# Patient Record
Sex: FEMALE | Race: WHITE | NOT HISPANIC OR LATINO | Employment: OTHER | URBAN - METROPOLITAN AREA
[De-identification: names, ages, dates, MRNs, and addresses within clinical notes are randomized per-mention and may not be internally consistent; named-entity substitution may affect disease eponyms.]

---

## 2017-01-03 ENCOUNTER — HOSPITAL ENCOUNTER (OUTPATIENT)
Dept: LAB | Facility: HOSPITAL | Age: 57
Discharge: HOME | End: 2017-01-03
Attending: INTERNAL MEDICINE | Admitting: INTERNAL MEDICINE

## 2017-07-24 ENCOUNTER — HOSPITAL ENCOUNTER (OUTPATIENT)
Dept: LAB | Facility: HOSPITAL | Age: 57
Discharge: HOME | End: 2017-07-24
Attending: INTERNAL MEDICINE | Admitting: INTERNAL MEDICINE

## 2017-09-25 ENCOUNTER — HOSPITAL ENCOUNTER (OUTPATIENT)
Dept: FAMILY MEDICINE CLINIC | Facility: CLINIC | Age: 57
Setting detail: SPECIMEN
Discharge: HOME | End: 2017-09-25
Attending: FAMILY MEDICINE | Admitting: FAMILY MEDICINE

## 2017-10-12 ENCOUNTER — HOSPITAL ENCOUNTER (OUTPATIENT)
Dept: ORTHOPEDIC SURGERY | Facility: CLINIC | Age: 57
Discharge: HOME | End: 2017-10-12
Attending: PODIATRIST | Admitting: PODIATRIST

## 2017-10-13 ENCOUNTER — HOSPITAL ENCOUNTER (OUTPATIENT)
Dept: CT IMAGING | Facility: HOSPITAL | Age: 57
Discharge: HOME | End: 2017-10-13
Attending: FAMILY MEDICINE | Admitting: FAMILY MEDICINE

## 2017-10-13 LAB — CREAT BLDA-MCNC: 0.7 MG/DL (ref 0.6–1.3)

## 2017-10-23 ENCOUNTER — HOSPITAL ENCOUNTER (OUTPATIENT)
Dept: MAMMOGRAPHY | Facility: HOSPITAL | Age: 57
Discharge: HOME | End: 2017-10-23
Attending: FAMILY MEDICINE | Admitting: FAMILY MEDICINE

## 2018-11-27 ENCOUNTER — HOSPITAL ENCOUNTER (OUTPATIENT)
Dept: MAMMOGRAPHY | Facility: HOSPITAL | Age: 58
Discharge: HOME | End: 2018-11-27
Attending: FAMILY MEDICINE | Admitting: FAMILY MEDICINE

## 2019-07-02 RX ORDER — LOSARTAN POTASSIUM AND HYDROCHLOROTHIAZIDE 25; 100 MG/1; MG/1
1 TABLET ORAL DAILY
COMMUNITY
Start: 2018-07-31 | End: 2019-07-02 | Stop reason: SDUPTHER

## 2019-07-02 RX ORDER — LOSARTAN POTASSIUM AND HYDROCHLOROTHIAZIDE 25; 100 MG/1; MG/1
1 TABLET ORAL DAILY
Qty: 90 TABLET | Refills: 0 | Status: SHIPPED | OUTPATIENT
Start: 2019-07-02 | End: 2019-10-16 | Stop reason: SDUPTHER

## 2019-07-05 RX ORDER — LOSARTAN POTASSIUM AND HYDROCHLOROTHIAZIDE 12.5; 5 MG/1; MG/1
1 TABLET ORAL DAILY
Qty: 180 TABLET | Refills: 0 | OUTPATIENT
Start: 2019-07-05

## 2019-08-07 RX ORDER — ALPRAZOLAM 0.5 MG/1
TABLET ORAL
Qty: 30 TABLET | Refills: 0 | Status: SHIPPED | OUTPATIENT
Start: 2019-08-07 | End: 2019-11-08 | Stop reason: SDUPTHER

## 2019-08-22 ENCOUNTER — PROCEDURE VISIT (OUTPATIENT)
Dept: NEUROLOGY | Facility: CLINIC | Age: 59
End: 2019-08-22

## 2019-08-22 DIAGNOSIS — G56.03 BILATERAL CARPAL TUNNEL SYNDROME: Primary | ICD-10-CM

## 2019-08-22 PROCEDURE — 95886 MUSC TEST DONE W/N TEST COMP: CPT | Performed by: PSYCHIATRY & NEUROLOGY

## 2019-08-22 PROCEDURE — 95910 NRV CNDJ TEST 7-8 STUDIES: CPT | Performed by: PSYCHIATRY & NEUROLOGY

## 2019-08-22 NOTE — PROGRESS NOTES
1EMG and Nerve Conduction Studies    The complete report includes the data sheets.     Referring Doctor:  Alan Villagran MD    Indication:  Bilateral CTS    History: Patient states been going on for 30 years last EMG was in 1989 in [place].     Are you a diabetic? yesHepatitis?noHIV?noPacemaker?noDefibrillator?noBlood thinner?noAre you allergic to latex or tape?no    Results:    1.  The median sensory distal latency is prolonged bilaterally, the median motor distal latency is prolonged bilaterally with normal amplitude responses and conduction velocity in the forearm.    2.  The ulnar motor and sensory conduction studies are normal.    3.  EMG of muscles examined in the C5-T1 myotomes were normal bilaterally except for minor neurogenic changes in the abductor pollicis brevis muscles.      Impression:    This is an abnormal study that supports a diagnosis of moderate bilateral median neuropathy at the wrist.    Joseph Seipel, M.D.

## 2019-09-17 RX ORDER — FENOFIBRATE 160 MG/1
TABLET ORAL
Qty: 90 TABLET | Refills: 0 | OUTPATIENT
Start: 2019-09-17

## 2019-09-25 ENCOUNTER — LAB (OUTPATIENT)
Dept: LAB | Facility: HOSPITAL | Age: 59
End: 2019-09-25

## 2019-09-25 ENCOUNTER — HOSPITAL ENCOUNTER (OUTPATIENT)
Dept: CARDIOLOGY | Facility: HOSPITAL | Age: 59
Discharge: HOME | End: 2019-09-25
Admitting: SURGERY

## 2019-09-25 ENCOUNTER — TRANSCRIBE ORDERS (OUTPATIENT)
Dept: ADMINISTRATIVE | Facility: HOSPITAL | Age: 59
End: 2019-09-25

## 2019-09-25 DIAGNOSIS — Z01.818 PRE-OP TESTING: ICD-10-CM

## 2019-09-25 DIAGNOSIS — G56.03 CARPAL TUNNEL SYNDROME, BILATERAL: ICD-10-CM

## 2019-09-25 DIAGNOSIS — Z01.818 PRE-OP TESTING: Primary | ICD-10-CM

## 2019-09-25 LAB
ANION GAP SERPL CALCULATED.3IONS-SCNC: 14.5 MMOL/L (ref 5–15)
BUN BLD-MCNC: 18 MG/DL (ref 8–23)
BUN/CREAT SERPL: 16.1 (ref 7–25)
CALCIUM SPEC-SCNC: 10.5 MG/DL (ref 8.6–10.5)
CHLORIDE SERPL-SCNC: 101 MMOL/L (ref 98–107)
CO2 SERPL-SCNC: 25.5 MMOL/L (ref 22–29)
CREAT BLD-MCNC: 1.12 MG/DL (ref 0.57–1)
GFR SERPL CREATININE-BSD FRML MDRD: 49 ML/MIN/1.73
GLUCOSE BLD-MCNC: 202 MG/DL (ref 65–99)
POTASSIUM BLD-SCNC: 3.9 MMOL/L (ref 3.5–5.2)
SODIUM BLD-SCNC: 141 MMOL/L (ref 136–145)

## 2019-09-25 PROCEDURE — 93010 ELECTROCARDIOGRAM REPORT: CPT | Performed by: INTERNAL MEDICINE

## 2019-09-25 PROCEDURE — 80048 BASIC METABOLIC PNL TOTAL CA: CPT

## 2019-09-25 PROCEDURE — 93005 ELECTROCARDIOGRAM TRACING: CPT | Performed by: SURGERY

## 2019-09-25 PROCEDURE — 36415 COLL VENOUS BLD VENIPUNCTURE: CPT

## 2019-09-26 ENCOUNTER — OFFICE VISIT (OUTPATIENT)
Dept: FAMILY MEDICINE CLINIC | Facility: CLINIC | Age: 59
End: 2019-09-26

## 2019-09-26 ENCOUNTER — LAB (OUTPATIENT)
Dept: FAMILY MEDICINE CLINIC | Facility: CLINIC | Age: 59
End: 2019-09-26

## 2019-09-26 VITALS
WEIGHT: 170.4 LBS | OXYGEN SATURATION: 97 % | HEART RATE: 79 BPM | DIASTOLIC BLOOD PRESSURE: 91 MMHG | HEIGHT: 64 IN | TEMPERATURE: 98.2 F | SYSTOLIC BLOOD PRESSURE: 163 MMHG | BODY MASS INDEX: 29.09 KG/M2

## 2019-09-26 DIAGNOSIS — E78.2 MIXED HYPERLIPIDEMIA: ICD-10-CM

## 2019-09-26 DIAGNOSIS — I10 ESSENTIAL HYPERTENSION: ICD-10-CM

## 2019-09-26 DIAGNOSIS — E11.49 TYPE 2 DIABETES MELLITUS WITH OTHER DIABETIC NEUROLOGICAL COMPLICATION (HCC): ICD-10-CM

## 2019-09-26 DIAGNOSIS — I10 ESSENTIAL HYPERTENSION: Primary | ICD-10-CM

## 2019-09-26 DIAGNOSIS — R30.0 DYSURIA: ICD-10-CM

## 2019-09-26 LAB
ALBUMIN SERPL-MCNC: 4.4 G/DL (ref 3.5–4.8)
ALBUMIN UR-MCNC: 591 MG/L
ALP SERPL-CCNC: 41 U/L (ref 32–91)
ALT SERPL W P-5'-P-CCNC: 48 U/L (ref 14–54)
ARTICHOKE IGE QN: 63 MG/DL (ref 0–100)
AST SERPL-CCNC: 31 U/L (ref 15–41)
BILIRUB BLD-MCNC: NEGATIVE MG/DL
BILIRUB CONJ SERPL-MCNC: 0 MG/DL (ref 0.1–0.5)
BILIRUB INDIRECT SERPL-MCNC: 0.6 MG/DL (ref 0–1.1)
BILIRUB SERPL-MCNC: 0.6 MG/DL (ref 0.3–1.2)
CHOLEST SERPL-MCNC: 125 MG/DL
CLARITY, POC: CLEAR
COLOR UR: YELLOW
GLUCOSE UR STRIP-MCNC: NEGATIVE MG/DL
HDLC SERPL QL: 3.29
HDLC SERPL-MCNC: 38 MG/DL
KETONES UR QL: NEGATIVE
LDLC/HDLC SERPL: 0.74 {RATIO}
LEUKOCYTE EST, POC: NEGATIVE
NITRITE UR-MCNC: NEGATIVE MG/ML
PH UR: 7 [PH] (ref 5–8)
PROT SERPL-MCNC: 7.1 G/DL (ref 6.1–7.9)
PROT UR STRIP-MCNC: ABNORMAL MG/DL
RBC # UR STRIP: NEGATIVE /UL
SP GR UR: 1.02 (ref 1–1.03)
TRIGL SERPL-MCNC: 295 MG/DL
TSH SERPL DL<=0.05 MIU/L-ACNC: 2.06 UIU/ML (ref 0.34–5.6)
UROBILINOGEN UR QL: NORMAL
VLDLC SERPL-MCNC: 59 MG/DL

## 2019-09-26 PROCEDURE — 80061 LIPID PANEL: CPT | Performed by: FAMILY MEDICINE

## 2019-09-26 PROCEDURE — 36415 COLL VENOUS BLD VENIPUNCTURE: CPT | Performed by: FAMILY MEDICINE

## 2019-09-26 PROCEDURE — 80076 HEPATIC FUNCTION PANEL: CPT | Performed by: FAMILY MEDICINE

## 2019-09-26 PROCEDURE — 83036 HEMOGLOBIN GLYCOSYLATED A1C: CPT | Performed by: FAMILY MEDICINE

## 2019-09-26 PROCEDURE — 84443 ASSAY THYROID STIM HORMONE: CPT | Performed by: FAMILY MEDICINE

## 2019-09-26 PROCEDURE — 99214 OFFICE O/P EST MOD 30 MIN: CPT | Performed by: FAMILY MEDICINE

## 2019-09-26 PROCEDURE — 81003 URINALYSIS AUTO W/O SCOPE: CPT | Performed by: FAMILY MEDICINE

## 2019-09-26 PROCEDURE — 82043 UR ALBUMIN QUANTITATIVE: CPT | Performed by: FAMILY MEDICINE

## 2019-09-26 RX ORDER — SYRINGE-NEEDLE,INSULIN,0.5 ML 27GX1/2"
SYRINGE, EMPTY DISPOSABLE MISCELLANEOUS
COMMUNITY
Start: 2016-03-24 | End: 2019-12-24

## 2019-09-26 RX ORDER — FENOFIBRATE 160 MG/1
TABLET ORAL EVERY 24 HOURS
COMMUNITY
Start: 2017-09-25 | End: 2019-09-30 | Stop reason: ALTCHOICE

## 2019-09-26 RX ORDER — ATORVASTATIN CALCIUM 20 MG/1
TABLET, FILM COATED ORAL EVERY 24 HOURS
COMMUNITY
Start: 2017-09-25 | End: 2019-09-27 | Stop reason: SDUPTHER

## 2019-09-26 RX ORDER — ESTRADIOL 0.1 MG/G
CREAM VAGINAL
COMMUNITY
Start: 2014-09-15 | End: 2019-12-24

## 2019-09-26 RX ORDER — METOPROLOL SUCCINATE 100 MG/1
TABLET, EXTENDED RELEASE ORAL
COMMUNITY
Start: 2019-01-03 | End: 2020-01-17 | Stop reason: SDUPTHER

## 2019-09-26 RX ORDER — ALPRAZOLAM 0.5 MG/1
TABLET ORAL EVERY 12 HOURS
COMMUNITY
Start: 2017-10-11 | End: 2019-12-24

## 2019-09-26 RX ORDER — BLOOD-GLUCOSE METER
KIT MISCELLANEOUS
COMMUNITY
Start: 2018-12-06 | End: 2020-01-17

## 2019-09-26 RX ORDER — MULTIVITAMIN
CAPSULE ORAL
COMMUNITY
Start: 2015-03-10 | End: 2019-12-24

## 2019-09-26 RX ORDER — MINOXIDIL 2 %
SOLUTION, NON-ORAL TOPICAL
COMMUNITY
Start: 2015-09-15 | End: 2019-12-24

## 2019-09-26 NOTE — PROGRESS NOTES
Subjective   Susy Hanson is a 59 y.o. female.     Here for follow up on bp,chol, and dm  BS running 150+  Last eye exam [facility] last year   2500mg metformin daily  24/28 units of humalog am/pm         The following portions of the patient's history were reviewed and updated as appropriate: allergies, current medications, past family history, past medical history, past social history, past surgical history and problem list.  Past Medical History:   Diagnosis Date   • Anxiety    • Depression    • Diabetes mellitus (CMS/HCC)    • DUB (dysfunctional uterine bleeding)    • Hyperlipidemia    • Hypertension    • IBS (irritable bowel syndrome)    • Obesity    • Peripheral neuropathy      Past Surgical History:   Procedure Laterality Date   • CHOLECYSTECTOMY     • HYSTERECTOMY       Family History   Problem Relation Age of Onset   • Alzheimer's disease Father      Social History     Socioeconomic History   • Marital status:      Spouse name: Not on file   • Number of children: Not on file   • Years of education: Not on file   • Highest education level: Not on file   Tobacco Use   • Smoking status: Former Smoker   Substance and Sexual Activity   • Alcohol use: No     Frequency: Never   • Drug use: No         Current Outpatient Medications:   •  ALPRAZolam (XANAX) 0.5 MG tablet, Every 12 (Twelve) Hours., Disp: , Rfl:   •  aspirin 81 MG tablet, ASPIRIN 81 MG ORAL TABLET, Disp: , Rfl:   •  atorvastatin (LIPITOR) 20 MG tablet, Daily., Disp: , Rfl:   •  Blood Glucose Monitoring Suppl (ONE TOUCH ULTRA MINI) w/Device kit, ONETOUCH ULTRA MINI w/Device KIT, Disp: , Rfl:   •  Cholecalciferol (MAXIMUM D3) 94016 units capsule, MAXIMUM D3 25989 UNIT CAPS, Disp: , Rfl:   •  estradiol (ESTRACE VAGINAL) 0.1 MG/GM vaginal cream, ESTRACE 0.1 MG/GM CREA, Disp: , Rfl:   •  fenofibrate 160 MG tablet, Daily., Disp: , Rfl:   •  glucose blood (ONE TOUCH ULTRA TEST) test strip, ONETOUCH ULTRA BLUE STRP, Disp: , Rfl:   •  insulin  "lispro protamine-insulin lispro (HUMALOG MIX 75/25) (75-25) 100 UNIT/ML suspension injection, HUMALOG MIX 75/25 (75-25) 100 UNIT/ML SUSP, Disp: , Rfl:   •  Insulin Pen Needle (BD PEN NEEDLE NAYLA U/F) 32G X 4 MM misc, BD PEN NEEDLE NAYLA U/F 32G X 4 MM, Disp: , Rfl:   •  Insulin Syringe-Needle U-100 (B-D INS SYR ULTRAFINE 1CC/31G) 31G X 5/16\" 1 ML misc, BD INSULIN SYR ULTRAFINE II 31G X 5/16\" 1 ML, Disp: , Rfl:   •  metFORMIN (GLUCOPHAGE) 1000 MG tablet, METFORMIN HCL 1000 MG TABS, Disp: , Rfl:   •  metoprolol succinate XL (TOPROL XL) 100 MG 24 hr tablet, TOPROL  MG XR24H-TAB, Disp: , Rfl:   •  Multiple Vitamin (MULTIVITAMIN) capsule, MULTIVITAMINS CAPS, Disp: , Rfl:   •  Omega-3 Fatty Acids ([employer] FISH OIL) 1200 MG capsule, [employer] FISH OIL 1200 MG CAPS, Disp: , Rfl:   •  ALPRAZolam (XANAX) 0.5 MG tablet, TAKE 1 TABLET BY MOUTH TWICE DAILY AS NEEDED FOR ANXIETY, Disp: 30 tablet, Rfl: 0  •  losartan-hydrochlorothiazide (HYZAAR) 100-25 MG per tablet, Take 1 tablet by mouth Daily., Disp: 90 tablet, Rfl: 0    Review of Systems   Constitutional: Negative for diaphoresis, fatigue, fever, unexpected weight gain and unexpected weight loss.   Respiratory: Negative for cough, chest tightness and shortness of breath.    Cardiovascular: Negative for chest pain, palpitations and leg swelling.   Gastrointestinal: Negative for nausea and vomiting.   Endocrine: Negative.    Genitourinary: Positive for dysuria. Negative for hematuria.   Neurological: Negative for dizziness, syncope, numbness and headache.     /91 (BP Location: Left arm, Patient Position: Sitting, Cuff Size: Adult)   Pulse 79   Temp 98.2 °F (36.8 °C) (Oral)   Ht 162.6 cm (64\")   Wt 77.3 kg (170 lb 6.4 oz)   SpO2 97%   BMI 29.25 kg/m²       Objective   Physical Exam   Constitutional: She appears well-developed and well-nourished. No distress. She appears overweight.   HENT:   Head: Normocephalic and atraumatic.   Neck: Neck supple. No JVD present. No " thyromegaly present.   Cardiovascular: Normal rate, regular rhythm, normal heart sounds and intact distal pulses. Exam reveals no gallop and no friction rub.   No murmur heard.  Pulmonary/Chest: Effort normal and breath sounds normal. No respiratory distress. She has no wheezes. She has no rales.   Abdominal: Soft. Bowel sounds are normal. There is no tenderness. There is no CVA tenderness.   Musculoskeletal: She exhibits no edema.    Susy had a diabetic foot exam performed today.   During the foot exam she had a monofilament test not performed.  Vascular Status -  Her right foot exhibits normal foot vasculature  and no edema. Her left foot exhibits normal foot vasculature  and no edema.  Skin Integrity  -  Her right foot skin is intact. She has callous right foot.  She has no right foot onychomycosis, no right foot ulcer, no ingrown toenail on right foot, right heel is not dry and cracked, no right foot warmth, no right foot blister and no right foot gangrenous changes.Her left foot skin is intact.She has callous left foot. She has no left foot onychomycosis, no left foot ulcer, no left ingrown toenail, no left heel dry and cracked, no left foot warmth, no left foot blister and no left foot gangrenous changes..   Foot Structure and Biomechanics -  Her right foot has no hallux valgus, no pes cavus, no claw toes and no hammer toes present. Her left foot has no hallux valgus, no pes cavus, no claw toes and no hammer toes.  Lymphadenopathy:     She has no cervical adenopathy.   Neurological: She is alert.   Skin: Skin is warm and dry. Turgor is normal.   Psychiatric: Her mood appears anxious.   Nursing note and vitals reviewed.    Brief Urine Lab Results  (Last result in the past 365 days)      Color   Clarity   Blood   Leuk Est   Nitrite   Protein   CREAT   Urine HCG        09/26/19 1618 Yellow Clear Negative Negative Negative 100 mg/dL               Assessment/Plan   Problems Addressed this Visit         Cardiovascular and Mediastinum    Hypertension - Primary    Relevant Medications    metoprolol succinate XL (TOPROL XL) 100 MG 24 hr tablet    Other Relevant Orders    Lipid Panel (Completed)    Hepatic Function Panel (Completed)    Mixed hyperlipidemia    Relevant Medications    atorvastatin (LIPITOR) 20 MG tablet    fenofibrate 160 MG tablet    Other Relevant Orders    Lipid Panel (Completed)    Hepatic Function Panel (Completed)       Endocrine    Type 2 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 (CMS/HCC)    Relevant Medications    insulin lispro protamine-insulin lispro (HUMALOG MIX 75/25) (75-25) 100 UNIT/ML suspension injection    metFORMIN (GLUCOPHAGE) 1000 MG tablet    Other Relevant Orders    Hemoglobin A1c    Lipid Panel (Completed)    MicroAlbumin, Urine, Random - Urine, Clean Catch (Completed)    TSH (Completed)    Hepatic Function Panel (Completed)      Other Visit Diagnoses     Dysuria        UA normal    Relevant Orders    POC Urinalysis Dipstick, Automated (Completed)          She will get flu shot at work.  Stress reduction encouraged.

## 2019-09-26 NOTE — PATIENT INSTRUCTIONS
Have bp checked daily for a week and send them to me  Keep working to lose weight through healthy eating and exercise.  No fried foods and limit pasta, bread, and sweets.

## 2019-09-27 LAB — HBA1C MFR BLD: 7.8 % (ref 3.5–5.6)

## 2019-09-29 RX ORDER — INSULIN LISPRO 100 [IU]/ML
INJECTION, SUSPENSION SUBCUTANEOUS
Qty: 45 ML | Refills: 4 | Status: SHIPPED | OUTPATIENT
Start: 2019-09-29 | End: 2020-01-17

## 2019-09-29 RX ORDER — ATORVASTATIN CALCIUM 20 MG/1
20 TABLET, FILM COATED ORAL EVERY 24 HOURS
Qty: 90 TABLET | Refills: 3 | Status: SHIPPED | OUTPATIENT
Start: 2019-09-29 | End: 2019-12-24

## 2019-09-29 RX ORDER — FENOFIBRATE 160 MG/1
160 TABLET ORAL EVERY 24 HOURS
Qty: 90 TABLET | Refills: 0 | OUTPATIENT
Start: 2019-09-29

## 2019-09-30 RX ORDER — ICOSAPENT ETHYL 1000 MG/1
2 CAPSULE ORAL 2 TIMES DAILY WITH MEALS
Qty: 360 CAPSULE | Refills: 3 | Status: SHIPPED | OUTPATIENT
Start: 2019-09-30 | End: 2019-12-24

## 2019-10-03 ENCOUNTER — TELEPHONE (OUTPATIENT)
Dept: FAMILY MEDICINE CLINIC | Facility: CLINIC | Age: 59
End: 2019-10-03

## 2019-10-03 NOTE — TELEPHONE ENCOUNTER
Pt needs  a rx for one touch ultra test strips and pen needles 4ml x32 guage and lancets  sent to jaylan at [facility]

## 2019-10-04 RX ORDER — LANCETS
EACH MISCELLANEOUS
Qty: 200 EACH | Refills: 3 | Status: SHIPPED | OUTPATIENT
Start: 2019-10-04 | End: 2020-01-17

## 2019-10-16 RX ORDER — LOSARTAN POTASSIUM AND HYDROCHLOROTHIAZIDE 25; 100 MG/1; MG/1
1 TABLET ORAL DAILY
Qty: 90 TABLET | Refills: 3 | Status: SHIPPED | OUTPATIENT
Start: 2019-10-16 | End: 2019-10-18 | Stop reason: ALTCHOICE

## 2019-10-18 ENCOUNTER — TELEPHONE (OUTPATIENT)
Dept: FAMILY MEDICINE CLINIC | Facility: CLINIC | Age: 59
End: 2019-10-18

## 2019-10-18 RX ORDER — LOSARTAN POTASSIUM 100 MG/1
100 TABLET ORAL DAILY
Qty: 90 TABLET | Refills: 3 | Status: SHIPPED | OUTPATIENT
Start: 2019-10-18 | End: 2020-09-29 | Stop reason: SDUPTHER

## 2019-10-18 RX ORDER — HYDROCHLOROTHIAZIDE 25 MG/1
25 TABLET ORAL DAILY
Qty: 90 TABLET | Refills: 3 | Status: SHIPPED | OUTPATIENT
Start: 2019-10-18 | End: 2020-09-29 | Stop reason: SDUPTHER

## 2019-10-28 RX ORDER — ATORVASTATIN CALCIUM 20 MG/1
20 TABLET, FILM COATED ORAL EVERY 24 HOURS
Qty: 90 TABLET | Refills: 0 | OUTPATIENT
Start: 2019-10-28

## 2019-11-01 DIAGNOSIS — Z12.31 BREAST CANCER SCREENING BY MAMMOGRAM: Primary | ICD-10-CM

## 2019-11-08 RX ORDER — ALPRAZOLAM 0.5 MG/1
0.5 TABLET ORAL 2 TIMES DAILY PRN
Qty: 30 TABLET | Refills: 0 | Status: SHIPPED | OUTPATIENT
Start: 2019-11-08 | End: 2020-03-04

## 2019-11-11 RX ORDER — NIACIN 500 MG/1
500 TABLET, EXTENDED RELEASE ORAL NIGHTLY
Qty: 90 TABLET | Refills: 1 | Status: SHIPPED | OUTPATIENT
Start: 2019-11-11 | End: 2020-04-08

## 2019-11-15 ENCOUNTER — HOSPITAL ENCOUNTER (OUTPATIENT)
Dept: MAMMOGRAPHY | Facility: HOSPITAL | Age: 59
Discharge: HOME | End: 2019-11-15
Admitting: FAMILY MEDICINE

## 2019-11-15 DIAGNOSIS — Z12.31 BREAST CANCER SCREENING BY MAMMOGRAM: ICD-10-CM

## 2019-11-15 PROCEDURE — 77067 SCR MAMMO BI INCL CAD: CPT

## 2019-11-15 PROCEDURE — 77063 BREAST TOMOSYNTHESIS BI: CPT

## 2019-11-27 ENCOUNTER — TELEPHONE (OUTPATIENT)
Dept: FAMILY MEDICINE CLINIC | Facility: CLINIC | Age: 59
End: 2019-11-27

## 2019-12-02 ENCOUNTER — TREATMENT (OUTPATIENT)
Dept: PHYSICAL THERAPY | Facility: CLINIC | Age: 59
End: 2019-12-02

## 2019-12-02 ENCOUNTER — TRANSCRIBE ORDERS (OUTPATIENT)
Dept: PHYSICAL THERAPY | Facility: CLINIC | Age: 59
End: 2019-12-02

## 2019-12-02 DIAGNOSIS — G56.01 CARPAL TUNNEL SYNDROME ON RIGHT: Primary | ICD-10-CM

## 2019-12-02 DIAGNOSIS — G56.01 CARPAL TUNNEL SYNDROME OF RIGHT WRIST: Primary | ICD-10-CM

## 2019-12-02 PROCEDURE — 97165 OT EVAL LOW COMPLEX 30 MIN: CPT | Performed by: OCCUPATIONAL THERAPIST

## 2019-12-02 PROCEDURE — 97140 MANUAL THERAPY 1/> REGIONS: CPT | Performed by: OCCUPATIONAL THERAPIST

## 2019-12-02 PROCEDURE — 97110 THERAPEUTIC EXERCISES: CPT | Performed by: OCCUPATIONAL THERAPIST

## 2019-12-02 NOTE — PROGRESS NOTES
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    The pt had left CTS done  In Oct which is healing well   She is here today due to CTS that was done on her right hand in    She reports that she feels it is tight and difficult to make a fist and use her hand   She reports no other major co morbidities       Objective     Pt is right hand dominate and right hand affected   ROM of the wrist in flex  55, ext 55  Radial dev is 25 and ulnar is 30   on the right is 35 and the left is 30  Dexterity on the right 9 hole peg is 20.93 sec and the left is 24 sec   She reports no sensory impairments and the numbness and tingling that she would experience at night is gone   She is able to make a complete fist and opposition is complete   She reports no deficits in ADL     IADL and performing some heavier manual tasks such as ; lifting, carrying, resistive rotation such as opening a jar present some minor issues    Assessment & Plan     Assessment  Impairments: impaired physical strength  Assessment details: Due to the tenderness and reported \"thickness in the palm\"  The pt presents with the above impairments   Prognosis: good  Functional Limitations: carrying objects, lifting, pushing and unable to perform repetitive tasks  Goals  Plan Goals: STG   Educate to HEP     LTG  Reduce the scar thickness at surgery site to equal same as left   LTG  Increase strength of lumbrical / strength     Pt is indicated for skilled OT      Plan  Planned modality interventions: " ultrasound  Planned therapy interventions: manual therapy, soft tissue mobilization, strengthening, stretching, therapeutic activities, joint mobilization, home exercise program, flexibility, functional ROM exercises and fine motor coordination training  Frequency: 2x week  Plan details: 10        History # of Personal Factors and/or Comorbidities: LOW (0)  Examination of Body System(s): # of elements: LOW (1-2)  Clinical Presentation: STABLE   Clinical Decision Making: LOW       Timed Codes        Manual Therapy:    15     mins  08649;    Therapeutic Exercise:    15     mins  71789;     Neuromuscular Ronna:        mins  01545;    Therapeutic Activity:          mins  58846;      Ultrasound:          mins  61981;    Community/ work         mins  86216  Self Care/ Danielle         mins  67904  Sensory Re-Int.                  mins   17618  Cognitve Re-train         mins  25734     Un-timed Codes  Electrical Stimulation:         mins 9 52496 ( );  OT low eval     30     mins  60192  OT mod eval.                   mins   05901  OT high eval          mins 44845        Timed Treatment:   30   mins   Total Treatment:     60   mins    OT SIGNATURE: Joy Wu OT   DATE TREATMENT INITIATED: 12/2/2019    Initial Certification  Certification Period: 3/1/2020  I certify that the therapy services are furnished while this patient is under my care.  The services outlined above are required by this patient, and will be reviewed every 90 days.     PHYSICIAN: Florin Montoya MD      DATE:     Please sign and return via fax to [phone].. Thank you, [facility] Occupational Therapy.

## 2019-12-06 ENCOUNTER — TREATMENT (OUTPATIENT)
Dept: PHYSICAL THERAPY | Facility: CLINIC | Age: 59
End: 2019-12-06

## 2019-12-06 DIAGNOSIS — G56.01 CARPAL TUNNEL SYNDROME ON RIGHT: Primary | ICD-10-CM

## 2019-12-06 PROCEDURE — 97110 THERAPEUTIC EXERCISES: CPT | Performed by: OCCUPATIONAL THERAPIST

## 2019-12-06 PROCEDURE — 97140 MANUAL THERAPY 1/> REGIONS: CPT | Performed by: OCCUPATIONAL THERAPIST

## 2019-12-06 NOTE — PROGRESS NOTES
OccupationalTherapy Daily Progress Note        Patient: Susy Hanson   : 1960  Diagnosis/ICD-10 Code:  Carpal tunnel syndrome on right [G56.01]  Referring practitioner: Florin Montoya MD  Date of Initial Visit: Type: THERAPY  Noted: 2019  Today's Date: 2019  Patient seen for 2 sessions             Subjective  The scar tissue on my right is more than left    I did fall on this hand after my surgery       Objective   See Exercise, Manual, and Modality Logs for complete treatment.   US  And scar massage and stretch to the incision healed area    ROM of the wrist is WFL:   MMT of wrist is good at 4/5   and sustained   Good no pain reports        Assessment/Plan    Progress per Plan of Care           Timed Codes        Manual Therapy:    15     mins  96889;    Therapeutic Exercise:    15     mins  25331;     Neuromuscular Ronna:        mins  51016;    Therapeutic Activity:          mins  73090;      Ultrasound:          mins  53713;    Community/ work         mins  64423  Self Care/ Danielle         mins  41617  Sensory Re-Int.                  mins   93444  Cognitve Re-train         mins  22936     Un-timed Codes  Electrical Stimulation:         mins 9 98692 ( );      Timed Treatment:   30   mins   Total Treatment:     30   mins    Joy Wu OT  Occupational Therapist

## 2019-12-24 ENCOUNTER — OFFICE VISIT (OUTPATIENT)
Dept: FAMILY MEDICINE CLINIC | Facility: CLINIC | Age: 59
End: 2019-12-24

## 2019-12-24 VITALS
HEART RATE: 85 BPM | SYSTOLIC BLOOD PRESSURE: 193 MMHG | WEIGHT: 165.8 LBS | OXYGEN SATURATION: 99 % | BODY MASS INDEX: 28.31 KG/M2 | DIASTOLIC BLOOD PRESSURE: 107 MMHG | HEIGHT: 64 IN

## 2019-12-24 DIAGNOSIS — I10 ESSENTIAL HYPERTENSION: Primary | ICD-10-CM

## 2019-12-24 LAB
ALBUMIN SERPL-MCNC: 4.4 G/DL (ref 3.5–5.2)
ALBUMIN/GLOB SERPL: 1.6 G/DL
ALP SERPL-CCNC: 69 U/L (ref 39–117)
ALT SERPL W P-5'-P-CCNC: 35 U/L (ref 1–33)
ANION GAP SERPL CALCULATED.3IONS-SCNC: 13.9 MMOL/L (ref 5–15)
AST SERPL-CCNC: 21 U/L (ref 1–32)
BASOPHILS # BLD AUTO: 0.08 10*3/MM3 (ref 0–0.2)
BASOPHILS NFR BLD AUTO: 1.4 % (ref 0–1.5)
BILIRUB SERPL-MCNC: 0.2 MG/DL (ref 0.2–1.2)
BUN BLD-MCNC: 12 MG/DL (ref 8–23)
BUN/CREAT SERPL: 13.8 (ref 7–25)
CALCIUM SPEC-SCNC: 9.7 MG/DL (ref 8.6–10.5)
CHLORIDE SERPL-SCNC: 98 MMOL/L (ref 98–107)
CO2 SERPL-SCNC: 27.1 MMOL/L (ref 22–29)
CREAT BLD-MCNC: 0.87 MG/DL (ref 0.57–1)
DEPRECATED RDW RBC AUTO: 40.7 FL (ref 37–54)
EOSINOPHIL # BLD AUTO: 0.39 10*3/MM3 (ref 0–0.4)
EOSINOPHIL NFR BLD AUTO: 6.9 % (ref 0.3–6.2)
ERYTHROCYTE [DISTWIDTH] IN BLOOD BY AUTOMATED COUNT: 13.2 % (ref 12.3–15.4)
GFR SERPL CREATININE-BSD FRML MDRD: 66 ML/MIN/1.73
GLOBULIN UR ELPH-MCNC: 2.8 GM/DL
GLUCOSE BLD-MCNC: 209 MG/DL (ref 65–99)
HCT VFR BLD AUTO: 39.4 % (ref 34–46.6)
HGB BLD-MCNC: 12.9 G/DL (ref 12–15.9)
IMM GRANULOCYTES # BLD AUTO: 0.02 10*3/MM3 (ref 0–0.05)
IMM GRANULOCYTES NFR BLD AUTO: 0.4 % (ref 0–0.5)
LYMPHOCYTES # BLD AUTO: 1.77 10*3/MM3 (ref 0.7–3.1)
LYMPHOCYTES NFR BLD AUTO: 31.2 % (ref 19.6–45.3)
MCH RBC QN AUTO: 28.2 PG (ref 26.6–33)
MCHC RBC AUTO-ENTMCNC: 32.7 G/DL (ref 31.5–35.7)
MCV RBC AUTO: 86 FL (ref 79–97)
MONOCYTES # BLD AUTO: 0.55 10*3/MM3 (ref 0.1–0.9)
MONOCYTES NFR BLD AUTO: 9.7 % (ref 5–12)
NEUTROPHILS # BLD AUTO: 2.87 10*3/MM3 (ref 1.7–7)
NEUTROPHILS NFR BLD AUTO: 50.4 % (ref 42.7–76)
NRBC BLD AUTO-RTO: 0 /100 WBC (ref 0–0.2)
PLATELET # BLD AUTO: 251 10*3/MM3 (ref 140–450)
PMV BLD AUTO: 10.8 FL (ref 6–12)
POTASSIUM BLD-SCNC: 4.6 MMOL/L (ref 3.5–5.2)
PROT SERPL-MCNC: 7.2 G/DL (ref 6–8.5)
RBC # BLD AUTO: 4.58 10*6/MM3 (ref 3.77–5.28)
SODIUM BLD-SCNC: 139 MMOL/L (ref 136–145)
WBC NRBC COR # BLD: 5.68 10*3/MM3 (ref 3.4–10.8)

## 2019-12-24 PROCEDURE — 85025 COMPLETE CBC W/AUTO DIFF WBC: CPT | Performed by: NURSE PRACTITIONER

## 2019-12-24 PROCEDURE — 80053 COMPREHEN METABOLIC PANEL: CPT | Performed by: NURSE PRACTITIONER

## 2019-12-24 PROCEDURE — 99213 OFFICE O/P EST LOW 20 MIN: CPT | Performed by: NURSE PRACTITIONER

## 2019-12-24 RX ORDER — HYDRALAZINE HYDROCHLORIDE 25 MG/1
25 TABLET, FILM COATED ORAL 2 TIMES DAILY
Qty: 60 TABLET | Refills: 1 | Status: SHIPPED | OUTPATIENT
Start: 2019-12-24 | End: 2019-12-26

## 2019-12-24 NOTE — PROGRESS NOTES
Subjective   Susy Hanson is a 59 y.o. female.       HPI   Pt. Is here today with c/o BP running high.  She takes medication for HTN daily; currently on metoprolol XL; Losartan; HCTZ.  For past couple of days took an extra metoprolol; BP still high.  Pt.  Has been having intermittent headaches that are stabbing at times.  Denies any CP; palpations; SOA; dizziness; trouble with vision, N/V.     BP readings at home for past two weeks: 182/105; 193/102; 199/114; 184/122; 164/96' 169/106; 168/94; 157/101; 174/99; 189/109.       The following portions of the patient's history were reviewed and updated as appropriate: allergies, current medications, past family history, past medical history, past social history, past surgical history and problem list.    Review of Systems   Constitutional: Negative for activity change, appetite change, chills, diaphoresis, fatigue, fever, unexpected weight gain and unexpected weight loss.   HENT: Negative for ear pain, hearing loss and nosebleeds.    Eyes: Negative for blurred vision, double vision, photophobia and visual disturbance.   Respiratory: Negative for cough, shortness of breath and wheezing.    Cardiovascular: Negative for chest pain, palpitations and leg swelling.   Gastrointestinal: Negative for abdominal pain, constipation, diarrhea, nausea, vomiting and indigestion.   Genitourinary: Negative for dysuria.   Musculoskeletal: Negative for arthralgias, myalgias, neck pain and neck stiffness.   Skin: Negative for rash.   Neurological: Positive for headache. Negative for dizziness, tremors, seizures, syncope, facial asymmetry, speech difficulty, weakness, light-headedness, numbness, memory problem and confusion.   Psychiatric/Behavioral: Negative for sleep disturbance, depressed mood and stress. The patient is not nervous/anxious.        Objective   Physical Exam   Constitutional: She is oriented to person, place, and time. She appears well-developed and well-nourished. No  distress.   HENT:   Head: Normocephalic and atraumatic.   Eyes: Pupils are equal, round, and reactive to light. Conjunctivae and EOM are normal.   Neck: Normal range of motion. Neck supple. No JVD present. No thyromegaly present.   Cardiovascular: Normal rate, regular rhythm, normal heart sounds and intact distal pulses. Exam reveals no gallop and no friction rub.   No murmur heard.  Pulmonary/Chest: Effort normal and breath sounds normal. No respiratory distress. She exhibits no tenderness.   Abdominal: Soft. Bowel sounds are normal. She exhibits no distension. There is no tenderness.   Lymphadenopathy:     She has no cervical adenopathy.   Neurological: She is alert and oriented to person, place, and time.   Skin: Skin is warm and dry. Capillary refill takes less than 2 seconds. No rash noted. She is not diaphoretic. No erythema.   Psychiatric: She has a normal mood and affect. Her behavior is normal.   Vitals reviewed.        Assessment/Plan   Susy was seen today for hypertension and headache.    Diagnoses and all orders for this visit:    Essential hypertension  Comments:  Cont. current medications.  Will add hydralazine 25mg bid.   Monitor BP at home over next couple of days; call in readings Thursday.    Reviewed warning s/s  Orders:  -     hydrALAZINE (APRESOLINE) 25 MG tablet; Take 1 tablet by mouth 2 (Two) Times a Day.  -     CBC Auto Differential  -     Comprehensive Metabolic Panel

## 2019-12-24 NOTE — PATIENT INSTRUCTIONS
"Monitor BP at home over the next week; call in readings for review [phone].   If BP not improving, call office for appointment.    Go to ER for ANY chest pain, shortness of breath, dizziness, headache, trouble with vision.      Hypertension  Hypertension, commonly called high blood pressure, is when the force of blood pumping through the arteries is too strong. The arteries are the blood vessels that carry blood from the heart throughout the body. Hypertension forces the heart to work harder to pump blood and may cause arteries to become narrow or stiff. Having untreated or uncontrolled hypertension can cause heart attacks, strokes, kidney disease, and other problems.  A blood pressure reading consists of a higher number over a lower number. Ideally, your blood pressure should be below 120/80. The first (\"top\") number is called the systolic pressure. It is a measure of the pressure in your arteries as your heart beats. The second (\"bottom\") number is called the diastolic pressure. It is a measure of the pressure in your arteries as the heart relaxes.  What are the causes?  The cause of this condition is not known.  What increases the risk?  Some risk factors for high blood pressure are under your control. Others are not.  Factors you can change  · Smoking.  · Having type 2 diabetes mellitus, high cholesterol, or both.  · Not getting enough exercise or physical activity.  · Being overweight.  · Having too much fat, sugar, calories, or salt (sodium) in your diet.  · Drinking too much alcohol.  Factors that are difficult or impossible to change  · Having chronic kidney disease.  · Having a family history of high blood pressure.  · Age. Risk increases with age.  · Race. You may be at higher risk if you are -American.  · Gender. Men are at higher risk than women before age 45. After age 65, women are at higher risk than men.  · Having obstructive sleep apnea.  · Stress.  What are the signs or " symptoms?  Extremely high blood pressure (hypertensive crisis) may cause:  · Headache.  · Anxiety.  · Shortness of breath.  · Nosebleed.  · Nausea and vomiting.  · Severe chest pain.  · Jerky movements you cannot control (seizures).  How is this diagnosed?  This condition is diagnosed by measuring your blood pressure while you are seated, with your arm resting on a surface. The cuff of the blood pressure monitor will be placed directly against the skin of your upper arm at the level of your heart. It should be measured at least twice using the same arm. Certain conditions can cause a difference in blood pressure between your right and left arms.  Certain factors can cause blood pressure readings to be lower or higher than normal (elevated) for a short period of time:  · When your blood pressure is higher when you are in a health care provider's office than when you are at home, this is called white coat hypertension. Most people with this condition do not need medicines.  · When your blood pressure is higher at home than when you are in a health care provider's office, this is called masked hypertension. Most people with this condition may need medicines to control blood pressure.  If you have a high blood pressure reading during one visit or you have normal blood pressure with other risk factors:  · You may be asked to return on a different day to have your blood pressure checked again.  · You may be asked to monitor your blood pressure at home for 1 week or longer.  If you are diagnosed with hypertension, you may have other blood or imaging tests to help your health care provider understand your overall risk for other conditions.  How is this treated?  This condition is treated by making healthy lifestyle changes, such as eating healthy foods, exercising more, and reducing your alcohol intake. Your health care provider may prescribe medicine if lifestyle changes are not enough to get your blood pressure under  control, and if:  · Your systolic blood pressure is above 130.  · Your diastolic blood pressure is above 80.  Your personal target blood pressure may vary depending on your medical conditions, your age, and other factors.  Follow these instructions at home:  Eating and drinking    · Eat a diet that is high in fiber and potassium, and low in sodium, added sugar, and fat. An example eating plan is called the DASH (Dietary Approaches to Stop Hypertension) diet. To eat this way:  ? Eat plenty of fresh fruits and vegetables. Try to fill half of your plate at each meal with fruits and vegetables.  ? Eat whole grains, such as whole wheat pasta, brown rice, or whole grain bread. Fill about one quarter of your plate with whole grains.  ? Eat or drink low-fat dairy products, such as skim milk or low-fat yogurt.  ? Avoid fatty cuts of meat, processed or cured meats, and poultry with skin. Fill about one quarter of your plate with lean proteins, such as fish, chicken without skin, beans, eggs, and tofu.  ? Avoid premade and processed foods. These tend to be higher in sodium, added sugar, and fat.  · Reduce your daily sodium intake. Most people with hypertension should eat less than 1,500 mg of sodium a day.  · Limit alcohol intake to no more than 1 drink a day for nonpregnant women and 2 drinks a day for men. One drink equals 12 oz of beer, 5 oz of wine, or 1½ oz of hard liquor.  Lifestyle    · Work with your health care provider to maintain a healthy body weight or to lose weight. Ask what an ideal weight is for you.  · Get at least 30 minutes of exercise that causes your heart to beat faster (aerobic exercise) most days of the week. Activities may include walking, swimming, or biking.  · Include exercise to strengthen your muscles (resistance exercise), such as pilates or lifting weights, as part of your weekly exercise routine. Try to do these types of exercises for 30 minutes at least 3 days a week.  · Do not use any  products that contain nicotine or tobacco, such as cigarettes and e-cigarettes. If you need help quitting, ask your health care provider.  · Monitor your blood pressure at home as told by your health care provider.  · Keep all follow-up visits as told by your health care provider. This is important.  Medicines  · Take over-the-counter and prescription medicines only as told by your health care provider. Follow directions carefully. Blood pressure medicines must be taken as prescribed.  · Do not skip doses of blood pressure medicine. Doing this puts you at risk for problems and can make the medicine less effective.  · Ask your health care provider about side effects or reactions to medicines that you should watch for.  Contact a health care provider if:  · You think you are having a reaction to a medicine you are taking.  · You have headaches that keep coming back (recurring).  · You feel dizzy.  · You have swelling in your ankles.  · You have trouble with your vision.  Get help right away if:  · You develop a severe headache or confusion.  · You have unusual weakness or numbness.  · You feel faint.  · You have severe pain in your chest or abdomen.  · You vomit repeatedly.  · You have trouble breathing.  Summary  · Hypertension is when the force of blood pumping through your arteries is too strong. If this condition is not controlled, it may put you at risk for serious complications.  · Your personal target blood pressure may vary depending on your medical conditions, your age, and other factors. For most people, a normal blood pressure is less than 120/80.  · Hypertension is treated with lifestyle changes, medicines, or a combination of both. Lifestyle changes include weight loss, eating a healthy, low-sodium diet, exercising more, and limiting alcohol.  This information is not intended to replace advice given to you by your health care provider. Make sure you discuss any questions you have with your health care  provider.  Document Released: 12/18/2006 Document Revised: 11/15/2017 Document Reviewed: 11/15/2017  [employer] Patient Education © 2019 [employer]

## 2019-12-26 DIAGNOSIS — I10 ESSENTIAL HYPERTENSION: ICD-10-CM

## 2019-12-26 RX ORDER — HYDRALAZINE HYDROCHLORIDE 25 MG/1
TABLET, FILM COATED ORAL
Qty: 180 TABLET | Refills: 1 | Status: SHIPPED | OUTPATIENT
Start: 2019-12-26 | End: 2020-01-07 | Stop reason: SDUPTHER

## 2020-01-02 ENCOUNTER — TELEPHONE (OUTPATIENT)
Dept: FAMILY MEDICINE CLINIC | Facility: CLINIC | Age: 60
End: 2020-01-02

## 2020-01-07 DIAGNOSIS — I10 ESSENTIAL HYPERTENSION: ICD-10-CM

## 2020-01-07 RX ORDER — HYDRALAZINE HYDROCHLORIDE 50 MG/1
50 TABLET, FILM COATED ORAL 2 TIMES DAILY
Qty: 180 TABLET | Refills: 0 | Status: ON HOLD | OUTPATIENT
Start: 2020-01-07 | End: 2020-01-18 | Stop reason: SDUPTHER

## 2020-01-17 ENCOUNTER — APPOINTMENT (OUTPATIENT)
Dept: CT IMAGING | Facility: HOSPITAL | Age: 60
End: 2020-01-17

## 2020-01-17 ENCOUNTER — HOSPITAL ENCOUNTER (OUTPATIENT)
Facility: HOSPITAL | Age: 60
Setting detail: OBSERVATION
Discharge: HOME | End: 2020-01-18
Attending: EMERGENCY MEDICINE | Admitting: INTERNAL MEDICINE

## 2020-01-17 ENCOUNTER — APPOINTMENT (OUTPATIENT)
Dept: MRI IMAGING | Facility: HOSPITAL | Age: 60
End: 2020-01-17

## 2020-01-17 ENCOUNTER — OFFICE VISIT (OUTPATIENT)
Dept: FAMILY MEDICINE CLINIC | Facility: CLINIC | Age: 60
End: 2020-01-17

## 2020-01-17 VITALS
WEIGHT: 168 LBS | BODY MASS INDEX: 28.84 KG/M2 | DIASTOLIC BLOOD PRESSURE: 113 MMHG | OXYGEN SATURATION: 98 % | HEART RATE: 88 BPM | TEMPERATURE: 97.1 F | SYSTOLIC BLOOD PRESSURE: 192 MMHG

## 2020-01-17 DIAGNOSIS — R42 DIZZY: ICD-10-CM

## 2020-01-17 DIAGNOSIS — I10 ESSENTIAL HYPERTENSION: ICD-10-CM

## 2020-01-17 DIAGNOSIS — R51.9 NONINTRACTABLE HEADACHE, UNSPECIFIED CHRONICITY PATTERN, UNSPECIFIED HEADACHE TYPE: ICD-10-CM

## 2020-01-17 DIAGNOSIS — I16.0 HYPERTENSIVE URGENCY: Primary | ICD-10-CM

## 2020-01-17 DIAGNOSIS — I10 UNCONTROLLED HYPERTENSION: Primary | ICD-10-CM

## 2020-01-17 DIAGNOSIS — I44.7 NEW ONSET LEFT BUNDLE BRANCH BLOCK (LBBB): ICD-10-CM

## 2020-01-17 PROBLEM — G62.9 PERIPHERAL NEUROPATHY: Status: ACTIVE | Noted: 2020-01-17

## 2020-01-17 LAB
ANION GAP SERPL CALCULATED.3IONS-SCNC: 13 MMOL/L (ref 5–15)
BASOPHILS # BLD AUTO: 0.1 10*3/MM3 (ref 0–0.2)
BASOPHILS NFR BLD AUTO: 1.7 % (ref 0–1.5)
BUN BLD-MCNC: 14 MG/DL (ref 8–23)
BUN/CREAT SERPL: 17.9 (ref 7–25)
CALCIUM SPEC-SCNC: 10.4 MG/DL (ref 8.6–10.5)
CHLORIDE SERPL-SCNC: 100 MMOL/L (ref 98–107)
CO2 SERPL-SCNC: 27 MMOL/L (ref 22–29)
CREAT BLD-MCNC: 0.78 MG/DL (ref 0.57–1)
DEPRECATED RDW RBC AUTO: 44.2 FL (ref 37–54)
EOSINOPHIL # BLD AUTO: 0.6 10*3/MM3 (ref 0–0.4)
EOSINOPHIL NFR BLD AUTO: 9.2 % (ref 0.3–6.2)
ERYTHROCYTE [DISTWIDTH] IN BLOOD BY AUTOMATED COUNT: 14.5 % (ref 12.3–15.4)
GFR SERPL CREATININE-BSD FRML MDRD: 75 ML/MIN/1.73
GLUCOSE BLD-MCNC: 85 MG/DL (ref 65–99)
GLUCOSE BLDC GLUCOMTR-MCNC: 162 MG/DL (ref 70–105)
GLUCOSE BLDC GLUCOMTR-MCNC: 72 MG/DL (ref 70–105)
HCT VFR BLD AUTO: 38.8 % (ref 34–46.6)
HGB BLD-MCNC: 13.7 G/DL (ref 12–15.9)
LYMPHOCYTES # BLD AUTO: 1.8 10*3/MM3 (ref 0.7–3.1)
LYMPHOCYTES NFR BLD AUTO: 29 % (ref 19.6–45.3)
MCH RBC QN AUTO: 30.8 PG (ref 26.6–33)
MCHC RBC AUTO-ENTMCNC: 35.3 G/DL (ref 31.5–35.7)
MCV RBC AUTO: 87.2 FL (ref 79–97)
MONOCYTES # BLD AUTO: 0.6 10*3/MM3 (ref 0.1–0.9)
MONOCYTES NFR BLD AUTO: 10.3 % (ref 5–12)
NEUTROPHILS # BLD AUTO: 3.1 10*3/MM3 (ref 1.7–7)
NEUTROPHILS NFR BLD AUTO: 49.8 % (ref 42.7–76)
NRBC BLD AUTO-RTO: 0.1 /100 WBC (ref 0–0.2)
PLATELET # BLD AUTO: 220 10*3/MM3 (ref 140–450)
PMV BLD AUTO: 8.7 FL (ref 6–12)
POTASSIUM BLD-SCNC: 3.9 MMOL/L (ref 3.5–5.2)
RBC # BLD AUTO: 4.45 10*6/MM3 (ref 3.77–5.28)
SODIUM BLD-SCNC: 140 MMOL/L (ref 136–145)
TROPONIN T SERPL-MCNC: <0.01 NG/ML (ref 0–0.03)
TSH SERPL DL<=0.05 MIU/L-ACNC: 1.97 UIU/ML (ref 0.27–4.2)
WBC NRBC COR # BLD: 6.2 10*3/MM3 (ref 3.4–10.8)
WHOLE BLOOD HOLD SPECIMEN: NORMAL
WHOLE BLOOD HOLD SPECIMEN: NORMAL

## 2020-01-17 PROCEDURE — 96376 TX/PRO/DX INJ SAME DRUG ADON: CPT

## 2020-01-17 PROCEDURE — G0378 HOSPITAL OBSERVATION PER HR: HCPCS

## 2020-01-17 PROCEDURE — 99220 PR INITIAL OBSERVATION CARE/DAY 70 MINUTES: CPT | Performed by: INTERNAL MEDICINE

## 2020-01-17 PROCEDURE — 82962 GLUCOSE BLOOD TEST: CPT

## 2020-01-17 PROCEDURE — 63710000001 INSULIN LISPRO PROTAMINE-INSULIN LISPRO (75-25) 100 UNIT/ML SUSPENSION: Performed by: PHYSICIAN ASSISTANT

## 2020-01-17 PROCEDURE — 74175 CTA ABDOMEN W/CONTRAST: CPT

## 2020-01-17 PROCEDURE — 93005 ELECTROCARDIOGRAM TRACING: CPT | Performed by: EMERGENCY MEDICINE

## 2020-01-17 PROCEDURE — 70551 MRI BRAIN STEM W/O DYE: CPT

## 2020-01-17 PROCEDURE — 84484 ASSAY OF TROPONIN QUANT: CPT | Performed by: EMERGENCY MEDICINE

## 2020-01-17 PROCEDURE — 99213 OFFICE O/P EST LOW 20 MIN: CPT | Performed by: NURSE PRACTITIONER

## 2020-01-17 PROCEDURE — 84443 ASSAY THYROID STIM HORMONE: CPT | Performed by: PHYSICIAN ASSISTANT

## 2020-01-17 PROCEDURE — 99284 EMERGENCY DEPT VISIT MOD MDM: CPT

## 2020-01-17 PROCEDURE — 0 IOPAMIDOL PER 1 ML: Performed by: INTERNAL MEDICINE

## 2020-01-17 PROCEDURE — 63710000001 INSULIN LISPRO (HUMAN) PER 5 UNITS: Performed by: PHYSICIAN ASSISTANT

## 2020-01-17 PROCEDURE — 85025 COMPLETE CBC W/AUTO DIFF WBC: CPT | Performed by: EMERGENCY MEDICINE

## 2020-01-17 PROCEDURE — 96374 THER/PROPH/DIAG INJ IV PUSH: CPT

## 2020-01-17 PROCEDURE — 36415 COLL VENOUS BLD VENIPUNCTURE: CPT

## 2020-01-17 PROCEDURE — 84484 ASSAY OF TROPONIN QUANT: CPT | Performed by: PHYSICIAN ASSISTANT

## 2020-01-17 PROCEDURE — 80048 BASIC METABOLIC PNL TOTAL CA: CPT | Performed by: EMERGENCY MEDICINE

## 2020-01-17 RX ORDER — BISACODYL 10 MG
10 SUPPOSITORY, RECTAL RECTAL DAILY PRN
Status: DISCONTINUED | OUTPATIENT
Start: 2020-01-17 | End: 2020-01-18 | Stop reason: HOSPADM

## 2020-01-17 RX ORDER — LABETALOL HYDROCHLORIDE 5 MG/ML
20 INJECTION, SOLUTION INTRAVENOUS ONCE
Status: COMPLETED | OUTPATIENT
Start: 2020-01-17 | End: 2020-01-17

## 2020-01-17 RX ORDER — NICOTINE POLACRILEX 4 MG
15 LOZENGE BUCCAL
Status: DISCONTINUED | OUTPATIENT
Start: 2020-01-17 | End: 2020-01-18 | Stop reason: HOSPADM

## 2020-01-17 RX ORDER — HYDROCHLOROTHIAZIDE 25 MG/1
25 TABLET ORAL DAILY
Status: DISCONTINUED | OUTPATIENT
Start: 2020-01-17 | End: 2020-01-18 | Stop reason: HOSPADM

## 2020-01-17 RX ORDER — ALPRAZOLAM 0.5 MG/1
0.5 TABLET ORAL 2 TIMES DAILY PRN
Status: DISCONTINUED | OUTPATIENT
Start: 2020-01-17 | End: 2020-01-18 | Stop reason: HOSPADM

## 2020-01-17 RX ORDER — HYDRALAZINE HYDROCHLORIDE 25 MG/1
50 TABLET, FILM COATED ORAL 2 TIMES DAILY
Status: DISCONTINUED | OUTPATIENT
Start: 2020-01-17 | End: 2020-01-18 | Stop reason: HOSPADM

## 2020-01-17 RX ORDER — CHOLECALCIFEROL (VITAMIN D3) 125 MCG
5 CAPSULE ORAL NIGHTLY PRN
Status: DISCONTINUED | OUTPATIENT
Start: 2020-01-17 | End: 2020-01-18 | Stop reason: HOSPADM

## 2020-01-17 RX ORDER — ACETAMINOPHEN 325 MG/1
650 TABLET ORAL EVERY 4 HOURS PRN
Status: DISCONTINUED | OUTPATIENT
Start: 2020-01-17 | End: 2020-01-18 | Stop reason: HOSPADM

## 2020-01-17 RX ORDER — SODIUM CHLORIDE 0.9 % (FLUSH) 0.9 %
10 SYRINGE (ML) INJECTION EVERY 12 HOURS SCHEDULED
Status: DISCONTINUED | OUTPATIENT
Start: 2020-01-17 | End: 2020-01-18 | Stop reason: HOSPADM

## 2020-01-17 RX ORDER — ALUMINA, MAGNESIA, AND SIMETHICONE 2400; 2400; 240 MG/30ML; MG/30ML; MG/30ML
15 SUSPENSION ORAL EVERY 6 HOURS PRN
Status: DISCONTINUED | OUTPATIENT
Start: 2020-01-17 | End: 2020-01-18 | Stop reason: HOSPADM

## 2020-01-17 RX ORDER — POTASSIUM CHLORIDE 1.5 G/1.77G
40 POWDER, FOR SOLUTION ORAL AS NEEDED
Status: DISCONTINUED | OUTPATIENT
Start: 2020-01-17 | End: 2020-01-18 | Stop reason: HOSPADM

## 2020-01-17 RX ORDER — SODIUM CHLORIDE 0.9 % (FLUSH) 0.9 %
10 SYRINGE (ML) INJECTION AS NEEDED
Status: DISCONTINUED | OUTPATIENT
Start: 2020-01-17 | End: 2020-01-18 | Stop reason: HOSPADM

## 2020-01-17 RX ORDER — DEXTROSE MONOHYDRATE 25 G/50ML
25 INJECTION, SOLUTION INTRAVENOUS
Status: DISCONTINUED | OUTPATIENT
Start: 2020-01-17 | End: 2020-01-18 | Stop reason: HOSPADM

## 2020-01-17 RX ORDER — MAGNESIUM SULFATE HEPTAHYDRATE 40 MG/ML
2 INJECTION, SOLUTION INTRAVENOUS AS NEEDED
Status: DISCONTINUED | OUTPATIENT
Start: 2020-01-17 | End: 2020-01-18 | Stop reason: HOSPADM

## 2020-01-17 RX ORDER — ACETAMINOPHEN 160 MG/5ML
650 SOLUTION ORAL EVERY 4 HOURS PRN
Status: DISCONTINUED | OUTPATIENT
Start: 2020-01-17 | End: 2020-01-18 | Stop reason: HOSPADM

## 2020-01-17 RX ORDER — DOCUSATE SODIUM 100 MG/1
100 CAPSULE, LIQUID FILLED ORAL 2 TIMES DAILY PRN
Status: DISCONTINUED | OUTPATIENT
Start: 2020-01-17 | End: 2020-01-18 | Stop reason: HOSPADM

## 2020-01-17 RX ORDER — LOSARTAN POTASSIUM 50 MG/1
100 TABLET ORAL DAILY
Status: DISCONTINUED | OUTPATIENT
Start: 2020-01-18 | End: 2020-01-18 | Stop reason: HOSPADM

## 2020-01-17 RX ORDER — ACETAMINOPHEN 650 MG/1
650 SUPPOSITORY RECTAL EVERY 4 HOURS PRN
Status: DISCONTINUED | OUTPATIENT
Start: 2020-01-17 | End: 2020-01-18 | Stop reason: HOSPADM

## 2020-01-17 RX ORDER — LABETALOL HYDROCHLORIDE 5 MG/ML
10 INJECTION, SOLUTION INTRAVENOUS EVERY 6 HOURS PRN
Status: DISCONTINUED | OUTPATIENT
Start: 2020-01-17 | End: 2020-01-18 | Stop reason: HOSPADM

## 2020-01-17 RX ORDER — METOPROLOL SUCCINATE 50 MG/1
100 TABLET, EXTENDED RELEASE ORAL 2 TIMES DAILY
Status: DISCONTINUED | OUTPATIENT
Start: 2020-01-17 | End: 2020-01-18 | Stop reason: HOSPADM

## 2020-01-17 RX ORDER — POTASSIUM CHLORIDE 20 MEQ/1
40 TABLET, EXTENDED RELEASE ORAL AS NEEDED
Status: DISCONTINUED | OUTPATIENT
Start: 2020-01-17 | End: 2020-01-18 | Stop reason: HOSPADM

## 2020-01-17 RX ORDER — HYDRALAZINE HYDROCHLORIDE 25 MG/1
25 TABLET, FILM COATED ORAL 2 TIMES DAILY
COMMUNITY
Start: 2020-01-13 | End: 2020-01-17 | Stop reason: SDUPTHER

## 2020-01-17 RX ORDER — ONDANSETRON 2 MG/ML
4 INJECTION INTRAMUSCULAR; INTRAVENOUS EVERY 6 HOURS PRN
Status: DISCONTINUED | OUTPATIENT
Start: 2020-01-17 | End: 2020-01-18 | Stop reason: HOSPADM

## 2020-01-17 RX ORDER — ONDANSETRON 4 MG/1
4 TABLET, FILM COATED ORAL EVERY 6 HOURS PRN
Status: DISCONTINUED | OUTPATIENT
Start: 2020-01-17 | End: 2020-01-18 | Stop reason: HOSPADM

## 2020-01-17 RX ORDER — MAGNESIUM SULFATE HEPTAHYDRATE 40 MG/ML
4 INJECTION, SOLUTION INTRAVENOUS AS NEEDED
Status: DISCONTINUED | OUTPATIENT
Start: 2020-01-17 | End: 2020-01-18 | Stop reason: HOSPADM

## 2020-01-17 RX ORDER — CALCIUM CARBONATE 200(500)MG
2 TABLET,CHEWABLE ORAL 2 TIMES DAILY PRN
Status: DISCONTINUED | OUTPATIENT
Start: 2020-01-17 | End: 2020-01-18 | Stop reason: HOSPADM

## 2020-01-17 RX ORDER — ATORVASTATIN CALCIUM 20 MG/1
20 TABLET, FILM COATED ORAL DAILY
Status: DISCONTINUED | OUTPATIENT
Start: 2020-01-18 | End: 2020-01-18 | Stop reason: HOSPADM

## 2020-01-17 RX ORDER — ACETAMINOPHEN 500 MG
1000 TABLET ORAL ONCE
Status: COMPLETED | OUTPATIENT
Start: 2020-01-17 | End: 2020-01-17

## 2020-01-17 RX ORDER — AMLODIPINE BESYLATE 5 MG/1
5 TABLET ORAL DAILY
Qty: 30 TABLET | Refills: 1 | Status: SHIPPED | OUTPATIENT
Start: 2020-01-17 | End: 2020-01-17

## 2020-01-17 RX ADMIN — METOPROLOL SUCCINATE 100 MG: 50 TABLET, EXTENDED RELEASE ORAL at 20:54

## 2020-01-17 RX ADMIN — HYDRALAZINE HYDROCHLORIDE 50 MG: 25 TABLET, FILM COATED ORAL at 20:54

## 2020-01-17 RX ADMIN — LABETALOL 20 MG/4 ML (5 MG/ML) INTRAVENOUS SYRINGE 10 MG: at 18:38

## 2020-01-17 RX ADMIN — IOPAMIDOL 100 ML: 755 INJECTION, SOLUTION INTRAVENOUS at 23:15

## 2020-01-17 RX ADMIN — ACETAMINOPHEN 1000 MG: 500 TABLET ORAL at 14:37

## 2020-01-17 RX ADMIN — ACETAMINOPHEN 650 MG: 325 TABLET, FILM COATED ORAL at 18:32

## 2020-01-17 RX ADMIN — HYDROCHLOROTHIAZIDE 25 MG: 25 TABLET ORAL at 18:27

## 2020-01-17 RX ADMIN — ALPRAZOLAM 0.5 MG: 0.5 TABLET ORAL at 21:13

## 2020-01-17 RX ADMIN — INSULIN LISPRO 28 UNITS: 100 INJECTION, SUSPENSION SUBCUTANEOUS at 21:01

## 2020-01-17 RX ADMIN — INSULIN LISPRO 3 UNITS: 100 INJECTION, SOLUTION INTRAVENOUS; SUBCUTANEOUS at 20:58

## 2020-01-17 RX ADMIN — LABETALOL 20 MG/4 ML (5 MG/ML) INTRAVENOUS SYRINGE 20 MG: at 12:49

## 2020-01-17 RX ADMIN — Medication 10 ML: at 20:55

## 2020-01-17 NOTE — PATIENT INSTRUCTIONS
It is advised that you go to the ER now for further evaluation.      You will also be given referral to see a cardiologist for further evaluation.

## 2020-01-17 NOTE — PROGRESS NOTES
Subjective   Susy Hanson is a 59 y.o. female.       HPI   Pt. Is here today to follow up on high blood pressure.  She was initiallyseen on Gabriele Vanda for elevated pressures; hydralazine was added.  She called back a week later with continued high readings at home; hydralazine was doubled.    She is back today with continued high readings; 192/113 today.  She c/o significant headache.  She denies chest pain but mentions feeling a pressure a few times.  Denies any SOA; dizziness; trouble with vision.  Has had some nausea on and off; no vomiting.  She mentions being stressed at work and with caring for her ill mother.  She has Xanax for PRN anxiety and mainly uses that at bedtime if needed.  She says she doesn't want anything else for anxiety.      The following portions of the patient's history were reviewed and updated as appropriate: allergies, current medications, past family history, past medical history, past social history, past surgical history and problem list.    Review of Systems   Constitutional: Negative for activity change, appetite change, chills, diaphoresis, fatigue, fever, unexpected weight gain and unexpected weight loss.   Respiratory: Negative for cough, chest tightness, shortness of breath and wheezing.    Cardiovascular: Negative for chest pain, palpitations and leg swelling.   Gastrointestinal: Positive for nausea. Negative for abdominal pain, diarrhea and vomiting.   Genitourinary: Negative for dysuria, flank pain, frequency and urgency.   Musculoskeletal: Negative for arthralgias and myalgias.   Skin: Negative for rash.   Neurological: Positive for headache. Negative for dizziness, tremors, seizures, syncope, facial asymmetry, speech difficulty, weakness, light-headedness, numbness, memory problem and confusion.   Psychiatric/Behavioral: Positive for stress. Negative for depressed mood. The patient is not nervous/anxious.        Objective   Physical Exam   Constitutional: She is  oriented to person, place, and time. She appears well-developed and well-nourished. No distress.   HENT:   Head: Normocephalic and atraumatic.   Eyes: Pupils are equal, round, and reactive to light. Conjunctivae and EOM are normal.   Neck: Normal range of motion. Neck supple. No JVD present. No thyromegaly present.   Cardiovascular: Normal rate, regular rhythm, normal heart sounds and intact distal pulses. Exam reveals no gallop and no friction rub.   No murmur heard.  Pulmonary/Chest: Effort normal and breath sounds normal. No respiratory distress. She has no wheezes. She exhibits no tenderness.   Abdominal: Soft. Bowel sounds are normal. She exhibits no distension. There is no tenderness.   Musculoskeletal: She exhibits no edema.   Lymphadenopathy:     She has no cervical adenopathy.   Neurological: She is alert and oriented to person, place, and time.   Skin: Skin is warm and dry. Capillary refill takes less than 2 seconds. No rash noted. She is not diaphoretic. No erythema.   Psychiatric: She has a normal mood and affect. Her behavior is normal.   Vitals reviewed.        Assessment/Plan   Susy was seen today for hypertension.    Diagnoses and all orders for this visit:    Uncontrolled hypertension  Comments:  Have reviewed warning S/S and am recommending she go to ER now.  Pt. is agreeable but mentions she might not stay if it's crowded.  Orders:  -     Ambulatory Referral to Cardiology  -     amLODIPine (NORVASC) 5 MG tablet; Take 1 tablet by mouth Daily.    Will also send amlodipine 5mg daily and a referral for evaluation with cardiology.  Warning S/S were reviewed with the patient today.

## 2020-01-17 NOTE — H&P
[facility] Medicine Services    Patient Name: Susy Hanson  : 1960  MRN: [id number]  Primary Care Physician: Erica Avila MD  Date of admission: 2020    Patient Care Team:  Erica Avila MD as PCP - General    Subjective   History Present Illness     Chief Complaint:   Chief Complaint   Patient presents with   • Hypertension     Pt sent from [facility] for HTN.      Ms. Hanson is a 59 y.o. with a past medical history of HTN, IBS, anxiety/depression, DM 2 with peripheral neuropathy who presents to [facility]  complaining of accelerated HTN with associated stabbing headaches for greater than one month.  She states her blood pressure meds were doubled three weeks ago, but her BP has been persistently elevated.  She reports associated blurred vision, nausea and dizziness.  She denies chest pain, diaphoresis, dyspnea and vomiting.  She states she has had increased stress and attributes that to part of her uncontrolled HTN.    In the ED, the patient's labs included the following: Na 140, K 3.9, BUN 14, Cr 0.78, glucose 85, WBC 6.2, hgb 13.7, plts 220.  Troponin negative.  EKG: new LBBB, SR, ST elevation secondary to IVCD, HR 85.  MRI brain negative.  BP on arrival 227/123.  The patient was given Labetalol 20 mg once in the ED and admitted for further evaluation and management.         Review of Systems   Constitution: Negative for chills, diaphoresis and fever.   Eyes: Positive for blurred vision.   Cardiovascular: Negative for chest pain.   Respiratory: Negative for shortness of breath.    Gastrointestinal: Positive for nausea. Negative for vomiting.   Neurological: Positive for dizziness and headaches.   All other systems reviewed and are negative.    Personal History     Past Medical History:   Past Medical History:   Diagnosis Date   • DUB (dysfunctional uterine bleeding)    • Hypertension    • Obesity    • Peripheral  neuropathy        Surgical History:      Past Surgical History:   Procedure Laterality Date   • CHOLECYSTECTOMY     • HYSTERECTOMY             Family History: family history includes Alzheimer's disease in her father. Otherwise pertinent FHx was reviewed and unremarkable.     Social History:  reports that she has quit smoking. She has never used smokeless tobacco. She reports that she does not drink alcohol or use drugs.      Medications:  Prior to Admission medications    Medication Sig Start Date End Date Taking? Authorizing Provider   ALPRAZolam (XANAX) 0.5 MG tablet Take 1 tablet by mouth 2 (Two) Times a Day As Needed for Anxiety. for anxiety 11/8/19  Yes Erica Avila MD   atorvastatin (LIPITOR) 20 MG tablet Take 1 tablet by mouth Daily. 9/29/19  Yes Erica Avila MD   hydrALAZINE (APRESOLINE) 50 MG tablet Take 1 tablet by mouth 2 (Two) Times a Day. 1/7/20  Yes Erica Avila MD   hydroCHLOROthiazide (HYDRODIURIL) 25 MG tablet Take 1 tablet by mouth Daily. 10/18/19  Yes Erica Avila MD   insulin lispro protamine-insulin lispro (humaLOG 75-25) (75-25) 100 UNIT/ML suspension injection Inject 24 Units under the skin into the appropriate area as directed Every Morning.   Yes Lexus Taylor MD   insulin lispro protamine-insulin lispro (humaLOG 75-25) (75-25) 100 UNIT/ML suspension injection Inject 28 Units under the skin into the appropriate area as directed every night at bedtime.   Yes Lexus Taylor MD   losartan (COZAAR) 100 MG tablet Take 1 tablet by mouth Daily. 10/18/19  Yes Erica Avila MD   metFORMIN (GLUCOPHAGE) 1000 MG tablet Take 1,000 mg by mouth Every Morning.   Yes Lexus Taylor MD   metFORMIN (GLUCOPHAGE) 1000 MG tablet Take 1,500 mg by mouth Every Evening.   Yes Lexus Taylor MD   metoprolol succinate XL (TOPROL-XL) 100 MG 24 hr tablet Take 2 tablets by mouth Daily for blood pressure.  Patient taking  differently: Take 100 mg by mouth 2 (Two) Times a Day. 4/30/19  Yes Erica Avila MD   niacin (NIASPAN) 500 MG CR tablet Take 1 tablet by mouth Every Night. 11/11/19  Yes Erica Avila MD   icosapent ethyl (VASCEPA) 1 g capsule capsule Take 2 capsules by mouth 2 (Two) Times a Day with meals. 9/30/19 1/17/20 Yes Erica Avila MD   amLODIPine (NORVASC) 5 MG tablet Take 1 tablet by mouth Daily. 1/17/20 1/17/20  Maria R Blue APRN   Blood Glucose Monitoring Suppl (ONE TOUCH ULTRA MINI) w/Device kit ONETOUCH ULTRA MINI w/Device KIT 12/6/18 1/17/20  ProviderLexus MD   glucose blood (ONE TOUCH ULTRA TEST) test strip 1 each by Other route 2 (Two) Times a Day. Use as instructed 10/4/19 1/17/20  Erica Avila MD   hydrALAZINE (APRESOLINE) 25 MG tablet Take 25 mg by mouth 2 (Two) Times a Day. 1/13/20 1/17/20  ProviderLexus MD   Insulin Lispro Prot & Lispro (humaLOG 75-25) (75-25) 100 UNIT/ML suspension pen-injector pen inject 24 units under the skin every morning and 28 units every evening 9/29/19 1/17/20  Erica Avila MD   Insulin Pen Needle (BD PEN NEEDLE NAYLA U/F) 32G X 4 MM misc Use with insulin twice a day 10/4/19 1/17/20  Erica Avila MD   Lancets (ONETOUCH ULTRASOFT) lancets Test blood sugar twice a day 10/4/19 1/17/20  Erica Avila MD   losartan-hydrochlorothiazide (HYZAAR) 100-25 MG per tablet TAKE 1 TABLET BY MOUTH DAILY 7/2/19 1/17/20  Erica Avila MD   metFORMIN (GLUCOPHAGE) 1000 MG tablet Take 1 tablet by mouth at breakfast and 2 tablets with supper. 9/29/19 1/17/20  Erica Avila MD   metoprolol succinate XL (TOPROL XL) 100 MG 24 hr tablet TOPROL  MG XR24H-TAB 1/3/19 1/17/20  Provider, MD Lexus   niacin (SLO-NIACIN) 500 MG CR tablet Take 1 tablet by mouth Every Night. 11/11/19 1/17/20  Erica Avila MD       Allergies:    Allergies   Allergen  Reactions   • Hydrocodone-Acetaminophen Hives       Objective   Objective     Vital Signs  Temp:  [97.1 °F (36.2 °C)-98 °F (36.7 °C)] 98 °F (36.7 °C)  Heart Rate:  [88-98] 90  Resp:  [12-18] 12  BP: (139-227)/() 169/82  SpO2:  [97 %-99 %] 97 %  on   ;   Device (Oxygen Therapy): room air  Body mass index is 28.53 kg/m².    Physical Exam   Constitutional: She is oriented to person, place, and time. She appears well-developed and well-nourished. No distress.   HENT:   Head: Normocephalic and atraumatic.   Eyes: Pupils are equal, round, and reactive to light. EOM are normal.   Neck: Normal range of motion. Neck supple.   Cardiovascular: Regular rhythm and normal heart sounds. Exam reveals no gallop and no friction rub.   No murmur heard.  Intermittent tachycardia, accelerated HTN   Pulmonary/Chest: Effort normal and breath sounds normal. No stridor. No respiratory distress. She has no wheezes.   Abdominal: Soft. Bowel sounds are normal. She exhibits no distension. There is no tenderness. There is no guarding.   Musculoskeletal: Normal range of motion. She exhibits no edema or deformity.   Neurological: She is alert and oriented to person, place, and time.   Skin: Skin is warm and dry. She is not diaphoretic.   Psychiatric: She has a normal mood and affect.   Vitals reviewed.    Results Review:    Results from last 7 days   Lab Units 01/17/20  1242   WBC 10*3/mm3 6.20   HEMOGLOBIN g/dL 13.7   HEMATOCRIT % 38.8   PLATELETS 10*3/mm3 220     Results from last 7 days   Lab Units 01/17/20  1553 01/17/20  1242   SODIUM mmol/L  --  140   POTASSIUM mmol/L  --  3.9   CHLORIDE mmol/L  --  100   CO2 mmol/L  --  27.0   BUN mg/dL  --  14   CREATININE mg/dL  --  0.78   GLUCOSE mg/dL  --  85   CALCIUM mg/dL  --  10.4   TROPONIN T ng/mL <0.010 <0.010     Estimated Creatinine Clearance: 75.3 mL/min (by C-G formula based on SCr of 0.78 mg/dL).  Brief Urine Lab Results  (Last result in the past 365 days)      Color   Clarity   Blood    Leuk Est   Nitrite   Protein   CREAT   Urine HCG        09/26/19 1618 Yellow Clear Negative Negative Negative 100 mg/dL               Microbiology Results (last 10 days)     ** No results found for the last 240 hours. **          ECG/EMG Results (most recent)     Procedure Component Value Units Date/Time    ECG 12 Lead [[id number]] Collected:  01/17/20 1241     Updated:  01/17/20 1244    Narrative:       HEART RATE= 85  bpm  RR Interval= 704  ms  NE Interval= 175  ms  P Horizontal Axis= -7  deg  P Front Axis= 58  deg  QRSD Interval= 167  ms  QT Interval= 434  ms  QRS Axis= -35  deg  T Wave Axis= 82  deg  - ABNORMAL ECG -  Sinus rhythm  Probable left atrial enlargement  ST elevation secondary to IVCD  When compared with ECG of 25-Sep-2019 14:52:40,  Nonspecific significant change  Electronically Signed By:   Date and Time of Study: 2020-01-17 12:41:08                    Mri Brain Without Contrast    Result Date: 1/17/2020  1. No acute intracranial abnormality. Statistically, no evidence of hemorrhage, mass effect or infarct. 2. Minimal periventricular white matter T2/FLAIR hyperintense signal changes most likely representing sequelae of chronic small vessel ischemic disease. Demyelinating process is also a consideration but felt to be less likely.    Electronically Signed By-Pillo Rosas On:1/17/2020 1:47 PM This report was finalized on [id number] by  Pillo Rosas, .        Estimated Creatinine Clearance: 75.3 mL/min (by C-G formula based on SCr of 0.78 mg/dL).    Assessment/Plan   Assessment/Plan       Active Hospital Problems    Diagnosis  POA   • **Hypertensive urgency [I16.0]  Yes   • Peripheral neuropathy [G62.9]  Unknown   • Type 2 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 (CMS/HCC) [E11.49]  Yes   • Vitamin D deficiency [E55.9]  Yes   • Obesity [E66.9]  Yes   • Mixed anxiety depressive disorder [F41.8]  Yes   • Irritable bowel syndrome [K58.9]  Yes   • Hypertension [I10]  Yes      • Mixed hyperlipidemia [E78.2]  Yes      Resolved Hospital Problems   No resolved problems to display.       Active Hospital Problems:  No notes have been filed under this hospital service.  Service: Hospitalist    Hypertensive urgency with uncontrolled HTN  - /123 on arrival, improved   - EKG: new LBBB, SR, ST elevation secondary to IVCD, HR 85  - troponin negative, check serial troponin  - stress test in AM   - cardiac monitoring  - cardiology consulted   - patient given Labetalol 20 mg once in ED, continue PRN  - continue home hydralazine, losartan, metoprolol, HCTZ  - diabetic / healthy heart diet / NPO at midnight     Acute headache  - likely secondary to uncontrolled HTN   - MRI brain negative   - Tylenol PRN     Hyperlipidemia  - continue home atorvastatin     Diabetes mellitus type 2 with peripheral neuropathy   - continue home insulin  - defer home Metformin  - SSI   - accuchecks AC HS    Anxiety / Depression  - on home Xanax (no current INSPECT)    IBS     Overweight (BMI 28.53)  - encourage lifestyle modifications     VTE Prophylaxis - Lovenox 40 mg SC daily.    CODE STATUS:    Code Status and Medical Interventions:   Ordered at: 01/17/20 1455     Code Status:    CPR     Medical Interventions (Level of Support Prior to Arrest):    Full       Admission Status:  I believe this patient meets observation criteria.    I discussed the patient's findings and my recommendations with patient.    Electronically signed by Gerda Warren PA-C, 01/17/20, 2:36 PM.  [facility] Hospitalist Team      I have reviewed the notes, assessments, and/or procedures performed byRICARDO Stevenson- . I concur with her/his documentation of Susy Hanson.  Patient was seen and examined, in bed no acute distress, admitted with hypertensive urgency.  Repeat blood pressure at this time is 180/90.  Patient complained of headache.  Her exam is awake alert no acute distress denies any chest patient's of breath or cough.   Lungs are clear to auscultation heart regular rhythm S1-S2, abdomen soft nontender distended, extremities no clubbing with occasional edema, patient admits of chronic history of tobacco use.  She is diabetic.  Will obtain CT Angio of the abdomen to rule out any renal artery stenosis check aldosterone and renin.

## 2020-01-17 NOTE — ED PROVIDER NOTES
Subjective   Chief complaint elevated blood pressure headaches    History of present illness this is a 59-year-old female with history of hypertension who states that her blood pressures been staying elevated over the last month about 3 weeks ago she had her blood pressure medication doubled and is not helping.  She states that she continues to have these headaches which is been ongoing off and on for the last several weeks.  There is no speech difficulty denies any paralysis she is able to walk without difficulty no visual disturbances.  Patient went to the office today and her blood pressure was elevated at 227/123 and was sent to the ER.          Review of Systems   Constitutional: Negative for chills and fever.   HENT: Negative for congestion and sinus pressure.    Eyes: Negative for photophobia and visual disturbance.   Respiratory: Negative for chest tightness and shortness of breath.    Cardiovascular: Negative for chest pain and leg swelling.   Gastrointestinal: Positive for nausea. Negative for abdominal pain and vomiting.   Endocrine: Negative for cold intolerance and heat intolerance.   Genitourinary: Negative for difficulty urinating and dysuria.   Musculoskeletal: Negative for arthralgias and back pain.   Skin: Negative for color change and pallor.   Neurological: Positive for dizziness and headaches. Negative for facial asymmetry and speech difficulty.   Psychiatric/Behavioral: Negative for agitation and behavioral problems.       Past Medical History:   Diagnosis Date   • Anxiety    • Depression    • Diabetes mellitus (CMS/HCC)    • DUB (dysfunctional uterine bleeding)    • Hyperlipidemia    • Hypertension    • IBS (irritable bowel syndrome)    • Obesity    • Peripheral neuropathy        Allergies   Allergen Reactions   • Hydrocodone-Acetaminophen Hives       Past Surgical History:   Procedure Laterality Date   • CHOLECYSTECTOMY     • HYSTERECTOMY         Family History   Problem Relation Age of Onset    • Alzheimer's disease Father        Social History     Socioeconomic History   • Marital status:      Spouse name: Not on file   • Number of children: Not on file   • Years of education: Not on file   • Highest education level: Not on file   Tobacco Use   • Smoking status: Former Smoker   • Smokeless tobacco: Never Used   Substance and Sexual Activity   • Alcohol use: No     Frequency: Never   • Drug use: No   • Sexual activity: Yes     Partners: Male     Prior to Admission medications    Medication Sig Start Date End Date Taking? Authorizing Provider   ALPRAZolam (XANAX) 0.5 MG tablet Take 1 tablet by mouth 2 (Two) Times a Day As Needed for Anxiety. for anxiety 11/8/19   Erica Avila MD   amLODIPine (NORVASC) 5 MG tablet Take 1 tablet by mouth Daily. 1/17/20   Maria R Blue APRN   atorvastatin (LIPITOR) 20 MG tablet Take 1 tablet by mouth Daily. 9/29/19   Erica Avila MD   hydrALAZINE (APRESOLINE) 25 MG tablet Take 25 mg by mouth 2 (Two) Times a Day. 1/13/20   Lexus Taylor MD   hydrALAZINE (APRESOLINE) 50 MG tablet Take 1 tablet by mouth 2 (Two) Times a Day. 1/7/20   Erica Avila MD   hydroCHLOROthiazide (HYDRODIURIL) 25 MG tablet Take 1 tablet by mouth Daily. 10/18/19   Erica Avila MD   icosapent ethyl (VASCEPA) 1 g capsule capsule Take 2 capsules by mouth 2 (Two) Times a Day with meals. 9/30/19   Erica Avila MD   Insulin Lispro Prot & Lispro (humaLOG 75-25) (75-25) 100 UNIT/ML suspension pen-injector pen inject 24 units under the skin every morning and 28 units every evening 9/29/19   Erica Avila MD   losartan (COZAAR) 100 MG tablet Take 1 tablet by mouth Daily. 10/18/19   Erica Avila MD   losartan-hydrochlorothiazide (HYZAAR) 100-25 MG per tablet TAKE 1 TABLET BY MOUTH DAILY 7/2/19   Erica Avila MD   metFORMIN (GLUCOPHAGE) 1000 MG tablet Take 1 tablet by mouth at breakfast  and 2 tablets with supper. 9/29/19   Erica Avila MD   metoprolol succinate XL (TOPROL XL) 100 MG 24 hr tablet TOPROL  MG XR24H-TAB 1/3/19   ProviderLexus MD   metoprolol succinate XL (TOPROL-XL) 100 MG 24 hr tablet Take 2 tablets by mouth Daily for blood pressure. 4/30/19   Erica Avila MD   niacin (NIASPAN) 500 MG CR tablet Take 1 tablet by mouth Every Night. 11/11/19   Erica Avila MD   niacin (SLO-NIACIN) 500 MG CR tablet Take 1 tablet by mouth Every Night. 11/11/19   Erica Avila MD   Blood Glucose Monitoring Suppl (ONE TOUCH ULTRA MINI) w/Device kit ONETOUCH ULTRA MINI w/Device KIT 12/6/18 1/17/20  ProviderLexus MD   glucose blood (ONE TOUCH ULTRA TEST) test strip 1 each by Other route 2 (Two) Times a Day. Use as instructed 10/4/19 1/17/20  Erica Avila MD   Insulin Pen Needle (BD PEN NEEDLE NAYLA U/F) 32G X 4 MM misc Use with insulin twice a day 10/4/19 1/17/20  Erica Avila MD   Lancets (ONETOUCH ULTRASOFT) lancets Test blood sugar twice a day 10/4/19 1/17/20  Erica Avila MD           Objective   Physical Exam  59-year-old awake alert no acute distress initial blood pressure in the 229/120 range.  HEENT extraocular muscles intact pupils equal and reactive no photophobia disks are sharp mouth is clear neck supple no adenopathy no JVD no bruits lungs clear no retractions heart regular without murmur abdomen soft without tenderness or masses extremities pulses are equal throughout upper and lower extremities no edema cords or Homans sign or evidence of DVT.  Patient's awake alert follows commands orientated x4 no facial asymmetry normal speech no drift in the arms or legs without focal deficits.  Procedures           ED Course      Results for orders placed or performed during the hospital encounter of 01/17/20   Basic Metabolic Panel   Result Value Ref Range    Glucose 85 65 - 99 mg/dL    BUN  14 8 - 23 mg/dL    Creatinine 0.78 0.57 - 1.00 mg/dL    Sodium 140 136 - 145 mmol/L    Potassium 3.9 3.5 - 5.2 mmol/L    Chloride 100 98 - 107 mmol/L    CO2 27.0 22.0 - 29.0 mmol/L    Calcium 10.4 8.6 - 10.5 mg/dL    eGFR Non African Amer 75 >60 mL/min/1.73    BUN/Creatinine Ratio 17.9 7.0 - 25.0    Anion Gap 13.0 5.0 - 15.0 mmol/L   Troponin   Result Value Ref Range    Troponin T <0.010 0.000 - 0.030 ng/mL   CBC Auto Differential   Result Value Ref Range    WBC 6.20 3.40 - 10.80 10*3/mm3    RBC 4.45 3.77 - 5.28 10*6/mm3    Hemoglobin 13.7 12.0 - 15.9 g/dL    Hematocrit 38.8 34.0 - 46.6 %    MCV 87.2 79.0 - 97.0 fL    MCH 30.8 26.6 - 33.0 pg    MCHC 35.3 31.5 - 35.7 g/dL    RDW 14.5 12.3 - 15.4 %    RDW-SD 44.2 37.0 - 54.0 fl    MPV 8.7 6.0 - 12.0 fL    Platelets 220 140 - 450 10*3/mm3    Neutrophil % 49.8 42.7 - 76.0 %    Lymphocyte % 29.0 19.6 - 45.3 %    Monocyte % 10.3 5.0 - 12.0 %    Eosinophil % 9.2 (H) 0.3 - 6.2 %    Basophil % 1.7 (H) 0.0 - 1.5 %    Neutrophils, Absolute 3.10 1.70 - 7.00 10*3/mm3    Lymphocytes, Absolute 1.80 0.70 - 3.10 10*3/mm3    Monocytes, Absolute 0.60 0.10 - 0.90 10*3/mm3    Eosinophils, Absolute 0.60 (H) 0.00 - 0.40 10*3/mm3    Basophils, Absolute 0.10 0.00 - 0.20 10*3/mm3    nRBC 0.1 0.0 - 0.2 /100 WBC   Light Blue Top   Result Value Ref Range    Extra Tube hold for add-on    Lavender Top   Result Value Ref Range    Extra Tube hold for add-on      Mri Brain Without Contrast    Result Date: 1/17/2020  1. No acute intracranial abnormality. Statistically, no evidence of hemorrhage, mass effect or infarct. 2. Minimal periventricular white matter T2/FLAIR hyperintense signal changes most likely representing sequelae of chronic small vessel ischemic disease. Demyelinating process is also a consideration but felt to be less likely.    Electronically Signed By-Pillo Rosas On:1/17/2020 1:47 PM This report was finalized on [id number] by  Pillo Rosas, .    Medications    acetaminophen (TYLENOL) tablet 1,000 mg (has no administration in time range)   labetalol (NORMODYNE,TRANDATE) injection 20 mg (20 mg Intravenous Given 1/17/20 1249)          EKG my interpretation normal sinus rhythm rate of 85 patient has a left bundle branch block with a QTC of 487 this is an abnormal EKG and is changed from previous EKG just from September 2019.                                      MDM  Number of Diagnoses or Management Options  Dizzy:   Hypertensive urgency:   New onset left bundle branch block (LBBB):   Nonintractable headache, unspecified chronicity pattern, unspecified headache type:   Diagnosis management comments: Medical decision making.  Patient IV established placed on monitors given labetalol 1 mg IV and had the above exam and evaluation MRI brain showed no acute finding CBC electrolytes troponin were negative but EKG is markedly abnormal with a new left bundle branch block which is new from just a few months ago.  Patient's had some uncontrolled hypertension new left bundle branch block some dizziness and headaches and nausea.  I see no evidence of an acute stroke certainly cardiac issues may be at hand here.  Blood pressure now 139/75 she was given Tylenol for headache hospitalist nurse practitioner notified and she will be placed in the hospital for further care.  Patient is scheduled to have a consultation as an outpatient from Dr. Kim office      Final diagnoses:   Hypertensive urgency   Dizzy   New onset left bundle branch block (LBBB)   Nonintractable headache, unspecified chronicity pattern, unspecified headache type            Aleks Esqueda MD  01/17/20 0531

## 2020-01-18 ENCOUNTER — APPOINTMENT (OUTPATIENT)
Dept: NUCLEAR MEDICINE | Facility: HOSPITAL | Age: 60
End: 2020-01-18

## 2020-01-18 VITALS
TEMPERATURE: 97.6 F | HEIGHT: 64 IN | RESPIRATION RATE: 16 BRPM | HEART RATE: 103 BPM | OXYGEN SATURATION: 93 % | BODY MASS INDEX: 28.38 KG/M2 | SYSTOLIC BLOOD PRESSURE: 140 MMHG | WEIGHT: 166.23 LBS | DIASTOLIC BLOOD PRESSURE: 63 MMHG

## 2020-01-18 LAB
ANION GAP SERPL CALCULATED.3IONS-SCNC: 13 MMOL/L (ref 5–15)
BASOPHILS # BLD AUTO: 0.1 10*3/MM3 (ref 0–0.2)
BASOPHILS NFR BLD AUTO: 1.2 % (ref 0–1.5)
BH CV NUCLEAR PRIOR STUDY: 3
BH CV STRESS BP STAGE 1: NORMAL
BH CV STRESS BP STAGE 2: NORMAL
BH CV STRESS BP STAGE 3: NORMAL
BH CV STRESS DURATION MIN STAGE 1: 3
BH CV STRESS DURATION MIN STAGE 2: 3
BH CV STRESS DURATION MIN STAGE 3: 3
BH CV STRESS DURATION SEC STAGE 1: 0
BH CV STRESS DURATION SEC STAGE 2: 0
BH CV STRESS DURATION SEC STAGE 3: 0
BH CV STRESS GRADE STAGE 1: 10
BH CV STRESS GRADE STAGE 2: 12
BH CV STRESS GRADE STAGE 3: 14
BH CV STRESS HR STAGE 1: 120
BH CV STRESS HR STAGE 2: 135
BH CV STRESS HR STAGE 3: 145
BH CV STRESS METS STAGE 1: 5
BH CV STRESS METS STAGE 2: 7.5
BH CV STRESS METS STAGE 3: 10
BH CV STRESS PROTOCOL 1: NORMAL
BH CV STRESS RECOVERY BP: NORMAL MMHG
BH CV STRESS RECOVERY HR: 91 BPM
BH CV STRESS SPEED STAGE 1: 1.7
BH CV STRESS SPEED STAGE 2: 2.5
BH CV STRESS SPEED STAGE 3: 3.4
BH CV STRESS STAGE 1: 1
BH CV STRESS STAGE 2: 2
BH CV STRESS STAGE 3: 3
BUN BLD-MCNC: 15 MG/DL (ref 8–23)
BUN/CREAT SERPL: 17.9 (ref 7–25)
CALCIUM SPEC-SCNC: 9.7 MG/DL (ref 8.6–10.5)
CHLORIDE SERPL-SCNC: 102 MMOL/L (ref 98–107)
CO2 SERPL-SCNC: 27 MMOL/L (ref 22–29)
CREAT BLD-MCNC: 0.84 MG/DL (ref 0.57–1)
DEPRECATED RDW RBC AUTO: 43.8 FL (ref 37–54)
EOSINOPHIL # BLD AUTO: 0.6 10*3/MM3 (ref 0–0.4)
EOSINOPHIL NFR BLD AUTO: 7.3 % (ref 0.3–6.2)
ERYTHROCYTE [DISTWIDTH] IN BLOOD BY AUTOMATED COUNT: 14.6 % (ref 12.3–15.4)
GFR SERPL CREATININE-BSD FRML MDRD: 69 ML/MIN/1.73
GLUCOSE BLD-MCNC: 109 MG/DL (ref 65–99)
GLUCOSE BLDC GLUCOMTR-MCNC: 111 MG/DL (ref 70–105)
GLUCOSE BLDC GLUCOMTR-MCNC: 134 MG/DL (ref 70–105)
GLUCOSE BLDC GLUCOMTR-MCNC: 151 MG/DL (ref 70–105)
HCT VFR BLD AUTO: 36.5 % (ref 34–46.6)
HGB BLD-MCNC: 12.6 G/DL (ref 12–15.9)
LYMPHOCYTES # BLD AUTO: 1 10*3/MM3 (ref 0.7–3.1)
LYMPHOCYTES NFR BLD AUTO: 12 % (ref 19.6–45.3)
MAXIMAL PREDICTED HEART RATE: 159 BPM
MCH RBC QN AUTO: 29.4 PG (ref 26.6–33)
MCHC RBC AUTO-ENTMCNC: 34.5 G/DL (ref 31.5–35.7)
MCV RBC AUTO: 85.2 FL (ref 79–97)
MONOCYTES # BLD AUTO: 0.8 10*3/MM3 (ref 0.1–0.9)
MONOCYTES NFR BLD AUTO: 9.5 % (ref 5–12)
NEUTROPHILS # BLD AUTO: 5.8 10*3/MM3 (ref 1.7–7)
NEUTROPHILS NFR BLD AUTO: 70 % (ref 42.7–76)
NRBC BLD AUTO-RTO: 0 /100 WBC (ref 0–0.2)
PERCENT MAX PREDICTED HR: 91.19 %
PLATELET # BLD AUTO: 195 10*3/MM3 (ref 140–450)
PMV BLD AUTO: 8.3 FL (ref 6–12)
POTASSIUM BLD-SCNC: 3.1 MMOL/L (ref 3.5–5.2)
RBC # BLD AUTO: 4.28 10*6/MM3 (ref 3.77–5.28)
SODIUM BLD-SCNC: 142 MMOL/L (ref 136–145)
STRESS BASELINE BP: NORMAL MMHG
STRESS BASELINE HR: 83 BPM
STRESS PERCENT HR: 107 %
STRESS POST PEAK BP: NORMAL MMHG
STRESS POST PEAK HR: 145 BPM
STRESS TARGET HR: 135 BPM
TROPONIN T SERPL-MCNC: <0.01 NG/ML (ref 0–0.03)
WBC NRBC COR # BLD: 8.3 10*3/MM3 (ref 3.4–10.8)

## 2020-01-18 PROCEDURE — G0378 HOSPITAL OBSERVATION PER HR: HCPCS

## 2020-01-18 PROCEDURE — 84484 ASSAY OF TROPONIN QUANT: CPT | Performed by: PHYSICIAN ASSISTANT

## 2020-01-18 PROCEDURE — 82962 GLUCOSE BLOOD TEST: CPT

## 2020-01-18 PROCEDURE — 78452 HT MUSCLE IMAGE SPECT MULT: CPT | Performed by: INTERNAL MEDICINE

## 2020-01-18 PROCEDURE — 85025 COMPLETE CBC W/AUTO DIFF WBC: CPT | Performed by: PHYSICIAN ASSISTANT

## 2020-01-18 PROCEDURE — 93018 CV STRESS TEST I&R ONLY: CPT | Performed by: INTERNAL MEDICINE

## 2020-01-18 PROCEDURE — 84244 ASSAY OF RENIN: CPT | Performed by: INTERNAL MEDICINE

## 2020-01-18 PROCEDURE — 0 TECHNETIUM SESTAMIBI: Performed by: INTERNAL MEDICINE

## 2020-01-18 PROCEDURE — 80048 BASIC METABOLIC PNL TOTAL CA: CPT | Performed by: PHYSICIAN ASSISTANT

## 2020-01-18 PROCEDURE — 93016 CV STRESS TEST SUPVJ ONLY: CPT | Performed by: INTERNAL MEDICINE

## 2020-01-18 PROCEDURE — 63710000001 INSULIN LISPRO (HUMAN) PER 5 UNITS: Performed by: PHYSICIAN ASSISTANT

## 2020-01-18 PROCEDURE — 78452 HT MUSCLE IMAGE SPECT MULT: CPT

## 2020-01-18 PROCEDURE — 99217 PR OBSERVATION CARE DISCHARGE MANAGEMENT: CPT | Performed by: INTERNAL MEDICINE

## 2020-01-18 PROCEDURE — A9500 TC99M SESTAMIBI: HCPCS | Performed by: INTERNAL MEDICINE

## 2020-01-18 PROCEDURE — 93017 CV STRESS TEST TRACING ONLY: CPT

## 2020-01-18 RX ORDER — POTASSIUM CHLORIDE 1.5 G/1.77G
40 POWDER, FOR SOLUTION ORAL AS NEEDED
Status: DISCONTINUED | OUTPATIENT
Start: 2020-01-18 | End: 2020-01-18

## 2020-01-18 RX ORDER — POTASSIUM CHLORIDE 750 MG/1
10 TABLET, FILM COATED, EXTENDED RELEASE ORAL 2 TIMES DAILY
Qty: 14 TABLET | Refills: 0 | Status: SHIPPED | OUTPATIENT
Start: 2020-01-18 | End: 2020-01-25

## 2020-01-18 RX ORDER — POTASSIUM CHLORIDE 20 MEQ/1
40 TABLET, EXTENDED RELEASE ORAL AS NEEDED
Status: DISCONTINUED | OUTPATIENT
Start: 2020-01-18 | End: 2020-01-18

## 2020-01-18 RX ORDER — HYDRALAZINE HYDROCHLORIDE 50 MG/1
100 TABLET, FILM COATED ORAL 2 TIMES DAILY
Qty: 180 TABLET | Refills: 0 | Status: SHIPPED | OUTPATIENT
Start: 2020-01-18 | End: 2020-04-17 | Stop reason: SDUPTHER

## 2020-01-18 RX ORDER — AMLODIPINE BESYLATE 5 MG/1
5 TABLET ORAL DAILY
Qty: 30 TABLET | Refills: 0 | Status: SHIPPED | OUTPATIENT
Start: 2020-01-18 | End: 2020-04-29

## 2020-01-18 RX ADMIN — ACETAMINOPHEN 650 MG: 325 TABLET, FILM COATED ORAL at 02:03

## 2020-01-18 RX ADMIN — POTASSIUM CHLORIDE 40 MEQ: 1500 TABLET, EXTENDED RELEASE ORAL at 16:12

## 2020-01-18 RX ADMIN — TECHNETIUM TC 99M SESTAMIBI 1 DOSE: 1 INJECTION INTRAVENOUS at 08:00

## 2020-01-18 RX ADMIN — Medication 10 ML: at 09:51

## 2020-01-18 RX ADMIN — INSULIN LISPRO 3 UNITS: 100 INJECTION, SOLUTION INTRAVENOUS; SUBCUTANEOUS at 12:52

## 2020-01-18 RX ADMIN — TECHNETIUM TC 99M SESTAMIBI 1 DOSE: 1 INJECTION INTRAVENOUS at 10:45

## 2020-01-18 NOTE — PLAN OF CARE
Problem: Patient Care Overview  Goal: Plan of Care Review  Outcome: Ongoing (interventions implemented as appropriate)  Flowsheets (Taken 1/18/2020 0400)  Progress: no change  Plan of Care Reviewed With: patient  Note:   Patient resting in bed throughout shift no s/s of acute distress.  One episode hypertension SBP > 160.  Labetalol administered.   Goal: Individualization and Mutuality  Outcome: Ongoing (interventions implemented as appropriate)  Goal: Discharge Needs Assessment  Outcome: Ongoing (interventions implemented as appropriate)  Goal: Interprofessional Rounds/Family Conf  Outcome: Ongoing (interventions implemented as appropriate)     Problem: Hypertensive Disease/Crisis (Arterial) (Adult)  Goal: Signs and Symptoms of Listed Potential Problems Will be Absent, Minimized or Managed (Hypertensive Disease/Crisis)  Outcome: Ongoing (interventions implemented as appropriate)     Problem: Diabetes, Type 2 (Adult)  Goal: Signs and Symptoms of Listed Potential Problems Will be Absent, Minimized or Managed (Diabetes, Type 2)  Outcome: Ongoing (interventions implemented as appropriate)

## 2020-01-18 NOTE — PLAN OF CARE
58 yo female admitted from ER due to hypertensive urgency and headache. 's/1 teens. Labetolol given with bp decreasing. To have stress test in am,cardiology consult and prn labetolol for bp over 160 systolic. Hx of diabetes with blood sugars controlled.

## 2020-01-19 ENCOUNTER — READMISSION MANAGEMENT (OUTPATIENT)
Dept: CALL CENTER | Facility: HOSPITAL | Age: 60
End: 2020-01-19

## 2020-01-20 NOTE — OUTREACH NOTE
Prep Survey      Responses   Facility patient discharged from?  [facility]   Is patient eligible?  Yes   Discharge diagnosis  Hypertensive urgency, Headache, T2DM   Does the patient have one of the following disease processes/diagnoses(primary or secondary)?  Other   Does the patient have Home health ordered?  No   Is there a DME ordered?  No   Prep survey completed?  Yes          Penny Gongora RN

## 2020-01-21 ENCOUNTER — READMISSION MANAGEMENT (OUTPATIENT)
Dept: CALL CENTER | Facility: HOSPITAL | Age: 60
End: 2020-01-21

## 2020-01-21 NOTE — OUTREACH NOTE
Medical Week 1 Survey      Responses   Facility patient discharged from?  [facility]   Does the patient have one of the following disease processes/diagnoses(primary or secondary)?  Other   Is there a successful TCM telephone encounter documented?  No   Week 1 attempt successful?  No   Revoke  Decline to participate [No answer/Left voicemail]          Jeanine Khanna LPN

## 2020-01-22 LAB — RENIN PLAS-CCNC: 0.2 NG/ML/HR (ref 0.17–5.38)

## 2020-01-28 ENCOUNTER — HOSPITAL ENCOUNTER (OUTPATIENT)
Dept: SLEEP MEDICINE | Facility: HOSPITAL | Age: 60
Discharge: HOME | End: 2020-01-28
Admitting: INTERNAL MEDICINE

## 2020-01-28 ENCOUNTER — OFFICE VISIT (OUTPATIENT)
Dept: SLEEP MEDICINE | Facility: CLINIC | Age: 60
End: 2020-01-28

## 2020-01-28 VITALS — HEIGHT: 64 IN | BODY MASS INDEX: 27.79 KG/M2 | WEIGHT: 162.8 LBS

## 2020-01-28 DIAGNOSIS — R06.83 SNORING: ICD-10-CM

## 2020-01-28 DIAGNOSIS — G47.33 OSA (OBSTRUCTIVE SLEEP APNEA): Primary | ICD-10-CM

## 2020-01-28 DIAGNOSIS — G47.10 HYPERSOMNIA: ICD-10-CM

## 2020-01-28 DIAGNOSIS — E11.49 TYPE 2 DIABETES MELLITUS WITH OTHER DIABETIC NEUROLOGICAL COMPLICATION (HCC): ICD-10-CM

## 2020-01-28 DIAGNOSIS — G47.33 OSA (OBSTRUCTIVE SLEEP APNEA): ICD-10-CM

## 2020-01-28 PROCEDURE — G0463 HOSPITAL OUTPT CLINIC VISIT: HCPCS

## 2020-01-28 PROCEDURE — 95806 SLEEP STUDY UNATT&RESP EFFT: CPT

## 2020-01-28 PROCEDURE — 95806 SLEEP STUDY UNATT&RESP EFFT: CPT | Performed by: INTERNAL MEDICINE

## 2020-01-28 PROCEDURE — 99244 OFF/OP CNSLTJ NEW/EST MOD 40: CPT | Performed by: INTERNAL MEDICINE

## 2020-01-28 NOTE — PROGRESS NOTES
[facility]  [address] [place] [address]  Phone   Fax       Susy Hanson  1960  59 y.o.  female      Referring Provider and PCP Erica Avila MD    Type of service: Initial consult  Date of service: 1/28/2020      Chief Complaint   Patient presents with   • Witnessed Apnea   • Snoring   • Daytime Sleepiness       History of present illness;  Thank you for asking to see Susy Hanson, 59 y.o.  for evaluation of sleep apnea. The patient was seen today on 1/28/2020 at [facility].   She works at [employer] and she is the manager for housekeeping.  She has a history of sleep apnea was tested in 2012 and did not use the CPAP on a regular basis.  Recently she was admitted to the hospital with a hypertensive crisis.  She also has history of diabetes mellitus.  She still has snoring and daytime excessive sleepiness.  She also has difficulty maintaining sleep she says that she wakes up several times.    Patient gives the following sleep history.  Sleep schedule:  Bedtime: 8 PM  Wake time: 4;30 a.m.  Normally takes about 15-20 minutes to fall asleep  Average hours of sleep 7  Number of naps per day no  When patient wakes up still feels tired and not rested  Snoring; yes  Witnessed apneas; yes  Have you ever awakened gasping for breath, coughing, choking: Yes      Past Medical History:   Diagnosis Date   • DM (diabetes mellitus) (CMS/[facility])    • DUB (dysfunctional uterine bleeding)    • Hypertension    • Obesity    • Peripheral neuropathy        Social history:  Shift work: No  Tobacco use: Quit smoking  Alcohol use: No  Caffeinated drinks: 2  Over-the-counter sleeping aid and medications: Xanax  Narcotic medications: No    Family Hx  Family history of sleep apnea, positive  Family History   Problem Relation Age of Onset   • Alzheimer's disease Father    • Diabetes Father    • Heart disease Father    • Other Father    • Sleep apnea  Father    • Snoring Father    • Other Mother    • Diabetes Mother    • Heart disease Mother    • Sleep apnea Mother    • Snoring Mother    • Diabetes Sister    • Heart disease Sister    • Other Sister    • Sleep apnea Sister    • Snoring Sister        Medications: reviewed    Current Outpatient Medications:   •  ALPRAZolam (XANAX) 0.5 MG tablet, Take 1 tablet by mouth 2 (Two) Times a Day As Needed for Anxiety. for anxiety, Disp: 30 tablet, Rfl: 0  •  amLODIPine (NORVASC) 5 MG tablet, Take 1 tablet by mouth Daily., Disp: 30 tablet, Rfl: 0  •  atorvastatin (LIPITOR) 20 MG tablet, Take 1 tablet by mouth Daily., Disp: 90 tablet, Rfl: 3  •  hydrALAZINE (APRESOLINE) 50 MG tablet, Take 2 tablets by mouth 2 (Two) Times a Day., Disp: 180 tablet, Rfl: 0  •  hydroCHLOROthiazide (HYDRODIURIL) 25 MG tablet, Take 1 tablet by mouth Daily., Disp: 90 tablet, Rfl: 3  •  insulin lispro protamine-insulin lispro (humaLOG 75-25) (75-25) 100 UNIT/ML suspension injection, Inject 24 Units under the skin into the appropriate area as directed Every Morning., Disp: , Rfl:   •  insulin lispro protamine-insulin lispro (humaLOG 75-25) (75-25) 100 UNIT/ML suspension injection, Inject 28 Units under the skin into the appropriate area as directed every night at bedtime., Disp: , Rfl:   •  losartan (COZAAR) 100 MG tablet, Take 1 tablet by mouth Daily., Disp: 90 tablet, Rfl: 3  •  metFORMIN (GLUCOPHAGE) 1000 MG tablet, Take 1,000 mg by mouth Every Morning., Disp: , Rfl:   •  metoprolol succinate XL (TOPROL-XL) 100 MG 24 hr tablet, Take 2 tablets by mouth Daily for blood pressure. (Patient taking differently: Take 100 mg by mouth 2 (Two) Times a Day.), Disp: 180 tablet, Rfl: 3  •  niacin (NIASPAN) 500 MG CR tablet, Take 1 tablet by mouth Every Night., Disp: 90 tablet, Rfl: 1  •  metFORMIN (GLUCOPHAGE) 1000 MG tablet, Take 1,500 mg by mouth Every Evening., Disp: , Rfl:     Review of systems:  [place] Sleepiness Scale: Total score: 12   Positive for  "snoring, witnessed apneas, fatigue and daytime excessive sleepiness,   Negative for shortness of breath, cough, wheezing, chest pain, nausea and vomiting, palpitation, swelling of feet:    Morning headaches: No  Awaken with sore throat or dry mouth : No  Leg jerking at night: No  Crawly feeling/urge sensation to move in the legs: No  Teeth grinding: No  Sleepwalking, nightmares, muscle weakness with laughing or anger,sleep paralysis: No  Nasal Congestion: No  Nocturia (how many times/night): 3  Memory Problem: No  Change in weight, no    Physical exam:  Vitals:    01/28/20 0700   Weight: 73.8 kg (162 lb 12.8 oz)   Height: 162.6 cm (64\")    Body mass index is 27.94 kg/m². Neck Circumference: 14.5 inches  HEENT: Head is atraumatic, normocephalic   Eyes:pupils are round equal and reacting to light and accommodation, conjunctiva normal  Nose:no nasal septal defects or deviation and the nasal passages are clear, no nasal polyps,   Throat: tonsils are not enlarged, tongue normal size, oral airway Mallampati class 3  NECK: No lymphadenopathy, trachea is in the midline, thyroid not enlarged  RESPIRATORY SYSTEM: Breath sounds are equal on both sides, there are no wheezes or crackles  CARDIOVASULAR SYSTEM: Heart sounds are regular rhythm and augustine rate, there are no murmurs or thrills  ABDOMEN: Soft, no hepatosplenomegaly, no evidence of ascites  EXTREMITES: No cyanosis, clubbing or edema   NEUROLOGICAL SYSTEM: Oriented x 3, no gross neurological defects, gait normal    Medical records and labs reviewed in [facility] reviewed    Assessment and plan:  · Sleep apnea unspecified, (G47.30) Patient's symptoms and examination is consistent with sleep apnea.  I have talked to the patient about the signs and symptoms of sleep apnea and consequences of untreated sleep apnea. Discussed sleep testing.  I have placed a order in epic for a home sleep test.  Patient will have a follow-up after this sleep test is done.   · Overweight, patient's " BMI is Body mass index is 27.94 kg/m².. I have discussed the relationship between weight and sleep apnea.There is direct correction between weight and severity of sleep apnea.  Weight reduction is encouraged. I have also discussed with the patient diet and exercise.  · Snoring secondary to sleep apnea  · Hypersomnia with [place] Sleepiness Scale of Total score: 12 due to sleep apnea.  · Hypertension.  Essential hypertension is highly correlated with sleep apnea. Treating sleep apnea will assist in good blood pressure control.  · Diabetes mellitus patient taking insulin.  Treating sleep apnea will also improve glycemic control.      I once again thank you for asking me to see this patient in consultation and I have forwarded my opinion and treatment plan.  Please do not hesitate to call me if you have any questions.     Sam Quintana MD, [facility]  Sleep Medicine.(Board-certified)  [facility]  Medical Director for [facility]  1/28/2020 ,

## 2020-01-29 ENCOUNTER — OFFICE VISIT (OUTPATIENT)
Dept: CARDIOLOGY | Facility: CLINIC | Age: 60
End: 2020-01-29

## 2020-01-29 VITALS
HEART RATE: 87 BPM | WEIGHT: 162 LBS | HEIGHT: 64 IN | DIASTOLIC BLOOD PRESSURE: 72 MMHG | BODY MASS INDEX: 27.66 KG/M2 | SYSTOLIC BLOOD PRESSURE: 120 MMHG

## 2020-01-29 DIAGNOSIS — R07.89 CHEST PAIN, ATYPICAL: ICD-10-CM

## 2020-01-29 DIAGNOSIS — E11.49 TYPE 2 DIABETES MELLITUS WITH OTHER DIABETIC NEUROLOGICAL COMPLICATION (HCC): ICD-10-CM

## 2020-01-29 DIAGNOSIS — I10 ESSENTIAL HYPERTENSION: Primary | ICD-10-CM

## 2020-01-29 DIAGNOSIS — E78.2 MIXED HYPERLIPIDEMIA: ICD-10-CM

## 2020-01-29 PROCEDURE — 99204 OFFICE O/P NEW MOD 45 MIN: CPT | Performed by: INTERNAL MEDICINE

## 2020-01-29 NOTE — PROGRESS NOTES
Answers for HPI/ROS submitted by the patient on 2020   Hypertension  Chronicity: chronic  Onset: more than 1 year ago  Progression since onset: gradually worsening  Condition status: resistant  anxiety: Yes  Agents associated with hypertension: no associated agents  CAD risks: diabetes mellitus, dyslipidemia, family history, stress  Compliance problems: no compliance problems  Date of Office Visit: 2020  Encounter Provider: Dr. Christo Murphy  Place of Service: [facility] CARDIOLOGY [place]  Patient Name: Susy Hanson  :1960  Erica Avila MD    Chief Complaint   Patient presents with   • Chest Pain     consult/er follow up/stress test   • Hypertension   • Hyperlipidemia   • Diabetes     History of Present Illness:    I am pleased to see Mrs. Hanson in my office today as a new consultation.    As you know, patient is a 59-year-old white female whose past medical history is significant for hypertension, diabetes mellitus, who is referred to me for symptom of poorly controlled hypertension and chest pain.    Patient has been hypertension from last 10 years.  Patient is also diabetic.  Her blood pressure was fairly well controlled but from last few months her blood pressure is running high.  In 2020, patient was admitted in the hospital with blood pressure greater than 200.  She underwent stress test which was negative for ischemia.  EKG showed left bundle branch block pattern.  MRI of the brain was unremarkable.  CT angiography to rule out renal artery stenosis was done and it was negative.  Patient antihypertensive regimen was adjusted and was discharged.    Since the discharge, patient is relatively doing well.  Patient denies any symptom of chest pain or tightness or heaviness.  Mild shortness of breath noted.  No leg edema.  No orthopnea, PND, syncope or presyncope.  No leg edema noted.    Her blood pressure at present is doing very well.  Patient is on losartan,  metoprolol, hydralazine and amlodipine.  I advised the patient to monitor the blood pressure at home and bring the logbook in 2 months.  In the meanwhile I will proceed with echocardiogram.        Past Medical History:   Diagnosis Date   • DM (diabetes mellitus) (CMS/HCC)    • DUB (dysfunctional uterine bleeding)    • Hyperlipidemia    • Hypertension    • Obesity    • Peripheral neuropathy          Past Surgical History:   Procedure Laterality Date   • CARDIOVASCULAR STRESS TEST  2020   • CHOLECYSTECTOMY     • HYSTERECTOMY             Current Outpatient Medications:   •  ALPRAZolam (XANAX) 0.5 MG tablet, Take 1 tablet by mouth 2 (Two) Times a Day As Needed for Anxiety. for anxiety, Disp: 30 tablet, Rfl: 0  •  amLODIPine (NORVASC) 5 MG tablet, Take 1 tablet by mouth Daily., Disp: 30 tablet, Rfl: 0  •  atorvastatin (LIPITOR) 20 MG tablet, Take 1 tablet by mouth Daily., Disp: 90 tablet, Rfl: 3  •  hydrALAZINE (APRESOLINE) 50 MG tablet, Take 2 tablets by mouth 2 (Two) Times a Day., Disp: 180 tablet, Rfl: 0  •  hydroCHLOROthiazide (HYDRODIURIL) 25 MG tablet, Take 1 tablet by mouth Daily., Disp: 90 tablet, Rfl: 3  •  insulin lispro protamine-insulin lispro (humaLOG 75-25) (75-25) 100 UNIT/ML suspension injection, Inject 24 Units under the skin into the appropriate area as directed Every Morning., Disp: , Rfl:   •  insulin lispro protamine-insulin lispro (humaLOG 75-25) (75-25) 100 UNIT/ML suspension injection, Inject 28 Units under the skin into the appropriate area as directed every night at bedtime., Disp: , Rfl:   •  losartan (COZAAR) 100 MG tablet, Take 1 tablet by mouth Daily., Disp: 90 tablet, Rfl: 3  •  metFORMIN (GLUCOPHAGE) 1000 MG tablet, Take 1,000 mg by mouth Every Morning., Disp: , Rfl:   •  metFORMIN (GLUCOPHAGE) 1000 MG tablet, Take 1,500 mg by mouth Every Evening., Disp: , Rfl:   •  metoprolol succinate XL (TOPROL-XL) 100 MG 24 hr tablet, Take 2 tablets by mouth Daily for blood pressure. (Patient taking  "differently: Take 100 mg by mouth 2 (Two) Times a Day.), Disp: 180 tablet, Rfl: 3  •  niacin (NIASPAN) 500 MG CR tablet, Take 1 tablet by mouth Every Night., Disp: 90 tablet, Rfl: 1      Social History     Socioeconomic History   • Marital status:      Spouse name: Not on file   • Number of children: Not on file   • Years of education: Not on file   • Highest education level: Not on file   Tobacco Use   • Smoking status: Former Smoker   • Smokeless tobacco: Never Used   Substance and Sexual Activity   • Alcohol use: No     Frequency: Never   • Drug use: No   • Sexual activity: Yes     Partners: Male         Review of Systems   Constitution: Positive for malaise/fatigue. Negative for chills and fever.   HENT: Negative for ear discharge and nosebleeds.    Eyes: Positive for blurred vision. Negative for discharge and redness.   Cardiovascular: Positive for palpitations. Negative for chest pain, orthopnea, paroxysmal nocturnal dyspnea and syncope.   Respiratory: Positive for shortness of breath. Negative for cough and wheezing.    Endocrine: Negative for heat intolerance.   Skin: Negative for rash.   Musculoskeletal: Positive for neck pain. Negative for arthritis and myalgias.   Gastrointestinal: Negative for abdominal pain, melena, nausea and vomiting.   Genitourinary: Negative for dysuria and hematuria.   Neurological: Positive for headaches. Negative for dizziness, light-headedness, numbness and tremors.   Psychiatric/Behavioral: Negative for depression. The patient is not nervous/anxious.        Procedures    Procedures    No orders to display           Objective:    /72   Pulse 87   Ht 162.6 cm (64.02\")   Wt 73.5 kg (162 lb)   BMI 27.79 kg/m²         Physical Exam   Constitutional: She is oriented to person, place, and time. She appears well-developed and well-nourished.   HENT:   Head: Normocephalic and atraumatic.   Eyes: No scleral icterus.   Neck: No thyromegaly present.   Cardiovascular: " Normal rate, regular rhythm and normal heart sounds. Exam reveals no gallop and no friction rub.   No murmur heard.  Pulmonary/Chest: Effort normal and breath sounds normal. No respiratory distress. She has no wheezes. She has no rales.   Abdominal: There is no tenderness.   Musculoskeletal: She exhibits no edema.   Lymphadenopathy:     She has no cervical adenopathy.   Neurological: She is alert and oriented to person, place, and time.   Skin: No rash noted. No erythema.   Psychiatric: She has a normal mood and affect.           Assessment:       Diagnosis Plan   1. Essential hypertension  Adult Transthoracic Echo Complete W/ Cont if Necessary Per Protocol   2. Mixed hyperlipidemia  Adult Transthoracic Echo Complete W/ Cont if Necessary Per Protocol   3. Type 2 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 (CMS/HCC)  Adult Transthoracic Echo Complete W/ Cont if Necessary Per Protocol   4. Chest pain, atypical  Adult Transthoracic Echo Complete W/ Cont if Necessary Per Protocol            Plan:       At this stage, I would recommend that patient should proceed with current treatment.  I would monitor the blood pressure at home.  Echocardiogram would be done to assess the left ventricular function.

## 2020-01-31 RX ORDER — INSULIN LISPRO 100 [IU]/ML
INJECTION, SUSPENSION SUBCUTANEOUS
Qty: 45 ML | OUTPATIENT
Start: 2020-01-31

## 2020-02-03 RX ORDER — INSULIN LISPRO 100 [IU]/ML
52 INJECTION, SUSPENSION SUBCUTANEOUS DAILY
Qty: 45 ML | Refills: 1 | Status: SHIPPED | OUTPATIENT
Start: 2020-02-03 | End: 2020-07-31 | Stop reason: SDUPTHER

## 2020-02-04 DIAGNOSIS — G47.34 SLEEP RELATED HYPOXIA: ICD-10-CM

## 2020-02-04 DIAGNOSIS — G47.33 OSA (OBSTRUCTIVE SLEEP APNEA): Primary | ICD-10-CM

## 2020-02-04 DIAGNOSIS — R06.83 SNORING: ICD-10-CM

## 2020-02-05 ENCOUNTER — HOSPITAL ENCOUNTER (OUTPATIENT)
Dept: CARDIOLOGY | Facility: HOSPITAL | Age: 60
Discharge: HOME | End: 2020-02-05
Admitting: INTERNAL MEDICINE

## 2020-02-05 VITALS
SYSTOLIC BLOOD PRESSURE: 154 MMHG | BODY MASS INDEX: 27.66 KG/M2 | WEIGHT: 162 LBS | DIASTOLIC BLOOD PRESSURE: 86 MMHG | HEIGHT: 64 IN

## 2020-02-05 DIAGNOSIS — R07.89 CHEST PAIN, ATYPICAL: ICD-10-CM

## 2020-02-05 DIAGNOSIS — I10 ESSENTIAL HYPERTENSION: ICD-10-CM

## 2020-02-05 DIAGNOSIS — E78.2 MIXED HYPERLIPIDEMIA: ICD-10-CM

## 2020-02-05 DIAGNOSIS — E11.49 TYPE 2 DIABETES MELLITUS WITH OTHER DIABETIC NEUROLOGICAL COMPLICATION (HCC): ICD-10-CM

## 2020-02-05 PROCEDURE — 93306 TTE W/DOPPLER COMPLETE: CPT | Performed by: INTERNAL MEDICINE

## 2020-02-05 PROCEDURE — 93306 TTE W/DOPPLER COMPLETE: CPT

## 2020-02-13 LAB
BH CV ECHO MEAS - % IVS THICK: 49.1 %
BH CV ECHO MEAS - % LVPW THICK: 70.3 %
BH CV ECHO MEAS - ACS: 1.8 CM
BH CV ECHO MEAS - AO MAX PG (FULL): 3.6 MMHG
BH CV ECHO MEAS - AO MAX PG: 8 MMHG
BH CV ECHO MEAS - AO MEAN PG (FULL): 2 MMHG
BH CV ECHO MEAS - AO MEAN PG: 4.5 MMHG
BH CV ECHO MEAS - AO ROOT AREA (BSA CORRECTED): 1.5
BH CV ECHO MEAS - AO ROOT AREA: 6 CM^2
BH CV ECHO MEAS - AO ROOT DIAM: 2.8 CM
BH CV ECHO MEAS - AO V2 MAX: 141.8 CM/SEC
BH CV ECHO MEAS - AO V2 MEAN: 100.4 CM/SEC
BH CV ECHO MEAS - AO V2 VTI: 27.5 CM
BH CV ECHO MEAS - AVA(I,A): 2.3 CM^2
BH CV ECHO MEAS - AVA(I,D): 2.3 CM^2
BH CV ECHO MEAS - AVA(V,A): 2.1 CM^2
BH CV ECHO MEAS - AVA(V,D): 2.1 CM^2
BH CV ECHO MEAS - BSA(HAYCOCK): 1.8 M^2
BH CV ECHO MEAS - BSA: 1.8 M^2
BH CV ECHO MEAS - BZI_BMI: 27.8 KILOGRAMS/M^2
BH CV ECHO MEAS - BZI_METRIC_HEIGHT: 162.6 CM
BH CV ECHO MEAS - BZI_METRIC_WEIGHT: 73.5 KG
BH CV ECHO MEAS - EDV(CUBED): 118.5 ML
BH CV ECHO MEAS - EDV(MOD-SP2): 74.7 ML
BH CV ECHO MEAS - EDV(MOD-SP4): 80.1 ML
BH CV ECHO MEAS - EDV(TEICH): 113.4 ML
BH CV ECHO MEAS - EF(CUBED): 66.3 %
BH CV ECHO MEAS - EF(MOD-BP): 59 %
BH CV ECHO MEAS - EF(MOD-SP2): 59.2 %
BH CV ECHO MEAS - EF(MOD-SP4): 59.3 %
BH CV ECHO MEAS - EF(TEICH): 57.7 %
BH CV ECHO MEAS - ESV(CUBED): 39.9 ML
BH CV ECHO MEAS - ESV(MOD-SP2): 30.5 ML
BH CV ECHO MEAS - ESV(MOD-SP4): 32.6 ML
BH CV ECHO MEAS - ESV(TEICH): 48 ML
BH CV ECHO MEAS - FS: 30.4 %
BH CV ECHO MEAS - IVS/LVPW: 1
BH CV ECHO MEAS - IVSD: 0.93 CM
BH CV ECHO MEAS - IVSS: 1.4 CM
BH CV ECHO MEAS - LA DIMENSION(2D): 3.6 CM
BH CV ECHO MEAS - LV DIASTOLIC VOL/BSA (35-75): 44.8 ML/M^2
BH CV ECHO MEAS - LV MASS(C)D: 156.1 GRAMS
BH CV ECHO MEAS - LV MASS(C)DI: 87.3 GRAMS/M^2
BH CV ECHO MEAS - LV MASS(C)S: 177.9 GRAMS
BH CV ECHO MEAS - LV MASS(C)SI: 99.5 GRAMS/M^2
BH CV ECHO MEAS - LV MAX PG: 4.5 MMHG
BH CV ECHO MEAS - LV MEAN PG: 2.5 MMHG
BH CV ECHO MEAS - LV SYSTOLIC VOL/BSA (12-30): 18.2 ML/M^2
BH CV ECHO MEAS - LV V1 MAX: 105.5 CM/SEC
BH CV ECHO MEAS - LV V1 MEAN: 74.2 CM/SEC
BH CV ECHO MEAS - LV V1 VTI: 22.6 CM
BH CV ECHO MEAS - LVIDD: 4.9 CM
BH CV ECHO MEAS - LVIDS: 3.4 CM
BH CV ECHO MEAS - LVOT AREA: 2.8 CM^2
BH CV ECHO MEAS - LVOT DIAM: 1.9 CM
BH CV ECHO MEAS - LVPWD: 0.9 CM
BH CV ECHO MEAS - LVPWS: 1.5 CM
BH CV ECHO MEAS - MV A MAX VEL: 125.7 CM/SEC
BH CV ECHO MEAS - MV DEC SLOPE: 575.9 CM/SEC^2
BH CV ECHO MEAS - MV DEC TIME: 0.19 SEC
BH CV ECHO MEAS - MV E MAX VEL: 110.6 CM/SEC
BH CV ECHO MEAS - MV E/A: 0.88
BH CV ECHO MEAS - MV MAX PG: 7.3 MMHG
BH CV ECHO MEAS - MV MEAN PG: 3.4 MMHG
BH CV ECHO MEAS - MV V2 MAX: 134.9 CM/SEC
BH CV ECHO MEAS - MV V2 MEAN: 88.2 CM/SEC
BH CV ECHO MEAS - MV V2 VTI: 31.7 CM
BH CV ECHO MEAS - MVA(VTI): 2 CM^2
BH CV ECHO MEAS - PA ACC TIME: 0.1 SEC
BH CV ECHO MEAS - PA MAX PG (FULL): 1 MMHG
BH CV ECHO MEAS - PA MAX PG: 3.8 MMHG
BH CV ECHO MEAS - PA MEAN PG (FULL): 0.72 MMHG
BH CV ECHO MEAS - PA MEAN PG: 2.1 MMHG
BH CV ECHO MEAS - PA PR(ACCEL): 33.8 MMHG
BH CV ECHO MEAS - PA V2 MAX: 97.3 CM/SEC
BH CV ECHO MEAS - PA V2 MEAN: 69.7 CM/SEC
BH CV ECHO MEAS - PA V2 VTI: 24.6 CM
BH CV ECHO MEAS - PULM A REVS DUR: 0.08 SEC
BH CV ECHO MEAS - PULM A REVS VEL: 32.2 CM/SEC
BH CV ECHO MEAS - PULM DIAS VEL: 42.7 CM/SEC
BH CV ECHO MEAS - PULM S/D: 1.5
BH CV ECHO MEAS - PULM SYS VEL: 64.9 CM/SEC
BH CV ECHO MEAS - RAP SYSTOLE: 3 MMHG
BH CV ECHO MEAS - RV MAX PG: 2.8 MMHG
BH CV ECHO MEAS - RV MEAN PG: 1.4 MMHG
BH CV ECHO MEAS - RV V1 MAX: 83.5 CM/SEC
BH CV ECHO MEAS - RV V1 MEAN: 55.4 CM/SEC
BH CV ECHO MEAS - RV V1 VTI: 19.4 CM
BH CV ECHO MEAS - RVDD: 2.3 CM
BH CV ECHO MEAS - RVSP: 15.5 MMHG
BH CV ECHO MEAS - SI(AO): 91.9 ML/M^2
BH CV ECHO MEAS - SI(CUBED): 43.9 ML/M^2
BH CV ECHO MEAS - SI(LVOT): 34.8 ML/M^2
BH CV ECHO MEAS - SI(MOD-SP2): 24.7 ML/M^2
BH CV ECHO MEAS - SI(MOD-SP4): 26.6 ML/M^2
BH CV ECHO MEAS - SI(TEICH): 36.6 ML/M^2
BH CV ECHO MEAS - SV(AO): 164.3 ML
BH CV ECHO MEAS - SV(CUBED): 78.6 ML
BH CV ECHO MEAS - SV(LVOT): 62.3 ML
BH CV ECHO MEAS - SV(MOD-SP2): 44.2 ML
BH CV ECHO MEAS - SV(MOD-SP4): 47.5 ML
BH CV ECHO MEAS - SV(TEICH): 65.4 ML
BH CV ECHO MEAS - TR MAX VEL: 176.7 CM/SEC
MAXIMAL PREDICTED HEART RATE: 159 BPM
STRESS TARGET HR: 135 BPM

## 2020-02-14 ENCOUNTER — TELEPHONE (OUTPATIENT)
Dept: CARDIOLOGY | Facility: CLINIC | Age: 60
End: 2020-02-14

## 2020-03-03 DIAGNOSIS — F41.8 MIXED ANXIETY DEPRESSIVE DISORDER: Primary | ICD-10-CM

## 2020-03-04 RX ORDER — ALPRAZOLAM 0.5 MG/1
TABLET ORAL
Qty: 30 TABLET | Refills: 0 | Status: SHIPPED | OUTPATIENT
Start: 2020-03-04 | End: 2020-04-02

## 2020-03-06 ENCOUNTER — OFFICE VISIT (OUTPATIENT)
Dept: FAMILY MEDICINE CLINIC | Facility: CLINIC | Age: 60
End: 2020-03-06

## 2020-03-06 VITALS
HEART RATE: 78 BPM | HEIGHT: 64 IN | WEIGHT: 166 LBS | OXYGEN SATURATION: 97 % | BODY MASS INDEX: 28.34 KG/M2 | DIASTOLIC BLOOD PRESSURE: 88 MMHG | SYSTOLIC BLOOD PRESSURE: 154 MMHG

## 2020-03-06 DIAGNOSIS — H10.9 CONJUNCTIVITIS OF BOTH EYES, UNSPECIFIED CONJUNCTIVITIS TYPE: ICD-10-CM

## 2020-03-06 DIAGNOSIS — J01.00 ACUTE NON-RECURRENT MAXILLARY SINUSITIS: Primary | ICD-10-CM

## 2020-03-06 PROCEDURE — 99213 OFFICE O/P EST LOW 20 MIN: CPT | Performed by: FAMILY MEDICINE

## 2020-03-06 RX ORDER — AMOXICILLIN AND CLAVULANATE POTASSIUM 875; 125 MG/1; MG/1
1 TABLET, FILM COATED ORAL 2 TIMES DAILY
Qty: 20 TABLET | Refills: 0 | Status: SHIPPED | OUTPATIENT
Start: 2020-03-06 | End: 2020-04-08

## 2020-03-06 RX ORDER — OFLOXACIN 3 MG/ML
2 SOLUTION/ DROPS OPHTHALMIC 4 TIMES DAILY
Qty: 10 ML | Refills: 1 | Status: SHIPPED | OUTPATIENT
Start: 2020-03-06 | End: 2020-08-19

## 2020-03-06 NOTE — PROGRESS NOTES
Subjective   Susy Hanson is a 59 y.o. female.     Comes in today with complaints of eye pain  bilat eye pain with lid swelling this am  Happened last year  Denies change in meds, skin products, shampoos, or detergents       The following portions of the patient's history were reviewed and updated as appropriate: allergies, current medications, past family history, past medical history, past social history, past surgical history and problem list.  Past Medical History:   Diagnosis Date   • Anxiety    • Depression    • DM (diabetes mellitus) (CMS/[facility])    • DUB (dysfunctional uterine bleeding)    • Hyperlipidemia    • Hypertension    • Irritable bowel syndrome    • Obesity    • Peripheral neuropathy      Past Surgical History:   Procedure Laterality Date   • CARDIOVASCULAR STRESS TEST     • CHOLECYSTECTOMY     • HYSTERECTOMY     • TUBAL ABDOMINAL LIGATION       Family History   Problem Relation Age of Onset   • Alzheimer's disease Father    • Diabetes Father    • Heart disease Father    • Other Father    • Sleep apnea Father    • Snoring Father    • Other Mother    • Diabetes Mother    • Heart disease Mother    • Sleep apnea Mother    • Snoring Mother    • Diabetes Sister    • Heart disease Sister    • Other Sister    • Sleep apnea Sister    • Snoring Sister      Social History     Socioeconomic History   • Marital status:      Spouse name: Not on file   • Number of children: Not on file   • Years of education: Not on file   • Highest education level: Not on file   Tobacco Use   • Smoking status: Former Smoker     Packs/day: 2.00     Years: 30.00     Pack years: 60.00     Types: Cigarettes     Start date: 1972     Last attempt to quit: 2008     Years since quittin.2   • Smokeless tobacco: Never Used   Substance and Sexual Activity   • Alcohol use: No     Frequency: Never   • Drug use: No   • Sexual activity: Yes     Partners: Male     Birth control/protection: None          Current Outpatient Medications:   •  ALPRAZolam (XANAX) 0.5 MG tablet, TAKE 1 TABLET BY MOUTH TWICE DAILY AS NEEDED FOR ANXIETY, Disp: 30 tablet, Rfl: 0  •  amLODIPine (NORVASC) 5 MG tablet, Take 1 tablet by mouth Daily., Disp: 30 tablet, Rfl: 0  •  atorvastatin (LIPITOR) 20 MG tablet, Take 1 tablet by mouth Daily., Disp: 90 tablet, Rfl: 3  •  hydrALAZINE (APRESOLINE) 50 MG tablet, Take 2 tablets by mouth 2 (Two) Times a Day., Disp: 180 tablet, Rfl: 0  •  hydroCHLOROthiazide (HYDRODIURIL) 25 MG tablet, Take 1 tablet by mouth Daily., Disp: 90 tablet, Rfl: 3  •  Insulin Lispro Prot & Lispro (HUMALOG MIX 75/25 KWIKPEN) (75-25) 100 UNIT/ML suspension pen-injector pen, Inject 24 Units under the skin with breakfast and 28 units under the skin with supper, Disp: 45 mL, Rfl: 1  •  losartan (COZAAR) 100 MG tablet, Take 1 tablet by mouth Daily., Disp: 90 tablet, Rfl: 3  •  metFORMIN (GLUCOPHAGE) 1000 MG tablet, Take 1,500 mg by mouth Every Evening., Disp: , Rfl:   •  metoprolol succinate XL (TOPROL-XL) 100 MG 24 hr tablet, Take 2 tablets by mouth Daily for blood pressure. (Patient taking differently: Take 100 mg by mouth 2 (Two) Times a Day.), Disp: 180 tablet, Rfl: 3  •  niacin (NIASPAN) 500 MG CR tablet, Take 1 tablet by mouth Every Night., Disp: 90 tablet, Rfl: 1  •  amoxicillin-clavulanate (AUGMENTIN) 875-125 MG per tablet, Take 1 tablet by mouth 2 (Two) Times a Day., Disp: 20 tablet, Rfl: 0  •  ofloxacin (OCUFLOX) 0.3 % ophthalmic solution, Administer 2 drops to both eyes 4 (Four) Times a Day., Disp: 10 mL, Rfl: 1    Review of Systems   Constitutional: Negative for diaphoresis, fatigue, fever, unexpected weight gain and unexpected weight loss.   Eyes: Positive for pain and redness. Negative for discharge and visual disturbance.   Respiratory: Negative for cough, chest tightness and shortness of breath.    Cardiovascular: Negative for chest pain, palpitations and leg swelling.   Gastrointestinal: Negative for  "nausea and vomiting.   Neurological: Negative for dizziness, syncope and headache.     /88 (BP Location: Left arm, Patient Position: Sitting, Cuff Size: Large Adult)   Pulse 78   Ht 162.6 cm (64\")   Wt 75.3 kg (166 lb)   SpO2 97%   BMI 28.49 kg/m²       Objective   Physical Exam   Constitutional: She appears well-developed and well-nourished.   HENT:   Head: Normocephalic and atraumatic.   Right Ear: Tympanic membrane and ear canal normal.   Left Ear: Tympanic membrane and ear canal normal.   Nose: Right sinus exhibits maxillary sinus tenderness. Right sinus exhibits no frontal sinus tenderness. Left sinus exhibits maxillary sinus tenderness. Left sinus exhibits no frontal sinus tenderness.   Mouth/Throat: Oropharynx is clear and moist.   Eyes: Pupils are equal, round, and reactive to light. EOM are normal. Right conjunctiva is injected. Left conjunctiva is injected.   Mild puffiness of both eyelids   Cardiovascular: Normal rate, regular rhythm, normal heart sounds and intact distal pulses.   Pulmonary/Chest: Effort normal and breath sounds normal.   Nursing note and vitals reviewed.        Assessment/Plan   Problems Addressed this Visit     None      Visit Diagnoses     Acute non-recurrent maxillary sinusitis    -  Primary    Conjunctivitis of both eyes, unspecified conjunctivitis type        Relevant Medications    ofloxacin (OCUFLOX) 0.3 % ophthalmic solution          Counseled on importance of good handwashing  Warm compresses to eyes  rx for augmentin and ocuflox       "

## 2020-03-25 ENCOUNTER — PATIENT MESSAGE (OUTPATIENT)
Dept: FAMILY MEDICINE CLINIC | Facility: CLINIC | Age: 60
End: 2020-03-25

## 2020-04-01 DIAGNOSIS — F41.8 MIXED ANXIETY DEPRESSIVE DISORDER: ICD-10-CM

## 2020-04-02 RX ORDER — ALPRAZOLAM 0.5 MG/1
TABLET ORAL
Qty: 30 TABLET | Refills: 0 | Status: SHIPPED | OUTPATIENT
Start: 2020-04-02 | End: 2020-05-22

## 2020-04-08 ENCOUNTER — TELEMEDICINE (OUTPATIENT)
Dept: CARDIOLOGY | Facility: CLINIC | Age: 60
End: 2020-04-08

## 2020-04-08 VITALS — HEIGHT: 64 IN | BODY MASS INDEX: 28.34 KG/M2 | WEIGHT: 166 LBS

## 2020-04-08 DIAGNOSIS — E11.49 TYPE 2 DIABETES MELLITUS WITH OTHER DIABETIC NEUROLOGICAL COMPLICATION (HCC): ICD-10-CM

## 2020-04-08 DIAGNOSIS — E78.2 MIXED HYPERLIPIDEMIA: ICD-10-CM

## 2020-04-08 DIAGNOSIS — I10 ESSENTIAL HYPERTENSION: Primary | ICD-10-CM

## 2020-04-08 DIAGNOSIS — R07.89 CHEST PAIN, ATYPICAL: ICD-10-CM

## 2020-04-08 PROCEDURE — 99213 OFFICE O/P EST LOW 20 MIN: CPT | Performed by: INTERNAL MEDICINE

## 2020-04-08 RX ORDER — DILTIAZEM HYDROCHLORIDE 180 MG/1
180 CAPSULE, COATED, EXTENDED RELEASE ORAL DAILY
Qty: 60 CAPSULE | Refills: 3 | Status: SHIPPED | OUTPATIENT
Start: 2020-04-08 | End: 2020-10-12 | Stop reason: SDUPTHER

## 2020-04-08 NOTE — PROGRESS NOTES
Date of Office Visit: 2020  Encounter Provider: Dr. Christo Murphy  Place of Service: [facility] CARDIOLOGY [place]  Patient Name: Susy Hanson  :1960  Erica Avila MD  You have chosen to receive care through a telephone visit today. Do you consent to use a telephone visit for your medical care today? Yes    Chief Complaint   Patient presents with   • Hypertension     2 month follow up echo/stress test/my chart video   • Hyperlipidemia   • Chest Pain     History of Present Illness    I am pleased have video visit Mrs. Hanson in my office today as a follow-up    As you know, patient is a 59-year-old white female whose past medical history is significant for hypertension, diabetes mellitus, who had video visit with me.    Patient has been hypertension from last 10 years.  Patient is also diabetic.  Her blood pressure was fairly well controlled but from last few months her blood pressure is running high.  In 2020, patient was admitted in the hospital with blood pressure greater than 200.  She underwent stress test which was negative for ischemia.  EKG showed left bundle branch block pattern.  MRI of the brain was unremarkable.  CT angiography to rule out renal artery stenosis was done and it was negative.  Patient antihypertensive regimen was adjusted and was discharged.    Since the previous visit, patient is doing fairly well from cardiovascular standpoint.  Patient denies any symptom of chest pain or tightness or heaviness.  No orthopnea or PND.  No palpitation or syncope.    Her blood pressure is still elevated.  At this stage, I would recommend to discontinue amlodipine.  I would start Cardizem  mg daily.  Blood pressure monitoring is recommended.  I will see the patient in 4 months    Past Medical History:   Diagnosis Date   • Anxiety    • Depression    • DM (diabetes mellitus) (CMS/HCC)    • DUB (dysfunctional uterine bleeding)    • Hyperlipidemia    •  Hypertension    • Irritable bowel syndrome 2006   • Obesity    • Peripheral neuropathy          Past Surgical History:   Procedure Laterality Date   • CARDIOVASCULAR STRESS TEST  2020   • CHOLECYSTECTOMY     • ECHO - CONVERTED  2020   • HYSTERECTOMY     • TUBAL ABDOMINAL LIGATION  1981           Current Outpatient Medications:   •  ALPRAZolam (XANAX) 0.5 MG tablet, TAKE 1 TABLET BY MOUTH TWICE DAILY AS NEEDED FOR ANXIETY, Disp: 30 tablet, Rfl: 0  •  amLODIPine (NORVASC) 5 MG tablet, Take 1 tablet by mouth Daily., Disp: 30 tablet, Rfl: 0  •  atorvastatin (LIPITOR) 20 MG tablet, Take 1 tablet by mouth Daily., Disp: 90 tablet, Rfl: 3  •  hydrALAZINE (APRESOLINE) 50 MG tablet, Take 2 tablets by mouth 2 (Two) Times a Day., Disp: 180 tablet, Rfl: 0  •  hydroCHLOROthiazide (HYDRODIURIL) 25 MG tablet, Take 1 tablet by mouth Daily., Disp: 90 tablet, Rfl: 3  •  Insulin Lispro Prot & Lispro (HUMALOG MIX 75/25 KWIKPEN) (75-25) 100 UNIT/ML suspension pen-injector pen, Inject 24 Units under the skin with breakfast and 28 units under the skin with supper, Disp: 45 mL, Rfl: 1  •  losartan (COZAAR) 100 MG tablet, Take 1 tablet by mouth Daily., Disp: 90 tablet, Rfl: 3  •  metFORMIN (GLUCOPHAGE) 1000 MG tablet, Take 1,500 mg by mouth Every Evening., Disp: , Rfl:   •  metoprolol succinate XL (TOPROL-XL) 100 MG 24 hr tablet, Take 2 tablets by mouth Daily for blood pressure. (Patient taking differently: Take 100 mg by mouth 2 (Two) Times a Day.), Disp: 180 tablet, Rfl: 3  •  ofloxacin (OCUFLOX) 0.3 % ophthalmic solution, Administer 2 drops to both eyes 4 (Four) Times a Day., Disp: 10 mL, Rfl: 1      Social History     Socioeconomic History   • Marital status:      Spouse name: Not on file   • Number of children: Not on file   • Years of education: Not on file   • Highest education level: Not on file   Tobacco Use   • Smoking status: Former Smoker     Packs/day: 2.00     Years: 30.00     Pack years: 60.00     Types: Cigarettes      "Start date: 1972     Last attempt to quit: 2008     Years since quittin.2   • Smokeless tobacco: Never Used   Substance and Sexual Activity   • Alcohol use: No     Frequency: Never   • Drug use: No   • Sexual activity: Yes     Partners: Male     Birth control/protection: None         Review of Systems   Constitution: Negative for chills and fever.   HENT: Negative for ear discharge and nosebleeds.    Eyes: Negative for discharge and redness.   Cardiovascular: Negative for chest pain, orthopnea, palpitations, paroxysmal nocturnal dyspnea and syncope.   Respiratory: Positive for shortness of breath. Negative for cough and wheezing.    Endocrine: Negative for heat intolerance.   Skin: Negative for rash.   Musculoskeletal: Positive for arthritis and joint pain. Negative for myalgias.   Gastrointestinal: Negative for abdominal pain, melena, nausea and vomiting.   Genitourinary: Negative for dysuria and hematuria.   Neurological: Negative for dizziness, light-headedness, numbness and tremors.   Psychiatric/Behavioral: Negative for depression. The patient is not nervous/anxious.        Procedures    Procedures    No orders to display           Objective:    Ht 162.6 cm (64.02\")   Wt 75.3 kg (166 lb)   BMI 28.48 kg/m²         Physical Exam    This is a telephone visit.    Assessment:       Diagnosis Plan   1. Essential hypertension     2. Mixed hyperlipidemia     3. Type 2 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 (CMS/HCC)     4. Chest pain, atypical              Plan:       At this stage, I would recommend that patient should start Cardizem  mg daily.  Amlodipine would be discontinued.  Blood pressure monitoring is recommended.  Compliance with the medication is recommended.  "

## 2020-04-16 DIAGNOSIS — I10 ESSENTIAL HYPERTENSION: ICD-10-CM

## 2020-04-17 RX ORDER — LANOLIN ALCOHOL/MO/W.PET/CERES
500 CREAM (GRAM) TOPICAL NIGHTLY
Qty: 90 CAPSULE | Refills: 1 | Status: SHIPPED | OUTPATIENT
Start: 2020-04-17 | End: 2020-08-19

## 2020-04-17 RX ORDER — HYDRALAZINE HYDROCHLORIDE 50 MG/1
50 TABLET, FILM COATED ORAL 2 TIMES DAILY
Qty: 180 TABLET | Refills: 0 | Status: SHIPPED | OUTPATIENT
Start: 2020-04-17 | End: 2020-07-14 | Stop reason: SDUPTHER

## 2020-04-29 ENCOUNTER — OFFICE VISIT (OUTPATIENT)
Dept: FAMILY MEDICINE CLINIC | Facility: CLINIC | Age: 60
End: 2020-04-29

## 2020-04-29 VITALS
DIASTOLIC BLOOD PRESSURE: 84 MMHG | BODY MASS INDEX: 28 KG/M2 | SYSTOLIC BLOOD PRESSURE: 145 MMHG | WEIGHT: 164 LBS | TEMPERATURE: 96.3 F | OXYGEN SATURATION: 97 % | HEART RATE: 68 BPM | HEIGHT: 64 IN

## 2020-04-29 DIAGNOSIS — H02.9 EYELID ABNORMALITY: Primary | ICD-10-CM

## 2020-04-29 PROCEDURE — 99213 OFFICE O/P EST LOW 20 MIN: CPT | Performed by: NURSE PRACTITIONER

## 2020-04-29 RX ORDER — DIAPER,BRIEF,INFANT-TODD,DISP
EACH MISCELLANEOUS 2 TIMES DAILY
Qty: 14 G | Refills: 0 | Status: SHIPPED | OUTPATIENT
Start: 2020-04-29 | End: 2020-08-19

## 2020-04-29 NOTE — PATIENT INSTRUCTIONS
Apply steroid cream to bilateral eyelids twice daily for up to 2 weeks  Avoid contact with eye  Avoid wearing makeup at this time  Start taking daily allergy medication such as zyrtec  Referral to ophthalmology due to recurrent irritation and blurred vision  Call for any questions

## 2020-04-29 NOTE — PROGRESS NOTES
"Subjective   Susy Hanson is a 59 y.o. female.     Pt is here today with complaints of eyelid itching and swelling.  It is has been going on for about 1 week.  She had a similar occurrence in March and was treated with cipro drops, which helped some.  She states that it only went away for a short duration before returning.   Reports that her conjunctiva have only occasionally been pink and are not bothering her.  Her eyelids itch and then hurt.  She has tried applying OTC blepharitis cream.  Denies any crusting or drainage.  Has been having some increased blurring of the eyes.  Denies sinus issues (congestion, runny nose).           The following portions of the patient's history were reviewed and updated as appropriate: allergies, current medications, past family history, past medical history, past social history, past surgical history and problem list.    Review of Systems   Constitutional: Negative for chills, fatigue and fever.   HENT: Negative for congestion, ear pain, sinus pressure and sore throat.    Eyes: Positive for blurred vision, pain, redness (mild conjunctival redness- ) and itching. Negative for double vision and discharge.   Respiratory: Negative for chest tightness and shortness of breath.    Cardiovascular: Negative for chest pain and palpitations.   Neurological: Negative for dizziness and headache.       Objective   /84 (BP Location: Left arm, Patient Position: Sitting, Cuff Size: Adult)   Pulse 68   Temp 96.3 °F (35.7 °C) (Oral)   Ht 162.6 cm (64.02\")   Wt 74.4 kg (164 lb)   SpO2 97%   Breastfeeding No   BMI 28.13 kg/m²   Physical Exam   Constitutional: She is oriented to person, place, and time. She appears well-developed and well-nourished. No distress.   HENT:   Head: Normocephalic and atraumatic.   Eyes: Pupils are equal, round, and reactive to light. EOM are normal. Right eye exhibits no discharge and no exudate. Left eye exhibits no discharge and no exudate.   dmitry eye " lids swollen and erythematic.  No drainage noted. Mild erythema of conjunctiva.   Cardiovascular: Normal rate and regular rhythm.   Pulmonary/Chest: Effort normal. No respiratory distress.   Neurological: She is alert and oriented to person, place, and time.   Skin: Skin is warm.   Psychiatric: She has a normal mood and affect. Her behavior is normal. Judgment and thought content normal.         Assessment/Plan     Diagnoses and all orders for this visit:    1. Eyelid abnormality (Primary)  Comments:  possibly eyelid dermatitis or blepheritis  will treat with low dose steroid- avoid eye contact  ref to ophthamology  no makeup  picture uploaded  Orders:  -     hydrocortisone 1 % cream; Apply  topically to the appropriate area as directed 2 (Two) Times a Day. Apply on bilateral eyelids, avoid contact with eye.  Dispense: 14 g; Refill: 0  -     Ambulatory Referral to Ophthalmology

## 2020-05-21 DIAGNOSIS — F41.8 MIXED ANXIETY DEPRESSIVE DISORDER: ICD-10-CM

## 2020-05-22 RX ORDER — ALPRAZOLAM 0.5 MG/1
TABLET ORAL
Qty: 30 TABLET | Refills: 0 | Status: SHIPPED | OUTPATIENT
Start: 2020-05-22 | End: 2020-07-15 | Stop reason: SDUPTHER

## 2020-05-29 ENCOUNTER — DOCUMENTATION (OUTPATIENT)
Dept: PHYSICAL THERAPY | Facility: CLINIC | Age: 60
End: 2020-05-29

## 2020-05-29 NOTE — PROGRESS NOTES
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    The pt had left CTS done  In Oct which is healing well   She is here today due to CTS that was done on her right hand in Nov of 2019   She reports that she feels it is tight and difficult to make a fist and use her hand   She reports no other major co morbidities        Objective      Pt is right hand dominate and right hand affected   ROM of the wrist in flex  55, ext 55  Radial dev is 25 and ulnar is 30   on the right is 35 and the left is 30  Dexterity on the right 9 hole peg is 20.93 sec and the left is 24 sec   She reports no sensory impairments and the numbness and tingling that she would experience at night is gone   She is able to make a complete fist and opposition is complete   She reports no deficits in ADL     IADL and performing some heavier manual tasks such as ; lifting, carrying, resistive rotation such as opening a jar present some minor issues      Discharge Status of Patient: pt was seen for 2 visits and then discharged with HEP and return to work     Goals: All Met    Discharge Plan: Continue with current home exercise program as instructed        Date of Discharge 5/29/20        Joy Wu OT  Occupational Therapist

## 2020-06-02 ENCOUNTER — OFFICE VISIT (OUTPATIENT)
Dept: FAMILY MEDICINE CLINIC | Facility: CLINIC | Age: 60
End: 2020-06-02

## 2020-06-02 VITALS
SYSTOLIC BLOOD PRESSURE: 159 MMHG | DIASTOLIC BLOOD PRESSURE: 85 MMHG | BODY MASS INDEX: 29.12 KG/M2 | OXYGEN SATURATION: 99 % | HEIGHT: 64 IN | WEIGHT: 170.6 LBS | TEMPERATURE: 97.3 F | HEART RATE: 75 BPM

## 2020-06-02 DIAGNOSIS — F41.1 GENERALIZED ANXIETY DISORDER: Primary | ICD-10-CM

## 2020-06-02 DIAGNOSIS — I10 ESSENTIAL HYPERTENSION: ICD-10-CM

## 2020-06-02 DIAGNOSIS — Z63.79 STRESSFUL LIFE EVENTS AFFECTING FAMILY AND HOUSEHOLD: ICD-10-CM

## 2020-06-02 PROCEDURE — 99213 OFFICE O/P EST LOW 20 MIN: CPT | Performed by: FAMILY MEDICINE

## 2020-06-02 NOTE — PROGRESS NOTES
Subjective   Susy Hanson is a 59 y.o. female.     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  The pandemic has created more work for her staff secondary to increased cleaning demands and the need to wash and provide scrubs for the staff  Pt's mother has been sick requiring hospitalization; her mother has dementia so pt has been overseeing her care while she was hospitalized; now that she has been discharged, she is currently living with the patient's sister but the patient is assisting with this and suspects that she will have to take over the care  Had sleep study  H/o night terrors  Using her CPAP nightly  Has been on antidepressants in the past and had to be hospitalized at [facility] secondary to a reaction to the medication  She has been on abilify and venlafaxine in the past  As well as others that she cannot remember the names  bp at home running 150's  Lite coffee in am  Stress   She says she is taking her medications as directed  She says she is not sure how much longer she can handle the stress at work in addition to the stress at home       The following portions of the patient's history were reviewed and updated as appropriate: allergies, current medications, past family history, past medical history, past social history, past surgical history and problem list.  Past Medical History:   Diagnosis Date   • Anxiety 2000   • Depression 2008   • DM (diabetes mellitus) (CMS/[facility])    • DUB (dysfunctional uterine bleeding)    • Hyperlipidemia    • Hypertension    • Irritable bowel syndrome 2006   • Obesity    • Peripheral neuropathy      Past Surgical History:   Procedure Laterality Date   • CARDIOVASCULAR STRESS TEST  2020   • CHOLECYSTECTOMY     • ECHO - CONVERTED  2020   • HYSTERECTOMY     • TUBAL ABDOMINAL LIGATION        Family History   Problem Relation Age of Onset   • Alzheimer's disease Father    • Diabetes Father    • Heart disease Father    • Other Father    • Sleep apnea Father    • Snoring Father    • Other Mother    • Diabetes Mother    • Heart disease Mother    • Sleep apnea Mother    • Snoring Mother    • Diabetes Sister    • Heart disease Sister    • Other Sister    • Sleep apnea Sister    • Snoring Sister      Social History     Socioeconomic History   • Marital status:      Spouse name: Not on file   • Number of children: Not on file   • Years of education: Not on file   • Highest education level: Not on file   Tobacco Use   • Smoking status: Former Smoker     Packs/day: 2.00     Years: 30.00     Pack years: 60.00     Types: Cigarettes     Start date: 1972     Last attempt to quit: 2008     Years since quittin.4   • Smokeless tobacco: Never Used   Substance and Sexual Activity   • Alcohol use: No     Frequency: Never   • Drug use: No   • Sexual activity: Yes     Partners: Male     Birth control/protection: None         Current Outpatient Medications:   •  ALPRAZolam (XANAX) 0.5 MG tablet, TAKE 1 TABLET BY MOUTH TWICE DAILY AS NEEDED FOR ANXIETY, Disp: 30 tablet, Rfl: 0  •  atorvastatin (LIPITOR) 20 MG tablet, Take 1 tablet by mouth Daily., Disp: 90 tablet, Rfl: 3  •  dilTIAZem CD (Cardizem CD) 180 MG 24 hr capsule, Take 1 capsule by mouth Daily., Disp: 60 capsule, Rfl: 3  •  hydrALAZINE (APRESOLINE) 50 MG tablet, Take 1 tablet by mouth 2 (Two) Times a Day., Disp: 180 tablet, Rfl: 0  •  hydroCHLOROthiazide (HYDRODIURIL) 25 MG tablet, Take 1 tablet by mouth Daily., Disp: 90 tablet, Rfl: 3  •  hydrocortisone 1 % cream, Apply  topically to the appropriate area as directed 2 (Two) Times a Day. Apply on bilateral eyelids, avoid contact with eye., Disp: 14 g, Rfl: 0  •  Insulin Lispro Prot & Lispro (HumaLOG Mix 75/25 KwikPen) (75-25) 100 UNIT/ML suspension pen-injector pen, Inject 24 Units under  "the skin with breakfast and 28 units under the skin with supper, Disp: 45 mL, Rfl: 1  •  losartan (Cozaar) 100 MG tablet, Take 1 tablet by mouth Daily., Disp: 90 tablet, Rfl: 3  •  metFORMIN (GLUCOPHAGE) 1000 MG tablet, Take 1,500 mg by mouth Every Evening., Disp: , Rfl:   •  metoprolol succinate XL (TOPROL-XL) 100 MG 24 hr tablet, Take 2 tablets by mouth Daily for blood pressure. (Patient taking differently: Take 100 mg by mouth 2 (Two) Times a Day.), Disp: 180 tablet, Rfl: 3  •  niacin (NIACIN SR,NIASPAN ER) 500 MG CR capsule, Take 1 capsule by mouth Every Night., Disp: 90 capsule, Rfl: 1  •  ofloxacin (OCUFLOX) 0.3 % ophthalmic solution, Administer 2 drops to both eyes 4 (Four) Times a Day., Disp: 10 mL, Rfl: 1    Review of Systems   Constitutional: Negative for diaphoresis, fatigue, fever, unexpected weight gain and unexpected weight loss.   Respiratory: Negative for cough, chest tightness and shortness of breath.    Cardiovascular: Negative for chest pain, palpitations and leg swelling.   Gastrointestinal: Negative for nausea and vomiting.   Neurological: Negative for dizziness, syncope and headache.   Psychiatric/Behavioral: Positive for agitation, decreased concentration, sleep disturbance, depressed mood and stress. Negative for self-injury and suicidal ideas. The patient is nervous/anxious.      /85 (BP Location: Left arm, Patient Position: Sitting, Cuff Size: Adult)   Pulse 75   Temp 97.3 °F (36.3 °C) (Temporal)   Ht 162.6 cm (64.02\")   Wt 77.4 kg (170 lb 9.6 oz)   SpO2 99%   Breastfeeding No   BMI 29.27 kg/m²       Objective   Physical Exam   Constitutional: She appears well-developed and well-nourished. No distress.   HENT:   Head: Normocephalic and atraumatic.   Neck: Neck supple. No JVD present. No thyromegaly present.   Cardiovascular: Normal rate, regular rhythm, normal heart sounds and intact distal pulses. Exam reveals no gallop and no friction rub.   No murmur heard.  Pulmonary/Chest: " Effort normal and breath sounds normal. No respiratory distress. She has no wheezes. She has no rales.   Musculoskeletal: She exhibits no edema.   Lymphadenopathy:     She has no cervical adenopathy.   Neurological: She is alert.   Skin: Skin is warm and dry.   Psychiatric: Her speech is normal. Judgment and thought content normal. Her mood appears anxious. She is agitated. Cognition and memory are normal. She exhibits a depressed mood.   Nursing note and vitals reviewed.        Assessment/Plan   Problems Addressed this Visit        Cardiovascular and Mediastinum    Essential hypertension      Other Visit Diagnoses     Generalized anxiety disorder    -  Primary    Relevant Orders    Ambulatory Referral to Psychology    Stressful life events affecting family and household        Relevant Orders    Ambulatory Referral to Psychology          She is currently not handling the stress very well  It is having a negative impact on her blood pressure which is still not adequately controlled  She is hesitant to have her medications adjusted at this time  I have strongly encouraged her to schedule counseling either through [facility] or through an outside source  I have encouraged her to keep working on stress reduction  She will continue the Xanax  I have ordered gene site testing to see what antidepressants and antianxiety medications will work better for her  In the meantime she will talk to her pharmacy and see if she can find out a list of the medications she has been on for anxiety and depression in the past  Once I get the results of both of these I will see about finding a medication that she can try and will consider putting her off work for a couple of weeks while these were adjusted

## 2020-06-02 NOTE — PATIENT INSTRUCTIONS
Call your pharmacy and get a list of meds you have been on for depression and anxiety and send me the list  Schedule counseling - outside or [facility]  Exercise  Stress reduction

## 2020-07-14 DIAGNOSIS — I10 ESSENTIAL HYPERTENSION: ICD-10-CM

## 2020-07-14 RX ORDER — HYDRALAZINE HYDROCHLORIDE 50 MG/1
50 TABLET, FILM COATED ORAL 2 TIMES DAILY
Qty: 180 TABLET | Refills: 0 | Status: SHIPPED | OUTPATIENT
Start: 2020-07-14 | End: 2020-07-17 | Stop reason: SDUPTHER

## 2020-07-15 DIAGNOSIS — F41.8 MIXED ANXIETY DEPRESSIVE DISORDER: ICD-10-CM

## 2020-07-16 RX ORDER — ALPRAZOLAM 0.5 MG/1
0.5 TABLET ORAL 2 TIMES DAILY PRN
Qty: 30 TABLET | Refills: 0 | Status: SHIPPED | OUTPATIENT
Start: 2020-07-16 | End: 2020-08-25 | Stop reason: SDUPTHER

## 2020-07-17 DIAGNOSIS — I10 ESSENTIAL HYPERTENSION: ICD-10-CM

## 2020-07-17 RX ORDER — HYDRALAZINE HYDROCHLORIDE 50 MG/1
50 TABLET, FILM COATED ORAL 2 TIMES DAILY
Qty: 180 TABLET | Refills: 3 | Status: SHIPPED | OUTPATIENT
Start: 2020-07-17 | End: 2020-08-19 | Stop reason: SDUPTHER

## 2020-07-31 RX ORDER — INSULIN LISPRO 100 [IU]/ML
52 INJECTION, SUSPENSION SUBCUTANEOUS DAILY
Qty: 45 ML | Refills: 1 | Status: SHIPPED | OUTPATIENT
Start: 2020-07-31 | End: 2021-04-26 | Stop reason: SDUPTHER

## 2020-08-04 RX ORDER — METOPROLOL SUCCINATE 100 MG/1
200 TABLET, EXTENDED RELEASE ORAL DAILY
Qty: 180 TABLET | Refills: 2 | Status: SHIPPED | OUTPATIENT
Start: 2020-08-04 | End: 2021-04-26 | Stop reason: SDUPTHER

## 2020-08-05 RX ORDER — ATORVASTATIN CALCIUM 20 MG/1
20 TABLET, FILM COATED ORAL DAILY
Qty: 90 TABLET | Refills: 2 | Status: SHIPPED | OUTPATIENT
Start: 2020-08-05 | End: 2021-07-15 | Stop reason: SDUPTHER

## 2020-08-19 ENCOUNTER — OFFICE VISIT (OUTPATIENT)
Dept: CARDIOLOGY | Facility: CLINIC | Age: 60
End: 2020-08-19

## 2020-08-19 VITALS
HEIGHT: 64 IN | SYSTOLIC BLOOD PRESSURE: 150 MMHG | BODY MASS INDEX: 29.19 KG/M2 | HEART RATE: 77 BPM | DIASTOLIC BLOOD PRESSURE: 84 MMHG | WEIGHT: 171 LBS

## 2020-08-19 DIAGNOSIS — I10 ESSENTIAL HYPERTENSION: ICD-10-CM

## 2020-08-19 DIAGNOSIS — I10 ESSENTIAL HYPERTENSION: Primary | ICD-10-CM

## 2020-08-19 DIAGNOSIS — R07.89 CHEST PAIN, ATYPICAL: ICD-10-CM

## 2020-08-19 DIAGNOSIS — E78.2 MIXED HYPERLIPIDEMIA: ICD-10-CM

## 2020-08-19 DIAGNOSIS — E11.49 TYPE 2 DIABETES MELLITUS WITH OTHER DIABETIC NEUROLOGICAL COMPLICATION (HCC): ICD-10-CM

## 2020-08-19 PROCEDURE — 93000 ELECTROCARDIOGRAM COMPLETE: CPT | Performed by: INTERNAL MEDICINE

## 2020-08-19 PROCEDURE — 99214 OFFICE O/P EST MOD 30 MIN: CPT | Performed by: INTERNAL MEDICINE

## 2020-08-19 RX ORDER — HYDRALAZINE HYDROCHLORIDE 50 MG/1
75 TABLET, FILM COATED ORAL 2 TIMES DAILY
Qty: 120 TABLET | Refills: 3 | Status: SHIPPED | OUTPATIENT
Start: 2020-08-19 | End: 2020-08-25 | Stop reason: SDUPTHER

## 2020-08-19 NOTE — PROGRESS NOTES
Date of Office Visit: 2020  Encounter Provider: Dr. Christo Murphy  Place of Service: [facility] CARDIOLOGY [place]  Patient Name: Susy Hanson  :1960  Erica Avila MD    Chief Complaint   Patient presents with   • Hypertension     4 month follow up   • Hyperlipidemia   • Diabetes   • Chest Pain     History of Present Illness    I am pleased see Mrs. Hanson in my office today as a follow-up    As you know, patient is a 60-year-old white female whose past medical history is significant for hypertension, diabetes mellitus,     Patient has been hypertension from last 10 years.  Patient is also diabetic.  Her blood pressure was fairly well controlled but from last few months her blood pressure is running high.  In 2020, patient was admitted in the hospital with blood pressure greater than 200.  She underwent stress test which was negative for ischemia.  EKG showed left bundle branch block pattern.  MRI of the brain was unremarkable.  CT angiography to rule out renal artery stenosis was done and it was negative.  Patient antihypertensive regimen was adjusted and was discharged.    This is a routine follow-up visit for Ms. Hanson.  On previous visit I recommended to start Cardizem  mg daily.  She brought her blood pressure logbook and most of the blood pressures are still elevated but they are much improved.  Patient denies any chest pain.  No orthopnea PND no syncope or presyncope.  No leg edema noted.    EKG showed normal sinus rhythm no ST changes.  No change from previous EKG.    At this stage, I would recommend to increase hydralazine 75 mg twice daily.  Blood pressure monitoring is recommended.  Salt restriction discussed.  I will see the patient in 6 months.      Past Medical History:   Diagnosis Date   • Anxiety    • Depression    • DM (diabetes mellitus) (CMS/HCC)    • DUB (dysfunctional uterine bleeding)    • Hyperlipidemia    • Hypertension    •  Irritable bowel syndrome    • Obesity    • Peripheral neuropathy          Past Surgical History:   Procedure Laterality Date   • CARDIOVASCULAR STRESS TEST     • CHOLECYSTECTOMY     • ECHO - CONVERTED     • HYSTERECTOMY     • TUBAL ABDOMINAL LIGATION             Current Outpatient Medications:   •  ALPRAZolam (XANAX) 0.5 MG tablet, Take 1 tablet by mouth 2 (Two) Times a Day As Needed for Anxiety. for anxiety, Disp: 30 tablet, Rfl: 0  •  atorvastatin (LIPITOR) 20 MG tablet, Take 1 tablet by mouth Daily., Disp: 90 tablet, Rfl: 2  •  dilTIAZem CD (Cardizem CD) 180 MG 24 hr capsule, Take 1 capsule by mouth Daily., Disp: 60 capsule, Rfl: 3  •  hydrALAZINE (APRESOLINE) 50 MG tablet, Take 1.5 tablets by mouth 2 (Two) Times a Day., Disp: 120 tablet, Rfl: 3  •  hydroCHLOROthiazide (HYDRODIURIL) 25 MG tablet, Take 1 tablet by mouth Daily., Disp: 90 tablet, Rfl: 3  •  Insulin Lispro Prot & Lispro (HumaLOG Mix 75/25 KwikPen) (75-25) 100 UNIT/ML suspension pen-injector pen, Inject 24 Units under the skin with breakfast and 28 units under the skin with supper, Disp: 45 mL, Rfl: 1  •  losartan (Cozaar) 100 MG tablet, Take 1 tablet by mouth Daily., Disp: 90 tablet, Rfl: 3  •  metFORMIN (GLUCOPHAGE) 1000 MG tablet, Take 1,500 mg by mouth Every Evening., Disp: , Rfl:   •  metoprolol succinate XL (TOPROL-XL) 100 MG 24 hr tablet, Take 2 tablets by mouth Daily for blood pressure., Disp: 180 tablet, Rfl: 2      Social History     Socioeconomic History   • Marital status:      Spouse name: Not on file   • Number of children: Not on file   • Years of education: Not on file   • Highest education level: Not on file   Tobacco Use   • Smoking status: Former Smoker     Packs/day: 2.00     Years: 30.00     Pack years: 60.00     Types: Cigarettes     Start date: 1972     Last attempt to quit: 2008     Years since quittin.6   • Smokeless tobacco: Never Used   Substance and Sexual Activity   • Alcohol use: No      "Frequency: Never   • Drug use: No   • Sexual activity: Yes     Partners: Male     Birth control/protection: None         Review of Systems   Constitution: Negative for chills and fever.   HENT: Negative for ear discharge and nosebleeds.    Eyes: Negative for discharge and redness.   Cardiovascular: Negative for chest pain, orthopnea, palpitations, paroxysmal nocturnal dyspnea and syncope.   Respiratory: Positive for shortness of breath. Negative for cough and wheezing.    Endocrine: Negative for heat intolerance.   Skin: Negative for rash.   Musculoskeletal: Positive for arthritis and back pain. Negative for myalgias.   Gastrointestinal: Negative for abdominal pain, melena, nausea and vomiting.   Genitourinary: Negative for dysuria and hematuria.   Neurological: Negative for dizziness, light-headedness, numbness and tremors.   Psychiatric/Behavioral: Negative for depression. The patient is not nervous/anxious.        Procedures      ECG 12 Lead  Date/Time: 8/19/2020 10:04 AM  Performed by: Christo Murphy MD  Authorized by: Christo Murphy MD   Comparison: compared with previous ECG   Similar to previous ECG  Rhythm: sinus rhythm    Clinical impression: normal ECG            ECG 12 Lead    (Results Pending)           Objective:    /84   Pulse 77   Ht 162.6 cm (64.02\")   Wt 77.6 kg (171 lb)   BMI 29.34 kg/m²         Physical Exam   Constitutional: She is oriented to person, place, and time. She appears well-developed and well-nourished.   HENT:   Head: Normocephalic and atraumatic.   Eyes: No scleral icterus.   Neck: No thyromegaly present.   Cardiovascular: Normal rate, regular rhythm and normal heart sounds. Exam reveals no gallop and no friction rub.   No murmur heard.  Pulmonary/Chest: Effort normal and breath sounds normal. No respiratory distress. She has no wheezes. She has no rales.   Abdominal: There is no tenderness.   Musculoskeletal: She exhibits no edema.   Lymphadenopathy:     She has no cervical " adenopathy.   Neurological: She is alert and oriented to person, place, and time.   Skin: No rash noted. No erythema.   Psychiatric: She has a normal mood and affect.           Assessment:       Diagnosis Plan   1. Essential hypertension  ECG 12 Lead    hydrALAZINE (APRESOLINE) 50 MG tablet   2. Mixed hyperlipidemia  ECG 12 Lead   3. Type 2 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 (CMS/HCC)  ECG 12 Lead   4. Chest pain, atypical  ECG 12 Lead   5. Essential hypertension  ECG 12 Lead    hydrALAZINE (APRESOLINE) 50 MG tablet    Cont. current medications.  Will add hydralazine 25mg bid.   Monitor BP at home over next couple of days; call in readings Thursday.    Reviewed warning s/s            Plan:       I would recommend to increase hydralazine to 75 mg twice daily.  Blood pressure monitoring is recommended.  Salt restriction discussed.

## 2020-08-25 DIAGNOSIS — F41.8 MIXED ANXIETY DEPRESSIVE DISORDER: ICD-10-CM

## 2020-08-25 DIAGNOSIS — I10 ESSENTIAL HYPERTENSION: ICD-10-CM

## 2020-08-25 RX ORDER — HYDRALAZINE HYDROCHLORIDE 50 MG/1
75 TABLET, FILM COATED ORAL 2 TIMES DAILY
Qty: 120 TABLET | Refills: 3 | Status: SHIPPED | OUTPATIENT
Start: 2020-08-25 | End: 2020-10-12 | Stop reason: SDUPTHER

## 2020-08-25 RX ORDER — ALPRAZOLAM 0.5 MG/1
0.5 TABLET ORAL 2 TIMES DAILY PRN
Qty: 60 TABLET | Refills: 0 | Status: SHIPPED | OUTPATIENT
Start: 2020-08-25 | End: 2020-10-12 | Stop reason: SDUPTHER

## 2020-09-29 RX ORDER — HYDROCHLOROTHIAZIDE 25 MG/1
25 TABLET ORAL DAILY
Qty: 90 TABLET | Refills: 2 | Status: SHIPPED | OUTPATIENT
Start: 2020-09-29 | End: 2021-07-15 | Stop reason: SDUPTHER

## 2020-09-29 RX ORDER — LOSARTAN POTASSIUM 100 MG/1
100 TABLET ORAL DAILY
Qty: 90 TABLET | Refills: 2 | Status: SHIPPED | OUTPATIENT
Start: 2020-09-29 | End: 2021-07-15 | Stop reason: SDUPTHER

## 2020-10-12 DIAGNOSIS — I10 ESSENTIAL HYPERTENSION: ICD-10-CM

## 2020-10-12 DIAGNOSIS — F41.8 MIXED ANXIETY DEPRESSIVE DISORDER: ICD-10-CM

## 2020-10-12 DIAGNOSIS — E78.2 MIXED HYPERLIPIDEMIA: ICD-10-CM

## 2020-10-12 DIAGNOSIS — R07.89 CHEST PAIN, ATYPICAL: ICD-10-CM

## 2020-10-12 DIAGNOSIS — E11.49 TYPE 2 DIABETES MELLITUS WITH OTHER DIABETIC NEUROLOGICAL COMPLICATION (HCC): ICD-10-CM

## 2020-10-12 RX ORDER — HYDRALAZINE HYDROCHLORIDE 50 MG/1
75 TABLET, FILM COATED ORAL 2 TIMES DAILY
Qty: 120 TABLET | Refills: 3 | Status: SHIPPED | OUTPATIENT
Start: 2020-10-12 | End: 2020-11-17

## 2020-10-12 RX ORDER — ALPRAZOLAM 0.5 MG/1
0.5 TABLET ORAL 2 TIMES DAILY PRN
Qty: 60 TABLET | Refills: 0 | Status: SHIPPED | OUTPATIENT
Start: 2020-10-12 | End: 2020-12-19 | Stop reason: SDUPTHER

## 2020-10-12 RX ORDER — DILTIAZEM HYDROCHLORIDE 180 MG/1
180 CAPSULE, COATED, EXTENDED RELEASE ORAL DAILY
Qty: 60 CAPSULE | Refills: 3 | Status: SHIPPED | OUTPATIENT
Start: 2020-10-12 | End: 2021-04-26 | Stop reason: SDUPTHER

## 2020-11-09 ENCOUNTER — TELEPHONE (OUTPATIENT)
Dept: FAMILY MEDICINE CLINIC | Facility: CLINIC | Age: 60
End: 2020-11-09

## 2020-11-09 DIAGNOSIS — E11.49 TYPE 2 DIABETES MELLITUS WITH OTHER DIABETIC NEUROLOGICAL COMPLICATION (HCC): Primary | ICD-10-CM

## 2020-11-09 DIAGNOSIS — Z11.59 NEED FOR HEPATITIS C SCREENING TEST: ICD-10-CM

## 2020-11-09 DIAGNOSIS — E78.2 MIXED HYPERLIPIDEMIA: ICD-10-CM

## 2020-11-09 DIAGNOSIS — I10 ESSENTIAL HYPERTENSION: Primary | ICD-10-CM

## 2020-11-09 DIAGNOSIS — E11.49 TYPE 2 DIABETES MELLITUS WITH OTHER DIABETIC NEUROLOGICAL COMPLICATION (HCC): ICD-10-CM

## 2020-11-10 NOTE — TELEPHONE ENCOUNTER
----- Message from Katherine Alcantara MA sent at 11/9/2020  1:05 PM EST -----  Regarding: FW: Non-Urgent Medical Question  Contact: [phone]    ----- Message -----  From: Susy Hanson  Sent: 11/9/2020   1:03 PM EST  To: Mal[facility]  Subject: Non-Urgent Medical Question                      I have an appointment scheduled for 11/17.  I think I need to have blood work before visit, especially for A1C.  Can I get that scheduled at the office prior to appointment?    Thanks  Susy

## 2020-11-11 ENCOUNTER — LAB (OUTPATIENT)
Dept: FAMILY MEDICINE CLINIC | Facility: CLINIC | Age: 60
End: 2020-11-11

## 2020-11-11 DIAGNOSIS — I10 ESSENTIAL HYPERTENSION: ICD-10-CM

## 2020-11-11 DIAGNOSIS — E78.2 MIXED HYPERLIPIDEMIA: ICD-10-CM

## 2020-11-11 DIAGNOSIS — Z11.59 NEED FOR HEPATITIS C SCREENING TEST: ICD-10-CM

## 2020-11-11 DIAGNOSIS — E11.49 TYPE 2 DIABETES MELLITUS WITH OTHER DIABETIC NEUROLOGICAL COMPLICATION (HCC): ICD-10-CM

## 2020-11-11 LAB
ALBUMIN SERPL-MCNC: 4.5 G/DL (ref 3.5–5.2)
ALBUMIN UR-MCNC: 79.3 MG/DL
ALBUMIN/GLOB SERPL: 2 G/DL
ALP SERPL-CCNC: 58 U/L (ref 39–117)
ALT SERPL W P-5'-P-CCNC: 28 U/L (ref 1–33)
ANION GAP SERPL CALCULATED.3IONS-SCNC: 8.3 MMOL/L (ref 5–15)
AST SERPL-CCNC: 18 U/L (ref 1–32)
BILIRUB SERPL-MCNC: 0.2 MG/DL (ref 0–1.2)
BUN SERPL-MCNC: 13 MG/DL (ref 8–23)
BUN/CREAT SERPL: 13.7 (ref 7–25)
CALCIUM SPEC-SCNC: 9.4 MG/DL (ref 8.6–10.5)
CHLORIDE SERPL-SCNC: 100 MMOL/L (ref 98–107)
CHOLEST SERPL-MCNC: 133 MG/DL (ref 0–200)
CO2 SERPL-SCNC: 30.7 MMOL/L (ref 22–29)
CREAT SERPL-MCNC: 0.95 MG/DL (ref 0.57–1)
GFR SERPL CREATININE-BSD FRML MDRD: 60 ML/MIN/1.73
GLOBULIN UR ELPH-MCNC: 2.3 GM/DL
GLUCOSE SERPL-MCNC: 141 MG/DL (ref 65–99)
HBA1C MFR BLD: 6.9 % (ref 3.5–5.6)
HCV AB SER DONR QL: NORMAL
HDLC SERPL-MCNC: 42 MG/DL (ref 40–60)
LDLC SERPL CALC-MCNC: 45 MG/DL (ref 0–100)
LDLC/HDLC SERPL: 0.72 {RATIO}
POTASSIUM SERPL-SCNC: 4.5 MMOL/L (ref 3.5–5.2)
PROT SERPL-MCNC: 6.8 G/DL (ref 6–8.5)
SODIUM SERPL-SCNC: 139 MMOL/L (ref 136–145)
TRIGL SERPL-MCNC: 304 MG/DL (ref 0–150)
VLDLC SERPL-MCNC: 46 MG/DL (ref 5–40)

## 2020-11-11 PROCEDURE — 83036 HEMOGLOBIN GLYCOSYLATED A1C: CPT | Performed by: FAMILY MEDICINE

## 2020-11-11 PROCEDURE — 80053 COMPREHEN METABOLIC PANEL: CPT | Performed by: FAMILY MEDICINE

## 2020-11-11 PROCEDURE — 36415 COLL VENOUS BLD VENIPUNCTURE: CPT | Performed by: FAMILY MEDICINE

## 2020-11-11 PROCEDURE — 86803 HEPATITIS C AB TEST: CPT | Performed by: FAMILY MEDICINE

## 2020-11-11 PROCEDURE — 82043 UR ALBUMIN QUANTITATIVE: CPT | Performed by: FAMILY MEDICINE

## 2020-11-11 PROCEDURE — 80061 LIPID PANEL: CPT | Performed by: FAMILY MEDICINE

## 2020-11-17 ENCOUNTER — OFFICE VISIT (OUTPATIENT)
Dept: FAMILY MEDICINE CLINIC | Facility: CLINIC | Age: 60
End: 2020-11-17

## 2020-11-17 VITALS
DIASTOLIC BLOOD PRESSURE: 93 MMHG | HEART RATE: 80 BPM | OXYGEN SATURATION: 100 % | HEIGHT: 64 IN | TEMPERATURE: 96.9 F | SYSTOLIC BLOOD PRESSURE: 179 MMHG | WEIGHT: 169 LBS | BODY MASS INDEX: 28.85 KG/M2

## 2020-11-17 DIAGNOSIS — M62.830 BACK SPASM: ICD-10-CM

## 2020-11-17 DIAGNOSIS — E78.2 MIXED HYPERLIPIDEMIA: Primary | ICD-10-CM

## 2020-11-17 DIAGNOSIS — E11.49 TYPE 2 DIABETES MELLITUS WITH OTHER DIABETIC NEUROLOGICAL COMPLICATION (HCC): ICD-10-CM

## 2020-11-17 DIAGNOSIS — I10 ESSENTIAL HYPERTENSION: ICD-10-CM

## 2020-11-17 PROCEDURE — 99213 OFFICE O/P EST LOW 20 MIN: CPT | Performed by: FAMILY MEDICINE

## 2020-11-17 RX ORDER — HYDRALAZINE HYDROCHLORIDE 100 MG/1
100 TABLET, FILM COATED ORAL 2 TIMES DAILY
Qty: 180 TABLET | Refills: 3 | Status: SHIPPED | OUTPATIENT
Start: 2020-11-17 | End: 2021-01-13

## 2020-11-17 RX ORDER — HYDRALAZINE HYDROCHLORIDE 50 MG/1
100 TABLET, FILM COATED ORAL 2 TIMES DAILY
Qty: 180 TABLET | Refills: 3 | Status: SHIPPED | OUTPATIENT
Start: 2020-11-17 | End: 2020-11-17 | Stop reason: SDUPTHER

## 2020-11-17 NOTE — PROGRESS NOTES
Answers for HPI/ROS submitted by the patient on 11/10/2020   Diabetes problem  What is the primary reason for your visit?: Diabetes  Diabetes type: type 2  MedicAlert ID: No  Disease duration: 12 years  blurred vision: Yes  chest pain: No  fatigue: No  foot paresthesias: No  foot ulcerations: No  polydipsia: No  polyphagia: No  polyuria: No  visual change: No  weakness: No  weight loss: No  Symptom course: stable  confusion: No  dizziness: No  headaches: No  hunger: No  mood changes: No  nervous/anxious: No  pallor: No  seizures: No  sleepiness: No  speech difficulty: No  sweats: No  tremors: No  blackouts: No  hospitalization: No  nocturnal hypoglycemia: No  required assistance: No  required glucagon: No  CVA: No  heart disease: No  impotence: No  nephropathy: No  peripheral neuropathy: No  PVD: No  retinopathy: No  CAD risks: dyslipidemia, family history, hypertension, obesity, stress  Current treatments: insulin injections, oral agent (monotherapy)  Treatment compliance: most of the time  Dose schedule: pre-breakfast, pre-dinner  Given by: patient  Injection sites: abdominal wall  Home blood tests: 1-2 x per week  Home urines: <1 x per month  Monitoring compliance: inadequate  Blood glucose trend: increasing rapidly  breakfast time: 6-7 am  breakfast glucose level: 140-180  Weight trend: fluctuating minimally  Current diet: generally unhealthy, high fat/cholesterol  Meal planning: avoidance of concentrated sweets  Exercise: intermittently  Dietitian visit: No  Eye exam current: Yes  Sees podiatrist: No  Subjective   Susy Hanson is a 60 y.o. female.     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 No MedicAlert identification noted. The initial diagnosis of diabetes was made   years ago. Pertinent negatives for hypoglycemia include no confusion, dizziness, headaches, hunger, mood changes, nervousness/anxiousness, pallor, seizures, sleepiness, speech difficulty, sweats or tremors. Associated symptoms include blurred vision. Pertinent negatives for diabetes include no chest pain, no fatigue, no foot paresthesias, no foot ulcerations, no polydipsia, no polyphagia, no polyuria, no visual change, no weakness and no weight loss. Pertinent negatives for hypoglycemia complications include no blackouts, no hospitalization, no nocturnal hypoglycemia, no required assistance and no required glucagon injection. Symptoms are stable. Pertinent negatives for diabetic complications include no CVA, heart disease, impotence, nephropathy, peripheral neuropathy, PVD or retinopathy. Risk factors for coronary artery disease include dyslipidemia, family history, hypertension, obesity and stress. Current diabetic treatment includes insulin injections and oral agent (monotherapy). She is compliant with treatment most of the time. She is currently taking insulin pre-breakfast and pre-dinner. Insulin injections are given by patient. Rotation sites for injection include the abdominal wall. Her weight is fluctuating minimally. She is following a generally unhealthy and high fat/cholesterol diet. Meal planning includes avoidance of concentrated sweets. She has not had a previous visit with a dietitian. She participates in exercise intermittently. She monitors blood glucose at home 1-2 x per week. She monitors urine at home <1 x per month. Blood glucose monitoring compliance is inadequate. Her home blood glucose trend is increasing rapidly. Her breakfast blood glucose is taken between 6-7 am. Her breakfast blood glucose range is generally 140-180 mg/dl. She does not see a podiatrist.Eye exam is current.        The following portions of the patient's history were reviewed and updated as appropriate: allergies, current  medications, past family history, past medical history, past social history, past surgical history and problem list.  Past Medical History:   Diagnosis Date   • Anxiety    • Depression    • DM (diabetes mellitus) (CMS/HCC)    • DUB (dysfunctional uterine bleeding)    • Hyperlipidemia    • Hypertension    • Irritable bowel syndrome    • Obesity    • Peripheral neuropathy      Past Surgical History:   Procedure Laterality Date   • CARDIOVASCULAR STRESS TEST     • CHOLECYSTECTOMY     • ECHO - CONVERTED     • HYSTERECTOMY     • TUBAL ABDOMINAL LIGATION       Family History   Problem Relation Age of Onset   • Alzheimer's disease Father    • Diabetes Father    • Heart disease Father    • Other Father    • Sleep apnea Father    • Snoring Father    • Other Mother    • Diabetes Mother    • Heart disease Mother    • Sleep apnea Mother    • Snoring Mother    • Diabetes Sister    • Heart disease Sister    • Other Sister    • Sleep apnea Sister    • Snoring Sister      Social History     Socioeconomic History   • Marital status:      Spouse name: Not on file   • Number of children: Not on file   • Years of education: Not on file   • Highest education level: Not on file   Tobacco Use   • Smoking status: Former Smoker     Packs/day: 2.00     Years: 30.00     Pack years: 60.00     Types: Cigarettes     Start date: 1972     Quit date: 2008     Years since quittin.8   • Smokeless tobacco: Never Used   Substance and Sexual Activity   • Alcohol use: No     Frequency: Never   • Drug use: No   • Sexual activity: Yes     Partners: Male     Birth control/protection: None         Current Outpatient Medications:   •  ALPRAZolam (XANAX) 0.5 MG tablet, Take 1 tablet by mouth 2 (Two) Times a Day As Needed for Anxiety. for anxiety, Disp: 60 tablet, Rfl: 0  •  atorvastatin (LIPITOR) 20 MG tablet, Take 1 tablet by mouth Daily., Disp: 90 tablet, Rfl: 2  •  dilTIAZem CD (Cardizem CD) 180 MG 24 hr capsule,  "Take 1 capsule by mouth Daily., Disp: 60 capsule, Rfl: 3  •  glucose blood test strip, 1 each by Other route Daily. onetouch ultra mini test strips, Disp: , Rfl:   •  hydrALAZINE (APRESOLINE) 50 MG tablet, Take 2 tablets by mouth 2 (Two) Times a Day., Disp: 180 tablet, Rfl: 3  •  hydroCHLOROthiazide (HYDRODIURIL) 25 MG tablet, Take 1 tablet by mouth Daily., Disp: 90 tablet, Rfl: 2  •  Insulin Lispro Prot & Lispro (HumaLOG Mix 75/25 KwikPen) (75-25) 100 UNIT/ML suspension pen-injector pen, Inject 24 Units under the skin with breakfast and 28 units under the skin with supper, Disp: 45 mL, Rfl: 1  •  losartan (Cozaar) 100 MG tablet, Take 1 tablet by mouth Daily., Disp: 90 tablet, Rfl: 2  •  metFORMIN (GLUCOPHAGE) 1000 MG tablet, Take 1,500 mg by mouth Every Evening., Disp: , Rfl:   •  metoprolol succinate XL (TOPROL-XL) 100 MG 24 hr tablet, Take 2 tablets by mouth Daily for blood pressure., Disp: 180 tablet, Rfl: 2    Review of Systems   Constitutional: Negative for chills, diaphoresis, fatigue and unexpected weight loss.   Eyes: Positive for blurred vision.   Respiratory: Negative.    Cardiovascular: Negative for chest pain, palpitations and leg swelling.   Endocrine: Negative for polydipsia, polyphagia and polyuria.   Genitourinary: Negative for impotence.   Musculoskeletal: Positive for back pain.   Skin: Negative for pallor.   Neurological: Negative for dizziness, tremors, seizures, speech difficulty, weakness, light-headedness, numbness, headache and confusion.   Psychiatric/Behavioral: Positive for stress. The patient is not nervous/anxious.      /93 (BP Location: Left arm, Patient Position: Sitting, Cuff Size: Adult)   Pulse 80   Temp 96.9 °F (36.1 °C) (Temporal)   Ht 162.6 cm (64.02\")   Wt 76.7 kg (169 lb)   SpO2 100%   Breastfeeding No   BMI 28.99 kg/m²       Objective   Physical Exam  Vitals signs and nursing note reviewed.   Constitutional:       General: She is not in acute distress.     " Appearance: Normal appearance. She is well-developed, well-groomed and overweight.   HENT:      Head: Normocephalic and atraumatic.   Neck:      Musculoskeletal: Neck supple.      Thyroid: No thyromegaly.      Vascular: No JVD.   Cardiovascular:      Rate and Rhythm: Normal rate and regular rhythm.      Heart sounds: Normal heart sounds. No murmur. No friction rub. No gallop.    Pulmonary:      Effort: Pulmonary effort is normal. No respiratory distress.      Breath sounds: Normal breath sounds. No wheezing or rales.   Abdominal:      Tenderness: There is no right CVA tenderness or left CVA tenderness.   Musculoskeletal:      Thoracic back: Normal.      Lumbar back: She exhibits pain and spasm. She exhibits normal range of motion, no tenderness and no bony tenderness.      Right lower leg: No edema.      Left lower leg: No edema.      Comments: No vertebral tenderness.     Lymphadenopathy:      Cervical: No cervical adenopathy.   Skin:     General: Skin is warm and dry.   Neurological:      Mental Status: She is alert.   Psychiatric:         Mood and Affect: Mood is anxious.       Lab Results   Component Value Date    CHOL 133 11/11/2020    TRIG 304 (H) 11/11/2020    HDL 42 11/11/2020    LDL 45 11/11/2020     Lab Results   Component Value Date    GLUCOSE 141 (H) 11/11/2020    BUN 13 11/11/2020    CREATININE 0.95 11/11/2020    EGFRIFNONA 60 (L) 11/11/2020    EGFRIFAFRI >60 10/13/2017    BCR 13.7 11/11/2020    K 4.5 11/11/2020    CO2 30.7 (H) 11/11/2020    CALCIUM 9.4 11/11/2020    ALBUMIN 4.50 11/11/2020    AST 18 11/11/2020    ALT 28 11/11/2020     Lab Results   Component Value Date    HGBA1C 6.9 (H) 11/11/2020         Assessment/Plan   Problems Addressed this Visit        Cardiovascular and Mediastinum    Essential hypertension    Relevant Medications    hydrALAZINE (APRESOLINE) 50 MG tablet    Mixed hyperlipidemia - Primary       Endocrine    Type 2 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  (CMS/[facility])      Other Visit Diagnoses     Back spasm          Diagnoses       Codes Comments    Mixed hyperlipidemia    -  Primary ICD-10-CM: E78.2  ICD-9-CM: 272.2     Essential hypertension     ICD-10-CM: I10  ICD-9-CM: 401.9 Cont. current medications.  Will add hydralazine 25mg bid.   Monitor BP at home over next couple of days; call in readings Thursday.    Reviewed warning s/s    Type 2 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 (CMS/[facility])     ICD-10-CM: E11.49  ICD-9-CM: 250.60     Back spasm     ICD-10-CM: M62.830  ICD-9-CM: 724.8           Labs were done prior to appt and reviewed with her  Encouraged better compliance with diet  Will increase her hydralazine to 100mg bid as she will not take it tid (currently on 75mg bid)  Counseled on the need for weight loss  rec she do hot showers and warm compresses to her back  Stress reduction  She has had her flu shot  Will see her back in 6 mo

## 2020-11-17 NOTE — PATIENT INSTRUCTIONS
Keep working to lose weight through healthy eating and exercise.  Hot showers and warm compresses to low back  No fried foods and limit pasta, bread, and sweets.  Start taking 2 hydralazine twice a day

## 2020-11-18 ENCOUNTER — TELEPHONE (OUTPATIENT)
Dept: FAMILY MEDICINE CLINIC | Facility: CLINIC | Age: 60
End: 2020-11-18

## 2020-11-18 RX ORDER — LANCETS
EACH MISCELLANEOUS
Qty: 200 EACH | Refills: 3 | Status: SHIPPED | OUTPATIENT
Start: 2020-11-18 | End: 2021-08-16

## 2020-11-18 NOTE — TELEPHONE ENCOUNTER
----- Message from Katherine Alcantara MA sent at 11/18/2020 11:12 AM EST -----  Regarding: FW: Prescription Question  Contact: [phone]    ----- Message -----  From: Susy Hanson  Sent: 11/18/2020  11:07 AM EST  To: Malinda Mcknight [facility]  Subject: Prescription Question                            Dr. Flores,    I forgot to ask to have refills on the Metformin.  It wouldn't let me request on my chart.  I am taking 1000mg in the morning and 1500mg at dinner.  I am getting all meds at [facility].    Thanks and have a safe holiday season,  Susy

## 2020-12-19 DIAGNOSIS — F41.8 MIXED ANXIETY DEPRESSIVE DISORDER: ICD-10-CM

## 2020-12-19 RX ORDER — ALPRAZOLAM 0.5 MG/1
0.5 TABLET ORAL 2 TIMES DAILY PRN
Qty: 60 TABLET | Refills: 0 | Status: SHIPPED | OUTPATIENT
Start: 2020-12-19 | End: 2021-03-11 | Stop reason: SDUPTHER

## 2021-01-13 ENCOUNTER — TELEPHONE (OUTPATIENT)
Dept: FAMILY MEDICINE CLINIC | Facility: CLINIC | Age: 61
End: 2021-01-13

## 2021-01-13 ENCOUNTER — OFFICE VISIT (OUTPATIENT)
Dept: FAMILY MEDICINE CLINIC | Facility: CLINIC | Age: 61
End: 2021-01-13

## 2021-01-13 VITALS
HEIGHT: 64 IN | SYSTOLIC BLOOD PRESSURE: 128 MMHG | TEMPERATURE: 98.2 F | HEART RATE: 99 BPM | BODY MASS INDEX: 28.34 KG/M2 | OXYGEN SATURATION: 99 % | WEIGHT: 166 LBS | DIASTOLIC BLOOD PRESSURE: 80 MMHG

## 2021-01-13 DIAGNOSIS — I10 ESSENTIAL HYPERTENSION: ICD-10-CM

## 2021-01-13 DIAGNOSIS — R51.9 ACUTE NONINTRACTABLE HEADACHE, UNSPECIFIED HEADACHE TYPE: ICD-10-CM

## 2021-01-13 DIAGNOSIS — R11.2 NON-INTRACTABLE VOMITING WITH NAUSEA, UNSPECIFIED VOMITING TYPE: Primary | ICD-10-CM

## 2021-01-13 PROCEDURE — 99213 OFFICE O/P EST LOW 20 MIN: CPT | Performed by: FAMILY MEDICINE

## 2021-01-13 RX ORDER — HYDRALAZINE HYDROCHLORIDE 100 MG/1
100 TABLET, FILM COATED ORAL 2 TIMES DAILY
Status: SHIPPED
Start: 2021-01-13 | End: 2021-01-13 | Stop reason: SDUPTHER

## 2021-01-13 RX ORDER — HYDRALAZINE HYDROCHLORIDE 100 MG/1
TABLET, FILM COATED ORAL
Start: 2021-01-13 | End: 2021-01-14 | Stop reason: SDUPTHER

## 2021-01-13 NOTE — PATIENT INSTRUCTIONS
Take nexium or prilosec every day for 2 weeks  Stress reduction  Limit/avoid caffeine  Push the fluids

## 2021-01-13 NOTE — TELEPHONE ENCOUNTER
She called saying she has been taking 2 (100)mg hydralazine bid  She is only supposed to be on 1 po bid  She will now start only taking 3 pills a day  I will adjust the rx

## 2021-01-13 NOTE — PROGRESS NOTES
Answers for HPI/ROS submitted by the patient on 1/12/2021   What is the primary reason for your visit?: Other  Please describe your symptoms.: Nausea, vomiting & headache  over the last few weeks have had problems with eating & swallowing.  Have you had these symptoms before?: No  How long have you been having these symptoms?: 5-7 days  Subjective   Susy Hanson is a 60 y.o. female.     Complaining of nausea and vomiting for more than a week  Scales at home show 10 pound weight loss since Jan 1  Headache and N/V are better today  Symptoms worsened on Friday  Covid vaccine on Weds last week  Burned to eat  BS have not changed  Very stressed  She was able to eat food yest and today and has had no further nausea       The following portions of the patient's history were reviewed and updated as appropriate: allergies, current medications, past family history, past medical history, past social history, past surgical history and problem list.  Past Medical History:   Diagnosis Date   • Anxiety 2000   • Depression 2008   • DM (diabetes mellitus) (CMS/[facility])    • DUB (dysfunctional uterine bleeding)    • Hyperlipidemia    • Hypertension    • Irritable bowel syndrome 2006   • Obesity    • Peripheral neuropathy      Past Surgical History:   Procedure Laterality Date   • CARDIOVASCULAR STRESS TEST  2020   • CHOLECYSTECTOMY     • ECHO - CONVERTED  2020   • HYSTERECTOMY     • TUBAL ABDOMINAL LIGATION  1981     Family History   Problem Relation Age of Onset   • Alzheimer's disease Father    • Diabetes Father    • Heart disease Father    • Other Father    • Sleep apnea Father    • Snoring Father    • Other Mother    • Diabetes Mother    • Heart disease Mother    • Sleep apnea Mother    • Snoring Mother    • Diabetes Sister    • Heart disease Sister    • Other Sister    • Sleep apnea Sister    • Snoring Sister      Social History     Socioeconomic History   • Marital status:      Spouse name: Not on file   • Number of  children: Not on file   • Years of education: Not on file   • Highest education level: Not on file   Tobacco Use   • Smoking status: Former Smoker     Packs/day: 2.00     Years: 30.00     Pack years: 60.00     Types: Cigarettes     Start date: 1972     Quit date: 2008     Years since quittin.0   • Smokeless tobacco: Never Used   Substance and Sexual Activity   • Alcohol use: No     Frequency: Never   • Drug use: No   • Sexual activity: Yes     Partners: Male     Birth control/protection: None         Current Outpatient Medications:   •  ALPRAZolam (XANAX) 0.5 MG tablet, Take 1 tablet by mouth 2 (Two) Times a Day As Needed for Anxiety. for anxiety, Disp: 60 tablet, Rfl: 0  •  atorvastatin (LIPITOR) 20 MG tablet, Take 1 tablet by mouth Daily., Disp: 90 tablet, Rfl: 2  •  dilTIAZem CD (Cardizem CD) 180 MG 24 hr capsule, Take 1 capsule by mouth Daily., Disp: 60 capsule, Rfl: 3  •  glucose blood test strip, Test blood sugar twice a day and as needed, Disp: 200 each, Rfl: 3  •  hydrALAZINE (APRESOLINE) 100 MG tablet, Take 1 tablet by mouth 2 (Two) Times a Day., Disp: 180 tablet, Rfl: 3  •  hydroCHLOROthiazide (HYDRODIURIL) 25 MG tablet, Take 1 tablet by mouth Daily., Disp: 90 tablet, Rfl: 2  •  Insulin Lispro Prot & Lispro (HumaLOG Mix 75/25 KwikPen) (75-25) 100 UNIT/ML suspension pen-injector pen, Inject 24 Units under the skin with breakfast and 28 units under the skin with supper, Disp: 45 mL, Rfl: 1  •  Insulin Pen Needle 31G X 5 MM misc, Use with insulin pen twice daily and as needed, Disp: 200 each, Rfl: 3  •  Lancets (onetouch ultrasoft) lancets, Use to check blood sugar twice a day and as needed, Disp: 200 each, Rfl: 3  •  losartan (Cozaar) 100 MG tablet, Take 1 tablet by mouth Daily., Disp: 90 tablet, Rfl: 2  •  metFORMIN (GLUCOPHAGE) 1000 MG tablet, Take 2 pills in the morning and 2.5 pills in the evening, Disp: 405 tablet, Rfl: 3  •  metoprolol succinate XL (TOPROL-XL) 100 MG 24 hr tablet, Take 2  "tablets by mouth Daily for blood pressure., Disp: 180 tablet, Rfl: 2    Review of Systems   Constitutional: Negative for diaphoresis, fatigue, fever, unexpected weight gain and unexpected weight loss.   Respiratory: Negative for cough, chest tightness and shortness of breath.    Cardiovascular: Negative for chest pain, palpitations and leg swelling.   Gastrointestinal: Positive for nausea and vomiting. Negative for abdominal pain and diarrhea.   Neurological: Positive for headache. Negative for dizziness, syncope and light-headedness.     /80 (BP Location: Left arm, Patient Position: Sitting, Cuff Size: Large Adult)   Pulse 99   Temp 98.2 °F (36.8 °C) (Temporal)   Ht 162.6 cm (64.02\")   Wt 75.3 kg (166 lb)   SpO2 99%   Breastfeeding No   BMI 28.48 kg/m²       Objective   Physical Exam  Vitals signs and nursing note reviewed.   Constitutional:       General: She is not in acute distress.     Appearance: Normal appearance. She is well-developed, well-groomed and overweight.   HENT:      Head: Normocephalic and atraumatic.   Neck:      Musculoskeletal: Neck supple.      Thyroid: No thyromegaly.   Cardiovascular:      Rate and Rhythm: Normal rate and regular rhythm.      Heart sounds: Normal heart sounds. No murmur. No friction rub. No gallop.    Pulmonary:      Effort: Pulmonary effort is normal. No respiratory distress.      Breath sounds: Normal breath sounds. No wheezing or rales.   Abdominal:      General: Abdomen is flat. Bowel sounds are normal.      Palpations: Abdomen is soft. There is no mass.      Tenderness: There is no abdominal tenderness.      Hernia: No hernia is present.   Lymphadenopathy:      Cervical: No cervical adenopathy.   Skin:     General: Skin is warm and dry.      Findings: No rash.   Neurological:      Mental Status: She is alert.   Psychiatric:         Behavior: Behavior is cooperative.           Assessment/Plan   Problems Addressed this Visit     None      Visit Diagnoses     " Non-intractable vomiting with nausea, unspecified vomiting type    -  Primary    Acute nonintractable headache, unspecified headache type          Diagnoses       Codes Comments    Non-intractable vomiting with nausea, unspecified vomiting type    -  Primary ICD-10-CM: R11.2  ICD-9-CM: 787.01     Acute nonintractable headache, unspecified headache type     ICD-10-CM: R51.9  ICD-9-CM: 784.0           Poss viral illness aggravated by the covid vaccine vs gerd vs PUD vs ?  Symptoms seem to have resolved  Encouraged to push fluids  Will have her work on stress reduction  Limit/avoid caffeine  rec she take nexium/prilosec to 2 weeks

## 2021-01-14 ENCOUNTER — TELEPHONE (OUTPATIENT)
Dept: FAMILY MEDICINE CLINIC | Facility: CLINIC | Age: 61
End: 2021-01-14

## 2021-01-14 DIAGNOSIS — I10 ESSENTIAL HYPERTENSION: ICD-10-CM

## 2021-01-14 RX ORDER — HYDRALAZINE HYDROCHLORIDE 100 MG/1
TABLET, FILM COATED ORAL
Qty: 270 TABLET | Refills: 3 | Status: SHIPPED | OUTPATIENT
Start: 2021-01-14 | End: 2022-01-10 | Stop reason: SDUPTHER

## 2021-01-14 NOTE — TELEPHONE ENCOUNTER
----- Message from Susy Hanson sent at 1/13/2021  4:26 PM EST -----  Regarding: RE: Prescription Question  Contact: [phone]  If you can change that would be good.  Thanks! :)

## 2021-03-11 DIAGNOSIS — F41.8 MIXED ANXIETY DEPRESSIVE DISORDER: ICD-10-CM

## 2021-03-11 RX ORDER — ALPRAZOLAM 0.5 MG/1
0.5 TABLET ORAL 2 TIMES DAILY PRN
Qty: 60 TABLET | Refills: 0 | Status: SHIPPED | OUTPATIENT
Start: 2021-03-11 | End: 2021-05-26 | Stop reason: SDUPTHER

## 2021-03-18 ENCOUNTER — TELEPHONE (OUTPATIENT)
Dept: FAMILY MEDICINE CLINIC | Facility: CLINIC | Age: 61
End: 2021-03-18

## 2021-03-18 DIAGNOSIS — G47.00 INSOMNIA, UNSPECIFIED TYPE: Primary | ICD-10-CM

## 2021-03-18 RX ORDER — TEMAZEPAM 30 MG/1
30 CAPSULE ORAL NIGHTLY PRN
Qty: 30 CAPSULE | Refills: 1 | Status: SHIPPED | OUTPATIENT
Start: 2021-03-18 | End: 2021-07-15 | Stop reason: SDUPTHER

## 2021-03-18 NOTE — TELEPHONE ENCOUNTER
----- Message from Magnolia Olmos MA sent at 3/18/2021 10:00 AM EDT -----  Regarding: FW: Non-Urgent Medical Question  Contact: [phone]    ----- Message -----  From: Susy Hanson  Sent: 3/18/2021  10:00 AM EDT  To: Malinda Mcknight [facility]  Subject: Non-Urgent Medical Question                      I haven't slept for several days.  I have taken 2 Xanax before bed the last 2 nights and haven't slept more than an hour at a time, maybe a couple of hours over the past few days.  I have been very stressed with job and this whole COVID situation.  I can come in if you need me too.  I am going on vacation from 3/24-4/5 with my kids which will help, I can't wait to hug them so tight.  But right now I can't focus and really need to get some sleep.  I do use my C-Pap every night.  I feel like I could just cry and crawl in a corner because I am so tired.   I have tried meditation, green tea, etc.   Is there anything you can prescribe for me to  help?      Thanks  Susy

## 2021-03-18 NOTE — TELEPHONE ENCOUNTER
Caller: Susy Hanson    Relationship: Self    Best call back number: [phone] (H)    What is the best time to reach you: ANYTIME    Who are you requesting to speak with (clinical staff, provider,  specific staff member): DR. HEIDI PICHARDO.     Do you know the name of the person who called: N/A    What was the call regarding: PATIENT CALLED TO ADVISE THAT SHE CAN'T SLEEP OR FOCUS, AND IS NEEDING A CALLBACK TO DISCUSS WHAT TO DO TO HELP HER SLEEP AND FOCUS. PATIENT ALSO STATES THAT WHEN SHE GOES ONTO HER MY CHART SHE IS NOT SEEING HER 2 COVID VACCINES THAT SHE HAD ON 01/06/21 & 01/27/21.    Do you require a callback: YES        THANKS

## 2021-04-26 DIAGNOSIS — E11.49 TYPE 2 DIABETES MELLITUS WITH OTHER DIABETIC NEUROLOGICAL COMPLICATION (HCC): ICD-10-CM

## 2021-04-26 DIAGNOSIS — R07.89 CHEST PAIN, ATYPICAL: ICD-10-CM

## 2021-04-26 DIAGNOSIS — E78.2 MIXED HYPERLIPIDEMIA: ICD-10-CM

## 2021-04-26 DIAGNOSIS — I10 ESSENTIAL HYPERTENSION: ICD-10-CM

## 2021-04-26 RX ORDER — INSULIN LISPRO 100 [IU]/ML
52 INJECTION, SUSPENSION SUBCUTANEOUS DAILY
Qty: 45 ML | Refills: 1 | Status: SHIPPED | OUTPATIENT
Start: 2021-04-26 | End: 2021-10-15 | Stop reason: SDUPTHER

## 2021-04-26 RX ORDER — DILTIAZEM HYDROCHLORIDE 180 MG/1
180 CAPSULE, COATED, EXTENDED RELEASE ORAL DAILY
Qty: 90 CAPSULE | Refills: 1 | Status: SHIPPED | OUTPATIENT
Start: 2021-04-26 | End: 2022-01-12 | Stop reason: SDUPTHER

## 2021-04-26 RX ORDER — DILTIAZEM HYDROCHLORIDE 180 MG/1
180 CAPSULE, COATED, EXTENDED RELEASE ORAL DAILY
Qty: 90 CAPSULE | Refills: 1 | Status: SHIPPED | OUTPATIENT
Start: 2021-04-26 | End: 2021-04-27 | Stop reason: SDUPTHER

## 2021-04-26 RX ORDER — METOPROLOL SUCCINATE 100 MG/1
200 TABLET, EXTENDED RELEASE ORAL DAILY
Qty: 180 TABLET | Refills: 1 | Status: SHIPPED | OUTPATIENT
Start: 2021-04-26 | End: 2021-10-15 | Stop reason: SDUPTHER

## 2021-04-26 NOTE — TELEPHONE ENCOUNTER
----- Message from Magnolia Olmos MA sent at 4/26/2021  7:49 AM EDT -----  Regarding: FW: Prescription Question  Contact: [phone]    ----- Message -----  From: Susy Hanson  Sent: 4/26/2021   7:46 AM EDT  To: [facility]  Subject: Prescription Question                            Dr Melo can you refill the dilTIAZem  MG 24 hr capsules that Dr. Murphy prescribed or should I take something else?  I am not going to him if not needed.    Thanks  Susy

## 2021-05-18 ENCOUNTER — LAB (OUTPATIENT)
Dept: FAMILY MEDICINE CLINIC | Facility: CLINIC | Age: 61
End: 2021-05-18

## 2021-05-18 ENCOUNTER — OFFICE VISIT (OUTPATIENT)
Dept: FAMILY MEDICINE CLINIC | Facility: CLINIC | Age: 61
End: 2021-05-18

## 2021-05-18 VITALS
HEART RATE: 74 BPM | OXYGEN SATURATION: 99 % | HEIGHT: 64 IN | SYSTOLIC BLOOD PRESSURE: 185 MMHG | WEIGHT: 167 LBS | DIASTOLIC BLOOD PRESSURE: 79 MMHG | TEMPERATURE: 97.8 F | BODY MASS INDEX: 28.51 KG/M2

## 2021-05-18 DIAGNOSIS — Z00.00 ENCOUNTER FOR GENERAL ADULT MEDICAL EXAMINATION WITHOUT ABNORMAL FINDINGS: ICD-10-CM

## 2021-05-18 DIAGNOSIS — Z12.31 BREAST CANCER SCREENING BY MAMMOGRAM: ICD-10-CM

## 2021-05-18 DIAGNOSIS — Z00.00 ENCOUNTER FOR GENERAL ADULT MEDICAL EXAMINATION WITHOUT ABNORMAL FINDINGS: Primary | ICD-10-CM

## 2021-05-18 DIAGNOSIS — Z12.11 SCREENING FOR COLON CANCER: ICD-10-CM

## 2021-05-18 LAB
ALBUMIN SERPL-MCNC: 4.6 G/DL (ref 3.5–5.2)
ALBUMIN/GLOB SERPL: 1.9 G/DL
ALP SERPL-CCNC: 59 U/L (ref 39–117)
ALT SERPL W P-5'-P-CCNC: 18 U/L (ref 1–33)
ANION GAP SERPL CALCULATED.3IONS-SCNC: 11.1 MMOL/L (ref 5–15)
AST SERPL-CCNC: 13 U/L (ref 1–32)
BASOPHILS # BLD AUTO: 0.08 10*3/MM3 (ref 0–0.2)
BASOPHILS NFR BLD AUTO: 1.6 % (ref 0–1.5)
BILIRUB BLD-MCNC: NEGATIVE MG/DL
BILIRUB SERPL-MCNC: 0.2 MG/DL (ref 0–1.2)
BUN SERPL-MCNC: 12 MG/DL (ref 8–23)
BUN/CREAT SERPL: 12.1 (ref 7–25)
CALCIUM SPEC-SCNC: 9.5 MG/DL (ref 8.6–10.5)
CHLORIDE SERPL-SCNC: 102 MMOL/L (ref 98–107)
CHOLEST SERPL-MCNC: 122 MG/DL (ref 0–200)
CLARITY, POC: CLEAR
CO2 SERPL-SCNC: 26.9 MMOL/L (ref 22–29)
COLOR UR: YELLOW
CREAT SERPL-MCNC: 0.99 MG/DL (ref 0.57–1)
DEPRECATED RDW RBC AUTO: 43 FL (ref 37–54)
EOSINOPHIL # BLD AUTO: 0.34 10*3/MM3 (ref 0–0.4)
EOSINOPHIL NFR BLD AUTO: 6.7 % (ref 0.3–6.2)
ERYTHROCYTE [DISTWIDTH] IN BLOOD BY AUTOMATED COUNT: 13.6 % (ref 12.3–15.4)
GFR SERPL CREATININE-BSD FRML MDRD: 57 ML/MIN/1.73
GLOBULIN UR ELPH-MCNC: 2.4 GM/DL
GLUCOSE SERPL-MCNC: 104 MG/DL (ref 65–99)
GLUCOSE UR STRIP-MCNC: NEGATIVE MG/DL
HBA1C MFR BLD: 6.2 % (ref 3.5–5.6)
HCT VFR BLD AUTO: 36.9 % (ref 34–46.6)
HDLC SERPL-MCNC: 39 MG/DL (ref 40–60)
HGB BLD-MCNC: 12 G/DL (ref 12–15.9)
IMM GRANULOCYTES # BLD AUTO: 0.02 10*3/MM3 (ref 0–0.05)
IMM GRANULOCYTES NFR BLD AUTO: 0.4 % (ref 0–0.5)
KETONES UR QL: NEGATIVE
LDLC SERPL CALC-MCNC: 42 MG/DL (ref 0–100)
LDLC/HDLC SERPL: 0.76 {RATIO}
LEUKOCYTE EST, POC: ABNORMAL
LYMPHOCYTES # BLD AUTO: 1.21 10*3/MM3 (ref 0.7–3.1)
LYMPHOCYTES NFR BLD AUTO: 24 % (ref 19.6–45.3)
MCH RBC QN AUTO: 28.6 PG (ref 26.6–33)
MCHC RBC AUTO-ENTMCNC: 32.5 G/DL (ref 31.5–35.7)
MCV RBC AUTO: 88.1 FL (ref 79–97)
MONOCYTES # BLD AUTO: 0.43 10*3/MM3 (ref 0.1–0.9)
MONOCYTES NFR BLD AUTO: 8.5 % (ref 5–12)
NEUTROPHILS NFR BLD AUTO: 2.96 10*3/MM3 (ref 1.7–7)
NEUTROPHILS NFR BLD AUTO: 58.8 % (ref 42.7–76)
NITRITE UR-MCNC: NEGATIVE MG/ML
NRBC BLD AUTO-RTO: 0 /100 WBC (ref 0–0.2)
PH UR: 5.5 [PH] (ref 5–8)
PLATELET # BLD AUTO: 217 10*3/MM3 (ref 140–450)
PMV BLD AUTO: 10.9 FL (ref 6–12)
POTASSIUM SERPL-SCNC: 4.3 MMOL/L (ref 3.5–5.2)
PROT SERPL-MCNC: 7 G/DL (ref 6–8.5)
PROT UR STRIP-MCNC: ABNORMAL MG/DL
RBC # BLD AUTO: 4.19 10*6/MM3 (ref 3.77–5.28)
RBC # UR STRIP: NEGATIVE /UL
SODIUM SERPL-SCNC: 140 MMOL/L (ref 136–145)
SP GR UR: 1.02 (ref 1–1.03)
TRIGL SERPL-MCNC: 267 MG/DL (ref 0–150)
UROBILINOGEN UR QL: NORMAL
VLDLC SERPL-MCNC: 41 MG/DL (ref 5–40)
WBC # BLD AUTO: 5.04 10*3/MM3 (ref 3.4–10.8)

## 2021-05-18 PROCEDURE — 80053 COMPREHEN METABOLIC PANEL: CPT | Performed by: FAMILY MEDICINE

## 2021-05-18 PROCEDURE — 83036 HEMOGLOBIN GLYCOSYLATED A1C: CPT | Performed by: FAMILY MEDICINE

## 2021-05-18 PROCEDURE — 80061 LIPID PANEL: CPT | Performed by: FAMILY MEDICINE

## 2021-05-18 PROCEDURE — 85025 COMPLETE CBC W/AUTO DIFF WBC: CPT | Performed by: FAMILY MEDICINE

## 2021-05-18 PROCEDURE — 81003 URINALYSIS AUTO W/O SCOPE: CPT | Performed by: FAMILY MEDICINE

## 2021-05-18 PROCEDURE — 36415 COLL VENOUS BLD VENIPUNCTURE: CPT | Performed by: FAMILY MEDICINE

## 2021-05-18 PROCEDURE — 87086 URINE CULTURE/COLONY COUNT: CPT | Performed by: FAMILY MEDICINE

## 2021-05-18 PROCEDURE — 99396 PREV VISIT EST AGE 40-64: CPT | Performed by: FAMILY MEDICINE

## 2021-05-18 RX ORDER — AMLODIPINE BESYLATE 2.5 MG/1
2.5 TABLET ORAL DAILY
Qty: 90 TABLET | Refills: 3 | Status: SHIPPED | OUTPATIENT
Start: 2021-05-18 | End: 2022-04-10 | Stop reason: SDUPTHER

## 2021-05-18 NOTE — PROGRESS NOTES
Subjective   Susy Hanson is a 60 y.o. female.     She comes in for cpe  BS running 125  Stress at work  Eye exam - Black's eye  Some diarrhea  Due colonoscopy         The following portions of the patient's history were reviewed and updated as appropriate: allergies, current medications, past family history, past medical history, past social history, past surgical history, and problem list.  Past Medical History:   Diagnosis Date   • Anxiety    • Depression    • DM (diabetes mellitus) (CMS/[facility])    • DUB (dysfunctional uterine bleeding)    • Hyperlipidemia    • Hypertension    • Irritable bowel syndrome    • Obesity    • Peripheral neuropathy      Past Surgical History:   Procedure Laterality Date   • CARDIOVASCULAR STRESS TEST     • CHOLECYSTECTOMY     • ECHO - CONVERTED     • HYSTERECTOMY     • TUBAL ABDOMINAL LIGATION       Family History   Problem Relation Age of Onset   • Alzheimer's disease Father    • Diabetes Father    • Heart disease Father    • Other Father    • Sleep apnea Father    • Snoring Father    • Other Mother    • Diabetes Mother    • Heart disease Mother    • Sleep apnea Mother    • Snoring Mother    • Diabetes Sister    • Heart disease Sister    • Other Sister    • Sleep apnea Sister    • Snoring Sister      Social History     Socioeconomic History   • Marital status:      Spouse name: Not on file   • Number of children: Not on file   • Years of education: Not on file   • Highest education level: Not on file   Tobacco Use   • Smoking status: Former Smoker     Packs/day: 2.00     Years: 30.00     Pack years: 60.00     Types: Cigarettes     Start date: 1972     Quit date: 2008     Years since quittin.3   • Smokeless tobacco: Never Used   Vaping Use   • Vaping Use: Never used   Substance and Sexual Activity   • Alcohol use: No   • Drug use: No   • Sexual activity: Yes     Partners: Male     Birth control/protection: None         Current Outpatient  Medications:   •  ALPRAZolam (XANAX) 0.5 MG tablet, Take 1 tablet by mouth 2 (Two) Times a Day As Needed for Anxiety. for anxiety, Disp: 60 tablet, Rfl: 0  •  atorvastatin (LIPITOR) 20 MG tablet, Take 1 tablet by mouth Daily., Disp: 90 tablet, Rfl: 2  •  dilTIAZem CD (Cardizem CD) 180 MG 24 hr capsule, Take 1 capsule by mouth Daily., Disp: 90 capsule, Rfl: 1  •  glucose blood test strip, Test blood sugar twice a day and as needed, Disp: 200 each, Rfl: 3  •  hydrALAZINE (APRESOLINE) 100 MG tablet, Take 2 tablets by mouth at breakfast and 1 tablet at bedtime OR take 1 tablet by mouth three times daily (Patient taking differently: Take 2 tablets by mouth at breakfast and 2 tablets at dinner), Disp: 270 tablet, Rfl: 3  •  hydroCHLOROthiazide (HYDRODIURIL) 25 MG tablet, Take 1 tablet by mouth Daily., Disp: 90 tablet, Rfl: 2  •  Insulin Lispro Prot & Lispro (HumaLOG Mix 75/25 KwikPen) (75-25) 100 UNIT/ML suspension pen-injector pen, Inject 24 Units under the skin with breakfast and 28 units under the skin with supper, Disp: 45 mL, Rfl: 1  •  Insulin Pen Needle 31G X 5 MM misc, Use with insulin pen twice daily and as needed, Disp: 200 each, Rfl: 3  •  Lancets (onetouch ultrasoft) lancets, Use to check blood sugar twice a day and as needed, Disp: 200 each, Rfl: 3  •  losartan (Cozaar) 100 MG tablet, Take 1 tablet by mouth Daily., Disp: 90 tablet, Rfl: 2  •  metFORMIN (GLUCOPHAGE) 1000 MG tablet, Take 2 pills in the morning and 2.5 pills in the evening (Patient taking differently: Take 1 pill in the morning and 1.5 pills in the evening), Disp: 405 tablet, Rfl: 3  •  metoprolol succinate XL (TOPROL-XL) 100 MG 24 hr tablet, Take 2 tablets by mouth Daily for blood pressure., Disp: 180 tablet, Rfl: 1  •  temazepam (RESTORIL) 30 MG capsule, Take 1 capsule by mouth At Night As Needed for Sleep., Disp: 30 capsule, Rfl: 1  •  amLODIPine (NORVASC) 2.5 MG tablet, Take 1 tablet by mouth Daily., Disp: 90 tablet, Rfl: 3    Review of  "Systems   Constitutional: Negative.    HENT: Negative.    Respiratory: Negative.    Cardiovascular: Negative.    Gastrointestinal: Positive for diarrhea. Negative for abdominal pain, blood in stool, constipation, nausea and vomiting.   Endocrine: Negative.    Genitourinary: Negative.    Musculoskeletal: Negative.    Skin: Negative.    Neurological: Negative.    Hematological: Negative.    Psychiatric/Behavioral: Positive for stress. Negative for self-injury and suicidal ideas.     BP (!) 185/79 (BP Location: Left arm, Patient Position: Sitting, Cuff Size: Large Adult)   Pulse 74   Temp 97.8 °F (36.6 °C) (Temporal)   Ht 162.6 cm (64.02\")   Wt 75.8 kg (167 lb)   SpO2 99%   Breastfeeding No   BMI 28.65 kg/m²       Objective   Physical Exam  Vitals and nursing note reviewed. Exam conducted with a chaperone present.   Constitutional:       Appearance: Normal appearance. She is well-developed, well-groomed and overweight.   HENT:      Head: Normocephalic and atraumatic.      Right Ear: Tympanic membrane, ear canal and external ear normal.      Left Ear: Tympanic membrane, ear canal and external ear normal.      Nose: Nose normal.      Mouth/Throat:      Mouth: Mucous membranes are moist.      Pharynx: Oropharynx is clear.   Eyes:      Extraocular Movements: Extraocular movements intact.      Conjunctiva/sclera: Conjunctivae normal.      Pupils: Pupils are equal, round, and reactive to light.   Neck:      Thyroid: No thyromegaly.      Vascular: No carotid bruit.   Cardiovascular:      Rate and Rhythm: Normal rate and regular rhythm.      Pulses: Normal pulses.           Dorsalis pedis pulses are 2+ on the right side and 2+ on the left side.        Posterior tibial pulses are 2+ on the right side and 2+ on the left side.      Heart sounds: Normal heart sounds.   Pulmonary:      Effort: Pulmonary effort is normal.      Breath sounds: Normal breath sounds.   Chest:      Breasts:         Right: Normal.         Left: " Normal.   Abdominal:      General: Abdomen is flat. Bowel sounds are normal.      Palpations: Abdomen is soft. There is no hepatomegaly, splenomegaly or mass.      Tenderness: There is no abdominal tenderness.      Hernia: No hernia is present.   Musculoskeletal:      Cervical back: Normal range of motion and neck supple.      Right lower leg: No edema.      Left lower leg: No edema.      Right foot: Normal range of motion. No deformity, bunion, Charcot foot, foot drop or prominent metatarsal heads.      Left foot: Normal range of motion. No deformity, bunion, Charcot foot, foot drop or prominent metatarsal heads.   Feet:      Right foot:      Skin integrity: Callus and dry skin present. No ulcer, blister, skin breakdown, erythema, warmth or fissure.      Toenail Condition: Right toenails are normal.      Left foot:      Skin integrity: Callus and dry skin present. No ulcer, blister, skin breakdown, erythema, warmth or fissure.      Toenail Condition: Left toenails are normal.   Lymphadenopathy:      Cervical: No cervical adenopathy.      Upper Body:      Right upper body: No supraclavicular or axillary adenopathy.      Left upper body: No supraclavicular or axillary adenopathy.   Skin:     General: Skin is warm and dry.      Findings: No lesion or rash.   Neurological:      General: No focal deficit present.      Mental Status: She is alert.      Motor: Motor function is intact.      Deep Tendon Reflexes: Reflexes are normal and symmetric.   Psychiatric:         Attention and Perception: Attention normal.         Behavior: Behavior is cooperative.      Comments: Appears very stressed       Diabetic Foot Exam Performed      Assessment/Plan   Problems Addressed this Visit        Health Encounters    Encounter for general adult medical examination without abnormal findings - Primary    Relevant Orders    POCT urinalysis dipstick, automated (Completed)    Urine Culture - Urine, Urine, Clean Catch    Comprehensive  Metabolic Panel    Hemoglobin A1c    Lipid Panel    CBC & Differential      Other Visit Diagnoses     Screening for colon cancer        Relevant Orders    Ambulatory Referral For Screening Colonoscopy    Breast cancer screening by mammogram        Relevant Orders    Mammo Screening Digital Tomosynthesis Bilateral With CAD      Diagnoses       Codes Comments    Encounter for general adult medical examination without abnormal findings    -  Primary ICD-10-CM: Z00.00  ICD-9-CM: V70.9     Screening for colon cancer     ICD-10-CM: Z12.11  ICD-9-CM: V76.51     Breast cancer screening by mammogram     ICD-10-CM: Z12.31  ICD-9-CM: V76.12         She was counseled on the need for weight loss  She was encouraged to work on stress reduction  Colonoscopy and mammogram were ordered  Labs were ordered  Blood pressures running high here and at home so I added amlodipine 2.5 mg and will see her back in 3 months  She will continue her other medications  I will try to get the results of the visit from her last eye exam  Urinalysis showed trace of leukocytes but she is asymptomatic so I will send it for culture and treat accordingly

## 2021-05-18 NOTE — PATIENT INSTRUCTIONS
Keep working to lose weight through healthy eating and exercise.  Stress reduction  No fried foods and limit pasta, bread, and sweets.

## 2021-05-19 ENCOUNTER — TRANSCRIBE ORDERS (OUTPATIENT)
Dept: ADMINISTRATIVE | Facility: HOSPITAL | Age: 61
End: 2021-05-19

## 2021-05-19 DIAGNOSIS — Z12.31 VISIT FOR SCREENING MAMMOGRAM: Primary | ICD-10-CM

## 2021-05-19 LAB — BACTERIA SPEC AEROBE CULT: NO GROWTH

## 2021-05-26 DIAGNOSIS — F41.8 MIXED ANXIETY DEPRESSIVE DISORDER: ICD-10-CM

## 2021-05-26 RX ORDER — ALPRAZOLAM 0.5 MG/1
0.5 TABLET ORAL 2 TIMES DAILY PRN
Qty: 60 TABLET | Refills: 0 | Status: SHIPPED | OUTPATIENT
Start: 2021-05-26 | End: 2021-07-15 | Stop reason: SDUPTHER

## 2021-06-02 ENCOUNTER — HOSPITAL ENCOUNTER (OUTPATIENT)
Dept: MAMMOGRAPHY | Facility: HOSPITAL | Age: 61
Discharge: HOME | End: 2021-06-02
Admitting: FAMILY MEDICINE

## 2021-06-02 DIAGNOSIS — R92.8 ABNORMALITY OF BOTH BREASTS ON SCREENING MAMMOGRAM: Primary | ICD-10-CM

## 2021-06-02 DIAGNOSIS — Z12.31 BREAST CANCER SCREENING BY MAMMOGRAM: ICD-10-CM

## 2021-06-02 PROCEDURE — 77063 BREAST TOMOSYNTHESIS BI: CPT

## 2021-06-02 PROCEDURE — 77067 SCR MAMMO BI INCL CAD: CPT

## 2021-06-07 ENCOUNTER — TELEPHONE (OUTPATIENT)
Dept: FAMILY MEDICINE CLINIC | Facility: CLINIC | Age: 61
End: 2021-06-07

## 2021-06-07 DIAGNOSIS — R19.7 DIARRHEA, UNSPECIFIED TYPE: Primary | ICD-10-CM

## 2021-06-07 NOTE — TELEPHONE ENCOUNTER
----- Message from Alondra Christine MA sent at 6/7/2021  8:37 AM EDT -----  Regarding: FW: Non-Urgent Medical Question  Contact: [phone]    ----- Message -----  From: Susy Hanson  Sent: 6/7/2021   8:27 AM EDT  To: [facility]  Subject: Non-Urgent Medical Question                      Good morning,    Should I have a C-diff test.  As I was talking to my Daughter-in-law she was telling me about her mother having the same symptoms I am having and they did a Colonostomy and didn't find C-diff until later.  When I looked up the symptoms I think I have a lot of the systems.  I don't have the procedure scheduled until 7/23.  I really would like to have this corrected soon.  It's terrible to going to the bathroom so much and especially if you are out.  Let me know how I can have a sample of my stool tested.    Thanks  Susy

## 2021-06-11 ENCOUNTER — HOSPITAL ENCOUNTER (OUTPATIENT)
Dept: ULTRASOUND IMAGING | Facility: HOSPITAL | Age: 61
Discharge: HOME | End: 2021-06-11

## 2021-06-11 ENCOUNTER — HOSPITAL ENCOUNTER (OUTPATIENT)
Dept: MAMMOGRAPHY | Facility: HOSPITAL | Age: 61
Discharge: HOME | End: 2021-06-11

## 2021-06-11 DIAGNOSIS — R92.8 ABNORMALITY OF BOTH BREASTS ON SCREENING MAMMOGRAM: ICD-10-CM

## 2021-06-11 PROCEDURE — G0279 TOMOSYNTHESIS, MAMMO: HCPCS

## 2021-06-11 PROCEDURE — 77066 DX MAMMO INCL CAD BI: CPT

## 2021-06-11 PROCEDURE — 76642 ULTRASOUND BREAST LIMITED: CPT

## 2021-06-14 ENCOUNTER — LAB (OUTPATIENT)
Dept: FAMILY MEDICINE CLINIC | Facility: CLINIC | Age: 61
End: 2021-06-14

## 2021-06-14 DIAGNOSIS — R19.7 DIARRHEA, UNSPECIFIED TYPE: ICD-10-CM

## 2021-06-14 LAB

## 2021-06-14 PROCEDURE — 0097U HC BIOFIRE FILMARRAY GI PANEL: CPT | Performed by: FAMILY MEDICINE

## 2021-06-14 PROCEDURE — 87324 CLOSTRIDIUM AG IA: CPT | Performed by: FAMILY MEDICINE

## 2021-06-14 PROCEDURE — 87449 NOS EACH ORGANISM AG IA: CPT | Performed by: FAMILY MEDICINE

## 2021-06-15 LAB — C DIFF GDH STL QL: NEGATIVE

## 2021-06-16 ENCOUNTER — APPOINTMENT (OUTPATIENT)
Dept: MAMMOGRAPHY | Facility: HOSPITAL | Age: 61
End: 2021-06-16

## 2021-06-21 ENCOUNTER — HOSPITAL ENCOUNTER (OUTPATIENT)
Dept: MAMMOGRAPHY | Facility: HOSPITAL | Age: 61
Discharge: HOME | End: 2021-06-21

## 2021-06-21 DIAGNOSIS — R92.1 BREAST CALCIFICATIONS: ICD-10-CM

## 2021-06-21 PROCEDURE — 25010000003 LIDOCAINE 1 % SOLUTION: Performed by: FAMILY MEDICINE

## 2021-06-21 PROCEDURE — 88360 TUMOR IMMUNOHISTOCHEM/MANUAL: CPT | Performed by: FAMILY MEDICINE

## 2021-06-21 PROCEDURE — 88305 TISSUE EXAM BY PATHOLOGIST: CPT | Performed by: FAMILY MEDICINE

## 2021-06-21 PROCEDURE — A4648 IMPLANTABLE TISSUE MARKER: HCPCS

## 2021-06-21 PROCEDURE — 76098 X-RAY EXAM SURGICAL SPECIMEN: CPT

## 2021-06-21 RX ORDER — LIDOCAINE HYDROCHLORIDE 10 MG/ML
3 INJECTION, SOLUTION INFILTRATION; PERINEURAL ONCE
Status: COMPLETED | OUTPATIENT
Start: 2021-06-21 | End: 2021-06-21

## 2021-06-21 RX ORDER — LIDOCAINE HYDROCHLORIDE AND EPINEPHRINE 10; 10 MG/ML; UG/ML
8 INJECTION, SOLUTION INFILTRATION; PERINEURAL ONCE
Status: COMPLETED | OUTPATIENT
Start: 2021-06-21 | End: 2021-06-21

## 2021-06-21 RX ADMIN — LIDOCAINE HYDROCHLORIDE 3 ML: 10 INJECTION, SOLUTION INFILTRATION; PERINEURAL at 10:37

## 2021-06-21 RX ADMIN — LIDOCAINE HYDROCHLORIDE,EPINEPHRINE BITARTRATE 9 ML: 10; .01 INJECTION, SOLUTION INFILTRATION; PERINEURAL at 10:38

## 2021-06-23 ENCOUNTER — TELEPHONE (OUTPATIENT)
Dept: FAMILY MEDICINE CLINIC | Facility: CLINIC | Age: 61
End: 2021-06-23

## 2021-06-23 NOTE — TELEPHONE ENCOUNTER
Caller: Susy Hanson    Relationship: Self    Best call back number: [phone]    Caller requesting test results: PATIENT    What test was performed: BREAST BIOPSY    When was the test performed: 06/21/2021    Additional notes: PATIENT WAS RETURNING A CALL REGARDING THESE RESULTS.  PLEASE CALL.     ATTEMPTED TO WARM TRANSFER

## 2021-06-23 NOTE — TELEPHONE ENCOUNTER
Call returned and patient made aware of results  She would really like a female surgeon and possibly a female oncologist

## 2021-06-24 ENCOUNTER — TELEPHONE (OUTPATIENT)
Dept: FAMILY MEDICINE CLINIC | Facility: CLINIC | Age: 61
End: 2021-06-24

## 2021-06-24 DIAGNOSIS — D05.12 DUCTAL CARCINOMA IN SITU (DCIS) OF LEFT BREAST: Primary | ICD-10-CM

## 2021-06-24 RX ORDER — DILTIAZEM HYDROCHLORIDE 180 MG/1
1 CAPSULE, EXTENDED RELEASE ORAL
COMMUNITY
End: 2021-08-16

## 2021-06-24 NOTE — TELEPHONE ENCOUNTER
----- Message from Thao Cote CMA sent at 6/24/2021  7:13 AM EDT -----  Regarding: FW: Test Results Question  Contact: [phone]    ----- Message -----  From: Susy Hanson  Sent: 6/23/2021   6:32 PM EDT  To: [facility]  Subject: Test Results Question                            [facility] am [facility] Asia Perkins [facility]  I would like to get this done as soon as possible.  You know how I get anxiety :(  LOL!  Thanks for being my doctor, I appreciate all you do for me.      Have a great day,  Susy

## 2021-06-24 NOTE — PROGRESS NOTES
Hematology/Oncology Outpatient Consultation    Patient name: Susy Hanson  : 1960  MRN: [id number]  Primary Care Physician: Erica Avila MD  Referring Physician: Erica Avila*  Reason For Consult:     Chief Complaint   Patient presents with   • Appointment     Breast Cancer       History of Present Illness:    This is a 60-year-old female who had routine screening mammogram which showed a 10 mm asymmetry in the right central breast.  There was indeterminate calcification in the lower outer left breast posterior depth.  Recommendation was for patient to have bilateral diagnostic mammogram and possible targeted right breast ultrasound.      On 2021 patient had bilateral diagnostic mammogram this basically showed an 0.8 cm group of coarse calcifications within the outer central left breast mildly suspicious due to change from prior mammogram.  There is an oval partially circumscribed obscured mass within the lower central right breast corresponding to previously questioned asymmetry.  In retrospect this finding appears similar to multiple prior exams dating back to  consistent with a benign cluster of cysts.  Ultrasound done on the right breast 2 cm from the nipple, showed a 1 cm grouping of oval circumscribed avascular masses with parallel orientation increased through-transmission with no internal vascularity consistent with a benign cluster of cysts, not significantly changed compared to ultrasound dated .  Stereotactic guided core biopsy of the left breast calcification was recommended to further evaluate.      On 2021 patient had left stereotactic guided biopsy of the left breast calcification with clip placement.  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  Atypical lobular hyperplasia.  No invasive cancer was identified.  DCIS was positive for estrogen receptors at 90%, progesterone receptors at  95%.    Patient has been referred for further evaluation and management of her DCIS.    Prior to the mammogram, she was totally asymptomatic.  She has no prior history of breast biopsies.    There is family history of breast cancer in her grandmother at age of 60, maternal aunt also had breast cancer.  Paternal aunt had breast cancer as well.  Patient is not sure of the ages of the other relatives.    Patient is also interested in pursuing cancer genetics for her own risk management.  The results of her genetic testing will help her to make surgical decisions     She has been referred to Dr. Perkins for her surgical discussions.    Past Medical History:   Diagnosis Date   • Anxiety 2000   • Depression 2008   • DM (diabetes mellitus) (CMS/HCC)    • DUB (dysfunctional uterine bleeding)    • Hyperlipidemia    • Hypertension    • Irritable bowel syndrome 2006   • Obesity    • Peripheral neuropathy        Past Surgical History:   Procedure Laterality Date   • CARDIOVASCULAR STRESS TEST  2020   • CHOLECYSTECTOMY     • ECHO - CONVERTED  2020   • HYSTERECTOMY     • TUBAL ABDOMINAL LIGATION  1981         Current Outpatient Medications:   •  ALPRAZolam (XANAX) 0.5 MG tablet, Take 1 tablet by mouth 2 (Two) Times a Day As Needed for Anxiety. for anxiety, Disp: 60 tablet, Rfl: 0  •  amLODIPine (NORVASC) 2.5 MG tablet, Take 1 tablet by mouth Daily., Disp: 90 tablet, Rfl: 3  •  atorvastatin (LIPITOR) 20 MG tablet, Take 1 tablet by mouth Daily., Disp: 90 tablet, Rfl: 2  •  dilTIAZem CD (Cardizem CD) 180 MG 24 hr capsule, Take 1 capsule by mouth Daily., Disp: 90 capsule, Rfl: 1  •  dilTIAZem XR (Dilt-XR) 180 MG 24 hr capsule, Take 1 capsule by mouth., Disp: , Rfl:   •  glucose blood test strip, Test blood sugar twice a day and as needed, Disp: 200 each, Rfl: 3  •  hydrALAZINE (APRESOLINE) 100 MG tablet, Take 2 tablets by mouth at breakfast and 1 tablet at bedtime OR take 1 tablet by mouth three times daily (Patient taking  differently: Take 2 tablets by mouth at breakfast and 2 tablets at dinner), Disp: 270 tablet, Rfl: 3  •  hydroCHLOROthiazide (HYDRODIURIL) 25 MG tablet, Take 1 tablet by mouth Daily., Disp: 90 tablet, Rfl: 2  •  Insulin Lispro Prot & Lispro (HumaLOG Mix 75/25 KwikPen) (75-25) 100 UNIT/ML suspension pen-injector pen, Inject 24 Units under the skin with breakfast and 28 units under the skin with supper, Disp: 45 mL, Rfl: 1  •  Insulin Pen Needle 31G X 5 MM misc, Use with insulin pen twice daily and as needed, Disp: 200 each, Rfl: 3  •  Lancets (onetouch ultrasoft) lancets, Use to check blood sugar twice a day and as needed, Disp: 200 each, Rfl: 3  •  losartan (Cozaar) 100 MG tablet, Take 1 tablet by mouth Daily., Disp: 90 tablet, Rfl: 2  •  magnesium citrate 1.745 GM/30ML solution solution, Take as directed, Disp: 297 mL, Rfl: 0  •  metFORMIN (GLUCOPHAGE) 1000 MG tablet, Take 2 pills in the morning and 2.5 pills in the evening (Patient taking differently: Take 1 pill in the morning and 1.5 pills in the evening), Disp: 405 tablet, Rfl: 3  •  metoprolol succinate XL (TOPROL-XL) 100 MG 24 hr tablet, Take 2 tablets by mouth Daily for blood pressure., Disp: 180 tablet, Rfl: 1  •  sorbitol 70 % solution solution, Take as directed, Disp: 100 mL, Rfl: 0  •  temazepam (RESTORIL) 30 MG capsule, Take 1 capsule by mouth At Night As Needed for Sleep., Disp: 30 capsule, Rfl: 1    Allergies   Allergen Reactions   • Hydrocodone-Acetaminophen Hives   • Acetaminophen Unknown - Low Severity   • Hydrocodone Unknown - Low Severity       Immunization History   Administered Date(s) Administered   • COVID-19 (PFIZER) 01/06/2021, 01/27/2021   • Hepatitis A 04/29/2018, 11/02/2018   • flucelvax quad pfs =>4 YRS 10/19/2020       Family History   Problem Relation Age of Onset   • Alzheimer's disease Father    • Diabetes Father    • Heart disease Father    • Other Father    • Sleep apnea Father    • Snoring Father    • Other Mother    • Diabetes  "Mother    • Heart disease Mother    • Sleep apnea Mother    • Snoring Mother    • Diabetes Sister    • Heart disease Sister    • Other Sister    • Sleep apnea Sister    • Snoring Sister    • Breast cancer Paternal Grandmother 60   • Breast cancer Maternal Aunt    • Breast cancer Paternal Aunt        Cancer-related family history includes Breast cancer in her maternal aunt and paternal aunt; Breast cancer (age of onset: 60) in her paternal grandmother.    Social History     Tobacco Use   • Smoking status: Former Smoker     Packs/day: 2.00     Years: 30.00     Pack years: 60.00     Types: Cigarettes     Start date: 1972     Quit date: 2008     Years since quittin.4   • Smokeless tobacco: Never Used   Vaping Use   • Vaping Use: Never used   Substance Use Topics   • Alcohol use: No   • Drug use: No       ROS:    Review of Systems   Constitutional: Negative for chills and fever.   HENT: Negative for ear pain, mouth sores, nosebleeds and sore throat.    Eyes: Negative for photophobia and visual disturbance.   Respiratory: Negative for wheezing and stridor.    Cardiovascular: Negative for chest pain and palpitations.   Gastrointestinal: Negative for abdominal pain, diarrhea, nausea and vomiting.   Endocrine: Negative for cold intolerance and heat intolerance.   Genitourinary: Negative for dysuria and hematuria.   Musculoskeletal: Negative for joint swelling and neck stiffness.   Skin: Negative for color change and rash.   Neurological: Negative for seizures and syncope.   Hematological: Negative for adenopathy.        No obvious bleeding   Psychiatric/Behavioral: Negative for agitation, confusion and hallucinations.       Objective:    Vitals:    21 0911   BP: 165/85   Pulse: 85   Resp: 18   Weight: 74.8 kg (165 lb)   Height: 162.6 cm (64\")   PainSc: 0-No pain     Body mass index is 28.32 kg/m².    ECOG  (0) Fully active, able to carry on all predisease performance without restriction    Physical " Exam:  Physical Exam  Vitals and nursing note reviewed.   Constitutional:       General: She is not in acute distress.     Appearance: She is not diaphoretic.   HENT:      Head: Normocephalic and atraumatic.   Eyes:      General: No scleral icterus.        Right eye: No discharge.         Left eye: No discharge.      Conjunctiva/sclera: Conjunctivae normal.   Neck:      Thyroid: No thyromegaly.   Cardiovascular:      Rate and Rhythm: Normal rate and regular rhythm.      Heart sounds: Normal heart sounds. No friction rub. No gallop.    Pulmonary:      Effort: Pulmonary effort is normal. No respiratory distress.      Breath sounds: No stridor. No wheezing.   Abdominal:      General: Bowel sounds are normal.      Palpations: Abdomen is soft. There is no mass.      Tenderness: There is no abdominal tenderness. There is no guarding or rebound.   Musculoskeletal:         General: No tenderness. Normal range of motion.      Cervical back: Normal range of motion and neck supple.   Lymphadenopathy:      Cervical: No cervical adenopathy.   Skin:     General: Skin is warm.      Findings: No erythema or rash.   Neurological:      Mental Status: She is alert and oriented to person, place, and time.      Motor: No abnormal muscle tone.   Psychiatric:         Behavior: Behavior normal.     Bilateral breast exam: I do not palpate any masses on the right breast.  The right axilla was negative.  The left breast was unremarkable.  She has ecchymosis noted on the left upper medial aspect consistent with recent biopsy procedure.    RECENT LABS  WBC   Date Value Ref Range Status   05/18/2021 5.04 3.40 - 10.80 10*3/mm3 Final     RBC   Date Value Ref Range Status   05/18/2021 4.19 3.77 - 5.28 10*6/mm3 Final     Hemoglobin   Date Value Ref Range Status   05/18/2021 12.0 12.0 - 15.9 g/dL Final     Hematocrit   Date Value Ref Range Status   05/18/2021 36.9 34.0 - 46.6 % Final     MCV   Date Value Ref Range Status   05/18/2021 88.1 79.0 - 97.0  fL Final     MCH   Date Value Ref Range Status   05/18/2021 28.6 26.6 - 33.0 pg Final     MCHC   Date Value Ref Range Status   05/18/2021 32.5 31.5 - 35.7 g/dL Final     RDW   Date Value Ref Range Status   05/18/2021 13.6 12.3 - 15.4 % Final     RDW-SD   Date Value Ref Range Status   05/18/2021 43.0 37.0 - 54.0 fl Final     MPV   Date Value Ref Range Status   05/18/2021 10.9 6.0 - 12.0 fL Final     Platelets   Date Value Ref Range Status   05/18/2021 217 140 - 450 10*3/mm3 Final     Neutrophil %   Date Value Ref Range Status   05/18/2021 58.8 42.7 - 76.0 % Final     Lymphocyte %   Date Value Ref Range Status   05/18/2021 24.0 19.6 - 45.3 % Final     Monocyte %   Date Value Ref Range Status   05/18/2021 8.5 5.0 - 12.0 % Final     Eosinophil %   Date Value Ref Range Status   05/18/2021 6.7 (H) 0.3 - 6.2 % Final     Basophil %   Date Value Ref Range Status   05/18/2021 1.6 (H) 0.0 - 1.5 % Final     Immature Grans %   Date Value Ref Range Status   05/18/2021 0.4 0.0 - 0.5 % Final     Neutrophils, Absolute   Date Value Ref Range Status   05/18/2021 2.96 1.70 - 7.00 10*3/mm3 Final     Lymphocytes, Absolute   Date Value Ref Range Status   05/18/2021 1.21 0.70 - 3.10 10*3/mm3 Final     Monocytes, Absolute   Date Value Ref Range Status   05/18/2021 0.43 0.10 - 0.90 10*3/mm3 Final     Eosinophils, Absolute   Date Value Ref Range Status   05/18/2021 0.34 0.00 - 0.40 10*3/mm3 Final     Basophils, Absolute   Date Value Ref Range Status   05/18/2021 0.08 0.00 - 0.20 10*3/mm3 Final     Immature Grans, Absolute   Date Value Ref Range Status   05/18/2021 0.02 0.00 - 0.05 10*3/mm3 Final     nRBC   Date Value Ref Range Status   05/18/2021 0.0 0.0 - 0.2 /100 WBC Final       Lab Results   Component Value Date    GLUCOSE 104 (H) 05/18/2021    BUN 12 05/18/2021    CREATININE 0.99 05/18/2021    EGFRIFNONA 57 (L) 05/18/2021    EGFRIFAFRI >60 10/13/2017    BCR 12.1 05/18/2021    K 4.3 05/18/2021    CO2 26.9 05/18/2021    CALCIUM 9.5  05/18/2021    ALBUMIN 4.60 05/18/2021    AST 13 05/18/2021    ALT 18 05/18/2021         Assessment/Plan   Malignant neoplasm of left breast in female, estrogen receptor positive, unspecified site of breast (CMS/HCC)  - Genetic Testing - Blood,  - Ambulatory Referral to Breast Surgery  - Genetic Testing - Blood,    Family history of breast cancer  - Genetic Testing - Blood,  - Ambulatory Referral to Breast Surgery  - Genetic Testing - Blood,      1. DCIS left breast status post biopsy with clip placement.  ER strongly +90%, WI strongly positive at 95%  2. Cluster of cysts on the right breast: Chronic, unchanged and benign  3. Postmenopausal state  4. Family history of breast cancer in maternal grandmother at age of 60, maternal aunt also had breast cancer.  Paternal aunt had breast cancer.  There is concern for possible hereditary cancer syndrome.  Patient is interested in pursuing cancer genetics       Discussion    Reviewed her mammogram, ultrasound, biopsy results.  She does have DCIS, low-grade presenting  as abnormal calcifications in the left breast.  She understands that following surgical procedure, we will determine the final stage of her disease.  If there is no upstage following surgery, then she will remain at clinical stage 0 disease.  We reviewed that this is a noninvasive breast cancer, with an excellent chance of survival .  We discussed that the chance of survivorship is approximately 97% over 10 years.  She has an appointment to see Dr. Perkins to discuss surgical options coming up.      Discussion on genetics.    I have reviewed her medical records.  I have also reviewed her family cancer history.  Patient does meet criteria to have consideration for genetic evaluation for possible hereditary cancer syndrome.  Because she has been diagnosed with breast cancer herself, she would be a good candidate for testing.  Patient would also utilize this result to make surgical decisions.  She would consider  bilateral mastectomies if she is found to have deleterious mutation.    Today's risk assessment is based on the history the patient has provided.  We discussed that the best people to screen are the ones who have been affected by malignancy as their result is more informative.  We reviewed all the hallmarks of hereditary cancer syndrome including multiple relatives on the same side of the family being affected by malignancy, cancer diagnosis in young individuals, different cancers in one individual.      We discussed the implications of a positive deleterious mutation which can result in recommendations for prophylactic surgeries, chemoprevention, lifestyle modifications and aggressive screening with mammogram and MRI, and also increased breast awareness and breast self exams.  Patient understands if she screens positive, then at-risk family members will need to be screened as well.  Each offspring has a 50% chance of inheriting a deleterious mutation if positive in a parent.      A negative deleterious mutation will make hereditary cancer syndrome much less likely, but does not rule out the possibility of a gene mutation that cannot be detected with the available technology.  She also understands that a negative gene test result might indicate that the cancers that occurred in her family were most likely sporadic.    We discussed variant of unknown significance.  Patient understands that no medical decisions will be made based on a variant of unknown significance, but I did stress the importance of maintaining contact information with us should this be the case.      We discussed the financial implications of the above testing.  Patient understands when the right clinical criteria are met that most insurance companies will cover the cost.      We also discussed the possible various insurability issues with a deleterious mutation result.     Patient has give us consent to proceed with comprehensive genetic analysis.           Plans:    · Comprehensive gene analysis with cancer next technology.  Patient will use results to make surgical decision  · Follow-up with me in 3 weeks  · Follow-up with Dr. Perkins for surgical discussions  · Tomeka Esteves to help navigate  · All questions answered to the best of my abilities        Patient verbalized understanding and is in agreement of the above plan.          Thank you very much allowing participate in the care of Susy, I will keep you updated on her progress      I spent 60 total minutes, face-to-face, caring for Susy today.  80% of this time involved counseling and/or coordination of care as documented within this note.

## 2021-06-25 ENCOUNTER — CONSULT (OUTPATIENT)
Dept: ONCOLOGY | Facility: CLINIC | Age: 61
End: 2021-06-25

## 2021-06-25 ENCOUNTER — LAB (OUTPATIENT)
Dept: LAB | Facility: HOSPITAL | Age: 61
End: 2021-06-25

## 2021-06-25 VITALS
HEART RATE: 85 BPM | BODY MASS INDEX: 28.17 KG/M2 | DIASTOLIC BLOOD PRESSURE: 85 MMHG | RESPIRATION RATE: 18 BRPM | WEIGHT: 165 LBS | HEIGHT: 64 IN | SYSTOLIC BLOOD PRESSURE: 165 MMHG

## 2021-06-25 DIAGNOSIS — Z80.3 FAMILY HISTORY OF BREAST CANCER: ICD-10-CM

## 2021-06-25 DIAGNOSIS — C50.912 MALIGNANT NEOPLASM OF LEFT BREAST IN FEMALE, ESTROGEN RECEPTOR POSITIVE, UNSPECIFIED SITE OF BREAST (HCC): Primary | ICD-10-CM

## 2021-06-25 DIAGNOSIS — Z17.0 MALIGNANT NEOPLASM OF LEFT BREAST IN FEMALE, ESTROGEN RECEPTOR POSITIVE, UNSPECIFIED SITE OF BREAST (HCC): Primary | ICD-10-CM

## 2021-06-25 PROCEDURE — 99205 OFFICE O/P NEW HI 60 MIN: CPT | Performed by: INTERNAL MEDICINE

## 2021-06-29 ENCOUNTER — TELEPHONE (OUTPATIENT)
Dept: SURGERY | Facility: CLINIC | Age: 61
End: 2021-06-29

## 2021-06-29 ENCOUNTER — TELEPHONE (OUTPATIENT)
Dept: ONCOLOGY | Facility: CLINIC | Age: 61
End: 2021-06-29

## 2021-06-29 NOTE — TELEPHONE ENCOUNTER
Caller: Susy Hanson    Relationship: Self    Best call back number: [phone]    What is the best time to reach you: ANYTIME TODAY    Who are you requesting to speak with (clinical staff, provider,  specific staff member): JAYSON     What was the call regarding: PATIENT STATES THAT SHE WAS IN LAST WEEK AND HAS BEEN EXPECTING A CALL FROM JAYSON TO HELP COORDINATE AN APPT. WITH DR. DEAL.     Do you require a callback: YES. PLEASE CALL TO ADVISE AT NUMBER LISTED.  LEAVE VM IF NO ANSWER.     THANKS

## 2021-06-29 NOTE — TELEPHONE ENCOUNTER
New Pt for Dr DINERO  7-23-21 arrive 7:30    Offered sooner, pt going on vacation    Pre screened for possible breast mri  Good on all questions  Ht  Wt 167    Mailed hannah Solorzano

## 2021-06-29 NOTE — TELEPHONE ENCOUNTER
I called and spoke with the patient at 9:22am and she stated that she is currently scheduled with Dr. Perkins on 7/23/2021.  The patient is going to be out of town on 7/1-7/5 and 7/16-7/21.  Dr. Perkins's office has ordered for a Breast MRI to be done at [facility] prior to her appointment on 7/23.  She is currently scheduled with Dr. Ramos on 7/22.  I let her know that we could reschedule her appointment with Dr. Ramos if needed.  I let her know that her imaging and appt with Dr. Perkins are more pertinent.  The patient voiced understanding.  I let her know that I would keep her updated with information and she stated that she would do the same.

## 2021-06-30 ENCOUNTER — TELEPHONE (OUTPATIENT)
Dept: SURGERY | Facility: CLINIC | Age: 61
End: 2021-06-30

## 2021-06-30 DIAGNOSIS — D05.12 BREAST NEOPLASM, TIS (DCIS), LEFT: Primary | ICD-10-CM

## 2021-06-30 NOTE — TELEPHONE ENCOUNTER
Scheduled bilateral breast mri w wo contrast at [facility] 7-14-21 arrive 8:15    Spoke to pt haim Solorzano

## 2021-07-01 ENCOUNTER — LAB REQUISITION (OUTPATIENT)
Dept: LAB | Facility: HOSPITAL | Age: 61
End: 2021-07-01

## 2021-07-01 DIAGNOSIS — Z00.00 ENCOUNTER FOR GENERAL ADULT MEDICAL EXAMINATION WITHOUT ABNORMAL FINDINGS: ICD-10-CM

## 2021-07-01 PROCEDURE — 88342 IMHCHEM/IMCYTCHM 1ST ANTB: CPT | Performed by: SURGERY

## 2021-07-01 PROCEDURE — 88360 TUMOR IMMUNOHISTOCHEM/MANUAL: CPT | Performed by: SURGERY

## 2021-07-01 PROCEDURE — 88341 IMHCHEM/IMCYTCHM EA ADD ANTB: CPT | Performed by: SURGERY

## 2021-07-02 LAB
LAB AP CASE REPORT: NORMAL
LAB AP DIAGNOSIS COMMENT: NORMAL
LAB AP SPECIAL STAINS: NORMAL
PATH REPORT.FINAL DX SPEC: NORMAL
PATH REPORT.GROSS SPEC: NORMAL

## 2021-07-07 LAB
LAB AP CASE REPORT: NORMAL
LAB AP OUTSIDE REPORT, ADDENDUM: NORMAL
PATH REPORT.FINAL DX SPEC: NORMAL
PATH REPORT.GROSS SPEC: NORMAL

## 2021-07-08 LAB
REF LAB TEST METHOD: NORMAL
WHOLE BLOOD SORT: NORMAL

## 2021-07-14 ENCOUNTER — APPOINTMENT (OUTPATIENT)
Dept: MRI IMAGING | Facility: HOSPITAL | Age: 61
End: 2021-07-14

## 2021-07-14 ENCOUNTER — HOSPITAL ENCOUNTER (OUTPATIENT)
Dept: MRI IMAGING | Facility: HOSPITAL | Age: 61
Discharge: HOME | End: 2021-07-14
Admitting: SURGERY

## 2021-07-14 DIAGNOSIS — D05.12 BREAST NEOPLASM, TIS (DCIS), LEFT: ICD-10-CM

## 2021-07-14 LAB — CREAT BLDA-MCNC: 1 MG/DL (ref 0.6–1.3)

## 2021-07-14 PROCEDURE — A9577 INJ MULTIHANCE: HCPCS | Performed by: SURGERY

## 2021-07-14 PROCEDURE — 82565 ASSAY OF CREATININE: CPT

## 2021-07-14 PROCEDURE — 77049 MRI BREAST C-+ W/CAD BI: CPT

## 2021-07-14 PROCEDURE — 0 GADOBENATE DIMEGLUMINE 529 MG/ML SOLUTION: Performed by: SURGERY

## 2021-07-14 RX ADMIN — GADOBENATE DIMEGLUMINE 15 ML: 529 INJECTION, SOLUTION INTRAVENOUS at 11:44

## 2021-07-15 DIAGNOSIS — F41.8 MIXED ANXIETY DEPRESSIVE DISORDER: ICD-10-CM

## 2021-07-15 DIAGNOSIS — G47.00 INSOMNIA, UNSPECIFIED TYPE: ICD-10-CM

## 2021-07-15 RX ORDER — LOSARTAN POTASSIUM 100 MG/1
100 TABLET ORAL DAILY
Qty: 90 TABLET | Refills: 2 | Status: SHIPPED | OUTPATIENT
Start: 2021-07-15 | End: 2022-04-10 | Stop reason: SDUPTHER

## 2021-07-15 RX ORDER — HYDROCHLOROTHIAZIDE 25 MG/1
25 TABLET ORAL DAILY
Qty: 90 TABLET | Refills: 2 | Status: SHIPPED | OUTPATIENT
Start: 2021-07-15 | End: 2022-04-10 | Stop reason: SDUPTHER

## 2021-07-15 RX ORDER — TEMAZEPAM 30 MG/1
30 CAPSULE ORAL NIGHTLY PRN
Qty: 30 CAPSULE | Refills: 5 | Status: SHIPPED | OUTPATIENT
Start: 2021-07-15 | End: 2021-09-22 | Stop reason: ALTCHOICE

## 2021-07-15 RX ORDER — ALPRAZOLAM 0.5 MG/1
0.5 TABLET ORAL 2 TIMES DAILY PRN
Qty: 60 TABLET | Refills: 0 | Status: SHIPPED | OUTPATIENT
Start: 2021-07-15 | End: 2021-09-22 | Stop reason: ALTCHOICE

## 2021-07-15 RX ORDER — ATORVASTATIN CALCIUM 20 MG/1
20 TABLET, FILM COATED ORAL DAILY
Qty: 90 TABLET | Refills: 2 | Status: SHIPPED | OUTPATIENT
Start: 2021-07-15 | End: 2022-04-10 | Stop reason: SDUPTHER

## 2021-07-20 NOTE — PROGRESS NOTES
Hematology/Oncology Outpatient Follow Up    PATIENT NAME:Susy Hanson  :1960  MRN: [id number]  PRIMARY CARE PHYSICIAN: Erica Avila MD  REFERRING PHYSICIAN: Erica Avila*    Chief Complaint   Patient presents with   • Follow-up     Malignant neoplasm of left breast in female, estrogen receptor positive, unspecified site of breast         HISTORY OF PRESENT ILLNESS:     This is a 60-year-old female who had routine screening mammogram which showed a 10 mm asymmetry in the right central breast.  There was indeterminate calcification in the lower outer left breast posterior depth.  Recommendation was for patient to have bilateral diagnostic mammogram and possible targeted right breast ultrasound.       On 2021 patient had bilateral diagnostic mammogram this basically showed an 0.8 cm group of coarse calcifications within the outer central left breast mildly suspicious due to change from prior mammogram.  There is an oval partially circumscribed obscured mass within the lower central right breast corresponding to previously questioned asymmetry.  In retrospect this finding appears similar to multiple prior exams dating back to  consistent with a benign cluster of cysts.  Ultrasound done on the right breast 2 cm from the nipple, showed a 1 cm grouping of oval circumscribed avascular masses with parallel orientation increased through-transmission with no internal vascularity consistent with a benign cluster of cysts, not significantly changed compared to ultrasound dated .  Stereotactic guided core biopsy of the left breast calcification was recommended to further evaluate.       On 2021 patient had left stereotactic guided biopsy of the left breast calcification with clip placement.  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  Atypical lobular hyperplasia.  No invasive cancer was identified.  DCIS was positive  for estrogen receptors at 90%, progesterone receptors at 95%.     Patient has been referred for further evaluation and management of her DCIS.     Prior to the mammogram, she was totally asymptomatic.  She has no prior history of breast biopsies.     There is family history of breast cancer in her grandmother at age of 60, maternal aunt also had breast cancer.  Paternal aunt had breast cancer as well.  Patient is not sure of the ages of the other relatives.     Patient is also interested in pursuing cancer genetics for her own risk management.  The results of her genetic testing will help her to make surgical decisions      She has been referred to Dr. Perkins for her surgical discussions.    · 6/25/2021 patient had comprehensive gene analysis with cancer next technology which was negative for genetic mutation to account for her malignancy.  Past Medical History:   Diagnosis Date   • Anxiety 2000   • Depression 2008   • DM (diabetes mellitus) (CMS/HCC)    • DUB (dysfunctional uterine bleeding)    • Hyperlipidemia    • Hypertension    • Irritable bowel syndrome 2006   • Obesity    • Peripheral neuropathy        Past Surgical History:   Procedure Laterality Date   • CARDIOVASCULAR STRESS TEST  2020   • CHOLECYSTECTOMY     • ECHO - CONVERTED  2020   • HYSTERECTOMY     • TUBAL ABDOMINAL LIGATION  1981         Current Outpatient Medications:   •  ALPRAZolam (XANAX) 0.5 MG tablet, Take 1 tablet by mouth 2 (Two) Times a Day As Needed for Anxiety. for anxiety, Disp: 60 tablet, Rfl: 0  •  amLODIPine (NORVASC) 2.5 MG tablet, Take 1 tablet by mouth Daily., Disp: 90 tablet, Rfl: 3  •  atorvastatin (LIPITOR) 20 MG tablet, Take 1 tablet by mouth Daily., Disp: 90 tablet, Rfl: 2  •  dilTIAZem CD (Cardizem CD) 180 MG 24 hr capsule, Take 1 capsule by mouth Daily., Disp: 90 capsule, Rfl: 1  •  dilTIAZem XR (Dilt-XR) 180 MG 24 hr capsule, Take 1 capsule by mouth., Disp: , Rfl:   •  glucose blood test strip, Test blood sugar twice a  day and as needed, Disp: 200 each, Rfl: 3  •  hydrALAZINE (APRESOLINE) 100 MG tablet, Take 2 tablets by mouth at breakfast and 1 tablet at bedtime OR take 1 tablet by mouth three times daily (Patient taking differently: Take 2 tablets by mouth at breakfast and 2 tablets at dinner), Disp: 270 tablet, Rfl: 3  •  hydroCHLOROthiazide (HYDRODIURIL) 25 MG tablet, Take 1 tablet by mouth Daily., Disp: 90 tablet, Rfl: 2  •  Insulin Lispro Prot & Lispro (HumaLOG Mix 75/25 KwikPen) (75-25) 100 UNIT/ML suspension pen-injector pen, Inject 24 Units under the skin with breakfast and 28 units under the skin with supper, Disp: 45 mL, Rfl: 1  •  Insulin Pen Needle 31G X 5 MM misc, Use with insulin pen twice daily and as needed, Disp: 200 each, Rfl: 3  •  Lancets (onetouch ultrasoft) lancets, Use to check blood sugar twice a day and as needed, Disp: 200 each, Rfl: 3  •  losartan (Cozaar) 100 MG tablet, Take 1 tablet by mouth Daily., Disp: 90 tablet, Rfl: 2  •  magnesium citrate 1.745 GM/30ML solution solution, Take as directed, Disp: 297 mL, Rfl: 0  •  metFORMIN (GLUCOPHAGE) 1000 MG tablet, Take 2 pills in the morning and 2.5 pills in the evening (Patient taking differently: Take 1 pill in the morning and 1.5 pills in the evening), Disp: 405 tablet, Rfl: 3  •  metoprolol succinate XL (TOPROL-XL) 100 MG 24 hr tablet, Take 2 tablets by mouth Daily for blood pressure., Disp: 180 tablet, Rfl: 1  •  sorbitol 70 % solution solution, Take as directed, Disp: 100 mL, Rfl: 0  •  temazepam (RESTORIL) 30 MG capsule, Take 1 capsule by mouth At Night As Needed for Sleep., Disp: 30 capsule, Rfl: 5    Allergies   Allergen Reactions   • Hydrocodone-Acetaminophen Hives   • Acetaminophen Unknown - Low Severity   • Hydrocodone Unknown - Low Severity       Family History   Problem Relation Age of Onset   • Alzheimer's disease Father    • Diabetes Father    • Heart disease Father    • Other Father    • Sleep apnea Father    • Snoring Father    • Other  Mother    • Diabetes Mother    • Heart disease Mother    • Sleep apnea Mother    • Snoring Mother    • Diabetes Sister    • Heart disease Sister    • Other Sister    • Sleep apnea Sister    • Snoring Sister    • Breast cancer Paternal Grandmother 60   • Breast cancer Maternal Aunt    • Breast cancer Paternal Aunt        Cancer-related family history includes Breast cancer in her maternal aunt and paternal aunt; Breast cancer (age of onset: 60) in her paternal grandmother.    Social History     Tobacco Use   • Smoking status: Former Smoker     Packs/day: 2.00     Years: 30.00     Pack years: 60.00     Types: Cigarettes     Start date: 1972     Quit date: 2008     Years since quittin.5   • Smokeless tobacco: Never Used   Vaping Use   • Vaping Use: Never used   Substance Use Topics   • Alcohol use: No   • Drug use: No       HPI, ROS and PFSH have been reviewed and confirmed on 2021.     SUBJECTIVE:    She is here today to review her genetic results.  Patient still has an appointment to see Dr. Perkins tomorrow          REVIEW OF SYSTEMS:  Review of Systems   Constitutional: Negative for chills and fever.   HENT: Negative for ear pain, mouth sores, nosebleeds and sore throat.    Eyes: Negative for photophobia and visual disturbance.   Respiratory: Negative for wheezing and stridor.    Cardiovascular: Negative for chest pain and palpitations.   Gastrointestinal: Negative for abdominal pain, diarrhea, nausea and vomiting.   Endocrine: Negative for cold intolerance and heat intolerance.   Genitourinary: Negative for dysuria and hematuria.   Musculoskeletal: Negative for joint swelling and neck stiffness.   Skin: Negative for color change and rash.   Neurological: Negative for seizures and syncope.   Hematological: Negative for adenopathy.        No obvious bleeding   Psychiatric/Behavioral: Negative for agitation, confusion and hallucinations.       OBJECTIVE:    Vitals:    21 1449   BP: 146/78  "  Pulse: 78   Resp: 18   Temp: 98.4 °F (36.9 °C)   TempSrc: Infrared   Weight: 72.6 kg (160 lb)   Height: 162.6 cm (64\")   PainSc: 0-No pain     Body mass index is 27.46 kg/m².    ECOG  (0) Fully active, able to carry on all predisease performance without restriction    Physical Exam  No changes  RECENT LABS  WBC   Date Value Ref Range Status   05/18/2021 5.04 3.40 - 10.80 10*3/mm3 Final     RBC   Date Value Ref Range Status   05/18/2021 4.19 3.77 - 5.28 10*6/mm3 Final     Hemoglobin   Date Value Ref Range Status   05/18/2021 12.0 12.0 - 15.9 g/dL Final     Hematocrit   Date Value Ref Range Status   05/18/2021 36.9 34.0 - 46.6 % Final     MCV   Date Value Ref Range Status   05/18/2021 88.1 79.0 - 97.0 fL Final     MCH   Date Value Ref Range Status   05/18/2021 28.6 26.6 - 33.0 pg Final     MCHC   Date Value Ref Range Status   05/18/2021 32.5 31.5 - 35.7 g/dL Final     RDW   Date Value Ref Range Status   05/18/2021 13.6 12.3 - 15.4 % Final     RDW-SD   Date Value Ref Range Status   05/18/2021 43.0 37.0 - 54.0 fl Final     MPV   Date Value Ref Range Status   05/18/2021 10.9 6.0 - 12.0 fL Final     Platelets   Date Value Ref Range Status   05/18/2021 217 140 - 450 10*3/mm3 Final     Neutrophil %   Date Value Ref Range Status   05/18/2021 58.8 42.7 - 76.0 % Final     Lymphocyte %   Date Value Ref Range Status   05/18/2021 24.0 19.6 - 45.3 % Final     Monocyte %   Date Value Ref Range Status   05/18/2021 8.5 5.0 - 12.0 % Final     Eosinophil %   Date Value Ref Range Status   05/18/2021 6.7 (H) 0.3 - 6.2 % Final     Basophil %   Date Value Ref Range Status   05/18/2021 1.6 (H) 0.0 - 1.5 % Final     Immature Grans %   Date Value Ref Range Status   05/18/2021 0.4 0.0 - 0.5 % Final     Neutrophils, Absolute   Date Value Ref Range Status   05/18/2021 2.96 1.70 - 7.00 10*3/mm3 Final     Lymphocytes, Absolute   Date Value Ref Range Status   05/18/2021 1.21 0.70 - 3.10 10*3/mm3 Final     Monocytes, Absolute   Date Value Ref " Range Status   05/18/2021 0.43 0.10 - 0.90 10*3/mm3 Final     Eosinophils, Absolute   Date Value Ref Range Status   05/18/2021 0.34 0.00 - 0.40 10*3/mm3 Final     Basophils, Absolute   Date Value Ref Range Status   05/18/2021 0.08 0.00 - 0.20 10*3/mm3 Final     Immature Grans, Absolute   Date Value Ref Range Status   05/18/2021 0.02 0.00 - 0.05 10*3/mm3 Final     nRBC   Date Value Ref Range Status   05/18/2021 0.0 0.0 - 0.2 /100 WBC Final       Lab Results   Component Value Date    GLUCOSE 104 (H) 05/18/2021    BUN 12 05/18/2021    CREATININE 1.00 07/14/2021    EGFRIFNONA 57 (L) 05/18/2021    EGFRIFAFRI >60 10/13/2017    BCR 12.1 05/18/2021    K 4.3 05/18/2021    CO2 26.9 05/18/2021    CALCIUM 9.5 05/18/2021    ALBUMIN 4.60 05/18/2021    AST 13 05/18/2021    ALT 18 05/18/2021         Assessment/Plan     There are no diagnoses linked to this encounter.    ASSESSMENT:      Malignant neoplasm of left breast in female, estrogen receptor positive, unspecified site of breast (CMS/HCC)  - Genetic Testing - Blood,  - Ambulatory Referral to Breast Surgery  - Genetic Testing - Blood,     Family history of breast cancer  - Genetic Testing - Blood,  - Ambulatory Referral to Breast Surgery  - Genetic Testing - Blood,        1. DCIS left breast status post biopsy with clip placement.  ER strongly +90%, WA strongly positive at 95%  2. Cluster of cysts on the right breast: Chronic, unchanged and benign  3. Postmenopausal state  4. Family history of breast cancer in maternal grandmother at age of 60, maternal aunt also had breast cancer.  Paternal aunt had breast cancer.  There is concern for possible hereditary cancer syndrome.  Patient is interested in pursuing cancer genetics   5. Comprehensive gene analysis with [employer] next technology was negative for significant mutation to account for the malignancy that she has had        Discussion     Reviewed her mammogram, ultrasound, biopsy results.  She does have DCIS, low-grade  presenting  as abnormal calcifications in the left breast.  She understands that following surgical procedure, we will determine the final stage of her disease.  If there is no upstage following surgery, then she will remain at clinical stage 0 disease.  We reviewed that this is a noninvasive breast cancer, with an excellent chance of survival .  We discussed that the chance of survivorship is approximately 97% over 10 years.  She has an appointment to see Dr. Perkins to discuss surgical options coming up.        Discussion on genetics.     I have reviewed her medical records.  I have also reviewed her family cancer history.  Patient does meet criteria to have consideration for genetic evaluation for possible hereditary cancer syndrome.  Because she has been diagnosed with breast cancer herself, she would be a good candidate for testing.  Patient would also utilize this result to make surgical decisions.  She would consider bilateral mastectomies if she is found to have deleterious mutation.    We have reviewed patient's results.  She has screened negative for any deleterious mutation that could increase her risk for developing cancer.  I explained to patient that the cancers that occurred in her family may have all been sporadic or could still be due to a mutation that we are not able to detect with the available technology.  There are other genes suspected to increase risk breast cancer that are yet to be identified.  About 5% to 10% of all breast cancers are due to genetic mutation, the rest are due to hormonal, reproductive, endocrinology and lifestyle issues.  Patient will continue to monitor her family cancer history and update us of any changes that occur in the future. We discussed the concept of familial breast cancer syndrome, which could play a role in her family cancer development.     We discussed general breast health care such as increased breast awareness, monthly breast self- exams, six monthly  clinical breast exam and annual mammograms.  We also discussed use of breast MRI for screening high-risk patients.    We discussed risk modifications such as limiting alcohol ingestion to one to two drinks per week, avoidance of smoking and avoidance of postmenopausal weight gain.  We discussed breast cancer risks associated with prolonged hormone replacement therapy.    We discussed signs and symptoms for patient to watch out for and notify us should they occur including breast lumps, nipple discharge, skin discoloration, breast pain or any other concerns she may have         A copy of her genetic results have been given to patient for her own records keeping.      Patient has been given opportunities to ask questions, which have all been answered to the best of abilities.                      Plans:     · Reviewed comprehensive gene results with patient   · Follow-up with me 2 weeks postop  · Follow-up with Dr. Perkins for surgical discussions patient has an appointment tomorrow  · Tomeka Esteves to help navigate  · All questions answered to the best of my abilities           Patient verbalized understanding and is in agreement of the above plan.              Thank you very much allowing participate in the care of Susy, I will keep you updated on her progress        I spent 30 total minutes, face-to-face, caring for Susy today.  80% of this time involved counseling and/or coordination of care as documented within this note.

## 2021-07-22 ENCOUNTER — OFFICE VISIT (OUTPATIENT)
Dept: ONCOLOGY | Facility: CLINIC | Age: 61
End: 2021-07-22

## 2021-07-22 ENCOUNTER — APPOINTMENT (OUTPATIENT)
Dept: LAB | Facility: HOSPITAL | Age: 61
End: 2021-07-22

## 2021-07-22 VITALS
DIASTOLIC BLOOD PRESSURE: 78 MMHG | TEMPERATURE: 98.4 F | RESPIRATION RATE: 18 BRPM | HEIGHT: 64 IN | HEART RATE: 78 BPM | WEIGHT: 160 LBS | BODY MASS INDEX: 27.31 KG/M2 | SYSTOLIC BLOOD PRESSURE: 146 MMHG

## 2021-07-22 DIAGNOSIS — Z80.3 FAMILY HISTORY OF BREAST CANCER: Primary | ICD-10-CM

## 2021-07-22 DIAGNOSIS — Z17.0 MALIGNANT NEOPLASM OF LEFT BREAST IN FEMALE, ESTROGEN RECEPTOR POSITIVE, UNSPECIFIED SITE OF BREAST (HCC): ICD-10-CM

## 2021-07-22 DIAGNOSIS — C50.912 MALIGNANT NEOPLASM OF LEFT BREAST IN FEMALE, ESTROGEN RECEPTOR POSITIVE, UNSPECIFIED SITE OF BREAST (HCC): ICD-10-CM

## 2021-07-22 PROCEDURE — 99214 OFFICE O/P EST MOD 30 MIN: CPT | Performed by: INTERNAL MEDICINE

## 2021-07-23 ENCOUNTER — OFFICE VISIT (OUTPATIENT)
Dept: SURGERY | Facility: CLINIC | Age: 61
End: 2021-07-23

## 2021-07-23 ENCOUNTER — TELEPHONE (OUTPATIENT)
Dept: SURGERY | Facility: CLINIC | Age: 61
End: 2021-07-23

## 2021-07-23 ENCOUNTER — TRANSCRIBE ORDERS (OUTPATIENT)
Dept: SURGERY | Facility: CLINIC | Age: 61
End: 2021-07-23

## 2021-07-23 ENCOUNTER — PREP FOR SURGERY (OUTPATIENT)
Dept: OTHER | Facility: HOSPITAL | Age: 61
End: 2021-07-23

## 2021-07-23 VITALS
WEIGHT: 162 LBS | OXYGEN SATURATION: 98 % | HEIGHT: 64 IN | TEMPERATURE: 97.3 F | BODY MASS INDEX: 27.66 KG/M2 | DIASTOLIC BLOOD PRESSURE: 88 MMHG | HEART RATE: 97 BPM | SYSTOLIC BLOOD PRESSURE: 178 MMHG

## 2021-07-23 DIAGNOSIS — G47.30 SLEEP APNEA, UNSPECIFIED TYPE: ICD-10-CM

## 2021-07-23 DIAGNOSIS — D05.12 BREAST NEOPLASM, TIS (DCIS), LEFT: Primary | ICD-10-CM

## 2021-07-23 DIAGNOSIS — R92.1 BREAST CALCIFICATIONS ON MAMMOGRAM: ICD-10-CM

## 2021-07-23 DIAGNOSIS — E11.65 TYPE 2 DIABETES MELLITUS WITH HYPERGLYCEMIA, WITH LONG-TERM CURRENT USE OF INSULIN (HCC): ICD-10-CM

## 2021-07-23 DIAGNOSIS — Z80.3 FH: BREAST CANCER: ICD-10-CM

## 2021-07-23 DIAGNOSIS — Z87.891 PAST USE OF TOBACCO: ICD-10-CM

## 2021-07-23 DIAGNOSIS — F41.8 ANXIETY ABOUT HEALTH: ICD-10-CM

## 2021-07-23 DIAGNOSIS — N64.89 BREAST ASYMMETRY ON EXAMINATION: ICD-10-CM

## 2021-07-23 DIAGNOSIS — Z79.4 TYPE 2 DIABETES MELLITUS WITH HYPERGLYCEMIA, WITH LONG-TERM CURRENT USE OF INSULIN (HCC): ICD-10-CM

## 2021-07-23 PROCEDURE — 99244 OFF/OP CNSLTJ NEW/EST MOD 40: CPT | Performed by: SURGERY

## 2021-07-23 RX ORDER — CEFAZOLIN SODIUM 2 G/100ML
2 INJECTION, SOLUTION INTRAVENOUS ONCE
Status: CANCELLED | OUTPATIENT
Start: 2021-08-25 | End: 2021-07-23

## 2021-07-23 RX ORDER — DIAZEPAM 5 MG/1
10 TABLET ORAL ONCE
Status: CANCELLED | OUTPATIENT
Start: 2021-08-25 | End: 2021-07-23

## 2021-07-23 RX ORDER — SODIUM CHLORIDE, SODIUM LACTATE, POTASSIUM CHLORIDE, CALCIUM CHLORIDE 600; 310; 30; 20 MG/100ML; MG/100ML; MG/100ML; MG/100ML
100 INJECTION, SOLUTION INTRAVENOUS CONTINUOUS
Status: CANCELLED | OUTPATIENT
Start: 2021-08-25

## 2021-07-23 NOTE — PROGRESS NOTES
Chief Complaint: Susy Hanson is a 61 y.o. female who was seen in consultation at the request of Erica Pina*  for newly diagnosed DCIS    History of Present Illness:  Patient presents with newly diagnosed DCIS. She noted no new masses, skin changes, nipple discharge, nipple changes prior to her most recent imaging.  Her most recent imaging includes the followin/15/2019 BILATERAL SCREENING MAMMO WITH LYLE          [facility]           SUSY HANSON  Scattered areas of fibroglandular density.   BI-RADS 1: Negative.    2021 BILATERAL SCREENING MAMMO WITH LYLE         [facility]            SUSY HANSON  There are scattered areas of fibroglandular density. 10 mm asymmetry in the central right breast, middle depth, visible on the CC view. Indeterminate calcifications in the lower outer left breast, posterior depth.  BI-RADS 0.    2021   BILATERAL DIAGNOSTIC MAMMO WITH LYLE & RIGHT BREAST US   KEIRA HANSON  MM.8 cm grouping of coarse and somewhat heterogeneous calcifications within the outer central left breast posterior third depth which are mildly suspicious. There is an oval partial circumscribed, partially obscured mass within the lower central right breast anterior/middle third depth corresponding to previously questioned asymmetry. In retrospect, this finding appears similar to multiple prior exam dating back to 2006.  US:  Right breast 6:00 position 2 cm from nipple is a 1.0 x 0.3 x 0.8 cm grouping of oval circumscribed anechoic avascular masses consistent with a benign cluster of cysts.  IMPRESSION:  1. Mildly suspicious subcentimeter grouping of calcifications within the outer central left breast posterior third depth.  RECOMMENDATION:  Recommend stereotactic guided core needle biopsy of the left breast calcifications for further evaluation.  BI-RADS 4.    2021 BILATERAL BREAST MRI               [facility]                             SUSY  BETTYE  LEFT BREAST:  4:00 posterior right breast, 7.8 cm posterior to the nipple, there is a 1.2 cm AP dimension, 1.8 cm transverse dimension, 0.8 cm craniocaudal dimension irregular enhancing mass with a central biopsy clip. There is approximately 0.7 cm linear nonmass enhancement extending posteriorly from the mass. Together the overall AP extent of suspicious enhancement measures approximately 1.8 cm. The visualized axilla is within normal limits.  2. No MRI evidence of malignancy in the right breast.    She had a biopsy on the following day that showed:   06/21/2021 BIOPSY OF THE LEFT BREAST PERCUTANEOUS             KEIRA         YADIRADYLAN WEN  PATHOLOGY ADDENDUM:  Final pathology is concordant.  Target: microcalcifications. Location: Outer central left breast posterior third depth. 8 g Mammotome. Sample number: 4. Marker migration on post-procedure mammogram: None.  PATHOLOGY:  Calcifications, left breast, biopsies:  Ductal carcinoma in situ, low to intermediate nuclear grade, solid type. Atypical lobular hyperplasia. No invasive carcinoma is identified. Microcalcifications identified within neoplastic ducts.  ER: 90%  MT: 95%      She has not had a breast biopsy in the past.  She has had her uterus and ovaries removed, is postmenopausal, and takes nor hormones. She took a patch for one year in 2006 and then vaginal estrogen for about 10 years.  Her family history includes the following: MA80, PA age uncertain, PGM 60s with breast cancer  Had an [employer] cancer next expanded panel sent 77 genes, sent 6-25-21- negative for mutation    She is here for evaluation.    Review of Systems:  Review of Systems   HENT:   Positive for hearing loss.    All other systems reviewed and are negative.       Past Medical and Surgical History:  Breast Biopsy History:  Patient had not had a breast biopsy prior to her cancer diagnosis.  Breast Cancer HIstory:  Patient does not have a past medical history of breast  cancer.  Breast Operations, and year:  NONE   Obstetric/Gynecologic History:  Age menstrual periods began: 9  Patient is postmenopausal due to removal of her uterus and both ovaries in the following year:    Number of pregnancies:3  Number of live births: 3  Number of abortions or miscarriages: 0  Age of delivery of first child: 14  Patient breast fed, for the following lenth of time: 6 MONTHS LAST CHILD   Length of time taking birth control pills: 2 YRS   Patient took hormone replacement during the following dates: AFTER , PATCH 1 YR, VAGINAL ESTROGEN MUCH LONGER.     PATIENT HAS UTERUS AND OVARIES REMOVED.       Past Surgical History:   Procedure Laterality Date   • BREAST BIOPSY     • CARDIOVASCULAR STRESS TEST     • CHOLECYSTECTOMY     • ECHO - CONVERTED     • HYSTERECTOMY     • TUBAL ABDOMINAL LIGATION         Past Medical History:   Diagnosis Date   • Anxiety    • Depression    • DM (diabetes mellitus) (CMS/[facility])    • DUB (dysfunctional uterine bleeding)    • Hyperlipidemia    • Hypertension    • Irritable bowel syndrome    • Obesity    • Peripheral neuropathy        Prior Hospitalizations, other than for surgery or childbirth, and year:  [facility]  AND     Social History     Socioeconomic History   • Marital status:      Spouse name: Not on file   • Number of children: Not on file   • Years of education: Not on file   • Highest education level: Not on file   Tobacco Use   • Smoking status: Former Smoker     Packs/day: 2.00     Years: 30.00     Pack years: 60.00     Types: Cigarettes     Start date: 1972     Quit date: 2008     Years since quittin.5   • Smokeless tobacco: Never Used   Vaping Use   • Vaping Use: Never used   Substance and Sexual Activity   • Alcohol use: No   • Drug use: No   • Sexual activity: Yes     Partners: Male     Birth control/protection: None     Patient is .  Patient is retired.  Patient drinks 2 servings of caffeine per  "day.    Family History:  Family History   Problem Relation Age of Onset   • Alzheimer's disease Father    • Diabetes Father    • Heart disease Father    • Other Father    • Sleep apnea Father    • Snoring Father    • Other Mother    • Diabetes Mother    • Heart disease Mother    • Sleep apnea Mother    • Snoring Mother    • Diabetes Sister    • Heart disease Sister    • Other Sister    • Sleep apnea Sister    • Snoring Sister    • Breast cancer Paternal Grandmother 60   • Breast cancer Maternal Aunt 80   • Breast cancer Paternal Aunt        Vital Signs:  /88   Pulse 97   Temp 97.3 °F (36.3 °C)   Ht 162.6 cm (64\")   Wt 73.5 kg (162 lb)   LMP  (LMP Unknown)   SpO2 98%   Breastfeeding No   BMI 27.81 kg/m²      Medications:    Current Outpatient Medications:   •  ALPRAZolam (XANAX) 0.5 MG tablet, Take 1 tablet by mouth 2 (Two) Times a Day As Needed for Anxiety. for anxiety, Disp: 60 tablet, Rfl: 0  •  amLODIPine (NORVASC) 2.5 MG tablet, Take 1 tablet by mouth Daily., Disp: 90 tablet, Rfl: 3  •  atorvastatin (LIPITOR) 20 MG tablet, Take 1 tablet by mouth Daily., Disp: 90 tablet, Rfl: 2  •  dilTIAZem CD (Cardizem CD) 180 MG 24 hr capsule, Take 1 capsule by mouth Daily., Disp: 90 capsule, Rfl: 1  •  dilTIAZem XR (Dilt-XR) 180 MG 24 hr capsule, Take 1 capsule by mouth., Disp: , Rfl:   •  glucose blood test strip, Test blood sugar twice a day and as needed, Disp: 200 each, Rfl: 3  •  hydrALAZINE (APRESOLINE) 100 MG tablet, Take 2 tablets by mouth at breakfast and 1 tablet at bedtime OR take 1 tablet by mouth three times daily (Patient taking differently: Take 2 tablets by mouth at breakfast and 2 tablets at dinner), Disp: 270 tablet, Rfl: 3  •  hydroCHLOROthiazide (HYDRODIURIL) 25 MG tablet, Take 1 tablet by mouth Daily., Disp: 90 tablet, Rfl: 2  •  Insulin Lispro Prot & Lispro (HumaLOG Mix 75/25 KwikPen) (75-25) 100 UNIT/ML suspension pen-injector pen, Inject 24 Units under the skin with breakfast and 28 units " "under the skin with supper, Disp: 45 mL, Rfl: 1  •  Insulin Pen Needle 31G X 5 MM misc, Use with insulin pen twice daily and as needed, Disp: 200 each, Rfl: 3  •  Lancets (onetouch ultrasoft) lancets, Use to check blood sugar twice a day and as needed, Disp: 200 each, Rfl: 3  •  losartan (Cozaar) 100 MG tablet, Take 1 tablet by mouth Daily., Disp: 90 tablet, Rfl: 2  •  magnesium citrate 1.745 GM/30ML solution solution, Take as directed, Disp: 297 mL, Rfl: 0  •  metFORMIN (GLUCOPHAGE) 1000 MG tablet, Take 2 pills in the morning and 2.5 pills in the evening (Patient taking differently: Take 1 pill in the morning and 1.5 pills in the evening), Disp: 405 tablet, Rfl: 3  •  metoprolol succinate XL (TOPROL-XL) 100 MG 24 hr tablet, Take 2 tablets by mouth Daily for blood pressure., Disp: 180 tablet, Rfl: 1  •  temazepam (RESTORIL) 30 MG capsule, Take 1 capsule by mouth At Night As Needed for Sleep., Disp: 30 capsule, Rfl: 5  •  sorbitol 70 % solution solution, Take as directed, Disp: 100 mL, Rfl: 0     Allergies:  Allergies   Allergen Reactions   • Hydrocodone-Acetaminophen Hives   • Acetaminophen Unknown - Low Severity   • Hydrocodone Unknown - Low Severity       Physical Examination:  /88   Pulse 97   Temp 97.3 °F (36.3 °C)   Ht 162.6 cm (64\")   Wt 73.5 kg (162 lb)   LMP  (LMP Unknown)   SpO2 98%   Breastfeeding No   BMI 27.81 kg/m²   General Appearance:  Patient is in no distress.  She is well kept and has an overweight build.   Psychiatric:  Patient with appropriate mood and affect. Alert and oriented to self, time, and place.    Breast, RIGHT:  medium sized, asymmetric with the contralateral side, RIGHT slightly larger than LEFT.  Breast skin is without erythema, edema, rashes.  There are no visible abnormalities upon inspection during the arm-raising maneuver or with hands on hips in the sitting position. There is no nipple retraction, discharge or nipple/areolar skin changes.There are no masses " palpable in the sitting or supine positions.    Breast, LEFT:  medium sized, asymmetric with the contralateral side, RIGHT slightly larger than left.  Breast skin is without erythema, edema, rashes.  There are no visible abnormalities upon inspection during the arm-raising maneuver or with hands on hips in the sitting position. There is no nipple retraction, discharge or nipple/areolar skin changes.There are no masses palpable in the sitting or supine positions. Well healing mammotomy with no erythema, warmth, drainage. No palpable mass.    Lymphatic:  There is no axillary, cervical, infraclavicular, or supraclavicular adenopathy bilaterally.  Eyes:  Pupils are round and reactive to light.  Cardiovascular:  Heart rate and rhythm are regular.  Respiratory:  Lungs are clear bilaterally with no crackles or wheezes in any lung field.  Gastrointestinal:  Abdomen is soft, nondistended, and nontender.     Musculoskeletal:  Good strength in all 4 extremities.   There is good range of motion in both shoulders.    Skin:  No new skin lesions or rashes on the skin excluding the breast (see breast exam above).        Imagin2018 BILATERAL SCREENING MAMMO WITH LYLE            [facility]         YADIRA BETTYE  Scattered areas of fibroglandular density.   BI-RADS 2: Benign Findings.    11/15/2019 BILATERAL SCREENING MAMMO WITH LYLE          [facility]           YADIRA BETTYE  Scattered areas of fibroglandular density.   BI-RADS 1: Negative.    2021 BILATERAL SCREENING MAMMO WITH LYLE         [facility]            YADIRA BETTYE  There are scattered areas of fibroglandular density. 10 mm asymmetry in the central right breast, middle depth, visible on the CC view. Indeterminate calcifications in the lower outer left breast, posterior depth.  BI-RADS 0.    2021   BILATERAL DIAGNOSTIC MAMMO WITH LYLE & RIGHT BREAST US   [facility]   YADIRA BETTYE  MM.8 cm grouping of coarse and somewhat heterogeneous  calcifications within the outer central left breast posterior third depth which are mildly suspicious. There is an oval partial circumscribed, partially obscured mass within the lower central right breast anterior/middle third depth corresponding to previously questioned asymmetry. In retrospect, this finding appears similar to multiple prior exam dating back to 06/29/2006.  US:  Right breast 6:00 position 2 cm from nipple is a 1.0 x 0.3 x 0.8 cm grouping of oval circumscribed anechoic avascular masses consistent with a benign cluster of cysts.  IMPRESSION:  1. Mildly suspicious subcentimeter grouping of calcifications within the outer central left breast posterior third depth.  RECOMMENDATION:  Recommend stereotactic guided core needle biopsy of the left breast calcifications for further evaluation.  BI-RADS 4.    07/14/2021 BILATERAL BREAST MRI               [facility]                             YADIRA GUZMANITH  LEFT BREAST:  4:00 posterior right breast, 7.8 cm posterior to the nipple, there is a 1.2 cm AP dimension, 1.8 cm transverse dimension, 0.8 cm craniocaudal dimension irregular enhancing mass with a central biopsy clip. There is approximately 0.7 cm linear nonmass enhancement extending posteriorly from the mass. Together the overall AP extent of suspicious enhancement measures approximately 1.8 cm. The visualized axilla is within normal limits.  2. No MRI evidence of malignancy in the right breast.    Pathology:  06/21/2021 BIOPSY OF THE LEFT BREAST [facility]CUTANE[facility]             [facility]         YADIRA GUZ[facility]  PATHOLOGY ADDENDUM:  Final pathology is concordant.  Target: microcalcifications. Location: Outer central left breast posterior third depth. 8 g Mammotome. Sample number: 4. Marker migration on post-procedure mammogram: None.  PATHOLOGY:  Calcifications, left breast, biopsies:  Ductal carcinoma in situ, low to intermediate nuclear grade, solid type. Atypical lobular hyperplasia. No invasive carcinoma is  identified. Microcalcifications identified within neoplastic ducts.  ER: 90%  WY: 95%    07/01/21 [facility] OUTSIDE PATH REVIEW  YADIRA WEN   Final Diagnosis   Outside Breast Consultation on Material from [facility] ([id number]):     1.   Breast, Left Outer Central Posterior One-third, Core Biopsy for Calcifications:               A. Ductal carcinoma in situ (DCIS), low to intermediate nuclear grade with solid and cribriform        architecture with spotty single cell and focal expansive necrosis.  B. In situ carcinoma biomarkers: ER positive (85% intermediate, Sendy 7/7); WY positive (90%, strong,        Sendy score 8/8). Ki-67 (performed and interpreted in-house) 30%.     [facility]                  PROGRESS NOTES    YADIRA WEN  Diabetes. Chest pain. Hypertension.    06/25/2021 HEMATOLOGY/ONCOLOGY PROGRESS NOTES   DR DEANNA WEN  Comprehensive gene analysis with cancer next technology. Follow-up with Dr. Perkins for surgical discussions.        LABS:   06/25/2021 CANCERNEXT-EXPANDED                      [facility] [facility]                     YADIRA WEN  Analyses of 77 genes. Negative: No Clinically Significant Variants Detected.       Procedures:      Assessment:   Diagnosis Plan   1. Breast neoplasm, Tis (DCIS), left     2. Breast calcifications on mammogram     3. Type 2 diabetes mellitus with hyperglycemia, with long-term current use of insulin (CMS/[facility])     4. Past use of tobacco     5. Anxiety about health     6. FH: breast cancer     7. Breast asymmetry on examination     8. Sleep apnea, unspecified type     1-2  Left breast posterior one third 4:00, 8 cm from the nipple, single propeller-shaped marker in the breast.  8 mm of calcification on mammogram, 1.8 cm masslike area on MRI with 8 mm of this tracking posteriorly from the clip and biopsy site.  On bedside ultrasound the marker is anterior and lateral to the imaging abnormality.  Dr. Ramos in situ, low to  intermediate grade, solid, cribriform, with spotty single cell and focal expansive necrosis.  Estrogen 85, progesterone 90%.    Clinical stage Tis N0    Note residual calcifications on postbiopsy images.    3-  Diabetes longstanding diagnosed in 2008.  Started taking injectable insulin in 2011.  Currently on Metformin and injectable Humalog.  Tells me that sugars run good.  Managed by Dr. Avila    4-  2 to 3 pack a day of cigarettes for 36 years stopped in 2008      5-  Anxiety about her breast cancer risk and current diagnosis      6-     VAL, PA age uncertain, PGM 60s with breast cancer  Had an [employer] cancer next expanded panel sent 77 genes, sent 6-25-21- negative for mutation      7-  RIGHT larger than LEFT affected    8  Sleep apnea, uses CPAP at night.    Plan:  The patient goes by Susy.  She works at [employer] in [place].  She is here today in the office with her daughter.    We reviewed the difference in systemic therapy versus locoregional. We discussed that she will be seeing a medical oncologist in the adjuvant setting. We discussed that for DCIS, that we would expect a 98% breast cancer specific survival and that the medications offered by medical oncology are for risk reduction for additional breast cancers and for a reduction in ipsilateral locoregional recurrence.     We discussed that for unifocal DCIS, there are 2 options for locoregional treatment that have equivalent survival: total mastectomy versus lumpectomy and radiation, the former with sentinel node biopsy.     We discussed that she would be an excellent candidate for a needle localized lumpectomy with bilateral oncoplastics, based on her baseline asymmetry.  She has significant concern about leaving residual breast tissue and her future risk for developing breast cancer.  We discussed that this would be 1 %/year risk for developing a second breast cancer which would be reduced to 2.5 %/year if taking the hormonal  blockade.      She understands the risks of mastectomy including bleeding, infection, seroma and chance of skin ischemia/necrosis. She understands the risks of  sentinel node biopsy, including 7% chance of lymphedema, injury to nerves or vessels in the axilla,  seroma. We discussed that we will not proceed with a frozen section analysis of the sentinel node in the operating room, due to the presence of ductal carcinoma in situ and no invasive encore.      She understands the risks and benefits and wishes to proceed with a bilateral mastectomy.  We discussed the procedure of a bilateral total mastectomy, left axillary sentinel lymph node biopsy, possible reconstruction.    I will plan to take skin overlying the disease if possible.  We discussed that the diabetes increase her risk for wound healing difficulties and infection.    We will arrange for her to see plastic surgery.  We will arrange for her to see Talia Martinez from behavioral oncology.  We discussed that Talia may be able to help her with her anxiety as she proceeds through this journey.  I asked her to bring her CPAP machine.  She has seen Dr. Ramos from medical oncology and will see her postoperatively.  Note that she has an allergy to Lortab.          NCCN guidelines have been followed.    We gave her EMLA cream and tegederm for her sentinel node procedure, and arranged for her to see our nurse navigator.     She will receive a recommendation about radiation after surgery.I am hopeful that she will not need this.      Asia Perkins MD        We have spent 60 minutes in face to face visit today, 45 in face to face .      Next Appointment:  Return for surgery.      EMR Dragon/transcription disclaimer:    Much of this encounter note is an electronic transcription/translocation of spoken language to printed text.  The electronic translation of spoken language may permit erroneous, or at times, nonsensical words or phrases to be inadvertently  transcribed.  Although I have reviewed the note from such areas, some may still exist.

## 2021-07-23 NOTE — TELEPHONE ENCOUNTER
Scheduled Surgery Main OR  8-25-21 Bilateral Total Mastectomy, Lt [place] Node Biopsy, Possible Reconstruction, Dr Arreaga      Arrive       7:00  SI             9:00  Surgery   10:00    Pat 8-16-21 @ 9:30    Return to see   9-9-21 arrive 9:15      Emailed Ifrah Gonzalez and Lashon  Regarding this case is on at 3:30pm  With an EIP to 10:00      Jeanine      Emailed and spoke to Ifrah  Pt needs appt with Dr Arreaga.    Jeanine

## 2021-07-23 NOTE — TELEPHONE ENCOUNTER
Emla cream 30 G tube no refills   Apply topically to affected nipple  Outer edge of affected nipple and cover day of surgery before leaving home.     [facility] Phone:  [phone]

## 2021-07-26 ENCOUNTER — TELEPHONE (OUTPATIENT)
Dept: PSYCHIATRY | Facility: HOSPITAL | Age: 61
End: 2021-07-26

## 2021-07-26 ENCOUNTER — TELEPHONE (OUTPATIENT)
Dept: SURGERY | Facility: CLINIC | Age: 61
End: 2021-07-26

## 2021-07-26 NOTE — TELEPHONE ENCOUNTER
Supportive Oncology Services    Pt called clinic after receiving number from Dr. Perkins for Supportive Oncology referral. Briefly discussed with patient current needs; appointment scheduled for tomorrow at 1:00 PM.

## 2021-07-27 ENCOUNTER — OFFICE VISIT (OUTPATIENT)
Dept: PSYCHIATRY | Facility: HOSPITAL | Age: 61
End: 2021-07-27

## 2021-07-27 DIAGNOSIS — F41.1 GENERALIZED ANXIETY DISORDER: Primary | ICD-10-CM

## 2021-07-27 DIAGNOSIS — G47.00 INSOMNIA, UNSPECIFIED TYPE: ICD-10-CM

## 2021-07-27 PROCEDURE — 90792 PSYCH DIAG EVAL W/MED SRVCS: CPT | Performed by: NURSE PRACTITIONER

## 2021-07-27 RX ORDER — GABAPENTIN 300 MG/1
300 CAPSULE ORAL 3 TIMES DAILY
Qty: 90 CAPSULE | Refills: 2 | Status: SHIPPED | OUTPATIENT
Start: 2021-07-27 | End: 2021-12-20
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
## 2021-07-28 ENCOUNTER — TELEPHONE (OUTPATIENT)
Dept: SURGERY | Facility: CLINIC | Age: 61
End: 2021-07-28

## 2021-07-28 NOTE — TELEPHONE ENCOUNTER
Received e-mail from Lisa at 's office, that he cannot do surgery on 8-25-21. Due to his office hours.    I have moved. Ms. Hanson to  8-26-21 Bilateral Total Mastectomy, Lt [place] Node Biopsy, Possible Reconstruction .    *this surgery date and time has been verified with Lisa at 's office.      Arrive       5:15  SI             7:15  Surgery   8:00    PAT 8-16- 21 at 9:30    Return to see Dr DINERO 9-9-21 arrive 11:15      LM for pt to call me.    Jeanine

## 2021-08-09 ENCOUNTER — TRANSCRIBE ORDERS (OUTPATIENT)
Dept: PREADMISSION TESTING | Facility: HOSPITAL | Age: 61
End: 2021-08-09

## 2021-08-09 DIAGNOSIS — Z01.818 OTHER SPECIFIED PRE-OPERATIVE EXAMINATION: Primary | ICD-10-CM

## 2021-08-16 ENCOUNTER — HOSPITAL ENCOUNTER (OUTPATIENT)
Dept: GENERAL RADIOLOGY | Facility: HOSPITAL | Age: 61
Discharge: HOME | End: 2021-08-16

## 2021-08-16 ENCOUNTER — PRE-ADMISSION TESTING (OUTPATIENT)
Dept: PREADMISSION TESTING | Facility: HOSPITAL | Age: 61
End: 2021-08-16

## 2021-08-16 VITALS
BODY MASS INDEX: 27.66 KG/M2 | RESPIRATION RATE: 20 BRPM | HEART RATE: 75 BPM | OXYGEN SATURATION: 98 % | TEMPERATURE: 97.8 F | DIASTOLIC BLOOD PRESSURE: 75 MMHG | SYSTOLIC BLOOD PRESSURE: 169 MMHG | WEIGHT: 162 LBS | HEIGHT: 64 IN

## 2021-08-16 DIAGNOSIS — D05.12 BREAST NEOPLASM, TIS (DCIS), LEFT: ICD-10-CM

## 2021-08-16 LAB
ALBUMIN SERPL-MCNC: 4.8 G/DL (ref 3.5–5.2)
ALBUMIN/GLOB SERPL: 2 G/DL
ALP SERPL-CCNC: 68 U/L (ref 39–117)
ALT SERPL W P-5'-P-CCNC: 23 U/L (ref 1–33)
ANION GAP SERPL CALCULATED.3IONS-SCNC: 12.1 MMOL/L (ref 5–15)
AST SERPL-CCNC: 16 U/L (ref 1–32)
BASOPHILS # BLD AUTO: 0.07 10*3/MM3 (ref 0–0.2)
BASOPHILS NFR BLD AUTO: 1.4 % (ref 0–1.5)
BILIRUB SERPL-MCNC: 0.2 MG/DL (ref 0–1.2)
BUN SERPL-MCNC: 18 MG/DL (ref 8–23)
BUN/CREAT SERPL: 16.7 (ref 7–25)
CALCIUM SPEC-SCNC: 10.5 MG/DL (ref 8.6–10.5)
CHLORIDE SERPL-SCNC: 99 MMOL/L (ref 98–107)
CO2 SERPL-SCNC: 25.9 MMOL/L (ref 22–29)
CREAT SERPL-MCNC: 1.08 MG/DL (ref 0.57–1)
DEPRECATED RDW RBC AUTO: 44.5 FL (ref 37–54)
EOSINOPHIL # BLD AUTO: 0.27 10*3/MM3 (ref 0–0.4)
EOSINOPHIL NFR BLD AUTO: 5.5 % (ref 0.3–6.2)
ERYTHROCYTE [DISTWIDTH] IN BLOOD BY AUTOMATED COUNT: 13.7 % (ref 12.3–15.4)
GFR SERPL CREATININE-BSD FRML MDRD: 51 ML/MIN/1.73
GLOBULIN UR ELPH-MCNC: 2.4 GM/DL
GLUCOSE SERPL-MCNC: 234 MG/DL (ref 65–99)
HCT VFR BLD AUTO: 36.7 % (ref 34–46.6)
HGB BLD-MCNC: 11.9 G/DL (ref 12–15.9)
IMM GRANULOCYTES # BLD AUTO: 0.04 10*3/MM3 (ref 0–0.05)
IMM GRANULOCYTES NFR BLD AUTO: 0.8 % (ref 0–0.5)
LYMPHOCYTES # BLD AUTO: 1.07 10*3/MM3 (ref 0.7–3.1)
LYMPHOCYTES NFR BLD AUTO: 21.7 % (ref 19.6–45.3)
MCH RBC QN AUTO: 28.7 PG (ref 26.6–33)
MCHC RBC AUTO-ENTMCNC: 32.4 G/DL (ref 31.5–35.7)
MCV RBC AUTO: 88.6 FL (ref 79–97)
MONOCYTES # BLD AUTO: 0.44 10*3/MM3 (ref 0.1–0.9)
MONOCYTES NFR BLD AUTO: 8.9 % (ref 5–12)
NEUTROPHILS NFR BLD AUTO: 3.05 10*3/MM3 (ref 1.7–7)
NEUTROPHILS NFR BLD AUTO: 61.7 % (ref 42.7–76)
NRBC BLD AUTO-RTO: 0 /100 WBC (ref 0–0.2)
PLATELET # BLD AUTO: 198 10*3/MM3 (ref 140–450)
PMV BLD AUTO: 10.5 FL (ref 6–12)
POTASSIUM SERPL-SCNC: 5.5 MMOL/L (ref 3.5–5.2)
PROT SERPL-MCNC: 7.2 G/DL (ref 6–8.5)
QT INTERVAL: 384 MS
RBC # BLD AUTO: 4.14 10*6/MM3 (ref 3.77–5.28)
SODIUM SERPL-SCNC: 137 MMOL/L (ref 136–145)
WBC # BLD AUTO: 4.94 10*3/MM3 (ref 3.4–10.8)

## 2021-08-16 PROCEDURE — 85025 COMPLETE CBC W/AUTO DIFF WBC: CPT

## 2021-08-16 PROCEDURE — 93005 ELECTROCARDIOGRAM TRACING: CPT

## 2021-08-16 PROCEDURE — 71046 X-RAY EXAM CHEST 2 VIEWS: CPT

## 2021-08-16 PROCEDURE — 80053 COMPREHEN METABOLIC PANEL: CPT

## 2021-08-16 PROCEDURE — 36415 COLL VENOUS BLD VENIPUNCTURE: CPT

## 2021-08-16 PROCEDURE — 93010 ELECTROCARDIOGRAM REPORT: CPT | Performed by: INTERNAL MEDICINE

## 2021-08-16 RX ORDER — HYDRALAZINE HYDROCHLORIDE 100 MG/1
200 TABLET, FILM COATED ORAL NIGHTLY
COMMUNITY
End: 2021-08-18 | Stop reason: SDUPTHER

## 2021-08-16 NOTE — DISCHARGE INSTRUCTIONS
Take the following medications the morning of surgery:  HYDRALAZINE, METOPROLOL, AMLODIPINE, AND DILTIAZEM    ARRIVAL TIME 0515 ON 8/26/21       If you are on prescription narcotic pain medication to control your pain you may also take that medication the morning of surgery.    General Instructions:  • Do not eat solid food after midnight the night before surgery.  • You may drink clear liquids day of surgery but must stop at least one hour before your hospital arrival time.  • It is beneficial for you to have a clear drink that contains carbohydrates the day of surgery.  We suggest a 12 to 20 ounce bottle of Gatorade or Powerade for non-diabetic patients or a 12 to 20 ounce bottle of G2 or Powerade Zero for diabetic patients. (Pediatric patients, are not advised to drink a 12 to 20 ounce carbohydrate drink)    Clear liquids are liquids you can see through.  Nothing red in color.     Plain water                               Sports drinks  Sodas                                   Gelatin (Jell-O)  Fruit juices without pulp such as white grape juice and apple juice  Popsicles that contain no fruit or yogurt  Tea or coffee (no cream or milk added)  Gatorade / Powerade  G2 / Powerade Zero    • Infants may have breast milk up to four hours before surgery.  • Infants drinking formula may drink formula up to six hours before surgery.   • Patients who avoid smoking, chewing tobacco and alcohol for 4 weeks prior to surgery have a reduced risk of post-operative complications.  Quit smoking as many days before surgery as you can.  • Do not smoke, use chewing tobacco or drink alcohol the day of surgery.   • If applicable bring your C-PAP/ BI-PAP machine.  • Bring any papers given to you in the doctor’s office.  • Wear clean comfortable clothes.  • Do not wear contact lenses, false eyelashes or make-up.  Bring a case for your glasses.   • Bring crutches or walker if applicable.  • Remove all piercings.  Leave jewelry and any  other valuables at home.  • Hair extensions with metal clips must be removed prior to surgery.  • The Pre-Admission Testing nurse will instruct you to bring medications if unable to obtain an accurate list in Pre-Admission Testing.            Preventing a Surgical Site Infection:  • For 2 to 3 days before surgery, avoid shaving with a razor because the razor can irritate skin and make it easier to develop an infection.    • Any areas of open skin can increase the risk of a post-operative wound infection by allowing bacteria to enter and travel throughout the body.  Notify your surgeon if you have any skin wounds / rashes even if it is not near the expected surgical site.  The area will need assessed to determine if surgery should be delayed until it is healed.  • The night prior to surgery shower using a fresh bar of anti-bacterial soap (such as Dial) and clean washcloth.  Sleep in a clean bed with clean clothing.  Do not allow pets to sleep with you.  • Shower on the morning of surgery using a fresh bar of anti-bacterial soap (such as Dial) and clean washcloth.  Dry with a clean towel and dress in clean clothing.  • Ask your surgeon if you will be receiving antibiotics prior to surgery.  • Make sure you, your family, and all healthcare providers clean their hands with soap and water or an alcohol based hand  before caring for you or your wound.    Day of surgery:  Your arrival time is approximately two hours before your scheduled surgery time.  Upon arrival, a Pre-op nurse and Anesthesiologist will review your health history, obtain vital signs, and answer questions you may have.  The only belongings needed at this time will be a list of your home medications and if applicable your C-PAP/BI-PAP machine.  A Pre-op nurse will start an IV and you may receive medication in preparation for surgery, including something to help you relax.     Please be aware that surgery does come with discomfort.  We want to  make every effort to control your discomfort so please discuss any uncontrolled symptoms with your nurse.   Your doctor will most likely have prescribed pain medications.      If you are going home after surgery you will receive individualized written care instructions before being discharged.  A responsible adult must drive you to and from the hospital on the day of your surgery and stay with you for 24 hours.  Discharge prescriptions can be filled by the hospital pharmacy during regular pharmacy hours.  If you are having surgery late in the day/evening your prescription may be e-prescribed to your pharmacy.  Please verify your pharmacy hours or chose a 24 hour pharmacy to avoid not having access to your prescription because your pharmacy has closed for the day.    If you are staying overnight following surgery, you will be transported to your hospital room following the recovery period.  [facility] has all private rooms.    If you have any questions please call Pre-Admission Testing at [phone].  Deductibles and co-payments are collected on the day of service. Please be prepared to pay the required co-pay, deductible or deposit on the day of service as defined by your plan.    Patient Education for Self-Quarantine Process    • Following your COVID testing, we strongly recommend that you wear a mask when you are with other people and practice social distancing.   • Limit your activities to only required outings.  • Wash your hands with soap and water frequently for at least 20 seconds.   • Avoid touching your eyes, nose and mouth with unwashed hands.  • Do not share anything - utensils, drinking glasses, food from the same bowl.   • Sanitize household surfaces daily. Include all high touch areas (door handles, light switches, phones, countertops, etc.)    Call your surgeon immediately if you experience any of the following symptoms:  • Sore Throat  • Shortness of Breath or difficulty  breathing  • Cough  • Chills  • Body soreness or muscle pain  • Headache  • Fever  • New loss of taste or smell  • Do not arrive for your surgery ill.  Your procedure will need to be rescheduled to another time.  You will need to call your physician before the day of surgery to avoid any unnecessary exposure to hospital staff as well as other patients.

## 2021-08-18 ENCOUNTER — TELEPHONE (OUTPATIENT)
Dept: PSYCHIATRY | Facility: HOSPITAL | Age: 61
End: 2021-08-18

## 2021-08-18 ENCOUNTER — OFFICE VISIT (OUTPATIENT)
Dept: FAMILY MEDICINE CLINIC | Facility: CLINIC | Age: 61
End: 2021-08-18

## 2021-08-18 VITALS
DIASTOLIC BLOOD PRESSURE: 83 MMHG | WEIGHT: 164 LBS | HEART RATE: 87 BPM | RESPIRATION RATE: 16 BRPM | HEIGHT: 64 IN | TEMPERATURE: 98.5 F | OXYGEN SATURATION: 98 % | BODY MASS INDEX: 28 KG/M2 | SYSTOLIC BLOOD PRESSURE: 129 MMHG

## 2021-08-18 DIAGNOSIS — G47.00 INSOMNIA, UNSPECIFIED TYPE: ICD-10-CM

## 2021-08-18 DIAGNOSIS — I10 ESSENTIAL HYPERTENSION: Primary | ICD-10-CM

## 2021-08-18 DIAGNOSIS — F41.8 MIXED ANXIETY DEPRESSIVE DISORDER: ICD-10-CM

## 2021-08-18 PROCEDURE — 99213 OFFICE O/P EST LOW 20 MIN: CPT | Performed by: FAMILY MEDICINE

## 2021-08-18 NOTE — TELEPHONE ENCOUNTER
[facility]    Call placed to pt regarding concerns surrounding gabapentin. Clarification provided and will address further at visit as scheduled.

## 2021-08-18 NOTE — PROGRESS NOTES
Answers for HPI/ROS submitted by the patient on 8/11/2021  What is the primary reason for your visit?: High Blood Pressure    Subjective   Susy Hanson is a 61 y.o. female.     Is here for follow-up on hypertension after her blood pressure remained elevated and she had to be started on amlodipine 2.5 mg in addition to diltiazem CD, hydralazine, hydrochlorothiazide, losartan, Toprol XL  Breast surgeon sent her to psych  Gabapentin was started  Pt never took the medication after she read the product insert  Taking xanax periodically (3 at night to sleep)  She has occ been taking 2 temazepam to help with sleep  Very stressed right now as she is scheduled for bilateral mastectomy next week  Says she sleep eats when she takes the xanax       The following portions of the patient's history were reviewed and updated as appropriate: allergies, current medications, past family history, past medical history, past social history, past surgical history, and problem list.  Past Medical History:   Diagnosis Date   • Anxiety 2000   • Breast cancer (CMS/[facility])     LEFT BREAST 7/2021   • Depression    • DM (diabetes mellitus) (CMS/[facility])    • Hyperlipidemia    • Hypertension     SAW CARDIO/ STRESS TEST 2020 - NOW MEDS MANAGED BY PCP    • Irritable bowel syndrome 2006   • Peripheral neuropathy    • Sleep apnea     WEARS CPAP     Past Surgical History:   Procedure Laterality Date   • BLADDER REPAIR     • BREAST BIOPSY     • CARDIOVASCULAR STRESS TEST  2020   • HYSTERECTOMY     • TUBAL ABDOMINAL LIGATION  1981     Family History   Problem Relation Age of Onset   • Alzheimer's disease Father    • Diabetes Father    • Heart disease Father    • Other Father    • Sleep apnea Father    • Snoring Father    • Other Mother    • Diabetes Mother    • Heart disease Mother    • Sleep apnea Mother    • Snoring Mother    • Diabetes Sister    • Heart disease Sister    • Other Sister    • Sleep apnea Sister    • Snoring Sister    • Breast cancer  Paternal Grandmother 60   • Breast cancer Maternal Aunt 80   • Breast cancer Paternal Aunt    • Malig Hyperthermia Neg Hx      Social History     Socioeconomic History   • Marital status:      Spouse name: Not on file   • Number of children: Not on file   • Years of education: Not on file   • Highest education level: Not on file   Tobacco Use   • Smoking status: Former Smoker     Packs/day: 2.00     Years: 30.00     Pack years: 60.00     Types: Cigarettes     Start date: 1972     Quit date: 2008     Years since quittin.6   • Smokeless tobacco: Never Used   Vaping Use   • Vaping Use: Never used   Substance and Sexual Activity   • Alcohol use: No   • Drug use: No   • Sexual activity: Yes     Partners: Male     Birth control/protection: None         Current Outpatient Medications:   •  ALPRAZolam (XANAX) 0.5 MG tablet, Take 1 tablet by mouth 2 (Two) Times a Day As Needed for Anxiety. for anxiety, Disp: 60 tablet, Rfl: 0  •  amLODIPine (NORVASC) 2.5 MG tablet, Take 1 tablet by mouth Daily., Disp: 90 tablet, Rfl: 3  •  atorvastatin (LIPITOR) 20 MG tablet, Take 1 tablet by mouth Daily., Disp: 90 tablet, Rfl: 2  •  dilTIAZem CD (Cardizem CD) 180 MG 24 hr capsule, Take 1 capsule by mouth Daily., Disp: 90 capsule, Rfl: 1  •  gabapentin (Neurontin) 300 MG capsule, Take 1 capsule by mouth 3 (Three) Times a Day., Disp: 90 capsule, Rfl: 2  •  hydrALAZINE (APRESOLINE) 100 MG tablet, Take 2 tablets by mouth at breakfast and 1 tablet at bedtime OR take 1 tablet by mouth three times daily (Patient taking differently: Take 100 mg by mouth Daily. Take 2 tablets by mouth at breakfast and 2 tablets at dinner), Disp: 270 tablet, Rfl: 3  •  hydroCHLOROthiazide (HYDRODIURIL) 25 MG tablet, Take 1 tablet by mouth Daily., Disp: 90 tablet, Rfl: 2  •  Insulin Lispro Prot & Lispro (HumaLOG Mix 75/25 KwikPen) (75-25) 100 UNIT/ML suspension pen-injector pen, Inject 24 Units under the skin with breakfast and 28 units under the  "skin with supper (Patient taking differently: Inject  under the skin into the appropriate area as directed 2 (Two) Times a Day With Meals. 24 UNITS IN AM AND 28 UNITS AT DINNER), Disp: 45 mL, Rfl: 1  •  lidocaine-prilocaine (EMLA) 2.5-2.5 % cream, Apply topically once for 1 dose day of surgery., Disp: 30 g, Rfl: 0  •  losartan (Cozaar) 100 MG tablet, Take 1 tablet by mouth Daily., Disp: 90 tablet, Rfl: 2  •  metFORMIN (GLUCOPHAGE) 1000 MG tablet, Take 1,500 mg by mouth Daily With Dinner., Disp: , Rfl:   •  metoprolol succinate XL (TOPROL-XL) 100 MG 24 hr tablet, Take 2 tablets by mouth Daily for blood pressure., Disp: 180 tablet, Rfl: 1  •  temazepam (RESTORIL) 30 MG capsule, Take 1 capsule by mouth At Night As Needed for Sleep., Disp: 30 capsule, Rfl: 5    Review of Systems   Constitutional: Negative.    Respiratory: Negative.    Cardiovascular: Negative.    Gastrointestinal: Negative for nausea and vomiting.   Neurological: Negative for syncope, light-headedness and headache.   Psychiatric/Behavioral: Positive for sleep disturbance and stress. The patient is nervous/anxious.      /83 (BP Location: Left arm, Patient Position: Sitting, Cuff Size: Adult)   Pulse 87   Temp 98.5 °F (36.9 °C) (Temporal)   Resp 16   Ht 162.6 cm (64\")   Wt 74.4 kg (164 lb)   LMP  (LMP Unknown)   SpO2 98%   BMI 28.15 kg/m²       Objective   Physical Exam  Vitals and nursing note reviewed.   Constitutional:       General: She is not in acute distress.     Appearance: Normal appearance. She is well-developed, well-groomed and overweight.   HENT:      Head: Normocephalic and atraumatic.   Neck:      Thyroid: No thyromegaly.   Cardiovascular:      Rate and Rhythm: Normal rate and regular rhythm.      Heart sounds: Normal heart sounds. No murmur heard.   No friction rub. No gallop.    Pulmonary:      Effort: Pulmonary effort is normal. No respiratory distress.      Breath sounds: Normal breath sounds. No wheezing or rales. "   Musculoskeletal:      Cervical back: Neck supple.      Right lower leg: No edema.      Left lower leg: No edema.   Lymphadenopathy:      Cervical: No cervical adenopathy.   Skin:     General: Skin is warm and dry.   Neurological:      Mental Status: She is alert.   Psychiatric:         Attention and Perception: Attention normal.         Mood and Affect: Mood is anxious.         Behavior: Behavior is cooperative.           Assessment/Plan   Problems Addressed this Visit        Cardiac and Vasculature    Essential hypertension - Primary       Mental Health    Mixed anxiety depressive disorder      Other Visit Diagnoses     Insomnia, unspecified type          Diagnoses       Codes Comments    Essential hypertension    -  Primary ICD-10-CM: I10  ICD-9-CM: 401.9     Mixed anxiety depressive disorder     ICD-10-CM: F41.8  ICD-9-CM: 300.4     Insomnia, unspecified type     ICD-10-CM: G47.00  ICD-9-CM: 780.52         bp is better controlled on amlodipine, diltiazem CD, hydralazine, hctz, and losartan  She will continue these meds  Right now she will continue to take 2-3 xanax at bedtime  She wants to consider valium in the near future  She was counseled on the need to only take one temazepam at night  She was counseled on the need for stress reduction and weight loss

## 2021-08-24 ENCOUNTER — OFFICE VISIT (OUTPATIENT)
Dept: PSYCHIATRY | Facility: HOSPITAL | Age: 61
End: 2021-08-24

## 2021-08-24 ENCOUNTER — APPOINTMENT (OUTPATIENT)
Dept: LAB | Facility: HOSPITAL | Age: 61
End: 2021-08-24

## 2021-08-24 ENCOUNTER — LAB (OUTPATIENT)
Dept: LAB | Facility: HOSPITAL | Age: 61
End: 2021-08-24

## 2021-08-24 DIAGNOSIS — Z01.818 OTHER SPECIFIED PRE-OPERATIVE EXAMINATION: ICD-10-CM

## 2021-08-24 DIAGNOSIS — F41.1 GENERALIZED ANXIETY DISORDER: Primary | ICD-10-CM

## 2021-08-24 DIAGNOSIS — G47.00 INSOMNIA, UNSPECIFIED TYPE: ICD-10-CM

## 2021-08-24 LAB — SARS-COV-2 ORF1AB RESP QL NAA+PROBE: NOT DETECTED

## 2021-08-24 PROCEDURE — U0004 COV-19 TEST NON-CDC HGH THRU: HCPCS

## 2021-08-24 PROCEDURE — 99214 OFFICE O/P EST MOD 30 MIN: CPT | Performed by: NURSE PRACTITIONER

## 2021-08-24 PROCEDURE — C9803 HOPD COVID-19 SPEC COLLECT: HCPCS

## 2021-08-24 NOTE — PROGRESS NOTES
[facility]  Phone visit- pandemic. Video attempted. Unable to conduct due to poor quality which negatively impacted communication during session. Pt consents to transition to session via phone.  You have chosen to receive care through a telehealth visit.  Do you consent to use a video/audio connection for your medical care today? YES    Subjective  Patient ID: Susy Hanson is a 61 y.o. female has been called today from the [facility] clinic in lieu of in person visit.    CC: Continued challenges managing sleep    HPI: Pt noted to be alert, oriented, and engaged in conversation. Affect and mood euthymic.  Patient continues to identify sleep as target symptoms; describes this as being generally easy to initiate, regardless of intervention, although and then fragmented with ability to fall back asleep easily.  Recent interventions reviewed; patient reports trialing gabapentin as discussed.  Reports taking 300 mg nightly, although with feeling of sedation in the morning.  Patient reports taking this for 3 to 4 days, noting reduction in this symptom as time passed.  Has not taken past several days, reporting trial of delta 8.  Again, feels this was similar with ability to fall asleep easily followed by fragmentation with ability to fall back to sleep.  Patient denies taking Xanax with either of these.  Mood and anxiety symptoms generally stable; patient reports for hopeful reduction following surgery.    Review of Systems   Psychiatric/Behavioral: Positive for sleep disturbance. Negative for dysphoric mood. The patient is nervous/anxious.      Medications Reviewed:  Gabapentin, Xanax, and temazepam each used individually as needed insomnia    Diagnoses and all orders for this visit:    1. Generalized anxiety disorder (Primary)    2. Insomnia, unspecified type    Plan of Care  Patient continues to identify target symptom of sleep disruption.  Explored with patient response to gabapentin as being somewhat  expected, with a.m. sedation likely to decrease over time.  Continue to encourage simplification of current regimen with use of gabapentin due to less known cognitive impact as compared to other interventions.  Continue to reiterate benefits of sleep hygiene, nonpharmacological interventions, and impact of current stress.  Follow-up arranged in clinic in 4 weeks following surgery.    I spent 31 minutes caring for Susy on this date of service. This time includes time spent by me in the following activities: preparing for the visit, obtaining and/or reviewing a separately obtained history, performing a medically appropriate examination and/or evaluation, counseling and educating the patient/family/caregiver and documenting information in the medical record.

## 2021-08-26 ENCOUNTER — HOSPITAL ENCOUNTER (OUTPATIENT)
Facility: HOSPITAL | Age: 61
Discharge: HOME | End: 2021-08-27
Attending: SURGERY | Admitting: PLASTIC SURGERY
Payer: COMMERCIAL

## 2021-08-26 ENCOUNTER — HOSPITAL ENCOUNTER (OUTPATIENT)
Dept: NUCLEAR MEDICINE | Facility: HOSPITAL | Age: 61
Discharge: HOME | End: 2021-08-26

## 2021-08-26 ENCOUNTER — ANESTHESIA (OUTPATIENT)
Dept: PERIOP | Facility: HOSPITAL | Age: 61
End: 2021-08-26

## 2021-08-26 ENCOUNTER — ANESTHESIA EVENT (OUTPATIENT)
Dept: PERIOP | Facility: HOSPITAL | Age: 61
End: 2021-08-26

## 2021-08-26 DIAGNOSIS — D05.12 BREAST NEOPLASM, TIS (DCIS), LEFT: ICD-10-CM

## 2021-08-26 LAB
GLUCOSE BLDC GLUCOMTR-MCNC: 161 MG/DL (ref 70–130)
GLUCOSE BLDC GLUCOMTR-MCNC: 201 MG/DL (ref 70–130)
GLUCOSE BLDC GLUCOMTR-MCNC: 86 MG/DL (ref 70–130)
GLUCOSE BLDC GLUCOMTR-MCNC: 89 MG/DL (ref 70–130)

## 2021-08-26 PROCEDURE — S0260 H&P FOR SURGERY: HCPCS | Performed by: SURGERY

## 2021-08-26 PROCEDURE — 82962 GLUCOSE BLOOD TEST: CPT

## 2021-08-26 PROCEDURE — G0378 HOSPITAL OBSERVATION PER HR: HCPCS

## 2021-08-26 PROCEDURE — 25010000002 FENTANYL CITRATE (PF) 50 MCG/ML SOLUTION: Performed by: NURSE ANESTHETIST, CERTIFIED REGISTERED

## 2021-08-26 PROCEDURE — 25010000002 CEFOXITIN PER 1 G: Performed by: PLASTIC SURGERY

## 2021-08-26 PROCEDURE — 19303 MAST SIMPLE COMPLETE: CPT | Performed by: SURGERY

## 2021-08-26 PROCEDURE — 25010000002 ONDANSETRON PER 1 MG: Performed by: NURSE ANESTHETIST, CERTIFIED REGISTERED

## 2021-08-26 PROCEDURE — 88341 IMHCHEM/IMCYTCHM EA ADD ANTB: CPT | Performed by: SURGERY

## 2021-08-26 PROCEDURE — C1889 IMPLANT/INSERT DEVICE, NOC: HCPCS | Performed by: SURGERY

## 2021-08-26 PROCEDURE — C1789 PROSTHESIS, BREAST, IMP: HCPCS | Performed by: SURGERY

## 2021-08-26 PROCEDURE — 38525 BIOPSY/REMOVAL LYMPH NODES: CPT | Performed by: SURGERY

## 2021-08-26 PROCEDURE — 88307 TISSUE EXAM BY PATHOLOGIST: CPT | Performed by: SURGERY

## 2021-08-26 PROCEDURE — 63710000001 ONDANSETRON PER 8 MG: Performed by: PLASTIC SURGERY

## 2021-08-26 PROCEDURE — A9520 TC99 TILMANOCEPT DIAG 0.5MCI: HCPCS | Performed by: SURGERY

## 2021-08-26 PROCEDURE — 25010000002 MAGNESIUM SULFATE PER 500 MG OF MAGNESIUM: Performed by: NURSE ANESTHETIST, CERTIFIED REGISTERED

## 2021-08-26 PROCEDURE — 25010000003 CEFAZOLIN PER 500 MG: Performed by: PLASTIC SURGERY

## 2021-08-26 PROCEDURE — 19303 MAST SIMPLE COMPLETE: CPT | Performed by: SPECIALIST/TECHNOLOGIST, OTHER

## 2021-08-26 PROCEDURE — 25010000002 GENTAMICIN PER 80 MG: Performed by: PLASTIC SURGERY

## 2021-08-26 PROCEDURE — 38792 RA TRACER ID OF SENTINL NODE: CPT

## 2021-08-26 PROCEDURE — 25010000002 DEXAMETHASONE PER 1 MG: Performed by: NURSE ANESTHETIST, CERTIFIED REGISTERED

## 2021-08-26 PROCEDURE — 25010000003 CEFAZOLIN IN DEXTROSE 2-4 GM/100ML-% SOLUTION: Performed by: SURGERY

## 2021-08-26 PROCEDURE — 25010000002 EPINEPHRINE PER 0.1 MG: Performed by: SURGERY

## 2021-08-26 PROCEDURE — 25010000002 MIDAZOLAM PER 1 MG: Performed by: ANESTHESIOLOGY

## 2021-08-26 PROCEDURE — 25010000002 HYDROMORPHONE PER 4 MG: Performed by: NURSE ANESTHETIST, CERTIFIED REGISTERED

## 2021-08-26 PROCEDURE — 25010000002 PROPOFOL 10 MG/ML EMULSION: Performed by: NURSE ANESTHETIST, CERTIFIED REGISTERED

## 2021-08-26 PROCEDURE — 0 TECHETIUM TC99M TILMANOCEPT: Performed by: SURGERY

## 2021-08-26 PROCEDURE — 25010000002 ROPIVACAINE PER 1 MG: Performed by: PLASTIC SURGERY

## 2021-08-26 PROCEDURE — 78195 LYMPH SYSTEM IMAGING: CPT | Performed by: SURGERY

## 2021-08-26 DEVICE — HORIZON TI SMALL RED  24 CLIPS/POUCH
Type: IMPLANTABLE DEVICE | Site: BREAST | Status: FUNCTIONAL
Brand: WECK

## 2021-08-26 DEVICE — CLIP LIGAT VASC HORIZON TI MD BLU 24CT: Type: IMPLANTABLE DEVICE | Site: BREAST | Status: FUNCTIONAL

## 2021-08-26 DEVICE — GRFT TISS ALLODERM RTM 320SQ/CM MD PERF 1.6TO0.4MM: Type: IMPLANTABLE DEVICE | Site: BREAST | Status: FUNCTIONAL

## 2021-08-26 DEVICE — EXPNDR TISS BRST MODHT XPROJ STL 133S MX/T 600CC: Type: IMPLANTABLE DEVICE | Site: BREAST | Status: FUNCTIONAL

## 2021-08-26 RX ORDER — SODIUM CHLORIDE, SODIUM LACTATE, POTASSIUM CHLORIDE, CALCIUM CHLORIDE 600; 310; 30; 20 MG/100ML; MG/100ML; MG/100ML; MG/100ML
100 INJECTION, SOLUTION INTRAVENOUS CONTINUOUS
Status: DISCONTINUED | OUTPATIENT
Start: 2021-08-26 | End: 2021-08-26 | Stop reason: HOSPADM

## 2021-08-26 RX ORDER — DOXYCYCLINE 100 MG/1
100 CAPSULE ORAL EVERY 12 HOURS SCHEDULED
Status: DISCONTINUED | OUTPATIENT
Start: 2021-08-26 | End: 2021-08-27 | Stop reason: HOSPADM

## 2021-08-26 RX ORDER — SODIUM CHLORIDE 0.9 % (FLUSH) 0.9 %
10 SYRINGE (ML) INJECTION AS NEEDED
Status: DISCONTINUED | OUTPATIENT
Start: 2021-08-26 | End: 2021-08-26 | Stop reason: HOSPADM

## 2021-08-26 RX ORDER — IBUPROFEN 600 MG/1
600 TABLET ORAL ONCE AS NEEDED
Status: DISCONTINUED | OUTPATIENT
Start: 2021-08-26 | End: 2021-08-26 | Stop reason: HOSPADM

## 2021-08-26 RX ORDER — SODIUM CHLORIDE 0.9 % (FLUSH) 0.9 %
3 SYRINGE (ML) INJECTION EVERY 12 HOURS SCHEDULED
Status: DISCONTINUED | OUTPATIENT
Start: 2021-08-26 | End: 2021-08-27 | Stop reason: HOSPADM

## 2021-08-26 RX ORDER — FAMOTIDINE 10 MG/ML
20 INJECTION, SOLUTION INTRAVENOUS
Status: COMPLETED | OUTPATIENT
Start: 2021-08-26 | End: 2021-08-26

## 2021-08-26 RX ORDER — TRANEXAMIC ACID 100 MG/ML
INJECTION, SOLUTION INTRAVENOUS AS NEEDED
Status: DISCONTINUED | OUTPATIENT
Start: 2021-08-26 | End: 2021-08-26 | Stop reason: HOSPADM

## 2021-08-26 RX ORDER — KETAMINE HYDROCHLORIDE 10 MG/ML
INJECTION INTRAMUSCULAR; INTRAVENOUS AS NEEDED
Status: DISCONTINUED | OUTPATIENT
Start: 2021-08-26 | End: 2021-08-26 | Stop reason: SURG

## 2021-08-26 RX ORDER — PROMETHAZINE HYDROCHLORIDE 25 MG/1
25 SUPPOSITORY RECTAL ONCE AS NEEDED
Status: DISCONTINUED | OUTPATIENT
Start: 2021-08-26 | End: 2021-08-26 | Stop reason: HOSPADM

## 2021-08-26 RX ORDER — ONDANSETRON 8 MG/1
8 TABLET, ORALLY DISINTEGRATING ORAL EVERY 6 HOURS PRN
Status: DISCONTINUED | OUTPATIENT
Start: 2021-08-26 | End: 2021-08-27 | Stop reason: HOSPADM

## 2021-08-26 RX ORDER — ONDANSETRON 2 MG/ML
4 INJECTION INTRAMUSCULAR; INTRAVENOUS ONCE AS NEEDED
Status: COMPLETED | OUTPATIENT
Start: 2021-08-26 | End: 2021-08-26

## 2021-08-26 RX ORDER — FLUMAZENIL 0.1 MG/ML
0.2 INJECTION INTRAVENOUS AS NEEDED
Status: DISCONTINUED | OUTPATIENT
Start: 2021-08-26 | End: 2021-08-26 | Stop reason: HOSPADM

## 2021-08-26 RX ORDER — DEXAMETHASONE SODIUM PHOSPHATE 4 MG/ML
INJECTION, SOLUTION INTRA-ARTICULAR; INTRALESIONAL; INTRAMUSCULAR; INTRAVENOUS; SOFT TISSUE AS NEEDED
Status: DISCONTINUED | OUTPATIENT
Start: 2021-08-26 | End: 2021-08-26 | Stop reason: SURG

## 2021-08-26 RX ORDER — MAGNESIUM HYDROXIDE 1200 MG/15ML
LIQUID ORAL AS NEEDED
Status: DISCONTINUED | OUTPATIENT
Start: 2021-08-26 | End: 2021-08-26 | Stop reason: HOSPADM

## 2021-08-26 RX ORDER — NALOXONE HCL 0.4 MG/ML
0.2 VIAL (ML) INJECTION AS NEEDED
Status: DISCONTINUED | OUTPATIENT
Start: 2021-08-26 | End: 2021-08-26 | Stop reason: HOSPADM

## 2021-08-26 RX ORDER — SODIUM CHLORIDE, SODIUM LACTATE, POTASSIUM CHLORIDE, CALCIUM CHLORIDE 600; 310; 30; 20 MG/100ML; MG/100ML; MG/100ML; MG/100ML
125 INJECTION, SOLUTION INTRAVENOUS CONTINUOUS
Status: DISCONTINUED | OUTPATIENT
Start: 2021-08-26 | End: 2021-08-26 | Stop reason: HOSPADM

## 2021-08-26 RX ORDER — SODIUM CHLORIDE 0.9 % (FLUSH) 0.9 %
3-10 SYRINGE (ML) INJECTION AS NEEDED
Status: DISCONTINUED | OUTPATIENT
Start: 2021-08-26 | End: 2021-08-27 | Stop reason: HOSPADM

## 2021-08-26 RX ORDER — SODIUM CHLORIDE, SODIUM LACTATE, POTASSIUM CHLORIDE, CALCIUM CHLORIDE 600; 310; 30; 20 MG/100ML; MG/100ML; MG/100ML; MG/100ML
9 INJECTION, SOLUTION INTRAVENOUS CONTINUOUS PRN
Status: DISCONTINUED | OUTPATIENT
Start: 2021-08-26 | End: 2021-08-26 | Stop reason: HOSPADM

## 2021-08-26 RX ORDER — NALOXONE HCL 0.4 MG/ML
0.1 VIAL (ML) INJECTION
Status: DISCONTINUED | OUTPATIENT
Start: 2021-08-26 | End: 2021-08-27 | Stop reason: HOSPADM

## 2021-08-26 RX ORDER — EPHEDRINE SULFATE 50 MG/ML
INJECTION, SOLUTION INTRAVENOUS AS NEEDED
Status: DISCONTINUED | OUTPATIENT
Start: 2021-08-26 | End: 2021-08-26 | Stop reason: SURG

## 2021-08-26 RX ORDER — LIDOCAINE HYDROCHLORIDE 20 MG/ML
INJECTION, SOLUTION INFILTRATION; PERINEURAL AS NEEDED
Status: DISCONTINUED | OUTPATIENT
Start: 2021-08-26 | End: 2021-08-26 | Stop reason: SURG

## 2021-08-26 RX ORDER — CELECOXIB 200 MG/1
400 CAPSULE ORAL ONCE
Status: COMPLETED | OUTPATIENT
Start: 2021-08-26 | End: 2021-08-26

## 2021-08-26 RX ORDER — ONDANSETRON HYDROCHLORIDE 8 MG/1
8 TABLET, FILM COATED ORAL ONCE
Status: COMPLETED | OUTPATIENT
Start: 2021-08-26 | End: 2021-08-26

## 2021-08-26 RX ORDER — OXYCODONE HCL 10 MG/1
10 TABLET, FILM COATED, EXTENDED RELEASE ORAL ONCE
Status: COMPLETED | OUTPATIENT
Start: 2021-08-26 | End: 2021-08-26

## 2021-08-26 RX ORDER — SODIUM CHLORIDE 0.9 % (FLUSH) 0.9 %
10 SYRINGE (ML) INJECTION EVERY 12 HOURS SCHEDULED
Status: DISCONTINUED | OUTPATIENT
Start: 2021-08-26 | End: 2021-08-26 | Stop reason: HOSPADM

## 2021-08-26 RX ORDER — DIAZEPAM 5 MG/1
10 TABLET ORAL ONCE
Status: COMPLETED | OUTPATIENT
Start: 2021-08-26 | End: 2021-08-26

## 2021-08-26 RX ORDER — ROCURONIUM BROMIDE 10 MG/ML
INJECTION, SOLUTION INTRAVENOUS AS NEEDED
Status: DISCONTINUED | OUTPATIENT
Start: 2021-08-26 | End: 2021-08-26 | Stop reason: SURG

## 2021-08-26 RX ORDER — DIPHENHYDRAMINE HCL 25 MG
25 CAPSULE ORAL
Status: DISCONTINUED | OUTPATIENT
Start: 2021-08-26 | End: 2021-08-26 | Stop reason: HOSPADM

## 2021-08-26 RX ORDER — MAGNESIUM SULFATE HEPTAHYDRATE 500 MG/ML
INJECTION, SOLUTION INTRAMUSCULAR; INTRAVENOUS AS NEEDED
Status: DISCONTINUED | OUTPATIENT
Start: 2021-08-26 | End: 2021-08-26 | Stop reason: SURG

## 2021-08-26 RX ORDER — MIDAZOLAM HYDROCHLORIDE 1 MG/ML
1 INJECTION INTRAMUSCULAR; INTRAVENOUS
Status: DISCONTINUED | OUTPATIENT
Start: 2021-08-26 | End: 2021-08-26 | Stop reason: HOSPADM

## 2021-08-26 RX ORDER — ONDANSETRON 2 MG/ML
4 INJECTION INTRAMUSCULAR; INTRAVENOUS EVERY 6 HOURS PRN
Status: DISCONTINUED | OUTPATIENT
Start: 2021-08-26 | End: 2021-08-27 | Stop reason: HOSPADM

## 2021-08-26 RX ORDER — ACETAMINOPHEN 325 MG/1
650 TABLET ORAL EVERY 4 HOURS PRN
Status: DISCONTINUED | OUTPATIENT
Start: 2021-08-26 | End: 2021-08-27 | Stop reason: HOSPADM

## 2021-08-26 RX ORDER — ROPIVACAINE HYDROCHLORIDE 5 MG/ML
INJECTION, SOLUTION EPIDURAL; INFILTRATION; PERINEURAL AS NEEDED
Status: DISCONTINUED | OUTPATIENT
Start: 2021-08-26 | End: 2021-08-26 | Stop reason: HOSPADM

## 2021-08-26 RX ORDER — PROMETHAZINE HYDROCHLORIDE 25 MG/1
25 TABLET ORAL ONCE AS NEEDED
Status: DISCONTINUED | OUTPATIENT
Start: 2021-08-26 | End: 2021-08-26 | Stop reason: HOSPADM

## 2021-08-26 RX ORDER — ONDANSETRON 4 MG/1
4 TABLET, FILM COATED ORAL EVERY 6 HOURS PRN
Status: DISCONTINUED | OUTPATIENT
Start: 2021-08-26 | End: 2021-08-27 | Stop reason: HOSPADM

## 2021-08-26 RX ORDER — OXYCODONE HYDROCHLORIDE 5 MG/1
5 TABLET ORAL EVERY 4 HOURS PRN
Status: DISCONTINUED | OUTPATIENT
Start: 2021-08-26 | End: 2021-08-27 | Stop reason: HOSPADM

## 2021-08-26 RX ORDER — GABAPENTIN 300 MG/1
300 CAPSULE ORAL ONCE
Status: COMPLETED | OUTPATIENT
Start: 2021-08-26 | End: 2021-08-26

## 2021-08-26 RX ORDER — ACETAMINOPHEN 500 MG
1000 TABLET ORAL ONCE
Status: COMPLETED | OUTPATIENT
Start: 2021-08-26 | End: 2021-08-26

## 2021-08-26 RX ORDER — LABETALOL HYDROCHLORIDE 5 MG/ML
5 INJECTION, SOLUTION INTRAVENOUS
Status: DISCONTINUED | OUTPATIENT
Start: 2021-08-26 | End: 2021-08-26 | Stop reason: HOSPADM

## 2021-08-26 RX ORDER — HYDRALAZINE HYDROCHLORIDE 20 MG/ML
5 INJECTION INTRAMUSCULAR; INTRAVENOUS
Status: DISCONTINUED | OUTPATIENT
Start: 2021-08-26 | End: 2021-08-26 | Stop reason: HOSPADM

## 2021-08-26 RX ORDER — ONDANSETRON 2 MG/ML
INJECTION INTRAMUSCULAR; INTRAVENOUS AS NEEDED
Status: DISCONTINUED | OUTPATIENT
Start: 2021-08-26 | End: 2021-08-26 | Stop reason: SURG

## 2021-08-26 RX ORDER — PROPOFOL 10 MG/ML
VIAL (ML) INTRAVENOUS AS NEEDED
Status: DISCONTINUED | OUTPATIENT
Start: 2021-08-26 | End: 2021-08-26 | Stop reason: SURG

## 2021-08-26 RX ORDER — FENTANYL CITRATE 50 UG/ML
INJECTION, SOLUTION INTRAMUSCULAR; INTRAVENOUS AS NEEDED
Status: DISCONTINUED | OUTPATIENT
Start: 2021-08-26 | End: 2021-08-26 | Stop reason: SURG

## 2021-08-26 RX ORDER — OXYCODONE AND ACETAMINOPHEN 10; 325 MG/1; MG/1
1 TABLET ORAL EVERY 4 HOURS PRN
Status: DISCONTINUED | OUTPATIENT
Start: 2021-08-26 | End: 2021-08-26 | Stop reason: HOSPADM

## 2021-08-26 RX ORDER — EPHEDRINE SULFATE 50 MG/ML
5 INJECTION, SOLUTION INTRAVENOUS ONCE AS NEEDED
Status: DISCONTINUED | OUTPATIENT
Start: 2021-08-26 | End: 2021-08-26 | Stop reason: HOSPADM

## 2021-08-26 RX ORDER — HYDROMORPHONE HYDROCHLORIDE 1 MG/ML
0.5 INJECTION, SOLUTION INTRAMUSCULAR; INTRAVENOUS; SUBCUTANEOUS
Status: DISCONTINUED | OUTPATIENT
Start: 2021-08-26 | End: 2021-08-26 | Stop reason: HOSPADM

## 2021-08-26 RX ORDER — DIPHENHYDRAMINE HYDROCHLORIDE 50 MG/ML
12.5 INJECTION INTRAMUSCULAR; INTRAVENOUS
Status: DISCONTINUED | OUTPATIENT
Start: 2021-08-26 | End: 2021-08-26 | Stop reason: HOSPADM

## 2021-08-26 RX ORDER — OXYCODONE HYDROCHLORIDE AND ACETAMINOPHEN 5; 325 MG/1; MG/1
1 TABLET ORAL ONCE AS NEEDED
Status: DISCONTINUED | OUTPATIENT
Start: 2021-08-26 | End: 2021-08-26 | Stop reason: HOSPADM

## 2021-08-26 RX ORDER — SCOLOPAMINE TRANSDERMAL SYSTEM 1 MG/1
1 PATCH, EXTENDED RELEASE TRANSDERMAL CONTINUOUS
Status: DISCONTINUED | OUTPATIENT
Start: 2021-08-26 | End: 2021-08-26 | Stop reason: HOSPADM

## 2021-08-26 RX ORDER — FENTANYL CITRATE 50 UG/ML
50 INJECTION, SOLUTION INTRAMUSCULAR; INTRAVENOUS
Status: DISCONTINUED | OUTPATIENT
Start: 2021-08-26 | End: 2021-08-26 | Stop reason: HOSPADM

## 2021-08-26 RX ORDER — CEFAZOLIN SODIUM 2 G/100ML
2 INJECTION, SOLUTION INTRAVENOUS ONCE
Status: COMPLETED | OUTPATIENT
Start: 2021-08-26 | End: 2021-08-26

## 2021-08-26 RX ADMIN — FENTANYL CITRATE 50 MCG: 50 INJECTION INTRAMUSCULAR; INTRAVENOUS at 10:07

## 2021-08-26 RX ADMIN — EPHEDRINE SULFATE 10 MG: 50 INJECTION INTRAVENOUS at 09:07

## 2021-08-26 RX ADMIN — SODIUM CHLORIDE, PRESERVATIVE FREE 3 ML: 5 INJECTION INTRAVENOUS at 20:00

## 2021-08-26 RX ADMIN — TILMANOCEPT 1 DOSE: KIT at 07:15

## 2021-08-26 RX ADMIN — ONDANSETRON 4 MG: 2 INJECTION INTRAMUSCULAR; INTRAVENOUS at 13:01

## 2021-08-26 RX ADMIN — SODIUM CHLORIDE, POTASSIUM CHLORIDE, SODIUM LACTATE AND CALCIUM CHLORIDE 9 ML/HR: 600; 310; 30; 20 INJECTION, SOLUTION INTRAVENOUS at 07:46

## 2021-08-26 RX ADMIN — ACETAMINOPHEN 1000 MG: 500 TABLET, FILM COATED ORAL at 06:34

## 2021-08-26 RX ADMIN — EPHEDRINE SULFATE 10 MG: 50 INJECTION INTRAVENOUS at 08:52

## 2021-08-26 RX ADMIN — KETAMINE HYDROCHLORIDE 35 MG: 10 INJECTION INTRAMUSCULAR; INTRAVENOUS at 08:40

## 2021-08-26 RX ADMIN — OXYCODONE 5 MG: 5 TABLET ORAL at 16:09

## 2021-08-26 RX ADMIN — ROCURONIUM BROMIDE 30 MG: 50 INJECTION INTRAVENOUS at 08:35

## 2021-08-26 RX ADMIN — LIDOCAINE HYDROCHLORIDE 100 MG: 20 INJECTION, SOLUTION INFILTRATION; PERINEURAL at 08:34

## 2021-08-26 RX ADMIN — PROPOFOL 150 MG: 10 INJECTION, EMULSION INTRAVENOUS at 08:34

## 2021-08-26 RX ADMIN — EPHEDRINE SULFATE 10 MG: 50 INJECTION INTRAVENOUS at 09:16

## 2021-08-26 RX ADMIN — GABAPENTIN 300 MG: 300 CAPSULE ORAL at 06:34

## 2021-08-26 RX ADMIN — DIAZEPAM 10 MG: 5 TABLET ORAL at 06:34

## 2021-08-26 RX ADMIN — KETAMINE HYDROCHLORIDE 10 MG: 10 INJECTION INTRAMUSCULAR; INTRAVENOUS at 09:40

## 2021-08-26 RX ADMIN — MIDAZOLAM 1 MG: 1 INJECTION INTRAMUSCULAR; INTRAVENOUS at 07:47

## 2021-08-26 RX ADMIN — FENTANYL CITRATE 50 MCG: 50 INJECTION INTRAMUSCULAR; INTRAVENOUS at 08:29

## 2021-08-26 RX ADMIN — DEXAMETHASONE SODIUM PHOSPHATE 8 MG: 4 INJECTION, SOLUTION INTRAMUSCULAR; INTRAVENOUS at 10:52

## 2021-08-26 RX ADMIN — DOXYCYCLINE 200 MG: 100 INJECTION, POWDER, LYOPHILIZED, FOR SOLUTION INTRAVENOUS at 08:18

## 2021-08-26 RX ADMIN — DOXYCYCLINE 100 MG: 100 CAPSULE ORAL at 20:00

## 2021-08-26 RX ADMIN — EPHEDRINE SULFATE 10 MG: 50 INJECTION INTRAVENOUS at 08:45

## 2021-08-26 RX ADMIN — SCOLOPAMINE TRANSDERMAL SYSTEM 1 PATCH: 1 PATCH, EXTENDED RELEASE TRANSDERMAL at 07:52

## 2021-08-26 RX ADMIN — KETAMINE HYDROCHLORIDE 5 MG: 10 INJECTION INTRAMUSCULAR; INTRAVENOUS at 10:52

## 2021-08-26 RX ADMIN — MAGNESIUM SULFATE HEPTAHYDRATE 2 G: 500 INJECTION, SOLUTION INTRAMUSCULAR; INTRAVENOUS at 08:40

## 2021-08-26 RX ADMIN — FENTANYL CITRATE 50 MCG: 50 INJECTION INTRAMUSCULAR; INTRAVENOUS at 13:29

## 2021-08-26 RX ADMIN — OXYCODONE 5 MG: 5 TABLET ORAL at 20:00

## 2021-08-26 RX ADMIN — EPHEDRINE SULFATE 10 MG: 50 INJECTION INTRAVENOUS at 12:05

## 2021-08-26 RX ADMIN — FAMOTIDINE 20 MG: 10 INJECTION INTRAVENOUS at 07:46

## 2021-08-26 RX ADMIN — OXYCODONE HYDROCHLORIDE 10 MG: 10 TABLET, FILM COATED, EXTENDED RELEASE ORAL at 06:37

## 2021-08-26 RX ADMIN — ONDANSETRON 4 MG: 2 INJECTION INTRAMUSCULAR; INTRAVENOUS at 12:06

## 2021-08-26 RX ADMIN — EPHEDRINE SULFATE 10 MG: 50 INJECTION INTRAVENOUS at 10:43

## 2021-08-26 RX ADMIN — SODIUM CHLORIDE, POTASSIUM CHLORIDE, SODIUM LACTATE AND CALCIUM CHLORIDE: 600; 310; 30; 20 INJECTION, SOLUTION INTRAVENOUS at 10:50

## 2021-08-26 RX ADMIN — ONDANSETRON HYDROCHLORIDE 8 MG: 8 TABLET, FILM COATED ORAL at 06:34

## 2021-08-26 RX ADMIN — HYDROMORPHONE HYDROCHLORIDE 0.5 MG: 1 INJECTION, SOLUTION INTRAMUSCULAR; INTRAVENOUS; SUBCUTANEOUS at 13:30

## 2021-08-26 RX ADMIN — CEFAZOLIN SODIUM 2 G: 2 INJECTION, SOLUTION INTRAVENOUS at 08:18

## 2021-08-26 RX ADMIN — CELECOXIB 400 MG: 200 CAPSULE ORAL at 06:34

## 2021-08-26 NOTE — H&P
There are no changes in the history or physical exam, aside from any that are listed as an addendum at the bottom of this document.   Today, patient will go for the surgery listed in the plan below.    Chief Complaint: Susy Hanson is a 61 y.o. female who was seen in consultation at the request of Erica Pina*  for newly diagnosed DCIS    History of Present Illness:  Patient presents with newly diagnosed DCIS. She noted no new masses, skin changes, nipple discharge, nipple changes prior to her most recent imaging.  Her most recent imaging includes the followin/15/2019 BILATERAL SCREENING MAMMO WITH LYLE          [facility]           SUSY HANSON  Scattered areas of fibroglandular density.   BI-RADS 1: Negative.    2021 BILATERAL SCREENING MAMMO WITH LYLE         [facility]            SUSY HANSON  There are scattered areas of fibroglandular density. 10 mm asymmetry in the central right breast, middle depth, visible on the CC view. Indeterminate calcifications in the lower outer left breast, posterior depth.  BI-RADS 0.    2021   BILATERAL DIAGNOSTIC MAMMO WITH LYLE & RIGHT BREAST US   KEIRA HANSON  MM.8 cm grouping of coarse and somewhat heterogeneous calcifications within the outer central left breast posterior third depth which are mildly suspicious. There is an oval partial circumscribed, partially obscured mass within the lower central right breast anterior/middle third depth corresponding to previously questioned asymmetry. In retrospect, this finding appears similar to multiple prior exam dating back to 2006.  US:  Right breast 6:00 position 2 cm from nipple is a 1.0 x 0.3 x 0.8 cm grouping of oval circumscribed anechoic avascular masses consistent with a benign cluster of cysts.  IMPRESSION:  1. Mildly suspicious subcentimeter grouping of calcifications within the outer central left breast posterior third depth.  RECOMMENDATION:  Recommend  stereotactic guided core needle biopsy of the left breast calcifications for further evaluation.  BI-RADS 4.    07/14/2021 BILATERAL BREAST MRI               [facility]                             YADIRA WEN  LEFT BREAST:  4:00 posterior right breast, 7.8 cm posterior to the nipple, there is a 1.2 cm AP dimension, 1.8 cm transverse dimension, 0.8 cm craniocaudal dimension irregular enhancing mass with a central biopsy clip. There is approximately 0.7 cm linear nonmass enhancement extending posteriorly from the mass. Together the overall AP extent of suspicious enhancement measures approximately 1.8 cm. The visualized axilla is within normal limits.  2. No MRI evidence of malignancy in the right breast.    She had a biopsy on the following day that showed:   06/21/2021 BIOPSY OF THE LEFT BREAST PERCUTANEOUS             [facility]         YADIRA WEN  PATHOLOGY ADDENDUM:  Final pathology is concordant.  Target: microcalcifications. Location: Outer central left breast posterior third depth. 8 g Mammotome. Sample number: 4. Marker migration on post-procedure mammogram: None.  PATHOLOGY:  Calcifications, left breast, biopsies:  Ductal carcinoma in situ, low to intermediate nuclear grade, solid type. Atypical lobular hyperplasia. No invasive carcinoma is identified. Microcalcifications identified within neoplastic ducts.  ER: 90%  [place]: 95%      She has not had a breast biopsy in the past.  She has had her uterus and ovaries removed, is postmenopausal, and takes nor hormones. She took a patch for one year in 2006 and then vaginal estrogen for about 10 years.  Her family history includes the following: MA80, PA age uncertain, PGM 60s with breast cancer  Had an [employer] cancer next expanded panel sent 77 genes, sent 6-25-21- negative for mutation    She is here for evaluation.    Review of Systems:  Review of Systems   HENT:   Positive for hearing loss.    All other systems reviewed and are negative.       Past Medical  and Surgical History:  Breast Biopsy History:  Patient had not had a breast biopsy prior to her cancer diagnosis.  Breast Cancer HIstory:  Patient does not have a past medical history of breast cancer.  Breast Operations, and year:  NONE   Obstetric/Gynecologic History:  Age menstrual periods began: 9  Patient is postmenopausal due to removal of her uterus and both ovaries in the following year:    Number of pregnancies:3  Number of live births: 3  Number of abortions or miscarriages: 0  Age of delivery of first child: 14  Patient breast fed, for the following lenth of time: 6 MONTHS LAST CHILD   Length of time taking birth control pills: 2 YRS   Patient took hormone replacement during the following dates: AFTER , PATCH 1 YR, VAGINAL ESTROGEN MUCH LONGER.     PATIENT HAS UTERUS AND OVARIES REMOVED.       Past Surgical History:   Procedure Laterality Date   • BREAST BIOPSY     • CARDIOVASCULAR STRESS TEST     • CHOLECYSTECTOMY     • ECHO - CONVERTED     • HYSTERECTOMY     • TUBAL ABDOMINAL LIGATION         Past Medical History:   Diagnosis Date   • Anxiety    • Depression    • DM (diabetes mellitus) (CMS/[facility])    • DUB (dysfunctional uterine bleeding)    • Hyperlipidemia    • Hypertension    • Irritable bowel syndrome    • Obesity    • Peripheral neuropathy        Prior Hospitalizations, other than for surgery or childbirth, and year:  [facility]  AND     Social History     Socioeconomic History   • Marital status:      Spouse name: Not on file   • Number of children: Not on file   • Years of education: Not on file   • Highest education level: Not on file   Tobacco Use   • Smoking status: Former Smoker     Packs/day: 2.00     Years: 30.00     Pack years: 60.00     Types: Cigarettes     Start date: 1972     Quit date: 2008     Years since quittin.5   • Smokeless tobacco: Never Used   Vaping Use   • Vaping Use: Never used   Substance and Sexual Activity   • Alcohol  "use: No   • Drug use: No   • Sexual activity: Yes     Partners: Male     Birth control/protection: None     Patient is .  Patient is retired.  Patient drinks 2 servings of caffeine per day.    Family History:  Family History   Problem Relation Age of Onset   • Alzheimer's disease Father    • Diabetes Father    • Heart disease Father    • Other Father    • Sleep apnea Father    • Snoring Father    • Other Mother    • Diabetes Mother    • Heart disease Mother    • Sleep apnea Mother    • Snoring Mother    • Diabetes Sister    • Heart disease Sister    • Other Sister    • Sleep apnea Sister    • Snoring Sister    • Breast cancer Paternal Grandmother 60   • Breast cancer Maternal Aunt 80   • Breast cancer Paternal Aunt        Vital Signs:  /88   Pulse 97   Temp 97.3 °F (36.3 °C)   Ht 162.6 cm (64\")   Wt 73.5 kg (162 lb)   LMP  (LMP Unknown)   SpO2 98%   Breastfeeding No   BMI 27.81 kg/m²      Medications:    Current Outpatient Medications:   •  ALPRAZolam (XANAX) 0.5 MG tablet, Take 1 tablet by mouth 2 (Two) Times a Day As Needed for Anxiety. for anxiety, Disp: 60 tablet, Rfl: 0  •  amLODIPine (NORVASC) 2.5 MG tablet, Take 1 tablet by mouth Daily., Disp: 90 tablet, Rfl: 3  •  atorvastatin (LIPITOR) 20 MG tablet, Take 1 tablet by mouth Daily., Disp: 90 tablet, Rfl: 2  •  dilTIAZem CD (Cardizem CD) 180 MG 24 hr capsule, Take 1 capsule by mouth Daily., Disp: 90 capsule, Rfl: 1  •  dilTIAZem XR (Dilt-XR) 180 MG 24 hr capsule, Take 1 capsule by mouth., Disp: , Rfl:   •  glucose blood test strip, Test blood sugar twice a day and as needed, Disp: 200 each, Rfl: 3  •  hydrALAZINE (APRESOLINE) 100 MG tablet, Take 2 tablets by mouth at breakfast and 1 tablet at bedtime OR take 1 tablet by mouth three times daily (Patient taking differently: Take 2 tablets by mouth at breakfast and 2 tablets at dinner), Disp: 270 tablet, Rfl: 3  •  hydroCHLOROthiazide (HYDRODIURIL) 25 MG tablet, Take 1 tablet by mouth Daily., " "Disp: 90 tablet, Rfl: 2  •  Insulin Lispro Prot & Lispro (HumaLOG Mix 75/25 KwikPen) (75-25) 100 UNIT/ML suspension pen-injector pen, Inject 24 Units under the skin with breakfast and 28 units under the skin with supper, Disp: 45 mL, Rfl: 1  •  Insulin Pen Needle 31G X 5 MM misc, Use with insulin pen twice daily and as needed, Disp: 200 each, Rfl: 3  •  Lancets (onetouch ultrasoft) lancets, Use to check blood sugar twice a day and as needed, Disp: 200 each, Rfl: 3  •  losartan (Cozaar) 100 MG tablet, Take 1 tablet by mouth Daily., Disp: 90 tablet, Rfl: 2  •  magnesium citrate 1.745 GM/30ML solution solution, Take as directed, Disp: 297 mL, Rfl: 0  •  metFORMIN (GLUCOPHAGE) 1000 MG tablet, Take 2 pills in the morning and 2.5 pills in the evening (Patient taking differently: Take 1 pill in the morning and 1.5 pills in the evening), Disp: 405 tablet, Rfl: 3  •  metoprolol succinate XL (TOPROL-XL) 100 MG 24 hr tablet, Take 2 tablets by mouth Daily for blood pressure., Disp: 180 tablet, Rfl: 1  •  temazepam (RESTORIL) 30 MG capsule, Take 1 capsule by mouth At Night As Needed for Sleep., Disp: 30 capsule, Rfl: 5  •  sorbitol 70 % solution solution, Take as directed, Disp: 100 mL, Rfl: 0     Allergies:  Allergies   Allergen Reactions   • Hydrocodone-Acetaminophen Hives   • Acetaminophen Unknown - Low Severity   • Hydrocodone Unknown - Low Severity       Physical Examination:  /88   Pulse 97   Temp 97.3 °F (36.3 °C)   Ht 162.6 cm (64\")   Wt 73.5 kg (162 lb)   LMP  (LMP Unknown)   SpO2 98%   Breastfeeding No   BMI 27.81 kg/m²   General Appearance:  Patient is in no distress.  She is well kept and has an overweight build.   Psychiatric:  Patient with appropriate mood and affect. Alert and oriented to self, time, and place.    Breast, RIGHT:  medium sized, asymmetric with the contralateral side, RIGHT slightly larger than LEFT.  Breast skin is without erythema, edema, rashes.  There are no visible abnormalities " upon inspection during the arm-raising maneuver or with hands on hips in the sitting position. There is no nipple retraction, discharge or nipple/areolar skin changes.There are no masses palpable in the sitting or supine positions.    Breast, LEFT:  medium sized, asymmetric with the contralateral side, RIGHT slightly larger than left.  Breast skin is without erythema, edema, rashes.  There are no visible abnormalities upon inspection during the arm-raising maneuver or with hands on hips in the sitting position. There is no nipple retraction, discharge or nipple/areolar skin changes.There are no masses palpable in the sitting or supine positions. Well healing mammotomy with no erythema, warmth, drainage. No palpable mass.    Lymphatic:  There is no axillary, cervical, infraclavicular, or supraclavicular adenopathy bilaterally.  Eyes:  Pupils are round and reactive to light.  Cardiovascular:  Heart rate and rhythm are regular.  Respiratory:  Lungs are clear bilaterally with no crackles or wheezes in any lung field.  Gastrointestinal:  Abdomen is soft, nondistended, and nontender.     Musculoskeletal:  Good strength in all 4 extremities.   There is good range of motion in both shoulders.    Skin:  No new skin lesions or rashes on the skin excluding the breast (see breast exam above).        Imagin2018 BILATERAL SCREENING MAMMO WITH LYLE            [facility]         YADIRA BETTYE  Scattered areas of fibroglandular density.   BI-RADS 2: Benign Findings.    11/15/2019 BILATERAL SCREENING MAMMO WITH LYLE          [facility]           YADIRA BETTYE  Scattered areas of fibroglandular density.   BI-RADS 1: Negative.    2021 BILATERAL SCREENING MAMMO WITH LYLE         [facility]            YADIRA BETTYE  There are scattered areas of fibroglandular density. 10 mm asymmetry in the central right breast, middle depth, visible on the CC view. Indeterminate calcifications in the lower outer left breast, posterior  depth.  BI-RADS 0.    2021   B[facility]ATER[facility]ST[facility] WITH LYLE & [facility] BRE[facility]  MM.8 cm grouping of coarse and somewhat heterogeneous calcifications within the outer central left breast posterior third depth which are mildly suspicious. There is an oval partial circumscribed, partially obscured mass within the lower central right breast anterior/middle third depth corresponding to previously questioned asymmetry. In retrospect, this finding appears similar to multiple prior exam dating back to 2006.  US:  Right breast 6:00 position 2 cm from nipple is a 1.0 x 0.3 x 0.8 cm grouping of oval circumscribed anechoic avascular masses consistent with a benign cluster of cysts.  IMPRESSION:  1. Mildly suspicious subcentimeter grouping of calcifications within the outer central left breast posterior third depth.  RECOMMENDATION:  Recommend stereotactic guided core needle biopsy of the left breast calcifications for further evaluation.  BI-RADS 4.    2021 BILATERAL BREAST MRI               [facility]                             YADIRA WEN  LEFT BREAST:  4:00 posterior right breast, 7.8 cm posterior to the nipple, there is a 1.2 cm AP dimension, 1.8 cm transverse dimension, 0.8 cm craniocaudal dimension irregular enhancing mass with a central biopsy clip. There is approximately 0.7 cm linear nonmass enhancement extending posteriorly from the mass. Together the overall AP extent of suspicious enhancement measures approximately 1.8 cm. The visualized axilla is within normal limits.  2. No MRI evidence of malignancy in the right breast.    Pathology:  2021 BIOPSY OF THE LEFT BREAST PERCUTANEOUS             [facility]         YADIRA GUZMANITH  PATHOLOGY ADDENDUM:  Final pathology is concordant.  Target: microcalcifications. Location: Outer central left breast posterior third depth. 8 g Mammotome. Sample number: 4. Marker migration on post-procedure mammogram:  None.  PATHOLOGY:  Calcifications, left breast, biopsies:  Ductal carcinoma in situ, low to intermediate nuclear grade, solid type. Atypical lobular hyperplasia. No invasive carcinoma is identified. Microcalcifications identified within neoplastic ducts.  ER: 90%  [place]: 95%    07/01/21 [facility] OUTSIDE PATH REVIEW  YADIRA WEN   Final Diagnosis   Outside Breast Consultation on Material from [facility] ([id number]):     1.   Breast, Left Outer Central Posterior One-third, Core Biopsy for Calcifications:               A. Ductal carcinoma in situ (DCIS), low to intermediate nuclear grade with solid and cribriform        architecture with spotty single cell and focal expansive necrosis.  B. In situ carcinoma biomarkers: ER positive (85% intermediate, Sendy 7/7); [place] positive (90%, strong,        Sendy score 8/8). Ki-67 (performed and interpreted in-house) 30%.     [facility]                  PROGRESS NOTES    YADIRA WEN  Diabetes. Chest pain. Hypertension.    06/25/2021 HEMATOLOGY/ONCOLOGY PROGRESS NOTES   DR DEANNA WEN  Comprehensive gene analysis with cancer next technology. Follow-up with Dr. Perkins for surgical discussions.        LABS:   06/25/2021 CANCERNEXT-EXPANDED                      [facility]                     YADIRA WEN  Analyses of 77 genes. Negative: No Clinically Significant Variants Detected.       Procedures:      Assessment:   Diagnosis Plan   1. Breast neoplasm, Tis (DCIS), left     2. Breast calcifications on mammogram     3. Type 2 diabetes mellitus with hyperglycemia, with long-term current use of insulin (CMS/[facility])     4. Past use of tobacco     5. Anxiety about health     6. FH: breast cancer     7. Breast asymmetry on examination     8. Sleep apnea, unspecified type     1-2  Left breast posterior one third 4:00, 8 cm from the nipple, single propeller-shaped marker in the breast.  8 mm of calcification on mammogram, 1.8 cm masslike area on MRI with 8  mm of this tracking posteriorly from the clip and biopsy site.  On bedside ultrasound the marker is anterior and lateral to the imaging abnormality.  Dr. Ramos in situ, low to intermediate grade, solid, cribriform, with spotty single cell and focal expansive necrosis.  Estrogen 85, progesterone 90%.    Clinical stage Tis N0    Note residual calcifications on postbiopsy images.    3-  Diabetes longstanding diagnosed in 2008.  Started taking injectable insulin in 2011.  Currently on Metformin and injectable Humalog.  Tells me that sugars run good.  Managed by Dr. Avila    4-  2 to 3 pack a day of cigarettes for 36 years stopped in 2008      5-  Anxiety about her breast cancer risk and current diagnosis      6-     VAL, PA age uncertain, PGM 60s with breast cancer  Had an [employer] cancer next expanded panel sent 77 genes, sent 6-25-21- negative for mutation      7-  RIGHT larger than LEFT affected    8  Sleep apnea, uses CPAP at night.    Plan:  The patient goes by Susy.  She works at [employer] in [employer].  She is here today in the office with her daughter.    We reviewed the difference in systemic therapy versus locoregional. We discussed that she will be seeing a medical oncologist in the adjuvant setting. We discussed that for DCIS, that we would expect a 98% breast cancer specific survival and that the medications offered by medical oncology are for risk reduction for additional breast cancers and for a reduction in ipsilateral locoregional recurrence.     We discussed that for unifocal DCIS, there are 2 options for locoregional treatment that have equivalent survival: total mastectomy versus lumpectomy and radiation, the former with sentinel node biopsy.     We discussed that she would be an excellent candidate for a needle localized lumpectomy with bilateral oncoplastics, based on her baseline asymmetry.  She has significant concern about leaving residual breast tissue and her future risk for  developing breast cancer.  We discussed that this would be 1 %/year risk for developing a second breast cancer which would be reduced to 2.5 %/year if taking the hormonal blockade.      She understands the risks of mastectomy including bleeding, infection, seroma and chance of skin ischemia/necrosis. She understands the risks of  sentinel node biopsy, including 7% chance of lymphedema, injury to nerves or vessels in the axilla,  seroma. We discussed that we will not proceed with a frozen section analysis of the sentinel node in the operating room, due to the presence of ductal carcinoma in situ and no invasive encore.      She understands the risks and benefits and wishes to proceed with a bilateral mastectomy.  We discussed the procedure of a bilateral total mastectomy, left axillary sentinel lymph node biopsy, possible reconstruction.    I will plan to take skin overlying the disease if possible.  We discussed that the diabetes increase her risk for wound healing difficulties and infection.    We will arrange for her to see plastic surgery.  We will arrange for her to see Talia Martinez from behavioral oncology.  We discussed that Talia may be able to help her with her anxiety as she proceeds through this journey.  I asked her to bring her CPAP machine.  She has seen Dr. Ramos from medical oncology and will see her postoperatively.  Note that she has an allergy to Lortab.          NCCN guidelines have been followed.    We gave her EMLA cream and tegederm for her sentinel node procedure, and arranged for her to see our nurse navigator.     She will receive a recommendation about radiation after surgery.I am hopeful that she will not need this.      Asia Perkins MD        We have spent 60 minutes in face to face visit today, 45 in face to face .      Next Appointment:  Return for surgery.      EMR Dragon/transcription disclaimer:    Much of this encounter note is an electronic transcription/translocation  of spoken language to printed text.  The electronic translation of spoken language may permit erroneous, or at times, nonsensical words or phrases to be inadvertently transcribed.  Although I have reviewed the note from such areas, some may still exist.

## 2021-08-26 NOTE — OP NOTE
Operative note    Preoperative Diagnosis: left breast DCIS     Postoperative Diagnosis: same    Procedure Performed: bilateral mastectomy and left DEEP axillary sentinel node biopsy with lymphoscintographic guidance. Reconstruction immediately following, tissue expanders with alloderm.    Dictating physician and surgeon: Asia Perkins MD    Plastic Surgeon: Whitley Alamo asst:  Sharon Avilez      was responsible for performing the following activities: Retraction, Suction, Irrigation and Suturing and their skilled assistance was necessary for the success of this case.       EBL:30cc    FINDINGS AND DESCRIPTION OF PROCEDURE:     The patient was brought to the operating room and placed on the table in the supine position. After adequate general ETT anesthesia was obtained, we sterilely prepped and draped the breast and axilla. We did a time out to identify correct patient and correct operative site.  She did receive IV antibiotic within an hour of and prior to incision.     Order of the case was LEFT TM, LEFT SLNB, RIGHT TM.  For each mastectomy, I performed the following procedure:  I tumesced the mastectomy flaps using 210 mL of 1:500,000 epinephrine in normal saline.  I tumesced superiorly to the clavicle, inferiorly to the inframammary fold, medially to the sternum and laterally to the anterior axillary line.  I then incised a skin sparing ellipse of skin surrounding the nipple areolar complex using a 10 blade. I then sharply dissected using a 10 blade and a long curved Zuniga scissors superiorly to the clavicle, inferiorly to the inframammary fold, medially to the sternum and laterally to the anterior axillary line. I then scored the perimeter of the specimen, the pectoral fascia, circumferentially. I then lifted the pectoral fascia off of the pectoralis major, as the posterior margin of the specimen.    I then labelled each specimen stitch marks 12:00 and passed it from the field.    For the LEFT sentinel  node:  I used the neoprobe at the start of the case to ensure that the radiotracer had migrated to the axilla, which it had.  I used bovie electrocautery for dissection and the gamma probe for guidance, to remove the lymph nodes demonstrating radiopharmaceutical uptake.     There was 1 sentinel node removed. Counts 7100. Grossly normal. This was sent for permanent section pathology.    I then irrigated, assured hemostasis and plastics then came in to do the reconstructive portion of the case.    She tolerated the procedure well, there were no immediate complications, and all counts were correct at the end of the case.

## 2021-08-26 NOTE — ANESTHESIA PREPROCEDURE EVALUATION
Anesthesia Evaluation     Patient summary reviewed   history of anesthetic complications: PONV  NPO Solid Status: > 8 hours             Airway   Mallampati: II  Neck ROM: full  No difficulty expected  Dental      Pulmonary    Cardiovascular     Rhythm: regular    (+) hypertension, valvular problems/murmurs (mild) MR,       Neuro/Psych  (+) psychiatric history Anxiety and Depression,     GI/Hepatic/Renal/Endo    (+)   diabetes mellitus,     Musculoskeletal     Abdominal    Substance History      OB/GYN          Other      history of cancer active                  Anesthesia Plan    ASA 3     general       Anesthetic plan, all risks, benefits, and alternatives have been provided, discussed and informed consent has been obtained with: patient.  Use of blood products discussed with patient .

## 2021-08-26 NOTE — OP NOTE
Pre-Operative Diagnosis: Acquired absence of bilateral breasts, left breast cancer     Post-Operative Diagnosis: Same     Procedure Performed:   1.  Immediate placement of bilateral prepectoral tissue expanders for reconstruction (Allergan [id number])  2.  Placement of AlloDerm acellular dermal matrix (10 x 16cm on each side)     Surgeon: VIRGIL Arreaga MD     Assistant: None     Anesthesia: General     Estimated Blood Loss: <5cc     Specimens: None     Complications: None     Indications: She presented after diagnosis of breast cancer.  She was interested in immediate reconstruction and we spent a significant time discussing which approach would be best for her.  With her need for further skin excision on the cancer side we discussed that her left-sided incision may sit a bit higher than the right.     We discussed risks, benefits and alternatives including but no limited to: bleeding, infection, asymmetry, poor or slow wound healing, need for further surgery, possible recurrence.  The patient elected to proceed.     Description of Procedure: The patient was met in the preoperative holding area.  All questions were answered and informed consent was assured.       She was marked in a standing position. Fusiform mastectomy incisions were drawn.     After induction of appropriate anesthesia, a timeout was performed correctly identifying the patient, operative site, and procedure to be performed.  All present were in agreement.     The marks were confirmed with . After completion of the mastectomies the skin did appear viable but cap refill was unreliable secondary to epinephrine injection and the expanders were brought onto the field.  The AlloDerm was brought on the field and rinsed.  The anterior constructs were made and the expanders placed in antibiotic solution.     Both breasts were copiously irrigated and immaculate hemostasis was achieved.  The superior skin was transposed over the inferior  and the overlap marked and deepithelialized.  Care was taken to de-epithelialized the dermal pedicle flap without injuring the subdermal plexus.  The breast pocket was copiously irrigated with antibiotic solution and immaculate hemostasis achieved.  2 drains were placed on each side.  Skin was reprepped and draped and gloves were changed.     After rinsing the pockets clear the expanders were placed into the pocket and sutured at the tabs at the desired position at the medial position and IM folds.  Subcutaneous space was closed with interrupted 2-0 Vicryl, the skin was then closed with 3-0 Monocryl deep dermal layer and 4 Monocryl in the intracuticular.  The incisions were dressed with Dermabond and she was placed in a well-padded Ace wrap.     The patient was then aroused from anesthesia with ease and transferred to the postoperative care area in good condition. All sponge, needle, and instrument counts were correct.

## 2021-08-26 NOTE — ANESTHESIA PROCEDURE NOTES
Airway  Urgency: elective    Date/Time: 8/26/2021 8:37 AM  Airway not difficult    General Information and Staff    Patient location during procedure: OR  Anesthesiologist: Rohan Patterson MD  CRNA: Amber Robertson CRNA    Indications and Patient Condition  Indications for airway management: airway protection    Preoxygenated: yes  Mask difficulty assessment: 1 - vent by mask    Final Airway Details  Final airway type: endotracheal airway      Successful airway: ETT  Cuffed: yes   Successful intubation technique: direct laryngoscopy  Facilitating devices/methods: cricoid pressure  Endotracheal tube insertion site: oral  Blade: Harkins  Blade size: 2  ETT size (mm): 7.0  Cormack-Lehane Classification: grade IIa - partial view of glottis  Placement verified by: chest auscultation and capnometry   Cuff volume (mL): 3  Measured from: lips  ETT/EBT  to lips (cm): 21  Number of attempts at approach: 1  Assessment: lips, teeth, and gum same as pre-op    Additional Comments  EBBS: ETCO2+: Atraumatic intubation

## 2021-08-26 NOTE — H&P
Allergies  HYDROCODONE   MOLD   Past Medical History  Breast Mass/Cyst/Biopsy: Y  Cancer: Y  Depression/Anxiety: Y  Diabetes: Y  Hearing Loss: Y  High Blood Pressure: Y  Surgical History  Other  Total Hysterectomy - 07/01/2021  Colonoscopy - 07/02/2012  Tubal Ligation - 11/02/1981  Family History  Maternal Grandmother - Diabetes mellitus  Mother - Hypertensive disorder  Father - Diabetes mellitus    - Heart disease    - Hypertensive disorder  Paternal Grandmother - Malignant tumor of breast    - Diabetes mellitus  Social History  Substance Use  Do you or have you ever smoked tobacco?: Former smoker  Do you or have you ever used e-cigarettes or vape?: Never used electronic cigarettes  Do you or have you ever used smokeless tobacco?: Never used smokeless tobacco  How much tobacco do you chew?: none  What is your level of alcohol consumption?: None  Have you used IV drugs?: No  Diet and Exercise  What type of diet are you following?: Diabetic  Education and Occupation  Are you currently employed?: Yes  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 : Yes  Public Health and Travel  Have you been to an area known to be high risk for COVID-19?: No  In the 14 days before symptom onset, have you had close contact with a person who is under investigation for COVID-19 while that person was ill?: No  Marriage and Sexuality  What is your relationship status?:   Gender Identity and LGBTQ Identity  Vitals  None recorded.  HPI  Patient presents for pre-op evaluation. Procedure: INSERT TEXT HERE. New patient symptoms: INSERT TEXT HERE.    Sent referral from Dr. Perkins after diagnosis of left breast cancer. She is discussed treatment with bilateral mastectomy and left sentinel node biopsy. Tumor is relatively small and she would otherwise be a candidate for breast conservation which is a  significant concerns with leaving behind any breast tissue and has elected to proceed with mastectomy. She has been considering whether or not she wants to pursue reconstruction and is interested in immediate reconstruction primarily to allow her to wear a swimsuit and avoid the difficulty with swim wear and [place]    She is wearing a B or C cup bra would like to be close to this but is okay with being a bit smaller. She does have a history of diabetes but this is fairly well controlled and her most recent A1c was 6.2.She does have a history of smoking but quit in 2008.  ROS  Patient reports breast lump but reports no non-healing areas. She reports no fever, no night sweats, and no significant weight gain. She reports no dry eyes and no vision change. She reports no difficulty hearing and no ear pain. She reports no frequent nosebleeds, no nose problems, and no sinus problems. She reports no sore throat, no bleeding gums, and no dry mouth. She reports no chest pain, no arm pain on exertion, and no shortness of breath when walking. She reports no cough, no wheezing, and no shortness of breath. She reports no abdominal pain, no nausea, and no vomiting. She reports no incontinence. She reports no muscle aches, no muscle weakness, and no arthralgias/joint pain. She reports no loss of consciousness, no gait dysfunction, and no paralysis. She reports no agitation, no dementia, and no delirium.  Physical Exam  Patient is a 61-year-old female.    Chaperone: Chaperone: present.    Constitutional: General Appearance: healthy-appearing, well-nourished, and well-developed. Level of Distress: NAD. Ambulation: ambulating normally.    Psychiatric: Mental Status: normal mood and affect and active and alert.    Head: Head: normocephalic and atraumatic.    Eyes: Lids and Conjunctivae: non-injected. Pupils: PERRLA. EOM: EOMI.    Cardiovascular: Heart Auscultation: RRR.    Breast:  Inspection/Palpation: Medium size breast with reasonable volume symmetry but the right breast footprint is about a centimeter to 2 cm lower than the left, no discrete masses but some induration in the area of biopsy. Grade 2+ ptosis.    Musculoskeletal:: Motor Strength and Tone: normal tone and motor strength. Joints, Bones, and Muscles: normal movement of all extremities. Extremities: no cyanosis or edema.    Neurologic: Gait and Station: normal gait and station.    Skin: Inspection and palpation: no rash or lesions.  Assessment / Plan    She be a good candidate for immediate expander based reconstruction with AlloDerm in a prepectoral position. We discussed placement of an expander to position both the muscle to reduce postoperative pain and eliminate animation deformity. We also discussed the use of AlloDerm acellular dermal matrix for its role in supporting the soft tissues after removal of the breast and reducing the risk of capsular contracture. Discussed that this is a two-stage approach with placement of an expander at the time of mastectomy followed by sequential expansion over the several weeks following surgery and then a second surgery to remove the expander and place a permanent implant. Given that this is for material in her skin we did discuss the risks of infection certainly exist and that this may require additional surgery to washout the space or remove the expander if infection cannot be controlled. There are always risks of bleeding as well as surgery. The goals of reconstruction were discussed and the ultimate goal is to achieve enough symmetry so that she is able to wear normal clothing and appear symmetrical in this and that if we can achieve symmetry that is good in revealing clothing and swimmer that is an added bonus. We discussed risks of poor wound healing of the mastectomy incision this may require revision as well. We did also discuss the importance of maintaining very tight glucose  control and that her blood sugars do seem to be fairly well controlled right now she should be very diligent with maintaining this to limit her risk of infection after surgery in the setting of diabetes.    We will be an overnight stay in the hospital with the use of 2 drains. Limitations after surgery were discussed and the importance of minimizing arm movement to provide consolidation of the dead space and good integration of the AlloDerm were stressed. She was given a copy of the ASPS consent form and my postoperative protocol and will be in touch for scheduling.    Her blood sugars been fairly well controlled recently in the 100-120 range. We again reviewed the importance of tight glucose control around the surgery.. She also inquired about direct implant reconstruction and discussed my rationale for not pursuing this with the variability of capsule size and location at high rates of revision surgery and I prefer to proceed with a planned two-stage approach with each stage being a bit more accurate than a direct implant approach.

## 2021-08-26 NOTE — ANESTHESIA POSTPROCEDURE EVALUATION
Patient: Susy Hanson    Procedure Summary     Date: 08/26/21 Room / Location: [facility] OR [address] [place] [address] MAIN OR    Anesthesia Start: 0824 Anesthesia Stop: 1256    Procedures:       bilateral total mastectomy, left axillary sentinel lymph node biopsy, possible reconstruction. (Bilateral Breast)      BILATERAL PREPECTORALPLACMENT TISSUE EXPANDERS AND ALLODERM (Bilateral Breast) Diagnosis:       Breast neoplasm, Tis (DCIS), left      (Breast neoplasm, Tis (DCIS), left [D05.12])    Surgeons: Asia Perkins MD; Heri Arreaga MD Provider: Rohan Patterson MD    Anesthesia Type: general ASA Status: 3          Anesthesia Type: general    Vitals  Vitals Value Taken Time   /73 08/26/21 1421   Temp 37 °C (98.6 °F) 08/26/21 1253   Pulse 66 08/26/21 1434   Resp 20 08/26/21 1405   SpO2 95 % 08/26/21 1434   Vitals shown include unvalidated device data.        Post Anesthesia Care and Evaluation    Patient location during evaluation: PACU  Patient participation: complete - patient participated  Pain management: adequate  Airway patency: patent  Anesthetic complications: No anesthetic complications    Cardiovascular status: acceptable  Respiratory status: acceptable  Hydration status: acceptable

## 2021-08-27 ENCOUNTER — READMISSION MANAGEMENT (OUTPATIENT)
Dept: CALL CENTER | Facility: HOSPITAL | Age: 61
End: 2021-08-27

## 2021-08-27 VITALS
DIASTOLIC BLOOD PRESSURE: 60 MMHG | OXYGEN SATURATION: 99 % | HEIGHT: 64 IN | TEMPERATURE: 97.7 F | BODY MASS INDEX: 27.63 KG/M2 | WEIGHT: 161.82 LBS | HEART RATE: 70 BPM | SYSTOLIC BLOOD PRESSURE: 129 MMHG | RESPIRATION RATE: 16 BRPM

## 2021-08-27 PROCEDURE — G0378 HOSPITAL OBSERVATION PER HR: HCPCS

## 2021-08-27 PROCEDURE — 25010000002 ENOXAPARIN PER 10 MG: Performed by: PLASTIC SURGERY

## 2021-08-27 PROCEDURE — 99024 POSTOP FOLLOW-UP VISIT: CPT | Performed by: SURGERY

## 2021-08-27 RX ORDER — ONDANSETRON 8 MG/1
8 TABLET, ORALLY DISINTEGRATING ORAL EVERY 8 HOURS PRN
Qty: 10 TABLET | Refills: 1 | Status: SHIPPED | OUTPATIENT
Start: 2021-08-27 | End: 2021-12-14

## 2021-08-27 RX ORDER — DOCUSATE SODIUM 250 MG
250 CAPSULE ORAL 2 TIMES DAILY PRN
Qty: 60 CAPSULE | Refills: 1 | Status: SHIPPED | OUTPATIENT
Start: 2021-08-27 | End: 2021-09-22

## 2021-08-27 RX ORDER — DOXYCYCLINE 100 MG/1
100 CAPSULE ORAL 2 TIMES DAILY
Qty: 10 CAPSULE | Refills: 0 | Status: SHIPPED | OUTPATIENT
Start: 2021-08-27 | End: 2021-09-01

## 2021-08-27 RX ORDER — OXYCODONE HYDROCHLORIDE 5 MG/1
5 TABLET ORAL EVERY 4 HOURS PRN
Qty: 20 TABLET | Refills: 0 | Status: SHIPPED | OUTPATIENT
Start: 2021-08-27 | End: 2021-09-22

## 2021-08-27 RX ORDER — AMOXICILLIN 250 MG
2 CAPSULE ORAL DAILY PRN
Qty: 30 TABLET | Refills: 1 | Status: SHIPPED | OUTPATIENT
Start: 2021-08-27 | End: 2021-09-22

## 2021-08-27 RX ORDER — ACETAMINOPHEN 325 MG/1
650 TABLET ORAL EVERY 4 HOURS PRN
Qty: 60 TABLET | Refills: 0 | Status: SHIPPED | OUTPATIENT
Start: 2021-08-27 | End: 2021-09-22

## 2021-08-27 RX ADMIN — OXYCODONE 5 MG: 5 TABLET ORAL at 05:10

## 2021-08-27 RX ADMIN — OXYCODONE 5 MG: 5 TABLET ORAL at 00:04

## 2021-08-27 RX ADMIN — DOXYCYCLINE 100 MG: 100 CAPSULE ORAL at 08:14

## 2021-08-27 RX ADMIN — ENOXAPARIN SODIUM 40 MG: 40 INJECTION SUBCUTANEOUS at 08:14

## 2021-08-27 RX ADMIN — OXYCODONE 5 MG: 5 TABLET ORAL at 10:28

## 2021-08-27 NOTE — PLAN OF CARE
Goal Outcome Evaluation:  Plan of Care Reviewed With: patient        Progress: improving  Outcome Summary: vss, c/o pain medicated with oxycodone, voiding freely, tolerating regular diet, encourage to increase fluid intake and to incentive spirometer, up with assist, dmitry breast incision with dermabond dressed with guaze and ace wrap with 4 hernandez, continue to monitor the pt.

## 2021-08-27 NOTE — PROGRESS NOTES
SUBJECTIVE:   Did well overnight.  Pain well controlled, no nausea, has tolerated reg diet, has walked and voided.    OBJECTIVE:  Vitals:    08/27/21 0500   BP: 123/68   Pulse: 72   Resp: 16   Temp: 97.3 °F (36.3 °C)   SpO2: 91%     Physical Exam  Resting in bed, nad  rrr  No dyspnea  Bilateral breast incisoins c/d/i with no fluid collection or erythema, mastectomy flaps viable, drain serosanguinous    PLAN:  POD1 bilat ssm, immediate prepec reconstruction  - doing well, ok for dc later today  - f/u with me next week  - drain care teaching  - leave dressing in place  - minimal arm movements    Dc Dx: breast cancer  Dc Condition: good

## 2021-08-27 NOTE — PROGRESS NOTES
Continued Stay Note  [facility]     Patient Name: Susy Hanson  MRN: [id number]  Today's Date: 8/27/2021    Admit Date: 8/26/2021    Discharge Plan     Row Name 08/27/21 1007       Plan    Plan  Home no needs    Plan Comments  Discharge order noted. Met with patient who confirmed DC plan is home. Stated family will assist as needed and will provide transportation at DC. Denies any needs/equipment.        Discharge Codes    No documentation.       Expected Discharge Date and Time     Expected Discharge Date Expected Discharge Time    Aug 27, 2021             Sabina Valentine RN

## 2021-08-27 NOTE — PROGRESS NOTES
INPATIENT ROUNDING NOTE    Postoperative day: 1    Overnight events:     Patient has done well overnight.    no nausea  She reports that her pain is responding favorably to the prescribed meds.  She has voided since her catheter has been removed.  She has ambulated.    Exam:  Temp:  [97 °F (36.1 °C)-98.6 °F (37 °C)] 97.3 °F (36.3 °C)  Heart Rate:  [69-93] 72  Resp:  [16-20] 16  BP: (123-182)/(68-92) 123/68     UOP 1.0L  JPs serosanguinous- 45,45,45,40    On examination, her incisions are dressed and dressings clean dry and intact.  Her mastectomy flaps are well perfused with minimal ecchymoses.   There are no undrained fluid collections.      Assessment and plan:    Breast cancer, postoperative day 1.  Doing well.    She has an appointment with me in the office already scheduled for her postop visit.

## 2021-08-27 NOTE — PROGRESS NOTES
Case Management Discharge Note      Final Note: Discharged home. Sabina Valentine, ALOK         Transportation Services  Private: Car    Final Discharge Disposition Code: 01 - home or self-care

## 2021-08-27 NOTE — OUTREACH NOTE
Prep Survey      Responses   [facility] patient discharged from?  [place]   Is LACE score < 7 ?  Yes   Emergency Room discharge w/ pulse ox?  No   Eligibility  [facility]   Date of Admission  08/26/21   Date of Discharge  08/27/21   Discharge Disposition  Home or Self Care   Discharge diagnosis  bilateral total mastectomy, left axillary sentinel lymph node biopsy, possible reconstruction.   Does the patient have one of the following disease processes/diagnoses(primary or secondary)?  General Surgery   Does the patient have Home health ordered?  No   Is there a DME ordered?  No   Prep survey completed?  Yes          Luli Ag RN

## 2021-08-30 ENCOUNTER — TRANSITIONAL CARE MANAGEMENT TELEPHONE ENCOUNTER (OUTPATIENT)
Dept: CALL CENTER | Facility: HOSPITAL | Age: 61
End: 2021-08-30

## 2021-08-30 NOTE — OUTREACH NOTE
Call Center TCM Note      Responses   [facility] patient discharged from?  [place]   Does the patient have one of the following disease processes/diagnoses(primary or secondary)?  General Surgery   TCM attempt successful?  No   Unsuccessful attempts  Attempt 1          Molly Miramontes LPN    8/30/2021, 11:09 EDT

## 2021-08-30 NOTE — OUTREACH NOTE
Call Center TCM Note      Responses   [facility] patient discharged from?  [place]   Does the patient have one of the following disease processes/diagnoses(primary or secondary)?  General Surgery   TCM attempt successful?  Yes   Call start time  1646   Call end time  1648   Meds reviewed with patient/caregiver?  Yes   Is the patient having any side effects they believe may be caused by any medication additions or changes?  No   Does the patient have all medications related to this admission filled (includes all antibiotics, pain medications, etc.)  Yes   Is the patient taking all medications as directed (includes completed medication regime)?  Yes   Does the patient have a follow up appointment scheduled with their surgeon?  Yes   Has the patient kept scheduled appointments due by today?  N/A   Has home health visited the patient within 72 hours of discharge?  N/A   Psychosocial issues?  No   Did the patient receive a copy of their discharge instructions?  Yes   Nursing interventions  Reviewed instructions with patient   What is the patient's perception of their health status since discharge?  Improving   Nursing interventions  Nurse provided patient education   Is the patient /caregiver able to teach back basic post-op care?  Practice 'cough and deep breath', Continue use of incentive spirometry at least 1 week post discharge, Drive as instructed by MD in discharge instructions, Take showers only when approved by MD-sponge bathe until then, No tub bath, swimming, or hot tub until instructed by MD, Keep incision areas clean,dry and protected, Do not remove steri-strips, Lifting as instructed by MD in discharge instructions   Is the patient/caregiver able to teach back signs and symptoms of incisional infection?  Increased redness, swelling or pain at the incisonal site, Increased drainage or bleeding, Incisional warmth, Pus or odor from incision, Fever   Is the patient/caregiver able to teach back steps to  recovery at home?  Set small, achievable goals for return to baseline health, Rest and rebuild strength, gradually increase activity, Make a list of questions for surgeon's appointment, Eat a well-balance diet, Practice good oral hygiene   If the patient is a current smoker, are they able to teach back resources for cessation?  Not a smoker   Is the patient/caregiver able to teach back the hierarchy of who to call/visit for symptoms/problems? PCP, Specialist, Home health nurse, Urgent Care, ED, 911  Yes   TCM call completed?  Yes          Molly Miramontes LPN    8/30/2021, 16:50 EDT

## 2021-08-31 ENCOUNTER — TELEPHONE (OUTPATIENT)
Dept: SURGERY | Facility: CLINIC | Age: 61
End: 2021-08-31

## 2021-08-31 LAB
CYTO UR: NORMAL
LAB AP CASE REPORT: NORMAL
LAB AP DIAGNOSIS COMMENT: NORMAL
LAB AP SPECIAL STAINS: NORMAL
LAB AP SYNOPTIC CHECKLIST: NORMAL
PATH REPORT.FINAL DX SPEC: NORMAL
PATH REPORT.GROSS SPEC: NORMAL

## 2021-08-31 NOTE — TELEPHONE ENCOUNTER
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  Single benign sentinel lymph node 0 of 1 sentinel node.  Right total mastectomy benign breast tissue with focal micropapilloma, squamous metaplasia of lactiferous ducts.      Pathologic stage Tis N0 stage 0.  I will call and let her know she will not need to see medical oncology or radiation oncology.    Final Diagnosis   1. Left Breast, Oriented Simple Skin Sparing Mastectomy (577 grams): INTERMEDIATE GRADE DUCTAL CARCINOMA                     IN SITU (DCIS).               A. Tumor site: Central, outer one-third (indicated on prior biopsy [id number]).               B. Tumor size: DCIS involves breast tissue 20 mm from medial to lateral.               C. Architectural patterns: Solid and cribriform.               D. Nuclear grade: Intermediate.               E. Necrosis: Present, focal.               F. Microcalcifications: Present in DCIS and non-neoplastic tissue.               G. Margins: Uninvolved by DCIS.                            1. DCIS comes to within 15 mm of the closest posterior margin, 20 mm from the anterior margin, 40 mm                                 from the lateral margin, 48 mm from the inferior margin, 80 mm from the superior margin and 120 mm                                 from the medial margin of resection.               H. Lymph nodes: Single benign sentinel lymph node (0/1) (specimen 3).               I. Additional findings: [place] duct hyperplasia, squamous metaplasia of lactiferous ducts (SMOLD), unremarkable                   skin and nipple.               J. Hormone receptor status: ER positive Sendy 7/8, FL positive Sendy 8/8, Ki67 30% (prior biopsy  [id number]).               K. Pathology stage: pTis, N(sn)0.     2. Right Breast, Oriented Simple Skin Sparing Mastectomy (532 grams):               A. Benign breast tissue with fibrous change and usual ductal hyperplasia.               B. Focal micropapilloma with usual hyperplasia.               C. Squamous metaplasia of lactiferous ducts (SMOLD).               D. Unremarkable skin and nipple.               E. No atypical hyperplasia, in situ nor invasive carcinoma identified.     3. [place] Lymph Node, Left Breast, Excision:               A. One lymph node, negative for metastatic carcinoma (0/1).                 swm/jse    Electronically signed by Sabina Traore MD on 8/31/2021 at 0823   Synoptic Checklist   DCIS OF THE BREAST: Resection  8th Edition - Protocol posted: 2/26/2020  DCIS OF THE BREAST: COMPLETE EXCISION - All Specimens  SPECIMEN   Procedure  Total mastectomy    Specimen Laterality  Left    TUMOR   Tumor Site  Central    Histologic Type  Ductal carcinoma in situ    Size (Extent) of DCIS  Estimated size (extent) of DCIS is at least (Millimeters): 20 mm   Architectural Patterns  Cribriform      Solid    Nuclear Grade  Grade II (intermediate)    Necrosis  Present, focal (small foci or single cell necrosis)    Microcalcifications  Present in DCIS      Present in nonneoplastic tissue    MARGINS   Margins  Uninvolved by DCIS    Distance from Closest Margin (Millimeters)  15 mm   Closest Margin(s)  Posterior    Distance from Other Margins     Anterior Margin (Millimeters)  20 mm   Posterior Margin (Millimeters)  15 mm   Superior Margin (Millimeters)  80 mm   Inferior Margin (Millimeters)  48 mm   Medial Margin (Millimeters)  120 mm   Lateral Margin (Millimeters)  40 mm   LYMPH NODES   Regional Lymph Nodes  Uninvolved by tumor cells    Total Number of Lymph Nodes Examined  1    Number of [place] Nodes Examined  1    PATHOLOGIC STAGE CLASSIFICATION (pTNM, AJCC 8th Edition)      Primary Tumor (pT)   pTis (DCIS)    Regional Lymph Nodes Modifier  (sn): [place] node(s) evaluated    Regional Lymph Nodes (pN)  pN0    .      Comment    Representative slides from part 1 of this case are reviewed internally with Dr. Cortes, who concurs.     chad/humaira

## 2021-09-08 ENCOUNTER — TELEPHONE (OUTPATIENT)
Dept: SLEEP MEDICINE | Facility: CLINIC | Age: 61
End: 2021-09-08

## 2021-09-08 ENCOUNTER — OFFICE VISIT (OUTPATIENT)
Dept: SURGERY | Facility: CLINIC | Age: 61
End: 2021-09-08

## 2021-09-08 VITALS
SYSTOLIC BLOOD PRESSURE: 162 MMHG | HEIGHT: 64 IN | OXYGEN SATURATION: 96 % | DIASTOLIC BLOOD PRESSURE: 72 MMHG | HEART RATE: 100 BPM | BODY MASS INDEX: 27.69 KG/M2 | TEMPERATURE: 97.1 F | WEIGHT: 162.2 LBS

## 2021-09-08 DIAGNOSIS — D05.12 BREAST NEOPLASM, TIS (DCIS), LEFT: Primary | ICD-10-CM

## 2021-09-08 DIAGNOSIS — Z87.891 PAST USE OF TOBACCO: ICD-10-CM

## 2021-09-08 DIAGNOSIS — Z79.4 TYPE 2 DIABETES MELLITUS WITH HYPERGLYCEMIA, WITH LONG-TERM CURRENT USE OF INSULIN (HCC): ICD-10-CM

## 2021-09-08 DIAGNOSIS — Z80.3 FH: BREAST CANCER: ICD-10-CM

## 2021-09-08 DIAGNOSIS — R92.1 BREAST CALCIFICATIONS ON MAMMOGRAM: ICD-10-CM

## 2021-09-08 DIAGNOSIS — E11.65 TYPE 2 DIABETES MELLITUS WITH HYPERGLYCEMIA, WITH LONG-TERM CURRENT USE OF INSULIN (HCC): ICD-10-CM

## 2021-09-08 DIAGNOSIS — G47.30 SLEEP APNEA, UNSPECIFIED TYPE: ICD-10-CM

## 2021-09-08 DIAGNOSIS — F41.8 ANXIETY ABOUT HEALTH: ICD-10-CM

## 2021-09-08 PROCEDURE — 99024 POSTOP FOLLOW-UP VISIT: CPT | Performed by: SURGERY

## 2021-09-08 NOTE — PROGRESS NOTES
Chief Complaint: Susy Hanson is a 61 y.o. female who was seen in consultation at the request of No ref. provider found  for newly diagnosed DCIS- postoperative visit    History of Present Illness:  Patient presents with newly diagnosed DCIS. She noted no new masses, skin changes, nipple discharge, nipple changes prior to her most recent imaging.  Her most recent imaging includes the followin/15/2019 BILATERAL SCREENING MAMMO WITH LYLE          [facility]           SUSY HANSON  Scattered areas of fibroglandular density.   BI-RADS 1: Negative.    2021 BILATERAL SCREENING MAMMO WITH LYLE         [facility]            SUSY HANSON  There are scattered areas of fibroglandular density. 10 mm asymmetry in the central right breast, middle depth, visible on the CC view. Indeterminate calcifications in the lower outer left breast, posterior depth.  BI-RADS 0.    2021   BILATERAL DIAGNOSTIC MAMMO WITH LYLE & RIGHT BREAST US   KEIRA HANSON  MM.8 cm grouping of coarse and somewhat heterogeneous calcifications within the outer central left breast posterior third depth which are mildly suspicious. There is an oval partial circumscribed, partially obscured mass within the lower central right breast anterior/middle third depth corresponding to previously questioned asymmetry. In retrospect, this finding appears similar to multiple prior exam dating back to 2006.  US:  Right breast 6:00 position 2 cm from nipple is a 1.0 x 0.3 x 0.8 cm grouping of oval circumscribed anechoic avascular masses consistent with a benign cluster of cysts.  IMPRESSION:  1. Mildly suspicious subcentimeter grouping of calcifications within the outer central left breast posterior third depth.  RECOMMENDATION:  Recommend stereotactic guided core needle biopsy of the left breast calcifications for further evaluation.  BI-RADS 4.    2021 BILATERAL BREAST MRI               [facility]                              YADIRA WEN  LEFT BREAST:  4:00 posterior right breast, 7.8 cm posterior to the nipple, there is a 1.2 cm AP dimension, 1.8 cm transverse dimension, 0.8 cm craniocaudal dimension irregular enhancing mass with a central biopsy clip. There is approximately 0.7 cm linear nonmass enhancement extending posteriorly from the mass. Together the overall AP extent of suspicious enhancement measures approximately 1.8 cm. The visualized axilla is within normal limits.  2. No MRI evidence of malignancy in the right breast.    She had a biopsy on the following day that showed:   06/21/2021 BIOPSY OF THE LEFT BREAST PERCUTANEOUS             [facility]         YADIRA WEN  PATHOLOGY ADDENDUM:  Final pathology is concordant.  Target: microcalcifications. Location: Outer central left breast posterior third depth. 8 g Mammotome. Sample number: 4. Marker migration on post-procedure mammogram: None.  PATHOLOGY:  Calcifications, left breast, biopsies:  Ductal carcinoma in situ, low to intermediate nuclear grade, solid type. Atypical lobular hyperplasia. No invasive carcinoma is identified. Microcalcifications identified within neoplastic ducts.  ER: 90%  AK: 95%      She has not had a breast biopsy in the past.  She has had her uterus and ovaries removed, is postmenopausal, and takes nor hormones. She took a patch for one year in 2006 and then vaginal estrogen for about 10 years.  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  Single  benign sentinel lymph node 0 of 1 sentinel node.  Right total mastectomy benign breast tissue with focal micropapilloma, squamous metaplasia of lactiferous ducts.       Pathologic stage Tis N0 stage 0.     She has had 1 of 4 drains removed.  She denies any redness warmth or drainage at her incisions.  She is seeing Dr. Arreaga this afternoon.  She is here for review.    Review of Systems:  Review of Systems   HENT:   Positive for hearing loss.    All other systems reviewed and are negative.       Past Medical and Surgical History:  Breast Biopsy History:  Patient had not had a breast biopsy prior to her cancer diagnosis.  Breast Cancer HIstory:  Patient does not have a past medical history of breast cancer.  Breast Operations, and year:  NONE   Obstetric/Gynecologic History:  Age menstrual periods began: 9  Patient is postmenopausal due to removal of her uterus and both ovaries in the following year: 2005   Number of pregnancies:3  Number of live births: 3  Number of abortions or miscarriages: 0  Age of delivery of first child: 14  Patient breast fed, for the following lenth of time: 6 MONTHS LAST CHILD   Length of time taking birth control pills: 2 YRS   Patient took hormone replacement during the following dates: AFTER 2005, PATCH 1 YR, VAGINAL ESTROGEN MUCH LONGER.     PATIENT HAS UTERUS AND OVARIES REMOVED.       Past Surgical History:   Procedure Laterality Date   • BLADDER REPAIR     • BREAST BIOPSY     • BREAST RECONSTRUCTION Bilateral 8/26/2021    Procedure: BILATERAL PREPECTORALPLACMENT TISSUE EXPANDERS AND ALLODERM;  Surgeon: Heri Arreaga MD;  Location: [facility];  Service: Plastics;  Laterality: Bilateral;   • CARDIOVASCULAR STRESS TEST  2020   • HYSTERECTOMY     • MASTECTOMY W/ SENTINEL NODE BIOPSY Bilateral 8/26/2021    Procedure: bilateral total mastectomy, left axillary sentinel lymph node biopsy, possible reconstruction.;  Surgeon: Asia Perkins MD;  Location: [facility];   Service: General;  Laterality: Bilateral;   • TUBAL ABDOMINAL LIGATION         Past Medical History:   Diagnosis Date   • Anxiety    • Breast cancer (CMS/[facility])     LEFT BREAST 2021   • Depression    • DM (diabetes mellitus) (CMS/[facility])    • Hyperlipidemia    • Hypertension     SAW CARDIO/ STRESS TEST  - NOW MEDS MANAGED BY PCP    • Irritable bowel syndrome    • Peripheral neuropathy    • PONV (postoperative nausea and vomiting)    • Sleep apnea     WEARS CPAP       Prior Hospitalizations, other than for surgery or childbirth, and year:  [facility]  AND     Social History     Socioeconomic History   • Marital status:      Spouse name: Not on file   • Number of children: Not on file   • Years of education: Not on file   • Highest education level: Not on file   Tobacco Use   • Smoking status: Former Smoker     Packs/day: 2.00     Years: 30.00     Pack years: 60.00     Types: Cigarettes     Start date: 1972     Quit date: 2008     Years since quittin.6   • Smokeless tobacco: Never Used   Vaping Use   • Vaping Use: Never used   Substance and Sexual Activity   • Alcohol use: No   • Drug use: No   • Sexual activity: Yes     Partners: Male     Birth control/protection: None     Patient is .  Patient is retired.  Patient drinks 2 servings of caffeine per day.    Family History:  Family History   Problem Relation Age of Onset   • Alzheimer's disease Father    • Diabetes Father    • Heart disease Father    • Other Father    • Sleep apnea Father    • Snoring Father    • Other Mother    • Diabetes Mother    • Heart disease Mother    • Sleep apnea Mother    • Snoring Mother    • Diabetes Sister    • Heart disease Sister    • Other Sister    • Sleep apnea Sister    • Snoring Sister    • Breast cancer Paternal Grandmother 60   • Breast cancer Maternal Aunt 80   • Breast cancer Paternal Aunt    • Malig Hyperthermia Neg Hx        Vital Signs:  /72   Pulse 100   Temp 97.1 °F (36.2  "°C)   Ht 162.6 cm (64\")   Wt 73.6 kg (162 lb 3.2 oz)   LMP  (LMP Unknown)   SpO2 96%   Breastfeeding No   BMI 27.84 kg/m²      Medications:    Current Outpatient Medications:   •  acetaminophen (TYLENOL) 325 MG tablet, Take 2 tablets by mouth Every 4 (Four) Hours As Needed for Mild Pain ., Disp: 60 tablet, Rfl: 0  •  ALPRAZolam (XANAX) 0.5 MG tablet, Take 1 tablet by mouth 2 (Two) Times a Day As Needed for Anxiety. for anxiety, Disp: 60 tablet, Rfl: 0  •  amLODIPine (NORVASC) 2.5 MG tablet, Take 1 tablet by mouth Daily., Disp: 90 tablet, Rfl: 3  •  atorvastatin (LIPITOR) 20 MG tablet, Take 1 tablet by mouth Daily., Disp: 90 tablet, Rfl: 2  •  dilTIAZem CD (Cardizem CD) 180 MG 24 hr capsule, Take 1 capsule by mouth Daily., Disp: 90 capsule, Rfl: 1  •  gabapentin (Neurontin) 300 MG capsule, Take 1 capsule by mouth 3 (Three) Times a Day., Disp: 90 capsule, Rfl: 2  •  hydrALAZINE (APRESOLINE) 100 MG tablet, Take 2 tablets by mouth at breakfast and 1 tablet at bedtime OR take 1 tablet by mouth three times daily (Patient taking differently: Take 100 mg by mouth Daily. Take 2 tablets by mouth at breakfast and 2 tablets at dinner), Disp: 270 tablet, Rfl: 3  •  hydroCHLOROthiazide (HYDRODIURIL) 25 MG tablet, Take 1 tablet by mouth Daily., Disp: 90 tablet, Rfl: 2  •  Insulin Lispro Prot & Lispro (HumaLOG Mix 75/25 KwikPen) (75-25) 100 UNIT/ML suspension pen-injector pen, Inject 24 Units under the skin with breakfast and 28 units under the skin with supper (Patient taking differently: Inject  under the skin into the appropriate area as directed 2 (Two) Times a Day With Meals. 24 UNITS IN AM AND 28 UNITS AT DINNER), Disp: 45 mL, Rfl: 1  •  losartan (Cozaar) 100 MG tablet, Take 1 tablet by mouth Daily., Disp: 90 tablet, Rfl: 2  •  metFORMIN (GLUCOPHAGE) 1000 MG tablet, Take 1,500 mg by mouth Daily With Dinner., Disp: , Rfl:   •  metoprolol succinate XL (TOPROL-XL) 100 MG 24 hr tablet, Take 2 tablets by mouth Daily for " "blood pressure., Disp: 180 tablet, Rfl: 1  •  temazepam (RESTORIL) 30 MG capsule, Take 1 capsule by mouth At Night As Needed for Sleep., Disp: 30 capsule, Rfl: 5  •  docusate sodium (COLACE) 250 MG capsule, Take 1 capsule by mouth 2 (Two) Times a Day As Needed for Constipation., Disp: 60 capsule, Rfl: 1  •  lidocaine-prilocaine (EMLA) 2.5-2.5 % cream, Apply topically once for 1 dose day of surgery., Disp: 30 g, Rfl: 0  •  ondansetron ODT (Zofran ODT) 8 MG disintegrating tablet, Place 1 tablet on the tongue Every 8 (Eight) Hours As Needed for Nausea or Vomiting., Disp: 10 tablet, Rfl: 1  •  oxyCODONE (ROXICODONE) 5 MG immediate release tablet, Take 1 tablet by mouth Every 4 (Four) Hours As Needed for Moderate Pain ., Disp: 20 tablet, Rfl: 0  •  sennosides-docusate (PERICOLACE) 8.6-50 MG per tablet, Take 2 tablets by mouth Daily As Needed for Constipation., Disp: 30 tablet, Rfl: 1     Allergies:  Allergies   Allergen Reactions   • Hydrocodone-Acetaminophen Hives       Physical Examination:  /72   Pulse 100   Temp 97.1 °F (36.2 °C)   Ht 162.6 cm (64\")   Wt 73.6 kg (162 lb 3.2 oz)   LMP  (LMP Unknown)   SpO2 96%   Breastfeeding No   BMI 27.84 kg/m²   General Appearance:  Patient is in no distress.  She is well kept and has an overweight build.   Psychiatric:  Patient with appropriate mood and affect. Alert and oriented to self, time, and place.    Breast, RIGHT:  Surgically absent with well healing transverse incisions with no erythema, warmth, drainage. Drain serous. No skin nodules or discolorations.      Breast, LEFT:  medium sized, asymmetric with the contralateral side, RIGHT slightly larger than left.  Breast skin is without erythema, edema, rashes.  There are no visible abnormalities upon inspection during the arm-raising maneuver or with hands on hips in the sitting position. There is no nipple retraction, discharge or nipple/areolar skin changes.There are no masses palpable in the sitting or " supine positions. Well healing mammotomy with no erythema, warmth, drainage. No palpable mass.    Lymphatic:  There is no axillary, cervical, infraclavicular, or supraclavicular adenopathy bilaterally.  Eyes:  Pupils are round and reactive to light.  Cardiovascular:  Heart rate and rhythm are regular.  Respiratory:  Lungs are clear bilaterally with no crackles or wheezes in any lung field.  Gastrointestinal:  Abdomen is soft, nondistended, and nontender.     Musculoskeletal:  Good strength in all 4 extremities.   There is good range of motion in both shoulders.    Skin:  No new skin lesions or rashes on the skin excluding the breast (see breast exam above).        Imagin2018 BILATERAL SCREENING MAMMO WITH LYLE            [facility]         YADIRA BETTYE  Scattered areas of fibroglandular density.   BI-RADS 2: Benign Findings.    11/15/2019 BILATERAL SCREENING MAMMO WITH LYLE          [facility]           YADIRA BETTYE  Scattered areas of fibroglandular density.   BI-RADS 1: Negative.    2021 BILATERAL SCREENING MAMMO WITH LYLE         [facility]            YADIRA BETTYE  There are scattered areas of fibroglandular density. 10 mm asymmetry in the central right breast, middle depth, visible on the CC view. Indeterminate calcifications in the lower outer left breast, posterior depth.  BI-RADS 0.    2021   BILATERAL DIAGNOSTIC MAMMO WITH LYLE & RIGHT BREAST US   [facility]   YADIRA BETTYE  MM.8 cm grouping of coarse and somewhat heterogeneous calcifications within the outer central left breast posterior third depth which are mildly suspicious. There is an oval partial circumscribed, partially obscured mass within the lower central right breast anterior/middle third depth corresponding to previously questioned asymmetry. In retrospect, this finding appears similar to multiple prior exam dating back to 2006.  US:  Right breast 6:00 position 2 cm from nipple is a 1.0 x 0.3 x 0.8 cm grouping  of oval circumscribed anechoic avascular masses consistent with a benign cluster of cysts.  IMPRESSION:  1. Mildly suspicious subcentimeter grouping of calcifications within the outer central left breast posterior third depth.  RECOMMENDATION:  Recommend stereotactic guided core needle biopsy of the left breast calcifications for further evaluation.  BI-RADS 4.    07/14/2021 BILATERAL BREAST MRI               [facility]                             YADIRA WEN  LEFT BREAST:  4:00 posterior right breast, 7.8 cm posterior to the nipple, there is a 1.2 cm AP dimension, 1.8 cm transverse dimension, 0.8 cm craniocaudal dimension irregular enhancing mass with a central biopsy clip. There is approximately 0.7 cm linear nonmass enhancement extending posteriorly from the mass. Together the overall AP extent of suspicious enhancement measures approximately 1.8 cm. The visualized axilla is within normal limits.  2. No MRI evidence of malignancy in the right breast.    Pathology:  06/21/2021 BIOPSY OF THE LEFT BREAST PERCUTANEOUS             YADIRA         YADIRA WEN  PATHOLOGY ADDENDUM:  Final pathology is concordant.  Target: microcalcifications. Location: Outer central left breast posterior third depth. 8 g Mammotome. Sample number: 4. Marker migration on post-procedure mammogram: None.  PATHOLOGY:  Calcifications, left breast, biopsies:  Ductal carcinoma in situ, low to intermediate nuclear grade, solid type. Atypical lobular hyperplasia. No invasive carcinoma is identified. Microcalcifications identified within neoplastic ducts.  ER: 90%  SC: 95%    07/01/21 [facility] OUTSIDE PATH REVIEW  YADIRA WEN   Final Diagnosis   Outside Breast Consultation on Material from [facility] ([id number]):     1.   Breast, Left Outer Central Posterior One-third, Core Biopsy for Calcifications:               A. Ductal carcinoma in situ (DCIS), low to intermediate nuclear grade with solid and cribriform        architecture with  spotty single cell and focal expansive necrosis.  B. In situ carcinoma biomarkers: ER positive (85% intermediate, Sendy 7/7); MD positive (90%, strong,        Sendy score 8/8). Ki-67 (performed and interpreted in-house) 30%.     [facility] [place]                  PROGRESS NOTES    YADIRA WEN  Diabetes. Chest pain. Hypertension.    06/25/2021 HEMATOLOGY/ONCOLOGY PROGRESS NOTES   DR DEANNA WEN  Comprehensive gene analysis with cancer next technology. Follow-up with Dr. Perkins for surgical discussions.        LABS:   06/25/2021 CANCERNEXT-EXPANDED                      [facility]                     YADIRA WEN  Analyses of 77 genes. Negative: No Clinically Significant Variants Detected.       Procedures:      Assessment:   Diagnosis Plan   1. Breast neoplasm, Tis (DCIS), left     2. Breast calcifications on mammogram     3. Type 2 diabetes mellitus with hyperglycemia, with long-term current use of insulin (CMS/[facility])     4. Past use of tobacco     5. Anxiety about health     6. FH: breast cancer     7. Sleep apnea, unspecified type     1-2  Left breast posterior one third 4:00, 8 cm from the nipple, single propeller-shaped marker in the breast.  8 mm of calcification on mammogram, 1.8 cm masslike area on MRI with 8 mm of this tracking posteriorly from the clip and biopsy site.  On bedside ultrasound the marker is anterior and lateral to the imaging abnormality.  Dr. Ramos in situ, low to intermediate grade, solid, cribriform, with spotty single cell and focal expansive necrosis.  Estrogen 85, progesterone 90%.    Clinical stage Tis N0    Note residual calcifications on postbiopsy images.    8-26-21 bilateral total mastectomy and bilateral prepectoral reconstruction with reconstruction prepectoral, Dr Arreaga returned as :  Left breast intermediate grade duct carcinoma in situ, 20 mm, solid and cribriform, intermediate grade, focal necrosis, margins are clear with the closest being 1.5 cm  from the posterior margin.  Single benign sentinel lymph node 0 of 1 sentinel node.  Right total mastectomy benign breast tissue with focal micropapilloma, squamous metaplasia of lactiferous ducts.       Pathologic stage Tis N0 stage 0.     - no need for medical oncology or radiation oncology.      3-  Diabetes longstanding diagnosed in 2008.  Started taking injectable insulin in 2011.  Currently on Metformin and injectable Humalog.  Tells me that sugars run good.  Managed by Dr. Avila    4-  2 to 3 pack a day of cigarettes for 36 years stopped in 2008      5-  Anxiety about her breast cancer risk and current diagnosis      6-     RICARDO NEAL age uncertain, PGM 60s with breast cancer  Had an [employer] cancer next expanded panel sent 77 genes, sent 6-25-21- negative for mutation    7  Sleep apnea, uses CPAP at night.    Plan:  The patient goes by Susy.  She works at [employer] in [place].  She is here today in the office with her daughter.    We reviewed her pathology report as well as her examination together today.  She was pleased with her pathology report and her decision to do a bilateral mastectomy.  She is seeing Dr. Arreaga this afternoon about potential additional drain removal.  I will see her back in a year with no imaging.  She will not need to see radiation oncology or medical oncology.  I asked her to continue her self breast exam and to call us in the interim with any concerns and we would be happy to see her back sooner.      Asia Perkins MD      Next Appointment:  Return in about 1 year (around 9/8/2022) for no imaging.      EMR Dragon/transcription disclaimer:    Much of this encounter note is an electronic transcription/translocation of spoken language to printed text.  The electronic translation of spoken language may permit erroneous, or at times, nonsensical words or phrases to be inadvertently transcribed.  Although I have reviewed the note from such areas, some may still  exist.

## 2021-09-21 ENCOUNTER — TRANSCRIBE ORDERS (OUTPATIENT)
Dept: PHYSICAL THERAPY | Facility: HOSPITAL | Age: 61
End: 2021-09-21

## 2021-09-21 DIAGNOSIS — C50.919 MALIGNANT NEOPLASM OF FEMALE BREAST, UNSPECIFIED ESTROGEN RECEPTOR STATUS, UNSPECIFIED LATERALITY, UNSPECIFIED SITE OF BREAST (HCC): Primary | ICD-10-CM

## 2021-09-22 ENCOUNTER — OFFICE VISIT (OUTPATIENT)
Dept: FAMILY MEDICINE CLINIC | Facility: CLINIC | Age: 61
End: 2021-09-22

## 2021-09-22 VITALS
WEIGHT: 162 LBS | BODY MASS INDEX: 27.66 KG/M2 | HEART RATE: 77 BPM | DIASTOLIC BLOOD PRESSURE: 81 MMHG | OXYGEN SATURATION: 100 % | HEIGHT: 64 IN | TEMPERATURE: 98 F | SYSTOLIC BLOOD PRESSURE: 152 MMHG

## 2021-09-22 DIAGNOSIS — E11.49 TYPE 2 DIABETES MELLITUS WITH OTHER DIABETIC NEUROLOGICAL COMPLICATION (HCC): Primary | ICD-10-CM

## 2021-09-22 PROCEDURE — 99213 OFFICE O/P EST LOW 20 MIN: CPT | Performed by: FAMILY MEDICINE

## 2021-09-22 RX ORDER — CEPHALEXIN 500 MG/1
500 CAPSULE ORAL 2 TIMES DAILY
Qty: 20 CAPSULE | Refills: 0 | Status: SHIPPED | OUTPATIENT
Start: 2021-09-22 | End: 2021-12-14

## 2021-09-22 NOTE — PROGRESS NOTES
Subjective   Susy Hanson is a 61 y.o. female.     For follow-up after bilateral mastectomy and lymph node resection and reconstruction following diagnosis of breast cancer of the left breast  She was started on gabapentin to help with her anxiety/sleep   The post-op nerve pain has been bad  The gabapentin helps but she hates the ways she feels on it during the day  The dose was changed to 100mg tid which she will start shortly  Little less stress since she has been off work for the surgery  BS have been good so she has not been taking her humalog - BS are unning in the 80's         The following portions of the patient's history were reviewed and updated as appropriate: allergies, current medications, past family history, past medical history, past social history, past surgical history, and problem list.  Past Medical History:   Diagnosis Date   • Anxiety 2000   • Breast cancer (CMS/HCC)     LEFT BREAST 7/2021   • Depression    • DM (diabetes mellitus) (CMS/[facility])    • Hyperlipidemia    • Hypertension     SAW CARDIO/ STRESS TEST 2020 - NOW MEDS MANAGED BY PCP    • Irritable bowel syndrome 2006   • Peripheral neuropathy    • PONV (postoperative nausea and vomiting)    • Sleep apnea     WEARS CPAP     Past Surgical History:   Procedure Laterality Date   • BLADDER REPAIR     • BREAST BIOPSY     • BREAST RECONSTRUCTION Bilateral 8/26/2021    Procedure: BILATERAL PREPECTORALPLACMENT TISSUE EXPANDERS AND ALLODERM;  Surgeon: Heri Arreaga MD;  Location: [facility];  Service: Plastics;  Laterality: Bilateral;   • CARDIOVASCULAR STRESS TEST  2020   • HYSTERECTOMY     • MASTECTOMY W/ SENTINEL NODE BIOPSY Bilateral 8/26/2021    Procedure: bilateral total mastectomy, left axillary sentinel lymph node biopsy, possible reconstruction.;  Surgeon: Asia Perkins MD;  Location: [facility];  Service: General;  Laterality: Bilateral;   • TUBAL ABDOMINAL LIGATION  1981     Family History   Problem Relation  Age of Onset   • Alzheimer's disease Father    • Diabetes Father    • Heart disease Father    • Other Father    • Sleep apnea Father    • Snoring Father    • Other Mother    • Diabetes Mother    • Heart disease Mother    • Sleep apnea Mother    • Snoring Mother    • Diabetes Sister    • Heart disease Sister    • Other Sister    • Sleep apnea Sister    • Snoring Sister    • Breast cancer Paternal Grandmother 60   • Breast cancer Maternal Aunt 80   • Breast cancer Paternal Aunt    • Malig Hyperthermia Neg Hx      Social History     Socioeconomic History   • Marital status:      Spouse name: Not on file   • Number of children: Not on file   • Years of education: Not on file   • Highest education level: Not on file   Tobacco Use   • Smoking status: Former Smoker     Packs/day: 2.00     Years: 30.00     Pack years: 60.00     Types: Cigarettes     Start date: 1972     Quit date: 2008     Years since quittin.7   • Smokeless tobacco: Never Used   Vaping Use   • Vaping Use: Never used   Substance and Sexual Activity   • Alcohol use: No   • Drug use: No   • Sexual activity: Yes     Partners: Male     Birth control/protection: None         Current Outpatient Medications:   •  amLODIPine (NORVASC) 2.5 MG tablet, Take 1 tablet by mouth Daily., Disp: 90 tablet, Rfl: 3  •  atorvastatin (LIPITOR) 20 MG tablet, Take 1 tablet by mouth Daily., Disp: 90 tablet, Rfl: 2  •  dilTIAZem CD (Cardizem CD) 180 MG 24 hr capsule, Take 1 capsule by mouth Daily., Disp: 90 capsule, Rfl: 1  •  gabapentin (Neurontin) 100 MG capsule, Take 1 capsule by mouth 3 (Three) Times a Day As Needed (anxiety or pain)., Disp: 90 capsule, Rfl: 2  •  gabapentin (Neurontin) 300 MG capsule, Take 1 capsule by mouth 3 (Three) Times a Day., Disp: 90 capsule, Rfl: 2  •  hydrALAZINE (APRESOLINE) 100 MG tablet, Take 2 tablets by mouth at breakfast and 1 tablet at bedtime OR take 1 tablet by mouth three times daily (Patient taking differently: Take 100  "mg by mouth Daily. Take 2 tablets by mouth at breakfast and 2 tablets at dinner), Disp: 270 tablet, Rfl: 3  •  hydroCHLOROthiazide (HYDRODIURIL) 25 MG tablet, Take 1 tablet by mouth Daily., Disp: 90 tablet, Rfl: 2  •  Insulin Lispro Prot & Lispro (HumaLOG Mix 75/25 KwikPen) (75-25) 100 UNIT/ML suspension pen-injector pen, Inject 24 Units under the skin with breakfast and 28 units under the skin with supper (Patient taking differently: Inject  under the skin into the appropriate area as directed 2 (Two) Times a Day With Meals. 24 UNITS IN AM AND 28 UNITS AT DINNER), Disp: 45 mL, Rfl: 1  •  lidocaine-prilocaine (EMLA) 2.5-2.5 % cream, Apply topically once for 1 dose day of surgery., Disp: 30 g, Rfl: 0  •  losartan (Cozaar) 100 MG tablet, Take 1 tablet by mouth Daily., Disp: 90 tablet, Rfl: 2  •  metFORMIN (GLUCOPHAGE) 1000 MG tablet, Take 1,500 mg by mouth Daily With Dinner., Disp: , Rfl:   •  metoprolol succinate XL (TOPROL-XL) 100 MG 24 hr tablet, Take 2 tablets by mouth Daily for blood pressure., Disp: 180 tablet, Rfl: 1  •  mupirocin (BACTROBAN) 2 % ointment, APPLY A SMALL AMOUNT TO THE AFFECTED AREA BY TOPICAL ROUTE 2 TIMES PER DAY, Disp: 22 g, Rfl: 2  •  ondansetron ODT (Zofran ODT) 8 MG disintegrating tablet, Place 1 tablet on the tongue Every 8 (Eight) Hours As Needed for Nausea or Vomiting., Disp: 10 tablet, Rfl: 1  •  cephalexin (Keflex) 500 MG capsule, Take 1 capsule by mouth 2 (Two) Times a Day., Disp: 20 capsule, Rfl: 0    Review of Systems   Constitutional: Negative.    Respiratory: Negative.    Cardiovascular: Negative.    Gastrointestinal: Negative for nausea and vomiting.   Neurological: Positive for numbness (paresthesias). Negative for dizziness, syncope, light-headedness and headache.     /81 (BP Location: Left arm, Patient Position: Sitting, Cuff Size: Large Adult)   Pulse 77   Temp 98 °F (36.7 °C) (Temporal)   Ht 162.6 cm (64\")   Wt 73.5 kg (162 lb)   LMP  (LMP Unknown)   SpO2 100%   " Breastfeeding No   BMI 27.81 kg/m²       Objective   Physical Exam  Vitals and nursing note reviewed.   Constitutional:       General: She is not in acute distress.     Appearance: Normal appearance. She is well-developed, well-groomed and overweight.   HENT:      Head: Normocephalic and atraumatic.   Neck:      Thyroid: No thyromegaly.   Cardiovascular:      Rate and Rhythm: Normal rate and regular rhythm.      Heart sounds: Normal heart sounds. No murmur heard.   No friction rub. No gallop.    Pulmonary:      Effort: Pulmonary effort is normal. No respiratory distress.      Breath sounds: Normal breath sounds. No wheezing or rales.   Musculoskeletal:      Cervical back: Neck supple.      Right lower leg: No edema.      Left lower leg: No edema.   Lymphadenopathy:      Cervical: No cervical adenopathy.   Skin:     General: Skin is warm and dry.   Neurological:      Mental Status: She is alert.   Psychiatric:         Behavior: Behavior is cooperative.      Comments: Much more relaxed today than is her baseline           Assessment/Plan   Problems Addressed this Visit        Endocrine and Metabolic    Type 2 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 (CMS/HCC) - Primary      Diagnoses       Codes Comments    Type 2 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 (CMS/HCC)    -  Primary ICD-10-CM: E11.49  ICD-9-CM: 250.60           She will try taking the gabapentin 100mg tid and see if she can tolerate it better  Diabetes is doing better   She was counseled on the need for dietary compliance and stress reduction

## 2021-09-28 NOTE — PROGRESS NOTES
Susy Hanson  1960     Spoke with the patient about the CPAP recall and encouraged her to continue to use the CPAP and register in the registry for recall.  She is not using the so clean.  I have reassured her that she should continue to use the CPAP as it is going to help her and it is medically necessary.  She told me that she has already registered and waiting for the replacement    Sam Quintana MD  9/28/2021

## 2021-10-01 ENCOUNTER — HOSPITAL ENCOUNTER (OUTPATIENT)
Dept: PHYSICAL THERAPY | Facility: HOSPITAL | Age: 61
Setting detail: THERAPIES SERIES
Discharge: HOME | End: 2021-10-01

## 2021-10-01 DIAGNOSIS — Z91.89 AT RISK FOR LYMPHEDEMA: Primary | ICD-10-CM

## 2021-10-01 DIAGNOSIS — M62.89 MUSCLE TIGHTNESS: ICD-10-CM

## 2021-10-01 DIAGNOSIS — N64.4 BREAST PAIN: ICD-10-CM

## 2021-10-01 PROCEDURE — 97140 MANUAL THERAPY 1/> REGIONS: CPT

## 2021-10-01 PROCEDURE — 97535 SELF CARE MNGMENT TRAINING: CPT

## 2021-10-01 PROCEDURE — 97161 PT EVAL LOW COMPLEX 20 MIN: CPT

## 2021-10-01 NOTE — THERAPY EVALUATION
Physical Therapy Lymphedema Initial Evaluation  [facility]     Patient Name: Susy Hanson  : 1960  MRN: [id number]  Today's Date: 10/1/2021      Visit Date: 10/01/2021    Visit Dx:    ICD-10-CM ICD-9-CM   1. At risk for lymphedema  Z91.89 V49.89   2. Breast pain  N64.4 611.71   3. Muscle tightness  M62.89 728.9       Patient Active Problem List   Diagnosis   • Encounter for general adult medical examination without abnormal findings   • Essential hypertension   • Irritable bowel syndrome   • Mixed anxiety depressive disorder   • Mixed hyperlipidemia   • Obesity   • Encounter for screening for malignant neoplasm of colon   • Type 2 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 (HCC)   • Vitamin D deficiency   • Hypertensive urgency   • Peripheral neuropathy   • ANDREAS (obstructive sleep apnea)   • Snoring   • Hypersomnia   • Chest pain, atypical   • Breast neoplasm, Tis (DCIS), left        Past Medical History:   Diagnosis Date   • Anxiety    • Breast cancer (HCC)     LEFT BREAST 2021   • Depression    • DM (diabetes mellitus) (HCC)    • Hyperlipidemia    • Hypertension     SAW CARDIO/ STRESS TEST  - NOW MEDS MANAGED BY PCP    • Irritable bowel syndrome    • Peripheral neuropathy    • PONV (postoperative nausea and vomiting)    • Sleep apnea     WEARS CPAP        Past Surgical History:   Procedure Laterality Date   • BLADDER REPAIR     • BREAST BIOPSY     • BREAST RECONSTRUCTION Bilateral 2021    Procedure: BILATERAL PREPECTORALPLACMENT TISSUE EXPANDERS AND ALLODERM;  Surgeon: Heri Arreaga MD;  Location: [facility];  Service: Plastics;  Laterality: Bilateral;   • CARDIOVASCULAR STRESS TEST     • HYSTERECTOMY     • MASTECTOMY W/ SENTINEL NODE BIOPSY Bilateral 2021    Procedure: bilateral total mastectomy, left axillary sentinel lymph node biopsy, possible reconstruction.;  Surgeon: Asia Perkins MD;  Location: [facility];   Service: General;  Laterality: Bilateral;   • TUBAL ABDOMINAL LIGATION  1981       Visit Dx:    ICD-10-CM ICD-9-CM   1. At risk for lymphedema  Z91.89 V49.89   2. Breast pain  N64.4 611.71   3. Muscle tightness  M62.89 728.9       Patient History     Row Name 10/01/21 0700             History    Chief Complaint  Pain  -LB      Type of Pain  Shoulder pain;Other pain breast/B axillary   -LB      Date Current Problem(s) Began  08/26/21  -LB      Brief Description of Current Complaint  Pt reports increasing edema, pain, discomfort B breasts and inferior to axilla. She reports symptoms are worsening. She is taking Gabapentin. She goes back to work Monday part time. Pt reports she gets massage every 2 weeks. She has been expanding but reports she wants to be done with this. She has purchased 15 sports bras but is uncomfortable in all of them.  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  -LB      Patient seeing anyone else for problem(s)?  yes  -LB      How has patient tried to help current problem?  massage every 2 weeks.  -LB      History of Previous Related Injuries  none  -LB         Pain     Pain Location  Breast  -LB      Pain at Present  5  -LB      Pain at Best  2  -LB      Pain at Worst  7  -LB      Pain Frequency  Constant/continuous  -LB      Pain Description  Tightness;Sore;Tender  -LB      What Performance Factors Make the Current Problem(s) WORSE?  trying to sleep  -LB      What Performance Factors Make the Current Problem(s) BETTER?  warm shower  -LB      Tolerance Time- Standing  no change  -LB      Tolerance Time- Sitting  no change  -LB      Tolerance Time- Walking  no change  -LB      Tolerance Time-  Lying  unable to get comfortable  -LB      Is your sleep disturbed?  Yes  -LB      What position do you sleep in?  Supine  -LB      Difficulties at work?  Pt RTW Monday part time   -LB      Difficulties with ADL's?  Requires inc time.  -LB      Difficulties with recreational activities?  Unable to perform.  -LB         Fall Risk Assessment    Any falls in the past year:  No  -LB         Services    Prior Rehab/Home Health Experiences  No  -LB      Are you currently receiving Home Health services  No  -LB      Do you plan to receive Home Health services in the near future  No  -LB         Daily Activities    Primary Language  English  -LB      Pt Participated in POC and Goals  Yes  -LB         Safety    Are you being hurt, hit, or frightened by anyone at home or in your life?  No  -LB      Are you being neglected by a caregiver  No  -LB        User Key  (r) = Recorded By, (t) = Taken By, (c) = Cosigned By    Initials Name Provider Type    Edita Chicas PT Physical Therapist          Lymphedema     Row Name 10/01/21 1000             Subjective Pain    Able to rate subjective pain?  yes  -LB      Pre-Treatment Pain Level  5  -LB      Subjective Pain Comment  I am just uncomfortable all of the time.  -LB         Subjective Comments    Subjective Comments  I feel like my breasts feel sore and tender and cold and heavy and I have random pains through them. I can't tolerate hardly any sports bras.  -LB         Lymphedema Assessment    Lymphedema Classification  LUE:;at risk/stage 0  -LB      Lymphedema Cancer Related Sx  bilateral;radical mastectomy;left;sentinel node biopsy  -LB      Lymph Nodes Removed #  1  -LB      Positive Lymph Nodes #  0  -LB      Chemo Received  no  -LB      Radiation Therapy Received  no  -LB      Infections or Cellulitis?  no  -LB         Posture/Observations    Posture/Observations Comments  forward head, rounded shoulders  -LB         General ROM    GENERAL ROM COMMENTS  dec end range of  motion B with tightness; grossly 150 deg B  -LB         MMT (Manual Muscle Testing)    General MMT Comments  WNL  -LB         Lymphedema Edema Assessment    Edema Assessment Comment  normal post-op  -LB         Skin Changes/Observations    Skin Observations Comment  well healing incisions  -LB         Lymphedema Sensation    Lymphedema Sensation Comments  normal  -LB         Lymphedema Pulses/Capillary Refill    Lymph Pulses Capillary Refill Comments  normal  -LB        User Key  (r) = Recorded By, (t) = Taken By, (c) = Cosigned By    Initials Name Provider Type    Edita Chicas PT Physical Therapist                          Therapy Education  Education Details: discussed s/s of lymphedema, reviewed precautions, discussed STM to reduce muscle tightness/discomfort, bra fitting post implant exchange  Given: Symptoms/condition management, Posture/body mechanics, Edema management  Program: New  How Provided: Verbal, Demonstration  Provided to: Patient  Level of Understanding: Teach back education performed, Verbalized, Demonstrated  01714 - PT Self Care/Mgmt Minutes: 15      OP Exercises     Row Name 10/01/21 1000 10/01/21 0900          Subjective Comments    Subjective Comments  I feel like my breasts feel sore and tender and cold and heavy and I have random pains through them. I can't tolerate hardly any sports bras.  -LB  --        Subjective Pain    Able to rate subjective pain?  yes  -LB  --     Pre-Treatment Pain Level  5  -LB  --     Subjective Pain Comment  I am just uncomfortable all of the time.  -LB  --        Total Minutes    00832 - PT Manual Therapy Minutes  --  15  -LB       User Key  (r) = Recorded By, (t) = Taken By, (c) = Cosigned By    Initials Name Provider Type    Edita Chicas PT Physical Therapist                     Manual Rx (last 36 hours)      Manual Treatments     Row Name 10/01/21 0900             Total Minutes    15903 - PT Manual Therapy Minutes  15  -LB         Manual Rx 1     Manual Rx 1 Location  STM to B axilla, breast, avoiding incisions  -LB      Manual Rx 1 Type  STM  -LB      Manual Rx 1 Duration  15  -LB        User Key  (r) = Recorded By, (t) = Taken By, (c) = Cosigned By    Initials Name Provider Type    Edita Chicas, PT Physical Therapist          PT OP Goals     Row Name 10/01/21 1000          PT Short Term Goals    STG Date to Achieve  10/15/21  -LB     STG 1  Pt will initiate self massage to address discomfort and begin resensitization process.  -LB     STG 1 Progress  New  -LB     STG 2  Pt will find bra that fits appropriately and does not irritate.  -LB     STG 2 Progress  New  -LB     STG 3  Pt will demonstrate 160 deg BUE flexion without discomfort.  -LB     STG 3 Progress  New  -LB        Long Term Goals    LTG Date to Achieve  10/31/21  -LB     LTG 1  Pt will tolerate work duties without inc B breast discomfort.  -LB     LTG 1 Progress  New  -LB     LTG 2  Pt will report dec overall disability per Quick DASH from 48% disability to 30% or less.  -LB     LTG 2 Progress  New  -LB     LTG 3  Pt will attend bra fitting and acquire appropriately fitted bra for continued support and comfort.  -LB     LTG 3 Progress  New  -LB        Time Calculation    PT Goal Re-Cert Due Date  12/30/21  -LB       User Key  (r) = Recorded By, (t) = Taken By, (c) = Cosigned By    Initials Name Provider Type    Edita Chicas, PT Physical Therapist          PT Assessment/Plan     Row Name 10/01/21 1007          PT Assessment    Functional Limitations  Limitation in home management;Limitations in community activities;Limitations in functional capacity and performance;Performance in leisure activities;Performance in self-care ADL;Performance in work activities  -LB     Impairments  Edema;Impaired flexibility;Impaired lymphatic circulation;Impaired muscle endurance;Joint mobility;Locomotion;Muscle strength;Pain;Posture;Range of motion;Poor body mechanics;Sensation  -LB     Assessment  Comments  Susy Hanson is a 61 y.o. female recently diagnosed with Left Breast Cancer, presents to therapy in evolving condition, s/p Bilateral Modified Radical Mastectomywith [place] Node Biopsy with immediate reconstruction with Prepectoral Tissue expander / (+) Left performed on 8/26/21 by surgeons Dr. Perkins and Whitley. Susy Hanson is at increased risk of post Mastectomy lymphedema syndrome Left Upper Extremity due to Breast surgery Lymph Node Removal . She presents with post-operative decreased range of motion, flexibility, strength, function and increased pain. Currently, negative s/s of lymphedema, infection, seroma, or axillary cording. At this time, functional limitations can be affected by but not limited to pain, difficulty reaching overhead, pain with work duties, difficulty sleeping. Susy Hanson will benefit from skilled formal Breast Care Physical Therapy at this time to address listed dysfunctions. Please refer to Plan.  -LB     Please refer to paper survey for additional self-reported information  Yes  -LB     Rehab Potential  Good  -LB     Patient/caregiver participated in establishment of treatment plan and goals  Yes  -LB     Patient would benefit from skilled therapy intervention  Yes  -LB        PT Plan    PT Frequency  1x/week  -LB     Predicted Duration of Therapy Intervention (PT)  6-8 visits  -LB     Planned CPT's?  PT EVAL LOW COMPLEXITY: 64257;PT RE-EVAL: 01530;PT THER PROC EA 15 MIN: 89635;PT THER ACT EA 15 MIN: 06306;PT MANUAL THERAPY EA 15 MIN: 38603;PT NEUROMUSC RE-EDUCATION EA 15 MIN: 98182;PT SELF CARE/HOME MGMT/TRAIN EA 15: 24985;PT BIS XTRACELL FLUID ANALYSIS: 35718  -LB     PT Plan Comments  Continue STM/MLD, PROM of B shoulders, arrange for bra fitting post exchange surgery.  -LB       User Key  (r) = Recorded By, (t) = Taken By, (c) = Cosigned By    Initials Name Provider Type    Edita Chicas, PT Physical Therapist             Outcome Measure  Options: Quick DASH  Quick DASH  Open a tight or new jar.: Moderate Difficulty  Do heavy household chores (e.g., wash walls, wash floors): Severe Difficulty  Carry a shopping bag or briefcase: Moderate Difficulty  Wash your back: Moderate Difficulty  Use a knife to cut food: No Difficulty  Recreational activities in which you take some force or impact through your arm, should or hand (e.g. golf, hammering, tennis, etc.): Severe Difficulty  During the past week, to what extent has your arm, shoulder, or hand problem interfered with your normal social activites with family, friends, neighbors or groups?: Slightly  During the past week, were you limited in your work or other regular daily activities as a result of your arm, shoulder or hand problem?: Very limited  Arm, Shoulder, or hand pain: Severe  Tingling (pins and needles) in your arm, shoulder, or hand: None  During the past week, how much difficulty have you had sleeping because of the pain in your arm, shoulder or hand?: Moderate Difficiculty  Number of Questions Answered: 11  Quick DASH Score: 47.73  Quick Dash Comments: 48% disability          Time Calculation:   Start Time: 0745  Stop Time: 0830  Time Calculation (min): 45 min  Total Timed Code Minutes- PT: 30 minute(s)  Timed Charges  08534 - PT Manual Therapy Minutes: 15  71567 - PT Self Care/Mgmt Minutes: 15  Total Minutes  Timed Charges Total Minutes: 15   Total Minutes: 15   Therapy Charges for Today     Code Description Service Date Service Provider Modifiers Qty    62246273665 HC PT MANUAL THERAPY EA 15 MIN 10/1/2021 Edita Reynaga, PT GP 1    04391545945 HC PT SELF CARE/MGMT/TRAIN EA 15 MIN 10/1/2021 Edita Reynaga, PT GP 1    64474555338 HC PT EVAL LOW COMPLEXITY 1 10/1/2021 Edita Reynaga, PT GP 1          PT G-Codes  Outcome Measure Options: Quick DASH  Quick DASH Score: 47.73         Edita Reynaga PT  10/1/2021

## 2021-10-07 ENCOUNTER — HOSPITAL ENCOUNTER (OUTPATIENT)
Dept: PHYSICAL THERAPY | Facility: HOSPITAL | Age: 61
Discharge: HOME | End: 2021-10-07

## 2021-10-07 DIAGNOSIS — N64.4 BREAST PAIN: ICD-10-CM

## 2021-10-07 DIAGNOSIS — Z91.89 AT RISK FOR LYMPHEDEMA: Primary | ICD-10-CM

## 2021-10-07 DIAGNOSIS — M62.89 MUSCLE TIGHTNESS: ICD-10-CM

## 2021-10-07 PROCEDURE — 97140 MANUAL THERAPY 1/> REGIONS: CPT

## 2021-10-07 NOTE — THERAPY TREATMENT NOTE
Outpatient Physical Therapy Ortho Treatment Note  [facility]     Patient Name: Susy Hanson  : 1960  MRN: [id number]  Today's Date: 10/7/2021      Visit Date: 10/07/2021    Visit Dx:    ICD-10-CM ICD-9-CM   1. At risk for lymphedema  Z91.89 V49.89   2. Breast pain  N64.4 611.71   3. Muscle tightness  M62.89 728.9       Patient Active Problem List   Diagnosis   • Encounter for general adult medical examination without abnormal findings   • Essential hypertension   • Irritable bowel syndrome   • Mixed anxiety depressive disorder   • Mixed hyperlipidemia   • Obesity   • Encounter for screening for malignant neoplasm of colon   • Type 2 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 (HCC)   • Vitamin D deficiency   • Hypertensive urgency   • Peripheral neuropathy   • ANDREAS (obstructive sleep apnea)   • Snoring   • Hypersomnia   • Chest pain, atypical   • Breast neoplasm, Tis (DCIS), left        Past Medical History:   Diagnosis Date   • Anxiety    • Breast cancer (HCC)     LEFT BREAST 2021   • Depression    • DM (diabetes mellitus) (HCC)    • Hyperlipidemia    • Hypertension     SAW CARDIO/ STRESS TEST  - NOW MEDS MANAGED BY PCP    • Irritable bowel syndrome    • Peripheral neuropathy    • PONV (postoperative nausea and vomiting)    • Sleep apnea     WEARS CPAP        Past Surgical History:   Procedure Laterality Date   • BLADDER REPAIR     • BREAST BIOPSY     • BREAST RECONSTRUCTION Bilateral 2021    Procedure: BILATERAL PREPECTORALPLACMENT TISSUE EXPANDERS AND ALLODERM;  Surgeon: Heri Arreaga MD;  Location: [facility];  Service: Plastics;  Laterality: Bilateral;   • CARDIOVASCULAR STRESS TEST     • HYSTERECTOMY     • MASTECTOMY W/ SENTINEL NODE BIOPSY Bilateral 2021    Procedure: bilateral total mastectomy, left axillary sentinel lymph node biopsy, possible reconstruction.;  Surgeon: Asia Perkins MD;  Location: [facility];   Service: General;  Laterality: Bilateral;   • TUBAL ABDOMINAL LIGATION  1981                       PT Assessment/Plan     Row Name 10/07/21 1151          PT Assessment    Assessment Comments  Pt returns for first follow up after evaluation with continued B pain and tightness. She returned to work this week and reports inc pain with work duties at computer. Focused on STM to reduce muscle guarding and PROM of BUEs. Gentle poterior inferior mobs and pec stretch. Pt reports reduced discomfort following session. She does get massage every 2 weeks. Will assess benefit from PT vs. independent massage and determine best POC.  -LB        PT Plan    PT Plan Comments  Continue STM, shoulder mobs, bra fitting post exchange.  -LB       User Key  (r) = Recorded By, (t) = Taken By, (c) = Cosigned By    Initials Name Provider Type    Edita Chicas, PT Physical Therapist            OP Exercises     Row Name 10/07/21 1100             Total Minutes    84119 - PT Manual Therapy Minutes  40  -LB        User Key  (r) = Recorded By, (t) = Taken By, (c) = Cosigned By    Initials Name Provider Type    Edita Chicas, PT Physical Therapist                      Manual Rx (last 36 hours)      Manual Treatments     Row Name 10/07/21 1100             Total Minutes    46623 - PT Manual Therapy Minutes  40  -LB         Manual Rx 1    Manual Rx 1 Location  STM to B axilla, breast, avoiding incisions  -LB      Manual Rx 1 Type  STM; PROM to B shoulders, pec stretch, GH posterior/inferior mobs  -LB      Manual Rx 1 Duration  40  -LB        User Key  (r) = Recorded By, (t) = Taken By, (c) = Cosigned By    Initials Name Provider Type    Edita Chicas, PT Physical Therapist          PT OP Goals     Row Name 10/07/21 1100          PT Short Term Goals    STG Date to Achieve  10/15/21  -LB     STG 1  Pt will initiate self massage to address discomfort and begin resensitization process.  -LB     STG 1 Progress  Ongoing  -LB     STG 2  Pt will find  bra that fits appropriately and does not irritate.  -LB     STG 2 Progress  Ongoing  -LB     STG 3  Pt will demonstrate 160 deg BUE flexion without discomfort.  -LB     STG 3 Progress  Progressing  -LB     STG 3 Progress Comments  PROM to 160 deg today with minimal discomfort.  -LB        Long Term Goals    LTG Date to Achieve  10/31/21  -LB     LTG 1  Pt will tolerate work duties without inc B breast discomfort.  -LB     LTG 1 Progress  Ongoing  -LB     LTG 1 Progress Comments  Pain with return to work this week.  -LB     LTG 2  Pt will report dec overall disability per Quick DASH from 48% disability to 30% or less.  -LB     LTG 2 Progress  Ongoing  -LB     LTG 3  Pt will attend bra fitting and acquire appropriately fitted bra for continued support and comfort.  -LB     LTG 3 Progress  Ongoing  -LB       User Key  (r) = Recorded By, (t) = Taken By, (c) = Cosigned By    Initials Name Provider Type    Edita Chicas PT Physical Therapist          Therapy Education  Education Details: reviewed self massage, issued [employer] initial HEP post mastectomy  Given: Symptoms/condition management, Posture/body mechanics, Edema management  Program: Reinforced  How Provided: Verbal, Demonstration  Provided to: Patient  Level of Understanding: Teach back education performed, Verbalized, Demonstrated              Time Calculation:   Start Time: 1045  Stop Time: 1130  Time Calculation (min): 45 min  Total Timed Code Minutes- PT: 43 minute(s)  Timed Charges  53627 - PT Manual Therapy Minutes: 40  Total Minutes  Timed Charges Total Minutes: 40   Total Minutes: 40  Therapy Charges for Today     Code Description Service Date Service Provider Modifiers Qty    74279135893 HC PT MANUAL THERAPY EA 15 MIN 10/7/2021 Edita Reynaga PT GP 3                    Edita Reynaga PT  10/7/2021

## 2021-10-15 ENCOUNTER — TELEPHONE (OUTPATIENT)
Dept: PSYCHIATRY | Facility: HOSPITAL | Age: 61
End: 2021-10-15

## 2021-10-15 ENCOUNTER — HOSPITAL ENCOUNTER (OUTPATIENT)
Dept: PHYSICAL THERAPY | Facility: HOSPITAL | Age: 61
Setting detail: THERAPIES SERIES
Discharge: HOME | End: 2021-10-15

## 2021-10-15 DIAGNOSIS — Z91.89 AT RISK FOR LYMPHEDEMA: Primary | ICD-10-CM

## 2021-10-15 DIAGNOSIS — M62.89 MUSCLE TIGHTNESS: ICD-10-CM

## 2021-10-15 DIAGNOSIS — N64.4 BREAST PAIN: ICD-10-CM

## 2021-10-15 PROCEDURE — 97140 MANUAL THERAPY 1/> REGIONS: CPT

## 2021-10-15 PROCEDURE — 97110 THERAPEUTIC EXERCISES: CPT

## 2021-10-15 RX ORDER — INSULIN LISPRO 100 [IU]/ML
52 INJECTION, SUSPENSION SUBCUTANEOUS DAILY
Qty: 45 ML | Refills: 1 | Status: CANCELLED | OUTPATIENT
Start: 2021-10-15

## 2021-10-15 RX ORDER — METOPROLOL SUCCINATE 100 MG/1
200 TABLET, EXTENDED RELEASE ORAL DAILY
Qty: 180 TABLET | Refills: 1 | Status: CANCELLED | OUTPATIENT
Start: 2021-10-15

## 2021-10-18 ENCOUNTER — OFFICE VISIT (OUTPATIENT)
Dept: PSYCHIATRY | Facility: HOSPITAL | Age: 61
End: 2021-10-18

## 2021-10-18 DIAGNOSIS — F41.1 GENERALIZED ANXIETY DISORDER: Primary | ICD-10-CM

## 2021-10-18 DIAGNOSIS — G47.00 INSOMNIA, UNSPECIFIED TYPE: ICD-10-CM

## 2021-10-18 PROCEDURE — 99214 OFFICE O/P EST MOD 30 MIN: CPT | Performed by: NURSE PRACTITIONER

## 2021-10-18 NOTE — PROGRESS NOTES
[facility]  Video visit-pandemic. Pt consented to session conducted through Zoom video.  You have chosen to receive care through a telehealth visit.  Do you consent to use a video/audio connection for your medical care today? YES    Subjective  Patient ID: Susy Hanson is a 61 y.o. female has been seen via Zoom video from the [facility] clinic in lieu of in person visit.    CC: Demoralized    HPI: Pt noted to be alert, oriented, and engaged in conversation.  Full range of affect noted, mood is frustrated, although stable.  Interval history reviewed; patient reports increase in anxiety symptoms over the past few weeks.  Identifies frustration surrounding delayed removal of expanders, scheduled for December 23.  Reports feelings of grief, as this coincides with family trip and mourns inability to see family again for the holidays.  Patient reports generally well managed mood and anxiety, reviewing progress toward radical acceptance of current situation.  Medications reviewed; patient continues to take gabapentin, finding this is helpful for breast pain and insomnia.  Reports taking 600 mg nightly with occasional 600 mg during day for pain.  Does report some swelling when on higher dosing, up to 1800 mg daily.  Generally, pain is tolerable, reducing over time although exacerbated with expansion.  Patient continues to participate in physical therapy.    Review of Systems   Psychiatric/Behavioral: Positive for sleep disturbance. Negative for dysphoric mood. The patient is nervous/anxious.      Medications Reviewed:  Gabapentin 600 to 1200 mg daily    Diagnoses and all orders for this visit:    1. Generalized anxiety disorder (Primary)    2. Insomnia, unspecified type    Plan of Care  Patient with stable symptoms of generalized anxiety disorder, insomnia with appreciated benefit to gabapentin.  Discussed with patient recommendation of no more than 900 to 1200 mg of gabapentin daily for current symptoms.   Given patient report of current supply, will not adjust prescription at this time.  Counseling continued surrounding radical acceptance of situation, creative problem-solving, and identification of patient goals.  Reiterated recommendation to take gabapentin nightly dose on schedule to allow for consistent sleep patterns.  Reviewed benefits of peers support with goals of universality and normalization of feelings surrounding this experience.  Follow-up arranged in clinic in 10 weeks prior to surgery.    I spent 36 minutes caring for Susy on this date of service. This time includes time spent by me in the following activities: preparing for the visit, obtaining and/or reviewing a separately obtained history, performing a medically appropriate examination and/or evaluation, counseling and educating the patient/family/caregiver, ordering medications, tests, or procedures and documenting information in the medical record.

## 2021-10-19 ENCOUNTER — TELEPHONE (OUTPATIENT)
Dept: FAMILY MEDICINE CLINIC | Facility: CLINIC | Age: 61
End: 2021-10-19

## 2021-10-19 RX ORDER — INSULIN LISPRO 100 [IU]/ML
52 INJECTION, SUSPENSION SUBCUTANEOUS DAILY
Qty: 45 ML | Refills: 1 | Status: ON HOLD | OUTPATIENT
Start: 2021-10-19 | End: 2022-04-15 | Stop reason: ALTCHOICE

## 2021-10-19 RX ORDER — METOPROLOL SUCCINATE 100 MG/1
200 TABLET, EXTENDED RELEASE ORAL DAILY
Qty: 180 TABLET | Refills: 1 | Status: SHIPPED | OUTPATIENT
Start: 2021-10-19 | End: 2022-04-10 | Stop reason: SDUPTHER

## 2021-10-19 NOTE — TELEPHONE ENCOUNTER
----- Message from Alondra Christine MA sent at 10/18/2021  7:31 AM EDT -----  Regarding: FW: Metformin refill    ----- Message -----  From: Susy Hanson  Sent: 10/15/2021   9:17 PM EDT  To: Malinda Mcknight [facility]  Subject: Metformin refill                                 I need a refill for the 1000 mg of Metformim  .  Thanks

## 2021-10-21 ENCOUNTER — HOSPITAL ENCOUNTER (OUTPATIENT)
Dept: PHYSICAL THERAPY | Facility: HOSPITAL | Age: 61
Setting detail: THERAPIES SERIES
Discharge: HOME | End: 2021-10-21

## 2021-10-21 DIAGNOSIS — Z91.89 AT RISK FOR LYMPHEDEMA: Primary | ICD-10-CM

## 2021-10-21 DIAGNOSIS — N64.4 BREAST PAIN: ICD-10-CM

## 2021-10-21 DIAGNOSIS — M62.89 MUSCLE TIGHTNESS: ICD-10-CM

## 2021-10-21 PROCEDURE — 97140 MANUAL THERAPY 1/> REGIONS: CPT

## 2021-10-21 NOTE — THERAPY TREATMENT NOTE
Outpatient Physical Therapy Ortho Treatment Note  [facility]     Patient Name: Susy Hanson  : 1960  MRN: [id number]  Today's Date: 10/21/2021      Visit Date: 10/21/2021    Visit Dx:    ICD-10-CM ICD-9-CM   1. At risk for lymphedema  Z91.89 V49.89   2. Breast pain  N64.4 611.71   3. Muscle tightness  M62.89 728.9       Patient Active Problem List   Diagnosis   • Encounter for general adult medical examination without abnormal findings   • Essential hypertension   • Irritable bowel syndrome   • Mixed anxiety depressive disorder   • Mixed hyperlipidemia   • Obesity   • Encounter for screening for malignant neoplasm of colon   • Type 2 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 (HCC)   • Vitamin D deficiency   • Hypertensive urgency   • Peripheral neuropathy   • ANDREAS (obstructive sleep apnea)   • Snoring   • Hypersomnia   • Chest pain, atypical   • Breast neoplasm, Tis (DCIS), left        Past Medical History:   Diagnosis Date   • Anxiety    • Breast cancer (HCC)     LEFT BREAST 2021   • Depression    • DM (diabetes mellitus) (HCC)    • Hyperlipidemia    • Hypertension     SAW CARDIO/ STRESS TEST  - NOW MEDS MANAGED BY PCP    • Irritable bowel syndrome    • Peripheral neuropathy    • PONV (postoperative nausea and vomiting)    • Sleep apnea     WEARS CPAP        Past Surgical History:   Procedure Laterality Date   • BLADDER REPAIR     • BREAST BIOPSY     • BREAST RECONSTRUCTION Bilateral 2021    Procedure: BILATERAL PREPECTORALPLACMENT TISSUE EXPANDERS AND ALLODERM;  Surgeon: Heri Arreaga MD;  Location: [facility];  Service: Plastics;  Laterality: Bilateral;   • CARDIOVASCULAR STRESS TEST     • HYSTERECTOMY     • MASTECTOMY W/ SENTINEL NODE BIOPSY Bilateral 2021    Procedure: bilateral total mastectomy, left axillary sentinel lymph node biopsy, possible reconstruction.;  Surgeon: Asia Perkins MD;  Location: [facility];   Service: General;  Laterality: Bilateral;   • TUBAL ABDOMINAL LIGATION  1981                        PT Assessment/Plan     Row Name 10/21/21 1152          PT Assessment    Assessment Comments Pt with inc pain and tightness today, aggravated by work duties and limited tolerance to sleeping. She is working full time and is able to change positions frequently, but feels her discomfort still inc to high level by the end of her shift. Scar tissue in L breast has improved due to inc focus. R does remain with indentation similar to L.  -LB            PT Plan    PT Plan Comments Continue STM/scar massage to improve discomfort and prepare for exchange surgery.  -LB           User Key  (r) = Recorded By, (t) = Taken By, (c) = Cosigned By    Initials Name Provider Type    Edita Chicas, PT Physical Therapist                   OP Exercises     Row Name 10/21/21 1100             Total Minutes    45523 - PT Manual Therapy Minutes 40  -LB            User Key  (r) = Recorded By, (t) = Taken By, (c) = Cosigned By    Initials Name Provider Type    Edita Chicas, PT Physical Therapist                         Manual Rx (last 36 hours)     Manual Treatments     Row Name 10/21/21 1100             Total Minutes    95759 - PT Manual Therapy Minutes 40  -LB              Manual Rx 1    Manual Rx 1 Location STM to B axilla, breast, avoiding incisions  -LB      Manual Rx 1 Type STM; PROM to B shoulders, pec stretch, GH posterior/inferior mobs  -LB      Manual Rx 1 Duration 40  -LB            User Key  (r) = Recorded By, (t) = Taken By, (c) = Cosigned By    Initials Name Provider Type    Edita Chicas, PT Physical Therapist                 PT OP Goals     Row Name 10/21/21 1100          PT Short Term Goals    STG Date to Achieve 10/15/21  -LB     STG 1 Pt will initiate self massage to address discomfort and begin resensitization process.  -LB     STG 1 Progress Met  -LB     STG 2 Pt will find bra that fits appropriately and does not  irritate.  -LB     STG 2 Progress Ongoing  -LB     STG 3 Pt will demonstrate 160 deg BUE flexion without discomfort.  -LB     STG 3 Progress Progressing  -LB            Long Term Goals    LTG Date to Achieve 10/31/21  -LB     LTG 1 Pt will tolerate work duties without inc B breast discomfort.  -LB     LTG 1 Progress Ongoing  -LB     LTG 1 Progress Comments Continued discomfort.  -LB     LTG 2 Pt will report dec overall disability per Quick DASH from 48% disability to 30% or less.  -LB     LTG 2 Progress Ongoing  -LB     LTG 3 Pt will attend bra fitting and acquire appropriately fitted bra for continued support and comfort.  -LB     LTG 3 Progress Ongoing  -LB           User Key  (r) = Recorded By, (t) = Taken By, (c) = Cosigned By    Initials Name Provider Type    Edita Chicas PT Physical Therapist                Therapy Education  Given: Symptoms/condition management, Posture/body mechanics, Edema management  Program: Reinforced  How Provided: Verbal, Demonstration  Provided to: Patient  Level of Understanding: Teach back education performed, Verbalized, Demonstrated              Time Calculation:   Start Time: 1045  Stop Time: 1130  Time Calculation (min): 45 min  Total Timed Code Minutes- PT: 40 minute(s)  Timed Charges  88143 - PT Manual Therapy Minutes: 40  Total Minutes  Timed Charges Total Minutes: 40   Total Minutes: 40  Therapy Charges for Today     Code Description Service Date Service Provider Modifiers Qty    39206900042 HC PT MANUAL THERAPY EA 15 MIN 10/21/2021 Edita Reynaga, PT GP 3                    Edita Reynaga PT  10/21/2021

## 2021-10-28 ENCOUNTER — HOSPITAL ENCOUNTER (OUTPATIENT)
Dept: PHYSICAL THERAPY | Facility: HOSPITAL | Age: 61
Setting detail: THERAPIES SERIES
Discharge: HOME | End: 2021-10-28

## 2021-10-28 DIAGNOSIS — M62.89 MUSCLE TIGHTNESS: ICD-10-CM

## 2021-10-28 DIAGNOSIS — G56.01 CARPAL TUNNEL SYNDROME ON RIGHT: ICD-10-CM

## 2021-10-28 DIAGNOSIS — N64.4 BREAST PAIN: ICD-10-CM

## 2021-10-28 DIAGNOSIS — Z91.89 AT RISK FOR LYMPHEDEMA: Primary | ICD-10-CM

## 2021-10-28 PROCEDURE — 97140 MANUAL THERAPY 1/> REGIONS: CPT

## 2021-10-28 NOTE — THERAPY TREATMENT NOTE
"    Outpatient Physical Therapy Lymphedema Treatment Note  [facility]     Patient Name: Susy Hanson  : 1960  MRN: [id number]  Today's Date: 10/28/2021        Visit Date: 10/28/2021    Visit Dx:    ICD-10-CM ICD-9-CM   1. At risk for lymphedema  Z91.89 V49.89   2. Breast pain  N64.4 611.71   3. Muscle tightness  M62.89 728.9   4. Carpal tunnel syndrome on right  G56.01 354.0       Patient Active Problem List   Diagnosis   • Encounter for general adult medical examination without abnormal findings   • Essential hypertension   • Irritable bowel syndrome   • Mixed anxiety depressive disorder   • Mixed hyperlipidemia   • Obesity   • Encounter for screening for malignant neoplasm of colon   • Type 2 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 (HCC)   • Vitamin D deficiency   • Hypertensive urgency   • Peripheral neuropathy   • ANDREAS (obstructive sleep apnea)   • Snoring   • Hypersomnia   • Chest pain, atypical   • Breast neoplasm, Tis (DCIS), left                           PT Assessment/Plan     Row Name 10/28/21 1018          PT Assessment    Assessment Comments Pt with slow improvement in condition, still eager for upcoming implant exchange. Pt with improving scar tissue B with obvious success with independent scar massage. Sent her self MLD video today and instructed in daily performance to address B breast \"fullness\"/discomfort following work duties. Pt continues to report improvement following sessions for 1-2 days.  -LB            PT Plan    PT Plan Comments Continue STM/scar massage to improve tissue quality for upcoming exchange surgery.  -LB           User Key  (r) = Recorded By, (t) = Taken By, (c) = Cosigned By    Initials Name Provider Type    Edita Chicas, PT Physical Therapist                    OP Exercises     Row Name 10/28/21 0900             Total Minutes    24097 - PT Manual Therapy Minutes 40  -LB            User Key  (r) = Recorded By, (t) = Taken By, (c) = " Cosigned By    Initials Name Provider Type    LB Edita Reynaga, PT Physical Therapist                        Manual Rx (last 36 hours)     Manual Treatments     Row Name 10/28/21 0900             Total Minutes    01015 - PT Manual Therapy Minutes 40  -LB              Manual Rx 1    Manual Rx 1 Location STM to B axilla, breast, avoiding incisions  -LB      Manual Rx 1 Type STM; PROM to B shoulders, pec stretch, GH posterior/inferior mobs  -LB      Manual Rx 1 Grade pt in SLing and supine  -LB      Manual Rx 1 Duration 40  -LB            User Key  (r) = Recorded By, (t) = Taken By, (c) = Cosigned By    Initials Name Provider Type    LB Edita Reynaga, PT Physical Therapist                 PT OP Goals     Row Name 10/28/21 1000          PT Short Term Goals    STG Date to Achieve 10/15/21  -LB     STG 1 Pt will initiate self massage to address discomfort and begin resensitization process.  -LB     STG 1 Progress Met  -LB     STG 2 Pt will find bra that fits appropriately and does not irritate.  -LB     STG 2 Progress Ongoing  -LB     STG 2 Progress Comments Pt wearing compression tank top.  -LB     STG 3 Pt will demonstrate 160 deg BUE flexion without discomfort.  -LB     STG 3 Progress Met  -LB            Long Term Goals    LTG Date to Achieve 10/31/21  -LB     LTG 1 Pt will tolerate work duties without inc B breast discomfort.  -LB     LTG 1 Progress Ongoing  -LB     LTG 1 Progress Comments intermittent discomfort  -LB     LTG 2 Pt will report dec overall disability per Quick DASH from 48% disability to 30% or less.  -LB     LTG 2 Progress Ongoing  -LB     LTG 3 Pt will attend bra fitting and acquire appropriately fitted bra for continued support and comfort.  -LB     LTG 3 Progress Ongoing  -LB           User Key  (r) = Recorded By, (t) = Taken By, (c) = Cosigned By    Initials Name Provider Type    LB Edita Reynaga, PT Physical Therapist                Therapy Education  Education Details: sent self MLD video,  discussed daily deep breaths and MLD  Given: Edema management, HEP  Program: Reinforced  How Provided: Verbal, Demonstration, Written  Provided to: Patient  Level of Understanding: Teach back education performed, Verbalized, Demonstrated              Time Calculation:   Start Time: 0915  Stop Time: 1000  Time Calculation (min): 45 min  Total Timed Code Minutes- PT: 40 minute(s)  Timed Charges  16737 - PT Manual Therapy Minutes: 40  Total Minutes  Timed Charges Total Minutes: 40   Total Minutes: 40   Therapy Charges for Today     Code Description Service Date Service Provider Modifiers Qty    09233935744 HC PT MANUAL THERAPY EA 15 MIN 10/28/2021 Edita Reynaga, PT GP 3                    Edita Reynaga, PT  10/28/2021

## 2021-11-04 ENCOUNTER — HOSPITAL ENCOUNTER (OUTPATIENT)
Dept: PHYSICAL THERAPY | Facility: HOSPITAL | Age: 61
Setting detail: THERAPIES SERIES
Discharge: HOME | End: 2021-11-04

## 2021-11-04 DIAGNOSIS — N64.4 BREAST PAIN: ICD-10-CM

## 2021-11-04 DIAGNOSIS — Z91.89 AT RISK FOR LYMPHEDEMA: Primary | ICD-10-CM

## 2021-11-04 DIAGNOSIS — M62.89 MUSCLE TIGHTNESS: ICD-10-CM

## 2021-11-04 DIAGNOSIS — G56.01 CARPAL TUNNEL SYNDROME ON RIGHT: ICD-10-CM

## 2021-11-04 PROCEDURE — 97140 MANUAL THERAPY 1/> REGIONS: CPT

## 2021-11-04 NOTE — THERAPY PROGRESS REPORT/RE-CERT
Physical Therapy Lymphedema Progress Note  [facility]     Patient Name: Susy Hanson  : 1960  MRN: [id number]  Today's Date: 2021      Visit Date: 2021    Visit Dx:    ICD-10-CM ICD-9-CM   1. At risk for lymphedema  Z91.89 V49.89   2. Breast pain  N64.4 611.71   3. Muscle tightness  M62.89 728.9   4. Carpal tunnel syndrome on right  G56.01 354.0       Patient Active Problem List   Diagnosis   • Encounter for general adult medical examination without abnormal findings   • Essential hypertension   • Irritable bowel syndrome   • Mixed anxiety depressive disorder   • Mixed hyperlipidemia   • Obesity   • Encounter for screening for malignant neoplasm of colon   • Type 2 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 (HCC)   • Vitamin D deficiency   • Hypertensive urgency   • Peripheral neuropathy   • ANDREAS (obstructive sleep apnea)   • Snoring   • Hypersomnia   • Chest pain, atypical   • Breast neoplasm, Tis (DCIS), left        Past Medical History:   Diagnosis Date   • Anxiety    • Breast cancer (HCC)     LEFT BREAST 2021   • Depression    • DM (diabetes mellitus) (HCC)    • Hyperlipidemia    • Hypertension     SAW CARDIO/ STRESS TEST  - NOW MEDS MANAGED BY PCP    • Irritable bowel syndrome    • Peripheral neuropathy    • PONV (postoperative nausea and vomiting)    • Sleep apnea     WEARS CPAP        Past Surgical History:   Procedure Laterality Date   • BLADDER REPAIR     • BREAST BIOPSY     • BREAST RECONSTRUCTION Bilateral 2021    Procedure: BILATERAL PREPECTORALPLACMENT TISSUE EXPANDERS AND ALLODERM;  Surgeon: Heri Arreaga MD;  Location: [facility];  Service: Plastics;  Laterality: Bilateral;   • CARDIOVASCULAR STRESS TEST     • HYSTERECTOMY     • MASTECTOMY W/ SENTINEL NODE BIOPSY Bilateral 2021    Procedure: bilateral total mastectomy, left axillary sentinel lymph node biopsy, possible reconstruction.;  Surgeon: Maddie  Asia GARNER MD;  Location: [facility] MAIN OR;  Service: General;  Laterality: Bilateral;   • TUBAL ABDOMINAL LIGATION  1981       Visit Dx:    ICD-10-CM ICD-9-CM   1. At risk for lymphedema  Z91.89 V49.89   2. Breast pain  N64.4 611.71   3. Muscle tightness  M62.89 728.9   4. Carpal tunnel syndrome on right  G56.01 354.0                                       OP Exercises     Row Name 11/04/21 1300             Total Minutes    81643 - PT Manual Therapy Minutes 40  -LB            User Key  (r) = Recorded By, (t) = Taken By, (c) = Cosigned By    Initials Name Provider Type    LB Edita Reynaga, PT Physical Therapist                        Manual Rx (last 36 hours)     Manual Treatments     Row Name 11/04/21 1300             Total Minutes    89688 - PT Manual Therapy Minutes 40  -LB              Manual Rx 1    Manual Rx 1 Location STM to B axilla, breast, avoiding incisions  -LB      Manual Rx 1 Type STM; PROM to B shoulders, pec stretch, GH posterior/inferior mobs  -LB      Manual Rx 1 Grade pt in SLing and supine  -LB      Manual Rx 1 Duration 40  -LB            User Key  (r) = Recorded By, (t) = Taken By, (c) = Cosigned By    Initials Name Provider Type    LB Edita Reynaga, PT Physical Therapist                 PT OP Goals     Row Name 11/04/21 1400          PT Short Term Goals    STG Date to Achieve 10/15/21  -LB     STG 1 Pt will initiate self massage to address discomfort and begin resensitization process.  -LB     STG 1 Progress Met  -LB     STG 2 Pt will find bra that fits appropriately and does not irritate.  -LB     STG 2 Progress Ongoing  -LB     STG 2 Progress Comments Waiting for upcoming implant exchange.  -LB     STG 3 Pt will demonstrate 160 deg BUE flexion without discomfort.  -LB     STG 3 Progress Met  -LB            Long Term Goals    LTG Date to Achieve 10/31/21  -LB     LTG 1 Pt will tolerate work duties without inc B breast discomfort.  -LB     LTG 1 Progress Ongoing  -LB     LTG 1 Progress Comments B  inc pain with work duties.  -LB     LTG 2 Pt will report dec overall disability per Quick DASH from 48% disability to 30% or less.  -LB     LTG 2 Progress Ongoing  -LB     LTG 2 Progress Comments worsening quickDASH score  -LB     LTG 3 Pt will attend bra fitting and acquire appropriately fitted bra for continued support and comfort.  -LB     LTG 3 Progress Ongoing  -LB     LTG 3 Progress Comments Will acquire after implant exchange.  -LB           User Key  (r) = Recorded By, (t) = Taken By, (c) = Cosigned By    Initials Name Provider Type    Edita Chicas, PT Physical Therapist                 PT Assessment/Plan     Row Name 11/04/21 1427          PT Assessment    Functional Limitations Limitation in home management; Limitations in community activities; Limitations in functional capacity and performance; Performance in leisure activities; Performance in self-care ADL; Performance in work activities  -LB     Impairments Edema; Impaired flexibility; Impaired lymphatic circulation; Impaired muscle endurance; Joint mobility; Locomotion; Muscle strength; Pain; Posture; Range of motion; Poor body mechanics; Sensation  -LB     Assessment Comments Pt has participated in 6 skilled PT sessions to address B breast edema, tightness in B pec girdle, and pain/discomfort with nearly all UE activities. She has made slow progress in this as pain and tightness continues, especially as day progresses. She feels pain with activities as simple as clicking button on computer. She has difficulty sleeping. Palpable edema B breasts with thickening along L > R incision and inferior. Pt with fluid pocket at lateral edge B breasts. She is performing self MLD and wearing compression tank top through the day. She has appt. with plastic surgeon next week and hopes to move her implant exchange surgery sooner if possible due to severe discomfort and limitations in function. She will remain appropriate for skilled PT services to continue to  reduce discomfort and improve pain. TOday her self reported QuickDASH disability score worsened and is now 68% disability.  -LB     Please refer to paper survey for additional self-reported information Yes  -LB     Rehab Potential Good  -LB     Patient/caregiver participated in establishment of treatment plan and goals Yes  -LB     Patient would benefit from skilled therapy intervention Yes  -LB            PT Plan    PT Frequency 1x/week; 2x/week  -LB     Predicted Duration of Therapy Intervention (PT) 4-6 visits  -LB     Planned CPT's? PT RE-EVAL: 95130; PT THER PROC EA 15 MIN: 84918; PT THER ACT EA 15 MIN: 15542; PT MANUAL THERAPY EA 15 MIN: 95664; PT SELF CARE/HOME MGMT/TRAIN EA 15: 79296; PT BIS XTRACELL FLUID ANALYSIS: 22328  -LB     PT Plan Comments Continue manual, follow up after plastic surgeon  -LB           User Key  (r) = Recorded By, (t) = Taken By, (c) = Cosigned By    Initials Name Provider Type    Edita Chicas, PT Physical Therapist                   Outcome Measure Options: Quick DASH  Quick DASH  Open a tight or new jar.: Severe Difficulty  Do heavy household chores (e.g., wash walls, wash floors): Unable  Carry a shopping bag or briefcase: Severe Difficulty  Wash your back: Severe Difficulty  Use a knife to cut food: Severe Difficulty  Recreational activities in which you take some force or impact through your arm, should or hand (e.g. golf, hammering, tennis, etc.): Severe Difficulty  During the past week, to what extent has your arm, shoulder, or hand problem interfered with your normal social activites with family, friends, neighbors or groups?: Quite a bit  During the past week, were you limited in your work or other regular daily activities as a result of your arm, shoulder or hand problem?: Very limited  Arm, Shoulder, or hand pain: Moderate  Tingling (pins and needles) in your arm, shoulder, or hand: None  During the past week, how much difficulty have you had sleeping because of the  pain in your arm, shoulder or hand?: Severe Difficulty  Number of Questions Answered: 11  Quick DASH Score: 68.18  Quick Dash Comments: 69% disability         Time Calculation:   Start Time: 1345  Stop Time: 1430  Time Calculation (min): 45 min  Total Timed Code Minutes- PT: 40 minute(s)  Timed Charges  73982 - PT Manual Therapy Minutes: 40  Total Minutes  Timed Charges Total Minutes: 40   Total Minutes: 40   Therapy Charges for Today     Code Description Service Date Service Provider Modifiers Qty    78280480847 HC PT MANUAL THERAPY EA 15 MIN 11/4/2021 Edita Reynaga, PT GP 3          PT G-Codes  Outcome Measure Options: Quick DASH  Quick DASH Score: 68.18         Edita Reynaga, PT  11/4/2021

## 2021-11-11 ENCOUNTER — HOSPITAL ENCOUNTER (OUTPATIENT)
Dept: PHYSICAL THERAPY | Facility: HOSPITAL | Age: 61
Setting detail: THERAPIES SERIES
Discharge: HOME | End: 2021-11-11

## 2021-11-11 DIAGNOSIS — Z91.89 AT RISK FOR LYMPHEDEMA: Primary | ICD-10-CM

## 2021-11-11 DIAGNOSIS — N64.4 BREAST PAIN: ICD-10-CM

## 2021-11-11 DIAGNOSIS — M62.89 MUSCLE TIGHTNESS: ICD-10-CM

## 2021-11-11 PROCEDURE — 97140 MANUAL THERAPY 1/> REGIONS: CPT

## 2021-11-11 NOTE — THERAPY TREATMENT NOTE
Outpatient Physical Therapy Lymphedema Treatment Note  [facility]     Patient Name: Susy Hanson  : 1960  MRN: [id number]  Today's Date: 2021        Visit Date: 2021    Visit Dx:    ICD-10-CM ICD-9-CM   1. At risk for lymphedema  Z91.89 V49.89   2. Breast pain  N64.4 611.71   3. Muscle tightness  M62.89 728.9       Patient Active Problem List   Diagnosis   • Encounter for general adult medical examination without abnormal findings   • Essential hypertension   • Irritable bowel syndrome   • Mixed anxiety depressive disorder   • Mixed hyperlipidemia   • Obesity   • Encounter for screening for malignant neoplasm of colon   • Type 2 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 (HCC)   • Vitamin D deficiency   • Hypertensive urgency   • Peripheral neuropathy   • ANDREAS (obstructive sleep apnea)   • Snoring   • Hypersomnia   • Chest pain, atypical   • Breast neoplasm, Tis (DCIS), left                           PT Assessment/Plan     Row Name 21 1001          PT Assessment    Assessment Comments Pt unable to move exchange surgery date so she remains frustrated with symptoms. Continued improved management for short term after PT. Scar tissue much improved to nearly non-existent today. Pt has done well with compliance with self MLD and scar massage. She continues to report high pain levels as the day progresses. She does feel relief for remainder of the day after PT but this does not carryover.  -LB            PT Plan    PT Plan Comments Continue manual therapy.  -LB           User Key  (r) = Recorded By, (t) = Taken By, (c) = Cosigned By    Initials Name Provider Type    Edita Chicas PT Physical Therapist                    OP Exercises     Row Name 21 0900             Total Minutes    16534 - PT Manual Therapy Minutes 40  -LB            User Key  (r) = Recorded By, (t) = Taken By, (c) = Cosigned By    Initials Name Provider Type    Edita Chicas PT  Physical Therapist                        Manual Rx (last 36 hours)     Manual Treatments     Row Name 11/11/21 0900             Total Minutes    01888 - PT Manual Therapy Minutes 40  -LB              Manual Rx 1    Manual Rx 1 Location STM to B axilla, breast, avoiding incisions  -LB      Manual Rx 1 Type STM; PROM to B shoulders, pec stretch, GH posterior/inferior mobs  -LB      Manual Rx 1 Grade pt in SLing and supine  -LB      Manual Rx 1 Duration 40  -LB            User Key  (r) = Recorded By, (t) = Taken By, (c) = Cosigned By    Initials Name Provider Type    Edita Chicas, PT Physical Therapist                 PT OP Goals     Row Name 11/11/21 1000          PT Short Term Goals    STG Date to Achieve 10/15/21  -LB     STG 1 Pt will initiate self massage to address discomfort and begin resensitization process.  -LB     STG 1 Progress Met  -LB     STG 2 Pt will find bra that fits appropriately and does not irritate.  -LB     STG 2 Progress Ongoing  -LB     STG 3 Pt will demonstrate 160 deg BUE flexion without discomfort.  -LB     STG 3 Progress Met  -LB            Long Term Goals    LTG Date to Achieve 10/31/21  -LB     LTG 1 Pt will tolerate work duties without inc B breast discomfort.  -LB     LTG 1 Progress Ongoing  -LB     LTG 2 Pt will report dec overall disability per Quick DASH from 48% disability to 30% or less.  -LB     LTG 2 Progress Ongoing  -LB     LTG 3 Pt will attend bra fitting and acquire appropriately fitted bra for continued support and comfort.  -LB     LTG 3 Progress Ongoing  -LB           User Key  (r) = Recorded By, (t) = Taken By, (c) = Cosigned By    Initials Name Provider Type    Edita Chicas PT Physical Therapist                Therapy Education  Given: Edema management, HEP  Program: Reinforced  How Provided: Verbal, Demonstration  Provided to: Patient  Level of Understanding: Teach back education performed, Verbalized, Demonstrated              Time Calculation:   Start Time:  0915  Stop Time: 1000  Time Calculation (min): 45 min  Total Timed Code Minutes- PT: 40 minute(s)  Timed Charges  21279 - PT Manual Therapy Minutes: 40  Total Minutes  Timed Charges Total Minutes: 40   Total Minutes: 40   Therapy Charges for Today     Code Description Service Date Service Provider Modifiers Qty    32738072476 HC PT MANUAL THERAPY EA 15 MIN 11/11/2021 Edita Reynaga, PT GP 3                    Edita Reynaga, PT  11/11/2021

## 2021-11-15 ENCOUNTER — NURSE NAVIGATOR (OUTPATIENT)
Dept: OTHER | Facility: HOSPITAL | Age: 61
End: 2021-11-15

## 2021-11-15 NOTE — PROGRESS NOTES
Called MsLian Valentin to see how she was doing. Left a message with my contact information and asked her to call back at her convenience.

## 2021-11-17 ENCOUNTER — HOSPITAL ENCOUNTER (OUTPATIENT)
Dept: PHYSICAL THERAPY | Facility: HOSPITAL | Age: 61
Setting detail: THERAPIES SERIES
Discharge: HOME | End: 2021-11-17

## 2021-11-17 DIAGNOSIS — N64.4 BREAST PAIN: ICD-10-CM

## 2021-11-17 DIAGNOSIS — M62.89 MUSCLE TIGHTNESS: ICD-10-CM

## 2021-11-17 DIAGNOSIS — Z91.89 AT RISK FOR LYMPHEDEMA: Primary | ICD-10-CM

## 2021-11-17 PROCEDURE — 97140 MANUAL THERAPY 1/> REGIONS: CPT

## 2021-11-17 NOTE — THERAPY TREATMENT NOTE
Outpatient Physical Therapy Ortho Treatment Note  [facility]     Patient Name: Susy Hanson  : 1960  MRN: [id number]  Today's Date: 2021      Visit Date: 2021    Visit Dx:    ICD-10-CM ICD-9-CM   1. At risk for lymphedema  Z91.89 V49.89   2. Breast pain  N64.4 611.71   3. Muscle tightness  M62.89 728.9       Patient Active Problem List   Diagnosis   • Encounter for general adult medical examination without abnormal findings   • Essential hypertension   • Irritable bowel syndrome   • Mixed anxiety depressive disorder   • Mixed hyperlipidemia   • Obesity   • Encounter for screening for malignant neoplasm of colon   • Type 2 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 (HCC)   • Vitamin D deficiency   • Hypertensive urgency   • Peripheral neuropathy   • ANDREAS (obstructive sleep apnea)   • Snoring   • Hypersomnia   • Chest pain, atypical   • Breast neoplasm, Tis (DCIS), left        Past Medical History:   Diagnosis Date   • Anxiety    • Breast cancer (HCC)     LEFT BREAST 2021   • Depression    • DM (diabetes mellitus) (HCC)    • Hyperlipidemia    • Hypertension     SAW CARDIO/ STRESS TEST  - NOW MEDS MANAGED BY PCP    • Irritable bowel syndrome    • Peripheral neuropathy    • PONV (postoperative nausea and vomiting)    • Sleep apnea     WEARS CPAP        Past Surgical History:   Procedure Laterality Date   • BLADDER REPAIR     • BREAST BIOPSY     • BREAST RECONSTRUCTION Bilateral 2021    Procedure: BILATERAL PREPECTORALPLACMENT TISSUE EXPANDERS AND ALLODERM;  Surgeon: Heri Arreaga MD;  Location: [facility];  Service: Plastics;  Laterality: Bilateral;   • CARDIOVASCULAR STRESS TEST     • HYSTERECTOMY     • MASTECTOMY W/ SENTINEL NODE BIOPSY Bilateral 2021    Procedure: bilateral total mastectomy, left axillary sentinel lymph node biopsy, possible reconstruction.;  Surgeon: Asia Perkins MD;  Location: [facility];   Service: General;  Laterality: Bilateral;   • TUBAL ABDOMINAL LIGATION  1981                        PT Assessment/Plan     Row Name 11/17/21 1427          PT Assessment    Assessment Comments Pt with inc discomfort today B. Tautness does seem to be inc L breast with inc redness along L incision. Instructed pt to monitor and call MD if it worsens or does not improve. Continued STM which does provide short term relief.  -LB            PT Plan    PT Plan Comments Continue POC until exchange surgery.  -LB           User Key  (r) = Recorded By, (t) = Taken By, (c) = Cosigned By    Initials Name Provider Type    Edita Chicas, PT Physical Therapist                   OP Exercises     Row Name 11/17/21 1300             Total Minutes    80721 - PT Manual Therapy Minutes 40  -LB            User Key  (r) = Recorded By, (t) = Taken By, (c) = Cosigned By    Initials Name Provider Type    Edita Chicas, PT Physical Therapist                         Manual Rx (last 36 hours)     Manual Treatments     Row Name 11/17/21 1300             Total Minutes    99207 - PT Manual Therapy Minutes 40  -LB              Manual Rx 1    Manual Rx 1 Location STM to B axilla, breast, avoiding incisions  -LB      Manual Rx 1 Type STM; PROM to B shoulders, pec stretch, GH posterior/inferior mobs  -LB      Manual Rx 1 Grade pt in SLing and supine  -LB      Manual Rx 1 Duration 40  -LB            User Key  (r) = Recorded By, (t) = Taken By, (c) = Cosigned By    Initials Name Provider Type    Edita Chicas, PT Physical Therapist                 PT OP Goals     Row Name 11/17/21 1400          PT Short Term Goals    STG Date to Achieve 10/15/21  -LB     STG 1 Pt will initiate self massage to address discomfort and begin resensitization process.  -LB     STG 1 Progress Met  -LB     STG 2 Pt will find bra that fits appropriately and does not irritate.  -LB     STG 2 Progress Ongoing  -LB     STG 3 Pt will demonstrate 160 deg BUE flexion without  discomfort.  -LB     STG 3 Progress Met  -LB            Long Term Goals    LTG Date to Achieve 10/31/21  -LB     LTG 1 Pt will tolerate work duties without inc B breast discomfort.  -LB     LTG 1 Progress Ongoing  -LB     LTG 2 Pt will report dec overall disability per Quick DASH from 48% disability to 30% or less.  -LB     LTG 2 Progress Ongoing  -LB     LTG 3 Pt will attend bra fitting and acquire appropriately fitted bra for continued support and comfort.  -LB     LTG 3 Progress Ongoing  -LB           User Key  (r) = Recorded By, (t) = Taken By, (c) = Cosigned By    Initials Name Provider Type    LB Edita Reynaga, PT Physical Therapist                Therapy Education  Given: Edema management, HEP  Program: Reinforced  How Provided: Verbal, Demonstration  Provided to: Patient  Level of Understanding: Teach back education performed, Verbalized, Demonstrated              Time Calculation:   Start Time: 1345  Stop Time: 1430  Time Calculation (min): 45 min  Total Timed Code Minutes- PT: 40 minute(s)  Timed Charges  49194 - PT Manual Therapy Minutes: 40  Total Minutes  Timed Charges Total Minutes: 40   Total Minutes: 40  Therapy Charges for Today     Code Description Service Date Service Provider Modifiers Qty    73152767541 HC PT MANUAL THERAPY EA 15 MIN 11/17/2021 Edita Reynaga, PT GP 3                    Edita Reynaga PT  11/17/2021

## 2021-11-24 ENCOUNTER — HOSPITAL ENCOUNTER (OUTPATIENT)
Dept: PHYSICAL THERAPY | Facility: HOSPITAL | Age: 61
Setting detail: THERAPIES SERIES
Discharge: HOME | End: 2021-11-24

## 2021-11-24 ENCOUNTER — LAB REQUISITION (OUTPATIENT)
Dept: LAB | Facility: HOSPITAL | Age: 61
End: 2021-11-24

## 2021-11-24 DIAGNOSIS — N64.4 BREAST PAIN: ICD-10-CM

## 2021-11-24 DIAGNOSIS — Z00.00 ENCOUNTER FOR GENERAL ADULT MEDICAL EXAMINATION WITHOUT ABNORMAL FINDINGS: ICD-10-CM

## 2021-11-24 DIAGNOSIS — Z91.89 AT RISK FOR LYMPHEDEMA: Primary | ICD-10-CM

## 2021-11-24 DIAGNOSIS — M62.89 MUSCLE TIGHTNESS: ICD-10-CM

## 2021-11-24 PROCEDURE — 97140 MANUAL THERAPY 1/> REGIONS: CPT

## 2021-11-24 PROCEDURE — 87205 SMEAR GRAM STAIN: CPT | Performed by: PLASTIC SURGERY

## 2021-11-24 PROCEDURE — 87070 CULTURE OTHR SPECIMN AEROBIC: CPT | Performed by: PLASTIC SURGERY

## 2021-11-24 PROCEDURE — 87205 SMEAR GRAM STAIN: CPT

## 2021-11-24 PROCEDURE — 87070 CULTURE OTHR SPECIMN AEROBIC: CPT

## 2021-11-24 NOTE — THERAPY TREATMENT NOTE
Outpatient Physical Therapy Lymphedema Treatment Note  [facility]     Patient Name: Susy Hanson  : 1960  MRN: [id number]  Today's Date: 2021        Visit Date: 2021    Visit Dx:    ICD-10-CM ICD-9-CM   1. At risk for lymphedema  Z91.89 V49.89   2. Breast pain  N64.4 611.71   3. Muscle tightness  M62.89 728.9       Patient Active Problem List   Diagnosis   • Encounter for general adult medical examination without abnormal findings   • Essential hypertension   • Irritable bowel syndrome   • Mixed anxiety depressive disorder   • Mixed hyperlipidemia   • Obesity   • Encounter for screening for malignant neoplasm of colon   • Type 2 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 (HCC)   • Vitamin D deficiency   • Hypertensive urgency   • Peripheral neuropathy   • ANDREAS (obstructive sleep apnea)   • Snoring   • Hypersomnia   • Chest pain, atypical   • Breast neoplasm, Tis (DCIS), left                           PT Assessment/Plan     Row Name 21 0811          PT Assessment    Assessment Comments Noted redness L breast > R along incision. R breast much improved today with dec edema and much dec discomfort for last few days. Pt even baked yesterday for inc period of time and did not report inc R breast discomfort. Encouraged alerting MD office concerning L breast redness as this is different than previous appearance.  -LB            PT Plan    PT Plan Comments Follow up concerning redness, continue manual therapy.  -LB           User Key  (r) = Recorded By, (t) = Taken By, (c) = Cosigned By    Initials Name Provider Type    Edita Chicas, PT Physical Therapist                    OP Exercises     Row Name 21 0700             Total Minutes    26818 - PT Manual Therapy Minutes 40  -LB            User Key  (r) = Recorded By, (t) = Taken By, (c) = Cosigned By    Initials Name Provider Type    Edita Chicas, PT Physical Therapist                        Manual Rx  (last 36 hours)     Manual Treatments     Row Name 11/24/21 0700             Total Minutes    91026 - PT Manual Therapy Minutes 40  -LB              Manual Rx 1    Manual Rx 1 Location STM to B axilla, breast, avoiding incisions  -LB      Manual Rx 1 Type STM; PROM to B shoulders, pec stretch, GH posterior/inferior mobs  -LB      Manual Rx 1 Grade pt in SLing and supine  -LB      Manual Rx 1 Duration 40  -LB            User Key  (r) = Recorded By, (t) = Taken By, (c) = Cosigned By    Initials Name Provider Type    Edita Chicas, PT Physical Therapist                 PT OP Goals     Row Name 11/24/21 0800          PT Short Term Goals    STG Date to Achieve 10/15/21  -LB     STG 1 Pt will initiate self massage to address discomfort and begin resensitization process.  -LB     STG 1 Progress Met  -LB     STG 2 Pt will find bra that fits appropriately and does not irritate.  -LB     STG 2 Progress Ongoing  -LB     STG 3 Pt will demonstrate 160 deg BUE flexion without discomfort.  -LB     STG 3 Progress Met  -LB            Long Term Goals    LTG Date to Achieve 10/31/21  -LB     LTG 1 Pt will tolerate work duties without inc B breast discomfort.  -LB     LTG 1 Progress Ongoing  -LB     LTG 2 Pt will report dec overall disability per Quick DASH from 48% disability to 30% or less.  -LB     LTG 2 Progress Ongoing  -LB     LTG 3 Pt will attend bra fitting and acquire appropriately fitted bra for continued support and comfort.  -LB     LTG 3 Progress Ongoing  -LB           User Key  (r) = Recorded By, (t) = Taken By, (c) = Cosigned By    Initials Name Provider Type    Edita Chicas PT Physical Therapist                Therapy Education  Given: Edema management, HEP  Program: Reinforced  How Provided: Verbal, Demonstration  Provided to: Patient  Level of Understanding: Teach back education performed, Verbalized, Demonstrated              Time Calculation:   Start Time: 0730  Stop Time: 0815  Time Calculation (min): 45  min  Total Timed Code Minutes- PT: 40 minute(s)  Timed Charges  18144 - PT Manual Therapy Minutes: 40  Total Minutes  Timed Charges Total Minutes: 40   Total Minutes: 40   Therapy Charges for Today     Code Description Service Date Service Provider Modifiers Qty    94195221416 HC PT MANUAL THERAPY EA 15 MIN 11/24/2021 Edita Reynaga, PT GP 3                    Edita Reynaga, PT  11/24/2021

## 2021-11-27 LAB
BACTERIA SPEC AEROBE CULT: NORMAL
GRAM STN SPEC: NORMAL
GRAM STN SPEC: NORMAL

## 2021-11-29 ENCOUNTER — LAB REQUISITION (OUTPATIENT)
Dept: LAB | Facility: HOSPITAL | Age: 61
End: 2021-11-29

## 2021-11-29 DIAGNOSIS — Z00.00 ENCOUNTER FOR GENERAL ADULT MEDICAL EXAMINATION WITHOUT ABNORMAL FINDINGS: ICD-10-CM

## 2021-11-29 PROCEDURE — 87205 SMEAR GRAM STAIN: CPT | Performed by: PLASTIC SURGERY

## 2021-11-29 PROCEDURE — 87070 CULTURE OTHR SPECIMN AEROBIC: CPT | Performed by: PLASTIC SURGERY

## 2021-12-02 ENCOUNTER — HOSPITAL ENCOUNTER (OUTPATIENT)
Dept: PHYSICAL THERAPY | Facility: HOSPITAL | Age: 61
Setting detail: THERAPIES SERIES
Discharge: HOME | End: 2021-12-02

## 2021-12-02 DIAGNOSIS — M62.89 MUSCLE TIGHTNESS: ICD-10-CM

## 2021-12-02 DIAGNOSIS — N64.4 BREAST PAIN: ICD-10-CM

## 2021-12-02 DIAGNOSIS — Z91.89 AT RISK FOR LYMPHEDEMA: Primary | ICD-10-CM

## 2021-12-02 LAB
BACTERIA SPEC AEROBE CULT: NORMAL
GRAM STN SPEC: NORMAL
GRAM STN SPEC: NORMAL

## 2021-12-02 PROCEDURE — 97140 MANUAL THERAPY 1/> REGIONS: CPT

## 2021-12-02 NOTE — THERAPY TREATMENT NOTE
Outpatient Physical Therapy Lymphedema Treatment Note  [facility]     Patient Name: Susy Hanson  : 1960  MRN: [id number]  Today's Date: 2021        Visit Date: 2021    Visit Dx:    ICD-10-CM ICD-9-CM   1. At risk for lymphedema  Z91.89 V49.89   2. Breast pain  N64.4 611.71   3. Muscle tightness  M62.89 728.9       Patient Active Problem List   Diagnosis   • Encounter for general adult medical examination without abnormal findings   • Essential hypertension   • Irritable bowel syndrome   • Mixed anxiety depressive disorder   • Mixed hyperlipidemia   • Obesity   • Encounter for screening for malignant neoplasm of colon   • Type 2 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 (HCC)   • Vitamin D deficiency   • Hypertensive urgency   • Peripheral neuropathy   • ANDREAS (obstructive sleep apnea)   • Snoring   • Hypersomnia   • Chest pain, atypical   • Breast neoplasm, Tis (DCIS), left                           PT Assessment/Plan     Row Name 21 1050          PT Assessment    Assessment Comments Pt returned to MD and he drained fluid off which gave me short term relief but did make her very sore. On antibiotic for L breast. Possibly moving implant exchange? Will follow up Monday to determine. Continued STM avoiding L breast incision and reddened area. Pt with some relief from therapy but obviously frustrated by continued edema in L > R breast.  -LB            PT Plan    PT Plan Comments follow up after MD appt. Monday  -LB           User Key  (r) = Recorded By, (t) = Taken By, (c) = Cosigned By    Initials Name Provider Type    Edita Chicas, PT Physical Therapist                    OP Exercises     Row Name 21 0900             Total Minutes    38578 - PT Manual Therapy Minutes 40  -LB            User Key  (r) = Recorded By, (t) = Taken By, (c) = Cosigned By    Initials Name Provider Type    Edita Chicas, PT Physical Therapist                        Manual  Rx (last 36 hours)     Manual Treatments     Row Name 12/02/21 0900             Total Minutes    02300 - PT Manual Therapy Minutes 40  -LB              Manual Rx 1    Manual Rx 1 Location STM to B axilla, breast, avoiding incisions  -LB      Manual Rx 1 Type STM; PROM to B shoulders, pec stretch, GH posterior/inferior mobs  -LB      Manual Rx 1 Grade pt in SLing and supine  -LB      Manual Rx 1 Duration 40  -LB            User Key  (r) = Recorded By, (t) = Taken By, (c) = Cosigned By    Initials Name Provider Type    Edita Chicas, PT Physical Therapist                 PT OP Goals     Row Name 12/02/21 1000          PT Short Term Goals    STG Date to Achieve 10/15/21  -LB     STG 1 Pt will initiate self massage to address discomfort and begin resensitization process.  -LB     STG 1 Progress Met  -LB     STG 2 Pt will find bra that fits appropriately and does not irritate.  -LB     STG 2 Progress Ongoing  -LB     STG 3 Pt will demonstrate 160 deg BUE flexion without discomfort.  -LB     STG 3 Progress Met  -LB            Long Term Goals    LTG Date to Achieve 10/31/21  -LB     LTG 1 Pt will tolerate work duties without inc B breast discomfort.  -LB     LTG 1 Progress Ongoing  -LB     LTG 2 Pt will report dec overall disability per [employer] D[employer] from 48% disability to 30% or less.  -LB     LTG 2 Progress Ongoing  -LB     LTG 3 Pt will attend bra fitting and acquire appropriately fitted bra for continued support and comfort.  -LB     LTG 3 Progress Ongoing  -LB           User Key  (r) = Recorded By, (t) = Taken By, (c) = Cosigned By    Initials Name Provider Type    Edita Chicas PT Physical Therapist                Therapy Education  Given: Edema management, HEP  Program: Reinforced  How Provided: Verbal, Demonstration  Provided to: Patient  Level of Understanding: Teach back education performed, Verbalized, Demonstrated              Time Calculation:   Start Time: 1045  Stop Time: 1130  Time Calculation (min): 45  min  Total Timed Code Minutes- PT: 40 minute(s)  Timed Charges  25692 - PT Manual Therapy Minutes: 40  Total Minutes  Timed Charges Total Minutes: 40   Total Minutes: 40   Therapy Charges for Today     Code Description Service Date Service Provider Modifiers Qty    17208953701 HC PT MANUAL THERAPY EA 15 MIN 12/2/2021 Edita Reynaga, PT GP 3                    Edita Reynaga, PT  12/2/2021

## 2021-12-14 ENCOUNTER — TELEPHONE (OUTPATIENT)
Dept: FAMILY MEDICINE CLINIC | Facility: CLINIC | Age: 61
End: 2021-12-14

## 2021-12-14 DIAGNOSIS — G47.00 INSOMNIA, UNSPECIFIED TYPE: Primary | ICD-10-CM

## 2021-12-14 RX ORDER — TEMAZEPAM 30 MG/1
30 CAPSULE ORAL NIGHTLY PRN
Qty: 90 CAPSULE | Refills: 0 | Status: SHIPPED | OUTPATIENT
Start: 2021-12-14 | End: 2022-01-26

## 2021-12-14 NOTE — TELEPHONE ENCOUNTER
----- Message from Aleksandr Wadsworth MA sent at 12/13/2021 11:02 AM EST -----  Regarding: FW: Metformin & Temazepam    ----- Message -----  From: Susy Hanson  Sent: 12/13/2021  11:02 AM EST  To: Malinda Mcknight [facility]  Subject: Metformin & Temazepam                            Good morning Dr. Flores,    Hope you and your family are doing well.  I am going to need the prescription changed and refilled for Metformin.  It needs to be refilled for 2500 daily which I have been taking 1000 am and 1500 pm.     I also need a prescription of Temazepam for sleeping.   I am not taking the Gabapentin any longer.  I did for the nerve pain and to help sleep.  I really don't like the way it makes me feel and the pain has gotten better.   I have my expanders removed and replaced with the permanent implants on 12/23.  I can't wait!  Maura Suazo to Me!  Have a Maura Suazo! :)

## 2021-12-20 ENCOUNTER — PRE-ADMISSION TESTING (OUTPATIENT)
Dept: PREADMISSION TESTING | Facility: HOSPITAL | Age: 61
End: 2021-12-20

## 2021-12-20 VITALS
BODY MASS INDEX: 27.83 KG/M2 | OXYGEN SATURATION: 98 % | TEMPERATURE: 97.3 F | HEIGHT: 64 IN | WEIGHT: 163 LBS | SYSTOLIC BLOOD PRESSURE: 171 MMHG | HEART RATE: 79 BPM | DIASTOLIC BLOOD PRESSURE: 75 MMHG | RESPIRATION RATE: 16 BRPM

## 2021-12-20 LAB
ANION GAP SERPL CALCULATED.3IONS-SCNC: 13.7 MMOL/L (ref 5–15)
BUN SERPL-MCNC: 22 MG/DL (ref 8–23)
BUN/CREAT SERPL: 22 (ref 7–25)
CALCIUM SPEC-SCNC: 10.3 MG/DL (ref 8.6–10.5)
CHLORIDE SERPL-SCNC: 100 MMOL/L (ref 98–107)
CO2 SERPL-SCNC: 25.3 MMOL/L (ref 22–29)
CREAT SERPL-MCNC: 1 MG/DL (ref 0.57–1)
DEPRECATED RDW RBC AUTO: 45.7 FL (ref 37–54)
ERYTHROCYTE [DISTWIDTH] IN BLOOD BY AUTOMATED COUNT: 14.3 % (ref 12.3–15.4)
GFR SERPL CREATININE-BSD FRML MDRD: 56 ML/MIN/1.73
GLUCOSE SERPL-MCNC: 108 MG/DL (ref 65–99)
HCT VFR BLD AUTO: 34.8 % (ref 34–46.6)
HGB BLD-MCNC: 11.2 G/DL (ref 12–15.9)
MCH RBC QN AUTO: 27.9 PG (ref 26.6–33)
MCHC RBC AUTO-ENTMCNC: 32.2 G/DL (ref 31.5–35.7)
MCV RBC AUTO: 86.6 FL (ref 79–97)
PLATELET # BLD AUTO: 263 10*3/MM3 (ref 140–450)
PMV BLD AUTO: 9.7 FL (ref 6–12)
POTASSIUM SERPL-SCNC: 5.1 MMOL/L (ref 3.5–5.2)
RBC # BLD AUTO: 4.02 10*6/MM3 (ref 3.77–5.28)
SARS-COV-2 ORF1AB RESP QL NAA+PROBE: NOT DETECTED
SODIUM SERPL-SCNC: 139 MMOL/L (ref 136–145)
WBC NRBC COR # BLD: 5.78 10*3/MM3 (ref 3.4–10.8)

## 2021-12-20 PROCEDURE — 36415 COLL VENOUS BLD VENIPUNCTURE: CPT

## 2021-12-20 PROCEDURE — C9803 HOPD COVID-19 SPEC COLLECT: HCPCS

## 2021-12-20 PROCEDURE — U0004 COV-19 TEST NON-CDC HGH THRU: HCPCS

## 2021-12-20 PROCEDURE — 80048 BASIC METABOLIC PNL TOTAL CA: CPT

## 2021-12-20 PROCEDURE — 85027 COMPLETE CBC AUTOMATED: CPT

## 2021-12-20 RX ORDER — HYDRALAZINE HYDROCHLORIDE 100 MG/1
100 TABLET, FILM COATED ORAL NIGHTLY
Status: ON HOLD | COMMUNITY
End: 2022-01-04

## 2021-12-20 RX ORDER — ALPRAZOLAM 0.5 MG/1
0.5 TABLET ORAL 2 TIMES DAILY PRN
COMMUNITY
End: 2022-01-26

## 2021-12-23 ENCOUNTER — ANESTHESIA EVENT (OUTPATIENT)
Dept: PERIOP | Facility: HOSPITAL | Age: 61
End: 2021-12-23

## 2021-12-23 ENCOUNTER — ANESTHESIA (OUTPATIENT)
Dept: PERIOP | Facility: HOSPITAL | Age: 61
End: 2021-12-23

## 2021-12-23 ENCOUNTER — HOSPITAL ENCOUNTER (OUTPATIENT)
Facility: HOSPITAL | Age: 61
Setting detail: HOSPITAL OUTPATIENT SURGERY
Discharge: HOME | End: 2021-12-23
Attending: PLASTIC SURGERY | Admitting: PLASTIC SURGERY

## 2021-12-23 VITALS
RESPIRATION RATE: 16 BRPM | TEMPERATURE: 97.7 F | BODY MASS INDEX: 28.29 KG/M2 | SYSTOLIC BLOOD PRESSURE: 145 MMHG | HEART RATE: 73 BPM | HEIGHT: 64 IN | DIASTOLIC BLOOD PRESSURE: 86 MMHG | OXYGEN SATURATION: 96 % | WEIGHT: 165.7 LBS

## 2021-12-23 DIAGNOSIS — D05.12 BREAST NEOPLASM, TIS (DCIS), LEFT: Primary | ICD-10-CM

## 2021-12-23 DIAGNOSIS — N64.89 SEROMA OF BREAST: ICD-10-CM

## 2021-12-23 LAB
GLUCOSE BLDC GLUCOMTR-MCNC: 143 MG/DL (ref 70–130)
GLUCOSE BLDC GLUCOMTR-MCNC: 194 MG/DL (ref 70–130)

## 2021-12-23 PROCEDURE — 25010000002 NEOSTIGMINE 5 MG/10ML SOLUTION: Performed by: NURSE ANESTHETIST, CERTIFIED REGISTERED

## 2021-12-23 PROCEDURE — 82962 GLUCOSE BLOOD TEST: CPT

## 2021-12-23 PROCEDURE — 25010000002 PROPOFOL 10 MG/ML EMULSION: Performed by: NURSE ANESTHETIST, CERTIFIED REGISTERED

## 2021-12-23 PROCEDURE — 0 CEFAZOLIN PER 500 MG: Performed by: PLASTIC SURGERY

## 2021-12-23 PROCEDURE — 25010000002 GENTAMICIN PER 80 MG: Performed by: PLASTIC SURGERY

## 2021-12-23 PROCEDURE — 87205 SMEAR GRAM STAIN: CPT | Performed by: PLASTIC SURGERY

## 2021-12-23 PROCEDURE — C1789 PROSTHESIS, BREAST, IMP: HCPCS | Performed by: PLASTIC SURGERY

## 2021-12-23 PROCEDURE — 87070 CULTURE OTHR SPECIMN AEROBIC: CPT | Performed by: PLASTIC SURGERY

## 2021-12-23 PROCEDURE — 25010000002 FENTANYL CITRATE (PF) 50 MCG/ML SOLUTION: Performed by: NURSE ANESTHETIST, CERTIFIED REGISTERED

## 2021-12-23 PROCEDURE — 63710000001 ONDANSETRON PER 8 MG: Performed by: PLASTIC SURGERY

## 2021-12-23 PROCEDURE — 25010000002 ONDANSETRON PER 1 MG: Performed by: NURSE ANESTHETIST, CERTIFIED REGISTERED

## 2021-12-23 PROCEDURE — 25010000002 DEXAMETHASONE PER 1 MG: Performed by: NURSE ANESTHETIST, CERTIFIED REGISTERED

## 2021-12-23 PROCEDURE — 87075 CULTR BACTERIA EXCEPT BLOOD: CPT | Performed by: PLASTIC SURGERY

## 2021-12-23 DEVICE — IMPLANTABLE DEVICE: Type: IMPLANTABLE DEVICE | Site: BREAST | Status: FUNCTIONAL

## 2021-12-23 RX ORDER — SODIUM CHLORIDE 0.9 % (FLUSH) 0.9 %
3 SYRINGE (ML) INJECTION EVERY 12 HOURS SCHEDULED
Status: DISCONTINUED | OUTPATIENT
Start: 2021-12-23 | End: 2021-12-23 | Stop reason: HOSPADM

## 2021-12-23 RX ORDER — HYDROCODONE BITARTRATE AND ACETAMINOPHEN 7.5; 325 MG/1; MG/1
1 TABLET ORAL ONCE AS NEEDED
Status: DISCONTINUED | OUTPATIENT
Start: 2021-12-23 | End: 2022-01-07 | Stop reason: HOSPADM

## 2021-12-23 RX ORDER — HYDROMORPHONE HYDROCHLORIDE 1 MG/ML
0.5 INJECTION, SOLUTION INTRAMUSCULAR; INTRAVENOUS; SUBCUTANEOUS
Status: DISCONTINUED | OUTPATIENT
Start: 2021-12-23 | End: 2022-01-07 | Stop reason: HOSPADM

## 2021-12-23 RX ORDER — OXYCODONE AND ACETAMINOPHEN 7.5; 325 MG/1; MG/1
1 TABLET ORAL EVERY 4 HOURS PRN
Status: DISCONTINUED | OUTPATIENT
Start: 2021-12-23 | End: 2021-12-30 | Stop reason: HOSPADM

## 2021-12-23 RX ORDER — DEXAMETHASONE SODIUM PHOSPHATE 10 MG/ML
INJECTION INTRAMUSCULAR; INTRAVENOUS AS NEEDED
Status: DISCONTINUED | OUTPATIENT
Start: 2021-12-23 | End: 2021-12-23 | Stop reason: SURG

## 2021-12-23 RX ORDER — FENTANYL CITRATE 50 UG/ML
INJECTION, SOLUTION INTRAMUSCULAR; INTRAVENOUS AS NEEDED
Status: DISCONTINUED | OUTPATIENT
Start: 2021-12-23 | End: 2021-12-23 | Stop reason: SURG

## 2021-12-23 RX ORDER — KETAMINE HYDROCHLORIDE 10 MG/ML
INJECTION INTRAMUSCULAR; INTRAVENOUS AS NEEDED
Status: DISCONTINUED | OUTPATIENT
Start: 2021-12-23 | End: 2021-12-23 | Stop reason: SURG

## 2021-12-23 RX ORDER — SODIUM CHLORIDE, SODIUM LACTATE, POTASSIUM CHLORIDE, CALCIUM CHLORIDE 600; 310; 30; 20 MG/100ML; MG/100ML; MG/100ML; MG/100ML
125 INJECTION, SOLUTION INTRAVENOUS CONTINUOUS
Status: DISCONTINUED | OUTPATIENT
Start: 2021-12-23 | End: 2022-01-07 | Stop reason: HOSPADM

## 2021-12-23 RX ORDER — FAMOTIDINE 10 MG/ML
20 INJECTION, SOLUTION INTRAVENOUS ONCE
Status: COMPLETED | OUTPATIENT
Start: 2021-12-23 | End: 2021-12-23

## 2021-12-23 RX ORDER — ROCURONIUM BROMIDE 10 MG/ML
INJECTION, SOLUTION INTRAVENOUS AS NEEDED
Status: DISCONTINUED | OUTPATIENT
Start: 2021-12-23 | End: 2021-12-23 | Stop reason: SURG

## 2021-12-23 RX ORDER — ONDANSETRON HYDROCHLORIDE 8 MG/1
8 TABLET, FILM COATED ORAL ONCE
Status: COMPLETED | OUTPATIENT
Start: 2021-12-23 | End: 2021-12-23

## 2021-12-23 RX ORDER — PROPOFOL 10 MG/ML
VIAL (ML) INTRAVENOUS AS NEEDED
Status: DISCONTINUED | OUTPATIENT
Start: 2021-12-23 | End: 2021-12-23 | Stop reason: SURG

## 2021-12-23 RX ORDER — NEOSTIGMINE METHYLSULFATE 0.5 MG/ML
INJECTION, SOLUTION INTRAVENOUS AS NEEDED
Status: DISCONTINUED | OUTPATIENT
Start: 2021-12-23 | End: 2021-12-23 | Stop reason: SURG

## 2021-12-23 RX ORDER — SODIUM CHLORIDE 0.9 % (FLUSH) 0.9 %
3-10 SYRINGE (ML) INJECTION AS NEEDED
Status: DISCONTINUED | OUTPATIENT
Start: 2021-12-23 | End: 2021-12-23 | Stop reason: HOSPADM

## 2021-12-23 RX ORDER — PROMETHAZINE HYDROCHLORIDE 25 MG/1
25 SUPPOSITORY RECTAL ONCE AS NEEDED
Status: DISCONTINUED | OUTPATIENT
Start: 2021-12-23 | End: 2022-01-07 | Stop reason: HOSPADM

## 2021-12-23 RX ORDER — LABETALOL HYDROCHLORIDE 5 MG/ML
5 INJECTION, SOLUTION INTRAVENOUS
Status: DISCONTINUED | OUTPATIENT
Start: 2021-12-23 | End: 2022-01-07 | Stop reason: HOSPADM

## 2021-12-23 RX ORDER — FENTANYL CITRATE 50 UG/ML
50 INJECTION, SOLUTION INTRAMUSCULAR; INTRAVENOUS
Status: DISCONTINUED | OUTPATIENT
Start: 2021-12-23 | End: 2022-01-07 | Stop reason: HOSPADM

## 2021-12-23 RX ORDER — ACETAMINOPHEN 325 MG/1
975 TABLET ORAL ONCE
Status: COMPLETED | OUTPATIENT
Start: 2021-12-23 | End: 2021-12-23

## 2021-12-23 RX ORDER — FLUMAZENIL 0.1 MG/ML
0.2 INJECTION INTRAVENOUS AS NEEDED
Status: DISCONTINUED | OUTPATIENT
Start: 2021-12-23 | End: 2022-01-07 | Stop reason: HOSPADM

## 2021-12-23 RX ORDER — OXYCODONE HYDROCHLORIDE AND ACETAMINOPHEN 5; 325 MG/1; MG/1
1 TABLET ORAL EVERY 4 HOURS PRN
Qty: 15 TABLET | Refills: 0 | Status: SHIPPED | OUTPATIENT
Start: 2021-12-23 | End: 2022-01-26

## 2021-12-23 RX ORDER — LIDOCAINE HYDROCHLORIDE 10 MG/ML
0.5 INJECTION, SOLUTION EPIDURAL; INFILTRATION; INTRACAUDAL; PERINEURAL ONCE AS NEEDED
Status: DISCONTINUED | OUTPATIENT
Start: 2021-12-23 | End: 2021-12-23 | Stop reason: HOSPADM

## 2021-12-23 RX ORDER — OXYCODONE HYDROCHLORIDE AND ACETAMINOPHEN 5; 325 MG/1; MG/1
1 TABLET ORAL ONCE AS NEEDED
Status: DISCONTINUED | OUTPATIENT
Start: 2021-12-23 | End: 2021-12-30 | Stop reason: HOSPADM

## 2021-12-23 RX ORDER — PROMETHAZINE HYDROCHLORIDE 25 MG/1
25 TABLET ORAL ONCE AS NEEDED
Status: DISCONTINUED | OUTPATIENT
Start: 2021-12-23 | End: 2022-01-07 | Stop reason: HOSPADM

## 2021-12-23 RX ORDER — ACETAMINOPHEN 325 MG/1
325 TABLET ORAL EVERY 4 HOURS PRN
Qty: 30 TABLET | Refills: 1 | Status: SHIPPED | OUTPATIENT
Start: 2021-12-23 | End: 2022-07-26

## 2021-12-23 RX ORDER — SCOLOPAMINE TRANSDERMAL SYSTEM 1 MG/1
1 PATCH, EXTENDED RELEASE TRANSDERMAL ONCE
Status: COMPLETED | OUTPATIENT
Start: 2021-12-23 | End: 2021-12-24

## 2021-12-23 RX ORDER — ONDANSETRON 2 MG/ML
4 INJECTION INTRAMUSCULAR; INTRAVENOUS ONCE AS NEEDED
Status: COMPLETED | OUTPATIENT
Start: 2021-12-23 | End: 2021-12-23

## 2021-12-23 RX ORDER — ONDANSETRON 8 MG/1
8 TABLET, ORALLY DISINTEGRATING ORAL EVERY 8 HOURS PRN
Qty: 10 TABLET | Refills: 1 | Status: SHIPPED | OUTPATIENT
Start: 2021-12-23 | End: 2022-01-26

## 2021-12-23 RX ORDER — ONDANSETRON 2 MG/ML
INJECTION INTRAMUSCULAR; INTRAVENOUS AS NEEDED
Status: DISCONTINUED | OUTPATIENT
Start: 2021-12-23 | End: 2021-12-23 | Stop reason: SURG

## 2021-12-23 RX ORDER — DIPHENHYDRAMINE HYDROCHLORIDE 50 MG/ML
12.5 INJECTION INTRAMUSCULAR; INTRAVENOUS
Status: DISCONTINUED | OUTPATIENT
Start: 2021-12-23 | End: 2022-01-07 | Stop reason: HOSPADM

## 2021-12-23 RX ORDER — DOXYCYCLINE 100 MG/1
100 CAPSULE ORAL 2 TIMES DAILY
Qty: 10 CAPSULE | Refills: 0 | Status: SHIPPED | OUTPATIENT
Start: 2021-12-23 | End: 2021-12-28

## 2021-12-23 RX ORDER — LIDOCAINE HYDROCHLORIDE 20 MG/ML
INJECTION, SOLUTION INFILTRATION; PERINEURAL AS NEEDED
Status: DISCONTINUED | OUTPATIENT
Start: 2021-12-23 | End: 2021-12-23 | Stop reason: SURG

## 2021-12-23 RX ORDER — MIDAZOLAM HYDROCHLORIDE 1 MG/ML
1 INJECTION INTRAMUSCULAR; INTRAVENOUS
Status: DISCONTINUED | OUTPATIENT
Start: 2021-12-23 | End: 2021-12-23 | Stop reason: HOSPADM

## 2021-12-23 RX ORDER — EPHEDRINE SULFATE 50 MG/ML
5 INJECTION, SOLUTION INTRAVENOUS ONCE AS NEEDED
Status: DISCONTINUED | OUTPATIENT
Start: 2021-12-23 | End: 2022-01-07 | Stop reason: HOSPADM

## 2021-12-23 RX ORDER — OXYCODONE HYDROCHLORIDE 5 MG/1
10 TABLET ORAL ONCE
Status: COMPLETED | OUTPATIENT
Start: 2021-12-23 | End: 2021-12-23

## 2021-12-23 RX ORDER — CELECOXIB 200 MG/1
400 CAPSULE ORAL ONCE
Status: COMPLETED | OUTPATIENT
Start: 2021-12-23 | End: 2021-12-23

## 2021-12-23 RX ORDER — GABAPENTIN 300 MG/1
300 CAPSULE ORAL ONCE
Status: COMPLETED | OUTPATIENT
Start: 2021-12-23 | End: 2021-12-23

## 2021-12-23 RX ORDER — NALOXONE HCL 0.4 MG/ML
0.2 VIAL (ML) INJECTION AS NEEDED
Status: DISCONTINUED | OUTPATIENT
Start: 2021-12-23 | End: 2022-01-07 | Stop reason: HOSPADM

## 2021-12-23 RX ORDER — PROMETHAZINE HYDROCHLORIDE 25 MG/1
25 SUPPOSITORY RECTAL EVERY 6 HOURS PRN
Qty: 10 SUPPOSITORY | Refills: 0 | Status: SHIPPED | OUTPATIENT
Start: 2021-12-23 | End: 2022-01-26

## 2021-12-23 RX ORDER — FENTANYL CITRATE 50 UG/ML
50 INJECTION, SOLUTION INTRAMUSCULAR; INTRAVENOUS
Status: DISCONTINUED | OUTPATIENT
Start: 2021-12-23 | End: 2021-12-23 | Stop reason: HOSPADM

## 2021-12-23 RX ORDER — AMOXICILLIN 250 MG
2 CAPSULE ORAL DAILY PRN
Qty: 30 TABLET | Refills: 1 | Status: SHIPPED | OUTPATIENT
Start: 2021-12-23 | End: 2022-01-04

## 2021-12-23 RX ORDER — DOCUSATE SODIUM 250 MG
250 CAPSULE ORAL 2 TIMES DAILY PRN
Qty: 15 CAPSULE | Refills: 1 | Status: SHIPPED | OUTPATIENT
Start: 2021-12-23 | End: 2022-01-04

## 2021-12-23 RX ORDER — IBUPROFEN 600 MG/1
600 TABLET ORAL ONCE AS NEEDED
Status: DISCONTINUED | OUTPATIENT
Start: 2021-12-23 | End: 2022-01-07 | Stop reason: HOSPADM

## 2021-12-23 RX ORDER — GLYCOPYRROLATE 0.2 MG/ML
INJECTION INTRAMUSCULAR; INTRAVENOUS AS NEEDED
Status: DISCONTINUED | OUTPATIENT
Start: 2021-12-23 | End: 2021-12-23 | Stop reason: SURG

## 2021-12-23 RX ORDER — GABAPENTIN 100 MG/1
100 CAPSULE ORAL EVERY 8 HOURS
Qty: 60 CAPSULE | Refills: 2 | Status: SHIPPED | OUTPATIENT
Start: 2021-12-23 | End: 2022-01-26

## 2021-12-23 RX ORDER — DIPHENHYDRAMINE HCL 25 MG
25 CAPSULE ORAL
Status: DISCONTINUED | OUTPATIENT
Start: 2021-12-23 | End: 2022-01-07 | Stop reason: HOSPADM

## 2021-12-23 RX ORDER — HYDRALAZINE HYDROCHLORIDE 20 MG/ML
5 INJECTION INTRAMUSCULAR; INTRAVENOUS
Status: DISCONTINUED | OUTPATIENT
Start: 2021-12-23 | End: 2022-01-07 | Stop reason: HOSPADM

## 2021-12-23 RX ORDER — EPHEDRINE SULFATE 50 MG/ML
INJECTION, SOLUTION INTRAVENOUS AS NEEDED
Status: DISCONTINUED | OUTPATIENT
Start: 2021-12-23 | End: 2021-12-23 | Stop reason: SURG

## 2021-12-23 RX ORDER — SODIUM CHLORIDE, SODIUM LACTATE, POTASSIUM CHLORIDE, CALCIUM CHLORIDE 600; 310; 30; 20 MG/100ML; MG/100ML; MG/100ML; MG/100ML
9 INJECTION, SOLUTION INTRAVENOUS CONTINUOUS
Status: DISCONTINUED | OUTPATIENT
Start: 2021-12-23 | End: 2022-01-07 | Stop reason: HOSPADM

## 2021-12-23 RX ADMIN — FENTANYL CITRATE 50 MCG: 0.05 INJECTION, SOLUTION INTRAMUSCULAR; INTRAVENOUS at 07:42

## 2021-12-23 RX ADMIN — NEOSTIGMINE METHYLSULFATE 2.5 MG: 0.5 INJECTION INTRAVENOUS at 09:32

## 2021-12-23 RX ADMIN — SODIUM CHLORIDE, POTASSIUM CHLORIDE, SODIUM LACTATE AND CALCIUM CHLORIDE: 600; 310; 30; 20 INJECTION, SOLUTION INTRAVENOUS at 09:24

## 2021-12-23 RX ADMIN — FAMOTIDINE 20 MG: 10 INJECTION INTRAVENOUS at 06:45

## 2021-12-23 RX ADMIN — EPHEDRINE SULFATE 5 MG: 50 INJECTION INTRAVENOUS at 09:41

## 2021-12-23 RX ADMIN — SCOLOPAMINE TRANSDERMAL SYSTEM 1 PATCH: 1 PATCH, EXTENDED RELEASE TRANSDERMAL at 06:45

## 2021-12-23 RX ADMIN — EPHEDRINE SULFATE 5 MG: 50 INJECTION INTRAVENOUS at 08:56

## 2021-12-23 RX ADMIN — ROCURONIUM BROMIDE 50 MG: 50 INJECTION INTRAVENOUS at 07:42

## 2021-12-23 RX ADMIN — PROPOFOL 25 MCG/KG/MIN: 10 INJECTION, EMULSION INTRAVENOUS at 08:14

## 2021-12-23 RX ADMIN — FENTANYL CITRATE 50 MCG: 0.05 INJECTION, SOLUTION INTRAMUSCULAR; INTRAVENOUS at 10:01

## 2021-12-23 RX ADMIN — GLYCOPYRROLATE 0.4 MG: 0.2 INJECTION INTRAMUSCULAR; INTRAVENOUS at 09:32

## 2021-12-23 RX ADMIN — ONDANSETRON HYDROCHLORIDE 8 MG: 8 TABLET, FILM COATED ORAL at 06:34

## 2021-12-23 RX ADMIN — EPHEDRINE SULFATE 10 MG: 50 INJECTION INTRAVENOUS at 08:26

## 2021-12-23 RX ADMIN — ONDANSETRON 4 MG: 2 INJECTION INTRAMUSCULAR; INTRAVENOUS at 09:36

## 2021-12-23 RX ADMIN — LIDOCAINE HYDROCHLORIDE 80 MG: 20 INJECTION, SOLUTION INFILTRATION; PERINEURAL at 07:42

## 2021-12-23 RX ADMIN — OXYCODONE HYDROCHLORIDE 10 MG: 5 TABLET ORAL at 07:26

## 2021-12-23 RX ADMIN — ONDANSETRON 4 MG: 2 INJECTION INTRAMUSCULAR; INTRAVENOUS at 11:26

## 2021-12-23 RX ADMIN — EPHEDRINE SULFATE 5 MG: 50 INJECTION INTRAVENOUS at 08:51

## 2021-12-23 RX ADMIN — DEXAMETHASONE SODIUM PHOSPHATE 10 MG: 10 INJECTION INTRAMUSCULAR; INTRAVENOUS at 07:50

## 2021-12-23 RX ADMIN — SODIUM CHLORIDE, POTASSIUM CHLORIDE, SODIUM LACTATE AND CALCIUM CHLORIDE 125 ML/HR: 600; 310; 30; 20 INJECTION, SOLUTION INTRAVENOUS at 06:34

## 2021-12-23 RX ADMIN — KETAMINE HYDROCHLORIDE 10 MG: 10 INJECTION INTRAMUSCULAR; INTRAVENOUS at 09:32

## 2021-12-23 RX ADMIN — PROPOFOL 150 MG: 10 INJECTION, EMULSION INTRAVENOUS at 07:42

## 2021-12-23 RX ADMIN — ACETAMINOPHEN 975 MG: 325 TABLET, FILM COATED ORAL at 06:34

## 2021-12-23 RX ADMIN — GABAPENTIN 300 MG: 300 CAPSULE ORAL at 07:28

## 2021-12-23 RX ADMIN — DOXYCYCLINE 200 MG: 100 INJECTION, POWDER, LYOPHILIZED, FOR SOLUTION INTRAVENOUS at 06:33

## 2021-12-23 RX ADMIN — CELECOXIB 400 MG: 200 CAPSULE ORAL at 06:34

## 2021-12-23 RX ADMIN — KETAMINE HYDROCHLORIDE 10 MG: 10 INJECTION INTRAMUSCULAR; INTRAVENOUS at 09:11

## 2021-12-23 RX ADMIN — EPHEDRINE SULFATE 10 MG: 50 INJECTION INTRAVENOUS at 07:51

## 2021-12-23 RX ADMIN — KETAMINE HYDROCHLORIDE 30 MG: 10 INJECTION INTRAMUSCULAR; INTRAVENOUS at 07:50

## 2021-12-23 RX ADMIN — OXYCODONE AND ACETAMINOPHEN 1 TABLET: 5; 325 TABLET ORAL at 11:45

## 2021-12-23 RX ADMIN — EPHEDRINE SULFATE 10 MG: 50 INJECTION INTRAVENOUS at 07:56

## 2021-12-23 NOTE — ANESTHESIA PROCEDURE NOTES
Airway  Urgency: elective    Date/Time: 12/23/2021 7:45 AM  Airway not difficult    General Information and Staff    Patient location during procedure: OR  Anesthesiologist: Matty Garrison MD  CRNA: Sherrell Ellison CRNA    Indications and Patient Condition  Indications for airway management: airway protection    Preoxygenated: yes  Mask difficulty assessment: 2 - vent by mask + OA or adjuvant +/- NMBA    Final Airway Details  Final airway type: endotracheal airway      Successful airway: ETT  Cuffed: yes   Successful intubation technique: direct laryngoscopy  Endotracheal tube insertion site: oral  Blade: Brooklynn  Blade size: 3  ETT size (mm): 7.0  Cormack-Lehane Classification: grade I - full view of glottis  Placement verified by: chest auscultation and capnometry   Cuff volume (mL): 7  Measured from: lips  ETT/EBT  to lips (cm): 20  Number of attempts at approach: 1  Assessment: lips, teeth, and gum same as pre-op and atraumatic intubation

## 2021-12-23 NOTE — ANESTHESIA POSTPROCEDURE EVALUATION
Patient: Susy Hanson    Procedure Summary     Date: 12/23/21 Room / Location: [facility] OR 09 / [facility] MAIN OR    Anesthesia Start: 0732 Anesthesia Stop: 1009    Procedure: BILATERAL REMOVAL TISSUE EXPANDERS AND PLACEMENT OF IMPLANTS (Bilateral Breast) Diagnosis:     Surgeons: Heri Arreaga MD Provider: Matty Garrison MD    Anesthesia Type: general ASA Status: 3          Anesthesia Type: general    Vitals  Vitals Value Taken Time   /82 12/23/21 1145   Temp 36.5 °C (97.7 °F) 12/23/21 1145   Pulse 70 12/23/21 1145   Resp 16 12/23/21 1145   SpO2 97 % 12/23/21 1145           Post Anesthesia Care and Evaluation      Comments: Pt. Discharged prior to being evaluated by anesthesia.  Chart is reviewed and no complications are noted.  THIS CASE IS NOT MEDICALLY DIRECTED

## 2021-12-23 NOTE — ANESTHESIA PREPROCEDURE EVALUATION
Anesthesia Evaluation     Patient summary reviewed and Nursing notes reviewed   history of anesthetic complications: PONV  NPO Solid Status: > 8 hours  NPO Liquid Status: > 2 hours           Airway   Mallampati: II  TM distance: >3 FB  Neck ROM: full  Possible difficult intubation  Comment: Torin 2. Cormack-Lehane Classification: grade IIa - partial view of glottis  Dental - normal exam     Pulmonary - normal exam    breath sounds clear to auscultation  (+) a smoker (60 pack years) Former, sleep apnea on CPAP,   Cardiovascular - normal exam    ECG reviewed  Rhythm: regular  Rate: normal    (+) hypertension 2 medications or greater, valvular problems/murmurs (mild) MR, CHF Diastolic >=55%, hyperlipidemia,   (-) angina, orthopnea, PND, MULLER    ROS comment: NORMAL ECG -  Sinus rhythm    Neuro/Psych  (+) numbness (Peripheral neuropathy), psychiatric history Anxiety and Depression,     GI/Hepatic/Renal/Endo    (+)   diabetes mellitus type 2 using insulin,     Musculoskeletal (-) negative ROS    Abdominal    Substance History - negative use     OB/GYN negative ob/gyn ROS         Other      history of cancer (Left breast CA)                    Anesthesia Plan    ASA 3     general       Anesthetic plan, all risks, benefits, and alternatives have been provided, discussed and informed consent has been obtained with: patient.  Use of blood products discussed with patient .

## 2021-12-26 LAB
BACTERIA SPEC AEROBE CULT: NORMAL
GRAM STN SPEC: NORMAL
GRAM STN SPEC: NORMAL

## 2021-12-28 LAB — BACTERIA SPEC ANAEROBE CULT: NORMAL

## 2022-01-04 ENCOUNTER — OFFICE VISIT (OUTPATIENT)
Dept: SLEEP MEDICINE | Facility: CLINIC | Age: 62
End: 2022-01-04

## 2022-01-04 VITALS
HEART RATE: 102 BPM | WEIGHT: 165 LBS | SYSTOLIC BLOOD PRESSURE: 169 MMHG | DIASTOLIC BLOOD PRESSURE: 92 MMHG | HEIGHT: 64 IN | BODY MASS INDEX: 28.17 KG/M2 | OXYGEN SATURATION: 99 %

## 2022-01-04 DIAGNOSIS — Z99.89 OSA ON CPAP: Primary | ICD-10-CM

## 2022-01-04 DIAGNOSIS — G47.33 OSA ON CPAP: Primary | ICD-10-CM

## 2022-01-04 PROBLEM — R06.83 SNORING: Status: RESOLVED | Noted: 2020-01-28 | Resolved: 2022-01-04

## 2022-01-04 PROBLEM — G47.10 HYPERSOMNIA: Status: RESOLVED | Noted: 2020-01-28 | Resolved: 2022-01-04

## 2022-01-04 PROCEDURE — 99213 OFFICE O/P EST LOW 20 MIN: CPT | Performed by: INTERNAL MEDICINE

## 2022-01-04 PROCEDURE — G0463 HOSPITAL OUTPT CLINIC VISIT: HCPCS

## 2022-01-04 NOTE — PROGRESS NOTES
"  [facility]  [address] [place] [id number]  Phone   Fax       SLEEP CLINIC FOLLOW UP PROGRESS NOTE.    Susy Hanson  1960  61 y.o.  female      PCP: Erica Avila MD      Date of visit: 1/4/2022    Chief Complaint   Patient presents with   • Sleep Apnea       HPI:  This is a 61 y.o. years old patient who has a history of obstructive sleep apnea is here for  the annual compliance follow-up.  Sleep apnea is mild in severity with a AHI of 11.2/hr. Patient is using positive airway pressure therapy with auto CPAP and the symptoms of snoring, non-restorative sleep and daytime excessive sleepiness have improved significantly on the therapy. Normally goes to bed at 8 PM and wakes up at 6 AM.  The patient wakes up 4 time(s) during the night and has no problem going back to sleep.  Feels refreshed after waking up.  Patient also denies headaches and nasal congestion.   She works at [employer]      Medications and allergies are reviewed by me and documented in the encounter.     SOCIAL ( habits pertaining to sleep medicine)  History tobacco use:No   History of alcohol use: 0 per week  Caffeine use: 0     REVIEW OF SYSTEMS:   Bethel Sleepiness Scale :Total score: 12   Nasal congestion:No   Dry mouth/nose:No   Post nasal drip; No   Acid reflux/Heartburn:Yes   Abd bloating:No   Morning headache:No   Anxiety:Yes   Depression:No    PHYSICAL EXAMINATION:  CONSTITUTIONAL:  Vitals:    01/04/22 0957   BP: 169/92   BP Location: Left arm   Patient Position: Sitting   Cuff Size: Adult   Pulse: 102   SpO2: 99%   Weight: 74.8 kg (165 lb)   Height: 162.6 cm (64\")    Body mass index is 28.32 kg/m².   NOSE: nasal passages are clear, no nasal polyps, septum in the midline.  THROAT: throat is clear, oral airway Mallampati class 3  RESP SYSTEM: Breath sounds are normal, no wheezes or crackles  CARDIOVASULAR: Heart rate is regular without murmur. No edema      Data " reviewed:  The Smart card downloaded on 1/4/2022 has been reviewed independently by me for compliance and discussed the data with the patient.   Compliance; 100%  More than 4 hr use, 100%  Average use of the device 9 hours and 2 minutes per night  Residual AHI: 2.0 /hr (goal < 5.0 /hr)  Mask type: Full face  DME: [employer]      ASSESSMENT AND PLAN:  · Obstructive sleep apnea ( G 47.33).  The symptoms of sleep apnea have improved with the device and the treatment.  Patient's compliance with the device is excellent for treatment of sleep apnea.  I have independently reviewed the smart card down load and discussed with the patient the download data and encouarged the patient to continue to use the device.The residual AHI is acceptable. The device is benefiting the patient and the device is medically necessary.  Without proper control of sleep apnea and good compliance there is a increased risk for hypertension, diabetes mellitus and nonrestorative sleep with hypersomnia which can increase risk for motor vehicle accidents.  Untreated sleep apnea is also a risk factor for development of atrial fibrillation, pulmonary hypertension and stroke. The patient is also instructed to get the supplies from the DME company and and change them on a regular basis.  A prescription for supplies has been sent to the DME company.  I have also discussed the good sleep hygiene habits and adequate amount of sleep needed for good health.   · Return in about 1 year (around 1/4/2023) for Annual visit with smartcard download. . Patient's questions were answered.        Sam Quintana MD  Sleep Medicine.  Medical Director, [facility] sleep [facility]  1/4/2022 ,

## 2022-01-10 DIAGNOSIS — E11.49 TYPE 2 DIABETES MELLITUS WITH OTHER DIABETIC NEUROLOGICAL COMPLICATION: ICD-10-CM

## 2022-01-10 DIAGNOSIS — I10 ESSENTIAL HYPERTENSION: ICD-10-CM

## 2022-01-10 DIAGNOSIS — E78.2 MIXED HYPERLIPIDEMIA: ICD-10-CM

## 2022-01-10 DIAGNOSIS — R07.89 CHEST PAIN, ATYPICAL: ICD-10-CM

## 2022-01-10 RX ORDER — HYDRALAZINE HYDROCHLORIDE 100 MG/1
TABLET, FILM COATED ORAL
Qty: 270 TABLET | Refills: 2 | Status: SHIPPED | OUTPATIENT
Start: 2022-01-10 | End: 2022-04-15

## 2022-01-10 RX ORDER — HYDRALAZINE HYDROCHLORIDE 100 MG/1
TABLET, FILM COATED ORAL
Qty: 270 TABLET | Refills: 3 | Status: CANCELLED | OUTPATIENT
Start: 2022-01-10

## 2022-01-10 RX ORDER — DILTIAZEM HYDROCHLORIDE 180 MG/1
180 CAPSULE, COATED, EXTENDED RELEASE ORAL DAILY
Qty: 90 CAPSULE | Refills: 1 | OUTPATIENT
Start: 2022-01-10

## 2022-01-10 RX ORDER — DILTIAZEM HYDROCHLORIDE 180 MG/1
180 CAPSULE, COATED, EXTENDED RELEASE ORAL DAILY
Qty: 90 CAPSULE | Refills: 1 | Status: CANCELLED | OUTPATIENT
Start: 2022-01-10

## 2022-01-12 ENCOUNTER — TELEPHONE (OUTPATIENT)
Dept: FAMILY MEDICINE CLINIC | Facility: CLINIC | Age: 62
End: 2022-01-12

## 2022-01-12 DIAGNOSIS — I10 ESSENTIAL HYPERTENSION: ICD-10-CM

## 2022-01-12 DIAGNOSIS — R07.89 CHEST PAIN, ATYPICAL: ICD-10-CM

## 2022-01-12 DIAGNOSIS — E78.2 MIXED HYPERLIPIDEMIA: ICD-10-CM

## 2022-01-12 DIAGNOSIS — E11.49 TYPE 2 DIABETES MELLITUS WITH OTHER DIABETIC NEUROLOGICAL COMPLICATION: ICD-10-CM

## 2022-01-12 RX ORDER — DILTIAZEM HYDROCHLORIDE 180 MG/1
180 CAPSULE, COATED, EXTENDED RELEASE ORAL DAILY
Qty: 90 CAPSULE | Refills: 1 | OUTPATIENT
Start: 2022-01-12

## 2022-01-12 RX ORDER — DILTIAZEM HYDROCHLORIDE 180 MG/1
180 CAPSULE, COATED, EXTENDED RELEASE ORAL DAILY
Qty: 90 CAPSULE | Refills: 1 | Status: SHIPPED | OUTPATIENT
Start: 2022-01-12 | End: 2022-04-15

## 2022-01-12 NOTE — TELEPHONE ENCOUNTER
----- Message from Amy Cook MA sent at 1/12/2022  8:00 AM EST -----  Regarding: FW: dilTIAZem  MG 24 hr capsule    ----- Message -----  From: Susy Hanson  Sent: 1/12/2022   5:07 AM EST  To: Malinda Mcknight [facility]  Subject: dilTIAZem  MG 24 hr capsule                Good morning,  It looks like I don't have anymore refills for the diTIAZem CD.  I only have 4 days left.  I do have an appointment scheduled for 1/26 to see you.      Have a  great day,  Susy

## 2022-01-17 NOTE — OP NOTE
Pre-Operative Diagnosis: Acquired absence bilateral breasts     Post-Operative Diagnosis: Same     Procedure Performed: Removal of bilateral breast tissue expander with placement of permanent breast prosthesis with lateral capsulorrhaphy and medial capsulotomy ([id number])     Surgeon: VIRGIL Arreaga MD     Assistant: None     Anesthesia: General     Estimated Blood Loss: <5cc     Specimens: None     Complications: None     Indications: She completed tissue expansion and waiting period and is ready to proceed with second stage surgery.  She elected to proceed with just exchange of the expanders for an implant with no additional autologous fat grafting.     We discussed risks, benefits and alternatives including but not limited to: bleeding, infection, asymmetry, poor or slow wound healing, need for further surgery, possible recurrence.  The patient elected to proceed.     Description of Procedure: The patient was met in the preoperative holding area.  All questions were answered and informed consent was assured.       She was marked in a standing position the breast landmarks were drawn and she was transferred to the operating room.     After induction of appropriate anesthesia, a timeout was performed correctly identifying the patient, operative site, and procedure to be performed.  All present were in agreement.     Local anesthetic was infiltrated in the chest prepped and draped in a sterile fashion.  A lateral IM fold incision was made 5 cm in length and this was carried down through subcutaneous tissue to the implant capsule on both sides.  There was excellent integration of the AlloDerm and this was dissected a couple centimeters to allow an adequate cuff for closure.  Capsulotomy incision was made and the expanders were evacuated and removed.  There was no periprosthetic fluid.  Antibiotic irrigation was performed and the capsule.  There was quite a bit of lateralization of the capsule on the left  and a sizer was placed and positioned medially and the new desired lateral border was marked and the sizer removed.  This kali was transposed inside the capsule on both the mastectomy site and chest wall side and a lateral capsular flap was developed about 3 cm in width lateral to the kali on the mastectomy side and elevated up to the level of the kali.  3 cm section of chest wall capsule was then removed along the marked line and the residual lateral capsule was scored and thermal capsulorrhaphy was performed to help obliterate this lateral space.  Again copious antibiotic irrigation was performed and immaculate hemostasis was achieved.  A 2-0 Vicryl suture was used in a double layered running fashion to anchor the lateral capsular flap up onto the chest wall to create opposition between the 2 fresh edges.  The sizers were then replaced with a 445 cc sizer and she was placed in a sitting position and there was good symmetry and projection and then additional medial capsulotomies were performed to allow good positioning of the medial border of the breast.  The sizers were removed and again pocket copiously irrigated and immaculate hemostasis achieved.  50% Betadine solution was left to dwell for several minutes in the pocket and then gloves were changed and the skin reprepped.  The pockets then rinsed clear and the new implants brought onto the field and transferred to a Mar funnel with a no touch technique and placed into the pocket.  Closure was performed with running 2-0 Vicryl in the capsule and then interrupted 2-0 Vicryl in the subcutaneous place, 3-0 Monocryl deep dermal layer and 4-0 Monocryl in the intracuticular layer.  Incisions were then dressed with Dermabond and Tegaderm and she was placed in a well-padded Ace wrap.     The patient was then aroused from anesthesia with ease and transferred to the postoperative care area in good condition. All sponge, needle, and instrument counts were  correct.

## 2022-01-26 ENCOUNTER — LAB (OUTPATIENT)
Dept: FAMILY MEDICINE CLINIC | Facility: CLINIC | Age: 62
End: 2022-01-26

## 2022-01-26 ENCOUNTER — OFFICE VISIT (OUTPATIENT)
Dept: FAMILY MEDICINE CLINIC | Facility: CLINIC | Age: 62
End: 2022-01-26

## 2022-01-26 VITALS
SYSTOLIC BLOOD PRESSURE: 130 MMHG | TEMPERATURE: 97.5 F | HEART RATE: 78 BPM | OXYGEN SATURATION: 100 % | HEIGHT: 64 IN | WEIGHT: 165 LBS | DIASTOLIC BLOOD PRESSURE: 76 MMHG | BODY MASS INDEX: 28.17 KG/M2

## 2022-01-26 DIAGNOSIS — I10 ESSENTIAL HYPERTENSION: Primary | ICD-10-CM

## 2022-01-26 DIAGNOSIS — F41.8 MIXED ANXIETY DEPRESSIVE DISORDER: ICD-10-CM

## 2022-01-26 DIAGNOSIS — E78.2 MIXED HYPERLIPIDEMIA: ICD-10-CM

## 2022-01-26 DIAGNOSIS — R68.89 COLD INTOLERANCE: ICD-10-CM

## 2022-01-26 DIAGNOSIS — E11.49 TYPE 2 DIABETES MELLITUS WITH OTHER DIABETIC NEUROLOGICAL COMPLICATION: ICD-10-CM

## 2022-01-26 DIAGNOSIS — G47.00 INSOMNIA, UNSPECIFIED TYPE: ICD-10-CM

## 2022-01-26 DIAGNOSIS — Z12.11 SCREENING FOR COLON CANCER: ICD-10-CM

## 2022-01-26 DIAGNOSIS — I10 ESSENTIAL HYPERTENSION: ICD-10-CM

## 2022-01-26 LAB
ALBUMIN SERPL-MCNC: 4.9 G/DL (ref 3.5–5.2)
ALBUMIN UR-MCNC: 66.1 MG/DL
ALBUMIN/GLOB SERPL: 2 G/DL
ALP SERPL-CCNC: 66 U/L (ref 39–117)
ALT SERPL W P-5'-P-CCNC: 27 U/L (ref 1–33)
ANION GAP SERPL CALCULATED.3IONS-SCNC: 10.5 MMOL/L (ref 5–15)
AST SERPL-CCNC: 20 U/L (ref 1–32)
BASOPHILS # BLD AUTO: 0.06 10*3/MM3 (ref 0–0.2)
BASOPHILS NFR BLD AUTO: 1.5 % (ref 0–1.5)
BILIRUB SERPL-MCNC: 0.2 MG/DL (ref 0–1.2)
BUN SERPL-MCNC: 15 MG/DL (ref 8–23)
BUN/CREAT SERPL: 15.3 (ref 7–25)
CALCIUM SPEC-SCNC: 10.7 MG/DL (ref 8.6–10.5)
CHLORIDE SERPL-SCNC: 101 MMOL/L (ref 98–107)
CHOLEST SERPL-MCNC: 197 MG/DL (ref 0–200)
CO2 SERPL-SCNC: 28.5 MMOL/L (ref 22–29)
CREAT SERPL-MCNC: 0.98 MG/DL (ref 0.57–1)
DEPRECATED RDW RBC AUTO: 43.9 FL (ref 37–54)
EOSINOPHIL # BLD AUTO: 0.68 10*3/MM3 (ref 0–0.4)
EOSINOPHIL NFR BLD AUTO: 16.5 % (ref 0.3–6.2)
ERYTHROCYTE [DISTWIDTH] IN BLOOD BY AUTOMATED COUNT: 14.3 % (ref 12.3–15.4)
GFR SERPL CREATININE-BSD FRML MDRD: 57 ML/MIN/1.73
GLOBULIN UR ELPH-MCNC: 2.5 GM/DL
GLUCOSE SERPL-MCNC: 147 MG/DL (ref 65–99)
HBA1C MFR BLD: 6.8 % (ref 3.5–5.6)
HCT VFR BLD AUTO: 36.7 % (ref 34–46.6)
HDLC SERPL-MCNC: 46 MG/DL (ref 40–60)
HGB BLD-MCNC: 12.2 G/DL (ref 12–15.9)
IMM GRANULOCYTES # BLD AUTO: 0.01 10*3/MM3 (ref 0–0.05)
IMM GRANULOCYTES NFR BLD AUTO: 0.2 % (ref 0–0.5)
LDLC SERPL CALC-MCNC: 93 MG/DL (ref 0–100)
LDLC/HDLC SERPL: 1.76 {RATIO}
LYMPHOCYTES # BLD AUTO: 1.06 10*3/MM3 (ref 0.7–3.1)
LYMPHOCYTES NFR BLD AUTO: 25.8 % (ref 19.6–45.3)
MCH RBC QN AUTO: 28.1 PG (ref 26.6–33)
MCHC RBC AUTO-ENTMCNC: 33.2 G/DL (ref 31.5–35.7)
MCV RBC AUTO: 84.6 FL (ref 79–97)
MONOCYTES # BLD AUTO: 0.62 10*3/MM3 (ref 0.1–0.9)
MONOCYTES NFR BLD AUTO: 15.1 % (ref 5–12)
NEUTROPHILS NFR BLD AUTO: 1.68 10*3/MM3 (ref 1.7–7)
NEUTROPHILS NFR BLD AUTO: 40.9 % (ref 42.7–76)
NRBC BLD AUTO-RTO: 0.2 /100 WBC (ref 0–0.2)
PLATELET # BLD AUTO: 247 10*3/MM3 (ref 140–450)
PMV BLD AUTO: 10.5 FL (ref 6–12)
POTASSIUM SERPL-SCNC: 5.4 MMOL/L (ref 3.5–5.2)
PROT SERPL-MCNC: 7.4 G/DL (ref 6–8.5)
RBC # BLD AUTO: 4.34 10*6/MM3 (ref 3.77–5.28)
SODIUM SERPL-SCNC: 140 MMOL/L (ref 136–145)
TRIGL SERPL-MCNC: 351 MG/DL (ref 0–150)
TSH SERPL DL<=0.05 MIU/L-ACNC: 1.77 UIU/ML (ref 0.27–4.2)
VLDLC SERPL-MCNC: 58 MG/DL (ref 5–40)
WBC NRBC COR # BLD: 4.11 10*3/MM3 (ref 3.4–10.8)

## 2022-01-26 PROCEDURE — 83036 HEMOGLOBIN GLYCOSYLATED A1C: CPT | Performed by: FAMILY MEDICINE

## 2022-01-26 PROCEDURE — 80061 LIPID PANEL: CPT | Performed by: FAMILY MEDICINE

## 2022-01-26 PROCEDURE — 99214 OFFICE O/P EST MOD 30 MIN: CPT | Performed by: FAMILY MEDICINE

## 2022-01-26 PROCEDURE — 80050 GENERAL HEALTH PANEL: CPT | Performed by: FAMILY MEDICINE

## 2022-01-26 PROCEDURE — 82043 UR ALBUMIN QUANTITATIVE: CPT | Performed by: FAMILY MEDICINE

## 2022-01-26 PROCEDURE — 36415 COLL VENOUS BLD VENIPUNCTURE: CPT | Performed by: FAMILY MEDICINE

## 2022-01-26 RX ORDER — ALPRAZOLAM 1 MG/1
1 TABLET ORAL 2 TIMES DAILY PRN
Status: SHIPPED
Start: 2022-01-26 | End: 2022-02-03 | Stop reason: SDUPTHER

## 2022-01-26 NOTE — PROGRESS NOTES
Subjective   Susy Hanson is a 61 y.o. female.     History of Present Illness     Patient verbalized consent to the visit recording.    The patient presents today for a follow-up on diabetes, hypertension, and insomnia.     History of breast cancer.   She states she was supposed to undergo a colonoscopy around the time she was diagnosed with breast cancer, although admits she was unable to deal with both events mentally at the same time. Denies a familial history of colon cancer. Her last colonoscopy was several years ago and believes there were no polyps identified.     Diabetes.   The patient routinely monitors her blood glucose in the morning which averages around 80 mg/dl or 100 mg/dl. She attributes the improvement to her no longer eating outside her home for dinner and when her blood glucose is low, she makes the decision not to take the Humalog. Denies chest pain, shortness of breath, or lower extremity swelling, noting increasing heart rate and denies associated pain. Denies polydipsia, polyphagia, or polyuria.     Anxiety.   She attributes the recent increase in anxiety to the reconstructive breast surgery, which she states she wish she never underwent, as she is not satisfied with the appearance or  The discomfort.    Insomnia.   The patient remains compliant with her anti-hypertensive, which she admits she did not take this morning, otherwise she is compliant. She has only had to take the amlodipine 2.5 mg a couple of times and usually it is when she is severely anxious causing an elevation in her blood pressure. The patient has Xanax with her taking up 3 tablets daily, and says it has not provided much relief. Blood pressure on arrival to the office was 171/92 mmHg and repeat blood pressure was 130/76 mmHg.     Vaccines.   The patient has received the COVID vaccines series to include the booster, as well as the influenza vaccine.     Sleep disturbance.   Despite compliance with temazepam, she is  still not sleeping well. Denies current use of melatonin. She has tried Aleve PM and she still does not rest for more than a few hours. Denies use of caffeine products.     Cold intolerance.  The patient also has been experiencing persistent chills and the inability to get warm, despite how warm she keeps home or how many layers of clothing she is wearing.     The following portions of the patient's history were reviewed and updated as appropriate: allergies, current medications, past family history, past medical history, past social history, past surgical history, and problem list.  Past Medical History:   Diagnosis Date   • Anxiety 2000   • Breast cancer (HCC)     LEFT BREAST 7/2021   • Depression    • DM (diabetes mellitus) (HCC)    • Hyperlipidemia    • Hypertension     SAW CARDIO/ STRESS TEST 2020 - NOW MEDS MANAGED BY PCP    • Irritable bowel syndrome 2006   • Peripheral neuropathy    • PONV (postoperative nausea and vomiting)    • Sleep apnea     WEARS CPAP     Past Surgical History:   Procedure Laterality Date   • BLADDER REPAIR     • BREAST BIOPSY     • BREAST RECONSTRUCTION Bilateral 8/26/2021    Procedure: BILATERAL PREPECTORALPLACMENT TISSUE EXPANDERS AND ALLODERM;  Surgeon: Heri Arreaga MD;  Location: [facility];  Service: Plastics;  Laterality: Bilateral;   • BREAST TISSUE EXPANDER REMOVAL INSERTION OF IMPLANT Bilateral 12/23/2021    Procedure: BILATERAL REMOVAL TISSUE EXPANDERS AND PLACEMENT OF IMPLANTS;  Surgeon: Heri Arreaga MD;  Location: [facility];  Service: Plastics;  Laterality: Bilateral;   • CARDIOVASCULAR STRESS TEST  2020   • HYSTERECTOMY     • MASTECTOMY W/ SENTINEL NODE BIOPSY Bilateral 8/26/2021    Procedure: bilateral total mastectomy, left axillary sentinel lymph node biopsy, possible reconstruction.;  Surgeon: Asia Perkins MD;  Location: [facility];  Service: General;  Laterality: Bilateral;   • TUBAL ABDOMINAL LIGATION  1981     Family History    Problem Relation Age of Onset   • Alzheimer's disease Father    • Diabetes Father    • Heart disease Father    • Other Father    • Sleep apnea Father    • Snoring Father    • Other Mother    • Diabetes Mother    • Heart disease Mother    • Sleep apnea Mother    • Snoring Mother    • Diabetes Sister    • Heart disease Sister    • Other Sister    • Sleep apnea Sister    • Snoring Sister    • Breast cancer Paternal Grandmother 60   • Breast cancer Maternal Aunt 80   • Breast cancer Paternal Aunt    • Malig Hyperthermia Neg Hx      Social History     Socioeconomic History   • Marital status:    Tobacco Use   • Smoking status: Former Smoker     Packs/day: 2.00     Years: 30.00     Pack years: 60.00     Types: Cigarettes     Start date: 1972     Quit date: 2008     Years since quittin.0   • Smokeless tobacco: Never Used   Vaping Use   • Vaping Use: Never used   Substance and Sexual Activity   • Alcohol use: No   • Drug use: No   • Sexual activity: Defer     Partners: Male     Birth control/protection: None         Current Outpatient Medications:   •  acetaminophen (TYLENOL) 325 MG tablet, Take 1 tablet by mouth Every 4 (Four) Hours As Needed for Mild Pain ., Disp: 30 tablet, Rfl: 1  •  ALPRAZolam (XANAX) 1 MG tablet, Take 1 tablet by mouth 2 (Two) Times a Day As Needed., Disp: , Rfl:   •  amLODIPine (NORVASC) 2.5 MG tablet, Take 1 tablet by mouth Daily., Disp: 90 tablet, Rfl: 3  •  atorvastatin (LIPITOR) 20 MG tablet, Take 1 tablet by mouth Daily., Disp: 90 tablet, Rfl: 2  •  dilTIAZem CD (Cardizem CD) 180 MG 24 hr capsule, Take 1 capsule by mouth Daily., Disp: 90 capsule, Rfl: 1  •  hydrALAZINE (APRESOLINE) 100 MG tablet, Take 2 tablets by mouth at breakfast and 1 tablet at bedtime OR take 1 tablet by mouth three times daily, Disp: 270 tablet, Rfl: 2  •  hydroCHLOROthiazide (HYDRODIURIL) 25 MG tablet, Take 1 tablet by mouth Daily., Disp: 90 tablet, Rfl: 2  •  Insulin Lispro Prot & Lispro (HumaLOG  "Mix 75/25 KwikPen) (75-25) 100 UNIT/ML suspension pen-injector pen, Inject 24 Units under the skin with breakfast and 28 units under the skin with supper (Patient taking differently: Inject 24 Units under the skin into the appropriate area as directed Every Morning.), Disp: 45 mL, Rfl: 1  •  losartan (Cozaar) 100 MG tablet, Take 1 tablet by mouth Daily., Disp: 90 tablet, Rfl: 2  •  metFORMIN (GLUCOPHAGE) 1000 MG tablet, Take one tablet by mouth with breakfast and 1 1/2 tablets with supper, Disp: 225 tablet, Rfl: 1  •  metoprolol succinate XL (TOPROL-XL) 100 MG 24 hr tablet, Take 2 tablets by mouth Daily for blood pressure., Disp: 180 tablet, Rfl: 1    Review of Systems   Review of systems was performed, and pertinent findings are noted in the HPI.    /76   Pulse 78   Temp 97.5 °F (36.4 °C) (Temporal)   Ht 162.6 cm (64\")   Wt 74.8 kg (165 lb)   LMP  (LMP Unknown)   SpO2 100%   Breastfeeding No   BMI 28.32 kg/m²       Objective   Physical Exam  Vitals and nursing note reviewed.   Constitutional:       General: She is not in acute distress.     Appearance: She is well-developed. She is obese.      Comments: Well groomed.   HENT:      Head: Normocephalic and atraumatic.      Right Ear: Tympanic membrane and external ear normal.      Left Ear: Tympanic membrane and external ear normal.   Neck:      Thyroid: No thyromegaly.      Comments: No JVD. Neck supple without adenopathy. Thyroid is normal to palpation.  Cardiovascular:      Rate and Rhythm: Normal rate and regular rhythm.      Heart sounds: Normal heart sounds. No murmur heard.  No friction rub. No gallop.    Pulmonary:      Effort: Pulmonary effort is normal. No respiratory distress.      Breath sounds: Normal breath sounds. No wheezing or rales.   Musculoskeletal:      Cervical back: Neck supple.   Lymphadenopathy:      Cervical: No cervical adenopathy.   Skin:     General: Skin is warm and dry.   Neurological:      Mental Status: She is alert. "   Psychiatric:      Comments: She is mildly anxious.           Assessment/Plan   Problems Addressed this Visit        Cardiac and Vasculature    Essential hypertension - Primary     She will continue the diltiazem  mg once a day, hydralazine 100 mg TID, hydrochlorothiazide 25 mg once a day, losartan 100 mg once a day, and metoprolol  mg once a day.         Relevant Orders    Comprehensive Metabolic Panel (Completed)    CBC & Differential (Completed)    Lipid Panel (Completed)    Mixed hyperlipidemia     She will continue the 20 mg of atorvastatin. For all three, she was counseled on the need for weight loss.            Relevant Orders    Comprehensive Metabolic Panel (Completed)    Lipid Panel (Completed)       Endocrine and Metabolic    Type 2 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 (HCC)     She will continue the metformin 1000 mg for breakfast and 1500 mg with supper, and she has her Humalog that she will take if she needs it. She was counseled on the need for dietary compliance.          Relevant Orders    Comprehensive Metabolic Panel (Completed)    CBC & Differential (Completed)    Lipid Panel (Completed)    MicroAlbumin, Urine, Random - Urine, Clean Catch (Completed)    Hemoglobin A1c (Completed)       Mental Health    Mixed anxiety depressive disorder    Relevant Medications    ALPRAZolam (XANAX) 1 MG tablet       Sleep    Insomnia     I will stop the temazepam and she will start taking a milligram of Xanax at bedtime and she will also be able to take a milligram of Xanax another time during the day if she needs it for her anxiety.            Symptoms and Signs    Cold intolerance     I have ordered labs including a CBC and a TSH. The other labs I have ordered are a CMP, lipid, hemoglobin A1c, and a microalbumin. I will see her back in 6 months for follow-up.         Relevant Orders    Comprehensive Metabolic Panel (Completed)    CBC & Differential (Completed)    TSH  (Completed)      Other Visit Diagnoses     Screening for colon cancer        Relevant Orders    Ambulatory Referral For Screening Colonoscopy (Completed)      Diagnoses       Codes Comments    Essential hypertension    -  Primary ICD-10-CM: I10  ICD-9-CM: 401.9     Mixed hyperlipidemia     ICD-10-CM: E78.2  ICD-9-CM: 272.2     Type 2 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 (HCC)     ICD-10-CM: E11.49  ICD-9-CM: 250.60     Cold intolerance     ICD-10-CM: R68.89  ICD-9-CM: 780.99     Screening for colon cancer     ICD-10-CM: Z12.11  ICD-9-CM: V76.51     Mixed anxiety depressive disorder     ICD-10-CM: F41.8  ICD-9-CM: 300.4     Insomnia, unspecified type     ICD-10-CM: G47.00  ICD-9-CM: 780.52         30 minutes were spent reviewing the patient's chart, taking her history and doing her exam, completing her chart, ordering her labs and tests, counseling her            Transcribed from ambient dictation for Erica Avila MD by Radha Escamilla.  01/26/22   11:46 EST

## 2022-01-26 NOTE — ASSESSMENT & PLAN NOTE
She will continue the 20 mg of atorvastatin. For all three, she was counseled on the need for weight loss.

## 2022-01-26 NOTE — ASSESSMENT & PLAN NOTE
I will stop the temazepam and she will start taking a milligram of Xanax at bedtime and she will also be able to take a milligram of Xanax another time during the day if she needs it for her anxiety.

## 2022-01-26 NOTE — ASSESSMENT & PLAN NOTE
She will continue the diltiazem  mg once a day, hydralazine 100 mg TID, hydrochlorothiazide 25 mg once a day, losartan 100 mg once a day, and metoprolol  mg once a day.

## 2022-01-26 NOTE — PATIENT INSTRUCTIONS
Keep working to lose weight through healthy eating and exercise.  No fried foods and limit pasta, bread, and sweets.  Take 1 mg of xanax at bedtime   Stop the temazepam

## 2022-01-26 NOTE — ASSESSMENT & PLAN NOTE
I have ordered labs including a CBC and a TSH. The other labs I have ordered are a CMP, lipid, hemoglobin A1c, and a microalbumin. I will see her back in 6 months for follow-up.

## 2022-01-26 NOTE — ASSESSMENT & PLAN NOTE
She will continue the metformin 1000 mg for breakfast and 1500 mg with supper, and she has her Humalog that she will take if she needs it. She was counseled on the need for dietary compliance.

## 2022-02-03 DIAGNOSIS — F41.8 MIXED ANXIETY DEPRESSIVE DISORDER: Primary | ICD-10-CM

## 2022-02-03 RX ORDER — ALPRAZOLAM 1 MG/1
1 TABLET ORAL 2 TIMES DAILY PRN
Qty: 60 TABLET | Refills: 0
Start: 2022-02-03 | End: 2022-02-09 | Stop reason: SDUPTHER

## 2022-02-09 DIAGNOSIS — F41.8 MIXED ANXIETY DEPRESSIVE DISORDER: ICD-10-CM

## 2022-02-10 RX ORDER — ALPRAZOLAM 1 MG/1
1 TABLET ORAL 2 TIMES DAILY PRN
Qty: 60 TABLET | Refills: 0 | Status: SHIPPED | OUTPATIENT
Start: 2022-02-10 | End: 2022-03-16 | Stop reason: SDUPTHER

## 2022-03-16 DIAGNOSIS — F41.8 MIXED ANXIETY DEPRESSIVE DISORDER: ICD-10-CM

## 2022-03-16 RX ORDER — ALPRAZOLAM 1 MG/1
1 TABLET ORAL 2 TIMES DAILY PRN
Qty: 60 TABLET | Refills: 0 | Status: SHIPPED | OUTPATIENT
Start: 2022-03-16 | End: 2022-04-10 | Stop reason: SDUPTHER

## 2022-04-10 DIAGNOSIS — F41.8 MIXED ANXIETY DEPRESSIVE DISORDER: ICD-10-CM

## 2022-04-10 RX ORDER — ALPRAZOLAM 1 MG/1
1 TABLET ORAL 2 TIMES DAILY PRN
Qty: 60 TABLET | Refills: 0 | Status: CANCELLED | OUTPATIENT
Start: 2022-04-10

## 2022-04-10 RX ORDER — ATORVASTATIN CALCIUM 20 MG/1
20 TABLET, FILM COATED ORAL DAILY
Qty: 90 TABLET | Refills: 2 | Status: CANCELLED | OUTPATIENT
Start: 2022-04-10

## 2022-04-10 RX ORDER — HYDROCHLOROTHIAZIDE 25 MG/1
25 TABLET ORAL DAILY
Qty: 90 TABLET | Refills: 2 | Status: CANCELLED | OUTPATIENT
Start: 2022-04-10

## 2022-04-10 RX ORDER — AMLODIPINE BESYLATE 2.5 MG/1
2.5 TABLET ORAL DAILY
Qty: 90 TABLET | Refills: 3 | Status: CANCELLED | OUTPATIENT
Start: 2022-04-10

## 2022-04-10 RX ORDER — LOSARTAN POTASSIUM 100 MG/1
100 TABLET ORAL DAILY
Qty: 90 TABLET | Refills: 2 | Status: CANCELLED | OUTPATIENT
Start: 2022-04-10

## 2022-04-10 RX ORDER — METOPROLOL SUCCINATE 100 MG/1
200 TABLET, EXTENDED RELEASE ORAL DAILY
Qty: 180 TABLET | Refills: 1 | Status: CANCELLED | OUTPATIENT
Start: 2022-04-10

## 2022-04-11 ENCOUNTER — HOSPITAL ENCOUNTER (OUTPATIENT)
Facility: HOSPITAL | Age: 62
Discharge: HOME HEALTH | End: 2022-04-15
Attending: EMERGENCY MEDICINE | Admitting: INTERNAL MEDICINE

## 2022-04-11 ENCOUNTER — APPOINTMENT (OUTPATIENT)
Dept: GENERAL RADIOLOGY | Facility: HOSPITAL | Age: 62
End: 2022-04-11

## 2022-04-11 ENCOUNTER — APPOINTMENT (OUTPATIENT)
Dept: CT IMAGING | Facility: HOSPITAL | Age: 62
End: 2022-04-11

## 2022-04-11 DIAGNOSIS — I50.9 ACUTE CONGESTIVE HEART FAILURE, UNSPECIFIED HEART FAILURE TYPE: ICD-10-CM

## 2022-04-11 DIAGNOSIS — R00.0 TACHYCARDIA: ICD-10-CM

## 2022-04-11 DIAGNOSIS — F41.8 MIXED ANXIETY DEPRESSIVE DISORDER: ICD-10-CM

## 2022-04-11 DIAGNOSIS — R07.9 CHEST PAIN, UNSPECIFIED TYPE: Primary | ICD-10-CM

## 2022-04-11 DIAGNOSIS — R06.00 DYSPNEA, UNSPECIFIED TYPE: ICD-10-CM

## 2022-04-11 DIAGNOSIS — R07.89 CHEST PAIN, ATYPICAL: ICD-10-CM

## 2022-04-11 LAB
ALBUMIN SERPL-MCNC: 5 G/DL (ref 3.5–5.2)
ALBUMIN/GLOB SERPL: 1.6 G/DL
ALP SERPL-CCNC: 90 U/L (ref 39–117)
ALT SERPL W P-5'-P-CCNC: 39 U/L (ref 1–33)
ANION GAP SERPL CALCULATED.3IONS-SCNC: 15 MMOL/L (ref 5–15)
ARTERIAL PATENCY WRIST A: POSITIVE
AST SERPL-CCNC: 24 U/L (ref 1–32)
ATMOSPHERIC PRESS: ABNORMAL MM[HG]
BASE EXCESS BLDA CALC-SCNC: 0.8 MMOL/L (ref 0–3)
BASOPHILS # BLD MANUAL: 0.13 10*3/MM3 (ref 0–0.2)
BASOPHILS NFR BLD MANUAL: 1 % (ref 0–1.5)
BDY SITE: ABNORMAL
BILIRUB SERPL-MCNC: 0.3 MG/DL (ref 0–1.2)
BUN SERPL-MCNC: 17 MG/DL (ref 8–23)
BUN/CREAT SERPL: 14.2 (ref 7–25)
CALCIUM SPEC-SCNC: 10.4 MG/DL (ref 8.6–10.5)
CHLORIDE SERPL-SCNC: 102 MMOL/L (ref 98–107)
CO2 BLDA-SCNC: 26.3 MMOL/L (ref 22–29)
CO2 SERPL-SCNC: 25 MMOL/L (ref 22–29)
CREAT SERPL-MCNC: 1.2 MG/DL (ref 0.57–1)
D DIMER PPP FEU-MCNC: 0.65 MG/L (FEU) (ref 0–0.59)
D-LACTATE SERPL-SCNC: 1.3 MMOL/L (ref 0.5–2)
DEPRECATED RDW RBC AUTO: 45.9 FL (ref 37–54)
EGFRCR SERPLBLD CKD-EPI 2021: 51 ML/MIN/1.73
EOSINOPHIL # BLD MANUAL: 0.27 10*3/MM3 (ref 0–0.4)
EOSINOPHIL NFR BLD MANUAL: 2 % (ref 0.3–6.2)
ERYTHROCYTE [DISTWIDTH] IN BLOOD BY AUTOMATED COUNT: 15.6 % (ref 12.3–15.4)
GLOBULIN UR ELPH-MCNC: 3.2 GM/DL
GLUCOSE BLDC GLUCOMTR-MCNC: 149 MG/DL (ref 70–105)
GLUCOSE SERPL-MCNC: 159 MG/DL (ref 65–99)
HCO3 BLDA-SCNC: 25.1 MMOL/L (ref 21–28)
HCT VFR BLD AUTO: 36.9 % (ref 34–46.6)
HEMODILUTION: NO
HGB BLD-MCNC: 13.1 G/DL (ref 12–15.9)
HOLD SPECIMEN: NORMAL
HOLD SPECIMEN: NORMAL
INHALED O2 CONCENTRATION: 100 %
LYMPHOCYTES # BLD MANUAL: 1.07 10*3/MM3 (ref 0.7–3.1)
LYMPHOCYTES NFR BLD MANUAL: 7 % (ref 5–12)
MCH RBC QN AUTO: 29.3 PG (ref 26.6–33)
MCHC RBC AUTO-ENTMCNC: 35.4 G/DL (ref 31.5–35.7)
MCV RBC AUTO: 83 FL (ref 79–97)
MODALITY: ABNORMAL
MONOCYTES # BLD: 0.94 10*3/MM3 (ref 0.1–0.9)
NEUTROPHILS # BLD AUTO: 10.99 10*3/MM3 (ref 1.7–7)
NEUTROPHILS NFR BLD MANUAL: 82 % (ref 42.7–76)
NT-PROBNP SERPL-MCNC: 1429 PG/ML (ref 0–900)
PCO2 BLDA: 38.4 MM HG (ref 35–48)
PH BLDA: 7.42 PH UNITS (ref 7.35–7.45)
PLAT MORPH BLD: NORMAL
PLATELET # BLD AUTO: 245 10*3/MM3 (ref 140–450)
PMV BLD AUTO: 8.1 FL (ref 6–12)
PO2 BLDA: 134.5 MM HG (ref 83–108)
POTASSIUM SERPL-SCNC: 3.9 MMOL/L (ref 3.5–5.2)
PROT SERPL-MCNC: 8.2 G/DL (ref 6–8.5)
RBC # BLD AUTO: 4.45 10*6/MM3 (ref 3.77–5.28)
RBC MORPH BLD: NORMAL
SAO2 % BLDCOA: 99.1 % (ref 94–98)
SARS-COV-2 RNA PNL SPEC NAA+PROBE: NOT DETECTED
SCAN SLIDE: NORMAL
SODIUM SERPL-SCNC: 142 MMOL/L (ref 136–145)
TOTAL RATE: 20 BREATHS/MINUTE
TROPONIN T SERPL-MCNC: <0.01 NG/ML (ref 0–0.03)
VARIANT LYMPHS NFR BLD MANUAL: 8 % (ref 19.6–45.3)
WBC MORPH BLD: NORMAL
WBC NRBC COR # BLD: 13.4 10*3/MM3 (ref 3.4–10.8)
WHOLE BLOOD HOLD SPECIMEN: NORMAL
WHOLE BLOOD HOLD SPECIMEN: NORMAL

## 2022-04-11 PROCEDURE — C9803 HOPD COVID-19 SPEC COLLECT: HCPCS

## 2022-04-11 PROCEDURE — 25010000002 CEFTRIAXONE PER 250 MG: Performed by: PHYSICIAN ASSISTANT

## 2022-04-11 PROCEDURE — 85025 COMPLETE CBC W/AUTO DIFF WBC: CPT | Performed by: EMERGENCY MEDICINE

## 2022-04-11 PROCEDURE — 71046 X-RAY EXAM CHEST 2 VIEWS: CPT

## 2022-04-11 PROCEDURE — 71275 CT ANGIOGRAPHY CHEST: CPT

## 2022-04-11 PROCEDURE — 85379 FIBRIN DEGRADATION QUANT: CPT | Performed by: PHYSICIAN ASSISTANT

## 2022-04-11 PROCEDURE — 99219 PR INITIAL OBSERVATION CARE/DAY 50 MINUTES: CPT | Performed by: NURSE PRACTITIONER

## 2022-04-11 PROCEDURE — 85007 BL SMEAR W/DIFF WBC COUNT: CPT | Performed by: EMERGENCY MEDICINE

## 2022-04-11 PROCEDURE — U0003 INFECTIOUS AGENT DETECTION BY NUCLEIC ACID (DNA OR RNA); SEVERE ACUTE RESPIRATORY SYNDROME CORONAVIRUS 2 (SARS-COV-2) (CORONAVIRUS DISEASE [COVID-19]), AMPLIFIED PROBE TECHNIQUE, MAKING USE OF HIGH THROUGHPUT TECHNOLOGIES AS DESCRIBED BY CMS-2020-01-R: HCPCS | Performed by: PHYSICIAN ASSISTANT

## 2022-04-11 PROCEDURE — 93005 ELECTROCARDIOGRAM TRACING: CPT | Performed by: EMERGENCY MEDICINE

## 2022-04-11 PROCEDURE — G0378 HOSPITAL OBSERVATION PER HR: HCPCS

## 2022-04-11 PROCEDURE — 80053 COMPREHEN METABOLIC PANEL: CPT | Performed by: EMERGENCY MEDICINE

## 2022-04-11 PROCEDURE — 96375 TX/PRO/DX INJ NEW DRUG ADDON: CPT

## 2022-04-11 PROCEDURE — 82803 BLOOD GASES ANY COMBINATION: CPT

## 2022-04-11 PROCEDURE — 0 IOPAMIDOL PER 1 ML: Performed by: EMERGENCY MEDICINE

## 2022-04-11 PROCEDURE — 25010000002 FUROSEMIDE PER 20 MG: Performed by: PHYSICIAN ASSISTANT

## 2022-04-11 PROCEDURE — 83605 ASSAY OF LACTIC ACID: CPT

## 2022-04-11 PROCEDURE — 87040 BLOOD CULTURE FOR BACTERIA: CPT | Performed by: EMERGENCY MEDICINE

## 2022-04-11 PROCEDURE — 99285 EMERGENCY DEPT VISIT HI MDM: CPT

## 2022-04-11 PROCEDURE — 25010000002 AZITHROMYCIN PER 500 MG: Performed by: PHYSICIAN ASSISTANT

## 2022-04-11 PROCEDURE — 96374 THER/PROPH/DIAG INJ IV PUSH: CPT

## 2022-04-11 PROCEDURE — 82962 GLUCOSE BLOOD TEST: CPT

## 2022-04-11 PROCEDURE — 84484 ASSAY OF TROPONIN QUANT: CPT | Performed by: PHYSICIAN ASSISTANT

## 2022-04-11 PROCEDURE — 83880 ASSAY OF NATRIURETIC PEPTIDE: CPT | Performed by: PHYSICIAN ASSISTANT

## 2022-04-11 PROCEDURE — 36600 WITHDRAWAL OF ARTERIAL BLOOD: CPT

## 2022-04-11 RX ORDER — NICOTINE POLACRILEX 4 MG
15 LOZENGE BUCCAL
Status: DISCONTINUED | OUTPATIENT
Start: 2022-04-11 | End: 2022-04-15 | Stop reason: HOSPADM

## 2022-04-11 RX ORDER — SODIUM CHLORIDE 0.9 % (FLUSH) 0.9 %
3-10 SYRINGE (ML) INJECTION AS NEEDED
Status: DISCONTINUED | OUTPATIENT
Start: 2022-04-11 | End: 2022-04-15 | Stop reason: HOSPADM

## 2022-04-11 RX ORDER — METOPROLOL TARTRATE 5 MG/5ML
2.5 INJECTION INTRAVENOUS EVERY 6 HOURS SCHEDULED
Status: DISCONTINUED | OUTPATIENT
Start: 2022-04-12 | End: 2022-04-11

## 2022-04-11 RX ORDER — FUROSEMIDE 10 MG/ML
80 INJECTION INTRAMUSCULAR; INTRAVENOUS ONCE
Status: COMPLETED | OUTPATIENT
Start: 2022-04-11 | End: 2022-04-11

## 2022-04-11 RX ORDER — ONDANSETRON 2 MG/ML
4 INJECTION INTRAMUSCULAR; INTRAVENOUS EVERY 6 HOURS PRN
Status: DISCONTINUED | OUTPATIENT
Start: 2022-04-11 | End: 2022-04-15 | Stop reason: HOSPADM

## 2022-04-11 RX ORDER — OLANZAPINE 10 MG/2ML
1 INJECTION, POWDER, LYOPHILIZED, FOR SOLUTION INTRAMUSCULAR AS NEEDED
Status: DISCONTINUED | OUTPATIENT
Start: 2022-04-11 | End: 2022-04-15 | Stop reason: HOSPADM

## 2022-04-11 RX ORDER — INSULIN LISPRO 100 [IU]/ML
0-9 INJECTION, SOLUTION INTRAVENOUS; SUBCUTANEOUS AS NEEDED
Status: DISCONTINUED | OUTPATIENT
Start: 2022-04-11 | End: 2022-04-15 | Stop reason: HOSPADM

## 2022-04-11 RX ORDER — ONDANSETRON 4 MG/1
4 TABLET, FILM COATED ORAL EVERY 6 HOURS PRN
Status: DISCONTINUED | OUTPATIENT
Start: 2022-04-11 | End: 2022-04-15 | Stop reason: HOSPADM

## 2022-04-11 RX ORDER — SODIUM CHLORIDE 0.9 % (FLUSH) 0.9 %
10 SYRINGE (ML) INJECTION AS NEEDED
Status: DISCONTINUED | OUTPATIENT
Start: 2022-04-11 | End: 2022-04-15 | Stop reason: HOSPADM

## 2022-04-11 RX ORDER — DEXTROSE MONOHYDRATE 25 G/50ML
25 INJECTION, SOLUTION INTRAVENOUS
Status: DISCONTINUED | OUTPATIENT
Start: 2022-04-11 | End: 2022-04-15 | Stop reason: HOSPADM

## 2022-04-11 RX ORDER — CHOLECALCIFEROL (VITAMIN D3) 125 MCG
5 CAPSULE ORAL NIGHTLY PRN
Status: DISCONTINUED | OUTPATIENT
Start: 2022-04-11 | End: 2022-04-15 | Stop reason: HOSPADM

## 2022-04-11 RX ORDER — METOPROLOL TARTRATE 5 MG/5ML
2.5 INJECTION INTRAVENOUS ONCE
Status: COMPLETED | OUTPATIENT
Start: 2022-04-11 | End: 2022-04-11

## 2022-04-11 RX ORDER — SODIUM CHLORIDE 0.9 % (FLUSH) 0.9 %
3 SYRINGE (ML) INJECTION EVERY 12 HOURS SCHEDULED
Status: DISCONTINUED | OUTPATIENT
Start: 2022-04-11 | End: 2022-04-15 | Stop reason: HOSPADM

## 2022-04-11 RX ORDER — HYDRALAZINE HYDROCHLORIDE 20 MG/ML
20 INJECTION INTRAMUSCULAR; INTRAVENOUS EVERY 6 HOURS PRN
Status: DISCONTINUED | OUTPATIENT
Start: 2022-04-11 | End: 2022-04-15 | Stop reason: HOSPADM

## 2022-04-11 RX ORDER — METOPROLOL TARTRATE 5 MG/5ML
2.5 INJECTION INTRAVENOUS ONCE
Status: COMPLETED | OUTPATIENT
Start: 2022-04-11 | End: 2022-04-12

## 2022-04-11 RX ORDER — NITROGLYCERIN 0.4 MG/1
0.4 TABLET SUBLINGUAL
Status: DISCONTINUED | OUTPATIENT
Start: 2022-04-11 | End: 2022-04-15 | Stop reason: HOSPADM

## 2022-04-11 RX ORDER — INSULIN LISPRO 100 [IU]/ML
0-9 INJECTION, SOLUTION INTRAVENOUS; SUBCUTANEOUS
Status: DISCONTINUED | OUTPATIENT
Start: 2022-04-12 | End: 2022-04-15 | Stop reason: HOSPADM

## 2022-04-11 RX ORDER — ALPRAZOLAM 1 MG/1
1 TABLET ORAL ONCE
Status: COMPLETED | OUTPATIENT
Start: 2022-04-11 | End: 2022-04-11

## 2022-04-11 RX ORDER — IPRATROPIUM BROMIDE AND ALBUTEROL SULFATE 2.5; .5 MG/3ML; MG/3ML
3 SOLUTION RESPIRATORY (INHALATION) EVERY 4 HOURS PRN
Status: DISCONTINUED | OUTPATIENT
Start: 2022-04-11 | End: 2022-04-15 | Stop reason: HOSPADM

## 2022-04-11 RX ADMIN — METOPROLOL TARTRATE 2.5 MG: 1 INJECTION, SOLUTION INTRAVENOUS at 22:30

## 2022-04-11 RX ADMIN — CEFTRIAXONE 2 G: 10 INJECTION, POWDER, FOR SOLUTION INTRAVENOUS at 19:27

## 2022-04-11 RX ADMIN — AZITHROMYCIN MONOHYDRATE 500 MG: 500 INJECTION, POWDER, LYOPHILIZED, FOR SOLUTION INTRAVENOUS at 19:27

## 2022-04-11 RX ADMIN — IOPAMIDOL 100 ML: 755 INJECTION, SOLUTION INTRAVENOUS at 21:17

## 2022-04-11 RX ADMIN — ALPRAZOLAM 1 MG: 1 TABLET ORAL at 23:13

## 2022-04-11 RX ADMIN — FUROSEMIDE 80 MG: 10 INJECTION, SOLUTION INTRAMUSCULAR; INTRAVENOUS at 21:51

## 2022-04-11 NOTE — ED PROVIDER NOTES
Subjective   Patient is a 61-year-old female who presents to the ED via EMS from urgent care with complaints of chest pain and shortness of breath.  Patient states she developed shortness of breath earlier today when she was walking from one office to another.  Patient does report some substernal chest discomfort radiates into the left side of her neck.  Patient states her shortness of breath has continued throughout the day.  She states it all started around to 2:30 PM today.  Patient does report a dry cough since yesterday she denies hemoptysis.  She denies any lower extremity or heart palpitations.  She denies any lightheadedness dizziness or syncopal episodes.  Risk factors include coronary artery disease, 80-pack-year history of smoking, high cholesterol hypertension and family history.  She did have a stress test back in 2020 she has followed up with Dr. Murphy in the past.  Patient does report history of breast cancer not currently on chemo or radiation.  Patient states her shortness of breath is worse with exertion and laying flat.    At urgent care patient was found to be slightly diaphoretic she was given full dose of aspirin in route and was started on a nonrebreather due to EMS finding her O2 to be in the 80s upon arrival to urgent care.  Patient does report improvement of her symptoms with the oxygen.  She currently rates her chest pain is minimal.        History provided by:  Patient      Review of Systems   Constitutional: Negative.    HENT: Negative.    Eyes: Negative for photophobia and visual disturbance.   Respiratory: Positive for cough and shortness of breath. Negative for apnea, choking, chest tightness, wheezing and stridor.    Cardiovascular: Positive for chest pain. Negative for palpitations and leg swelling.   Gastrointestinal: Negative for abdominal distention, abdominal pain, nausea and vomiting.   Genitourinary: Negative.    Musculoskeletal: Negative for back pain, neck pain and neck  stiffness.   Skin: Negative.    Neurological: Negative.    Hematological: Negative.        Past Medical History:   Diagnosis Date   • Anxiety 2000   • Breast cancer (HCC)     LEFT BREAST 7/2021   • Depression    • DM (diabetes mellitus) (HCC)    • Hyperlipidemia    • Hypertension     SAW CARDIO/ STRESS TEST 2020 - NOW MEDS MANAGED BY PCP    • Irritable bowel syndrome 2006   • Peripheral neuropathy    • PONV (postoperative nausea and vomiting)    • Sleep apnea     WEARS CPAP       Allergies   Allergen Reactions   • Hydrocodone-Acetaminophen Hives     Lortab, takes tylenol       Past Surgical History:   Procedure Laterality Date   • BLADDER REPAIR     • BREAST BIOPSY     • BREAST RECONSTRUCTION Bilateral 8/26/2021    Procedure: BILATERAL PREPECTORALPLACMENT TISSUE EXPANDERS AND ALLODERM;  Surgeon: Heri Arreaga MD;  Location: [facility];  Service: Plastics;  Laterality: Bilateral;   • BREAST TISSUE EXPANDER REMOVAL INSERTION OF IMPLANT Bilateral 12/23/2021    Procedure: BILATERAL REMOVAL TISSUE EXPANDERS AND PLACEMENT OF IMPLANTS;  Surgeon: Heri Arreaga MD;  Location: [facility] OR;  Service: Plastics;  Laterality: Bilateral;   • CARDIOVASCULAR STRESS TEST  2020   • HYSTERECTOMY     • MASTECTOMY W/ SENTINEL NODE BIOPSY Bilateral 8/26/2021    Procedure: bilateral total mastectomy, left axillary sentinel lymph node biopsy, possible reconstruction.;  Surgeon: Asia Perkins MD;  Location: [facility];  Service: General;  Laterality: Bilateral;   • TUBAL ABDOMINAL LIGATION  1981       Family History   Problem Relation Age of Onset   • Alzheimer's disease Father    • Diabetes Father    • Heart disease Father    • Other Father    • Sleep apnea Father    • Snoring Father    • Other Mother    • Diabetes Mother    • Heart disease Mother    • Sleep apnea Mother    • Snoring Mother    • Diabetes Sister    • Heart disease Sister    • Other Sister    • Sleep apnea Sister    • Snoring Sister    •  Breast cancer Paternal Grandmother 60   • Breast cancer Maternal Aunt 80   • Breast cancer Paternal Aunt    • Malig Hyperthermia Neg Hx        Social History     Socioeconomic History   • Marital status:    Tobacco Use   • Smoking status: Former Smoker     Packs/day: 2.00     Years: 30.00     Pack years: 60.00     Types: Cigarettes     Start date: 1972     Quit date: 2008     Years since quittin.2   • Smokeless tobacco: Never Used   Vaping Use   • Vaping Use: Never used   Substance and Sexual Activity   • Alcohol use: No   • Drug use: No   • Sexual activity: Defer     Partners: Male     Birth control/protection: None           Objective   Physical Exam  Vitals and nursing note reviewed.   Constitutional:       General: She is not in acute distress.     Appearance: Normal appearance. She is well-developed. She is not ill-appearing, toxic-appearing or diaphoretic.   HENT:      Head: Normocephalic and atraumatic.      Mouth/Throat:      Mouth: Mucous membranes are moist.      Pharynx: Oropharynx is clear.   Eyes:      General: No scleral icterus.     Extraocular Movements: Extraocular movements intact.      Pupils: Pupils are equal, round, and reactive to light.   Cardiovascular:      Rate and Rhythm: Regular rhythm. Tachycardia present.      Heart sounds: No murmur heard.    No friction rub. No gallop.   Pulmonary:      Effort: Pulmonary effort is normal. No respiratory distress.      Breath sounds: Normal breath sounds. No stridor. No wheezing, rhonchi or rales.   Chest:      Chest wall: No tenderness.   Abdominal:      General: Bowel sounds are normal. There is no distension. There are no signs of injury.      Palpations: Abdomen is soft.      Tenderness: There is no abdominal tenderness. There is no right CVA tenderness, left CVA tenderness, guarding or rebound.      Hernia: No hernia is present.   Musculoskeletal:      Cervical back: Normal range of motion and neck supple. No rigidity.       "Right lower leg: No edema.      Left lower leg: No edema.   Skin:     General: Skin is warm.      Capillary Refill: Capillary refill takes less than 2 seconds.      Coloration: Skin is not cyanotic, jaundiced or pale.      Findings: No rash.   Neurological:      General: No focal deficit present.      Mental Status: She is alert and oriented to person, place, and time.   Psychiatric:         Mood and Affect: Mood normal.         Behavior: Behavior normal.         Procedures           ED Course  ED Course as of 04/11/22 2328 Mon Apr 11, 2022 1945 Blood Gas, Arterial -(!) [AA]   2012 Patient now on room air spO2 95%   [AA]      ED Course User Index  [AA] Fortino Borden, PA    Blood pressure 149/82, pulse 115, temperature 99.1 °F (37.3 °C), resp. rate 18, height 162.6 cm (64\"), weight 74.8 kg (165 lb), SpO2 95 %, not currently breastfeeding.    Medications   sodium chloride 0.9 % flush 10 mL (has no administration in time range)   sodium chloride 0.9 % flush 10 mL (has no administration in time range)   nitroglycerin (NITROSTAT) SL tablet 0.4 mg (has no administration in time range)   cefTRIAXone (ROCEPHIN) in SWFI 2 GRAMS/20ml IV PUSH syringe (has no administration in time range)   azithromycin 500 mg IVPB in 250 mL NS (has no administration in time range)   sodium chloride 0.9 % flush 3 mL (has no administration in time range)   sodium chloride 0.9 % flush 3-10 mL (has no administration in time range)   ondansetron (ZOFRAN) tablet 4 mg (has no administration in time range)     Or   ondansetron (ZOFRAN) injection 4 mg (has no administration in time range)   dextrose (GLUTOSE) oral gel 15 g (has no administration in time range)   dextrose (D50W) (25 g/50 mL) IV injection 25 g (has no administration in time range)   glucagon (human recombinant) (GLUCAGEN DIAGNOSTIC) 1 mg in sterile water (preservative free) 1 mL injection (has no administration in time range)   insulin lispro (ADMELOG) injection 0-9 Units (has " no administration in time range)     And   insulin lispro (ADMELOG) injection 0-9 Units (has no administration in time range)   melatonin tablet 5 mg (has no administration in time range)   ipratropium-albuterol (DUO-NEB) nebulizer solution 3 mL (has no administration in time range)   cefTRIAXone (ROCEPHIN) in SWFI 2 GRAMS/20ml IV PUSH syringe (2 g Intravenous Given 4/11/22 1927)   azithromycin 500 mg IVPB in 250 mL NS (500 mg Intravenous Given 4/11/22 1927)   furosemide (LASIX) injection 80 mg (80 mg Intravenous Given 4/11/22 2151)   iopamidol (ISOVUE-370) 76 % injection 100 mL (100 mL Intravenous Given 4/11/22 2117)   metoprolol tartrate (LOPRESSOR) injection 2.5 mg (2.5 mg Intravenous Given 4/11/22 2230)   ALPRAZolam (XANAX) tablet 1 mg (1 mg Oral Given 4/11/22 2313)     Labs Reviewed   COMPREHENSIVE METABOLIC PANEL - Abnormal; Notable for the following components:       Result Value    Glucose 159 (*)     Creatinine 1.20 (*)     ALT (SGPT) 39 (*)     eGFR 51.0 (*)     All other components within normal limits    Narrative:     GFR Normal >60  Chronic Kidney Disease <60  Kidney Failure <15     CBC WITH AUTO DIFFERENTIAL - Abnormal; Notable for the following components:    WBC 13.40 (*)     RDW 15.6 (*)     All other components within normal limits    Narrative:     The previously reported component NRBC is no longer being reported. Previous result was 0.0 /100 WBC (Reference Range: 0.0-0.2 /100 WBC) on 4/11/2022 at 1919 EDT.   BNP (IN-HOUSE) - Abnormal; Notable for the following components:    proBNP 1,429.0 (*)     All other components within normal limits    Narrative:     Among patients with dyspnea, NT-proBNP is highly sensitive for the detection of acute congestive heart failure. In addition NT-proBNP of <300 pg/ml effectively rules out acute congestive heart failure with 99% negative predictive value.    Results may be falsely decreased if patient taking Biotin.     D-DIMER, QUANTITATIVE - Abnormal; Notable  for the following components:    D-Dimer, Quantitative 0.65 (*)     All other components within normal limits    Narrative:     Reference Range  --------------------------------------------------------------------     < 0.50   Negative Predictive Value  0.50-0.59   Indeterminate    >= 0.60   Probable VTE             A very low percentage of patients with DVT may yield D-Dimer results   below the cut-off of 0.50 mg/L FEU.  This is known to be more   prevalent in patients with distal DVT.             Results of this test should always be interpreted in conjunction with   the patient's medical history, clinical presentation and other   findings.  Clinical diagnosis should not be based on the result of   [place] D-Dimer alone.   BLOOD GAS, ARTERIAL - Abnormal; Notable for the following components:    pO2, Arterial 134.5 (*)     O2 Saturation, Arterial 99.1 (*)     All other components within normal limits   MANUAL DIFFERENTIAL - Abnormal; Notable for the following components:    Neutrophil % 82.0 (*)     Lymphocyte % 8.0 (*)     Neutrophils Absolute 10.99 (*)     Monocytes Absolute 0.94 (*)     All other components within normal limits   POCT GLUCOSE FINGERSTICK - Abnormal; Notable for the following components:    Glucose 149 (*)     All other components within normal limits   COVID-19,CEPHEID/WILY,COR/DAVID/PAD/MICH IN-HOUSE,NP SWAB IN TRANSPORT MEDIA 3-4 HR TAT, RT-PCR - Normal    Narrative:     Fact sheet for providers: https://www.fda.gov/media/623088/download     Fact sheet for patients: https://www.fda.gov/media/226630/download  Fact sheet for providers: https://www.fda.gov/media/309710/download     Fact sheet for patients: https://www.fda.gov/media/577490/download   TROPONIN (IN-HOUSE) - Normal    Narrative:     Troponin T Reference Range:  <= 0.03 ng/mL-   Negative for AMI  >0.03 ng/mL-     Abnormal for myocardial necrosis.  Clinicians would have to utilize clinical acumen, EKG, Troponin and serial changes to  determine if it is an Acute Myocardial Infarction or myocardial injury due to an underlying chronic condition.       Results may be falsely decreased if patient taking Biotin.     POC LACTATE - Normal   BLOOD CULTURE   BLOOD CULTURE   RAINBOW DRAW    Narrative:     The following orders were created for panel order [place] Draw.  Procedure                               Abnormality         Status                     ---------                               -----------         ------                     Green Top (Gel)[[id number]]                                  Final result               Lavender Top[[id number]]                                     Final result               Gold Top - SST[[id number]]                                   Final result               Light Blue Top[[id number]]                                   Final result                 Please view results for these tests on the individual orders.   BLOOD GAS, ARTERIAL   SCAN SLIDE   LACTIC ACID, PLASMA   LACTIC ACID, PLASMA   LACTIC ACID, PLASMA   TROPONIN (IN-HOUSE)   TROPONIN (IN-HOUSE)   HEMOGLOBIN A1C   BASIC METABOLIC PANEL   CBC WITH AUTO DIFFERENTIAL   PROCALCITONIN   POC LACTATE   POCT GLUCOSE FINGERSTICK   POCT GLUCOSE FINGERSTICK   POCT GLUCOSE FINGERSTICK   CBC AND DIFFERENTIAL    Narrative:     The following orders were created for panel order CBC & Differential.  Procedure                               Abnormality         Status                     ---------                               -----------         ------                     CBC Auto Differential[[id number]]        Abnormal            Final result               Scan Slide[[id number]]                                       Final result                 Please view results for these tests on the individual orders.   GREEN TOP   LAVENDER TOP   GOLD TOP - SST   LIGHT BLUE TOP     XR Chest 2 View    Result Date: 4/11/2022  New interstitial and airspace opacities in both mid to lower lungs, likely  due to pulmonary edema or perhaps pneumonia.   Electronically Signed By-Court Sheikh MD On:4/11/2022 7:29 PM This report was finalized on [id number] by  Court Sheikh MD.    CT Angiogram Chest Pulmonary Embolism    Result Date: 4/11/2022  1.No evidence of pulmonary emboli. 2.Trace bilateral pleural effusions. 3.Bilateral interlobular septal thickening, suggestive of pulmonary edema. 4.Patchy opacities in the lower lobes, right greater than left, likely due to partial atelectasis and less likely pneumonia. 5.Mild cardiomegaly.  Electronically Signed By-Court hSeikh MD On:4/11/2022 9:37 PM This report was finalized on [id number] by  Court Sheikh MD.                                                 MDM  Number of Diagnoses or Management Options  Acute congestive heart failure, unspecified heart failure type (HCC)  Chest pain, unspecified type  Dyspnea, unspecified type  Tachycardia  Diagnosis management comments: Chart Review:  -Stress test: 1/18/2020  Good exercise tolerance.  Left bundle branch block with exercise and return back to normal conduction in the recovery phase  Normal stress Cardiolite test.  Gated SPECT images revealed normal left ventricular size and diffuse hypocontractility with ejection fraction of 40%.  Suggest echocardiogram to assess wall motion.    -Echocardiogram: 2/13/2020  ·Left ventricular systolic function is normal.  ·Left ventricular diastolic dysfunction (grade I) consistent with impaired relaxation.  ·Estimated EF appears to be in the range of 61 - 65%.  ·Left atrial cavity size is mildly dilated.  ·Mild mitral valve regurgitation is present        Comorbidity: As per past medical history  Differentials: ACS, electrolyte abnormality, PE, dissection, pleural effusion, pneumothorax     ;this list is not all inclusive and does not constitute the entirety of considered causes  ECG: Interpreted by myself and Dr. Mustafa shows sinus or ectopic atrial tachycardia rate 121 left  bundle branch block ST elevation secondary to IVCD.  Previous EKG reviewed from earlier urgent care also showed sinus tachycardia rate 139 and left bundle branch block previous EKG reviewed from January 2020 notes sinus rhythm with left atrial enlargement.  Labs:  Imaging: Was interpreted by physician and reviewed by myself:  Disposition/Treatment:  Appropriate PPE was worn during exam and throughout all encounters with the patient.  While in the ED IV was placed and labs were obtained patient was placed on proper monitors patient was found to be tachycardic.  Patient was on nonrebreather SPO2 at 100% that was placed via EMS she was given aspirin in route to the ED. EKG showed sinus tachycardia with a left bundle branch block.  Chart was reviewed patient was found to have a left bundle branch block during stress test that resolved back in 2020.     Patient did trigger simple sepsis.  Sepsis protocol was initiated.  POC lactic normal blood cultures are pending.  Patient does report a slight cough that started yesterday therefore patient was covered for pulmonary source of infection with Rocephin and azithromycin.  COVID-19 swab negative patient was given Lopressor while in the ED for tachycardia some improvement now in the 110's.     Lab results showed normal troponin elevated BNP of 1429.  elevated D-dimer of 0.65.  CBC showed WBC 13.4 no bands noted. patient is hemodynamically stable platelets unremarkable.  Metabolic panel showed glucose 159, creatinine 1.2, ALT 39, otherwise unremarkable. ABG showed PO2 134.5 on nonrebreather patient was titrated down to room air and has stayed above 92% on room air.     Chest x-ray showed signs of pulmonary edema versus atelectasis.  Due to patient's elevated D-dimer CT PE protocol was ordered which also showed pulmonary edema as well as no signs of acute PE.  Patient was given Lasix while in the ED for congestive heart failure shown on chest x-ray, CT, elevated BNP, and  symptoms.  Lab results and findings were discussed with the patient at bedside he voiced understand admission and was in agreement the plan.  Spoke to Sabina VALDEZ who agreed for admission with hospitalist group.  Case was also discussed with attending.       Amount and/or Complexity of Data Reviewed  Clinical lab tests: reviewed  Tests in the radiology section of CPT®: reviewed  Tests in the medicine section of CPT®: reviewed        Final diagnoses:   Chest pain, unspecified type   Dyspnea, unspecified type   Tachycardia   Acute congestive heart failure, unspecified heart failure type (HCC)       ED Disposition  ED Disposition     ED Disposition   Decision to Admit    Condition   --    Comment   Level of Care: Med/Surg [1]   Diagnosis: Chest pain, unspecified type [[id number]]   Admitting Physician: JONI BARRY [[id number]]   Attending Physician: JONI BARRY [[id number]]   Bed Request Comments: cardiac monitor   Certification: I Certify That Inpatient Hospital Services Are Medically Necessary For Greater Than 2 Midnights               No follow-up provider specified.       Medication List      No changes were made to your prescriptions during this visit.          Fortino Borden PA  04/11/22 0868

## 2022-04-12 ENCOUNTER — APPOINTMENT (OUTPATIENT)
Dept: NUCLEAR MEDICINE | Facility: HOSPITAL | Age: 62
End: 2022-04-12

## 2022-04-12 ENCOUNTER — APPOINTMENT (OUTPATIENT)
Dept: CARDIOLOGY | Facility: HOSPITAL | Age: 62
End: 2022-04-12

## 2022-04-12 LAB
ANION GAP SERPL CALCULATED.3IONS-SCNC: 16 MMOL/L (ref 5–15)
BASOPHILS # BLD AUTO: 0.1 10*3/MM3 (ref 0–0.2)
BASOPHILS NFR BLD AUTO: 0.7 % (ref 0–1.5)
BH CV ECHO MEAS - ACS: 1.73 CM
BH CV ECHO MEAS - AO MAX PG: 6.5 MMHG
BH CV ECHO MEAS - AO MEAN PG: 3.5 MMHG
BH CV ECHO MEAS - AO ROOT DIAM: 2.6 CM
BH CV ECHO MEAS - AO V2 MAX: 127.2 CM/SEC
BH CV ECHO MEAS - AO V2 VTI: 19.8 CM
BH CV ECHO MEAS - AVA(I,D): 1.37 CM2
BH CV ECHO MEAS - EDV(CUBED): 125.7 ML
BH CV ECHO MEAS - EDV(MOD-SP4): 105.4 ML
BH CV ECHO MEAS - EF(MOD-BP): 36 %
BH CV ECHO MEAS - EF(MOD-SP4): 36.3 %
BH CV ECHO MEAS - ESV(CUBED): 103.5 ML
BH CV ECHO MEAS - ESV(MOD-SP4): 67.1 ML
BH CV ECHO MEAS - FS: 6.3 %
BH CV ECHO MEAS - IVS/LVPW: 1.13 CM
BH CV ECHO MEAS - IVSD: 0.8 CM
BH CV ECHO MEAS - LA DIMENSION(2D): 3.6 CM
BH CV ECHO MEAS - LV DIASTOLIC VOL/BSA (35-75): 58.5 CM2
BH CV ECHO MEAS - LV MASS(C)D: 125.7 GRAMS
BH CV ECHO MEAS - LV MAX PG: 2.4 MMHG
BH CV ECHO MEAS - LV MEAN PG: 1.16 MMHG
BH CV ECHO MEAS - LV SYSTOLIC VOL/BSA (12-30): 37.2 CM2
BH CV ECHO MEAS - LV V1 MAX: 77.5 CM/SEC
BH CV ECHO MEAS - LV V1 VTI: 12.6 CM
BH CV ECHO MEAS - LVIDD: 5 CM
BH CV ECHO MEAS - LVIDS: 4.7 CM
BH CV ECHO MEAS - LVOT AREA: 2.14 CM2
BH CV ECHO MEAS - LVOT DIAM: 1.65 CM
BH CV ECHO MEAS - LVPWD: 0.7 CM
BH CV ECHO MEAS - MV MAX PG: 8.3 MMHG
BH CV ECHO MEAS - MV MEAN PG: 5.4 MMHG
BH CV ECHO MEAS - MV V2 VTI: 16.7 CM
BH CV ECHO MEAS - MVA(VTI): 1.62 CM2
BH CV ECHO MEAS - PA V2 MAX: 121.8 CM/SEC
BH CV ECHO MEAS - RV MAX PG: 4.1 MMHG
BH CV ECHO MEAS - RV V1 MAX: 101.6 CM/SEC
BH CV ECHO MEAS - RV V1 VTI: 17.4 CM
BH CV ECHO MEAS - SI(MOD-SP4): 21.2 ML/M2
BH CV ECHO MEAS - SV(LVOT): 27 ML
BH CV ECHO MEAS - SV(MOD-SP4): 38.2 ML
BUN SERPL-MCNC: 15 MG/DL (ref 8–23)
BUN/CREAT SERPL: 11.1 (ref 7–25)
CALCIUM SPEC-SCNC: 9.6 MG/DL (ref 8.6–10.5)
CHLORIDE SERPL-SCNC: 98 MMOL/L (ref 98–107)
CO2 SERPL-SCNC: 23 MMOL/L (ref 22–29)
CREAT SERPL-MCNC: 1.35 MG/DL (ref 0.57–1)
D-LACTATE SERPL-SCNC: 1.2 MMOL/L (ref 0.5–2)
D-LACTATE SERPL-SCNC: 1.2 MMOL/L (ref 0.5–2)
D-LACTATE SERPL-SCNC: 1.8 MMOL/L (ref 0.5–2)
D-LACTATE SERPL-SCNC: 1.8 MMOL/L (ref 0.5–2)
D-LACTATE SERPL-SCNC: 2.2 MMOL/L (ref 0.5–2)
DEPRECATED RDW RBC AUTO: 46.4 FL (ref 37–54)
EGFRCR SERPLBLD CKD-EPI 2021: 44.3 ML/MIN/1.73
EOSINOPHIL # BLD AUTO: 0.1 10*3/MM3 (ref 0–0.4)
EOSINOPHIL NFR BLD AUTO: 0.7 % (ref 0.3–6.2)
ERYTHROCYTE [DISTWIDTH] IN BLOOD BY AUTOMATED COUNT: 15.7 % (ref 12.3–15.4)
GLUCOSE BLDC GLUCOMTR-MCNC: 184 MG/DL (ref 70–105)
GLUCOSE BLDC GLUCOMTR-MCNC: 190 MG/DL (ref 70–105)
GLUCOSE BLDC GLUCOMTR-MCNC: 201 MG/DL (ref 70–105)
GLUCOSE BLDC GLUCOMTR-MCNC: 203 MG/DL (ref 70–105)
GLUCOSE BLDC GLUCOMTR-MCNC: 251 MG/DL (ref 70–105)
GLUCOSE SERPL-MCNC: 208 MG/DL (ref 65–99)
HBA1C MFR BLD: 6.8 % (ref 3.5–5.6)
HCT VFR BLD AUTO: 33.7 % (ref 34–46.6)
HGB BLD-MCNC: 11.5 G/DL (ref 12–15.9)
LYMPHOCYTES # BLD AUTO: 1 10*3/MM3 (ref 0.7–3.1)
LYMPHOCYTES NFR BLD AUTO: 11.4 % (ref 19.6–45.3)
MAXIMAL PREDICTED HEART RATE: 157 BPM
MCH RBC QN AUTO: 28.7 PG (ref 26.6–33)
MCHC RBC AUTO-ENTMCNC: 34.2 G/DL (ref 31.5–35.7)
MCV RBC AUTO: 84.2 FL (ref 79–97)
MONOCYTES # BLD AUTO: 1 10*3/MM3 (ref 0.1–0.9)
MONOCYTES NFR BLD AUTO: 12.2 % (ref 5–12)
NEUTROPHILS NFR BLD AUTO: 6.4 10*3/MM3 (ref 1.7–7)
NEUTROPHILS NFR BLD AUTO: 75 % (ref 42.7–76)
NRBC BLD AUTO-RTO: 0.1 /100 WBC (ref 0–0.2)
PLATELET # BLD AUTO: 226 10*3/MM3 (ref 140–450)
PMV BLD AUTO: 8.3 FL (ref 6–12)
POTASSIUM SERPL-SCNC: 3.6 MMOL/L (ref 3.5–5.2)
PROCALCITONIN SERPL-MCNC: 0.17 NG/ML (ref 0–0.25)
RBC # BLD AUTO: 4.01 10*6/MM3 (ref 3.77–5.28)
SODIUM SERPL-SCNC: 137 MMOL/L (ref 136–145)
STRESS TARGET HR: 133 BPM
TROPONIN T SERPL-MCNC: <0.01 NG/ML (ref 0–0.03)
WBC NRBC COR # BLD: 8.6 10*3/MM3 (ref 3.4–10.8)

## 2022-04-12 PROCEDURE — 93306 TTE W/DOPPLER COMPLETE: CPT

## 2022-04-12 PROCEDURE — 83605 ASSAY OF LACTIC ACID: CPT | Performed by: NURSE PRACTITIONER

## 2022-04-12 PROCEDURE — G0378 HOSPITAL OBSERVATION PER HR: HCPCS

## 2022-04-12 PROCEDURE — 78452 HT MUSCLE IMAGE SPECT MULT: CPT | Performed by: INTERNAL MEDICINE

## 2022-04-12 PROCEDURE — 80048 BASIC METABOLIC PNL TOTAL CA: CPT | Performed by: NURSE PRACTITIONER

## 2022-04-12 PROCEDURE — 36415 COLL VENOUS BLD VENIPUNCTURE: CPT | Performed by: NURSE PRACTITIONER

## 2022-04-12 PROCEDURE — 25010000002 AZITHROMYCIN PER 500 MG: Performed by: NURSE PRACTITIONER

## 2022-04-12 PROCEDURE — 25010000002 AZITHROMYCIN PER 500 MG: Performed by: HOSPITALIST

## 2022-04-12 PROCEDURE — 82962 GLUCOSE BLOOD TEST: CPT

## 2022-04-12 PROCEDURE — 96376 TX/PRO/DX INJ SAME DRUG ADON: CPT

## 2022-04-12 PROCEDURE — 63710000001 INSULIN LISPRO (HUMAN) PER 5 UNITS: Performed by: NURSE PRACTITIONER

## 2022-04-12 PROCEDURE — 63710000001 INSULIN LISPRO (HUMAN) PER 5 UNITS: Performed by: INTERNAL MEDICINE

## 2022-04-12 PROCEDURE — 83605 ASSAY OF LACTIC ACID: CPT | Performed by: PHYSICIAN ASSISTANT

## 2022-04-12 PROCEDURE — 83036 HEMOGLOBIN GLYCOSYLATED A1C: CPT | Performed by: NURSE PRACTITIONER

## 2022-04-12 PROCEDURE — 84484 ASSAY OF TROPONIN QUANT: CPT | Performed by: NURSE PRACTITIONER

## 2022-04-12 PROCEDURE — 93018 CV STRESS TEST I&R ONLY: CPT | Performed by: INTERNAL MEDICINE

## 2022-04-12 PROCEDURE — 93016 CV STRESS TEST SUPVJ ONLY: CPT | Performed by: INTERNAL MEDICINE

## 2022-04-12 PROCEDURE — 85025 COMPLETE CBC W/AUTO DIFF WBC: CPT | Performed by: NURSE PRACTITIONER

## 2022-04-12 PROCEDURE — 78452 HT MUSCLE IMAGE SPECT MULT: CPT

## 2022-04-12 PROCEDURE — 99214 OFFICE O/P EST MOD 30 MIN: CPT | Performed by: INTERNAL MEDICINE

## 2022-04-12 PROCEDURE — 84145 PROCALCITONIN (PCT): CPT | Performed by: NURSE PRACTITIONER

## 2022-04-12 PROCEDURE — 25010000002 CEFTRIAXONE PER 250 MG: Performed by: HOSPITALIST

## 2022-04-12 PROCEDURE — 0 TECHNETIUM TETROFOSMIN KIT: Performed by: HOSPITALIST

## 2022-04-12 PROCEDURE — 93306 TTE W/DOPPLER COMPLETE: CPT | Performed by: INTERNAL MEDICINE

## 2022-04-12 PROCEDURE — A9502 TC99M TETROFOSMIN: HCPCS | Performed by: HOSPITALIST

## 2022-04-12 PROCEDURE — 99225 PR SBSQ OBSERVATION CARE/DAY 25 MINUTES: CPT | Performed by: HOSPITALIST

## 2022-04-12 PROCEDURE — 93017 CV STRESS TEST TRACING ONLY: CPT

## 2022-04-12 PROCEDURE — 25010000002 CEFTRIAXONE PER 250 MG: Performed by: NURSE PRACTITIONER

## 2022-04-12 RX ORDER — HYDROCHLOROTHIAZIDE 25 MG/1
25 TABLET ORAL DAILY
Status: CANCELLED | OUTPATIENT
Start: 2022-04-12

## 2022-04-12 RX ORDER — METOPROLOL SUCCINATE 50 MG/1
200 TABLET, EXTENDED RELEASE ORAL DAILY
Status: CANCELLED | OUTPATIENT
Start: 2022-04-12

## 2022-04-12 RX ORDER — METOPROLOL SUCCINATE 50 MG/1
200 TABLET, EXTENDED RELEASE ORAL DAILY
Status: DISCONTINUED | OUTPATIENT
Start: 2022-04-12 | End: 2022-04-15 | Stop reason: HOSPADM

## 2022-04-12 RX ORDER — LOSARTAN POTASSIUM 50 MG/1
100 TABLET ORAL DAILY
Status: CANCELLED | OUTPATIENT
Start: 2022-04-12

## 2022-04-12 RX ORDER — AMLODIPINE BESYLATE 2.5 MG/1
2.5 TABLET ORAL DAILY
Qty: 90 TABLET | Refills: 2 | Status: ON HOLD | OUTPATIENT
Start: 2022-04-12 | End: 2022-04-15

## 2022-04-12 RX ORDER — ATORVASTATIN CALCIUM 20 MG/1
20 TABLET, FILM COATED ORAL DAILY
Qty: 90 TABLET | Refills: 2 | Status: SHIPPED | OUTPATIENT
Start: 2022-04-12 | End: 2022-12-30 | Stop reason: SDUPTHER

## 2022-04-12 RX ORDER — ALPRAZOLAM 1 MG/1
1 TABLET ORAL 2 TIMES DAILY PRN
Qty: 60 TABLET | Refills: 0 | Status: SHIPPED | OUTPATIENT
Start: 2022-04-12 | End: 2022-06-03 | Stop reason: SDUPTHER

## 2022-04-12 RX ORDER — AMLODIPINE BESYLATE 5 MG/1
2.5 TABLET ORAL DAILY
Status: CANCELLED | OUTPATIENT
Start: 2022-04-12

## 2022-04-12 RX ORDER — METOPROLOL SUCCINATE 100 MG/1
200 TABLET, EXTENDED RELEASE ORAL DAILY
Qty: 180 TABLET | Refills: 2 | Status: ON HOLD | OUTPATIENT
Start: 2022-04-12 | End: 2022-04-15

## 2022-04-12 RX ORDER — ATORVASTATIN CALCIUM 20 MG/1
20 TABLET, FILM COATED ORAL NIGHTLY
Status: CANCELLED | OUTPATIENT
Start: 2022-04-12

## 2022-04-12 RX ORDER — AMLODIPINE BESYLATE 2.5 MG/1
2.5 TABLET ORAL DAILY
Status: DISCONTINUED | OUTPATIENT
Start: 2022-04-12 | End: 2022-04-12 | Stop reason: SDUPTHER

## 2022-04-12 RX ORDER — DILTIAZEM HYDROCHLORIDE 180 MG/1
180 CAPSULE, COATED, EXTENDED RELEASE ORAL DAILY
Status: CANCELLED | OUTPATIENT
Start: 2022-04-12

## 2022-04-12 RX ORDER — ALPRAZOLAM 1 MG/1
1 TABLET ORAL 2 TIMES DAILY PRN
Status: DISCONTINUED | OUTPATIENT
Start: 2022-04-12 | End: 2022-04-15 | Stop reason: HOSPADM

## 2022-04-12 RX ORDER — HYDROCHLOROTHIAZIDE 25 MG/1
25 TABLET ORAL DAILY
Qty: 90 TABLET | Refills: 2 | Status: SHIPPED | OUTPATIENT
Start: 2022-04-12 | End: 2022-04-15

## 2022-04-12 RX ORDER — DILTIAZEM HYDROCHLORIDE 180 MG/1
180 CAPSULE, COATED, EXTENDED RELEASE ORAL
Status: DISCONTINUED | OUTPATIENT
Start: 2022-04-12 | End: 2022-04-14

## 2022-04-12 RX ORDER — ALPRAZOLAM 0.5 MG/1
1 TABLET ORAL 2 TIMES DAILY PRN
Status: CANCELLED | OUTPATIENT
Start: 2022-04-12

## 2022-04-12 RX ORDER — AMLODIPINE BESYLATE 2.5 MG/1
2.5 TABLET ORAL
Status: DISCONTINUED | OUTPATIENT
Start: 2022-04-12 | End: 2022-04-12

## 2022-04-12 RX ORDER — ATORVASTATIN CALCIUM 20 MG/1
20 TABLET, FILM COATED ORAL NIGHTLY
Status: DISCONTINUED | OUTPATIENT
Start: 2022-04-12 | End: 2022-04-15 | Stop reason: HOSPADM

## 2022-04-12 RX ORDER — LOSARTAN POTASSIUM 100 MG/1
100 TABLET ORAL DAILY
Qty: 90 TABLET | Refills: 2 | Status: SHIPPED | OUTPATIENT
Start: 2022-04-12 | End: 2022-04-15

## 2022-04-12 RX ADMIN — ALPRAZOLAM 1 MG: 1 TABLET ORAL at 20:30

## 2022-04-12 RX ADMIN — METOPROLOL SUCCINATE 200 MG: 50 TABLET, EXTENDED RELEASE ORAL at 12:42

## 2022-04-12 RX ADMIN — TETROFOSMIN 1 DOSE: 1.38 INJECTION, POWDER, LYOPHILIZED, FOR SOLUTION INTRAVENOUS at 11:09

## 2022-04-12 RX ADMIN — INSULIN LISPRO 2 UNITS: 100 INJECTION, SOLUTION INTRAVENOUS; SUBCUTANEOUS at 12:42

## 2022-04-12 RX ADMIN — Medication 3 ML: at 00:13

## 2022-04-12 RX ADMIN — CEFTRIAXONE 2 G: 2 INJECTION, POWDER, FOR SOLUTION INTRAMUSCULAR; INTRAVENOUS at 18:20

## 2022-04-12 RX ADMIN — INSULIN LISPRO 4 UNITS: 100 INJECTION, SOLUTION INTRAVENOUS; SUBCUTANEOUS at 18:20

## 2022-04-12 RX ADMIN — INSULIN LISPRO 2 UNITS: 100 INJECTION, SOLUTION INTRAVENOUS; SUBCUTANEOUS at 09:31

## 2022-04-12 RX ADMIN — ATORVASTATIN CALCIUM 20 MG: 20 TABLET, FILM COATED ORAL at 20:30

## 2022-04-12 RX ADMIN — AZITHROMYCIN MONOHYDRATE 500 MG: 500 INJECTION, POWDER, LYOPHILIZED, FOR SOLUTION INTRAVENOUS at 19:42

## 2022-04-12 RX ADMIN — Medication 3 ML: at 20:30

## 2022-04-12 RX ADMIN — Medication 3 ML: at 09:33

## 2022-04-12 RX ADMIN — METOPROLOL TARTRATE 2.5 MG: 1 INJECTION, SOLUTION INTRAVENOUS at 00:12

## 2022-04-12 RX ADMIN — AMLODIPINE BESYLATE 2.5 MG: 2.5 TABLET ORAL at 12:42

## 2022-04-12 RX ADMIN — DILTIAZEM HYDROCHLORIDE 180 MG: 180 CAPSULE, COATED, EXTENDED RELEASE ORAL at 19:42

## 2022-04-12 NOTE — PROGRESS NOTES
[facility] Medicine Services Daily Progress Note    Patient Name: Susy Hanson  : 1960  MRN: [id number]  Primary Care Physician:  Erica Avila MD  Date of admission: 2022      Subjective      Chief Complaint: Shortness of breath    Seen and examined bedside.  Improved however still little bit dyspneic no active chest pain.    Review of systems  Cardiac no chest pain or palpitations  Pulmonary positive for shortness of breath positive for cough  GI no nausea no vomiting      Objective      Vitals:   Temp:  [97.8 °F (36.6 °C)-99.1 °F (37.3 °C)] 99.1 °F (37.3 °C)  Heart Rate:  [] 97  Resp:  [18-28] 18  BP: (121-187)/() 121/82    Physical Exam   Constitutional:  oriented to person, place, and time. No distress.   HENT:   Head: Normocephalic and atraumatic.   Eyes: Conjunctivae and EOM are normal. Pupils are equal, round, and reactive to light.   Neck: No JVD present. No thyromegaly present.   Cardiovascular: Normal rate, regular rhythm, normal heart sounds and intact distal pulses. Exam reveals no gallop and no friction rub.   No murmur heard.  Pulmonary/Chest: Effort normal and breath sounds normal. No stridor. No respiratory distress.  has no wheezes.  has no rales.  exhibits no tenderness.   Abdominal: Soft. Bowel sounds are normal.  no distension and no mass. There is no tenderness. There is no rebound and no guarding. No hernia.    Musculoskeletal: Normal range of motion.   Lymphadenopathy:     no cervical adenopathy.   Neurological:  alert and oriented to person, place, and time. No cranial nerve deficit or sensory deficit. exhibits normal muscle tone.   Skin: No rash noted.  not diaphoretic.   Psychiatric:  normal mood and affect.   Vitals reviewed.         Result Review    Result Review:  I have personally reviewed the results from the time of this admission to 2022 11:14 EDT and agree with these findings:  [x]  Laboratory  []   Microbiology  [x]  Radiology  []  EKG/Telemetry   []  Cardiology/Vascular   []  Pathology  []  Old records  []  Other:          Assessment/Plan      Brief Patient Summary:  Susy Hanson is a 61 y.o. female who presented with dyspnea.      amLODIPine, 2.5 mg, Oral, Q24H  azithromycin, 500 mg, Intravenous, Q24H  cefTRIAXone, 2 g, Intravenous, Q24H  insulin lispro, 0-9 Units, Subcutaneous, TID AC  sodium chloride, 3 mL, Intravenous, Q12H             Active Hospital Problems:  Active Hospital Problems    Diagnosis    • Chest pain, unspecified type    • Dyspnea    • Breast neoplasm, Tis (DCIS), left      Added automatically from request for surgery [id number]     • ANDREAS on CPAP    • Peripheral neuropathy    • Type 2 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 (HCC)    • Mixed anxiety depressive disorder    • Irritable bowel syndrome    • Essential hypertension    • Mixed hyperlipidemia      Plan:   Dyspnea  Cardiogenic versus pneumonia versus other  No PE on CT scan PE protocol  Imaging consistent with infiltrate possible pneumonia versus edema  Diuresed with IV Lasix  Cover with antibiotics  Follow-up on micro  Bronchodilators, supportive management  Cardiology consult    Chest pain  Resolved  As above  Cardiology consult  2D echo pending    Pneumonia?  Procalcitonin low normal  No leukocytosis  Imaging as above  Cover antibiotics for now follow-up on micro    Diabetes  Insulin sliding scale Accu-Cheks next    Hypertension  Start home meds, hold hydrochlorothiazide and ACE inhibitor due to VIVEK    VIVEK  Creatinine improved with Lasix  Monitor  Follow-up on 2D echo    Ree  Restart home Xanax    DVT PUD prophylaxis     Plan as above        DVT prophylaxis:  Mechanical DVT prophylaxis orders are present.    CODE STATUS:    Code Status (Patient has no pulse and is not breathing): CPR (Attempt to Resuscitate)  Medical Interventions (Patient has pulse or is breathing): Full Support      Disposition:  I  expect patient to be discharged 24 to 48 hours.      Electronically signed by Sergo Ruth MD, 04/12/22, 11:14 EDT.  [facility]

## 2022-04-12 NOTE — PLAN OF CARE
Goal Outcome Evaluation:  Patient has had no complaints  Problem: Fall Injury Risk  Goal: Absence of Fall and Fall-Related Injury  Outcome: Ongoing, Progressing  Intervention: Identify and Manage Contributors  Recent Flowsheet Documentation  Taken 4/12/2022 1100 by Beulah Duran RN  Medication Review/Management: medications reviewed  Self-Care Promotion: independence encouraged  Intervention: Promote Injury-Free Environment  Recent Flowsheet Documentation  Taken 4/12/2022 1100 by Beulah Duran RN  Safety Promotion/Fall Prevention:   activity supervised   assistive device/personal items within reach   clutter free environment maintained   room organization consistent   safety round/check completed   nonskid shoes/slippers when out of bed     Problem: Adult Inpatient Plan of Care  Goal: Plan of Care Review  Outcome: Ongoing, Progressing  Goal: Patient-Specific Goal (Individualized)  Outcome: Ongoing, Progressing  Goal: Absence of Hospital-Acquired Illness or Injury  Outcome: Ongoing, Progressing  Intervention: Identify and Manage Fall Risk  Recent Flowsheet Documentation  Taken 4/12/2022 1100 by Beulah Duran RN  Safety Promotion/Fall Prevention:   activity supervised   assistive device/personal items within reach   clutter free environment maintained   room organization consistent   safety round/check completed   nonskid shoes/slippers when out of bed  Intervention: Prevent Skin Injury  Recent Flowsheet Documentation  Taken 4/12/2022 1100 by Beulah Duran RN  Skin Protection:   skin-to-device areas padded   skin-to-skin areas padded  Intervention: Prevent and Manage VTE (Venous Thromboembolism) Risk  Recent Flowsheet Documentation  Taken 4/12/2022 1100 by Beulah Duran RN  Activity Management:   activity adjusted per tolerance   up ad tony  Range of Motion: ROM (range of motion) performed  Intervention: Prevent Infection  Recent Flowsheet Documentation  Taken 4/12/2022 1100 by Beulah Duran RN  Infection  Prevention:   visitors restricted/screened   single patient room provided   hand hygiene promoted   equipment surfaces disinfected  Goal: Optimal Comfort and Wellbeing  Outcome: Ongoing, Progressing  Intervention: Provide Person-Centered Care  Recent Flowsheet Documentation  Taken 4/12/2022 1100 by Beulah Duran, RN  Trust Relationship/Rapport:   care explained   choices provided   empathic listening provided   emotional support provided   questions answered   questions encouraged   reassurance provided   thoughts/feelings acknowledged  Goal: Readiness for Transition of Care  Outcome: Ongoing, Progressing  Intervention: Mutually Develop Transition Plan  Recent Flowsheet Documentation  Taken 4/12/2022 1749 by Beulah Duran, RN  Equipment Currently Used at Home: cpap  Taken 4/12/2022 1748 by Beulah Duran, RN  Transportation Anticipated: car, drives self  Transportation Concerns: none  Patient/Family Anticipated Services at Transition: none  Patient/Family Anticipates Transition to:   home   home with family  Taken 4/12/2022 1746 by Beulah Duran, RN  Equipment Currently Used at Home: cpap    of chest pain this shift. NPO at midnight. Myoview in AM.

## 2022-04-12 NOTE — CASE MANAGEMENT/SOCIAL WORK
Discharge Planning Assessment  [facility]     Patient Name: Susy Hanson  MRN: [id number]  Today's Date: 4/12/2022    Admit Date: 4/11/2022     Discharge Needs Assessment     Row Name 04/12/22 0908       Living Environment    People in Home spouse    Name(s) of People in Home Jorge-spouse    Current Living Arrangements home    Primary Care Provided by self    Provides Primary Care For no one    Family Caregiver if Needed spouse       Resource/Environmental Concerns    Transportation Concerns none       Transition Planning    Transportation Anticipated family or friend will provide  spouse       Discharge Needs Assessment    Readmission Within the Last 30 Days no previous admission in last 30 days    Equipment Currently Used at Home cpap  [place]    Concerns to be Addressed no discharge needs identified    Equipment Needed After Discharge none               Discharge Plan     Row Name 04/12/22 0910       Plan    Plan DC Plan: Return Home with spouse.    Plan Comments Spoke with chen who lives in own home with spouse. Independent with ADL's, Established PCP and Pharmacy. Denies needs.              Demographic Summary     Row Name 04/12/22 0907       General Information    Admission Type inpatient    Arrived From emergency department    Reason for Consult decision-making    Preferred Language English               Functional Status     Row Name 04/12/22 0907       Functional Status    Usual Activity Tolerance good    Current Activity Tolerance good       Functional Status, IADL    Medications independent    Meal Preparation independent    Housekeeping independent    Laundry independent    Shopping independent       Mental Status    General Appearance WDL WDL         Met with patient in room wearing PPE: mask, face shield/goggles, gloves, gown.      Maintained distance greater than six feet and spent less than 15 minutes in the room.        Francia Morse RN

## 2022-04-12 NOTE — H&P
[facility] Medicine Services      Patient Name: Susy Hanson  : 1960  MRN: [id number]  Primary Care Physician:  Erica Avila MD  Date of admission: 2022      Subjective      Chief Complaint: dyspnea     History of Present Illness: Susy Hanson is a very pleasant  61 y.o. female who presented to [facility] on 2022 complaining of sudden shortness of air on exertion today about 2 pm. She reports she was here at a vocational meeting in the dining room and afterwards walked to the pharmacy in front of the hospital and became very short of breath.  She reports she had to stop to catch her breath.  She reported being slightly lightheaded she reports some sternal chest pressure.  She denies any nausea vomiting fever or dizziness.  She reports she has had a mild nonproductive cough for the past few days.  She reports she left here because she had an appointment for a massage and had difficulty laying flat due to shortness of air wheezing.  She then went to an urgent care and they suggested she come to the emergency department for reported sinus tachycardia.  She has a past medical history of breast cancer status post mastectomy and no chemotherapy, anxiety, difficult to control hypertension.  Reports she used to smoke quite heavily for 30 years and quit 14 years ago and has not been diagnosed with COPD but suspect she will eventually get it.  She is not on home oxygen other than CPAP at night.  Denies past medical history coronary artery disease.  Review of records shows a negative stress test in 2020 and last 2D echo was in 2020 which showed normal systolic function mild ventricular diastolic dysfunction grade 1.  Insulin-dependent diabetes mellitus type 2 and her serum glucose today was 149.  She reports to becoming easily anxious.  Labs show a WBC of 13.4, a proBNP of 1429, troponin less than 0.010.  BP was elevated on admission.  Creatinine is  "1.2.  EKG image below regular rhythm with an elevated heart rate.  She was given 80 mg IV Lasix in ED, 2.5 mg IV Lopressor.  Chest x-ray per radiology today:    \"New interstitial and airspace opacities in both mid to lower lungs,  likely due to pulmonary edema or perhaps pneumonia.  \"    D-dimer mildly elevated at 0.65 CT chest per radiology today:    \"1.No evidence of pulmonary emboli.  2.Trace bilateral pleural effusions.  3.Bilateral interlobular septal thickening, suggestive of pulmonary  edema.  4.Patchy opacities in the lower lobes, right greater than left, likely  due to partial atelectasis and less likely pneumonia.  5.Mild cardiomegaly.\"    Lactic acid was 1.3.  She triggered sepsis for leukocytosis, tachycardia.  She is afebrile and not hypotensive.  She was started on IV ceftriaxone and IV azithromycin and blood cultures ordered and pending.  She will be admitted for IV antibiotics pending blood culture results, Dr. Murphy of cardiology has been consulted, repeat 2D echo in a.m. serial troponins and continuous cardiac monitoring.      Review of Systems   Constitutional: Negative.   HENT: Positive for hearing loss.    Eyes: Negative.    Cardiovascular: Positive for chest pain and dyspnea on exertion.   Respiratory: Positive for cough, shortness of breath and wheezing.    Endocrine: Negative.    Hematologic/Lymphatic: Negative.    Skin: Negative.    Musculoskeletal: Negative.    Gastrointestinal: Negative.    Genitourinary: Negative.    Neurological: Positive for light-headedness.   Psychiatric/Behavioral: The patient is nervous/anxious.    Allergic/Immunologic: Negative.    All other systems reviewed and are negative.      Personal History     Past Medical History:   Diagnosis Date   • Anxiety 2000   • Breast cancer (HCC)     LEFT BREAST 7/2021   • Depression    • DM (diabetes mellitus) (HCC)    • Hyperlipidemia    • Hypertension     SAW CARDIO/ STRESS TEST 2020 - NOW MEDS MANAGED BY PCP    • Irritable bowel " syndrome 2006   • Peripheral neuropathy    • PONV (postoperative nausea and vomiting)    • Sleep apnea     WEARS CPAP       Past Surgical History:   Procedure Laterality Date   • BLADDER REPAIR     • BREAST BIOPSY     • BREAST RECONSTRUCTION Bilateral 8/26/2021    Procedure: BILATERAL PREPECTORALPLACMENT TISSUE EXPANDERS AND ALLODERM;  Surgeon: Heri Arreaga MD;  Location: [facility];  Service: Plastics;  Laterality: Bilateral;   • BREAST TISSUE EXPANDER REMOVAL INSERTION OF IMPLANT Bilateral 12/23/2021    Procedure: BILATERAL REMOVAL TISSUE EXPANDERS AND PLACEMENT OF IMPLANTS;  Surgeon: Heri Arreaga MD;  Location: [facility] OR;  Service: Plastics;  Laterality: Bilateral;   • CARDIOVASCULAR STRESS TEST  2020   • HYSTERECTOMY     • MASTECTOMY W/ SENTINEL NODE BIOPSY Bilateral 8/26/2021    Procedure: bilateral total mastectomy, left axillary sentinel lymph node biopsy, possible reconstruction.;  Surgeon: Asia Perkins MD;  Location: [facility];  Service: General;  Laterality: Bilateral;   • TUBAL ABDOMINAL LIGATION  1981       Family History: family history includes Alzheimer's disease in her father; Breast cancer in her paternal aunt; Breast cancer (age of onset: 60) in her paternal grandmother; Breast cancer (age of onset: 80) in her maternal aunt; Diabetes in her father, mother, and sister; Heart disease in her father, mother, and sister; Other in her father, mother, and sister; Sleep apnea in her father, mother, and sister; Snoring in her father, mother, and sister. Otherwise pertinent FHx was reviewed and not pertinent to current issue.    Social History:  reports that she quit smoking about 14 years ago. Her smoking use included cigarettes. She started smoking about 50 years ago. She has a 60.00 pack-year smoking history. She has never used smokeless tobacco. She reports that she does not drink alcohol and does not use drugs.    Home Medications:  Prior to Admission  Medications     Prescriptions Last Dose Informant Patient Reported? Taking?    acetaminophen (TYLENOL) 325 MG tablet   No No    Take 1 tablet by mouth Every 4 (Four) Hours As Needed for Mild Pain .    ALPRAZolam (XANAX) 1 MG tablet   No No    Take 1 tablet by mouth 2 (Two) Times a Day As Needed for Anxiety or Sleep.    amLODIPine (NORVASC) 2.5 MG tablet   No No    Take 1 tablet by mouth Daily.    atorvastatin (LIPITOR) 20 MG tablet   No No    Take 1 tablet by mouth Daily.    dilTIAZem CD (Cardizem CD) 180 MG 24 hr capsule   No No    Take 1 capsule by mouth Daily.    hydrALAZINE (APRESOLINE) 100 MG tablet   No No    Take 2 tablets by mouth at breakfast and 1 tablet at bedtime OR take 1 tablet by mouth three times daily    hydroCHLOROthiazide (HYDRODIURIL) 25 MG tablet   No No    Take 1 tablet by mouth Daily.    Insulin Lispro Prot & Lispro (HumaLOG Mix 75/25 KwikPen) (75-25) 100 UNIT/ML suspension pen-injector pen   No No    Inject 24 Units under the skin with breakfast and 28 units under the skin with supper    Patient taking differently:  Inject 24 Units under the skin into the appropriate area as directed Every Morning.    losartan (Cozaar) 100 MG tablet   No No    Take 1 tablet by mouth Daily.    metFORMIN (GLUCOPHAGE) 1000 MG tablet   No No    Take one tablet by mouth with breakfast and 1 1/2 tablets with supper    metoprolol succinate XL (TOPROL-XL) 100 MG 24 hr tablet   No No    Take 2 tablets by mouth Daily for blood pressure.            Allergies:  Allergies   Allergen Reactions   • Hydrocodone-Acetaminophen Hives     Lortab, takes tylenol       Objective      Vitals:   Temp:  [97.8 °F (36.6 °C)-99.1 °F (37.3 °C)] 99.1 °F (37.3 °C)  Heart Rate:  [105-147] 106  Resp:  [18-28] 18  BP: (140-187)/() 145/86    Physical Exam  Vitals reviewed.   Constitutional:       Appearance: Normal appearance.      Comments: BMI 28   HENT:      Head: Normocephalic and atraumatic.      Right Ear: Tympanic membrane normal.  "     Left Ear: Tympanic membrane normal.      Ears:      Comments: Wears hearing aids      Nose: Nose normal.      Mouth/Throat:      Mouth: Mucous membranes are moist.   Eyes:      Extraocular Movements: Extraocular movements intact.   Cardiovascular:      Rate and Rhythm: Normal rate and regular rhythm.      Pulses: Normal pulses.      Heart sounds: Normal heart sounds.   Pulmonary:      Effort: Pulmonary effort is normal.      Breath sounds: Normal breath sounds.   Abdominal:      Palpations: Abdomen is soft.   Genitourinary:     Comments: deferred  Musculoskeletal:         General: Normal range of motion.      Cervical back: Normal range of motion.   Skin:     General: Skin is warm and dry.   Neurological:      General: No focal deficit present.      Mental Status: She is alert and oriented to person, place, and time.   Psychiatric:         Mood and Affect: Mood normal.         Behavior: Behavior normal.         Thought Content: Thought content normal.         Judgment: Judgment normal.          Result Review    Result Review:  I have personally reviewed the results from the time of this admission to 4/12/2022 00:37 EDT and agree with these findings:  []  Laboratory  [x]  Microbiology  [x]  Radiology  [x]  EKG/Telemetry   []  Cardiology/Vascular   []  Pathology  [x]  Old records  []  Other:  Most notable findings include: covid-10 negative, glucose 149, cr 1.2, wbc 13.4 DD 0.65 troponin<0.010, Pro BNP 1429    cxr  IMPRESSION:  New interstitial and airspace opacities in both mid to lower lungs,  likely due to pulmonary edema or perhaps pneumonia.      CT chest W    \"1.No evidence of pulmonary emboli.  2.Trace bilateral pleural effusions.  3.Bilateral interlobular septal thickening, suggestive of pulmonary  edema.  4.Patchy opacities in the lower lobes, right greater than left, likely  due to partial atelectasis and less likely pneumonia.  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 Murphy consulted for further evaluation last stress test negative in 2020 may also be secondary to diastolic CHF proBNP elevated one-time dose 80 mg IV Lasix in ED pulmonary edema versus infiltrates per CT and chest x-ray stable on room air    Dyspnea may be secondary to possible pneumonia vs pulmonary edema secondary to diastolic CHF, Pro BNP elevated 80 mg IV Lasix in ED, 2d echo in am, cardiology consulted,Continue azithromycin and Ceftriaxone for elevated wbc until blood cultures resulted, duo=nebs q 4hrs prn     Breast cancer status post mastectomy stable    Type 2 diabetes mellitus glucose 149, home meds unverified at this time reorder pending verification pharmacy add SSI as needed and Accu-Cheks AC at bedtime concentrated sweet diet    Mixed anxiety depressive disorder, home meds unverified at this time reorder pending verification from pharmacy    Irritable bowel syndrome stable per patient home meds unverified at this time reorder pending verification pharmacy    Essential hypertension elevated on admission given Lasix and Lopressor 2.5 mg in ED, repeat 2.5 mg Lopressor given secondary to tachycardia and hypertension monitor BP area 20 mg IV hydralazine as needed every 6 hours systolic blood pressure greater than 160    Hyperlipidemia, home meds unverified at this time reorder  pending verification from pharmacy.    ANDREAS - CPaP  Compliant at home       DVT prophylaxis:  Mechanical DVT prophylaxis orders are present.    CODE STATUS:    Code Status (Patient has no pulse and is not breathing): CPR (Attempt to Resuscitate)  Medical Interventions (Patient has pulse or is breathing): Full Support    Admission Status:  I believe this patient meets inpatient  status.    I discussed the patient's findings and my recommendations with patient.    This patient has been examined wearing appropriate Personal Protective Equipment . 04/12/22      Signature: Electronically signed by BRISA Lo, 04/12/22, 12:37 AM EDT.

## 2022-04-12 NOTE — CONSULTS
"Cardiology [place]        Subjective:     Encounter Date:04/11/2022      Patient ID: Susy Hanson is a 61 y.o. female.    Reason for consultation:    Shortness of breath  Elevated proBNP  Accelerated hypertension  Diabetes mellitus    Symptoms:    Shortness of breath  Chest discomfort    Referring Physician: Fortino Borden    HPI:  Susy Hanson is a 61 y.o. female who presents with shortness of breath and chest tightness.  Ms. Hanson is a patient of Dr. Murphy.  Pmh includes HTN, hLD, ANDREAS-reports compliance with her CPAP, DM, breast CA s/p mastectomy, IBS, stress test 2020 which showed no ischemia.    Ms. Hanson presents with shortness of breath and chest tightness that started yesterday. She states she was getting a massage yesterday and noted \"rattling\" in her chest.  She felt it was difficult to breath while lying flat.  She also states she walked from dining room in hospital up to the pharmacy and was out of breath to where she had to stop.  She reports these findings are new for her. She states her chest felt tight. She also reports a cough the last couple of days.      Work up in ER reveals hypertension with SBP 190s. Labs show a WBC of 13.4, a proBNP of 1429, troponin less than 0.010.  BP was elevated on admission.  Creatinine is 1.2.  ddimer mildly elevated, CT chest showed no PE, trace bilateral pleural effusions, bilateral interlobular septal thickening suggestive of pulmonary edema, patchy opacities in the lower lobes R>L likely due to partial atelectasis and less likely pneumonia.  She was initiated on Abx in ER and given IV Lasix. On my encounter patient feels better, she currently denies chest pain or SOA.       Past Medical History:   Diagnosis Date   • Anxiety 2000   • Breast cancer (HCC)     LEFT BREAST 7/2021   • Depression    • DM (diabetes mellitus) (HCC)    • Hyperlipidemia    • Hypertension     SAW CARDIO/ STRESS TEST 2020 - NOW MEDS MANAGED BY PCP    • Irritable bowel syndrome "    • Peripheral neuropathy    • PONV (postoperative nausea and vomiting)    • Sleep apnea     WEARS CPAP       Past Surgical History:   Procedure Laterality Date   • BLADDER REPAIR     • BREAST BIOPSY     • BREAST RECONSTRUCTION Bilateral 2021    Procedure: BILATERAL PREPECTORALPLACMENT TISSUE EXPANDERS AND ALLODERM;  Surgeon: Heri Arreaga MD;  Location: [facility];  Service: Plastics;  Laterality: Bilateral;   • BREAST TISSUE EXPANDER REMOVAL INSERTION OF IMPLANT Bilateral 2021    Procedure: BILATERAL REMOVAL TISSUE EXPANDERS AND PLACEMENT OF IMPLANTS;  Surgeon: Heri Arreaga MD;  Location: [facility] OR;  Service: Plastics;  Laterality: Bilateral;   • CARDIOVASCULAR STRESS TEST     • HYSTERECTOMY     • MASTECTOMY W/ SENTINEL NODE BIOPSY Bilateral 2021    Procedure: bilateral total mastectomy, left axillary sentinel lymph node biopsy, possible reconstruction.;  Surgeon: Asia Perkins MD;  Location: [facility];  Service: General;  Laterality: Bilateral;   • TUBAL ABDOMINAL LIGATION         Family History   Problem Relation Age of Onset   • Alzheimer's disease Father    • Diabetes Father    • Heart disease Father    • Other Father    • Sleep apnea Father    • Snoring Father    • Other Mother    • Diabetes Mother    • Heart disease Mother    • Sleep apnea Mother    • Snoring Mother    • Diabetes Sister    • Heart disease Sister    • Other Sister    • Sleep apnea Sister    • Snoring Sister    • Breast cancer Paternal Grandmother 60   • Breast cancer Maternal Aunt 80   • Breast cancer Paternal Aunt    • Malig Hyperthermia Neg Hx        Social History     Socioeconomic History   • Marital status:    Tobacco Use   • Smoking status: Former Smoker     Packs/day: 2.00     Years: 30.00     Pack years: 60.00     Types: Cigarettes     Start date: 1972     Quit date: 2008     Years since quittin.2   • Smokeless tobacco: Never Used   Vaping Use   •  "Vaping Use: Never used   Substance and Sexual Activity   • Alcohol use: No   • Drug use: No   • Sexual activity: Defer     Partners: Male     Birth control/protection: None         Allergies   Allergen Reactions   • Hydrocodone-Acetaminophen Hives     Lortab, takes tylenol       Current Medications:   Scheduled Meds:atorvastatin, 20 mg, Oral, Nightly  azithromycin, 500 mg, Intravenous, Q24H  cefTRIAXone, 2 g, Intravenous, Q24H  dilTIAZem CD, 180 mg, Oral, Q24H  insulin lispro, 0-9 Units, Subcutaneous, TID AC  metoprolol succinate XL, 200 mg, Oral, Daily  sodium chloride, 3 mL, Intravenous, Q12H      Continuous Infusions:     Review of Systems   Constitutional: Negative for chills and fever.   HENT: Negative for ear discharge and nosebleeds.    Eyes: Negative for discharge and redness.   Cardiovascular: Positive for chest pain, orthopnea and palpitations. Negative for paroxysmal nocturnal dyspnea and syncope.   Respiratory: Positive for shortness of breath. Negative for cough and wheezing.    Endocrine: Negative for heat intolerance.   Skin: Negative for rash.   Musculoskeletal: Positive for arthritis. Negative for myalgias.   Gastrointestinal: Negative for abdominal pain, melena, nausea and vomiting.   Genitourinary: Negative for dysuria and hematuria.   Neurological: Negative for dizziness, light-headedness, numbness and tremors.   Psychiatric/Behavioral: Negative for depression. The patient is nervous/anxious.         Objective:         /84 (Patient Position: Lying)   Pulse 90   Temp 97.9 °F (36.6 °C)   Resp 16   Ht 162.6 cm (64\")   Wt 74.8 kg (165 lb)   LMP  (LMP Unknown)   SpO2 96%   BMI 28.32 kg/m²     Physical Exam:    Head: Atraumatic, normocephalic  Eyes: No conjunctival injection or redness noted.  No discharge  Neck: No JVD, no lymphadenopathy or carotid bruit  Chest: No obvious deformity noted  Lungs: Air entry is present in all lung zones.  Decreased breath sounds at the bases.  No crackles " or rhonchi  Heart: Normal S1 and S2.  No pericardial rub or murmur  Neuro: Neurological exam was grossly intact.  No focal deficit.  Psychiatric: Patient appears to be emotionally stable.  No depression or anxiety noted  Extremities: Trace leg edema      ASCVD RIsk Score::  The 10-year ASCVD risk score (Stephanie MCDERMOTT Jr., et al., 2013) is: 16%    Values used to calculate the score:      Age: 61 years      Sex: Female      Is Non- : No      Diabetic: Yes      Tobacco smoker: No      Systolic Blood Pressure: 147 mmHg      Is BP treated: Yes      HDL Cholesterol: 46 mg/dL      Total Cholesterol: 197 mg/dL      Lab Review:     Results from last 7 days   Lab Units 04/12/22  0340 04/11/22  1901   SODIUM mmol/L 137 142   POTASSIUM mmol/L 3.6 3.9   CHLORIDE mmol/L 98 102   CO2 mmol/L 23.0 25.0   BUN mg/dL 15 17   CREATININE mg/dL 1.35* 1.20*   GLUCOSE mg/dL 208* 159*   CALCIUM mg/dL 9.6 10.4   AST (SGOT) U/L  --  24   ALT (SGPT) U/L  --  39*     Results from last 7 days   Lab Units 04/12/22  1049 04/12/22  0010 04/11/22  1901   TROPONIN T ng/mL <0.010 <0.010 <0.010     Results from last 7 days   Lab Units 04/12/22  0340 04/11/22  1901   WBC 10*3/mm3 8.60 13.40*   HEMOGLOBIN g/dL 11.5* 13.1   HEMATOCRIT % 33.7* 36.9   PLATELETS 10*3/mm3 226 245                   Invalid input(s): LDLCALC  Results from last 7 days   Lab Units 04/11/22  1901   PROBNP pg/mL 1,429.0*           Recent Radiology:  Imaging Results (Most Recent)     Procedure Component Value Units Date/Time    CT Angiogram Chest Pulmonary Embolism [[id number]] Collected: 04/11/22 2131     Updated: 04/11/22 2139    Narrative:         DATE OF EXAM:  4/11/2022 9:04 PM     PROCEDURE:  CT ANGIOGRAM CHEST PULMONARY EMBOLISM-     INDICATIONS:   PE suspected, high prob     COMPARISON:   2 view chest x-ray 04/11/2022. No previous chest CT for comparison.     TECHNIQUE:  Routine transaxial slices were obtained through chest after  administration of  intravenous 100 ml of Isovue 370. Reconstructed  coronal and sagittal images were also obtained. Automated exposure  control and iterative reconstruction methods were used.      FINDINGS:  No pulmonary arterial filling defects are seen to suggest pulmonary  emboli. Main pulmonary artery is nondilated. Heart size is mildly  enlarged. There is no pericardial effusion. There are trace bilateral  pleural effusions. There is interlobular septal thickening in both  lungs, greatest in the lung bases. There are also patchy opacities in  the lower lobes, right greater than left. There are postoperative  changes of bilateral mastectomy with implant reconstruction. No axillary  adenopathy is seen. Mildly prominent right hilar lymph nodes may be  reactive. Partially included solid organs of the upper abdomen appear  within normal limits for the phase of contrast-enhancement. No acute  osseous abnormality is identified.        Impression:      1.No evidence of pulmonary emboli.  2.Trace bilateral pleural effusions.  3.Bilateral interlobular septal thickening, suggestive of pulmonary  edema.  4.Patchy opacities in the lower lobes, right greater than left, likely  due to partial atelectasis and less likely pneumonia.  5.Mild cardiomegaly.     Electronically Signed By-Court Sheikh MD On:4/11/2022 9:37 PM  This report was finalized on [id number] by  Court Sheikh MD.    XR Chest 2 View [[id number]] Collected: 04/11/22 1927     Updated: 04/11/22 1931    Narrative:      XR CHEST 2 VW-     Date of Exam: 4/11/2022 7:15 PM     Indication: CHF/COPD Protocol.     Comparison: [facility] chest x-ray 10/20/2012.     Technique: Two views of the chest were obtained.     FINDINGS: There are new interstitial and airspace opacities in both mid  to lower lungs. No pneumothorax or large pleural effusion is seen. Heart  size is not enlarged. There are surgical clips over both sides of the  chest.         Impression:      New interstitial and airspace  opacities in both mid to lower lungs,  likely due to pulmonary edema or perhaps pneumonia.        Electronically Signed By-Court Sheikh MD On:4/11/2022 7:29 PM  This report was finalized on [id number] by  Court Sheikh MD.            ECHOCARDIOGRAM:    Results for orders placed during the hospital encounter of 02/05/20    Adult Transthoracic Echo Complete W/ Cont if Necessary Per Protocol    Interpretation Summary  · Left ventricular systolic function is normal.  · Left ventricular diastolic dysfunction (grade I) consistent with impaired relaxation.  · Estimated EF appears to be in the range of 61 - 65%.  · Left atrial cavity size is mildly dilated.  · Mild mitral valve regurgitation is present                  Assessment:         Active Hospital Problems    Diagnosis  POA   • Chest pain, unspecified type [R07.9]  Yes   • Dyspnea [R06.00]  Yes   • Breast neoplasm, Tis (DCIS), left [D05.12]  Yes     Added automatically from request for surgery [id number]     • ANDREAS on CPAP [G47.33, Z99.89]  Not Applicable   • Peripheral neuropathy [G62.9]  Yes   • Type 2 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 (HCC) [E11.49]  Yes   • Mixed anxiety depressive disorder [F41.8]  Yes   • Irritable bowel syndrome [K58.9]  Yes   • Essential hypertension [I10]  Yes   • Mixed hyperlipidemia [E78.2]  Yes     1. Chest Pain   - CE negative  - stress test 2020 normal    2. Shortness of breath, dyspnea on exertion / acute on chronic diastolic HF  - proBNP elevated  - 2D ECHO ordered  - IV Lasix 80mg x 1 given    3. Leukocytosis  - rocephin and azithromycin started in ER  - cx pending    4. DM II    5. HTN  - outpt medications: amlodipine 2.5mg daily, diltiazem 180, Hctz 25mg daily, Toprol  daily    6. HLD    7. IBS    8. ANDREAS uses CPAP    9. VIVEK   - crt 1.3     Plan:   Patient received IV Lasix, blood pressure is better today  Her shortness of breath has improved  2D ECHO pending  Will order stress test to risk  stratify    Patient is seen and examined and findings are verified.  All data is reviewed by me personally.  Assessment and plan formulated by APC was done after discussion with attending.  I spent more than 50% of time in taking care of the patient.    Patient is presented with accelerated hypertension along with shortness of breath and chest discomfort.  Her initial blood pressure at urgent care center was greater than 200.  Patient has been my patient but has not seen me for last 2 years.  She she was not taking all her medication.    Blood pressure is improved since the initiation of medication.  Patient denies any chest pain at present.    Normal S1 and S2.  No pericardial rub or murmur abdominal exam is benign    At this stage I will add Cardizem  mg daily.  Amlodipine would be discontinued.  I would proceed with echocardiogram and stress test.  Further recommendation after results of this test.  I had lengthy discussion with the patient about control of her blood pressure.    Electronically signed by Christo Murphy MD, 04/12/22, 6:28 PM EDT.           Christo Murphy MD  04/12/22  18:28 EDT

## 2022-04-13 ENCOUNTER — APPOINTMENT (OUTPATIENT)
Dept: ULTRASOUND IMAGING | Facility: HOSPITAL | Age: 62
End: 2022-04-13

## 2022-04-13 LAB
ALBUMIN SERPL-MCNC: 4 G/DL (ref 3.5–5.2)
ALBUMIN/GLOB SERPL: 1.5 G/DL
ALP SERPL-CCNC: 61 U/L (ref 39–117)
ALT SERPL W P-5'-P-CCNC: 24 U/L (ref 1–33)
ANION GAP SERPL CALCULATED.3IONS-SCNC: 13 MMOL/L (ref 5–15)
AST SERPL-CCNC: 16 U/L (ref 1–32)
BACTERIA UR QL AUTO: ABNORMAL /HPF
BILIRUB SERPL-MCNC: 0.2 MG/DL (ref 0–1.2)
BILIRUB UR QL STRIP: NEGATIVE
BUN SERPL-MCNC: 20 MG/DL (ref 8–23)
BUN/CREAT SERPL: 14.8 (ref 7–25)
CALCIUM SPEC-SCNC: 9.2 MG/DL (ref 8.6–10.5)
CHLORIDE SERPL-SCNC: 99 MMOL/L (ref 98–107)
CLARITY UR: CLEAR
CO2 SERPL-SCNC: 25 MMOL/L (ref 22–29)
COLOR UR: YELLOW
CREAT SERPL-MCNC: 1.35 MG/DL (ref 0.57–1)
CREAT UR-MCNC: 45.9 MG/DL
D-LACTATE SERPL-SCNC: 0.8 MMOL/L (ref 0.5–2)
D-LACTATE SERPL-SCNC: 1.2 MMOL/L (ref 0.5–2)
D-LACTATE SERPL-SCNC: 1.2 MMOL/L (ref 0.5–2)
DEPRECATED RDW RBC AUTO: 46.4 FL (ref 37–54)
EGFRCR SERPLBLD CKD-EPI 2021: 44.3 ML/MIN/1.73
ERYTHROCYTE [DISTWIDTH] IN BLOOD BY AUTOMATED COUNT: 15.6 % (ref 12.3–15.4)
GLOBULIN UR ELPH-MCNC: 2.6 GM/DL
GLUCOSE BLDC GLUCOMTR-MCNC: 137 MG/DL (ref 70–105)
GLUCOSE BLDC GLUCOMTR-MCNC: 184 MG/DL (ref 70–105)
GLUCOSE BLDC GLUCOMTR-MCNC: 191 MG/DL (ref 70–105)
GLUCOSE BLDC GLUCOMTR-MCNC: 199 MG/DL (ref 70–105)
GLUCOSE SERPL-MCNC: 171 MG/DL (ref 65–99)
GLUCOSE UR STRIP-MCNC: NEGATIVE MG/DL
HCT VFR BLD AUTO: 32.4 % (ref 34–46.6)
HGB BLD-MCNC: 11.1 G/DL (ref 12–15.9)
HGB UR QL STRIP.AUTO: NEGATIVE
HYALINE CASTS UR QL AUTO: ABNORMAL /LPF
KETONES UR QL STRIP: NEGATIVE
LEUKOCYTE ESTERASE UR QL STRIP.AUTO: ABNORMAL
MCH RBC QN AUTO: 28.9 PG (ref 26.6–33)
MCHC RBC AUTO-ENTMCNC: 34.2 G/DL (ref 31.5–35.7)
MCV RBC AUTO: 84.5 FL (ref 79–97)
NITRITE UR QL STRIP: NEGATIVE
PH UR STRIP.AUTO: 8 [PH] (ref 5–8)
PLATELET # BLD AUTO: 225 10*3/MM3 (ref 140–450)
PMV BLD AUTO: 8.7 FL (ref 6–12)
POTASSIUM SERPL-SCNC: 3.5 MMOL/L (ref 3.5–5.2)
PROT ?TM UR-MCNC: 112.8 MG/DL
PROT SERPL-MCNC: 6.6 G/DL (ref 6–8.5)
PROT UR QL STRIP: ABNORMAL
PROT/CREAT UR: 2457.5 MG/G CREA (ref 0–200)
RBC # BLD AUTO: 3.83 10*6/MM3 (ref 3.77–5.28)
RBC # UR STRIP: ABNORMAL /HPF
REF LAB TEST METHOD: ABNORMAL
SODIUM SERPL-SCNC: 137 MMOL/L (ref 136–145)
SP GR UR STRIP: 1.01 (ref 1–1.03)
SQUAMOUS #/AREA URNS HPF: ABNORMAL /HPF
TROPONIN T SERPL-MCNC: <0.01 NG/ML (ref 0–0.03)
UROBILINOGEN UR QL STRIP: ABNORMAL
WBC # UR STRIP: ABNORMAL /HPF
WBC NRBC COR # BLD: 5.8 10*3/MM3 (ref 3.4–10.8)

## 2022-04-13 PROCEDURE — 84156 ASSAY OF PROTEIN URINE: CPT | Performed by: INTERNAL MEDICINE

## 2022-04-13 PROCEDURE — 93458 L HRT ARTERY/VENTRICLE ANGIO: CPT | Performed by: INTERNAL MEDICINE

## 2022-04-13 PROCEDURE — C1894 INTRO/SHEATH, NON-LASER: HCPCS | Performed by: INTERNAL MEDICINE

## 2022-04-13 PROCEDURE — G0378 HOSPITAL OBSERVATION PER HR: HCPCS

## 2022-04-13 PROCEDURE — 63710000001 INSULIN LISPRO (HUMAN) PER 5 UNITS: Performed by: INTERNAL MEDICINE

## 2022-04-13 PROCEDURE — 25010000002 CEFTRIAXONE PER 250 MG: Performed by: HOSPITALIST

## 2022-04-13 PROCEDURE — 36415 COLL VENOUS BLD VENIPUNCTURE: CPT | Performed by: NURSE PRACTITIONER

## 2022-04-13 PROCEDURE — 99225 PR SBSQ OBSERVATION CARE/DAY 25 MINUTES: CPT | Performed by: HOSPITALIST

## 2022-04-13 PROCEDURE — 25010000002 FENTANYL CITRATE (PF) 100 MCG/2ML SOLUTION: Performed by: INTERNAL MEDICINE

## 2022-04-13 PROCEDURE — 82570 ASSAY OF URINE CREATININE: CPT | Performed by: INTERNAL MEDICINE

## 2022-04-13 PROCEDURE — 80053 COMPREHEN METABOLIC PANEL: CPT | Performed by: HOSPITALIST

## 2022-04-13 PROCEDURE — 76775 US EXAM ABDO BACK WALL LIM: CPT

## 2022-04-13 PROCEDURE — 25010000002 MIDAZOLAM PER 1 MG: Performed by: INTERNAL MEDICINE

## 2022-04-13 PROCEDURE — 84484 ASSAY OF TROPONIN QUANT: CPT | Performed by: NURSE PRACTITIONER

## 2022-04-13 PROCEDURE — 25010000002 AZITHROMYCIN PER 500 MG: Performed by: HOSPITALIST

## 2022-04-13 PROCEDURE — 83605 ASSAY OF LACTIC ACID: CPT | Performed by: NURSE PRACTITIONER

## 2022-04-13 PROCEDURE — 81001 URINALYSIS AUTO W/SCOPE: CPT | Performed by: INTERNAL MEDICINE

## 2022-04-13 PROCEDURE — 0 IOPAMIDOL PER 1 ML: Performed by: INTERNAL MEDICINE

## 2022-04-13 PROCEDURE — 99214 OFFICE O/P EST MOD 30 MIN: CPT | Performed by: INTERNAL MEDICINE

## 2022-04-13 PROCEDURE — 85027 COMPLETE CBC AUTOMATED: CPT | Performed by: HOSPITALIST

## 2022-04-13 PROCEDURE — 86335 IMMUNFIX E-PHORSIS/URINE/CSF: CPT | Performed by: INTERNAL MEDICINE

## 2022-04-13 PROCEDURE — 82962 GLUCOSE BLOOD TEST: CPT

## 2022-04-13 PROCEDURE — 63710000001 INSULIN LISPRO (HUMAN) PER 5 UNITS: Performed by: NURSE PRACTITIONER

## 2022-04-13 PROCEDURE — C1769 GUIDE WIRE: HCPCS | Performed by: INTERNAL MEDICINE

## 2022-04-13 RX ORDER — FENTANYL CITRATE 50 UG/ML
INJECTION, SOLUTION INTRAMUSCULAR; INTRAVENOUS AS NEEDED
Status: DISCONTINUED | OUTPATIENT
Start: 2022-04-13 | End: 2022-04-13 | Stop reason: HOSPADM

## 2022-04-13 RX ORDER — MIDAZOLAM HYDROCHLORIDE 1 MG/ML
INJECTION INTRAMUSCULAR; INTRAVENOUS AS NEEDED
Status: DISCONTINUED | OUTPATIENT
Start: 2022-04-13 | End: 2022-04-13 | Stop reason: HOSPADM

## 2022-04-13 RX ORDER — ACETYLCYSTEINE 200 MG/ML
600 SOLUTION ORAL; RESPIRATORY (INHALATION) EVERY 12 HOURS SCHEDULED
Status: DISPENSED | OUTPATIENT
Start: 2022-04-13 | End: 2022-04-15

## 2022-04-13 RX ORDER — LIDOCAINE HYDROCHLORIDE 20 MG/ML
INJECTION, SOLUTION INFILTRATION; PERINEURAL AS NEEDED
Status: DISCONTINUED | OUTPATIENT
Start: 2022-04-13 | End: 2022-04-13 | Stop reason: HOSPADM

## 2022-04-13 RX ORDER — ALPRAZOLAM 0.25 MG/1
0.25 TABLET ORAL ONCE
Status: COMPLETED | OUTPATIENT
Start: 2022-04-13 | End: 2022-04-13

## 2022-04-13 RX ORDER — SODIUM CHLORIDE 9 MG/ML
75 INJECTION, SOLUTION INTRAVENOUS CONTINUOUS
Status: DISCONTINUED | OUTPATIENT
Start: 2022-04-13 | End: 2022-04-14

## 2022-04-13 RX ADMIN — ALPRAZOLAM 1 MG: 1 TABLET ORAL at 23:53

## 2022-04-13 RX ADMIN — SODIUM CHLORIDE 75 ML/HR: 9 INJECTION, SOLUTION INTRAVENOUS at 09:31

## 2022-04-13 RX ADMIN — ALPRAZOLAM 1 MG: 1 TABLET ORAL at 10:18

## 2022-04-13 RX ADMIN — METOPROLOL SUCCINATE 200 MG: 50 TABLET, EXTENDED RELEASE ORAL at 09:18

## 2022-04-13 RX ADMIN — DILTIAZEM HYDROCHLORIDE 180 MG: 180 CAPSULE, COATED, EXTENDED RELEASE ORAL at 09:18

## 2022-04-13 RX ADMIN — INSULIN LISPRO 2 UNITS: 100 INJECTION, SOLUTION INTRAVENOUS; SUBCUTANEOUS at 09:18

## 2022-04-13 RX ADMIN — CEFTRIAXONE 2 G: 2 INJECTION, POWDER, FOR SOLUTION INTRAMUSCULAR; INTRAVENOUS at 22:07

## 2022-04-13 RX ADMIN — AZITHROMYCIN MONOHYDRATE 500 MG: 500 INJECTION, POWDER, LYOPHILIZED, FOR SOLUTION INTRAVENOUS at 23:48

## 2022-04-13 RX ADMIN — Medication 3 ML: at 09:17

## 2022-04-13 RX ADMIN — ACETYLCYSTEINE 600 MG: 200 SOLUTION ORAL; RESPIRATORY (INHALATION) at 10:18

## 2022-04-13 RX ADMIN — INSULIN LISPRO 2 UNITS: 100 INJECTION, SOLUTION INTRAVENOUS; SUBCUTANEOUS at 12:44

## 2022-04-13 RX ADMIN — ALPRAZOLAM 0.25 MG: 0.25 TABLET ORAL at 16:20

## 2022-04-13 NOTE — PROGRESS NOTES
LOS: 2 days   Admiting Physician- Sergo Ruth MD    Reason For Followup:    Shortness of breath  Elevated BNP  Congestive heart failure acute systolic  Dilated cardiomyopathy  Accelerated hypertension    Subjective     Patient is feeling better.  No chest    Objective     Blood pressure is improved but still elevated    Review of Systems:   Review of Systems   Constitutional: Negative for chills and fever.   HENT: Negative for ear discharge and nosebleeds.    Eyes: Negative for discharge and redness.   Cardiovascular: Negative for chest pain, orthopnea, palpitations, paroxysmal nocturnal dyspnea and syncope.   Respiratory: Positive for shortness of breath. Negative for cough and wheezing.    Endocrine: Negative for heat intolerance.   Skin: Negative for rash.   Musculoskeletal: Positive for arthritis and joint pain. Negative for myalgias.   Gastrointestinal: Negative for abdominal pain, melena, nausea and vomiting.   Genitourinary: Negative for dysuria and hematuria.   Neurological: Negative for dizziness, light-headedness, numbness and tremors.   Psychiatric/Behavioral: Negative for depression. The patient is not nervous/anxious.          Vital Signs  Vitals:    04/13/22 0042 04/13/22 0514 04/13/22 0734 04/13/22 1204   BP: 126/73 133/77 149/83 150/89   BP Location: Right arm Right arm Right arm Right arm   Patient Position: Lying Lying Lying Lying   Pulse: 72 80 84 86   Resp: 16 18 16 18   Temp: 97.6 °F (36.4 °C) 97.5 °F (36.4 °C)     TempSrc: Oral Oral     SpO2: 96% 96% 98% 96%   Weight:       Height:         Wt Readings from Last 1 Encounters:   04/12/22 74.8 kg (165 lb)       Intake/Output Summary (Last 24 hours) at 4/13/2022 1710  Last data filed at 4/13/2022 0701  Gross per 24 hour   Intake 120 ml   Output --   Net 120 ml     Physical Exam:  Constitutional:       Appearance: Well-developed.   Eyes:      General: No scleral icterus.        Right eye: No discharge.   HENT:      Head: Normocephalic and  atraumatic.   Neck:      Thyroid: No thyromegaly.      Lymphadenopathy: No cervical adenopathy.   Pulmonary:      Effort: Pulmonary effort is normal. No respiratory distress.      Breath sounds: Normal breath sounds. No wheezing. No rales.   Cardiovascular:      Normal rate. Regular rhythm.      No gallop.   Edema:     Peripheral edema absent.   Abdominal:      Tenderness: There is no abdominal tenderness.   Skin:     Findings: No erythema or rash.   Neurological:      Mental Status: Alert and oriented to person, place, and time.         Results Review:   Lab Results (last 24 hours)     Procedure Component Value Units Date/Time    Urinalysis With Microscopic If Indicated (No Culture) - [[id number]]  (Abnormal) Collected: 04/13/22 1521    Specimen: Urine Updated: 04/13/22 1628     Color, UA Yellow     Appearance, UA Clear     pH, UA 8.0     Specific Gravity, UA 1.009     Glucose, UA Negative     Ketones, UA Negative     Bilirubin, UA Negative     Blood, UA Negative     Protein, UA >=300 mg/dL (3+)     Leuk Esterase, UA Small (1+)     Nitrite, UA Negative     Urobilinogen, UA 0.2 E.U./dL    Urinalysis, Microscopic Only - Urine, Clean Catch [[id number]]  (Abnormal) Collected: 04/13/22 1521    Specimen: Urine Updated: 04/13/22 1628     RBC, UA 0-2 /HPF      WBC, UA 0-2 /HPF      Bacteria, UA None Seen /HPF      Squamous Epithelial Cells, UA 0-2 /HPF      Hyaline Casts, UA 0-2 /LPF      Methodology Automated Microscopy    Immunofixation, Urine - Urine, Clean Catch [[id number]] Collected: 04/13/22 1520    Specimen: Urine, Clean Catch Updated: 04/13/22 1619    Protein / Creatinine Ratio, Urine - Urine, Clean Catch [[id number]] Collected: 04/13/22 1520    Specimen: Urine, Clean Catch Updated: 04/13/22 1619    Lactic Acid, Plasma [[id number]]  (Normal) Collected: 04/13/22 1224    Specimen: Blood Updated: 04/13/22 1314     Lactate 1.2 mmol/L     Troponin [[id number]]  (Normal) Collected: 04/13/22 1224    Specimen: Blood Updated:  04/13/22 1312     Troponin T <0.010 ng/mL     Narrative:      Troponin T Reference Range:  <= 0.03 ng/mL-   Negative for AMI  >0.03 ng/mL-     Abnormal for myocardial necrosis.  Clinicians would have to utilize clinical acumen, EKG, Troponin and serial changes to determine if it is an Acute Myocardial Infarction or myocardial injury due to an underlying chronic condition.       Results may be falsely decreased if patient taking Biotin.      POC Glucose Once [[id number]]  (Abnormal) Collected: 04/13/22 1208    Specimen: Blood Updated: 04/13/22 1209     Glucose 184 mg/dL      Comment: Serial Number: [id number]:  [id number]       POC Glucose Once [[id number]]  (Abnormal) Collected: 04/13/22 0737    Specimen: Blood Updated: 04/13/22 0738     Glucose 191 mg/dL      Comment: Serial Number: [id number]:  [id number]       Troponin [[id number]]  (Normal) Collected: 04/13/22 0637    Specimen: Blood Updated: 04/13/22 0736     Troponin T <0.010 ng/mL     Narrative:      Troponin T Reference Range:  <= 0.03 ng/mL-   Negative for AMI  >0.03 ng/mL-     Abnormal for myocardial necrosis.  Clinicians would have to utilize clinical acumen, EKG, Troponin and serial changes to determine if it is an Acute Myocardial Infarction or myocardial injury due to an underlying chronic condition.       Results may be falsely decreased if patient taking Biotin.      Lactic Acid, Plasma [[id number]]  (Normal) Collected: 04/13/22 0637    Specimen: Blood Updated: 04/13/22 0734     Lactate 1.2 mmol/L     Lactic Acid, Plasma [[id number]]  (Normal) Collected: 04/13/22 0006    Specimen: Blood Updated: 04/13/22 0141     Lactate 0.8 mmol/L     Comprehensive Metabolic Panel [[id number]]  (Abnormal) Collected: 04/13/22 0006    Specimen: Blood Updated: 04/13/22 0140     Glucose 171 mg/dL      BUN 20 mg/dL      Creatinine 1.35 mg/dL      Sodium 137 mmol/L      Potassium 3.5 mmol/L      Chloride 99 mmol/L      CO2 25.0 mmol/L      Calcium 9.2 mg/dL       Total Protein 6.6 g/dL      Albumin 4.00 g/dL      ALT (SGPT) 24 U/L      AST (SGOT) 16 U/L      Alkaline Phosphatase 61 U/L      Total Bilirubin 0.2 mg/dL      Globulin 2.6 gm/dL      A/G Ratio 1.5 g/dL      BUN/Creatinine Ratio 14.8     Anion Gap 13.0 mmol/L      eGFR 44.3 mL/min/1.73      Comment: National Kidney Foundation and American Society of Nephrology (ASN) Task Force recommended calculation based on the Chronic Kidney Disease Epidemiology Collaboration (CKD-EPI) equation refit without adjustment for race.       Narrative:      GFR Normal >60  Chronic Kidney Disease <60  Kidney Failure <15      Troponin [[id number]]  (Normal) Collected: 04/13/22 0006    Specimen: Blood Updated: 04/13/22 0140     Troponin T <0.010 ng/mL     Narrative:      Troponin T Reference Range:  <= 0.03 ng/mL-   Negative for AMI  >0.03 ng/mL-     Abnormal for myocardial necrosis.  Clinicians would have to utilize clinical acumen, EKG, Troponin and serial changes to determine if it is an Acute Myocardial Infarction or myocardial injury due to an underlying chronic condition.       Results may be falsely decreased if patient taking Biotin.      CBC (No Diff) [[id number]]  (Abnormal) Collected: 04/13/22 0006    Specimen: Blood Updated: 04/13/22 0122     WBC 5.80 10*3/mm3      RBC 3.83 10*6/mm3      Hemoglobin 11.1 g/dL      Hematocrit 32.4 %      MCV 84.5 fL      MCH 28.9 pg      MCHC 34.2 g/dL      RDW 15.6 %      RDW-SD 46.4 fl      MPV 8.7 fL      Platelets 225 10*3/mm3     POC Glucose Once [[id number]]  (Abnormal) Collected: 04/12/22 2125    Specimen: Blood Updated: 04/12/22 2127     Glucose 251 mg/dL      Comment: Serial Number: [id number]:  [id number]       Blood Culture - Blood, Hand, Left [[id number]]  (Normal) Collected: 04/11/22 1901    Specimen: Blood from Hand, Left Updated: 04/12/22 1917     Blood Culture No growth at 24 hours    Blood Culture - Blood, Arm, Right [[id number]]  (Normal) Collected: 04/11/22 1901    Specimen:  Blood from Arm, Right Updated: 04/12/22 1917     Blood Culture No growth at 24 hours    Lactic Acid, Plasma [[id number]]  (Normal) Collected: 04/12/22 1809    Specimen: Blood Updated: 04/12/22 1850     Lactate 1.2 mmol/L     Troponin [[id number]]  (Normal) Collected: 04/12/22 1809    Specimen: Blood Updated: 04/12/22 1848     Troponin T <0.010 ng/mL     Narrative:      Troponin T Reference Range:  <= 0.03 ng/mL-   Negative for AMI  >0.03 ng/mL-     Abnormal for myocardial necrosis.  Clinicians would have to utilize clinical acumen, EKG, Troponin and serial changes to determine if it is an Acute Myocardial Infarction or myocardial injury due to an underlying chronic condition.       Results may be falsely decreased if patient taking Biotin.      POC Glucose Once [[id number]]  (Abnormal) Collected: 04/12/22 1731    Specimen: Blood Updated: 04/12/22 1733     Glucose 201 mg/dL      Comment: Serial Number: [id number]:  [id number]           Imaging Results (Last 72 Hours)     Procedure Component Value Units Date/Time    US Renal Bilateral [[id number]] Collected: 04/13/22 1417     Updated: 04/13/22 1421    Narrative:      Examination: US RENAL BILATERAL-     Date of Exam: 4/13/2022 12:58 PM     Indication: Acute renal failure     Comparison: None available     Technique: Grayscale and color Doppler ultrasound evaluation of the  kidneys and urinary bladder was performed     Findings:  The right kidney measures 9.2 x 5.7 x 5.0 cm. The left kidney measures  11.4 x 5.2 x 4.5 cm. There is no hydronephrosis. There is no solid or  cystic renal lesion.     The urinary bladder is fluid-filled without wall thickening.       Impression:      1. Mildly small right kidney.  2. No hydronephrosis.     Electronically Signed By-Pillo Rosas MD On:4/13/2022 2:19 PM  This report was finalized on [id number] by  Pillo Rosas MD.    CT Angiogram Chest Pulmonary Embolism [[id number]] Collected: 04/11/22 2131     Updated: 04/11/22  2139    Narrative:         DATE OF EXAM:  4/11/2022 9:04 PM     PROCEDURE:  CT ANGIOGRAM CHEST PULMONARY EMBOLISM-     INDICATIONS:   PE suspected, high prob     COMPARISON:   2 view chest x-ray 04/11/2022. No previous chest CT for comparison.     TECHNIQUE:  Routine transaxial slices were obtained through chest after  administration of intravenous 100 ml of Isovue 370. Reconstructed  coronal and sagittal images were also obtained. Automated exposure  control and iterative reconstruction methods were used.      FINDINGS:  No pulmonary arterial filling defects are seen to suggest pulmonary  emboli. Main pulmonary artery is nondilated. Heart size is mildly  enlarged. There is no pericardial effusion. There are trace bilateral  pleural effusions. There is interlobular septal thickening in both  lungs, greatest in the lung bases. There are also patchy opacities in  the lower lobes, right greater than left. There are postoperative  changes of bilateral mastectomy with implant reconstruction. No axillary  adenopathy is seen. Mildly prominent right hilar lymph nodes may be  reactive. Partially included solid organs of the upper abdomen appear  within normal limits for the phase of contrast-enhancement. No acute  osseous abnormality is identified.        Impression:      1.No evidence of pulmonary emboli.  2.Trace bilateral pleural effusions.  3.Bilateral interlobular septal thickening, suggestive of pulmonary  edema.  4.Patchy opacities in the lower lobes, right greater than left, likely  due to partial atelectasis and less likely pneumonia.  5.Mild cardiomegaly.     Electronically Signed By-Court Sheikh MD On:4/11/2022 9:37 PM  This report was finalized on [id number] by  Court Sheikh MD.    XR Chest 2 View [[id number]] Collected: 04/11/22 1927     Updated: 04/11/22 1931    Narrative:      XR CHEST 2 VW-     Date of Exam: 4/11/2022 7:15 PM     Indication: CHF/COPD Protocol.     Comparison: [facility] chest x-ray  10/20/2012.     Technique: Two views of the chest were obtained.     FINDINGS: There are new interstitial and airspace opacities in both mid  to lower lungs. No pneumothorax or large pleural effusion is seen. Heart  size is not enlarged. There are surgical clips over both sides of the  chest.         Impression:      New interstitial and airspace opacities in both mid to lower lungs,  likely due to pulmonary edema or perhaps pneumonia.        Electronically Signed By-Court Sheikh MD On:4/11/2022 7:29 PM  This report was finalized on [id number] by  Court Sheikh MD.        ECG/EMG Results (most recent)     Procedure Component Value Units Date/Time    ECG 12 Lead [[id number]] Collected: 04/11/22 1907     Updated: 04/11/22 1908     QT Interval 362 ms     Narrative:      HEART RATE= 121  bpm  RR Interval= 496  ms  MI Interval= 129  ms  P Horizontal Axis= -47  deg  P Front Axis= -57  deg  QRSD Interval= 163  ms  QT Interval= 362  ms  QRS Axis= -28  deg  T Wave Axis= 91  deg  - ABNORMAL ECG -  Sinus or ectopic atrial tachycardia  Left bundle branch block  ST elevation secondary to IVCD  Electronically Signed By:   Date and Time of Study: 2022-04-11 19:07:05    Adult Transthoracic Echo Complete W/ Cont if Necessary Per Protocol [[id number]] Resulted: 04/12/22 1937     Updated: 04/12/22 1941     Target HR (85%) 133 bpm      Max. Pred. HR (100%) 157 bpm      ACS 1.73 cm      Ao root diam 2.6 cm      Ao pk alireza 127.2 cm/sec      Ao V2 VTI 19.8 cm      MOJGAN(I,D) 1.37 cm2      EDV(cubed) 125.7 ml      EDV(MOD-sp4) 105.4 ml      EF(MOD-bp) 36.0 %      EF(MOD-sp4) 36.3 %      ESV(cubed) 103.5 ml      ESV(MOD-sp4) 67.1 ml      IVS/LVPW 1.13 cm      LV mass(C)d 125.7 grams      LV V1 max PG 2.40 mmHg      LV V1 mean PG 1.16 mmHg      LV V1 max 77.5 cm/sec      LVPWd 0.70 cm      MV V2 VTI 16.7 cm      MVA(VTI) 1.62 cm2      PA V2 max 121.8 cm/sec      RV V1 max PG 4.1 mmHg      RV V1 max 101.6 cm/sec      RV V1 VTI 17.4 cm       SI(MOD-sp4) 21.2 ml/m2      SV(LVOT) 27.0 ml      SV(MOD-sp4) 38.2 ml      Ao max PG 6.5 mmHg      Ao mean PG 3.5 mmHg      FS 6.3 %      IVSd 0.80 cm      LA dimension(2D) 3.6 cm      LV V1 VTI 12.6 cm      LVIDd 5.0 cm      LVIDs 4.7 cm      LVOT area 2.14 cm2      LVOT diam 1.65 cm      MV max PG 8.3 mmHg      MV mean PG 5.4 mmHg      LV Kamara Vol (BSA corrected) 58.5 cm2      LV Sys Vol (BSA corrected) 37.2 cm2     Narrative:      · Left ventricular systolic function is severely decreased.  · Left ventricular ejection fraction is 25 to 30%  · Akinetic septum and mid to distal inferior wall is noted  · Left ventricular diastolic function was normal.  · The right ventricular cavity is small in size.           CBC    Results from last 7 days   Lab Units 04/13/22  0006 04/12/22  0340 04/11/22  1901   WBC 10*3/mm3 5.80 8.60 13.40*   HEMOGLOBIN g/dL 11.1* 11.5* 13.1   PLATELETS 10*3/mm3 225 226 245     BMP   Results from last 7 days   Lab Units 04/13/22  0006 04/12/22  0340 04/11/22  1901   SODIUM mmol/L 137 137 142   POTASSIUM mmol/L 3.5 3.6 3.9   CHLORIDE mmol/L 99 98 102   CO2 mmol/L 25.0 23.0 25.0   BUN mg/dL 20 15 17   CREATININE mg/dL 1.35* 1.35* 1.20*   GLUCOSE mg/dL 171* 208* 159*     CMP   Results from last 7 days   Lab Units 04/13/22  0006 04/12/22  0340 04/11/22  1901   SODIUM mmol/L 137 137 142   POTASSIUM mmol/L 3.5 3.6 3.9   CHLORIDE mmol/L 99 98 102   CO2 mmol/L 25.0 23.0 25.0   BUN mg/dL 20 15 17   CREATININE mg/dL 1.35* 1.35* 1.20*   GLUCOSE mg/dL 171* 208* 159*   ALBUMIN g/dL 4.00  --  5.00   BILIRUBIN mg/dL 0.2  --  0.3   ALK PHOS U/L 61  --  90   AST (SGOT) U/L 16  --  24   ALT (SGPT) U/L 24  --  39*     Cardiac Studies:  Echo- Results for orders placed during the hospital encounter of 04/11/22    Adult Transthoracic Echo Complete W/ Cont if Necessary Per Protocol    Interpretation Summary  · Left ventricular systolic function is severely decreased.  · Left ventricular ejection fraction is 25 to  30%  · Akinetic septum and mid to distal inferior wall is noted  · Left ventricular diastolic function was normal.  · The right ventricular cavity is small in size.    Stress Myoview-  Cath-      Medication Review:   Scheduled Meds:acetylcysteine, 600 mg, Oral, Q12H  atorvastatin, 20 mg, Oral, Nightly  azithromycin, 500 mg, Intravenous, Q24H  cefTRIAXone, 2 g, Intravenous, Q24H  dilTIAZem CD, 180 mg, Oral, Q24H  insulin lispro, 0-9 Units, Subcutaneous, TID AC  metoprolol succinate XL, 200 mg, Oral, Daily  sodium chloride, 3 mL, Intravenous, Q12H      Continuous Infusions:sodium chloride, 75 mL/hr, Last Rate: 75 mL/hr (04/13/22 0940)      PRN Meds:.ALPRAZolam  •  dextrose  •  dextrose  •  glucagon (human recombinant)  •  hydrALAZINE  •  insulin lispro **AND** insulin lispro  •  ipratropium-albuterol  •  melatonin  •  nitroglycerin  •  ondansetron **OR** ondansetron  •  sodium chloride  •  sodium chloride  •  sodium chloride      Assessment/Plan   Patient Active Problem List   Diagnosis   • Encounter for general adult medical examination without abnormal findings   • Essential hypertension   • Irritable bowel syndrome   • Mixed anxiety depressive disorder   • Mixed hyperlipidemia   • Obesity   • Encounter for screening for malignant neoplasm of colon   • Type 2 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 (HCC)   • Vitamin D deficiency   • Hypertensive urgency   • Peripheral neuropathy   • ANDREAS on CPAP   • Chest pain, atypical   • Breast neoplasm, Tis (DCIS), left   • Cold intolerance   • Insomnia   • Chest pain, unspecified type   • Dyspnea     MDM:    1.  Dilated cardiomyopathy:    Echocardiogram showed EF is 25 to 30%.  Patient is on Toprol-XL.  I would consider either hydralazine but if nephrology team is okay I would like to start Entresto.    2.  Congestive heart failure:    At present patient is in euvolemic status.  After cardiac catheterization I would start low-dose diuretics    3.   Hypertension:    Blood pressure is better but still on the higher side.  I would consider either hydralazine or Entresto    4.  Ischemic evaluation:    Patient would proceed with cardiac catheterization today I have canceled stress test    Christo Murphy MD  04/13/22  17:10 EDT

## 2022-04-13 NOTE — PROGRESS NOTES
[facility] Medicine Services Daily Progress Note    Patient Name: Susy Hanson  : 1960  MRN: [id number]  Primary Care Physician:  Erica Avila MD  Date of admission: 2022      Subjective      Chief Complaint: Shortness of breath    Feels better this morning.  No chest pain shortness of breath improved    Review of systems  Cardiac no chest pain or palpitations  Pulmonary positive for shortness of breath positive for cough  GI no nausea no vomiting      Objective      Vitals:   Temp:  [97.5 °F (36.4 °C)-97.9 °F (36.6 °C)] 97.5 °F (36.4 °C)  Heart Rate:  [] 84  Resp:  [15-18] 16  BP: (116-163)/(73-84) 149/83    Physical Exam   Constitutional:  oriented to person, place, and time. No distress.   HENT:   Head: Normocephalic and atraumatic.   Eyes: Conjunctivae and EOM are normal. Pupils are equal, round, and reactive to light.   Neck: No JVD present. No thyromegaly present.   Cardiovascular: Normal rate, regular rhythm, normal heart sounds and intact distal pulses. Exam reveals no gallop and no friction rub.   No murmur heard.  Pulmonary/Chest: Effort normal and breath sounds normal. No stridor. No respiratory distress.  has no wheezes.  has no rales.  exhibits no tenderness.   Abdominal: Soft. Bowel sounds are normal.  no distension and no mass. There is no tenderness. There is no rebound and no guarding. No hernia.    Musculoskeletal: Normal range of motion.   Lymphadenopathy:     no cervical adenopathy.   Neurological:  alert and oriented to person, place, and time. No cranial nerve deficit or sensory deficit. exhibits normal muscle tone.   Skin: No rash noted.  not diaphoretic.   Psychiatric:  normal mood and affect.   Vitals reviewed.         Result Review    Result Review:  I have personally reviewed the results from the time of this admission to 2022 11:01 EDT and agree with these findings:  [x]  Laboratory  []  Microbiology  [x]  Radiology  []   EKG/Telemetry   []  Cardiology/Vascular   []  Pathology  []  Old records  []  Other:          Assessment/Plan      Brief Patient Summary:  Susy Hanson is a 61 y.o. female who presented with dyspnea.      acetylcysteine, 600 mg, Oral, Q12H  atorvastatin, 20 mg, Oral, Nightly  azithromycin, 500 mg, Intravenous, Q24H  cefTRIAXone, 2 g, Intravenous, Q24H  dilTIAZem CD, 180 mg, Oral, Q24H  insulin lispro, 0-9 Units, Subcutaneous, TID AC  metoprolol succinate XL, 200 mg, Oral, Daily  sodium chloride, 3 mL, Intravenous, Q12H       sodium chloride, 75 mL/hr, Last Rate: 75 mL/hr (04/13/22 0749)         Active Hospital Problems:  Active Hospital Problems    Diagnosis    • **Chest pain, atypical    • Chest pain, unspecified type    • Dyspnea    • Breast neoplasm, Tis (DCIS), left      Added automatically from request for surgery [id number]     • ANDREAS on CPAP    • Peripheral neuropathy    • Type 2 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 (HCC)    • Mixed anxiety depressive disorder    • Irritable bowel syndrome    • Essential hypertension    • Mixed hyperlipidemia      Plan:   Dyspnea  Cardiogenic versus pneumonia versus other  No PE on CT scan PE protocol  Imaging consistent with infiltrate possible pneumonia versus edema  Diuresed with IV Lasix  Cover with antibiotics  Follow-up on micro so far negative  Bronchodilators, supportive management  Cardiology consult    Chest pain  Resolved  As above  Cardiology consult plan for stress test and further ischemic work-up  2D echo decreased EF 25 to 30%    Pneumonia?  Procalcitonin low normal  No leukocytosis  Imaging as above  Cover antibiotics for now follow-up on micro    Diabetes  Insulin sliding scale Accu-Cheks next    Hypertension  Start home meds, hold hydrochlorothiazide and ACE inhibitor due to VIVEK    VIVEK  Creatinine improved with Lasix  Monitor  As above  Per nephrology    Anxiety  Restart home Xanax    DVT PUD prophylaxis     Plan as  above        DVT prophylaxis:  Mechanical DVT prophylaxis orders are present.    CODE STATUS:    Code Status (Patient has no pulse and is not breathing): CPR (Attempt to Resuscitate)  Medical Interventions (Patient has pulse or is breathing): Full Support      Disposition:  I expect patient to be discharged 24 to 48 hours.      Electronically signed by Sergo Ruth MD, 04/13/22, 11:01 EDT.  [facility]

## 2022-04-13 NOTE — PROGRESS NOTES
"Heart Failure Program  Nurse Navigator  Discharge Planning    Patient Name:Susy Hanson  :1960  Cardiologist:Jeffrey  Current Admission Date: 2022   Previous Admission:    Admission frequency: 2 admissions in 6 months    Heart Failure history per record:    Symptoms on admission:c/o SOA, cough, orthopnea, chest pain on arrival. Pt describes increase MULLER, went to [facility] then transported to ED via EMS. Pt advises she last saw her cardiologist 2 years ago.      Admission Weight:  Flowsheet Rows    Flowsheet Row First Filed Value   Admission Height 162.6 cm (64\") Documented at 2022 1849   Admission Weight 74.8 kg (165 lb) Documented at 2022 184            Current Home Medications:  Prior to Admission medications    Medication Sig Start Date End Date Taking? Authorizing Provider   acetaminophen (TYLENOL) 325 MG tablet Take 1 tablet by mouth Every 4 (Four) Hours As Needed for Mild Pain . 21  Yes Heri Arreaga MD   ALPRAZolam (XANAX) 1 MG tablet Take 1 tablet by mouth 2 (Two) Times a Day As Needed for Anxiety or Sleep. 22  Yes Erica Avila MD   amLODIPine (NORVASC) 2.5 MG tablet Take 1 tablet by mouth Daily. 22  Yes Erica Avila MD   atorvastatin (LIPITOR) 20 MG tablet Take 1 tablet by mouth Daily. 22  Yes Erica Avila MD   dilTIAZem CD (Cardizem CD) 180 MG 24 hr capsule Take 1 capsule by mouth Daily. 22  Yes Erica Avila MD   hydrALAZINE (APRESOLINE) 100 MG tablet Take 2 tablets by mouth at breakfast and 1 tablet at bedtime OR take 1 tablet by mouth three times daily  Patient taking differently: Take 2 tablets by mouth at breakfast and 1 tablet at bedtime OR take 1 tablet by mouth three times daily 1/10/22  Yes Erica Avila MD   hydroCHLOROthiazide (HYDRODIURIL) 25 MG tablet Take 1 tablet by mouth Daily. 22  Yes Erica Avila MD   Insulin Lispro Prot & Lispro (HumaLOG Mix " 75/25 KwikPen) (75-25) 100 UNIT/ML suspension pen-injector pen Inject 24 Units under the skin with breakfast and 28 units under the skin with supper  Patient taking differently: Inject 24 Units under the skin into the appropriate area as directed Every Morning. 10/19/21  Yes Erica Avila MD   losartan (Cozaar) 100 MG tablet Take 1 tablet by mouth Daily. 4/12/22  Yes Erica Avila MD   metFORMIN (GLUCOPHAGE) 1000 MG tablet Take one tablet by mouth with breakfast and 1 1/2 tablets with supper  Patient taking differently: Take one tablet by mouth with breakfast and 1 1/2 tablets with supper 12/14/21  Yes Erica Avila MD   metoprolol succinate XL (TOPROL-XL) 100 MG 24 hr tablet Take 2 tablets by mouth Daily for blood pressure. 4/12/22  Yes Erica Avila MD       Social history:   Pt lives with spouse, wears a cpap nightly, medication adherence. No issues with obtaining medications or taking them. Works full-time and advises of high stress over last 2 years, former smoker.    Smoking status:former    Diagnostics Testing:  proBNP level: 1429    Echocardiogram:Results for orders placed during the hospital encounter of 04/11/22    Adult Transthoracic Echo Complete W/ Cont if Necessary Per Protocol    Interpretation Summary  · Left ventricular systolic function is severely decreased.  · Left ventricular ejection fraction is 25 to 30%  · Akinetic septum and mid to distal inferior wall is noted  · Left ventricular diastolic function was normal.  · The right ventricular cavity is small in size.        Patient Assessment:   Pt lying in bed, resp even and unlabored, no SOA with conversation, no edema, pt reports to improved SOA since admission.    Current O2: none  Home O2: none    Education provided to patient:  yes- Heart Failure disease education  yes -Symptom identification/management  yes -Daily Weights  yes- Diet education  na- Fluid restriction (if ordered)  yes-  Activity education  pending- Medication education  na- Smoking cessation  yes- Follow-up Appointments  yes-Provided information on how to access [facility] My HF Guide/Heart Failure Interactive workbook    Acceptance of learning: acceptance, cooperative, teachbck    Heart Failure education interactive teaching session time: 45 minutes    GWTG: EF 25-30% - toprol XL, losartan on hold for heart cath per notation    Identified needs/barriers:   New dx, pending heart cath, I&O, daily weights    Intervention:   RT - message on proper CPAP machine cleaning.

## 2022-04-13 NOTE — PLAN OF CARE
Goal Outcome Evaluation:      Pt NPO after breakfast today to have heart cath. Family at bedside. VSS

## 2022-04-13 NOTE — CASE MANAGEMENT/SOCIAL WORK
Continued Stay Note  [facility]     Patient Name: Susy Hanson  MRN: [id number]  Today's Date: 4/13/2022    Admit Date: 4/11/2022     Discharge Plan     Row Name 04/13/22 1601       Plan    Plan D/C plan: anticipate routine home with spouse.    Plan Comments Anticipate routine home with spouse. No d/c needs at this time.                            Phone communication or documentation only - no physical contact with patient or family.        BARBARA NATH RN

## 2022-04-13 NOTE — CONSULTS
NEPHROLOGY CONSULTATION-----[facility]    [facility]  [phone]  Sb Kerr MD    Patient Care Team:  Erica Avila MD as PCP - General  Ne Rich, RN as Ambulatory  (Population Health)  Sb Kerr MD as Consulting Physician (Nephrology)    CC/REASON FOR CONSULTATION: Elevated creatinine, needs heart cath    PHYSICIAN REQUESTING CONSULTATION: Dr. Ruth    History of Present Illness  58-year-old female with past medical history of hypertension, diabetes presented to hospital on 4/11/2022 with complaints of worsening shortness of air.  She had an echo performed showed EF about 20 to 25%.  She is in need for heart cath now.  She does not have any previous cardiac history.  Her creatinine is 1.35.  Her creatinine admission was 1.2, it has been 1-1.1 for over a year.  Denies any dysuria gross hematuria.  No chest pain.  No vomiting or diarrhea.  He does not use NSAIDs.  She was on hydrochlorothiazide, losartan and Metformin prior to admission.    Review of Systems   As noted above otherwise 10 systems reviewed and were negative.    Past Medical History:   Diagnosis Date   • Anxiety 2000   • Breast cancer (HCC)     LEFT BREAST 7/2021   • Depression    • DM (diabetes mellitus) (HCC)    • Hyperlipidemia    • Hypertension     SAW CARDIO/ STRESS TEST 2020 - NOW MEDS MANAGED BY PCP    • Irritable bowel syndrome 2006   • Peripheral neuropathy    • PONV (postoperative nausea and vomiting)    • Sleep apnea     WEARS CPAP       Past Surgical History:   Procedure Laterality Date   • BLADDER REPAIR     • BREAST BIOPSY     • BREAST RECONSTRUCTION Bilateral 8/26/2021    Procedure: BILATERAL PREPECTORALPLACMENT TISSUE EXPANDERS AND ALLODERM;  Surgeon: Heri Arreaga MD;  Location: [facility];  Service: Plastics;  Laterality: Bilateral;   • BREAST TISSUE EXPANDER REMOVAL INSERTION OF IMPLANT Bilateral 12/23/2021     Procedure: BILATERAL REMOVAL TISSUE EXPANDERS AND PLACEMENT OF IMPLANTS;  Surgeon: Heri Arreaga MD;  Location: [facility];  Service: Plastics;  Laterality: Bilateral;   • CARDIOVASCULAR STRESS TEST     • HYSTERECTOMY     • MASTECTOMY W/ SENTINEL NODE BIOPSY Bilateral 2021    Procedure: bilateral total mastectomy, left axillary sentinel lymph node biopsy, possible reconstruction.;  Surgeon: Asia Perkins MD;  Location: [facility];  Service: General;  Laterality: Bilateral;   • TUBAL ABDOMINAL LIGATION         Family History   Problem Relation Age of Onset   • Alzheimer's disease Father    • Diabetes Father    • Heart disease Father    • Other Father    • Sleep apnea Father    • Snoring Father    • Other Mother    • Diabetes Mother    • Heart disease Mother    • Sleep apnea Mother    • Snoring Mother    • Diabetes Sister    • Heart disease Sister    • Other Sister    • Sleep apnea Sister    • Snoring Sister    • Breast cancer Paternal Grandmother 60   • Breast cancer Maternal Aunt 80   • Breast cancer Paternal Aunt    • Malig Hyperthermia Neg Hx        Social History     Tobacco Use   • Smoking status: Former Smoker     Packs/day: 2.00     Years: 30.00     Pack years: 60.00     Types: Cigarettes     Start date: 1972     Quit date: 2008     Years since quittin.2   • Smokeless tobacco: Never Used   Vaping Use   • Vaping Use: Never used   Substance Use Topics   • Alcohol use: No   • Drug use: No       Home Meds:   Medications Prior to Admission   Medication Sig Dispense Refill Last Dose   • acetaminophen (TYLENOL) 325 MG tablet Take 1 tablet by mouth Every 4 (Four) Hours As Needed for Mild Pain . 30 tablet 1    • ALPRAZolam (XANAX) 1 MG tablet Take 1 tablet by mouth 2 (Two) Times a Day As Needed for Anxiety or Sleep. 60 tablet 0    • amLODIPine (NORVASC) 2.5 MG tablet Take 1 tablet by mouth Daily. 90 tablet 2    • atorvastatin (LIPITOR) 20 MG tablet Take 1 tablet by  mouth Daily. 90 tablet 2    • dilTIAZem CD (Cardizem CD) 180 MG 24 hr capsule Take 1 capsule by mouth Daily. 90 capsule 1    • hydrALAZINE (APRESOLINE) 100 MG tablet Take 2 tablets by mouth at breakfast and 1 tablet at bedtime OR take 1 tablet by mouth three times daily (Patient taking differently: Take 2 tablets by mouth at breakfast and 1 tablet at bedtime OR take 1 tablet by mouth three times daily) 270 tablet 2    • hydroCHLOROthiazide (HYDRODIURIL) 25 MG tablet Take 1 tablet by mouth Daily. 90 tablet 2    • Insulin Lispro Prot & Lispro (HumaLOG Mix 75/25 KwikPen) (75-25) 100 UNIT/ML suspension pen-injector pen Inject 24 Units under the skin with breakfast and 28 units under the skin with supper (Patient taking differently: Inject 24 Units under the skin into the appropriate area as directed Every Morning.) 45 mL 1    • losartan (Cozaar) 100 MG tablet Take 1 tablet by mouth Daily. 90 tablet 2    • metFORMIN (GLUCOPHAGE) 1000 MG tablet Take one tablet by mouth with breakfast and 1 1/2 tablets with supper (Patient taking differently: Take one tablet by mouth with breakfast and 1 1/2 tablets with supper) 225 tablet 1    • metoprolol succinate XL (TOPROL-XL) 100 MG 24 hr tablet Take 2 tablets by mouth Daily for blood pressure. 180 tablet 2        Scheduled Meds:  acetylcysteine, 600 mg, Oral, Q12H  atorvastatin, 20 mg, Oral, Nightly  azithromycin, 500 mg, Intravenous, Q24H  cefTRIAXone, 2 g, Intravenous, Q24H  dilTIAZem CD, 180 mg, Oral, Q24H  insulin lispro, 0-9 Units, Subcutaneous, TID AC  metoprolol succinate XL, 200 mg, Oral, Daily  sodium chloride, 3 mL, Intravenous, Q12H        Continuous Infusions:  sodium chloride, 75 mL/hr, Last Rate: 75 mL/hr (04/13/22 7143)        PRN Meds:  ALPRAZolam  •  dextrose  •  dextrose  •  glucagon (human recombinant)  •  hydrALAZINE  •  insulin lispro **AND** insulin lispro  •  ipratropium-albuterol  •  melatonin  •  nitroglycerin  •  ondansetron **OR** ondansetron  •  sodium  chloride  •  sodium chloride  •  sodium chloride    Allergies:  Hydrocodone-acetaminophen    OBJECTIVE    Vital Signs  Temp:  [97.5 °F (36.4 °C)-97.9 °F (36.6 °C)] 97.5 °F (36.4 °C)  Heart Rate:  [] 86  Resp:  [15-18] 18  BP: (116-163)/(73-89) 150/89    No intake/output data recorded.  No intake/output data recorded.    Physical Exam:  General Appearance: alert, appears stated age and cooperative  Head: normocephalic, without obvious abnormality and atraumatic  Eyes: conjunctivae and sclerae normal and no icterus  Neck: supple and no JVD  Lungs: clear to auscultation and respirations regular  Heart: regular rhythm & normal rate and normal S1, S2  Chest Wall: no abnormalities observed  Abdomen: normal bowel sounds and soft non-tender  Extremities: moves extremities well, no edema, no cyanosis  Skin: no bleeding, bruising or rash  Neurologic: Alert, and oriented. No focal deficits    Results Review:    I reviewed the patient's new clinical results.    WBC WBC   Date Value Ref Range Status   04/13/2022 5.80 3.40 - 10.80 10*3/mm3 Final   04/12/2022 8.60 3.40 - 10.80 10*3/mm3 Final   04/11/2022 13.40 (H) 3.40 - 10.80 10*3/mm3 Final      HGB Hemoglobin   Date Value Ref Range Status   04/13/2022 11.1 (L) 12.0 - 15.9 g/dL Final   04/12/2022 11.5 (L) 12.0 - 15.9 g/dL Final   04/11/2022 13.1 12.0 - 15.9 g/dL Final      HCT Hematocrit   Date Value Ref Range Status   04/13/2022 32.4 (L) 34.0 - 46.6 % Final   04/12/2022 33.7 (L) 34.0 - 46.6 % Final   04/11/2022 36.9 34.0 - 46.6 % Final      Platlets No results found for: LABPLAT   MCV MCV   Date Value Ref Range Status   04/13/2022 84.5 79.0 - 97.0 fL Final   04/12/2022 84.2 79.0 - 97.0 fL Final   04/11/2022 83.0 79.0 - 97.0 fL Final          Sodium Sodium   Date Value Ref Range Status   04/13/2022 137 136 - 145 mmol/L Final   04/12/2022 137 136 - 145 mmol/L Final   04/11/2022 142 136 - 145 mmol/L Final      Potassium Potassium   Date Value Ref Range Status   04/13/2022 3.5  3.5 - 5.2 mmol/L Final   04/12/2022 3.6 3.5 - 5.2 mmol/L Final   04/11/2022 3.9 3.5 - 5.2 mmol/L Final      Chloride Chloride   Date Value Ref Range Status   04/13/2022 99 98 - 107 mmol/L Final   04/12/2022 98 98 - 107 mmol/L Final   04/11/2022 102 98 - 107 mmol/L Final      CO2 CO2   Date Value Ref Range Status   04/13/2022 25.0 22.0 - 29.0 mmol/L Final   04/12/2022 23.0 22.0 - 29.0 mmol/L Final   04/11/2022 25.0 22.0 - 29.0 mmol/L Final      BUN BUN   Date Value Ref Range Status   04/13/2022 20 8 - 23 mg/dL Final   04/12/2022 15 8 - 23 mg/dL Final   04/11/2022 17 8 - 23 mg/dL Final      Creatinine Creatinine   Date Value Ref Range Status   04/13/2022 1.35 (H) 0.57 - 1.00 mg/dL Final   04/12/2022 1.35 (H) 0.57 - 1.00 mg/dL Final   04/11/2022 1.20 (H) 0.57 - 1.00 mg/dL Final      Calcium Calcium   Date Value Ref Range Status   04/13/2022 9.2 8.6 - 10.5 mg/dL Final   04/12/2022 9.6 8.6 - 10.5 mg/dL Final   04/11/2022 10.4 8.6 - 10.5 mg/dL Final      PO4 No results found for: CAPO4   Albumin Albumin   Date Value Ref Range Status   04/13/2022 4.00 3.50 - 5.20 g/dL Final   04/11/2022 5.00 3.50 - 5.20 g/dL Final      Magnesium No results found for: MG   Uric Acid No results found for: URICACID       Imaging Results (Last 72 Hours)     Procedure Component Value Units Date/Time    CT Angiogram Chest Pulmonary Embolism [[id number]] Collected: 04/11/22 2131     Updated: 04/11/22 2139    Narrative:         DATE OF EXAM:  4/11/2022 9:04 PM     PROCEDURE:  CT ANGIOGRAM CHEST PULMONARY EMBOLISM-     INDICATIONS:   PE suspected, high prob     COMPARISON:   2 view chest x-ray 04/11/2022. No previous chest CT for comparison.     TECHNIQUE:  Routine transaxial slices were obtained through chest after  administration of intravenous 100 ml of Isovue 370. Reconstructed  coronal and sagittal images were also obtained. Automated exposure  control and iterative reconstruction methods were used.      FINDINGS:  No pulmonary arterial filling  defects are seen to suggest pulmonary  emboli. Main pulmonary artery is nondilated. Heart size is mildly  enlarged. There is no pericardial effusion. There are trace bilateral  pleural effusions. There is interlobular septal thickening in both  lungs, greatest in the lung bases. There are also patchy opacities in  the lower lobes, right greater than left. There are postoperative  changes of bilateral mastectomy with implant reconstruction. No axillary  adenopathy is seen. Mildly prominent right hilar lymph nodes may be  reactive. Partially included solid organs of the upper abdomen appear  within normal limits for the phase of contrast-enhancement. No acute  osseous abnormality is identified.        Impression:      1.No evidence of pulmonary emboli.  2.Trace bilateral pleural effusions.  3.Bilateral interlobular septal thickening, suggestive of pulmonary  edema.  4.Patchy opacities in the lower lobes, right greater than left, likely  due to partial atelectasis and less likely pneumonia.  5.Mild cardiomegaly.     Electronically Signed By-Court Sheikh MD On:4/11/2022 9:37 PM  This report was finalized on [id number] by  Court Sheikh MD.    XR Chest 2 View [[id number]] Collected: 04/11/22 1927     Updated: 04/11/22 1931    Narrative:      XR CHEST 2 VW-     Date of Exam: 4/11/2022 7:15 PM     Indication: CHF/COPD Protocol.     Comparison: [facility] chest x-ray 10/20/2012.     Technique: Two views of the chest were obtained.     FINDINGS: There are new interstitial and airspace opacities in both mid  to lower lungs. No pneumothorax or large pleural effusion is seen. Heart  size is not enlarged. There are surgical clips over both sides of the  chest.         Impression:      New interstitial and airspace opacities in both mid to lower lungs,  likely due to pulmonary edema or perhaps pneumonia.        Electronically Signed By-Court Sheikh MD On:4/11/2022 7:29 PM  This report was finalized on [id number] by  Court  MD Kori.            Results for orders placed during the hospital encounter of 04/11/22    XR Chest 2 View    Narrative  XR CHEST 2 VW-    Date of Exam: 4/11/2022 7:15 PM    Indication: CHF/COPD Protocol.    Comparison: [facility] chest x-ray 10/20/2012.    Technique: Two views of the chest were obtained.    FINDINGS: There are new interstitial and airspace opacities in both mid  to lower lungs. No pneumothorax or large pleural effusion is seen. Heart  size is not enlarged. There are surgical clips over both sides of the  chest.    Impression  New interstitial and airspace opacities in both mid to lower lungs,  likely due to pulmonary edema or perhaps pneumonia.      Electronically Signed By-Court Sheikh MD On:4/11/2022 7:29 PM  This report was finalized on [id number] by  Court Sheikh MD.      Results for orders placed in visit on 08/16/21    XR Chest 2 View    Narrative  EXAMINATION: 2 VIEWS OF THE CHEST    HISTORY: 61-year-old female undergoing preoperative evaluation prior to  bilateral mastectomy with a history of prior tobacco use.    FINDINGS: PA and lateral chest radiographs were obtained. No prior chest  radiograph is available for comparison. The lungs are normal in volume  and are clear. The cardiomediastinal silhouette, pulmonary vasculature  and osseous structures appear normal.    Impression  Negative chest radiographs.    This report was finalized on 8/16/2021 11:30 AM by Dr. Willard Noonan M.D.            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  Will check UA, renal ultrasound.  Quantify  urine protein and screen for paraproteins.  · Hypertension  · Diabetes  · CHF-severe LV dysfunction with EF of 20 to 25%  · History of breast cancer    Patient had increased but acceptable risk for contrast-induced nephropathy.  She will be started on Mucomyst and gentle IV hydration prior to heart cath  Hold Metformin and losartan and hydrochlorothiazide  Check renal ultrasound, urinalysis, quantify urine protein and screen for paraproteins        I discussed the patients findings and my recommendations with patient, family and consulting provider    Will follow along closely. Thank you for allowing us to see this patient in renal consultation.    [facility]  [phone]  MD Sb Castillo MD  04/13/22  12:11 EDT

## 2022-04-13 NOTE — PLAN OF CARE
Goal Outcome Evaluation:  Plan of Care Reviewed With: patient        Progress: improving  Outcome Evaluation: Patient rested throughout the night, no complaints at this time.

## 2022-04-14 PROBLEM — I50.21 ACUTE SYSTOLIC HEART FAILURE (HCC): Status: ACTIVE | Noted: 2022-04-14

## 2022-04-14 LAB
ALBUMIN SERPL-MCNC: 3.9 G/DL (ref 3.5–5.2)
ALBUMIN/GLOB SERPL: 1.4 G/DL
ALP SERPL-CCNC: 64 U/L (ref 39–117)
ALT SERPL W P-5'-P-CCNC: 24 U/L (ref 1–33)
ANION GAP SERPL CALCULATED.3IONS-SCNC: 11 MMOL/L (ref 5–15)
ANION GAP SERPL CALCULATED.3IONS-SCNC: 12 MMOL/L (ref 5–15)
AST SERPL-CCNC: 16 U/L (ref 1–32)
BILIRUB SERPL-MCNC: <0.2 MG/DL (ref 0–1.2)
BUN SERPL-MCNC: 20 MG/DL (ref 8–23)
BUN SERPL-MCNC: 21 MG/DL (ref 8–23)
BUN/CREAT SERPL: 17.4 (ref 7–25)
BUN/CREAT SERPL: 18.9 (ref 7–25)
CALCIUM SPEC-SCNC: 8.8 MG/DL (ref 8.6–10.5)
CALCIUM SPEC-SCNC: 9.5 MG/DL (ref 8.6–10.5)
CHLORIDE SERPL-SCNC: 105 MMOL/L (ref 98–107)
CHLORIDE SERPL-SCNC: 105 MMOL/L (ref 98–107)
CHOLEST SERPL-MCNC: 136 MG/DL (ref 0–200)
CO2 SERPL-SCNC: 23 MMOL/L (ref 22–29)
CO2 SERPL-SCNC: 24 MMOL/L (ref 22–29)
CREAT SERPL-MCNC: 1.11 MG/DL (ref 0.57–1)
CREAT SERPL-MCNC: 1.15 MG/DL (ref 0.57–1)
D-LACTATE SERPL-SCNC: 0.8 MMOL/L (ref 0.5–2)
D-LACTATE SERPL-SCNC: 0.9 MMOL/L (ref 0.5–2)
D-LACTATE SERPL-SCNC: 1.2 MMOL/L (ref 0.5–2)
D-LACTATE SERPL-SCNC: 1.7 MMOL/L (ref 0.5–2)
DEPRECATED RDW RBC AUTO: 46.4 FL (ref 37–54)
DEPRECATED RDW RBC AUTO: 46.8 FL (ref 37–54)
EGFRCR SERPLBLD CKD-EPI 2021: 53.6 ML/MIN/1.73
EGFRCR SERPLBLD CKD-EPI 2021: 56 ML/MIN/1.73
ERYTHROCYTE [DISTWIDTH] IN BLOOD BY AUTOMATED COUNT: 15.5 % (ref 12.3–15.4)
ERYTHROCYTE [DISTWIDTH] IN BLOOD BY AUTOMATED COUNT: 15.5 % (ref 12.3–15.4)
GLOBULIN UR ELPH-MCNC: 2.7 GM/DL
GLUCOSE BLDC GLUCOMTR-MCNC: 164 MG/DL (ref 70–105)
GLUCOSE BLDC GLUCOMTR-MCNC: 183 MG/DL (ref 70–105)
GLUCOSE BLDC GLUCOMTR-MCNC: 184 MG/DL (ref 70–105)
GLUCOSE SERPL-MCNC: 184 MG/DL (ref 65–99)
GLUCOSE SERPL-MCNC: 215 MG/DL (ref 65–99)
HCT VFR BLD AUTO: 31.5 % (ref 34–46.6)
HCT VFR BLD AUTO: 32.4 % (ref 34–46.6)
HDLC SERPL-MCNC: 39 MG/DL (ref 40–60)
HGB BLD-MCNC: 10.7 G/DL (ref 12–15.9)
HGB BLD-MCNC: 10.9 G/DL (ref 12–15.9)
LDLC SERPL CALC-MCNC: 66 MG/DL (ref 0–100)
LDLC/HDLC SERPL: 1.55 {RATIO}
MCH RBC QN AUTO: 28.5 PG (ref 26.6–33)
MCH RBC QN AUTO: 28.8 PG (ref 26.6–33)
MCHC RBC AUTO-ENTMCNC: 33.8 G/DL (ref 31.5–35.7)
MCHC RBC AUTO-ENTMCNC: 33.8 G/DL (ref 31.5–35.7)
MCV RBC AUTO: 84.5 FL (ref 79–97)
MCV RBC AUTO: 85.2 FL (ref 79–97)
PHOSPHATE SERPL-MCNC: 4.4 MG/DL (ref 2.5–4.5)
PLATELET # BLD AUTO: 224 10*3/MM3 (ref 140–450)
PLATELET # BLD AUTO: 251 10*3/MM3 (ref 140–450)
PMV BLD AUTO: 8.1 FL (ref 6–12)
PMV BLD AUTO: 8.2 FL (ref 6–12)
POTASSIUM SERPL-SCNC: 4.1 MMOL/L (ref 3.5–5.2)
POTASSIUM SERPL-SCNC: 4.9 MMOL/L (ref 3.5–5.2)
PROT SERPL-MCNC: 6.6 G/DL (ref 6–8.5)
RBC # BLD AUTO: 3.7 10*6/MM3 (ref 3.77–5.28)
RBC # BLD AUTO: 3.83 10*6/MM3 (ref 3.77–5.28)
SODIUM SERPL-SCNC: 139 MMOL/L (ref 136–145)
SODIUM SERPL-SCNC: 141 MMOL/L (ref 136–145)
TRIGL SERPL-MCNC: 183 MG/DL (ref 0–150)
TROPONIN T SERPL-MCNC: <0.01 NG/ML (ref 0–0.03)
URATE SERPL-MCNC: 7.8 MG/DL (ref 2.4–5.7)
VLDLC SERPL-MCNC: 31 MG/DL (ref 5–40)
WBC NRBC COR # BLD: 5.5 10*3/MM3 (ref 3.4–10.8)
WBC NRBC COR # BLD: 5.6 10*3/MM3 (ref 3.4–10.8)

## 2022-04-14 PROCEDURE — 85027 COMPLETE CBC AUTOMATED: CPT | Performed by: HOSPITALIST

## 2022-04-14 PROCEDURE — 82784 ASSAY IGA/IGD/IGG/IGM EACH: CPT | Performed by: INTERNAL MEDICINE

## 2022-04-14 PROCEDURE — 84100 ASSAY OF PHOSPHORUS: CPT | Performed by: INTERNAL MEDICINE

## 2022-04-14 PROCEDURE — G0378 HOSPITAL OBSERVATION PER HR: HCPCS

## 2022-04-14 PROCEDURE — 84484 ASSAY OF TROPONIN QUANT: CPT | Performed by: NURSE PRACTITIONER

## 2022-04-14 PROCEDURE — 99225 PR SBSQ OBSERVATION CARE/DAY 25 MINUTES: CPT | Performed by: HOSPITALIST

## 2022-04-14 PROCEDURE — 82962 GLUCOSE BLOOD TEST: CPT

## 2022-04-14 PROCEDURE — 36415 COLL VENOUS BLD VENIPUNCTURE: CPT | Performed by: NURSE PRACTITIONER

## 2022-04-14 PROCEDURE — 83605 ASSAY OF LACTIC ACID: CPT | Performed by: NURSE PRACTITIONER

## 2022-04-14 PROCEDURE — 86334 IMMUNOFIX E-PHORESIS SERUM: CPT | Performed by: INTERNAL MEDICINE

## 2022-04-14 PROCEDURE — 99214 OFFICE O/P EST MOD 30 MIN: CPT | Performed by: INTERNAL MEDICINE

## 2022-04-14 PROCEDURE — 93005 ELECTROCARDIOGRAM TRACING: CPT | Performed by: INTERNAL MEDICINE

## 2022-04-14 PROCEDURE — 63710000001 INSULIN LISPRO (HUMAN) PER 5 UNITS: Performed by: INTERNAL MEDICINE

## 2022-04-14 PROCEDURE — 84550 ASSAY OF BLOOD/URIC ACID: CPT | Performed by: INTERNAL MEDICINE

## 2022-04-14 PROCEDURE — 85027 COMPLETE CBC AUTOMATED: CPT | Performed by: INTERNAL MEDICINE

## 2022-04-14 PROCEDURE — 80053 COMPREHEN METABOLIC PANEL: CPT | Performed by: HOSPITALIST

## 2022-04-14 PROCEDURE — 80061 LIPID PANEL: CPT | Performed by: INTERNAL MEDICINE

## 2022-04-14 RX ORDER — BUMETANIDE 1 MG/1
2 TABLET ORAL DAILY
Status: DISCONTINUED | OUTPATIENT
Start: 2022-04-14 | End: 2022-04-15 | Stop reason: HOSPADM

## 2022-04-14 RX ORDER — SODIUM CHLORIDE 9 MG/ML
250 INJECTION, SOLUTION INTRAVENOUS ONCE AS NEEDED
Status: DISCONTINUED | OUTPATIENT
Start: 2022-04-14 | End: 2022-04-15 | Stop reason: HOSPADM

## 2022-04-14 RX ORDER — POLYETHYLENE GLYCOL 3350 17 G/17G
17 POWDER, FOR SOLUTION ORAL DAILY
Status: DISCONTINUED | OUTPATIENT
Start: 2022-04-14 | End: 2022-04-15 | Stop reason: HOSPADM

## 2022-04-14 RX ADMIN — ALPRAZOLAM 1 MG: 1 TABLET ORAL at 22:40

## 2022-04-14 RX ADMIN — ACETYLCYSTEINE 600 MG: 200 SOLUTION ORAL; RESPIRATORY (INHALATION) at 21:42

## 2022-04-14 RX ADMIN — INSULIN LISPRO 2 UNITS: 100 INJECTION, SOLUTION INTRAVENOUS; SUBCUTANEOUS at 09:18

## 2022-04-14 RX ADMIN — INSULIN LISPRO 2 UNITS: 100 INJECTION, SOLUTION INTRAVENOUS; SUBCUTANEOUS at 12:24

## 2022-04-14 RX ADMIN — ATORVASTATIN CALCIUM 20 MG: 20 TABLET, FILM COATED ORAL at 21:42

## 2022-04-14 RX ADMIN — METOPROLOL SUCCINATE 200 MG: 50 TABLET, EXTENDED RELEASE ORAL at 09:17

## 2022-04-14 RX ADMIN — DILTIAZEM HYDROCHLORIDE 180 MG: 180 CAPSULE, COATED, EXTENDED RELEASE ORAL at 09:17

## 2022-04-14 RX ADMIN — Medication 3 ML: at 21:00

## 2022-04-14 RX ADMIN — Medication 3 ML: at 09:17

## 2022-04-14 RX ADMIN — POLYETHYLENE GLYCOL 3350 17 G: 17 POWDER, FOR SOLUTION ORAL at 15:24

## 2022-04-14 RX ADMIN — SACUBITRIL AND VALSARTAN 1 TABLET: 24; 26 TABLET, FILM COATED ORAL at 21:42

## 2022-04-14 RX ADMIN — INSULIN LISPRO 2 UNITS: 100 INJECTION, SOLUTION INTRAVENOUS; SUBCUTANEOUS at 18:55

## 2022-04-14 RX ADMIN — BUMETANIDE 2 MG: 1 TABLET ORAL at 14:12

## 2022-04-14 RX ADMIN — ACETYLCYSTEINE 600 MG: 200 SOLUTION ORAL; RESPIRATORY (INHALATION) at 09:19

## 2022-04-14 NOTE — PROGRESS NOTES
"NEPHROLOGY PROGRESS NOTE------[facility]    [facility]  [phone]  Sb Kerr MD      Patient Care Team:  Erica Avila MD as PCP - General  Ne Rich RN as Ambulatory  ([facility])  Sb Kerr MD as Consulting Physician (Nephrology)      Provider:  Sb Kerr MD  Patient Name: Susy Hanson  :  1960    SUBJECTIVE:  F/u CKD  No chest pain   Voiding by self  S/p cath    Medication:  acetylcysteine, 600 mg, Oral, Q12H  atorvastatin, 20 mg, Oral, Nightly  insulin lispro, 0-9 Units, Subcutaneous, TID AC  metoprolol succinate XL, 200 mg, Oral, Daily  sacubitril-valsartan, 1 tablet, Oral, Q12H  sodium chloride, 3 mL, Intravenous, Q12H      sodium chloride, 75 mL/hr, Last Rate: 75 mL/hr (22 0931)        OBJECTIVE    Vital Sign Min/Max for last 24 hours  Temp  Min: 96.7 °F (35.9 °C)  Max: 97.8 °F (36.6 °C)   BP  Min: 124/51  Max: 172/89   Pulse  Min: 74  Max: 87   Resp  Min: 14  Max: 18   SpO2  Min: 89 %  Max: 99 %   No data recorded   No data recorded     Flowsheet Rows    Flowsheet Row First Filed Value   Admission Height 162.6 cm (64\") Documented at 2022   Admission Weight 74.8 kg (165 lb) Documented at 2022          No intake/output data recorded.  I/O last 3 completed shifts:  In: 840 [P.O.:840]  Out: -     Physical Exam:  General Appearance: alert, appears stated age and cooperative  Head: normocephalic, without obvious abnormality and atraumatic  Eyes: conjunctivae and sclerae normal and no icterus  Neck: supple and no JVD  Lungs: crackles present  Heart: regular rhythm & normal rate and normal S1, S2  Chest: Wall no abnormalities observed  Abdomen: normal bowel sounds and soft non-tender  Extremities: moves extremities well, no edema, no cyanosis and no redness  Skin: no bleeding, bruising or rash, turgor normal, color normal and no lesions noted  Neurologic: " Alert, and oriented. No focal deficits    Labs:    WBC WBC   Date Value Ref Range Status   04/14/2022 5.60 3.40 - 10.80 10*3/mm3 Final   04/14/2022 5.50 3.40 - 10.80 10*3/mm3 Final   04/13/2022 5.80 3.40 - 10.80 10*3/mm3 Final   04/12/2022 8.60 3.40 - 10.80 10*3/mm3 Final   04/11/2022 13.40 (H) 3.40 - 10.80 10*3/mm3 Final      HGB Hemoglobin   Date Value Ref Range Status   04/14/2022 10.9 (L) 12.0 - 15.9 g/dL Final   04/14/2022 10.7 (L) 12.0 - 15.9 g/dL Final   04/13/2022 11.1 (L) 12.0 - 15.9 g/dL Final   04/12/2022 11.5 (L) 12.0 - 15.9 g/dL Final   04/11/2022 13.1 12.0 - 15.9 g/dL Final      HCT Hematocrit   Date Value Ref Range Status   04/14/2022 32.4 (L) 34.0 - 46.6 % Final   04/14/2022 31.5 (L) 34.0 - 46.6 % Final   04/13/2022 32.4 (L) 34.0 - 46.6 % Final   04/12/2022 33.7 (L) 34.0 - 46.6 % Final   04/11/2022 36.9 34.0 - 46.6 % Final      Platlets No results found for: LABPLAT   MCV MCV   Date Value Ref Range Status   04/14/2022 84.5 79.0 - 97.0 fL Final   04/14/2022 85.2 79.0 - 97.0 fL Final   04/13/2022 84.5 79.0 - 97.0 fL Final   04/12/2022 84.2 79.0 - 97.0 fL Final   04/11/2022 83.0 79.0 - 97.0 fL Final          Sodium Sodium   Date Value Ref Range Status   04/14/2022 141 136 - 145 mmol/L Final   04/14/2022 139 136 - 145 mmol/L Final   04/13/2022 137 136 - 145 mmol/L Final   04/12/2022 137 136 - 145 mmol/L Final   04/11/2022 142 136 - 145 mmol/L Final      Potassium Potassium   Date Value Ref Range Status   04/14/2022 4.9 3.5 - 5.2 mmol/L Final   04/14/2022 4.1 3.5 - 5.2 mmol/L Final   04/13/2022 3.5 3.5 - 5.2 mmol/L Final   04/12/2022 3.6 3.5 - 5.2 mmol/L Final   04/11/2022 3.9 3.5 - 5.2 mmol/L Final      Chloride Chloride   Date Value Ref Range Status   04/14/2022 105 98 - 107 mmol/L Final   04/14/2022 105 98 - 107 mmol/L Final   04/13/2022 99 98 - 107 mmol/L Final   04/12/2022 98 98 - 107 mmol/L Final   04/11/2022 102 98 - 107 mmol/L Final      CO2 CO2   Date Value Ref Range Status   04/14/2022 24.0 22.0  - 29.0 mmol/L Final   04/14/2022 23.0 22.0 - 29.0 mmol/L Final   04/13/2022 25.0 22.0 - 29.0 mmol/L Final   04/12/2022 23.0 22.0 - 29.0 mmol/L Final   04/11/2022 25.0 22.0 - 29.0 mmol/L Final      BUN BUN   Date Value Ref Range Status   04/14/2022 20 8 - 23 mg/dL Final   04/14/2022 21 8 - 23 mg/dL Final   04/13/2022 20 8 - 23 mg/dL Final   04/12/2022 15 8 - 23 mg/dL Final   04/11/2022 17 8 - 23 mg/dL Final      Creatinine Creatinine   Date Value Ref Range Status   04/14/2022 1.15 (H) 0.57 - 1.00 mg/dL Final   04/14/2022 1.11 (H) 0.57 - 1.00 mg/dL Final   04/13/2022 1.35 (H) 0.57 - 1.00 mg/dL Final   04/12/2022 1.35 (H) 0.57 - 1.00 mg/dL Final   04/11/2022 1.20 (H) 0.57 - 1.00 mg/dL Final      Calcium Calcium   Date Value Ref Range Status   04/14/2022 9.5 8.6 - 10.5 mg/dL Final   04/14/2022 8.8 8.6 - 10.5 mg/dL Final   04/13/2022 9.2 8.6 - 10.5 mg/dL Final   04/12/2022 9.6 8.6 - 10.5 mg/dL Final   04/11/2022 10.4 8.6 - 10.5 mg/dL Final      PO4 No components found for: PO4   Albumin Albumin   Date Value Ref Range Status   04/14/2022 3.90 3.50 - 5.20 g/dL Final   04/13/2022 4.00 3.50 - 5.20 g/dL Final   04/11/2022 5.00 3.50 - 5.20 g/dL Final      Magnesium No results found for: MG   Uric Acid No components found for: URIC ACID     Imaging Results (Last 72 Hours)     Procedure Component Value Units Date/Time    US Renal Bilateral [[id number]] Collected: 04/13/22 1417     Updated: 04/13/22 1421    Narrative:      Examination: US RENAL BILATERAL-     Date of Exam: 4/13/2022 12:58 PM     Indication: Acute renal failure     Comparison: None available     Technique: Grayscale and color Doppler ultrasound evaluation of the  kidneys and urinary bladder was performed     Findings:  The right kidney measures 9.2 x 5.7 x 5.0 cm. The left kidney measures  11.4 x 5.2 x 4.5 cm. There is no hydronephrosis. There is no solid or  cystic renal lesion.     The urinary bladder is fluid-filled without wall thickening.       Impression:       1. Mildly small right kidney.  2. No hydronephrosis.     Electronically Signed By-Pillo Rosas MD On:4/13/2022 2:19 PM  This report was finalized on [id number] by  Pillo Rosas MD.    CT Angiogram Chest Pulmonary Embolism [[id number]] Collected: 04/11/22 2131     Updated: 04/11/22 2139    Narrative:         DATE OF EXAM:  4/11/2022 9:04 PM     PROCEDURE:  CT ANGIOGRAM CHEST PULMONARY EMBOLISM-     INDICATIONS:   PE suspected, high prob     COMPARISON:   2 view chest x-ray 04/11/2022. No previous chest CT for comparison.     TECHNIQUE:  Routine transaxial slices were obtained through chest after  administration of intravenous 100 ml of Isovue 370. Reconstructed  coronal and sagittal images were also obtained. Automated exposure  control and iterative reconstruction methods were used.      FINDINGS:  No pulmonary arterial filling defects are seen to suggest pulmonary  emboli. Main pulmonary artery is nondilated. Heart size is mildly  enlarged. There is no pericardial effusion. There are trace bilateral  pleural effusions. There is interlobular septal thickening in both  lungs, greatest in the lung bases. There are also patchy opacities in  the lower lobes, right greater than left. There are postoperative  changes of bilateral mastectomy with implant reconstruction. No axillary  adenopathy is seen. Mildly prominent right hilar lymph nodes may be  reactive. Partially included solid organs of the upper abdomen appear  within normal limits for the phase of contrast-enhancement. No acute  osseous abnormality is identified.        Impression:      1.No evidence of pulmonary emboli.  2.Trace bilateral pleural effusions.  3.Bilateral interlobular septal thickening, suggestive of pulmonary  edema.  4.Patchy opacities in the lower lobes, right greater than left, likely  due to partial atelectasis and less likely pneumonia.  5.Mild cardiomegaly.     Electronically Signed By-Court Sheikh MD On:4/11/2022 9:37  PM  This report was finalized on [id number] by  Court Sheikh MD.    XR Chest 2 View [[id number]] Collected: 04/11/22 1927     Updated: 04/11/22 1931    Narrative:      XR CHEST 2 VW-     Date of Exam: 4/11/2022 7:15 PM     Indication: CHF/COPD Protocol.     Comparison: AP chest x-ray 10/20/2012.     Technique: Two views of the chest were obtained.     FINDINGS: There are new interstitial and airspace opacities in both mid  to lower lungs. No pneumothorax or large pleural effusion is seen. Heart  size is not enlarged. There are surgical clips over both sides of the  chest.         Impression:      New interstitial and airspace opacities in both mid to lower lungs,  likely due to pulmonary edema or perhaps pneumonia.        Electronically Signed By-Court Sheikh MD On:4/11/2022 7:29 PM  This report was finalized on [id number] by  Court Sheikh MD.          Results for orders placed during the hospital encounter of 04/11/22    XR Chest 2 View    Narrative  XR CHEST 2 VW-    Date of Exam: 4/11/2022 7:15 PM    Indication: CHF/COPD Protocol.    Comparison: AP chest x-ray 10/20/2012.    Technique: Two views of the chest were obtained.    FINDINGS: There are new interstitial and airspace opacities in both mid  to lower lungs. No pneumothorax or large pleural effusion is seen. Heart  size is not enlarged. There are surgical clips over both sides of the  chest.    Impression  New interstitial and airspace opacities in both mid to lower lungs,  likely due to pulmonary edema or perhaps pneumonia.      Electronically Signed By-Court Sheikh MD On:4/11/2022 7:29 PM  This report was finalized on [id number] by  Court Sheikh MD.      Results for orders placed in visit on 08/16/21    XR Chest 2 View    Narrative  EXAMINATION: 2 VIEWS OF THE CHEST    HISTORY: 61-year-old female undergoing preoperative evaluation prior to  bilateral mastectomy with a history of prior tobacco use.    FINDINGS: PA and lateral chest  radiographs were obtained. No prior chest  radiograph is available for comparison. The lungs are normal in volume  and are clear. The cardiomediastinal silhouette, pulmonary vasculature  and osseous structures appear normal.    Impression  Negative chest radiographs.    This report was finalized on 8/16/2021 11:30 AM by Dr. Willard Noonan M.D.            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  2.5 GM urine protein. Romelia; US unremarkable.   · Hypertension  · Diabetes  · CHF-severe LV dysfunction with EF of 20 to 25%  · History of breast cancer     CR stable post cath  Renal US unremarkable  Started entresto. K 4.9. Adv Low K Diet. Avoid K sparing diureic  Stop fluids. Bumex 2 mg daily  Paraproteins pending      Sb Kerr MD  [facility]  [phone]  04/14/22  12:23 EDT

## 2022-04-14 NOTE — CONSULTS
Nutrition Services    Patient Name: Susy Hanson  YOB: 1960  MRN: [id number]  Admission date: 4/11/2022      PPE Documentation        PPE Worn By Provider mask and eye protection   PPE Worn By Patient  None      Nutrition Counseling & Education       Reason for Visit: Physician Consult      Session topic: Consistent Carbohydrate Diet and Heart Healthy eating     Session comments: Visited patient this afternoon who reports dxs of diabetes about 12 years ago. Initially reports diet education during this time, but since then has not reviewed diet or specially adhering to recommendations. Patient cites social stresses being a main barrier for proper nutrition including caring for elderly parent, breast cancer requiring double mastectomy, and work related stresses. Patient also with new diagnosis of heart failure increasing her concerns to follow heart healthy diet. Reviewed at length carbohydrate counting and label reading. Teach back applied. Also reviewed principles of heart healthy eating including healthy fats/reduced sodium. All questions discussed/answered.      Provided print material via: Academy [employer]-Nutrition Care Manual      Goals: Discussed with patient goals of her nutrition care. Patient reported desire to start planning more meals and incorporating lessons learned today. Patient also desires to get back into quilting which helps her feel less stressed.      Monitoring/Follow Up: Will see prn       Electronically signed by:  Kaya Borges RD  04/14/22 15:07 EDT

## 2022-04-14 NOTE — PLAN OF CARE
Goal Outcome Evaluation:           Progress: improving  Outcome Evaluation: Pt brought back to room @ 2030 from having heart cath procedure.  Right groin dressing noted to have no s/s of bleeding at this time.  Pt dcp is home with spouse.

## 2022-04-14 NOTE — PROGRESS NOTES
Heart Failure Program  Nurse Navigator  Discharge Planning: Follow-up Note    Patient Name:Susy Hanson  :1960  Current Admission Date: 2022       Last 3 Weights:  Wt Readings from Last 3 Encounters:   22 74.8 kg (165 lb)   22 74.8 kg (165 lb)   22 74.8 kg (165 lb)       Intake and Output totals: I/O last 3 completed shifts:  In: 840 [P.O.:840]  Out: -   No intake/output data recorded.          Patient Assessment:   sitting up in bed with family at bedside. resp even and unlabored, no SOA with conversation. No edema. Pt newly started on entresto [facility] med.      Patient Education:   HF s/s, review [facility] medications - coupon information provided as well, pharmacy contacted for meds 2 bed fill with coupon card.     Review HF Education provided to patient:  yes-Symptoms worsening  yes-Prescribed medications  yes-HF self-care  yes-Follow-up Appointments       Acceptance of learning: acceptance, cooperative, deborah    Heart Failure education interactive teaching session time: 30 minutes      Identified needs/barriers:   New dx, education extensive provided, I&O, daily weights.    Intervention follow-up:  Education provided

## 2022-04-14 NOTE — CASE MANAGEMENT/SOCIAL WORK
Continued Stay Note  [facility]     Patient Name: Susy Hanson  MRN: [id number]  Today's Date: 4/14/2022    Admit Date: 4/11/2022     Discharge Plan     Row Name 04/14/22 1542       Plan    Plan D/C plan: Anticipate routine home with spouse.    Plan Comments CM received notification that patient is going to be started on Entresto. CM contacted [facility] pharmacy for test claim. Monthly cost came back as $617, pt qualifies for $10 coupon. Pharmcy confirmed patient monthly price will be $10. CM met with patient at bedside to provide this information.                   Met with patient in room wearing PPE: mask      Maintained distance greater than six feet and spent less than 15 minutes in the room.            BARBARA NATH RN

## 2022-04-14 NOTE — NURSING NOTE
Pt awake and requesting her xanax as she takes this at home and is anxious from having procedure.  Meds were not released and still on hold from after having surgery. Xanax given per pt request.

## 2022-04-14 NOTE — PLAN OF CARE
Goal Outcome Evaluation:      Patient was seen by nutrition services to talk about healthy heart diabetic diet. Fluids were stopped and bumex started. Advice a low K+ diet. Right groin area from cath looks and feels good. Pt has not had a BM since Sunday, miralax was ordered. VSS, continue to monitor.

## 2022-04-14 NOTE — NURSING NOTE
Patient back to floor from having heart cath procedure.  Right groin dressing c/d/i and no bruising noted.  Pt A&O x 4 and is aware of being on bedrest until 0030.

## 2022-04-14 NOTE — PLAN OF CARE
Pharmacist Future Drug COST Assessment ([facility]):     Pharmacy ran test claim for future high-cost drug therapy. Indication: HF    Drug Covered/PA required Patient Copay per month   Entresto Covered without PA $ 617     Patient has deductible.  We have, however, been able to add a $10 coupon to the patient profile to help with the copay above.     For questions, reach out to [facility] at [phone]    Gerda Angeles PharmD   4/14/2022 12:05 EDT

## 2022-04-14 NOTE — PROGRESS NOTES
LOS: 2 days   Admiting Physician- No att. providers found    Reason For Followup:    Shortness of breath  Elevated BNP  Congestive heart failure acute systolic  Dilated cardiomyopathy  Accelerated hypertension    Subjective     No complaints.    Objective     BP improving.  Right groin was soft without hematoma    Review of Systems:   Review of Systems   Constitutional: Negative for chills and fever.   HENT: Negative for ear discharge and nosebleeds.    Eyes: Negative for discharge and redness.   Cardiovascular: Negative for chest pain, orthopnea, palpitations, paroxysmal nocturnal dyspnea and syncope.   Respiratory: Positive for shortness of breath. Negative for cough and wheezing.    Endocrine: Negative for heat intolerance.   Skin: Negative for rash.   Musculoskeletal: Positive for arthritis and joint pain. Negative for myalgias.   Gastrointestinal: Negative for abdominal pain, melena, nausea and vomiting.   Genitourinary: Negative for dysuria and hematuria.   Neurological: Negative for dizziness, light-headedness, numbness and tremors.   Psychiatric/Behavioral: Negative for depression. The patient is not nervous/anxious.          Vital Signs  Vitals:    04/14/22 0532 04/14/22 1202 04/14/22 2242 04/15/22 0540   BP: 126/74 172/89 147/68 134/79   BP Location: Right arm Right arm Right arm Right arm   Patient Position: Sitting Lying Lying Lying   Pulse: 79 87 82 76   Resp: 18 16 18 16   Temp: 97.3 °F (36.3 °C) 96.7 °F (35.9 °C) 98.1 °F (36.7 °C) 97.4 °F (36.3 °C)   TempSrc: Oral Axillary Oral    SpO2: 97% 96% 97% 97%   Weight:       Height:         Wt Readings from Last 1 Encounters:   04/12/22 74.8 kg (165 lb)     No intake or output data in the 24 hours ending 04/15/22 1538  Physical Exam:  Constitutional:       Appearance: Well-developed.   Eyes:      General: No scleral icterus.        Right eye: No discharge.   HENT:      Head: Normocephalic and atraumatic.   Neck:      Thyroid: No thyromegaly.       Lymphadenopathy: No cervical adenopathy.   Pulmonary:      Effort: Pulmonary effort is normal. No respiratory distress.      Breath sounds: Normal breath sounds. No wheezing. No rales.   Cardiovascular:      Normal rate. Regular rhythm.      No gallop.   Abdominal:      Tenderness: There is no abdominal tenderness.   Skin:     Findings: No erythema or rash.   Neurological:      Mental Status: Alert and oriented to person, place, and time.         Results Review:   Lab Results (last 24 hours)     Procedure Component Value Units Date/Time    Lactic Acid, Plasma [[id number]]  (Normal) Collected: 04/15/22 0621    Specimen: Blood Updated: 04/15/22 0753     Lactate 1.6 mmol/L     Troponin [[id number]]  (Normal) Collected: 04/15/22 0621    Specimen: Blood Updated: 04/15/22 0743     Troponin T <0.010 ng/mL     Narrative:      Troponin T Reference Range:  <= 0.03 ng/mL-   Negative for AMI  >0.03 ng/mL-     Abnormal for myocardial necrosis.  Clinicians would have to utilize clinical acumen, EKG, Troponin and serial changes to determine if it is an Acute Myocardial Infarction or myocardial injury due to an underlying chronic condition.       Results may be falsely decreased if patient taking Biotin.      POC Glucose Once [[id number]]  (Abnormal) Collected: 04/15/22 0734    Specimen: Blood Updated: 04/15/22 0735     Glucose 237 mg/dL      Comment: Serial Number: [id number]:  [id number]       CBC (No Diff) [[id number]]  (Abnormal) Collected: 04/15/22 0621    Specimen: Blood Updated: 04/15/22 0728     WBC 5.00 10*3/mm3      RBC 3.88 10*6/mm3      Hemoglobin 11.2 g/dL      Hematocrit 32.7 %      MCV 84.2 fL      MCH 28.8 pg      MCHC 34.2 g/dL      RDW 15.3 %      RDW-SD 45.5 fl      MPV 8.6 fL      Platelets 229 10*3/mm3     Lactic Acid, Plasma [[id number]]  (Normal) Collected: 04/15/22 0215    Specimen: Blood Updated: 04/15/22 0327     Lactate 1.3 mmol/L     Comprehensive Metabolic Panel [[id number]]  (Abnormal) Collected:  04/15/22 0215    Specimen: Blood Updated: 04/15/22 0323     Glucose 245 mg/dL      BUN 22 mg/dL      Creatinine 1.21 mg/dL      Sodium 139 mmol/L      Potassium 3.7 mmol/L      Chloride 101 mmol/L      CO2 24.0 mmol/L      Calcium 9.6 mg/dL      Total Protein 6.8 g/dL      Albumin 4.30 g/dL      ALT (SGPT) 25 U/L      AST (SGOT) 15 U/L      Alkaline Phosphatase 69 U/L      Total Bilirubin 0.2 mg/dL      Globulin 2.5 gm/dL      A/G Ratio 1.7 g/dL      BUN/Creatinine Ratio 18.2     Anion Gap 14.0 mmol/L      eGFR 50.5 mL/min/1.73      Comment: National Kidney Foundation and American Society of Nephrology (ASN) Task Force recommended calculation based on the Chronic Kidney Disease Epidemiology Collaboration (CKD-EPI) equation refit without adjustment for race.       Narrative:      GFR Normal >60  Chronic Kidney Disease <60  Kidney Failure <15      Troponin [[id number]]  (Normal) Collected: 04/15/22 0215    Specimen: Blood Updated: 04/15/22 0323     Troponin T <0.010 ng/mL     Narrative:      Troponin T Reference Range:  <= 0.03 ng/mL-   Negative for AMI  >0.03 ng/mL-     Abnormal for myocardial necrosis.  Clinicians would have to utilize clinical acumen, EKG, Troponin and serial changes to determine if it is an Acute Myocardial Infarction or myocardial injury due to an underlying chronic condition.       Results may be falsely decreased if patient taking Biotin.      Phosphorus [[id number]]  (Abnormal) Collected: 04/15/22 0215    Specimen: Blood Updated: 04/15/22 0323     Phosphorus 5.2 mg/dL     Troponin [[id number]]  (Normal) Collected: 04/14/22 2025    Specimen: Blood Updated: 04/14/22 2138     Troponin T <0.010 ng/mL     Narrative:      Troponin T Reference Range:  <= 0.03 ng/mL-   Negative for AMI  >0.03 ng/mL-     Abnormal for myocardial necrosis.  Clinicians would have to utilize clinical acumen, EKG, Troponin and serial changes to determine if it is an Acute Myocardial Infarction or myocardial injury due to an  underlying chronic condition.       Results may be falsely decreased if patient taking Biotin.      Blood Culture - Blood, Hand, Left [[id number]]  (Normal) Collected: 04/11/22 1901    Specimen: Blood from Hand, Left Updated: 04/14/22 1917     Blood Culture No growth at 3 days    Blood Culture - Blood, Arm, Right [[id number]]  (Normal) Collected: 04/11/22 1901    Specimen: Blood from Arm, Right Updated: 04/14/22 1917     Blood Culture No growth at 3 days    Lactic Acid, Plasma [[id number]]  (Normal) Collected: 04/14/22 1800    Specimen: Blood Updated: 04/14/22 1859     Lactate 0.9 mmol/L     POC Glucose Once [[id number]]  (Abnormal) Collected: 04/14/22 1729    Specimen: Blood Updated: 04/14/22 1730     Glucose 184 mg/dL      Comment: Serial Number: [id number]:  [id number]       Lactic Acid, Plasma [[id number]]  (Normal) Collected: 04/14/22 1213    Specimen: Blood Updated: 04/14/22 1258     Lactate 1.2 mmol/L     Troponin [[id number]]  (Normal) Collected: 04/14/22 1213    Specimen: Blood Updated: 04/14/22 1256     Troponin T <0.010 ng/mL     Narrative:      Troponin T Reference Range:  <= 0.03 ng/mL-   Negative for AMI  >0.03 ng/mL-     Abnormal for myocardial necrosis.  Clinicians would have to utilize clinical acumen, EKG, Troponin and serial changes to determine if it is an Acute Myocardial Infarction or myocardial injury due to an underlying chronic condition.       Results may be falsely decreased if patient taking Biotin.          Imaging Results (Last 72 Hours)     Procedure Component Value Units Date/Time    US Renal Bilateral [[id number]] Collected: 04/13/22 1417     Updated: 04/13/22 1421    Narrative:      Examination: US RENAL BILATERAL-     Date of Exam: 4/13/2022 12:58 PM     Indication: Acute renal failure     Comparison: None available     Technique: Grayscale and color Doppler ultrasound evaluation of the  kidneys and urinary bladder was performed     Findings:  The right kidney measures 9.2 x 5.7 x  5.0 cm. The left kidney measures  11.4 x 5.2 x 4.5 cm. There is no hydronephrosis. There is no solid or  cystic renal lesion.     The urinary bladder is fluid-filled without wall thickening.       Impression:      1. Mildly small right kidney.  2. No hydronephrosis.     Electronically Signed By-Pillo Rosas MD On:4/13/2022 2:19 PM  This report was finalized on [id number] by  Pillo Rosas MD.        ECG/EMG Results (most recent)     Procedure Component Value Units Date/Time    ECG 12 Lead [[id number]] Collected: 04/11/22 1907     Updated: 04/11/22 1908     QT Interval 362 ms     Narrative:      HEART RATE= 121  bpm  RR Interval= 496  ms  RI Interval= 129  ms  P Horizontal Axis= -47  deg  P Front Axis= -57  deg  QRSD Interval= 163  ms  QT Interval= 362  ms  QRS Axis= -28  deg  T Wave Axis= 91  deg  - ABNORMAL ECG -  Sinus or ectopic atrial tachycardia  Left bundle branch block  ST elevation secondary to IVCD  Electronically Signed By:   Date and Time of Study: 2022-04-11 19:07:05    Adult Transthoracic Echo Complete W/ Cont if Necessary Per Protocol [[id number]] Resulted: 04/12/22 1937     Updated: 04/12/22 1941     Target HR (85%) 133 bpm      Max. Pred. HR (100%) 157 bpm      ACS 1.73 cm      Ao root diam 2.6 cm      Ao pk alireza 127.2 cm/sec      Ao V2 VTI 19.8 cm      MOJGAN(I,D) 1.37 cm2      EDV(cubed) 125.7 ml      EDV(MOD-sp4) 105.4 ml      EF(MOD-bp) 36.0 %      EF(MOD-sp4) 36.3 %      ESV(cubed) 103.5 ml      ESV(MOD-sp4) 67.1 ml      IVS/LVPW 1.13 cm      LV mass(C)d 125.7 grams      LV V1 max PG 2.40 mmHg      LV V1 mean PG 1.16 mmHg      LV V1 max 77.5 cm/sec      LVPWd 0.70 cm      MV V2 VTI 16.7 cm      MVA(VTI) 1.62 cm2      PA V2 max 121.8 cm/sec      RV V1 max PG 4.1 mmHg      RV V1 max 101.6 cm/sec      RV V1 VTI 17.4 cm      SI(MOD-sp4) 21.2 ml/m2      SV(LVOT) 27.0 ml      SV(MOD-sp4) 38.2 ml      Ao max PG 6.5 mmHg      Ao mean PG 3.5 mmHg      FS 6.3 %      IVSd 0.80 cm      LA dimension(2D)  3.6 cm      LV V1 VTI 12.6 cm      LVIDd 5.0 cm      LVIDs 4.7 cm      LVOT area 2.14 cm2      LVOT diam 1.65 cm      MV max PG 8.3 mmHg      MV mean PG 5.4 mmHg      LV Kamara Vol (BSA corrected) 58.5 cm2      LV Sys Vol (BSA corrected) 37.2 cm2     Narrative:      · Left ventricular systolic function is severely decreased.  · Left ventricular ejection fraction is 25 to 30%  · Akinetic septum and mid to distal inferior wall is noted  · Left ventricular diastolic function was normal.  · The right ventricular cavity is small in size.       ECG 12 Lead [[id number]] Collected: 04/14/22 0807     Updated: 04/14/22 0808     QT Interval 478 ms     Narrative:      HEART RATE= 77  bpm  RR Interval= 776  ms  PA Interval= 183  ms  P Horizontal Axis= -3  deg  P Front Axis= 60  deg  QRSD Interval= 171  ms  QT Interval= 478  ms  QRS Axis= -50  deg  T Wave Axis= 75  deg  - ABNORMAL ECG -  Sinus rhythm  Left bundle branch block  When compared with ECG of 11-Apr-2022 19:07:05,  Significant change in rhythm  Significant repolarization change  Electronically Signed By:   Date and Time of Study: 2022-04-14 08:07:38    SCANNED - TELEMETRY   [[id number]] Resulted: 04/11/22     Updated: 04/14/22 1350    SCANNED - TELEMETRY   [[id number]] Resulted: 04/11/22     Updated: 04/15/22 1403    SCANNED - TELEMETRY   [[id number]] Resulted: 04/11/22     Updated: 04/15/22 1423        CBC    Results from last 7 days   Lab Units 04/15/22  0621 04/14/22  0814 04/14/22  0018 04/13/22  0006 04/12/22  0340 04/11/22  1901   WBC 10*3/mm3 5.00 5.60 5.50 5.80 8.60 13.40*   HEMOGLOBIN g/dL 11.2* 10.9* 10.7* 11.1* 11.5* 13.1   PLATELETS 10*3/mm3 229 224 251 225 226 245     BMP   Results from last 7 days   Lab Units 04/15/22  0215 04/14/22  0814 04/14/22  0018 04/13/22  0006 04/12/22  0340 04/11/22  1901   SODIUM mmol/L 139 141 139 137 137 142   POTASSIUM mmol/L 3.7 4.9 4.1 3.5 3.6 3.9   CHLORIDE mmol/L 101 105 105 99 98 102   CO2 mmol/L 24.0 24.0 23.0 25.0 23.0 25.0    BUN mg/dL 22 20 21 20 15 17   CREATININE mg/dL 1.21* 1.15* 1.11* 1.35* 1.35* 1.20*   GLUCOSE mg/dL 245* 184* 215* 171* 208* 159*   PHOSPHORUS mg/dL 5.2*  --  4.4  --   --   --      CMP   Results from last 7 days   Lab Units 04/15/22  0215 04/14/22  0814 04/14/22  0018 04/13/22  0006 04/12/22  0340 04/11/22  1901   SODIUM mmol/L 139 141 139 137 137 142   POTASSIUM mmol/L 3.7 4.9 4.1 3.5 3.6 3.9   CHLORIDE mmol/L 101 105 105 99 98 102   CO2 mmol/L 24.0 24.0 23.0 25.0 23.0 25.0   BUN mg/dL 22 20 21 20 15 17   CREATININE mg/dL 1.21* 1.15* 1.11* 1.35* 1.35* 1.20*   GLUCOSE mg/dL 245* 184* 215* 171* 208* 159*   ALBUMIN g/dL 4.30  --  3.90 4.00  --  5.00   BILIRUBIN mg/dL 0.2  --  <0.2 0.2  --  0.3   ALK PHOS U/L 69  --  64 61  --  90   AST (SGOT) U/L 15  --  16 16  --  24   ALT (SGPT) U/L 25  --  24 24  --  39*     Cardiac Studies:  Echo- Results for orders placed during the hospital encounter of 04/11/22    Adult Transthoracic Echo Complete W/ Cont if Necessary Per Protocol    Interpretation Summary  · Left ventricular systolic function is severely decreased.  · Left ventricular ejection fraction is 25 to 30%  · Akinetic septum and mid to distal inferior wall is noted  · Left ventricular diastolic function was normal.  · The right ventricular cavity is small in size.    Stress Myoview-  Cath-      Medication Review:   Scheduled Meds:  Continuous Infusions:No current facility-administered medications for this encounter.    PRN Meds:.      Assessment/Plan   Patient Active Problem List   Diagnosis   • Encounter for general adult medical examination without abnormal findings   • Essential hypertension   • Irritable bowel syndrome   • Mixed anxiety depressive disorder   • Mixed hyperlipidemia   • Obesity   • Encounter for screening for malignant neoplasm of colon   • Type 2 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 (HCC)   • Vitamin D deficiency   • Hypertensive urgency   • Peripheral neuropathy   •  ANDREAS on CPAP   • Chest pain, atypical   • Breast neoplasm, Tis (DCIS), left   • Cold intolerance   • Insomnia   • Chest pain, unspecified type   • Dyspnea   • Acute systolic heart failure (CMS/HCC)     MDM:    1.  Dilated cardiomyopathy:    Echocardiogram showed EF is 25 to 30%.  Patient is on Toprol-XL.  I would consider either hydralazine but if nephrology team is okay I would like to start Entresto.    2.  Congestive heart failure:    At present patient is in euvolemic status.  After cardiac catheterization I would start low-dose diuretics    3.  Hypertension:    Blood pressure is better but still on the higher side.  I would consider either hydralazine or Entresto    4.  Ischemic evaluation:    Cardiac Catheterization reveals non obstructive  CAD  Will d/w nephrology re: starting entresto  Right groin soft with no hematoma  Additional recommendations per Dr. Murphy    Patient is seen and examined and findings are verified.  All data is reviewed by me personally.  Assessment and plan formulated by APC was done after discussion with attending.  I spent more than 50% of time in taking care of the patient.    Patient is seen and examined and findings are verified.  Patient is lying comfortably.  Right groin is soft without hematoma no chest pain.    Blood pressure is getting better.    Normal S1 and S2.  No pericardial rub or murmur abdominal exam is benign no leg edema noted.    I discussed with nephrology and we agreed to start Cardizem CD and start Entresto.  It will be started.  I would like to keep the patient 1 more night and check BMP in the morning for creatinine.  If it is stable patient can be discharged.    Electronically signed by Christo Murphy MD, 04/15/22, 3:38 PM EDT.      Christo Murphy MD  04/15/22  15:38 EDT

## 2022-04-15 ENCOUNTER — TELEPHONE (OUTPATIENT)
Dept: FAMILY MEDICINE CLINIC | Facility: CLINIC | Age: 62
End: 2022-04-15

## 2022-04-15 ENCOUNTER — READMISSION MANAGEMENT (OUTPATIENT)
Dept: CALL CENTER | Facility: HOSPITAL | Age: 62
End: 2022-04-15

## 2022-04-15 VITALS
BODY MASS INDEX: 28.17 KG/M2 | HEIGHT: 64 IN | OXYGEN SATURATION: 97 % | RESPIRATION RATE: 16 BRPM | WEIGHT: 165 LBS | DIASTOLIC BLOOD PRESSURE: 79 MMHG | HEART RATE: 76 BPM | SYSTOLIC BLOOD PRESSURE: 134 MMHG | TEMPERATURE: 97.4 F

## 2022-04-15 LAB
ALBUMIN SERPL-MCNC: 4.3 G/DL (ref 3.5–5.2)
ALBUMIN/GLOB SERPL: 1.7 G/DL
ALP SERPL-CCNC: 69 U/L (ref 39–117)
ALT SERPL W P-5'-P-CCNC: 25 U/L (ref 1–33)
ANION GAP SERPL CALCULATED.3IONS-SCNC: 14 MMOL/L (ref 5–15)
AST SERPL-CCNC: 15 U/L (ref 1–32)
BILIRUB SERPL-MCNC: 0.2 MG/DL (ref 0–1.2)
BUN SERPL-MCNC: 22 MG/DL (ref 8–23)
BUN/CREAT SERPL: 18.2 (ref 7–25)
CALCIUM SPEC-SCNC: 9.6 MG/DL (ref 8.6–10.5)
CHLORIDE SERPL-SCNC: 101 MMOL/L (ref 98–107)
CO2 SERPL-SCNC: 24 MMOL/L (ref 22–29)
CREAT SERPL-MCNC: 1.21 MG/DL (ref 0.57–1)
D-LACTATE SERPL-SCNC: 1.3 MMOL/L (ref 0.5–2)
D-LACTATE SERPL-SCNC: 1.6 MMOL/L (ref 0.5–2)
DEPRECATED RDW RBC AUTO: 45.5 FL (ref 37–54)
EGFRCR SERPLBLD CKD-EPI 2021: 50.5 ML/MIN/1.73
ERYTHROCYTE [DISTWIDTH] IN BLOOD BY AUTOMATED COUNT: 15.3 % (ref 12.3–15.4)
GLOBULIN UR ELPH-MCNC: 2.5 GM/DL
GLUCOSE BLDC GLUCOMTR-MCNC: 237 MG/DL (ref 70–105)
GLUCOSE SERPL-MCNC: 245 MG/DL (ref 65–99)
HCT VFR BLD AUTO: 32.7 % (ref 34–46.6)
HGB BLD-MCNC: 11.2 G/DL (ref 12–15.9)
IGA SERPL-MCNC: 170 MG/DL (ref 87–352)
IGG SERPL-MCNC: 685 MG/DL (ref 586–1602)
IGM SERPL-MCNC: 30 MG/DL (ref 26–217)
INTERPRETATION UR IFE-IMP: NORMAL
MCH RBC QN AUTO: 28.8 PG (ref 26.6–33)
MCHC RBC AUTO-ENTMCNC: 34.2 G/DL (ref 31.5–35.7)
MCV RBC AUTO: 84.2 FL (ref 79–97)
PHOSPHATE SERPL-MCNC: 5.2 MG/DL (ref 2.5–4.5)
PLATELET # BLD AUTO: 229 10*3/MM3 (ref 140–450)
PMV BLD AUTO: 8.6 FL (ref 6–12)
POTASSIUM SERPL-SCNC: 3.7 MMOL/L (ref 3.5–5.2)
PROT PATTERN SERPL IFE-IMP: NORMAL
PROT SERPL-MCNC: 6.8 G/DL (ref 6–8.5)
RBC # BLD AUTO: 3.88 10*6/MM3 (ref 3.77–5.28)
SODIUM SERPL-SCNC: 139 MMOL/L (ref 136–145)
TROPONIN T SERPL-MCNC: <0.01 NG/ML (ref 0–0.03)
TROPONIN T SERPL-MCNC: <0.01 NG/ML (ref 0–0.03)
WBC NRBC COR # BLD: 5 10*3/MM3 (ref 3.4–10.8)

## 2022-04-15 PROCEDURE — 63710000001 INSULIN LISPRO (HUMAN) PER 5 UNITS: Performed by: INTERNAL MEDICINE

## 2022-04-15 PROCEDURE — 80053 COMPREHEN METABOLIC PANEL: CPT | Performed by: HOSPITALIST

## 2022-04-15 PROCEDURE — 99217 PR OBSERVATION CARE DISCHARGE MANAGEMENT: CPT | Performed by: HOSPITALIST

## 2022-04-15 PROCEDURE — 84484 ASSAY OF TROPONIN QUANT: CPT | Performed by: NURSE PRACTITIONER

## 2022-04-15 PROCEDURE — 84100 ASSAY OF PHOSPHORUS: CPT | Performed by: INTERNAL MEDICINE

## 2022-04-15 PROCEDURE — 83605 ASSAY OF LACTIC ACID: CPT | Performed by: NURSE PRACTITIONER

## 2022-04-15 PROCEDURE — 36415 COLL VENOUS BLD VENIPUNCTURE: CPT | Performed by: HOSPITALIST

## 2022-04-15 PROCEDURE — 85027 COMPLETE CBC AUTOMATED: CPT | Performed by: HOSPITALIST

## 2022-04-15 PROCEDURE — 99214 OFFICE O/P EST MOD 30 MIN: CPT | Performed by: INTERNAL MEDICINE

## 2022-04-15 PROCEDURE — 82962 GLUCOSE BLOOD TEST: CPT

## 2022-04-15 PROCEDURE — G0378 HOSPITAL OBSERVATION PER HR: HCPCS

## 2022-04-15 RX ORDER — METOPROLOL SUCCINATE 200 MG/1
200 TABLET, EXTENDED RELEASE ORAL DAILY
Qty: 30 TABLET | Refills: 0 | Status: SHIPPED | OUTPATIENT
Start: 2022-04-15 | End: 2022-04-28

## 2022-04-15 RX ORDER — HUMAN INSULIN 100 [IU]/ML
INJECTION, SUSPENSION SUBCUTANEOUS
Qty: 45 ML | Refills: 1 | Status: SHIPPED | OUTPATIENT
Start: 2022-04-15 | End: 2022-07-26

## 2022-04-15 RX ORDER — POLYETHYLENE GLYCOL 3350 17 G/17G
17 POWDER, FOR SOLUTION ORAL DAILY
Qty: 238 G | Refills: 0 | Status: SHIPPED | OUTPATIENT
Start: 2022-04-16 | End: 2022-04-26

## 2022-04-15 RX ORDER — BUMETANIDE 2 MG/1
2 TABLET ORAL DAILY
Qty: 30 TABLET | Refills: 0 | Status: SHIPPED | OUTPATIENT
Start: 2022-04-16 | End: 2022-04-28

## 2022-04-15 RX ORDER — ALPRAZOLAM 1 MG/1
1 TABLET ORAL ONCE
Status: COMPLETED | OUTPATIENT
Start: 2022-04-15 | End: 2022-04-15

## 2022-04-15 RX ADMIN — METOPROLOL SUCCINATE 200 MG: 50 TABLET, EXTENDED RELEASE ORAL at 09:08

## 2022-04-15 RX ADMIN — POLYETHYLENE GLYCOL 3350 17 G: 17 POWDER, FOR SOLUTION ORAL at 09:09

## 2022-04-15 RX ADMIN — SACUBITRIL AND VALSARTAN 1 TABLET: 24; 26 TABLET, FILM COATED ORAL at 09:08

## 2022-04-15 RX ADMIN — BUMETANIDE 2 MG: 1 TABLET ORAL at 09:08

## 2022-04-15 RX ADMIN — INSULIN LISPRO 4 UNITS: 100 INJECTION, SOLUTION INTRAVENOUS; SUBCUTANEOUS at 09:08

## 2022-04-15 RX ADMIN — Medication 3 ML: at 09:09

## 2022-04-15 RX ADMIN — ALPRAZOLAM 1 MG: 1 TABLET ORAL at 02:39

## 2022-04-15 NOTE — PROGRESS NOTES
"NEPHROLOGY PROGRESS NOTE------[facility]    [facility]  [phone]  Sb Kerr MD      Patient Care Team:  Erica Avila MD as PCP - General  Ne Rich RN as Ambulatory  ([facility])  Sb Kerr MD as Consulting Physician (Nephrology)      Provider:  Sb Kerr MD  Patient Name: Susy Hanson  :  1960    SUBJECTIVE:  F/u CKD  No chest pain   Voiding by self    Medication:  atorvastatin, 20 mg, Oral, Nightly  bumetanide, 2 mg, Oral, Daily  insulin lispro, 0-9 Units, Subcutaneous, TID AC  metoprolol succinate XL, 200 mg, Oral, Daily  polyethylene glycol, 17 g, Oral, Daily  sacubitril-valsartan, 1 tablet, Oral, Q12H  sodium chloride, 3 mL, Intravenous, Q12H           OBJECTIVE    Vital Sign Min/Max for last 24 hours  Temp  Min: 96.7 °F (35.9 °C)  Max: 98.1 °F (36.7 °C)   BP  Min: 134/79  Max: 172/89   Pulse  Min: 76  Max: 87   Resp  Min: 16  Max: 18   SpO2  Min: 96 %  Max: 97 %   No data recorded   No data recorded     Flowsheet Rows    Flowsheet Row First Filed Value   Admission Height 162.6 cm (64\") Documented at 2022   Admission Weight 74.8 kg (165 lb) Documented at 2022          No intake/output data recorded.  I/O last 3 completed shifts:  In: 720 [P.O.:720]  Out: -     Physical Exam:  General Appearance: alert, appears stated age and cooperative  Head: normocephalic, without obvious abnormality and atraumatic  Eyes: conjunctivae and sclerae normal and no icterus  Neck: supple and no JVD  Lungs: crackles present  Heart: regular rhythm & normal rate and normal S1, S2  Chest: Wall no abnormalities observed  Abdomen: normal bowel sounds and soft non-tender  Extremities: moves extremities well, no edema, no cyanosis and no redness  Skin: no bleeding, bruising or rash, turgor normal, color normal and no lesions noted  Neurologic: Alert, and oriented. No focal " deficits    Labs:    WBC WBC   Date Value Ref Range Status   04/15/2022 5.00 3.40 - 10.80 10*3/mm3 Final   04/14/2022 5.60 3.40 - 10.80 10*3/mm3 Final   04/14/2022 5.50 3.40 - 10.80 10*3/mm3 Final   04/13/2022 5.80 3.40 - 10.80 10*3/mm3 Final      HGB Hemoglobin   Date Value Ref Range Status   04/15/2022 11.2 (L) 12.0 - 15.9 g/dL Final   04/14/2022 10.9 (L) 12.0 - 15.9 g/dL Final   04/14/2022 10.7 (L) 12.0 - 15.9 g/dL Final   04/13/2022 11.1 (L) 12.0 - 15.9 g/dL Final      HCT Hematocrit   Date Value Ref Range Status   04/15/2022 32.7 (L) 34.0 - 46.6 % Final   04/14/2022 32.4 (L) 34.0 - 46.6 % Final   04/14/2022 31.5 (L) 34.0 - 46.6 % Final   04/13/2022 32.4 (L) 34.0 - 46.6 % Final      Platlets No results found for: LABPLAT   MCV MCV   Date Value Ref Range Status   04/15/2022 84.2 79.0 - 97.0 fL Final   04/14/2022 84.5 79.0 - 97.0 fL Final   04/14/2022 85.2 79.0 - 97.0 fL Final   04/13/2022 84.5 79.0 - 97.0 fL Final          Sodium Sodium   Date Value Ref Range Status   04/15/2022 139 136 - 145 mmol/L Final   04/14/2022 141 136 - 145 mmol/L Final   04/14/2022 139 136 - 145 mmol/L Final   04/13/2022 137 136 - 145 mmol/L Final      Potassium Potassium   Date Value Ref Range Status   04/15/2022 3.7 3.5 - 5.2 mmol/L Final   04/14/2022 4.9 3.5 - 5.2 mmol/L Final   04/14/2022 4.1 3.5 - 5.2 mmol/L Final   04/13/2022 3.5 3.5 - 5.2 mmol/L Final      Chloride Chloride   Date Value Ref Range Status   04/15/2022 101 98 - 107 mmol/L Final   04/14/2022 105 98 - 107 mmol/L Final   04/14/2022 105 98 - 107 mmol/L Final   04/13/2022 99 98 - 107 mmol/L Final      CO2 CO2   Date Value Ref Range Status   04/15/2022 24.0 22.0 - 29.0 mmol/L Final   04/14/2022 24.0 22.0 - 29.0 mmol/L Final   04/14/2022 23.0 22.0 - 29.0 mmol/L Final   04/13/2022 25.0 22.0 - 29.0 mmol/L Final      BUN BUN   Date Value Ref Range Status   04/15/2022 22 8 - 23 mg/dL Final   04/14/2022 20 8 - 23 mg/dL Final   04/14/2022 21 8 - 23 mg/dL Final   04/13/2022 20 8 -  23 mg/dL Final      Creatinine Creatinine   Date Value Ref Range Status   04/15/2022 1.21 (H) 0.57 - 1.00 mg/dL Final   04/14/2022 1.15 (H) 0.57 - 1.00 mg/dL Final   04/14/2022 1.11 (H) 0.57 - 1.00 mg/dL Final   04/13/2022 1.35 (H) 0.57 - 1.00 mg/dL Final      Calcium Calcium   Date Value Ref Range Status   04/15/2022 9.6 8.6 - 10.5 mg/dL Final   04/14/2022 9.5 8.6 - 10.5 mg/dL Final   04/14/2022 8.8 8.6 - 10.5 mg/dL Final   04/13/2022 9.2 8.6 - 10.5 mg/dL Final      PO4 No components found for: PO4   Albumin Albumin   Date Value Ref Range Status   04/15/2022 4.30 3.50 - 5.20 g/dL Final   04/14/2022 3.90 3.50 - 5.20 g/dL Final   04/13/2022 4.00 3.50 - 5.20 g/dL Final      Magnesium No results found for: MG   Uric Acid No components found for: URIC ACID     Imaging Results (Last 72 Hours)     Procedure Component Value Units Date/Time    US Renal Bilateral [[id number]] Collected: 04/13/22 1417     Updated: 04/13/22 1421    Narrative:      Examination: US RENAL BILATERAL-     Date of Exam: 4/13/2022 12:58 PM     Indication: Acute renal failure     Comparison: None available     Technique: Grayscale and color Doppler ultrasound evaluation of the  kidneys and urinary bladder was performed     Findings:  The right kidney measures 9.2 x 5.7 x 5.0 cm. The left kidney measures  11.4 x 5.2 x 4.5 cm. There is no hydronephrosis. There is no solid or  cystic renal lesion.     The urinary bladder is fluid-filled without wall thickening.       Impression:      1. Mildly small right kidney.  2. No hydronephrosis.     Electronically Signed By-Pillo Rosas MD On:4/13/2022 2:19 PM  This report was finalized on [id number] by  Pillo Rosas MD.          Results for orders placed during the hospital encounter of 04/11/22    XR Chest 2 View    Narrative  XR CHEST 2 VW-    Date of Exam: 4/11/2022 7:15 PM    Indication: CHF/COPD Protocol.    Comparison: [facility] chest x-ray 10/20/2012.    Technique: Two views of the chest were  obtained.    FINDINGS: There are new interstitial and airspace opacities in both mid  to lower lungs. No pneumothorax or large pleural effusion is seen. Heart  size is not enlarged. There are surgical clips over both sides of the  chest.    Impression  New interstitial and airspace opacities in both mid to lower lungs,  likely due to pulmonary edema or perhaps pneumonia.      Electronically Signed By-Court Sheikh MD On:4/11/2022 7:29 PM  This report was finalized on [id number] by  Court Sheikh MD.      Results for orders placed in visit on 08/16/21    XR Chest 2 View    Narrative  EXAMINATION: 2 VIEWS OF THE CHEST    HISTORY: 61-year-old female undergoing preoperative evaluation prior to  bilateral mastectomy with a history of prior tobacco use.    FINDINGS: PA and lateral chest radiographs were obtained. No prior chest  radiograph is available for comparison. The lungs are normal in volume  and are clear. The cardiomediastinal silhouette, pulmonary vasculature  and osseous structures appear normal.    Impression  Negative chest radiographs.    This report was finalized on 8/16/2021 11:30 AM by Dr. Willard Noonan M.D.            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  2.5 GM urine protein. Romelia; US unremarkable.   · Hypertension  · Diabetes  · CHF-severe LV dysfunction with EF of 20 to 25%  · History of breast cancer     CR stable  On entresto  Continue bumex  Paraproteins pending      Sb Kerr MD  [facility]  [phone]  04/15/22  10:09 EDT

## 2022-04-15 NOTE — DISCHARGE SUMMARY
[facility] Medicine Services  DISCHARGE SUMMARY    Patient Name: Susy Hanson  : 1960  MRN: [id number]    Date of Admission: 2022  Discharge Diagnosis: Chest pain  Date of Discharge: 4/15/2022  Primary Care Physician: Erica Avila MD      Presenting Problem:   Tachycardia [R00.0]  Dyspnea, unspecified type [R06.00]  Chest pain, unspecified type [R07.9]  Acute congestive heart failure, unspecified heart failure type (HCC) [I50.9]    Active and Resolved Hospital Problems:  Active Hospital Problems    Diagnosis POA   • **Chest pain, atypical [R07.89] Unknown   • Acute systolic heart failure (CMS/HCC) [I50.21] Unknown   • Chest pain, unspecified type [R07.9] Yes   • Dyspnea [R06.00] Yes   • Breast neoplasm, Tis (DCIS), left [D05.12] Yes   • ANDREAS on CPAP [G47.33, Z99.89] Not Applicable   • Peripheral neuropathy [G62.9] Yes   • Type 2 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 (HCC) [E11.49] Yes   • Mixed anxiety depressive disorder [F41.8] Yes   • Irritable bowel syndrome [K58.9] Yes   • Essential hypertension [I10] Yes   • Mixed hyperlipidemia [E78.2] Yes      Resolved Hospital Problems   No resolved problems to display.         Hospital Course     Hospital Course:  Chief Complaint: dyspnea      History of Present Illness: Susy Hanson is a very pleasant  61 y.o. female who presented to [facility] on 2022 complaining of sudden shortness of air on exertion today about 2 pm. She reports she was here at a vocational meeting in the dining room and afterwards walked to the pharmacy in front of the hospital and became very short of breath.  She reports she had to stop to catch her breath.  She reported being slightly lightheaded she reports some sternal chest pressure.  She denies any nausea vomiting fever or dizziness.  She reports she has had a mild nonproductive cough for the past few days.  She reports she left here  "because she had an appointment for a massage and had difficulty laying flat due to shortness of air wheezing.  She then went to an urgent care and they suggested she come to the emergency department for reported sinus tachycardia.  She has a past medical history of breast cancer status post mastectomy and no chemotherapy, anxiety, difficult to control hypertension.  Reports she used to smoke quite heavily for 30 years and quit 14 years ago and has not been diagnosed with COPD but suspect she will eventually get it.  She is not on home oxygen other than CPAP at night.  Denies past medical history coronary artery disease.  Review of records shows a negative stress test in 2020 and last 2D echo was in 2020 which showed normal systolic function mild ventricular diastolic dysfunction grade 1.  Insulin-dependent diabetes mellitus type 2 and her serum glucose today was 149.  She reports to becoming easily anxious.  Labs show a WBC of 13.4, a proBNP of 1429, troponin less than 0.010.  BP was elevated on admission.  Creatinine is 1.2.  EKG image below regular rhythm with an elevated heart rate.  She was given 80 mg IV Lasix in ED, 2.5 mg IV Lopressor.  Chest x-ray per radiology today:     \"New interstitial and airspace opacities in both mid to lower lungs,  likely due to pulmonary edema or perhaps pneumonia.  \"     D-dimer mildly elevated at 0.65 CT chest per radiology today:     \"1.No evidence of pulmonary emboli.  2.Trace bilateral pleural effusions.  3.Bilateral interlobular septal thickening, suggestive of pulmonary  edema.  4.Patchy opacities in the lower lobes, right greater than left, likely  due to partial atelectasis and less likely pneumonia.  5.Mild cardiomegaly.\"     Lactic acid was 1.3.  She triggered sepsis for leukocytosis, tachycardia.  She is afebrile and not hypotensive.  She was started on IV ceftriaxone and IV azithromycin and blood cultures ordered and pending.  She will be admitted for IV antibiotics " pending blood culture results, Dr. Murphy of cardiology has been consulted, repeat 2D echo in a.m. serial troponins and continuous cardiac monitoring.    Hospital course and problem list      Plan:      Dyspnea  Cardiogenic   No PE on CT scan PE protocol  No Further evidence of pneumonia antibiotics stopped  Follow-up on micro so far negative, procalcitonin low normal  Bronchodilators, supportive management  Diuretics per cardiology nephrology will continue on Bumex on discharge  Cardiology consult     Chest pain  Resolved  As above  Status post cardiac cath showed mild coronary artery disease recommended medical management  2D echo decreased EF 25 to 30%, 35-40 on cardiac catheterization    CHF  Acute on chronic  With decreased EF  2D echo as above  Started on Entresto  Will be discharged on Bumex as well  Follow-up with cardiology in outpatient     Pneumonia  Ruled out  Procalcitonin low normal  No leukocytosis  Imaging as above  Antibiotics DC'd     Diabetes  Insulin sliding scale Accu-Cheks   Restart home regimen on discharge  Evaluated and educated by nutritionist     Hypertension  Home meds DC'd  Started on Entresto, beta-blocker, will still be on Bumex     VIVEK  Improved with diuretics  Monitor  As above  As per nephrology     Anxiety  Restart home Xanax     DVT PUD prophylaxis      Plan discharge home stable condition follow-up with nephrology cardiology as an outpatient.     Condition on discharge stable.    Reasons For Change In Medications and Indications for New Medications:      Day of Discharge     Vital Signs:  Temp:  [96.7 °F (35.9 °C)-98.1 °F (36.7 °C)] 97.4 °F (36.3 °C)  Heart Rate:  [76-87] 76  Resp:  [16-18] 16  BP: (134-172)/(68-89) 134/79    Physical Exam:  Physical Exam   Physical Exam   Constitutional:  oriented to person, place, and time. No distress.   HENT:   Head: Normocephalic and atraumatic.   Eyes: Conjunctivae and EOM are normal. Pupils are equal, round, and reactive to light.   Neck: No  JVD present. No thyromegaly present.   Cardiovascular: Normal rate, regular rhythm, normal heart sounds and intact distal pulses. Exam reveals no gallop and no friction rub.   No murmur heard.  Pulmonary/Chest: Effort normal and breath sounds normal. No stridor. No respiratory distress.  has no wheezes.  has no rales.  exhibits no tenderness.   Abdominal: Soft. Bowel sounds are normal.  no distension and no mass. There is no tenderness. There is no rebound and no guarding. No hernia.   Musculoskeletal: Normal range of motion.   Lymphadenopathy:     no cervical adenopathy.   Neurological:  alert and oriented to person, place, and time. No cranial nerve deficit or sensory deficit. exhibits normal muscle tone.   Skin: No rash noted.  not diaphoretic.   Psychiatric:  normal mood and affect.   Vitals reviewed.        Pertinent  and/or Most Recent Results     LAB RESULTS:      Lab 04/15/22  0621 04/15/22  0215 04/14/22  1800 04/14/22  1213 04/14/22  0814 04/14/22  0708 04/14/22  0018 04/13/22  0637 04/13/22  0006 04/12/22  1222 04/12/22  0340 04/11/22  1911 04/11/22  1901   WBC 5.00  --   --   --  5.60  --  5.50  --  5.80  --  8.60  --  13.40*   HEMOGLOBIN 11.2*  --   --   --  10.9*  --  10.7*  --  11.1*  --  11.5*  --  13.1   HEMATOCRIT 32.7*  --   --   --  32.4*  --  31.5*  --  32.4*  --  33.7*  --  36.9   PLATELETS 229  --   --   --  224  --  251  --  225  --  226  --  245   NEUTROS ABS  --   --   --   --   --   --   --   --   --   --  6.40  --  10.99*   LYMPHS ABS  --   --   --   --   --   --   --   --   --   --  1.00  --   --    MONOS ABS  --   --   --   --   --   --   --   --   --   --  1.00*  --   --    EOS ABS  --   --   --   --   --   --   --   --   --   --  0.10  --  0.27   MCV 84.2  --   --   --  84.5  --  85.2  --  84.5  --  84.2  --  83.0   PROCALCITONIN  --   --   --   --   --   --   --   --   --   --  0.17  --   --    LACTATE 1.6 1.3 0.9 1.2  --  1.7 0.8   < > 0.8   < > 1.8  1.8   < >  --     < > = values  in this interval not displayed.         Lab 04/15/22  0215 04/14/22  0814 04/14/22  0018 04/13/22  0006 04/12/22  0340 04/12/22  0010   SODIUM 139 141 139 137 137  --    POTASSIUM 3.7 4.9 4.1 3.5 3.6  --    CHLORIDE 101 105 105 99 98  --    CO2 24.0 24.0 23.0 25.0 23.0  --    ANION GAP 14.0 12.0 11.0 13.0 16.0*  --    BUN 22 20 21 20 15  --    CREATININE 1.21* 1.15* 1.11* 1.35* 1.35*  --    EGFR 50.5* 53.6* 56.0* 44.3* 44.3*  --    GLUCOSE 245* 184* 215* 171* 208*  --    CALCIUM 9.6 9.5 8.8 9.2 9.6  --    PHOSPHORUS 5.2*  --  4.4  --   --   --    HEMOGLOBIN A1C  --   --   --   --   --  6.8*         Lab 04/15/22  0215 04/14/22  0018 04/13/22  0006 04/11/22  1901   TOTAL PROTEIN 6.8 6.6 6.6 8.2   ALBUMIN 4.30 3.90 4.00 5.00   GLOBULIN 2.5 2.7 2.6 3.2   ALT (SGPT) 25 24 24 39*   AST (SGOT) 15 16 16 24   BILIRUBIN 0.2 <0.2 0.2 0.3   ALK PHOS 69 64 61 90         Lab 04/15/22  0621 04/15/22  0215 04/14/22 2025 04/14/22  1213 04/14/22  0708 04/12/22  0010 04/11/22  1901   PROBNP  --   --   --   --   --   --  1,429.0*   TROPONIN T <0.010 <0.010 <0.010 <0.010 <0.010   < > <0.010    < > = values in this interval not displayed.         Lab 04/14/22  0814   CHOLESTEROL 136   LDL CHOL 66   HDL CHOL 39*   TRIGLYCERIDES 183*             Lab 04/11/22  1929   PH, ARTERIAL 7.424   PCO2, ARTERIAL 38.4   PO2 .5*   O2 SATURATION ART 99.1*   FIO2 100   HCO3 ART 25.1   BASE EXCESS ART 0.8     Brief Urine Lab Results  (Last result in the past 365 days)      Color   Clarity   Blood   Leuk Est   Nitrite   Protein   CREAT   Urine HCG        04/13/22 1521 Yellow   Clear   Negative   Small (1+)   Negative   >=300 mg/dL (3+)               Microbiology Results (last 10 days)     Procedure Component Value - Date/Time    COVID-19,CEPHEID/WILY,COR/DAVID/PAD/MICH IN-HOUSE(OR EMERGENT/ADD-ON),NP SWAB IN TRANSPORT MEDIA 3-4 HR TAT, RT-PCR - Swab, Nasopharynx [[id number]]  (Normal) Collected: 04/11/22 1929    Lab Status: Final result Specimen: Swab  from Nasopharynx Updated: 04/11/22 2018     COVID19 Not Detected    Narrative:      Fact sheet for providers: https://www.fda.gov/media/728993/download     Fact sheet for patients: https://www.fda.gov/media/477824/download  Fact sheet for providers: https://www.fda.gov/media/091461/download     Fact sheet for patients: https://www.fda.gov/media/881936/download    Blood Culture - Blood, Hand, Left [[id number]]  (Normal) Collected: 04/11/22 1901    Lab Status: Preliminary result Specimen: Blood from Hand, Left Updated: 04/14/22 1917     Blood Culture No growth at 3 days    Blood Culture - Blood, Arm, Right [[id number]]  (Normal) Collected: 04/11/22 1901    Lab Status: Preliminary result Specimen: Blood from Arm, Right Updated: 04/14/22 1917     Blood Culture No growth at 3 days          XR Chest 2 View    Result Date: 4/11/2022  Impression: New interstitial and airspace opacities in both mid to lower lungs, likely due to pulmonary edema or perhaps pneumonia.   Electronically Signed By-Court Sheikh MD On:4/11/2022 7:29 PM This report was finalized on [id number] by  Court Sheikh MD.    CT Angiogram Chest Pulmonary Embolism    Result Date: 4/11/2022  Impression: 1.No evidence of pulmonary emboli. 2.Trace bilateral pleural effusions. 3.Bilateral interlobular septal thickening, suggestive of pulmonary edema. 4.Patchy opacities in the lower lobes, right greater than left, likely due to partial atelectasis and less likely pneumonia. 5.Mild cardiomegaly.  Electronically Signed By-Court Sheikh MD On:4/11/2022 9:37 PM This report was finalized on [id number] by  Court Sheikh MD.    US Renal Bilateral    Result Date: 4/13/2022  Impression: 1. Mildly small right kidney. 2. No hydronephrosis.  Electronically Signed By-Pillo Rosas MD On:4/13/2022 2:19 PM This report was finalized on [id number] by  Pillo Rosas MD.              Results for orders placed during the hospital encounter of 04/11/22    Adult  Transthoracic Echo Complete W/ Cont if Necessary Per Protocol    Interpretation Summary  · Left ventricular systolic function is severely decreased.  · Left ventricular ejection fraction is 25 to 30%  · Akinetic septum and mid to distal inferior wall is noted  · Left ventricular diastolic function was normal.  · The right ventricular cavity is small in size.      Labs Pending at Discharge:  Pending Labs     Order Current Status    Immunofixation, Serum In process    Immunofixation, Urine - Urine, Clean Catch In process    Blood Culture - Blood, Arm, Right Preliminary result    Blood Culture - Blood, Hand, Left Preliminary result          Procedures Performed  Procedure(s):  Cardiac Catheterization/Vascular Study         Consults:   Consults     Date and Time Order Name Status Description    4/13/2022  9:15 AM Inpatient Nephrology Consult Completed     4/12/2022 12:34 AM Cardiology (on-call MD unless specified) Completed     4/11/2022 11:22 PM Inpatient Cardiology Consult              Discharge Details        Discharge Medications      New Medications      Instructions Start Date   bumetanide 2 MG tablet  Commonly known as: BUMEX   2 mg, Oral, Daily   Start Date: April 16, 2022     polyethylene glycol 17 g packet  Commonly known as: MIRALAX   17 g, Oral, Daily   Start Date: April 16, 2022     sacubitril-valsartan 24-26 MG tablet  Commonly known as: ENTRESTO   1 tablet, Oral, Every 12 Hours Scheduled         Changes to Medications      Instructions Start Date   HumaLOG Mix 75/25 KwikPen (75-25) 100 UNIT/ML suspension pen-injector pen  Generic drug: Insulin Lispro Prot & Lispro  What changed:   how much to take  when to take this   Inject 24 Units under the skin with breakfast and 28 units under the skin with supper      metoprolol succinate  MG 24 hr tablet  Commonly known as: Toprol XL  What changed: medication strength   200 mg, Oral, Daily         Continue These Medications      Instructions Start Date    acetaminophen 325 MG tablet  Commonly known as: TYLENOL   325 mg, Oral, Every 4 Hours PRN      ALPRAZolam 1 MG tablet  Commonly known as: XANAX   1 mg, Oral, 2 Times Daily PRN      atorvastatin 20 MG tablet  Commonly known as: LIPITOR   20 mg, Oral, Daily      metFORMIN 1000 MG tablet  Commonly known as: GLUCOPHAGE   Take one tablet by mouth with breakfast and 1 1/2 tablets with supper         Stop These Medications    amLODIPine 2.5 MG tablet  Commonly known as: NORVASC     dilTIAZem  MG 24 hr capsule  Commonly known as: Cardizem CD     hydrALAZINE 100 MG tablet  Commonly known as: APRESOLINE     hydroCHLOROthiazide 25 MG tablet  Commonly known as: HYDRODIURIL     losartan 100 MG tablet  Commonly known as: Cozaar            Allergies   Allergen Reactions   • Hydrocodone-Acetaminophen Hives     Lortab, takes tylenol         Discharge Disposition: Discharge stable.  Discharge home with home health.  Home-Health Care Svc    Diet:  Hospital:  Diet Order   Procedures   • Diet Cardiac; Healthy Heart         Discharge Activity:   Activity Instructions     Activity as Tolerated              CODE STATUS:  Code Status and Medical Interventions:   Ordered at: 04/11/22 2321     Code Status (Patient has no pulse and is not breathing):    CPR (Attempt to Resuscitate)     Medical Interventions (Patient has pulse or is breathing):    Full Support         Future Appointments   Date Time Provider Department Center   7/26/2022  9:45 AM Erica Avila MD [facility]   9/9/2022  1:00 PM Asia Perkins MD [facility]   1/3/2023  9:00 AM Sam Quintana MD [facility]       Additional Instructions for the Follow-ups that You Need to Schedule     Discharge Follow-up with PCP   As directed       Currently Documented PCP:    Erica Avila MD    PCP Phone Number:    [phone]     Follow Up Details: 1 week         Discharge Follow-up with Specialty: Nephrology and cardiology in 1  week   As directed      Specialty: Nephrology and cardiology in 1 week               Time spent on Discharge including face to face service: 38 minutes      Signature:   Electronically signed by Sergo Ruth MD, 04/15/22, 9:39 AM EDT.

## 2022-04-15 NOTE — PROGRESS NOTES
LOS: 2 days   Admiting Physician- No att. providers found    Reason For Followup:    Congestive heart failure acute systolic  Accelerated hypertension  Cardiomyopathy      Subjective     Denies any chest pain or shortness of    Objective     Hemodynamics are stable    Review of Systems:   Review of Systems   Constitutional: Negative for chills and fever.   HENT: Negative for ear discharge and nosebleeds.    Eyes: Negative for discharge and redness.   Cardiovascular: Negative for chest pain, orthopnea, palpitations, paroxysmal nocturnal dyspnea and syncope.   Respiratory: Negative for cough, shortness of breath and wheezing.    Endocrine: Negative for heat intolerance.   Skin: Negative for rash.   Musculoskeletal: Negative for arthritis and myalgias.   Gastrointestinal: Negative for abdominal pain, melena, nausea and vomiting.   Genitourinary: Negative for dysuria and hematuria.   Neurological: Negative for dizziness, light-headedness, numbness and tremors.   Psychiatric/Behavioral: Negative for depression. The patient is not nervous/anxious.          Vital Signs  Vitals:    04/14/22 0532 04/14/22 1202 04/14/22 2242 04/15/22 0540   BP: 126/74 172/89 147/68 134/79   BP Location: Right arm Right arm Right arm Right arm   Patient Position: Sitting Lying Lying Lying   Pulse: 79 87 82 76   Resp: 18 16 18 16   Temp: 97.3 °F (36.3 °C) 96.7 °F (35.9 °C) 98.1 °F (36.7 °C) 97.4 °F (36.3 °C)   TempSrc: Oral Axillary Oral    SpO2: 97% 96% 97% 97%   Weight:       Height:         Wt Readings from Last 1 Encounters:   04/12/22 74.8 kg (165 lb)     No intake or output data in the 24 hours ending 04/15/22 1539  Physical Exam:  Constitutional:       Appearance: Well-developed.   Eyes:      General: No scleral icterus.        Right eye: No discharge.   HENT:      Head: Normocephalic and atraumatic.   Neck:      Thyroid: No thyromegaly.      Lymphadenopathy: No cervical adenopathy.   Pulmonary:      Effort: Pulmonary effort is  normal. No respiratory distress.      Breath sounds: Normal breath sounds. No wheezing. No rales.   Cardiovascular:      Normal rate. Regular rhythm.      No gallop.   Edema:     Peripheral edema absent.   Abdominal:      Tenderness: There is no abdominal tenderness.   Skin:     Findings: No erythema or rash.   Neurological:      Mental Status: Alert and oriented to person, place, and time.         Results Review:   Lab Results (last 24 hours)     Procedure Component Value Units Date/Time    Lactic Acid, Plasma [[id number]]  (Normal) Collected: 04/15/22 0621    Specimen: Blood Updated: 04/15/22 0753     Lactate 1.6 mmol/L     Troponin [[id number]]  (Normal) Collected: 04/15/22 0621    Specimen: Blood Updated: 04/15/22 0743     Troponin T <0.010 ng/mL     Narrative:      Troponin T Reference Range:  <= 0.03 ng/mL-   Negative for AMI  >0.03 ng/mL-     Abnormal for myocardial necrosis.  Clinicians would have to utilize clinical acumen, EKG, Troponin and serial changes to determine if it is an Acute Myocardial Infarction or myocardial injury due to an underlying chronic condition.       Results may be falsely decreased if patient taking Biotin.      POC Glucose Once [[id number]]  (Abnormal) Collected: 04/15/22 0734    Specimen: Blood Updated: 04/15/22 0735     Glucose 237 mg/dL      Comment: Serial Number: [id number]:  [id number]       CBC (No Diff) [[id number]]  (Abnormal) Collected: 04/15/22 0621    Specimen: Blood Updated: 04/15/22 0728     WBC 5.00 10*3/mm3      RBC 3.88 10*6/mm3      Hemoglobin 11.2 g/dL      Hematocrit 32.7 %      MCV 84.2 fL      MCH 28.8 pg      MCHC 34.2 g/dL      RDW 15.3 %      RDW-SD 45.5 fl      MPV 8.6 fL      Platelets 229 10*3/mm3     Lactic Acid, Plasma [[id number]]  (Normal) Collected: 04/15/22 0215    Specimen: Blood Updated: 04/15/22 0327     Lactate 1.3 mmol/L     Comprehensive Metabolic Panel [[id number]]  (Abnormal) Collected: 04/15/22 0215    Specimen: Blood Updated: 04/15/22  0323     Glucose 245 mg/dL      BUN 22 mg/dL      Creatinine 1.21 mg/dL      Sodium 139 mmol/L      Potassium 3.7 mmol/L      Chloride 101 mmol/L      CO2 24.0 mmol/L      Calcium 9.6 mg/dL      Total Protein 6.8 g/dL      Albumin 4.30 g/dL      ALT (SGPT) 25 U/L      AST (SGOT) 15 U/L      Alkaline Phosphatase 69 U/L      Total Bilirubin 0.2 mg/dL      Globulin 2.5 gm/dL      A/G Ratio 1.7 g/dL      BUN/Creatinine Ratio 18.2     Anion Gap 14.0 mmol/L      eGFR 50.5 mL/min/1.73      Comment: National Kidney Foundation and American Society of Nephrology (ASN) Task Force recommended calculation based on the Chronic Kidney Disease Epidemiology Collaboration (CKD-EPI) equation refit without adjustment for race.       Narrative:      GFR Normal >60  Chronic Kidney Disease <60  Kidney Failure <15      Troponin [[id number]]  (Normal) Collected: 04/15/22 0215    Specimen: Blood Updated: 04/15/22 0323     Troponin T <0.010 ng/mL     Narrative:      Troponin T Reference Range:  <= 0.03 ng/mL-   Negative for AMI  >0.03 ng/mL-     Abnormal for myocardial necrosis.  Clinicians would have to utilize clinical acumen, EKG, Troponin and serial changes to determine if it is an Acute Myocardial Infarction or myocardial injury due to an underlying chronic condition.       Results may be falsely decreased if patient taking Biotin.      Phosphorus [[id number]]  (Abnormal) Collected: 04/15/22 0215    Specimen: Blood Updated: 04/15/22 0323     Phosphorus 5.2 mg/dL     Troponin [[id number]]  (Normal) Collected: 04/14/22 2025    Specimen: Blood Updated: 04/14/22 2138     Troponin T <0.010 ng/mL     Narrative:      Troponin T Reference Range:  <= 0.03 ng/mL-   Negative for AMI  >0.03 ng/mL-     Abnormal for myocardial necrosis.  Clinicians would have to utilize clinical acumen, EKG, Troponin and serial changes to determine if it is an Acute Myocardial Infarction or myocardial injury due to an underlying chronic condition.       Results may be  falsely decreased if patient taking Biotin.      Blood Culture - Blood, Hand, Left [[id number]]  (Normal) Collected: 04/11/22 1901    Specimen: Blood from Hand, Left Updated: 04/14/22 1917     Blood Culture No growth at 3 days    Blood Culture - Blood, Arm, Right [[id number]]  (Normal) Collected: 04/11/22 1901    Specimen: Blood from Arm, Right Updated: 04/14/22 1917     Blood Culture No growth at 3 days    Lactic Acid, Plasma [[id number]]  (Normal) Collected: 04/14/22 1800    Specimen: Blood Updated: 04/14/22 1859     Lactate 0.9 mmol/L     POC Glucose Once [[id number]]  (Abnormal) Collected: 04/14/22 1729    Specimen: Blood Updated: 04/14/22 1730     Glucose 184 mg/dL      Comment: Serial Number: [id number]:  [id number]       Lactic Acid, Plasma [[id number]]  (Normal) Collected: 04/14/22 1213    Specimen: Blood Updated: 04/14/22 1258     Lactate 1.2 mmol/L     Troponin [[id number]]  (Normal) Collected: 04/14/22 1213    Specimen: Blood Updated: 04/14/22 1256     Troponin T <0.010 ng/mL     Narrative:      Troponin T Reference Range:  <= 0.03 ng/mL-   Negative for AMI  >0.03 ng/mL-     Abnormal for myocardial necrosis.  Clinicians would have to utilize clinical acumen, EKG, Troponin and serial changes to determine if it is an Acute Myocardial Infarction or myocardial injury due to an underlying chronic condition.       Results may be falsely decreased if patient taking Biotin.          Imaging Results (Last 72 Hours)     Procedure Component Value Units Date/Time    US Renal Bilateral [[id number]] Collected: 04/13/22 1417     Updated: 04/13/22 1421    Narrative:      Examination: US RENAL BILATERAL-     Date of Exam: 4/13/2022 12:58 PM     Indication: Acute renal failure     Comparison: None available     Technique: Grayscale and color Doppler ultrasound evaluation of the  kidneys and urinary bladder was performed     Findings:  The right kidney measures 9.2 x 5.7 x 5.0 cm. The left kidney measures  11.4 x 5.2 x 4.5  cm. There is no hydronephrosis. There is no solid or  cystic renal lesion.     The urinary bladder is fluid-filled without wall thickening.       Impression:      1. Mildly small right kidney.  2. No hydronephrosis.     Electronically Signed By-Pillo Rosas MD On:4/13/2022 2:19 PM  This report was finalized on [id number] by  Pillo Rosas MD.        ECG/EMG Results (most recent)     Procedure Component Value Units Date/Time    ECG 12 Lead [[id number]] Collected: 04/11/22 1907     Updated: 04/11/22 1908     QT Interval 362 ms     Narrative:      HEART RATE= 121  bpm  RR Interval= 496  ms  MI Interval= 129  ms  P Horizontal Axis= -47  deg  P Front Axis= -57  deg  QRSD Interval= 163  ms  QT Interval= 362  ms  QRS Axis= -28  deg  T Wave Axis= 91  deg  - ABNORMAL ECG -  Sinus or ectopic atrial tachycardia  Left bundle branch block  ST elevation secondary to IVCD  Electronically Signed By:   Date and Time of Study: 2022-04-11 19:07:05    Adult Transthoracic Echo Complete W/ Cont if Necessary Per Protocol [[id number]] Resulted: 04/12/22 1937     Updated: 04/12/22 1941     Target HR (85%) 133 bpm      Max. Pred. HR (100%) 157 bpm      ACS 1.73 cm      Ao root diam 2.6 cm      Ao pk alireza 127.2 cm/sec      Ao V2 VTI 19.8 cm      MOJGAN(I,D) 1.37 cm2      EDV(cubed) 125.7 ml      EDV(MOD-sp4) 105.4 ml      EF(MOD-bp) 36.0 %      EF(MOD-sp4) 36.3 %      ESV(cubed) 103.5 ml      ESV(MOD-sp4) 67.1 ml      IVS/LVPW 1.13 cm      LV mass(C)d 125.7 grams      LV V1 max PG 2.40 mmHg      LV V1 mean PG 1.16 mmHg      LV V1 max 77.5 cm/sec      LVPWd 0.70 cm      MV V2 VTI 16.7 cm      MVA(VTI) 1.62 cm2      PA V2 max 121.8 cm/sec      RV V1 max PG 4.1 mmHg      RV V1 max 101.6 cm/sec      RV V1 VTI 17.4 cm      SI(MOD-sp4) 21.2 ml/m2      SV(LVOT) 27.0 ml      SV(MOD-sp4) 38.2 ml      Ao max PG 6.5 mmHg      Ao mean PG 3.5 mmHg      FS 6.3 %      IVSd 0.80 cm      LA dimension(2D) 3.6 cm      LV V1 VTI 12.6 cm      LVIDd 5.0 cm       LVIDs 4.7 cm      LVOT area 2.14 cm2      LVOT diam 1.65 cm      MV max PG 8.3 mmHg      MV mean PG 5.4 mmHg      LV Kamara Vol (BSA corrected) 58.5 cm2      LV Sys Vol (BSA corrected) 37.2 cm2     Narrative:      · Left ventricular systolic function is severely decreased.  · Left ventricular ejection fraction is 25 to 30%  · Akinetic septum and mid to distal inferior wall is noted  · Left ventricular diastolic function was normal.  · The right ventricular cavity is small in size.       ECG 12 Lead [[id number]] Collected: 04/14/22 0807     Updated: 04/14/22 0808     QT Interval 478 ms     Narrative:      HEART RATE= 77  bpm  RR Interval= 776  ms  IA Interval= 183  ms  P Horizontal Axis= -3  deg  P Front Axis= 60  deg  QRSD Interval= 171  ms  QT Interval= 478  ms  QRS Axis= -50  deg  T Wave Axis= 75  deg  - ABNORMAL ECG -  Sinus rhythm  Left bundle branch block  When compared with ECG of 11-Apr-2022 19:07:05,  Significant change in rhythm  Significant repolarization change  Electronically Signed By:   Date and Time of Study: 2022-04-14 08:07:38    SCANNED - TELEMETRY   [[id number]] Resulted: 04/11/22     Updated: 04/14/22 1350    SCANNED - TELEMETRY   [[id number]] Resulted: 04/11/22     Updated: 04/15/22 1403    SCANNED - TELEMETRY   [[id number]] Resulted: 04/11/22     Updated: 04/15/22 1423        CBC    Results from last 7 days   Lab Units 04/15/22  0621 04/14/22  0814 04/14/22  0018 04/13/22  0006 04/12/22  0340 04/11/22  1901   WBC 10*3/mm3 5.00 5.60 5.50 5.80 8.60 13.40*   HEMOGLOBIN g/dL 11.2* 10.9* 10.7* 11.1* 11.5* 13.1   PLATELETS 10*3/mm3 229 224 251 225 226 245     BMP   Results from last 7 days   Lab Units 04/15/22  0215 04/14/22  0814 04/14/22  0018 04/13/22  0006 04/12/22  0340 04/11/22  1901   SODIUM mmol/L 139 141 139 137 137 142   POTASSIUM mmol/L 3.7 4.9 4.1 3.5 3.6 3.9   CHLORIDE mmol/L 101 105 105 99 98 102   CO2 mmol/L 24.0 24.0 23.0 25.0 23.0 25.0   BUN mg/dL 22 20 21 20 15 17   CREATININE mg/dL  1.21* 1.15* 1.11* 1.35* 1.35* 1.20*   GLUCOSE mg/dL 245* 184* 215* 171* 208* 159*   PHOSPHORUS mg/dL 5.2*  --  4.4  --   --   --      CMP   Results from last 7 days   Lab Units 04/15/22  0215 04/14/22  0814 04/14/22  0018 04/13/22  0006 04/12/22  0340 04/11/22  1901   SODIUM mmol/L 139 141 139 137 137 142   POTASSIUM mmol/L 3.7 4.9 4.1 3.5 3.6 3.9   CHLORIDE mmol/L 101 105 105 99 98 102   CO2 mmol/L 24.0 24.0 23.0 25.0 23.0 25.0   BUN mg/dL 22 20 21 20 15 17   CREATININE mg/dL 1.21* 1.15* 1.11* 1.35* 1.35* 1.20*   GLUCOSE mg/dL 245* 184* 215* 171* 208* 159*   ALBUMIN g/dL 4.30  --  3.90 4.00  --  5.00   BILIRUBIN mg/dL 0.2  --  <0.2 0.2  --  0.3   ALK PHOS U/L 69  --  64 61  --  90   AST (SGOT) U/L 15  --  16 16  --  24   ALT (SGPT) U/L 25  --  24 24  --  39*     Cardiac Studies:  Echo- Results for orders placed during the hospital encounter of 04/11/22    Adult Transthoracic Echo Complete W/ Cont if Necessary Per Protocol    Interpretation Summary  · Left ventricular systolic function is severely decreased.  · Left ventricular ejection fraction is 25 to 30%  · Akinetic septum and mid to distal inferior wall is noted  · Left ventricular diastolic function was normal.  · The right ventricular cavity is small in size.    Stress Myoview-  Cath-      Medication Review:   Scheduled Meds:  Continuous Infusions:No current facility-administered medications for this encounter.    PRN Meds:.      Assessment/Plan   Patient Active Problem List   Diagnosis   • Encounter for general adult medical examination without abnormal findings   • Essential hypertension   • Irritable bowel syndrome   • Mixed anxiety depressive disorder   • Mixed hyperlipidemia   • Obesity   • Encounter for screening for malignant neoplasm of colon   • Type 2 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 (HCC)   • Vitamin D deficiency   • Hypertensive urgency   • Peripheral neuropathy   • ANDREAS on CPAP   • Chest pain, atypical   • Breast  neoplasm, Tis (DCIS), left   • Cold intolerance   • Insomnia   • Chest pain, unspecified type   • Dyspnea   • Acute systolic heart failure (CMS/HCC)     MDM:    1.  Congestive heart failure acute systolic:    Patient is not in congestive heart failure.  She is doing well.    2.  Dilated cardiomyopathy:    Patient is started on Entresto and Toprol-XL.  She is tolerating it well    3.  Hypertension:    Blood pressure is very well controlled.    4.  Renal insufficiency:    Creatinine is 1.2  I am pleased with that.  Continue current therapy      Electronically signed by Christo Murphy MD, 04/15/22, 3:41 PM EDT.      Christo Murphy MD  04/15/22  15:39 EDT

## 2022-04-15 NOTE — PLAN OF CARE
Goal Outcome Evaluation:  Plan of Care Reviewed With: patient        Progress: improving  Outcome Evaluation: Pt resting in bed throughout shift.  No issues or complaints at this time.   Pt to start Entresto and has med voucher/coupon at bedside.   Pt dcp is home w/spouse.

## 2022-04-15 NOTE — PLAN OF CARE
Problem: Fall Injury Risk  Goal: Absence of Fall and Fall-Related Injury  Outcome: Met     Problem: Adult Inpatient Plan of Care  Goal: Plan of Care Review  Outcome: Met  Goal: Patient-Specific Goal (Individualized)  Outcome: Met  Goal: Absence of Hospital-Acquired Illness or Injury  Outcome: Met  Goal: Optimal Comfort and Wellbeing  Outcome: Met  Goal: Readiness for Transition of Care  Outcome: Met     Problem: Skin Injury Risk Increased  Goal: Skin Health and Integrity  Outcome: Met   Goal Outcome Evaluation:

## 2022-04-15 NOTE — CASE MANAGEMENT/SOCIAL WORK
Case Management Discharge Note      Final Note: Home         Selected Continued Care - Discharged on 4/15/2022 Admission date: 4/11/2022 - Discharge disposition: Home-Health Care c                  Transportation Services  Private: Car    Final Discharge Disposition Code: 01 - home or self-care

## 2022-04-16 LAB
BACTERIA SPEC AEROBE CULT: NORMAL
BACTERIA SPEC AEROBE CULT: NORMAL

## 2022-04-16 NOTE — OUTREACH NOTE
Prep Survey    Flowsheet Row Responses   [facility] facility patient discharged from? [facility]   Is LACE score < 7 ? No   Emergency Room discharge w/ pulse ox? No   Eligibility TCM   Hospital [facility]   Date of Admission 04/11/22   Date of Discharge 04/15/22   Discharge Disposition Home-Health Care Svc   Discharge diagnosis Chest pain-heart cath this visit   Does the patient have one of the following disease processes/diagnoses(primary or secondary)? Other   Does the patient have Home health ordered? No   Is there a DME ordered? No   Prep survey completed? Yes          MARSHAL MCGRAW - Registered Nurse

## 2022-04-17 LAB
BH CV REST NUCLEAR ISOTOPE DOSE: 7.6 MCI
MAXIMAL PREDICTED HEART RATE: 157 BPM
QT INTERVAL: 362 MS
STRESS TARGET HR: 133 BPM

## 2022-04-18 ENCOUNTER — TRANSITIONAL CARE MANAGEMENT TELEPHONE ENCOUNTER (OUTPATIENT)
Dept: CALL CENTER | Facility: HOSPITAL | Age: 62
End: 2022-04-18

## 2022-04-18 ENCOUNTER — PATIENT OUTREACH (OUTPATIENT)
Dept: CASE MANAGEMENT | Facility: OTHER | Age: 62
End: 2022-04-18

## 2022-04-18 ENCOUNTER — TRANSCRIBE ORDERS (OUTPATIENT)
Dept: CARDIAC REHAB | Facility: HOSPITAL | Age: 62
End: 2022-04-18

## 2022-04-18 DIAGNOSIS — I50.23 ACUTE ON CHRONIC SYSTOLIC CHF (CONGESTIVE HEART FAILURE): Primary | ICD-10-CM

## 2022-04-18 DIAGNOSIS — I50.22 CHRONIC SYSTOLIC HEART FAILURE: Primary | ICD-10-CM

## 2022-04-18 NOTE — OUTREACH NOTE
Call Center TCM Note    Flowsheet Row Responses   [facility] patient discharged from? [facility]   Does the patient have one of the following disease processes/diagnoses(primary or secondary)? Other   TCM attempt successful? Yes   Call start time 1540   Call end time 1542   Discharge diagnosis Chest pain-heart cath this visit   Meds reviewed with patient/caregiver? Yes   Is the patient having any side effects they believe may be caused by any medication additions or changes? No   Does the patient have all medications ordered at discharge? Yes   Is the patient taking all medications as directed (includes completed medication regime)? Yes   Does the patient have a primary care provider?  Yes   Does the patient have an appointment with their PCP within 7 days of discharge? Greater than 7 days   Comments regarding PCP HOSP DC FU appt 4/26/22 @ 3:30 pm.    What is preventing the patient from scheduling follow up appointments within 7 days of discharge? Earlier appointment not available   Nursing Interventions Verified appointment date/time/provider   Has the patient kept scheduled appointments due by today? N/A   Has home health visited the patient within 72 hours of discharge? N/A   Psychosocial issues? No   Did the patient receive a copy of their discharge instructions? Yes   Nursing interventions Reviewed instructions with patient   What is the patient's perception of their health status since discharge? Improving   Is the patient/caregiver able to teach back signs and symptoms related to disease process for when to call PCP? Yes   Is the patient/caregiver able to teach back signs and symptoms related to disease process for when to call 911? Yes   Is the patient/caregiver able to teach back the hierarchy of who to call/visit for symptoms/problems? PCP, Specialist, Home health nurse, Urgent Care, ED, 911 Yes   If the patient is a current smoker, are they able to teach back resources for cessation? Not a smoker   TCM  call completed? Yes   Wrap up additional comments Pt reports she is improving. No s/s of infection at cath site.           Bekah Melo RN    4/18/2022, 15:43 EDT

## 2022-04-18 NOTE — OUTREACH NOTE
Call Center TCM Note    Flowsheet Row Responses   [facility] patient discharged from? [facility]   Does the patient have one of the following disease processes/diagnoses(primary or secondary)? Other   TCM attempt successful? No   Unsuccessful attempts Attempt 1   Does the patient have a primary care provider?  Yes   Does the patient have an appointment with their PCP within 7 days of discharge? Greater than 7 days   Comments regarding PCP HOSP DC FU appt 4/26/22 @ 3:30 pm.    What is preventing the patient from scheduling follow up appointments within 7 days of discharge? Earlier appointment not available   Nursing Interventions Verified appointment date/time/provider   Has the patient kept scheduled appointments due by today? N/A          Bekah Melo RN    4/18/2022, 13:53 EDT

## 2022-04-18 NOTE — OUTREACH NOTE
AMBULATORY CASE MANAGEMENT NOTE    Name and Relationship of Patient/Support Person: Susy Hanson - Self      Education Documentation  Self-Care, taught by Ne Rich RN at 4/18/2022  4:48 PM.  Learner: Patient  Readiness: Acceptance  Method: Explanation  Response: Verbalizes Understanding    Rehabilitation Therapy, taught by Ne Rich RN at 4/18/2022  4:48 PM.  Learner: Patient  Readiness: Acceptance  Method: Explanation  Response: Verbalizes Understanding    Signs/Symptoms, taught by Ne Rich RN at 4/18/2022  4:48 PM.  Learner: Patient  Readiness: Acceptance  Method: Explanation  Response: Verbalizes Understanding    Resources for Support, taught by Ne Rich RN at 4/18/2022  4:48 PM.  Learner: Patient  Readiness: Acceptance  Method: Explanation  Response: Verbalizes Understanding    Coping Strategies, taught by Ne Rich RN at 4/18/2022  4:48 PM.  Learner: Patient  Readiness: Acceptance  Method: Explanation  Response: Verbalizes Understanding        Adult Patient Profile  Questions/Answers    Flowsheet Row Most Recent Value   Symptoms/Conditions Managed at Home diabetes, type 2, cancer, cardiovascular   Barriers to Managing Health none   Cancer Symptoms/Conditions breast   Cancer Management Strategies other (see comments)  [bilat mastectomy with reconstructive surgery]   Cancer Self-Management Outcome 4 (good)   Cardiovascular Symptoms/Conditions hypertension, heart failure   Cardiovascular Management Strategies diet modification, medication therapy, fluid modification, weight management   Cardiovascular Self-Management Outcome 3 (uncertain)   Diabetes Management Strategies blood glucose testing, diet modification, exercise, insulin therapy, medication therapy   Frequency of Blood Glucose Testing before meals and at bedtime   A1C Target below 8   Last A1C Result 6.8   Diabetes Self-Management Outcome 4 (good)   Identifying Health Goals managing stress, anxiety or  depression   How to be Addressed Susy   How Well Do You Speak English? very well   Source of Information patient        Adult Wellness Assessment  Questions/Answers    Flowsheet Row Most Recent Value   Help with Reading Health-Related Information never   Problems Learning about Medical Condition never   Confidence in Filling Out Forms by Self always   Bothered by Nervous, Anxious Feelings Last Two Weeks nearly every day   Not Able to Stop Worry Last Two Weeks nearly every day      Patient Outreach    New engagement ana for recent discharge from hospital for new onset CHF. Patient with history of DM and breast cancer DCIS dx 2021 with bilateral  mastectomy and reconstructive surgery. Introduced self, explained ECM RN role and provided contact information. Education provided for CM  program. See care plans for notes related to illness. Patient has appts with PCP, nephrologist and awaiting cardio call back. Plans to participate in cardiac rehab. Noted discharge summary stated patient discharge with home health, however patient declines needing HHC. Patient able to take BP, blood sugar, weigh self and monitor oxygen sat at home. Has good family support.  Repeat Blood draw scheduled for Wednesday. Patient works from home.   No issue with social determinants,denies food, medication, transportation, or financial insecurities. No questions or concerns per pt at this time. Advised pt to call RN-ECM with any new needs. Agrees to follow up outreach.         ELBA DINERO  Ambulatory Case Management    4/18/2022, 16:49 EDT

## 2022-04-19 ENCOUNTER — TELEPHONE (OUTPATIENT)
Dept: FAMILY MEDICINE CLINIC | Facility: CLINIC | Age: 62
End: 2022-04-19

## 2022-04-19 RX ORDER — ESCITALOPRAM OXALATE 10 MG/1
10 TABLET ORAL DAILY
Qty: 90 TABLET | Refills: 3 | Status: SHIPPED | OUTPATIENT
Start: 2022-04-19 | End: 2022-07-26

## 2022-04-19 NOTE — TELEPHONE ENCOUNTER
----- Message from Amy Cook MA sent at 4/19/2022  7:19 AM EDT -----  Regarding: FW: Anxiety    ----- Message -----  From: Susy Hanson  Sent: 4/18/2022   7:55 PM EDT  To: [facility]ck[facility]  Subject: Anxiety                                          I was on Celexa probably 7 or 8 years ago and didn't have any issues with it until it was changed and for the life of me I can't remember if it was Lexapro or something else.   I am really trying to stay relaxed and have completely changed my diet.  I am eating Heart Healthy.  My daughter literally took everything from the fridge and cabinets out of the house.  Went and replaced with all healthy foods.  Some to the extreme like Goat Milk Yogurt!  It is disgusting! :) LOL   I really never have caffeine so that is definitely not an issue.  I will be willing to try what you think is the best.  I probably should have gotten on something before now.  Especially after the past couple of years.   If you want to send an order in I will actually be at [facility] Wednesday for blood work for the kidney doctor.      Thanks for the response and I will see you next Tuesday.

## 2022-04-20 ENCOUNTER — LAB (OUTPATIENT)
Dept: LAB | Facility: HOSPITAL | Age: 62
End: 2022-04-20

## 2022-04-20 ENCOUNTER — TRANSCRIBE ORDERS (OUTPATIENT)
Dept: ADMINISTRATIVE | Facility: HOSPITAL | Age: 62
End: 2022-04-20

## 2022-04-20 DIAGNOSIS — N18.30 STAGE 3 CHRONIC KIDNEY DISEASE, UNSPECIFIED WHETHER STAGE 3A OR 3B CKD: Primary | ICD-10-CM

## 2022-04-20 DIAGNOSIS — N18.30 STAGE 3 CHRONIC KIDNEY DISEASE, UNSPECIFIED WHETHER STAGE 3A OR 3B CKD: ICD-10-CM

## 2022-04-20 LAB
ANION GAP SERPL CALCULATED.3IONS-SCNC: 16.7 MMOL/L (ref 5–15)
BUN SERPL-MCNC: 29 MG/DL (ref 8–23)
BUN/CREAT SERPL: 21.8 (ref 7–25)
CALCIUM SPEC-SCNC: 10.2 MG/DL (ref 8.6–10.5)
CHLORIDE SERPL-SCNC: 97 MMOL/L (ref 98–107)
CO2 SERPL-SCNC: 22.3 MMOL/L (ref 22–29)
CREAT SERPL-MCNC: 1.33 MG/DL (ref 0.57–1)
EGFRCR SERPLBLD CKD-EPI 2021: 45 ML/MIN/1.73
GLUCOSE SERPL-MCNC: 156 MG/DL (ref 65–99)
POTASSIUM SERPL-SCNC: 4.1 MMOL/L (ref 3.5–5.2)
SODIUM SERPL-SCNC: 136 MMOL/L (ref 136–145)

## 2022-04-20 PROCEDURE — 36415 COLL VENOUS BLD VENIPUNCTURE: CPT

## 2022-04-20 PROCEDURE — 80048 BASIC METABOLIC PNL TOTAL CA: CPT

## 2022-04-21 ENCOUNTER — TELEPHONE (OUTPATIENT)
Dept: CARDIOLOGY | Facility: CLINIC | Age: 62
End: 2022-04-21

## 2022-04-21 NOTE — TELEPHONE ENCOUNTER
She will continue both medications  Is the nausea and vomiting continues she will schedule an appointment

## 2022-04-21 NOTE — TELEPHONE ENCOUNTER
She was put on Entresto and Bumex while in the hospital  She has been having nausea and vomiting 3 or 4 times a day She thinks its the Bumex  She isn't going to take it today and see if that helps  Do you think that it could be the Bumex

## 2022-04-23 LAB — QT INTERVAL: 478 MS

## 2022-04-25 ENCOUNTER — TELEPHONE (OUTPATIENT)
Dept: CARDIAC REHAB | Facility: HOSPITAL | Age: 62
End: 2022-04-25

## 2022-04-26 ENCOUNTER — OFFICE VISIT (OUTPATIENT)
Dept: FAMILY MEDICINE CLINIC | Facility: CLINIC | Age: 62
End: 2022-04-26

## 2022-04-26 ENCOUNTER — READMISSION MANAGEMENT (OUTPATIENT)
Dept: CALL CENTER | Facility: HOSPITAL | Age: 62
End: 2022-04-26

## 2022-04-26 VITALS
DIASTOLIC BLOOD PRESSURE: 79 MMHG | SYSTOLIC BLOOD PRESSURE: 126 MMHG | WEIGHT: 164 LBS | BODY MASS INDEX: 28 KG/M2 | TEMPERATURE: 97.8 F | HEIGHT: 64 IN | HEART RATE: 70 BPM | OXYGEN SATURATION: 98 %

## 2022-04-26 DIAGNOSIS — E11.49 TYPE 2 DIABETES MELLITUS WITH OTHER DIABETIC NEUROLOGICAL COMPLICATION: ICD-10-CM

## 2022-04-26 DIAGNOSIS — I10 ESSENTIAL HYPERTENSION: ICD-10-CM

## 2022-04-26 DIAGNOSIS — I50.21 ACUTE SYSTOLIC HEART FAILURE: Primary | ICD-10-CM

## 2022-04-26 DIAGNOSIS — N17.9 ACUTE KIDNEY INJURY: ICD-10-CM

## 2022-04-26 PROCEDURE — 99495 TRANSJ CARE MGMT MOD F2F 14D: CPT | Performed by: FAMILY MEDICINE

## 2022-04-26 NOTE — OUTREACH NOTE
"Medical Week 2 Survey    Flowsheet Row Responses   [facility] patient discharged from? [facility]   Does the patient have one of the following disease processes/diagnoses(primary or secondary)? Other   Week 2 attempt successful? Yes   Call start time 1053   Discharge diagnosis Chest pain-heart cath this visit   Call end time 1109   Meds reviewed with patient/caregiver? Yes   Is the patient having any side effects they believe may be caused by any medication additions or changes? Yes   Side effects comments  States she has been nauseated and vomitting she believes are from the meds.  The vomitting has stopped but the nausea has continued.    Does the patient have all medications ordered at discharge? Yes   Is the patient taking all medications as directed (includes completed medication regime)? Yes   Does the patient have a primary care provider?  Yes   Does the patient have an appointment with their PCP within 7 days of discharge? Greater than 7 days   Has the patient kept scheduled appointments due by today? N/A   Comments Has appt today with PCP, Nephrology Wed and cardiology on Thurs.   Psychosocial issues? No   Did the patient receive a copy of their discharge instructions? Yes   Nursing interventions Reviewed instructions with patient   What is the patient's perception of their health status since discharge? New symptoms unrelated to diagnosis   Is the patient/caregiver able to teach back signs and symptoms related to disease process for when to call PCP? Yes   Is the patient/caregiver able to teach back signs and symptoms related to disease process for when to call 911? Yes   Is the patient/caregiver able to teach back the hierarchy of who to call/visit for symptoms/problems? PCP, Specialist, Home health nurse, Urgent Care, ED, 911 Yes   If the patient is a current smoker, are they able to teach back resources for cessation? Not a smoker   Additional teach back comments States she is doing \"ok\".  Was having " vomitting several times a day but has not had any for a few days.  Still continues to be nauseated.  Talked about trying peppermint or angie to see if this helps.  Will discuss ld with . BP has been low at times 85/46 and 82/47 on 4/21 then on 4/22 was up some at 118/66.  Today bp is 136/77.  She will discuss perameters for holding bp meds with cardiology and take bp log to appts.  She has also had several days of dizziness and being light headed.    Week 2 Call Completed? Yes   Wrap up additional comments Encouraged pt to write all questions down for providers to make sure to get addressed at her appts this week.          LIANNE ACKERMAN - Licensed Nurse

## 2022-04-26 NOTE — PROGRESS NOTES
Transitional Care Follow Up Visit  Subjective     Susy Hanson is a 61 y.o. female who presents for a transitional care management visit.    Within 48 business hours after discharge our office contacted her via telephone to coordinate her care and needs.      I reviewed and discussed the details of that call along with the discharge summary, hospital problems, inpatient lab results, inpatient diagnostic studies, and consultation reports with Susy.     Current outpatient and discharge medications have been reconciled for the patient.  Reviewed by: Erica Avila MD      Date of TCM Phone Call 4/15/2022   [facility]   Date of Admission 4/11/2022   Date of Discharge 4/15/2022   Discharge Disposition Home-Health Care [facility]     Risk for Readmission (LACE) Score: 8 (4/15/2022  6:01 AM)      Here for follow up after recent admission to [facility]  Some nausea that she thinks relates to the bumex  Hypotension over the weekend but this has resolved   Tires easily  Sees DR. Murphy on Thurs  Sees renal tomorrow  Starts cardiac rehab tomorrow  Uncertain whether she wants to continue the lexapro  She says she has so many new meds she wants to concentrate on them and their side effects     Course During Hospital Stay:  presented to [facility] on 4/11/2022 complaining of sudden shortness of air on exertion today about 2 pm. She reports she was here at a vocational meeting in the dining room and afterwards walked to the pharmacy in front of the hospital and became very short of breath.  She reports she had to stop to catch her breath.  She reported being slightly lightheaded she reports some sternal chest pressure.  She denies any nausea vomiting fever or dizziness.  She reports she has had a mild nonproductive cough for the past few days.  She reports she left here because she had an appointment for a massage and had difficulty laying flat due to shortness of air wheezing.  She then went to an urgent care and  they suggested she come to the emergency department for reported sinus tachycardia.  She has a past medical history of breast cancer status post mastectomy and no chemotherapy, anxiety, difficult to control hypertension.  Reports she used to smoke quite heavily for 30 years and quit 14 years ago and has not been diagnosed with COPD but suspect she will eventually get it.  She is not on home oxygen other than CPAP at night.  Denies past medical history coronary artery disease.  Review of records shows a negative stress test in 2020 and last 2D echo was in 2020 which showed normal systolic function mild ventricular diastolic dysfunction grade 1.  Insulin-dependent diabetes mellitus type 2 and her serum glucose today was 149.  She reports to becoming easily anxious.  Labs show a WBC of 13.4, a proBNP of 1429, troponin less than 0.010.  BP was elevated on admission.  Creatinine is 1.2.  EKG image below regular rhythm with an elevated heart rate.  She was given 80 mg IV Lasix in ED, 2.5 mg IV Lopressor.  Chest x-ray per radiology today:     The following portions of the patient's history were reviewed and updated as appropriate: allergies, current medications, past family history, past medical history, past social history, past surgical history and problem list.    Review of Systems   Constitutional: Positive for fatigue.   Respiratory: Positive for shortness of breath. Negative for cough, chest tightness and wheezing.    Cardiovascular: Negative.    Gastrointestinal: Positive for nausea.   Endocrine: Negative.    Neurological: Negative for dizziness, syncope, light-headedness and headaches.       Objective   Physical Exam  Vitals and nursing note reviewed.   Constitutional:       General: She is not in acute distress.     Appearance: Normal appearance. She is well-developed, well-groomed and overweight.   HENT:      Head: Normocephalic and atraumatic.   Neck:      Thyroid: No thyromegaly.      Vascular: No JVD.    Cardiovascular:      Rate and Rhythm: Normal rate and regular rhythm.      Heart sounds: Normal heart sounds. No murmur heard.    No friction rub. No gallop.   Pulmonary:      Effort: Pulmonary effort is normal. No respiratory distress.      Breath sounds: Normal breath sounds. No wheezing or rales.   Musculoskeletal:      Cervical back: Neck supple.      Right lower leg: No edema.      Left lower leg: No edema.   Lymphadenopathy:      Cervical: No cervical adenopathy.   Skin:     General: Skin is warm and dry.   Neurological:      Mental Status: She is alert.   Psychiatric:         Mood and Affect: Affect is flat.         Behavior: Behavior is cooperative.       Lab Results   Component Value Date    GLUCOSE 156 (H) 04/20/2022    BUN 29 (H) 04/20/2022    CREATININE 1.33 (H) 04/20/2022    EGFRIFNONA 57 (L) 01/26/2022    EGFRIFAFRI >60 10/13/2017    BCR 21.8 04/20/2022    K 4.1 04/20/2022    CO2 22.3 04/20/2022    CALCIUM 10.2 04/20/2022    ALBUMIN 4.30 04/15/2022    AST 15 04/15/2022    ALT 25 04/15/2022     Lab Results   Component Value Date    WBC 5.00 04/15/2022    HGB 11.2 (L) 04/15/2022    HCT 32.7 (L) 04/15/2022    MCV 84.2 04/15/2022     04/15/2022     Lab Results   Component Value Date    TSH 1.770 01/26/2022     Lab Results   Component Value Date    CHOL 136 04/14/2022    TRIG 183 (H) 04/14/2022    HDL 39 (L) 04/14/2022    LDL 66 04/14/2022       DATE OF PROCEDURE:  4/13/2022     INDICATION FOR PROCEDURE:     Congestive heart failure acute systolic  Dilated cardiomyopathy     PROCEDURE PERFORMED:     Left heart catheterization  coronary angiography  left ventriculography     PROCEDURE COMMENTS:      After informed consent was obtained, the patient was prepped and draped in the usual sterile manner.  Mild to moderate sedation was administered.  Right femoral artery was accessed without difficulty and 6 Frisian arterial sheath was inserted.  Sheath was flushed with heparinized saline.     Using 6  Japanese Roberto catheters, first left coronary artery and the right coronary was electively engaged and appropriate views were taken.  A 6 Japanese JR4 catheter was used to cross aortic valve and left heart catheterization was performed.  Left ventriculography was done NAQVI view     Patient tolerated the procedure well.     FINDINGS:     1. HEMODYNAMICS:       Aortic pressure: 167/101 mmHg     LVEDP: 10 to 15 mmHg     Gradient across aortic valve on pullback: No gradient across aortic valve        2. LEFT VENTRICULOGRAPHY: 35 to 40%        3. CORONARY ANGIOGRAPHY:            A: Left main coronary artery: Normal            B: Left anterior descending artery: 25% plaque in proximal and mid segment.  No high-grade stenosis            C: Left circumflex coronary artery: Mild diffuse disease in the midsegment but no high-grade stenosis            D: Right coronary artery: 25% plaque in proximal and mid segment of RCA but no high-grade stenosis.  It is dominant vessel     SUMMARY:      Mild coronary artery disease  Left ventricular dysfunction     RECOMMENDATIONS:      Medical treatment.  Susy Hanson  Echo Complete w/Doppler and Color Flow  Order# [id number]  Reading physician: Christo Murphy MD Ordering physician: Sabina Bird APRN Study date: 22       Patient Information    Patient Name   Susy Hanson MRN   [id number] Legal Sex   Female  (Age)   1960 (61 y.o.)         PACS Images     Show images for Adult Transthoracic Echo Complete W/ Cont if Necessary Per Protocol   Sedation Narrator Report    Sedation Narrator Report          Interpretation Summary    · Left ventricular systolic function is severely decreased.  · Left ventricular ejection fraction is 25 to 30%  · Akinetic septum and mid to distal inferior wall is noted  · Left ventricular diastolic function was normal.  · The right ventricular cavity is small in size.    Doris Pace on 2022  7:24 PM   CHF/COPD protocol; presents  w/ high O2   Hx of Bilateral mastectomy in 2021   Former smoker   HTN on meds   Diabetes, no insulin pump     Xray Tech: KNG/CDT(student)               Appointment Information      PACS Images     Radiology Images    Study Result    Narrative & Impression   XR CHEST 2 VW-     Date of Exam: 4/11/2022 7:15 PM     Indication: CHF/COPD Protocol.     Comparison: AP chest x-ray 10/20/2012.     Technique: Two views of the chest were obtained.     FINDINGS: There are new interstitial and airspace opacities in both mid  to lower lungs. No pneumothorax or large pleural effusion is seen. Heart  size is not enlarged. There are surgical clips over both sides of the  chest.       IMPRESSION:  New interstitial and airspace opacities in both mid to lower lungs,  likely due to pulmonary edema or perhaps pneumonia.        Electronically Signed By-Court Sheikh MD On:4/11/2022 7:29 PM  This report was finalized on [id number] by  Court Sheikh MD.     DATE OF EXAM:  4/11/2022 9:04 PM     PROCEDURE:  CT ANGIOGRAM CHEST PULMONARY EMBOLISM-     INDICATIONS:   PE suspected, high prob     COMPARISON:   2 view chest x-ray 04/11/2022. No previous chest CT for comparison.     TECHNIQUE:  Routine transaxial slices were obtained through chest after  administration of intravenous 100 ml of Isovue 370. Reconstructed  coronal and sagittal images were also obtained. Automated exposure  control and iterative reconstruction methods were used.      FINDINGS:  No pulmonary arterial filling defects are seen to suggest pulmonary  emboli. Main pulmonary artery is nondilated. Heart size is mildly  enlarged. There is no pericardial effusion. There are trace bilateral  pleural effusions. There is interlobular septal thickening in both  lungs, greatest in the lung bases. There are also patchy opacities in  the lower lobes, right greater than left. There are postoperative  changes of bilateral mastectomy with implant reconstruction. No  axillary  adenopathy is seen. Mildly prominent right hilar lymph nodes may be  reactive. Partially included solid organs of the upper abdomen appear  within normal limits for the phase of contrast-enhancement. No acute  osseous abnormality is identified.      IMPRESSION:  1.No evidence of pulmonary emboli.  2.Trace bilateral pleural effusions.  3.Bilateral interlobular septal thickening, suggestive of pulmonary  edema.  4.Patchy opacities in the lower lobes, right greater than left, likely  due to partial atelectasis and less likely pneumonia.  5.Mild cardiomegaly.     Electronically Signed By-Court Sheikh MD On:4/11/2022 9:37 PM  This report was finalized on [id number] by  Court Sheikh MD.    Assessment/Plan   Problems Addressed this Visit        Cardiac and Vasculature    Essential hypertension    Acute systolic heart failure (CMS/HCC) - Primary       Endocrine and Metabolic    Type 2 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 (HCC)       Genitourinary and Reproductive     Acute kidney injury (HCC)      Diagnoses       Codes Comments    Acute systolic heart failure (CMS/HCC)    -  Primary ICD-10-CM: I50.21  ICD-9-CM: 428.21     Essential hypertension     ICD-10-CM: I10  ICD-9-CM: 401.9     Type 2 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 (HCC)     ICD-10-CM: E11.49  ICD-9-CM: 250.60     Acute kidney injury (HCC)     ICD-10-CM: N17.9  ICD-9-CM: 584.9           Blood pressure stable and she will continue her current medications  Since the diagnosis of worsening congestive heart failure, she has made some significant dietary changes.  She has follow-up with cardiology on Thursday and starts cardiac rehab tomorrow  She has an appointment with her nephrologist tomorrow to follow-up on her acute kidney injury  She is trying to be more compliant with her diabetes  Lexapro had been added to help with some depression and anxiety but she is having nausea and is struggling  to figure out if it is related to that or to her other medications, so she has decided to hold off on continuing it for now

## 2022-04-27 ENCOUNTER — APPOINTMENT (OUTPATIENT)
Dept: CARDIAC REHAB | Facility: HOSPITAL | Age: 62
End: 2022-04-27

## 2022-04-28 ENCOUNTER — OFFICE VISIT (OUTPATIENT)
Dept: CARDIOLOGY | Facility: CLINIC | Age: 62
End: 2022-04-28

## 2022-04-28 VITALS
SYSTOLIC BLOOD PRESSURE: 130 MMHG | HEIGHT: 64 IN | OXYGEN SATURATION: 98 % | HEART RATE: 83 BPM | WEIGHT: 158 LBS | DIASTOLIC BLOOD PRESSURE: 72 MMHG | BODY MASS INDEX: 26.98 KG/M2

## 2022-04-28 DIAGNOSIS — E78.2 MIXED HYPERLIPIDEMIA: ICD-10-CM

## 2022-04-28 DIAGNOSIS — E11.49 TYPE 2 DIABETES MELLITUS WITH OTHER DIABETIC NEUROLOGICAL COMPLICATION: ICD-10-CM

## 2022-04-28 DIAGNOSIS — I10 ESSENTIAL HYPERTENSION: ICD-10-CM

## 2022-04-28 DIAGNOSIS — I50.21 ACUTE SYSTOLIC HEART FAILURE: Primary | ICD-10-CM

## 2022-04-28 PROCEDURE — 99214 OFFICE O/P EST MOD 30 MIN: CPT | Performed by: INTERNAL MEDICINE

## 2022-04-28 RX ORDER — BUMETANIDE 2 MG/1
1 TABLET ORAL DAILY
Qty: 30 TABLET | Refills: 0 | Status: SHIPPED | OUTPATIENT
Start: 2022-04-28 | End: 2022-05-05 | Stop reason: SDUPTHER

## 2022-04-28 NOTE — PROGRESS NOTES
Date of Office Visit: 2022  Encounter Provider: Dr. Christo Murphy  Place of Service[facility]  Patient Name: Susy Hanson  :1960  Erica Avila MD    Chief Complaint   Patient presents with   • Hypertension   • Hyperlipidemia   • Diabetes   • Follow-up     History of Present Illness    I am pleased see Mrs. Hanson in my office today as a follow-up    As you know, patient is a 61-year-old white female whose past medical history is significant for hypertension, diabetes mellitus, who came today for follow-up.    Patient has been hypertension from last 10 years.  Patient is also diabetic.  Her blood pressure was fairly well controlled but from last few months her blood pressure is running high.  In 2020, patient was admitted in the hospital with blood pressure greater than 200.  She underwent stress test which was negative for ischemia.  EKG showed left bundle branch block pattern.  MRI of the brain was unremarkable.  CT angiography to rule out renal artery stenosis was done and it was negative.  Patient antihypertensive regimen was adjusted and was discharged.    In 2021, patient was admitted at [facility] with symptom of shortness of breath and accelerated hypertension.  She was noted to be in congestive heart failure.  Echocardiogram showed ejection fraction of 25%.  Patient underwent cardiac catheterization and no significant coronary artery disease was noted.  Patient was started on Entresto and Toprol-XL.  She was discharged home on Bumex 2 mg daily.    Since the discharge, patient is feeling better.  She is in euvolemic status.  Patient denies any shortness of breath or chest pain.  Blood pressures are much better.  Patient denies any orthopnea or PND.  No leg edema noted.  No palpitation.    At this stage I would recommend to decrease Bumex to 1 mg daily.  Toprol-XL has been decreased to 100 mg daily instead of 300.  Continue  Entresto.  Repeat echocardiogram in 90 days on current therapy      Past Medical History:   Diagnosis Date   • Anxiety 2000   • Breast cancer (HCC)     LEFT BREAST 7/2021   • Depression    • DM (diabetes mellitus) (HCC)    • Hyperlipidemia    • Hypertension     SAW CARDIO/ STRESS TEST 2020 - NOW MEDS MANAGED BY PCP    • Irritable bowel syndrome 2006   • Peripheral neuropathy    • PONV (postoperative nausea and vomiting)    • Sleep apnea     WEARS CPAP         Past Surgical History:   Procedure Laterality Date   • BLADDER REPAIR     • BREAST BIOPSY     • BREAST RECONSTRUCTION Bilateral 08/26/2021    Procedure: BILATERAL PREPECTORALPLACMENT TISSUE EXPANDERS AND ALLODERM;  Surgeon: Heri Arreaga MD;  Location: [facility];  Service: Plastics;  Laterality: Bilateral;   • BREAST TISSUE EXPANDER REMOVAL INSERTION OF IMPLANT Bilateral 12/23/2021    Procedure: BILATERAL REMOVAL TISSUE EXPANDERS AND PLACEMENT OF IMPLANTS;  Surgeon: Heri Arreaga MD;  Location: [facility];  Service: Plastics;  Laterality: Bilateral;   • CARDIAC CATHETERIZATION Left 04/13/2022    Procedure: Cardiac Catheterization/Vascular Study;  Surgeon: Christo Murphy MD;  Location: [facility] INVASIVE LOCATION;  Service: Cardiovascular;  Laterality: Left;   • CARDIOVASCULAR STRESS TEST  2020   • HYSTERECTOMY     • LYMPH NODE BIOPSY  8/29/2021   • MASTECTOMY W/ SENTINEL NODE BIOPSY Bilateral 08/26/2021    Procedure: bilateral total mastectomy, left axillary sentinel lymph node biopsy, possible reconstruction.;  Surgeon: Asia Perkins MD;  Location: [facility];  Service: General;  Laterality: Bilateral;   • TUBAL ABDOMINAL LIGATION  1981           Current Outpatient Medications:   •  acetaminophen (TYLENOL) 325 MG tablet, Take 1 tablet by mouth Every 4 (Four) Hours As Needed for Mild Pain ., Disp: 30 tablet, Rfl: 1  •  ALPRAZolam (XANAX) 1 MG tablet, Take 1 tablet by mouth 2 (Two) Times a Day As Needed for Anxiety or  Sleep., Disp: 60 tablet, Rfl: 0  •  atorvastatin (LIPITOR) 20 MG tablet, Take 1 tablet by mouth Daily., Disp: 90 tablet, Rfl: 2  •  bumetanide (BUMEX) 2 MG tablet, Take 0.5 tablets by mouth Daily., Disp: 30 tablet, Rfl: 0  •  escitalopram (Lexapro) 10 MG tablet, Take 1 tablet by mouth Daily., Disp: 90 tablet, Rfl: 3  •  Insulin NPH Isophane & Regular (NovoLIN 70/30 FlexPen) (70-30) 100 UNIT/ML suspension pen-injector, inject 24 units in the morning and 28 units with dinner (Patient taking differently: inject 24 units in the morning and 28 units with dinner), Disp: 45 mL, Rfl: 1  •  metFORMIN (GLUCOPHAGE) 1000 MG tablet, Take one tablet by mouth with breakfast and 1 1/2 tablets with supper (Patient taking differently: Take one tablet by mouth with breakfast and 1 1/2 tablets with supper), Disp: 225 tablet, Rfl: 1  •  metoprolol succinate XL (TOPROL-XL) 100 MG 24 hr tablet, take 1 tablet by oral route  every day, Disp: 90 tablet, Rfl: 3  •  sacubitril-valsartan (ENTRESTO) 24-26 MG tablet, Take 1 tablet by mouth Every 12 (Twelve) Hours., Disp: 60 tablet, Rfl: 0      Social History     Socioeconomic History   • Marital status:    Tobacco Use   • Smoking status: Former Smoker     Packs/day: 2.00     Years: 30.00     Pack years: 60.00     Types: Cigarettes     Start date: 1972     Quit date: 2008     Years since quittin.3   • Smokeless tobacco: Never Used   Vaping Use   • Vaping Use: Never used   Substance and Sexual Activity   • Alcohol use: No   • Drug use: No   • Sexual activity: Yes     Partners: Male     Birth control/protection: None         Review of Systems   Constitutional: Negative for chills and fever.   HENT: Negative for ear discharge and nosebleeds.    Eyes: Negative for discharge and redness.   Cardiovascular: Negative for chest pain, orthopnea, palpitations, paroxysmal nocturnal dyspnea and syncope.   Respiratory: Positive for shortness of breath. Negative for cough and wheezing.   "  Endocrine: Negative for heat intolerance.   Skin: Negative for rash.   Musculoskeletal: Negative for arthritis and myalgias.   Gastrointestinal: Negative for abdominal pain, melena, nausea and vomiting.   Genitourinary: Negative for dysuria and hematuria.   Neurological: Negative for dizziness, light-headedness, numbness and tremors.   Psychiatric/Behavioral: Negative for depression. The patient is not nervous/anxious.        Procedures    Procedures    No orders to display           Objective:    /72 (BP Location: Left arm, Cuff Size: Adult)   Pulse 83   Ht 162.6 cm (64.02\")   Wt 71.7 kg (158 lb)   LMP  (LMP Unknown)   SpO2 98%   BMI 27.11 kg/m²         Constitutional:       Appearance: Well-developed.   Eyes:      General: No scleral icterus.        Right eye: No discharge.   HENT:      Head: Normocephalic and atraumatic.   Neck:      Thyroid: No thyromegaly.      Lymphadenopathy: No cervical adenopathy.   Pulmonary:      Effort: Pulmonary effort is normal. No respiratory distress.      Breath sounds: Normal breath sounds. No wheezing. No rales.   Cardiovascular:      Normal rate. Regular rhythm.      No gallop.   Abdominal:      Tenderness: There is no abdominal tenderness.   Skin:     Findings: No erythema or rash.   Neurological:      Mental Status: Alert and oriented to person, place, and time.             Assessment:       Diagnosis Plan   1. Acute systolic heart failure (CMS/HCC)     2. Essential hypertension     3. Type 2 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 (HCC)     4. Mixed hyperlipidemia              Plan:       MDM:    1.  Systolic chronic congestive heart failure:    Patient is in euvolemic status.  I would recommend to decrease Bumex to 1 mg daily.  Repeat echocardiogram in future    2.  Nonischemic dilated cardiomyopathy:    Patient is on Entresto and Toprol-XL.  Repeat echocardiogram in 90 days.  If LVEF is less than 40% patient would be eligible for AICD " for secondary prophylaxis    3.  Hypertension:    Blood pressure is much better on current treatment    4.  Hyperlipidemia:    Patient is on Lipitor.  Recent LDL is 66 which is desirable.

## 2022-05-02 ENCOUNTER — APPOINTMENT (OUTPATIENT)
Dept: CARDIAC REHAB | Facility: HOSPITAL | Age: 62
End: 2022-05-02

## 2022-05-02 DIAGNOSIS — I50.22 CHRONIC SYSTOLIC HEART FAILURE: ICD-10-CM

## 2022-05-03 ENCOUNTER — TELEPHONE (OUTPATIENT)
Dept: CARDIAC REHAB | Facility: HOSPITAL | Age: 62
End: 2022-05-03

## 2022-05-03 NOTE — TELEPHONE ENCOUNTER
No co-pay.  Must pay out of pocket until deductible or out of pocket is met.  Then insurance will pay at 100%.  ~chip

## 2022-05-04 ENCOUNTER — APPOINTMENT (OUTPATIENT)
Dept: CARDIAC REHAB | Facility: HOSPITAL | Age: 62
End: 2022-05-04

## 2022-05-04 ENCOUNTER — READMISSION MANAGEMENT (OUTPATIENT)
Dept: CALL CENTER | Facility: HOSPITAL | Age: 62
End: 2022-05-04

## 2022-05-04 RX ORDER — METOPROLOL SUCCINATE 200 MG/1
200 TABLET, EXTENDED RELEASE ORAL DAILY
Qty: 30 TABLET | Refills: 0 | Status: CANCELLED | OUTPATIENT
Start: 2022-05-04

## 2022-05-04 RX ORDER — BUMETANIDE 2 MG/1
2 TABLET ORAL DAILY
Qty: 30 TABLET | Refills: 0 | Status: CANCELLED | OUTPATIENT
Start: 2022-05-04

## 2022-05-04 RX ORDER — SACUBITRIL AND VALSARTAN 24; 26 MG/1; MG/1
1 TABLET, FILM COATED ORAL EVERY 12 HOURS SCHEDULED
Qty: 60 TABLET | Refills: 0 | Status: CANCELLED | OUTPATIENT
Start: 2022-05-04

## 2022-05-04 NOTE — OUTREACH NOTE
"Medical Week 3 Survey    Flowsheet Row Responses   [facility] patient discharged from? [place]   Does the patient have one of the following disease processes/diagnoses(primary or secondary)? Other   Week 3 attempt successful? Yes   Call start time 1314   Call end time 1316   Discharge diagnosis Chest pain-heart cath this visit   Is the patient taking all medications as directed (includes completed medication regime)? Yes   Does the patient have a primary care provider?  Yes   Has the patient kept scheduled appointments due by today? Yes   What is the patient's perception of their health status since discharge? Improving   Is the patient/caregiver able to teach back signs and symptoms related to disease process for when to call PCP? Yes   Is the patient/caregiver able to teach back signs and symptoms related to disease process for when to call 911? Yes   Is the patient/caregiver able to teach back the hierarchy of who to call/visit for symptoms/problems? PCP, Specialist, Home health nurse, Urgent Care, ED, 911 Yes   If the patient is a current smoker, are they able to teach back resources for cessation? Not a smoker   Additional teach back comments States she is doing \"fine\".  Denies chest pains and has been in cardiac rehab for 2 weeks.     Week 3 Call Completed? Yes   Graduated Yes   Graduated/Revoked comments Denies questions or needs at this time          LIANNE ACKERMAN - Licensed Nurse  "

## 2022-05-05 RX ORDER — BUMETANIDE 2 MG/1
2 TABLET ORAL DAILY
Qty: 30 TABLET | Refills: 0 | Status: CANCELLED | OUTPATIENT
Start: 2022-05-04

## 2022-05-05 RX ORDER — SACUBITRIL AND VALSARTAN 24; 26 MG/1; MG/1
1 TABLET, FILM COATED ORAL EVERY 12 HOURS SCHEDULED
Qty: 60 TABLET | Refills: 0 | Status: CANCELLED | OUTPATIENT
Start: 2022-05-04

## 2022-05-06 RX ORDER — BUMETANIDE 2 MG/1
2 TABLET ORAL DAILY
Qty: 30 TABLET | Refills: 0 | Status: CANCELLED | OUTPATIENT
Start: 2022-05-04

## 2022-05-06 RX ORDER — METOPROLOL SUCCINATE 200 MG/1
200 TABLET, EXTENDED RELEASE ORAL DAILY
Qty: 30 TABLET | Refills: 0 | Status: CANCELLED | OUTPATIENT
Start: 2022-05-04

## 2022-05-06 RX ORDER — SACUBITRIL AND VALSARTAN 24; 26 MG/1; MG/1
1 TABLET, FILM COATED ORAL EVERY 12 HOURS SCHEDULED
Qty: 60 TABLET | Refills: 0 | Status: CANCELLED | OUTPATIENT
Start: 2022-05-04

## 2022-05-06 RX ORDER — BUMETANIDE 1 MG/1
1 TABLET ORAL DAILY
Qty: 30 TABLET | Refills: 0 | Status: SHIPPED | OUTPATIENT
Start: 2022-05-06 | End: 2022-06-09 | Stop reason: SDUPTHER

## 2022-05-09 ENCOUNTER — APPOINTMENT (OUTPATIENT)
Dept: CARDIAC REHAB | Facility: HOSPITAL | Age: 62
End: 2022-05-09

## 2022-05-09 RX ORDER — BUMETANIDE 2 MG/1
2 TABLET ORAL DAILY
Qty: 30 TABLET | Refills: 0 | Status: CANCELLED | OUTPATIENT
Start: 2022-05-04

## 2022-05-09 RX ORDER — SACUBITRIL AND VALSARTAN 24; 26 MG/1; MG/1
1 TABLET, FILM COATED ORAL EVERY 12 HOURS SCHEDULED
Qty: 60 TABLET | Refills: 0 | Status: CANCELLED | OUTPATIENT
Start: 2022-05-04

## 2022-05-09 RX ORDER — METOPROLOL SUCCINATE 200 MG/1
200 TABLET, EXTENDED RELEASE ORAL DAILY
Qty: 30 TABLET | Refills: 0 | Status: CANCELLED | OUTPATIENT
Start: 2022-05-04

## 2022-05-10 RX ORDER — SACUBITRIL AND VALSARTAN 24; 26 MG/1; MG/1
1 TABLET, FILM COATED ORAL EVERY 12 HOURS SCHEDULED
Qty: 60 TABLET | Refills: 0 | Status: SHIPPED | OUTPATIENT
Start: 2022-05-10 | End: 2022-06-09 | Stop reason: SDUPTHER

## 2022-05-11 ENCOUNTER — APPOINTMENT (OUTPATIENT)
Dept: CARDIAC REHAB | Facility: HOSPITAL | Age: 62
End: 2022-05-11

## 2022-05-12 ENCOUNTER — APPOINTMENT (OUTPATIENT)
Dept: CARDIAC REHAB | Facility: HOSPITAL | Age: 62
End: 2022-05-12

## 2022-05-16 ENCOUNTER — APPOINTMENT (OUTPATIENT)
Dept: CARDIAC REHAB | Facility: HOSPITAL | Age: 62
End: 2022-05-16

## 2022-05-17 ENCOUNTER — APPOINTMENT (OUTPATIENT)
Dept: CARDIAC REHAB | Facility: HOSPITAL | Age: 62
End: 2022-05-17

## 2022-05-18 ENCOUNTER — APPOINTMENT (OUTPATIENT)
Dept: CARDIAC REHAB | Facility: HOSPITAL | Age: 62
End: 2022-05-18

## 2022-05-19 ENCOUNTER — APPOINTMENT (OUTPATIENT)
Dept: CARDIAC REHAB | Facility: HOSPITAL | Age: 62
End: 2022-05-19

## 2022-05-23 ENCOUNTER — APPOINTMENT (OUTPATIENT)
Dept: CARDIAC REHAB | Facility: HOSPITAL | Age: 62
End: 2022-05-23

## 2022-05-24 ENCOUNTER — TRANSCRIBE ORDERS (OUTPATIENT)
Dept: ADMINISTRATIVE | Facility: HOSPITAL | Age: 62
End: 2022-05-24

## 2022-05-24 ENCOUNTER — APPOINTMENT (OUTPATIENT)
Dept: CARDIAC REHAB | Facility: HOSPITAL | Age: 62
End: 2022-05-24

## 2022-05-24 ENCOUNTER — LAB (OUTPATIENT)
Dept: LAB | Facility: HOSPITAL | Age: 62
End: 2022-05-24

## 2022-05-24 DIAGNOSIS — N18.30 STAGE 3 CHRONIC KIDNEY DISEASE, UNSPECIFIED WHETHER STAGE 3A OR 3B CKD: Primary | ICD-10-CM

## 2022-05-24 DIAGNOSIS — N18.30 STAGE 3 CHRONIC KIDNEY DISEASE, UNSPECIFIED WHETHER STAGE 3A OR 3B CKD: ICD-10-CM

## 2022-05-24 LAB
ALBUMIN SERPL-MCNC: 4.9 G/DL (ref 3.5–5.2)
ALBUMIN/GLOB SERPL: 2.1 G/DL
ALP SERPL-CCNC: 69 U/L (ref 39–117)
ALT SERPL W P-5'-P-CCNC: 24 U/L (ref 1–33)
ANION GAP SERPL CALCULATED.3IONS-SCNC: 16 MMOL/L (ref 5–15)
AST SERPL-CCNC: 19 U/L (ref 1–32)
BASOPHILS # BLD AUTO: 0.09 10*3/MM3 (ref 0–0.2)
BASOPHILS NFR BLD AUTO: 1.7 % (ref 0–1.5)
BILIRUB SERPL-MCNC: 0.2 MG/DL (ref 0–1.2)
BUN SERPL-MCNC: 20 MG/DL (ref 8–23)
BUN/CREAT SERPL: 17.7 (ref 7–25)
CALCIUM SPEC-SCNC: 10.2 MG/DL (ref 8.6–10.5)
CHLORIDE SERPL-SCNC: 101 MMOL/L (ref 98–107)
CO2 SERPL-SCNC: 23 MMOL/L (ref 22–29)
CREAT SERPL-MCNC: 1.13 MG/DL (ref 0.57–1)
DEPRECATED RDW RBC AUTO: 45.6 FL (ref 37–54)
EGFRCR SERPLBLD CKD-EPI 2021: 54.8 ML/MIN/1.73
EOSINOPHIL # BLD AUTO: 0.35 10*3/MM3 (ref 0–0.4)
EOSINOPHIL NFR BLD AUTO: 6.6 % (ref 0.3–6.2)
ERYTHROCYTE [DISTWIDTH] IN BLOOD BY AUTOMATED COUNT: 13.9 % (ref 12.3–15.4)
GLOBULIN UR ELPH-MCNC: 2.3 GM/DL
GLUCOSE SERPL-MCNC: 124 MG/DL (ref 65–99)
HCT VFR BLD AUTO: 39.5 % (ref 34–46.6)
HGB BLD-MCNC: 12.4 G/DL (ref 12–15.9)
IMM GRANULOCYTES # BLD AUTO: 0.01 10*3/MM3 (ref 0–0.05)
IMM GRANULOCYTES NFR BLD AUTO: 0.2 % (ref 0–0.5)
LYMPHOCYTES # BLD AUTO: 1.47 10*3/MM3 (ref 0.7–3.1)
LYMPHOCYTES NFR BLD AUTO: 27.8 % (ref 19.6–45.3)
MCH RBC QN AUTO: 27.8 PG (ref 26.6–33)
MCHC RBC AUTO-ENTMCNC: 31.4 G/DL (ref 31.5–35.7)
MCV RBC AUTO: 88.6 FL (ref 79–97)
MONOCYTES # BLD AUTO: 0.53 10*3/MM3 (ref 0.1–0.9)
MONOCYTES NFR BLD AUTO: 10 % (ref 5–12)
NEUTROPHILS NFR BLD AUTO: 2.84 10*3/MM3 (ref 1.7–7)
NEUTROPHILS NFR BLD AUTO: 53.7 % (ref 42.7–76)
NRBC BLD AUTO-RTO: 0 /100 WBC (ref 0–0.2)
PLATELET # BLD AUTO: 211 10*3/MM3 (ref 140–450)
PMV BLD AUTO: 11.1 FL (ref 6–12)
POTASSIUM SERPL-SCNC: 5.8 MMOL/L (ref 3.5–5.2)
PROT SERPL-MCNC: 7.2 G/DL (ref 6–8.5)
RBC # BLD AUTO: 4.46 10*6/MM3 (ref 3.77–5.28)
SODIUM SERPL-SCNC: 140 MMOL/L (ref 136–145)
WBC NRBC COR # BLD: 5.29 10*3/MM3 (ref 3.4–10.8)

## 2022-05-24 PROCEDURE — 36415 COLL VENOUS BLD VENIPUNCTURE: CPT

## 2022-05-24 PROCEDURE — 80053 COMPREHEN METABOLIC PANEL: CPT

## 2022-05-24 PROCEDURE — 85025 COMPLETE CBC W/AUTO DIFF WBC: CPT

## 2022-05-25 ENCOUNTER — APPOINTMENT (OUTPATIENT)
Dept: CARDIAC REHAB | Facility: HOSPITAL | Age: 62
End: 2022-05-25

## 2022-05-26 ENCOUNTER — APPOINTMENT (OUTPATIENT)
Dept: CARDIAC REHAB | Facility: HOSPITAL | Age: 62
End: 2022-05-26

## 2022-05-26 ENCOUNTER — TELEPHONE (OUTPATIENT)
Dept: CARDIOLOGY | Facility: CLINIC | Age: 62
End: 2022-05-26

## 2022-05-26 ENCOUNTER — TELEPHONE (OUTPATIENT)
Dept: FAMILY MEDICINE CLINIC | Facility: CLINIC | Age: 62
End: 2022-05-26

## 2022-05-26 RX ORDER — METHYLPREDNISOLONE 4 MG/1
TABLET ORAL
Qty: 21 TABLET | Refills: 0 | Status: SHIPPED | OUTPATIENT
Start: 2022-05-26 | End: 2022-07-26

## 2022-05-26 NOTE — TELEPHONE ENCOUNTER
Pt called with some questions about a mild rash that began a few days ago. It was first a small patch on her skin that itched, so pt put Hydrocortisone cream on it, and it still spread to her chin and neck in a few days.     Pt went to the [facility] pharmacy, and they had advised her to contact this office.     Pt also stated she has only had two medication changes, being the Bumex and Entresto, and has not had any changes to home goods such as lotions or detergents.

## 2022-05-26 NOTE — TELEPHONE ENCOUNTER
I sent a short prescription for steroids to the pharmacy.  If her symptoms or not improving she needs to be seen in the office

## 2022-05-26 NOTE — TELEPHONE ENCOUNTER
PATIENT CALLED STATES SHE HAS A ITCHY RASH ON CHEST, NECK AND CHIN FOR 2-3 DAYS AND SPREADING. SHE HAS BEEN USING HYDROCORTISONE.     NO APPOINTMENT AVAILABLE AND ATTEMPTED WARM TRANSFER.     CALL BACK NUMBER [phone]

## 2022-05-28 ENCOUNTER — HOSPITAL ENCOUNTER (EMERGENCY)
Facility: HOSPITAL | Age: 62
Discharge: HOME | End: 2022-05-28
Attending: EMERGENCY MEDICINE | Admitting: EMERGENCY MEDICINE

## 2022-05-28 ENCOUNTER — APPOINTMENT (OUTPATIENT)
Dept: GENERAL RADIOLOGY | Facility: HOSPITAL | Age: 62
End: 2022-05-28

## 2022-05-28 VITALS
HEIGHT: 64 IN | BODY MASS INDEX: 26.63 KG/M2 | WEIGHT: 156 LBS | TEMPERATURE: 98.4 F | OXYGEN SATURATION: 97 % | DIASTOLIC BLOOD PRESSURE: 77 MMHG | SYSTOLIC BLOOD PRESSURE: 144 MMHG | HEART RATE: 117 BPM | RESPIRATION RATE: 16 BRPM

## 2022-05-28 DIAGNOSIS — U07.1 COVID: ICD-10-CM

## 2022-05-28 DIAGNOSIS — R07.9 CHEST PAIN, UNSPECIFIED TYPE: Primary | ICD-10-CM

## 2022-05-28 LAB
ALBUMIN SERPL-MCNC: 4.8 G/DL (ref 3.5–5.2)
ALBUMIN/GLOB SERPL: 1.7 G/DL
ALP SERPL-CCNC: 66 U/L (ref 39–117)
ALT SERPL W P-5'-P-CCNC: 17 U/L (ref 1–33)
ANION GAP SERPL CALCULATED.3IONS-SCNC: 18 MMOL/L (ref 5–15)
AST SERPL-CCNC: 15 U/L (ref 1–32)
BASOPHILS # BLD AUTO: 0 10*3/MM3 (ref 0–0.2)
BASOPHILS NFR BLD AUTO: 0.5 % (ref 0–1.5)
BILIRUB SERPL-MCNC: 0.3 MG/DL (ref 0–1.2)
BUN SERPL-MCNC: 24 MG/DL (ref 8–23)
BUN/CREAT SERPL: 23.3 (ref 7–25)
CALCIUM SPEC-SCNC: 9.9 MG/DL (ref 8.6–10.5)
CHLORIDE SERPL-SCNC: 98 MMOL/L (ref 98–107)
CO2 SERPL-SCNC: 22 MMOL/L (ref 22–29)
CREAT SERPL-MCNC: 1.03 MG/DL (ref 0.57–1)
D DIMER PPP FEU-MCNC: 0.33 MG/L (FEU) (ref 0–0.59)
DEPRECATED RDW RBC AUTO: 45.5 FL (ref 37–54)
EGFRCR SERPLBLD CKD-EPI 2021: 61.2 ML/MIN/1.73
EOSINOPHIL # BLD AUTO: 0 10*3/MM3 (ref 0–0.4)
EOSINOPHIL NFR BLD AUTO: 0.4 % (ref 0.3–6.2)
ERYTHROCYTE [DISTWIDTH] IN BLOOD BY AUTOMATED COUNT: 15.3 % (ref 12.3–15.4)
GLOBULIN UR ELPH-MCNC: 2.8 GM/DL
GLUCOSE SERPL-MCNC: 83 MG/DL (ref 65–99)
HCT VFR BLD AUTO: 37.2 % (ref 34–46.6)
HGB BLD-MCNC: 12 G/DL (ref 12–15.9)
HOLD SPECIMEN: NORMAL
LIPASE SERPL-CCNC: 35 U/L (ref 13–60)
LYMPHOCYTES # BLD AUTO: 0.4 10*3/MM3 (ref 0.7–3.1)
LYMPHOCYTES NFR BLD AUTO: 5.8 % (ref 19.6–45.3)
MAGNESIUM SERPL-MCNC: 1.5 MG/DL (ref 1.6–2.4)
MCH RBC QN AUTO: 27.6 PG (ref 26.6–33)
MCHC RBC AUTO-ENTMCNC: 32.4 G/DL (ref 31.5–35.7)
MCV RBC AUTO: 85.2 FL (ref 79–97)
MONOCYTES # BLD AUTO: 1.1 10*3/MM3 (ref 0.1–0.9)
MONOCYTES NFR BLD AUTO: 15 % (ref 5–12)
NEUTROPHILS NFR BLD AUTO: 5.8 10*3/MM3 (ref 1.7–7)
NEUTROPHILS NFR BLD AUTO: 78.3 % (ref 42.7–76)
NRBC BLD AUTO-RTO: 0.1 /100 WBC (ref 0–0.2)
NT-PROBNP SERPL-MCNC: 1076 PG/ML (ref 0–900)
PLATELET # BLD AUTO: 225 10*3/MM3 (ref 140–450)
PMV BLD AUTO: 8.3 FL (ref 6–12)
POTASSIUM SERPL-SCNC: 4.3 MMOL/L (ref 3.5–5.2)
PROT SERPL-MCNC: 7.6 G/DL (ref 6–8.5)
RBC # BLD AUTO: 4.36 10*6/MM3 (ref 3.77–5.28)
SARS-COV-2 RNA PNL SPEC NAA+PROBE: DETECTED
SODIUM SERPL-SCNC: 138 MMOL/L (ref 136–145)
TROPONIN T SERPL-MCNC: <0.01 NG/ML (ref 0–0.03)
TSH SERPL DL<=0.05 MIU/L-ACNC: 1.25 UIU/ML (ref 0.27–4.2)
WBC NRBC COR # BLD: 7.4 10*3/MM3 (ref 3.4–10.8)

## 2022-05-28 PROCEDURE — 93005 ELECTROCARDIOGRAM TRACING: CPT

## 2022-05-28 PROCEDURE — 99283 EMERGENCY DEPT VISIT LOW MDM: CPT

## 2022-05-28 PROCEDURE — 83735 ASSAY OF MAGNESIUM: CPT | Performed by: EMERGENCY MEDICINE

## 2022-05-28 PROCEDURE — 83880 ASSAY OF NATRIURETIC PEPTIDE: CPT | Performed by: EMERGENCY MEDICINE

## 2022-05-28 PROCEDURE — 84484 ASSAY OF TROPONIN QUANT: CPT | Performed by: EMERGENCY MEDICINE

## 2022-05-28 PROCEDURE — 80050 GENERAL HEALTH PANEL: CPT | Performed by: EMERGENCY MEDICINE

## 2022-05-28 PROCEDURE — 87635 SARS-COV-2 COVID-19 AMP PRB: CPT | Performed by: EMERGENCY MEDICINE

## 2022-05-28 PROCEDURE — 36415 COLL VENOUS BLD VENIPUNCTURE: CPT

## 2022-05-28 PROCEDURE — M0222 HC INJECTION BEBTELOVIMAB: HCPCS | Performed by: EMERGENCY MEDICINE

## 2022-05-28 PROCEDURE — 93005 ELECTROCARDIOGRAM TRACING: CPT | Performed by: EMERGENCY MEDICINE

## 2022-05-28 PROCEDURE — 25010000002 INJECTION, BEBTELOVIMAB, 175 MG: Performed by: EMERGENCY MEDICINE

## 2022-05-28 PROCEDURE — 85379 FIBRIN DEGRADATION QUANT: CPT | Performed by: EMERGENCY MEDICINE

## 2022-05-28 PROCEDURE — 83690 ASSAY OF LIPASE: CPT | Performed by: EMERGENCY MEDICINE

## 2022-05-28 PROCEDURE — 71045 X-RAY EXAM CHEST 1 VIEW: CPT

## 2022-05-28 RX ORDER — SODIUM CHLORIDE 0.9 % (FLUSH) 0.9 %
10 SYRINGE (ML) INJECTION AS NEEDED
Status: DISCONTINUED | OUTPATIENT
Start: 2022-05-28 | End: 2022-05-28 | Stop reason: HOSPADM

## 2022-05-28 RX ORDER — EPINEPHRINE 1 MG/ML
0.3 INJECTION, SOLUTION, CONCENTRATE INTRAVENOUS ONCE AS NEEDED
Status: DISCONTINUED | OUTPATIENT
Start: 2022-05-28 | End: 2022-05-28 | Stop reason: HOSPADM

## 2022-05-28 RX ORDER — DIPHENHYDRAMINE HCL 25 MG
50 CAPSULE ORAL ONCE AS NEEDED
Status: DISCONTINUED | OUTPATIENT
Start: 2022-05-28 | End: 2022-05-28 | Stop reason: HOSPADM

## 2022-05-28 RX ORDER — METHYLPREDNISOLONE SODIUM SUCCINATE 125 MG/2ML
125 INJECTION, POWDER, LYOPHILIZED, FOR SOLUTION INTRAMUSCULAR; INTRAVENOUS ONCE AS NEEDED
Status: DISCONTINUED | OUTPATIENT
Start: 2022-05-28 | End: 2022-05-28 | Stop reason: HOSPADM

## 2022-05-28 RX ORDER — DIPHENHYDRAMINE HYDROCHLORIDE 50 MG/ML
50 INJECTION INTRAMUSCULAR; INTRAVENOUS ONCE AS NEEDED
Status: DISCONTINUED | OUTPATIENT
Start: 2022-05-28 | End: 2022-05-28 | Stop reason: HOSPADM

## 2022-05-28 RX ORDER — BEBTELOVIMAB 87.5 MG/ML
175 INJECTION, SOLUTION INTRAVENOUS ONCE
Status: COMPLETED | OUTPATIENT
Start: 2022-05-28 | End: 2022-05-28

## 2022-05-28 RX ADMIN — BEBTELOVIMAB 175 MG: 87.5 INJECTION, SOLUTION INTRAVENOUS at 14:46

## 2022-05-31 ENCOUNTER — APPOINTMENT (OUTPATIENT)
Dept: CARDIAC REHAB | Facility: HOSPITAL | Age: 62
End: 2022-05-31

## 2022-05-31 ENCOUNTER — PATIENT OUTREACH (OUTPATIENT)
Dept: CASE MANAGEMENT | Facility: OTHER | Age: 62
End: 2022-05-31

## 2022-05-31 NOTE — OUTREACH NOTE
AMBULATORY CASE MANAGEMENT NOTE    Name and Relationship of Patient/Support Person: Susy Hanson - Self    Patient Outreach    Received email from patient with status. See care plan for notes. Pt recently diagnosed with covid after negative test at home. Reports her symptoms with rash on chest, difficulty breathing and severe pain in back with taking a breath or moving a certain way. Did receive infusion. Plans to retire 7/1/22.       ELBA DINERO  Ambulatory Case Management    5/31/2022, 11:11 EDT

## 2022-06-02 LAB — QT INTERVAL: 352 MS

## 2022-06-03 DIAGNOSIS — F41.8 MIXED ANXIETY DEPRESSIVE DISORDER: ICD-10-CM

## 2022-06-03 RX ORDER — ALPRAZOLAM 1 MG/1
1 TABLET ORAL 2 TIMES DAILY PRN
Qty: 60 TABLET | Refills: 0 | Status: SHIPPED | OUTPATIENT
Start: 2022-06-03 | End: 2022-06-28 | Stop reason: SDUPTHER

## 2022-06-07 ENCOUNTER — OFFICE VISIT (OUTPATIENT)
Dept: CARDIAC REHAB | Facility: HOSPITAL | Age: 62
End: 2022-06-07

## 2022-06-07 DIAGNOSIS — I50.22 CHRONIC SYSTOLIC HEART FAILURE: Primary | ICD-10-CM

## 2022-06-07 PROCEDURE — 93798 PHYS/QHP OP CAR RHAB W/ECG: CPT

## 2022-06-08 ENCOUNTER — APPOINTMENT (OUTPATIENT)
Dept: CARDIAC REHAB | Facility: HOSPITAL | Age: 62
End: 2022-06-08

## 2022-06-08 ENCOUNTER — TREATMENT (OUTPATIENT)
Dept: CARDIAC REHAB | Facility: HOSPITAL | Age: 62
End: 2022-06-08

## 2022-06-08 DIAGNOSIS — I50.22 CHRONIC SYSTOLIC HEART FAILURE: Primary | ICD-10-CM

## 2022-06-08 PROCEDURE — 93798 PHYS/QHP OP CAR RHAB W/ECG: CPT

## 2022-06-09 ENCOUNTER — TREATMENT (OUTPATIENT)
Dept: CARDIAC REHAB | Facility: HOSPITAL | Age: 62
End: 2022-06-09

## 2022-06-09 DIAGNOSIS — I50.22 CHRONIC SYSTOLIC HEART FAILURE: Primary | ICD-10-CM

## 2022-06-09 PROCEDURE — 93798 PHYS/QHP OP CAR RHAB W/ECG: CPT

## 2022-06-09 RX ORDER — BUMETANIDE 1 MG/1
1 TABLET ORAL DAILY
Qty: 30 TABLET | Refills: 0 | Status: SHIPPED | OUTPATIENT
Start: 2022-06-09 | End: 2022-06-28 | Stop reason: SDUPTHER

## 2022-06-09 RX ORDER — SACUBITRIL AND VALSARTAN 24; 26 MG/1; MG/1
1 TABLET, FILM COATED ORAL EVERY 12 HOURS SCHEDULED
Qty: 60 TABLET | Refills: 0 | Status: SHIPPED | OUTPATIENT
Start: 2022-06-09 | End: 2022-06-28 | Stop reason: SDUPTHER

## 2022-06-13 ENCOUNTER — TREATMENT (OUTPATIENT)
Dept: CARDIAC REHAB | Facility: HOSPITAL | Age: 62
End: 2022-06-13

## 2022-06-13 DIAGNOSIS — I50.22 CHRONIC SYSTOLIC HEART FAILURE: Primary | ICD-10-CM

## 2022-06-13 PROCEDURE — 93798 PHYS/QHP OP CAR RHAB W/ECG: CPT

## 2022-06-14 ENCOUNTER — TREATMENT (OUTPATIENT)
Dept: CARDIAC REHAB | Facility: HOSPITAL | Age: 62
End: 2022-06-14

## 2022-06-14 DIAGNOSIS — I50.22 CHRONIC SYSTOLIC HEART FAILURE: Primary | ICD-10-CM

## 2022-06-15 ENCOUNTER — TREATMENT (OUTPATIENT)
Dept: CARDIAC REHAB | Facility: HOSPITAL | Age: 62
End: 2022-06-15

## 2022-06-15 DIAGNOSIS — I50.22 CHRONIC SYSTOLIC HEART FAILURE: Primary | ICD-10-CM

## 2022-06-15 PROCEDURE — 93798 PHYS/QHP OP CAR RHAB W/ECG: CPT

## 2022-06-16 ENCOUNTER — APPOINTMENT (OUTPATIENT)
Dept: CARDIAC REHAB | Facility: HOSPITAL | Age: 62
End: 2022-06-16

## 2022-06-20 ENCOUNTER — APPOINTMENT (OUTPATIENT)
Dept: CARDIAC REHAB | Facility: HOSPITAL | Age: 62
End: 2022-06-20

## 2022-06-21 ENCOUNTER — APPOINTMENT (OUTPATIENT)
Dept: CARDIAC REHAB | Facility: HOSPITAL | Age: 62
End: 2022-06-21

## 2022-06-22 ENCOUNTER — APPOINTMENT (OUTPATIENT)
Dept: CARDIAC REHAB | Facility: HOSPITAL | Age: 62
End: 2022-06-22

## 2022-06-23 ENCOUNTER — APPOINTMENT (OUTPATIENT)
Dept: CARDIAC REHAB | Facility: HOSPITAL | Age: 62
End: 2022-06-23

## 2022-06-27 ENCOUNTER — TREATMENT (OUTPATIENT)
Dept: CARDIAC REHAB | Facility: HOSPITAL | Age: 62
End: 2022-06-27

## 2022-06-27 DIAGNOSIS — I50.22 CHRONIC SYSTOLIC HEART FAILURE: Primary | ICD-10-CM

## 2022-06-27 PROCEDURE — 93798 PHYS/QHP OP CAR RHAB W/ECG: CPT

## 2022-06-28 ENCOUNTER — TREATMENT (OUTPATIENT)
Dept: CARDIAC REHAB | Facility: HOSPITAL | Age: 62
End: 2022-06-28

## 2022-06-28 DIAGNOSIS — I50.22 CHRONIC SYSTOLIC HEART FAILURE: Primary | ICD-10-CM

## 2022-06-28 DIAGNOSIS — F41.8 MIXED ANXIETY DEPRESSIVE DISORDER: ICD-10-CM

## 2022-06-28 PROCEDURE — 93798 PHYS/QHP OP CAR RHAB W/ECG: CPT

## 2022-06-28 RX ORDER — BUMETANIDE 1 MG/1
1 TABLET ORAL DAILY
Qty: 90 TABLET | Refills: 1 | Status: SHIPPED | OUTPATIENT
Start: 2022-06-28 | End: 2022-11-11

## 2022-06-28 RX ORDER — SACUBITRIL AND VALSARTAN 24; 26 MG/1; MG/1
1 TABLET, FILM COATED ORAL EVERY 12 HOURS SCHEDULED
Qty: 180 TABLET | Refills: 1 | Status: SHIPPED | OUTPATIENT
Start: 2022-06-28 | End: 2022-11-21

## 2022-06-28 RX ORDER — ALPRAZOLAM 1 MG/1
1 TABLET ORAL 2 TIMES DAILY PRN
Qty: 60 TABLET | Refills: 0 | Status: SHIPPED | OUTPATIENT
Start: 2022-06-28 | End: 2022-08-01 | Stop reason: SDUPTHER

## 2022-06-29 ENCOUNTER — TREATMENT (OUTPATIENT)
Dept: CARDIAC REHAB | Facility: HOSPITAL | Age: 62
End: 2022-06-29

## 2022-06-29 DIAGNOSIS — I50.22 CHRONIC SYSTOLIC HEART FAILURE: Primary | ICD-10-CM

## 2022-06-29 PROCEDURE — 93798 PHYS/QHP OP CAR RHAB W/ECG: CPT

## 2022-06-30 ENCOUNTER — TREATMENT (OUTPATIENT)
Dept: CARDIAC REHAB | Facility: HOSPITAL | Age: 62
End: 2022-06-30

## 2022-06-30 DIAGNOSIS — I50.22 CHRONIC SYSTOLIC HEART FAILURE: Primary | ICD-10-CM

## 2022-06-30 PROCEDURE — 93798 PHYS/QHP OP CAR RHAB W/ECG: CPT

## 2022-07-04 ENCOUNTER — APPOINTMENT (OUTPATIENT)
Dept: CARDIAC REHAB | Facility: HOSPITAL | Age: 62
End: 2022-07-04

## 2022-07-05 ENCOUNTER — APPOINTMENT (OUTPATIENT)
Dept: CARDIAC REHAB | Facility: HOSPITAL | Age: 62
End: 2022-07-05

## 2022-07-06 ENCOUNTER — APPOINTMENT (OUTPATIENT)
Dept: CARDIAC REHAB | Facility: HOSPITAL | Age: 62
End: 2022-07-06

## 2022-07-07 ENCOUNTER — APPOINTMENT (OUTPATIENT)
Dept: CARDIAC REHAB | Facility: HOSPITAL | Age: 62
End: 2022-07-07

## 2022-07-11 ENCOUNTER — APPOINTMENT (OUTPATIENT)
Dept: CARDIAC REHAB | Facility: HOSPITAL | Age: 62
End: 2022-07-11

## 2022-07-12 ENCOUNTER — APPOINTMENT (OUTPATIENT)
Dept: CARDIAC REHAB | Facility: HOSPITAL | Age: 62
End: 2022-07-12

## 2022-07-13 ENCOUNTER — APPOINTMENT (OUTPATIENT)
Dept: CARDIAC REHAB | Facility: HOSPITAL | Age: 62
End: 2022-07-13

## 2022-07-14 ENCOUNTER — APPOINTMENT (OUTPATIENT)
Dept: CARDIAC REHAB | Facility: HOSPITAL | Age: 62
End: 2022-07-14

## 2022-07-18 ENCOUNTER — TREATMENT (OUTPATIENT)
Dept: CARDIAC REHAB | Facility: HOSPITAL | Age: 62
End: 2022-07-18

## 2022-07-18 DIAGNOSIS — I50.22 CHRONIC SYSTOLIC HEART FAILURE: Primary | ICD-10-CM

## 2022-07-18 PROCEDURE — 93798 PHYS/QHP OP CAR RHAB W/ECG: CPT

## 2022-07-19 ENCOUNTER — TREATMENT (OUTPATIENT)
Dept: CARDIAC REHAB | Facility: HOSPITAL | Age: 62
End: 2022-07-19

## 2022-07-19 DIAGNOSIS — I50.22 CHRONIC SYSTOLIC HEART FAILURE: Primary | ICD-10-CM

## 2022-07-19 PROCEDURE — 93798 PHYS/QHP OP CAR RHAB W/ECG: CPT

## 2022-07-20 ENCOUNTER — TREATMENT (OUTPATIENT)
Dept: CARDIAC REHAB | Facility: HOSPITAL | Age: 62
End: 2022-07-20

## 2022-07-20 DIAGNOSIS — I50.22 CHRONIC SYSTOLIC HEART FAILURE: Primary | ICD-10-CM

## 2022-07-20 PROCEDURE — 93798 PHYS/QHP OP CAR RHAB W/ECG: CPT

## 2022-07-21 ENCOUNTER — APPOINTMENT (OUTPATIENT)
Dept: CARDIAC REHAB | Facility: HOSPITAL | Age: 62
End: 2022-07-21

## 2022-07-25 ENCOUNTER — TREATMENT (OUTPATIENT)
Dept: CARDIAC REHAB | Facility: HOSPITAL | Age: 62
End: 2022-07-25

## 2022-07-25 DIAGNOSIS — I50.22 CHRONIC SYSTOLIC HEART FAILURE: Primary | ICD-10-CM

## 2022-07-25 PROCEDURE — 93798 PHYS/QHP OP CAR RHAB W/ECG: CPT

## 2022-07-26 ENCOUNTER — OFFICE VISIT (OUTPATIENT)
Dept: FAMILY MEDICINE CLINIC | Facility: CLINIC | Age: 62
End: 2022-07-26

## 2022-07-26 ENCOUNTER — TREATMENT (OUTPATIENT)
Dept: CARDIAC REHAB | Facility: HOSPITAL | Age: 62
End: 2022-07-26

## 2022-07-26 ENCOUNTER — LAB (OUTPATIENT)
Dept: FAMILY MEDICINE CLINIC | Facility: CLINIC | Age: 62
End: 2022-07-26

## 2022-07-26 VITALS
HEIGHT: 64 IN | TEMPERATURE: 97.4 F | OXYGEN SATURATION: 97 % | HEART RATE: 108 BPM | BODY MASS INDEX: 25.1 KG/M2 | DIASTOLIC BLOOD PRESSURE: 83 MMHG | SYSTOLIC BLOOD PRESSURE: 123 MMHG | WEIGHT: 147 LBS

## 2022-07-26 DIAGNOSIS — I50.22 CHRONIC SYSTOLIC HEART FAILURE: Primary | ICD-10-CM

## 2022-07-26 DIAGNOSIS — E11.49 TYPE 2 DIABETES MELLITUS WITH OTHER DIABETIC NEUROLOGICAL COMPLICATION: ICD-10-CM

## 2022-07-26 DIAGNOSIS — E78.2 MIXED HYPERLIPIDEMIA: ICD-10-CM

## 2022-07-26 DIAGNOSIS — I10 ESSENTIAL HYPERTENSION: Primary | ICD-10-CM

## 2022-07-26 DIAGNOSIS — Z23 NEED FOR VACCINATION: ICD-10-CM

## 2022-07-26 LAB
CHOLEST SERPL-MCNC: 128 MG/DL (ref 0–200)
HBA1C MFR BLD: 6.3 % (ref 3.5–5.6)
HDLC SERPL-MCNC: 44 MG/DL (ref 40–60)
LDLC SERPL CALC-MCNC: 63 MG/DL (ref 0–100)
LDLC/HDLC SERPL: 1.37 {RATIO}
TRIGL SERPL-MCNC: 119 MG/DL (ref 0–150)
VLDLC SERPL-MCNC: 21 MG/DL (ref 5–40)

## 2022-07-26 PROCEDURE — 93798 PHYS/QHP OP CAR RHAB W/ECG: CPT

## 2022-07-26 PROCEDURE — 90471 IMMUNIZATION ADMIN: CPT | Performed by: FAMILY MEDICINE

## 2022-07-26 PROCEDURE — 36415 COLL VENOUS BLD VENIPUNCTURE: CPT | Performed by: FAMILY MEDICINE

## 2022-07-26 PROCEDURE — 99214 OFFICE O/P EST MOD 30 MIN: CPT | Performed by: FAMILY MEDICINE

## 2022-07-26 PROCEDURE — 90715 TDAP VACCINE 7 YRS/> IM: CPT | Performed by: FAMILY MEDICINE

## 2022-07-26 PROCEDURE — 83036 HEMOGLOBIN GLYCOSYLATED A1C: CPT | Performed by: FAMILY MEDICINE

## 2022-07-26 PROCEDURE — 80061 LIPID PANEL: CPT | Performed by: FAMILY MEDICINE

## 2022-07-26 RX ORDER — CETIRIZINE HYDROCHLORIDE 10 MG/1
10 TABLET ORAL DAILY PRN
COMMUNITY

## 2022-07-26 NOTE — PROGRESS NOTES
Answers for HPI/ROS submitted by the patient on 7/20/2022  What is the primary reason for your visit?: Diabetes  Diabetes type: type 2  MedicAlert ID: No  Disease duration: 15 years  Symptom course: improving  CAD risks: dyslipidemia, family history, hypertension  Current treatments: oral agent (monotherapy)  Treatment compliance: most of the time  Home blood tests: 3-4 x per week  Monitoring compliance: good  Blood glucose trend: decreasing steadily  breakfast time: 7-8 am  breakfast glucose level:   dinner glucose level: 110-130  High score: 110-130  Overall:   Weight trend: decreasing steadily  Current diet: diabetic, generally healthy, low salt  Meal planning: ADA exchanges, avoidance of concentrated sweets  Exercise: three times a week  Dietitian visit: No  Eye exam current: Yes  Sees podiatrist: No    Subjective   Susy Hanson is a 62 y.o. female who presents today for follow-up on her blood pressure, cholesterol, and diabetes.    Diabetes  She has type 2 diabetes mellitus. No MedicAlert identification noted. The initial diagnosis of diabetes was made 15 years ago. Pertinent negatives for diabetes include no chest pain. Symptoms are improving. Risk factors for coronary artery disease include dyslipidemia, family history and hypertension. Current diabetic treatment includes oral agent (monotherapy). She is compliant with treatment most of the time. Her weight is decreasing steadily. She is following a diabetic, generally healthy and low salt diet. Meal planning includes ADA exchanges and avoidance of concentrated sweets. She has not had a previous visit with a dietitian. She participates in exercise three times a week. She monitors blood glucose at home 3-4 x per week. Blood glucose monitoring compliance is good. Her home blood glucose trend is decreasing steadily. Her breakfast blood glucose is taken between 7-8 am. Her breakfast blood glucose range is generally  mg/dl. Her dinner blood  glucose range is generally 110-130 mg/dl. Her highest blood glucose is 110-130 mg/dl. Her overall blood glucose range is  mg/dl. She does not see a podiatrist.Eye exam is current.     Cardiac rehab  The patient reports she is still doing cardiac rehab. She has a follow-up appointment with Dr. Braxton tomorrow, 07/27/2022. She denies any chest pain or trouble breathing.      Weight loss  The patient retired on 07/01/2022. She is having a little bit of anxiety about it. She states she has worked all of her life. The patient does rehab 3 to 4 times a week. She states she does have a treadmill. She is currently following a diet. According to the patient, she has gone from overweight to normal.    Diabetes  She has not been taking insulin for the last 2 months. She is currently taking metformin 1 tablet at night and 1 tablet in the morning. The patient is also doing the no fat or low fat, no salt over 1200 grams. Some days, she does not even do any sugar.    Insomnia  According to the patient, she has been taking Xanax to sleep. She has not started the Lortab. The patient has gotten to where she has to take 2 tablets of Xanax to sleep. She tries not to do it every night. The patient will try to wait 2 to 3 days. After waiting 2 to 3 days, she is exhausted and then she will take 2 tablets of Xanax.     The following portions of the patient's history were reviewed and updated as appropriate: allergies, current medications, past family history, past medical history, past social history, past surgical history, and problem list.  Past Medical History:   Diagnosis Date   • Anxiety 2000   • Breast cancer (HCC)     LEFT BREAST 7/2021   • Depression    • DM (diabetes mellitus) (HCC)    • HL (hearing loss)    • Hyperlipidemia    • Hypertension     SAW CARDIO/ STRESS TEST 2020 - NOW MEDS MANAGED BY PCP    • Irritable bowel syndrome 2006   • Peripheral neuropathy    • PONV (postoperative nausea and vomiting)    • Sleep apnea      WEARS CPAP     Past Surgical History:   Procedure Laterality Date   • BLADDER REPAIR     • BREAST BIOPSY     • BREAST RECONSTRUCTION Bilateral 08/26/2021    Procedure: BILATERAL PREPECTORALPLACMENT TISSUE EXPANDERS AND ALLODERM;  Surgeon: Heri Arreaga MD;  Location: [facility];  Service: Plastics;  Laterality: Bilateral;   • BREAST TISSUE EXPANDER REMOVAL INSERTION OF IMPLANT Bilateral 12/23/2021    Procedure: BILATERAL REMOVAL TISSUE EXPANDERS AND PLACEMENT OF IMPLANTS;  Surgeon: Heri Arreaga MD;  Location: [facility] OR;  Service: Plastics;  Laterality: Bilateral;   • CARDIAC CATHETERIZATION Left 04/13/2022    Procedure: Cardiac Catheterization/Vascular Study;  Surgeon: Christo Murphy MD;  Location: [facility] CATH INVASIVE LOCATION;  Service: Cardiovascular;  Laterality: Left;   • CARDIOVASCULAR STRESS TEST  2020   • COLONOSCOPY  11/2012   • HYSTERECTOMY     • LYMPH NODE BIOPSY  8/29/2021   • MASTECTOMY W/ SENTINEL NODE BIOPSY Bilateral 08/26/2021    Procedure: bilateral total mastectomy, left axillary sentinel lymph node biopsy, possible reconstruction.;  Surgeon: Asia Perkins MD;  Location: [facility];  Service: General;  Laterality: Bilateral;   • TUBAL ABDOMINAL LIGATION  1981     Family History   Problem Relation Age of Onset   • Alzheimer's disease Father    • Diabetes Father    • Heart disease Father    • Other Father    • Sleep apnea Father    • Snoring Father    • Hyperlipidemia Father    • Other Mother    • Diabetes Mother    • Heart disease Mother    • Sleep apnea Mother    • Snoring Mother    • COPD Mother    • Hyperlipidemia Mother    • Diabetes Sister    • Heart disease Sister    • Other Sister    • Sleep apnea Sister    • Snoring Sister    • COPD Sister    • Breast cancer Paternal Grandmother 60   • Cancer Paternal Grandmother    • Diabetes Paternal Grandmother    • Breast cancer Maternal Aunt 80   • Breast cancer Paternal Aunt    • Diabetes Maternal Grandmother    •  "Hyperlipidemia Sister    • Hyperlipidemia Brother    • Hyperlipidemia Son    • Malig Hyperthermia Neg Hx      Social History     Socioeconomic History   • Marital status:    Tobacco Use   • Smoking status: Former Smoker     Packs/day: 2.00     Years: 30.00     Pack years: 60.00     Types: Cigarettes, Cigarettes     Start date: 1972     Quit date: 2008     Years since quittin.5   • Smokeless tobacco: Never Used   Vaping Use   • Vaping Use: Never used   Substance and Sexual Activity   • Alcohol use: No   • Drug use: No   • Sexual activity: Yes     Partners: Male     Birth control/protection: None         Current Outpatient Medications:   •  ALPRAZolam (XANAX) 1 MG tablet, Take 1 tablet by mouth 2 (Two) Times a Day As Needed for Anxiety or Sleep., Disp: 60 tablet, Rfl: 0  •  atorvastatin (LIPITOR) 20 MG tablet, Take 1 tablet by mouth Daily., Disp: 90 tablet, Rfl: 2  •  bumetanide (BUMEX) 1 MG tablet, Take 1 tablet by mouth Daily., Disp: 90 tablet, Rfl: 1  •  cetirizine (zyrTEC) 10 MG tablet, , Disp: , Rfl:   •  metFORMIN (GLUCOPHAGE) 1000 MG tablet, Take one tablet by mouth with breakfast and 1 tablets with supper, Disp: , Rfl:   •  metoprolol succinate XL (TOPROL-XL) 100 MG 24 hr tablet, take 1 tablet by oral route  every day, Disp: 90 tablet, Rfl: 3  •  sacubitril-valsartan (Entresto) 24-26 MG tablet, Take 1 tablet by mouth Every 12 (Twelve) Hours., Disp: 180 tablet, Rfl: 1  •  Triamcinolone Acetonide (NASACORT) 55 MCG/ACT nasal inhaler, APPLY 2 SPRAYS BY NASAL ROUTE DAILY FOR 30 DAYS, Disp: 10.8 mL, Rfl: 6    Review of Systems   Respiratory: Negative for shortness of breath.    Cardiovascular: Negative for chest pain.     /83 (BP Location: Right arm, Patient Position: Sitting, Cuff Size: Adult)   Pulse 108   Temp 97.4 °F (36.3 °C) (Temporal)   Ht 162.6 cm (64\")   Wt 66.7 kg (147 lb)   LMP  (LMP Unknown)   SpO2 97%   Breastfeeding No   BMI 25.23 kg/m²       Objective   Physical " Exam  Constitutional:       Comments: She is well dressed. She is well groomed. She is alert and cooperative with exam.    Neck:      Comments: Neck is supple without adenopathy. Thyroid is normal to palpation.   Cardiovascular:      Comments: Heart has a regular rate and rhythm without murmur.   Pulmonary:      Comments: Lungs are clear to auscultation bilaterally.   Musculoskeletal:      Comments: No pedal edema.    Psychiatric:      Comments: Mood is normal.           Assessment & Plan    1. Essential hypertension:       -   Doing well on metoprolol  mg.    2. Hyperlipidemia:       -   Currently on atorvastatin 20 mg.    3. Type 2 diabetes with neurological complications:       -    She is now off the insulin and is just taking metformin 1000 mg twice a day.        -    She was counseled on the need for dietary compliance.        -    A lipid and A1c were ordered.        -    She will reschedule her colonoscopy.        -    She was given a Tdap today, 07/26/2022.        -    She will check on coverage for her Shingrix.        -    I will see her back in 6 months.    Problems Addressed this Visit        Cardiac and Vasculature    Essential hypertension - Primary    Mixed hyperlipidemia    Relevant Orders    Lipid Panel       Endocrine and Metabolic    Type 2 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 (HCC)    Relevant Medications    metFORMIN (GLUCOPHAGE) 1000 MG tablet    Other Relevant Orders    Lipid Panel    Hemoglobin A1c      Other Visit Diagnoses     Need for vaccination        Relevant Orders    Tdap Vaccine Greater Than or Equal To 6yo IM (Completed)      Diagnoses       Codes Comments    Essential hypertension    -  Primary ICD-10-CM: I10  ICD-9-CM: 401.9     Mixed hyperlipidemia     ICD-10-CM: E78.2  ICD-9-CM: 272.2     Type 2 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 (HCC)     ICD-10-CM: E11.49  ICD-9-CM: 250.60     Need for vaccination     ICD-10-CM:  Z23  ICD-9-CM: V05.9                 Transcribed from ambient dictation for Erica Avila MD by Sanam Bhakta.  07/26/22   10:38 EDT    Patient verbalized consent to the visit recording.

## 2022-07-26 NOTE — PROGRESS NOTES
Date of Office Visit: 2022  Encounter Provider: Dr. Christo Murphy  Place of Service: [facility] CARDIOLOGY [place]  Patient Name: Susy Hanson  :1960  Erica Avila MD    Chief Complaint   Patient presents with   • Congestive Heart Failure   • Hypertension   • Hyperlipidemia   • Diabetes   • Follow-up     History of Present Illness    I am pleased see Mrs. Hanson in my office today as a follow-up    As you know, patient is a 62-year-old white female whose past medical history is significant for hypertension, diabetes mellitus, who came today for follow-up.    Patient has been hypertension from last 10 years.  Patient is also diabetic.  Her blood pressure was fairly well controlled but from last few months her blood pressure is running high.  In 2020, patient was admitted in the hospital with blood pressure greater than 200.  She underwent stress test which was negative for ischemia.  EKG showed left bundle branch block pattern.  MRI of the brain was unremarkable.  CT angiography to rule out renal artery stenosis was done and it was negative.  Patient antihypertensive regimen was adjusted and was discharged.    In 2021, patient was admitted at [facility] with symptom of shortness of breath and accelerated hypertension.  She was noted to be in congestive heart failure.  Echocardiogram showed ejection fraction of 25%.  Patient underwent cardiac catheterization and no significant coronary artery disease was noted.  Patient was started on Entresto and Toprol-XL.  She was discharged home on Bumex 2 mg daily.    Patient came today for 3-month follow-up.  Overall patient is doing well.  Patient symptom of shortness of breath has improved.  There is no leg edema.  Patient denies any orthopnea PND no syncope or presyncope.    EKG showed sinus rhythm with left bundle branch block.    Clinically patient is in euvolemic status.  I would recommend to proceed with  echocardiogram.  If echocardiogram shows that her ejection fraction is still less than 40%, she would need AICD.  Otherwise current treatment would be continued.        Past Medical History:   Diagnosis Date   • Anxiety 2000   • Breast cancer (HCC)     LEFT BREAST 7/2021   • CHF (congestive heart failure) (HCC)    • Depression    • DM (diabetes mellitus) (HCC)    • HL (hearing loss)    • Hyperlipidemia    • Hypertension     SAW CARDIO/ STRESS TEST 2020 - NOW MEDS MANAGED BY PCP    • Irritable bowel syndrome 2006   • Peripheral neuropathy    • PONV (postoperative nausea and vomiting)    • Sleep apnea     WEARS CPAP         Past Surgical History:   Procedure Laterality Date   • BLADDER REPAIR     • BREAST BIOPSY     • BREAST RECONSTRUCTION Bilateral 08/26/2021    Procedure: BILATERAL PREPECTORALPLACMENT TISSUE EXPANDERS AND ALLODERM;  Surgeon: Heri Arreaga MD;  Location: [facility];  Service: Plastics;  Laterality: Bilateral;   • BREAST TISSUE EXPANDER REMOVAL INSERTION OF IMPLANT Bilateral 12/23/2021    Procedure: BILATERAL REMOVAL TISSUE EXPANDERS AND PLACEMENT OF IMPLANTS;  Surgeon: Heri Arreaga MD;  Location: [facility];  Service: Plastics;  Laterality: Bilateral;   • CARDIAC CATHETERIZATION Left 04/13/2022    Procedure: Cardiac Catheterization/Vascular Study;  Surgeon: Christo Murphy MD;  Location: [facility] INVASIVE LOCATION;  Service: Cardiovascular;  Laterality: Left;   • CARDIOVASCULAR STRESS TEST  2020   • COLONOSCOPY  11/2012   • HYSTERECTOMY     • LYMPH NODE BIOPSY  8/29/2021   • MASTECTOMY W/ SENTINEL NODE BIOPSY Bilateral 08/26/2021    Procedure: bilateral total mastectomy, left axillary sentinel lymph node biopsy, possible reconstruction.;  Surgeon: Asia Perkins MD;  Location: [facility];  Service: General;  Laterality: Bilateral;   • TUBAL ABDOMINAL LIGATION  1981           Current Outpatient Medications:   •  ALPRAZolam (XANAX) 1 MG tablet, Take 1 tablet by  mouth 2 (Two) Times a Day As Needed for Anxiety or Sleep., Disp: 60 tablet, Rfl: 0  •  atorvastatin (LIPITOR) 20 MG tablet, Take 1 tablet by mouth Daily., Disp: 90 tablet, Rfl: 2  •  bumetanide (BUMEX) 1 MG tablet, Take 1 tablet by mouth Daily., Disp: 90 tablet, Rfl: 1  •  cetirizine (zyrTEC) 10 MG tablet, , Disp: , Rfl:   •  metFORMIN (GLUCOPHAGE) 1000 MG tablet, Take one tablet by mouth with breakfast and 1 tablets with supper, Disp: , Rfl:   •  metoprolol succinate XL (TOPROL-XL) 100 MG 24 hr tablet, take 1 tablet by oral route  every day, Disp: 90 tablet, Rfl: 3  •  sacubitril-valsartan (Entresto) 24-26 MG tablet, Take 1 tablet by mouth Every 12 (Twelve) Hours., Disp: 180 tablet, Rfl: 1  •  Triamcinolone Acetonide (NASACORT) 55 MCG/ACT nasal inhaler, APPLY 2 SPRAYS BY NASAL ROUTE DAILY FOR 30 DAYS, Disp: 10.8 mL, Rfl: 6      Social History     Socioeconomic History   • Marital status:    Tobacco Use   • Smoking status: Former Smoker     Packs/day: 2.00     Years: 30.00     Pack years: 60.00     Types: Cigarettes, Cigarettes     Start date: 1972     Quit date: 2008     Years since quittin.5   • Smokeless tobacco: Never Used   Vaping Use   • Vaping Use: Never used   Substance and Sexual Activity   • Alcohol use: No   • Drug use: No   • Sexual activity: Yes     Partners: Male     Birth control/protection: None         Review of Systems   Constitutional: Negative for chills and fever.   HENT: Negative for ear discharge and nosebleeds.    Eyes: Negative for discharge and redness.   Cardiovascular: Negative for chest pain, orthopnea, palpitations, paroxysmal nocturnal dyspnea and syncope.   Respiratory: Negative for cough, shortness of breath and wheezing.    Endocrine: Negative for heat intolerance.   Skin: Negative for rash.   Musculoskeletal: Negative for arthritis and myalgias.   Gastrointestinal: Negative for abdominal pain, melena, nausea and vomiting.   Genitourinary: Negative for dysuria  "and hematuria.   Neurological: Negative for dizziness, light-headedness, numbness and tremors.   Psychiatric/Behavioral: Negative for depression. The patient is not nervous/anxious.        Procedures      ECG 12 Lead    Date/Time: 7/27/2022 10:12 AM  Performed by: Christo Murphy MD  Authorized by: Christo Murphy MD   Comparison: compared with previous ECG   Similar to previous ECG  Rhythm: sinus rhythm  Conduction: left bundle branch block    Clinical impression: abnormal EKG            ECG 12 Lead    (Results Pending)           Objective:    /82 (BP Location: Left arm, Patient Position: Sitting, Cuff Size: Adult)   Pulse 84   Ht 162.6 cm (64.02\")   Wt 67.1 kg (148 lb)   LMP  (LMP Unknown)   SpO2 98%   BMI 25.39 kg/m²         Constitutional:       Appearance: Well-developed.   Eyes:      General: No scleral icterus.        Right eye: No discharge.   HENT:      Head: Normocephalic and atraumatic.   Neck:      Thyroid: No thyromegaly.      Lymphadenopathy: No cervical adenopathy.   Pulmonary:      Effort: Pulmonary effort is normal. No respiratory distress.      Breath sounds: Normal breath sounds. No wheezing. No rales.   Cardiovascular:      Normal rate. Regular rhythm.      No gallop.   Abdominal:      Tenderness: There is no abdominal tenderness.   Skin:     Findings: No erythema or rash.   Neurological:      Mental Status: Alert and oriented to person, place, and time.             Assessment:       Diagnosis Plan   1. Acute systolic heart failure (CMS/HCC)  ECG 12 Lead    Adult Transthoracic Echo Complete W/ Cont if Necessary Per Protocol   2. Essential hypertension  ECG 12 Lead    Adult Transthoracic Echo Complete W/ Cont if Necessary Per Protocol   3. Mixed hyperlipidemia  ECG 12 Lead    Adult Transthoracic Echo Complete W/ Cont if Necessary Per Protocol   4. Type 2 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 (HCC)  ECG 12 Lead    Adult Transthoracic Echo Complete W/ Cont if " Necessary Per Protocol            Plan:       MDM:    1.  Chronic systolic congestive heart failure:    Patient is in euvolemic status.  I am pleased with the condition.  Continue current dose of diuretics    2.  Nonischemic dilated cardiomyopathy:    Repeat echocardiogram.  If EF is less than 40%, patient will need ICD    3.  Hypertension:    Blood pressure is controlled current treatment would be continued    4.  Diabetes mellitus::    Patient would need diet modification and exercise.  Continue metformin

## 2022-07-27 ENCOUNTER — OFFICE VISIT (OUTPATIENT)
Dept: CARDIOLOGY | Facility: CLINIC | Age: 62
End: 2022-07-27

## 2022-07-27 ENCOUNTER — APPOINTMENT (OUTPATIENT)
Dept: CARDIAC REHAB | Facility: HOSPITAL | Age: 62
End: 2022-07-27

## 2022-07-27 VITALS
DIASTOLIC BLOOD PRESSURE: 82 MMHG | HEIGHT: 64 IN | OXYGEN SATURATION: 98 % | WEIGHT: 148 LBS | BODY MASS INDEX: 25.27 KG/M2 | SYSTOLIC BLOOD PRESSURE: 128 MMHG | HEART RATE: 84 BPM

## 2022-07-27 DIAGNOSIS — E78.2 MIXED HYPERLIPIDEMIA: ICD-10-CM

## 2022-07-27 DIAGNOSIS — I50.21 ACUTE SYSTOLIC HEART FAILURE: Primary | ICD-10-CM

## 2022-07-27 DIAGNOSIS — E11.49 TYPE 2 DIABETES MELLITUS WITH OTHER DIABETIC NEUROLOGICAL COMPLICATION: ICD-10-CM

## 2022-07-27 DIAGNOSIS — I10 ESSENTIAL HYPERTENSION: ICD-10-CM

## 2022-07-27 PROCEDURE — 93000 ELECTROCARDIOGRAM COMPLETE: CPT | Performed by: INTERNAL MEDICINE

## 2022-07-27 PROCEDURE — 99214 OFFICE O/P EST MOD 30 MIN: CPT | Performed by: INTERNAL MEDICINE

## 2022-07-28 ENCOUNTER — TREATMENT (OUTPATIENT)
Dept: CARDIAC REHAB | Facility: HOSPITAL | Age: 62
End: 2022-07-28

## 2022-07-28 ENCOUNTER — HOSPITAL ENCOUNTER (OUTPATIENT)
Dept: CARDIOLOGY | Facility: HOSPITAL | Age: 62
Discharge: HOME | End: 2022-07-28
Admitting: INTERNAL MEDICINE

## 2022-07-28 VITALS
DIASTOLIC BLOOD PRESSURE: 81 MMHG | SYSTOLIC BLOOD PRESSURE: 128 MMHG | WEIGHT: 148 LBS | BODY MASS INDEX: 25.27 KG/M2 | HEIGHT: 64 IN

## 2022-07-28 DIAGNOSIS — I10 ESSENTIAL HYPERTENSION: ICD-10-CM

## 2022-07-28 DIAGNOSIS — I50.21 ACUTE SYSTOLIC HEART FAILURE: ICD-10-CM

## 2022-07-28 DIAGNOSIS — I50.22 CHRONIC SYSTOLIC HEART FAILURE: Primary | ICD-10-CM

## 2022-07-28 DIAGNOSIS — E78.2 MIXED HYPERLIPIDEMIA: ICD-10-CM

## 2022-07-28 DIAGNOSIS — E11.49 TYPE 2 DIABETES MELLITUS WITH OTHER DIABETIC NEUROLOGICAL COMPLICATION: ICD-10-CM

## 2022-07-28 PROCEDURE — 93306 TTE W/DOPPLER COMPLETE: CPT

## 2022-07-28 PROCEDURE — 93798 PHYS/QHP OP CAR RHAB W/ECG: CPT

## 2022-07-28 PROCEDURE — 93306 TTE W/DOPPLER COMPLETE: CPT | Performed by: INTERNAL MEDICINE

## 2022-07-29 ENCOUNTER — TELEPHONE (OUTPATIENT)
Dept: CARDIOLOGY | Facility: CLINIC | Age: 62
End: 2022-07-29

## 2022-07-31 LAB
BH CV ECHO MEAS - AO MAX PG: 3.6 MMHG
BH CV ECHO MEAS - AO MEAN PG: 2.29 MMHG
BH CV ECHO MEAS - AO ROOT DIAM: 3.3 CM
BH CV ECHO MEAS - AO V2 MAX: 94.9 CM/SEC
BH CV ECHO MEAS - AO V2 VTI: 16.7 CM
BH CV ECHO MEAS - AVA(I,D): 2.6 CM2
BH CV ECHO MEAS - EDV(CUBED): 127.1 ML
BH CV ECHO MEAS - EDV(MOD-SP2): 87.4 ML
BH CV ECHO MEAS - EDV(MOD-SP4): 98.8 ML
BH CV ECHO MEAS - EF(MOD-BP): 23 %
BH CV ECHO MEAS - EF(MOD-SP2): 34.3 %
BH CV ECHO MEAS - EF(MOD-SP4): 15.7 %
BH CV ECHO MEAS - ESV(CUBED): 103.4 ML
BH CV ECHO MEAS - ESV(MOD-SP2): 57.4 ML
BH CV ECHO MEAS - ESV(MOD-SP4): 83.3 ML
BH CV ECHO MEAS - FS: 6.6 %
BH CV ECHO MEAS - IVS/LVPW: 1.21 CM
BH CV ECHO MEAS - IVSD: 1.17 CM
BH CV ECHO MEAS - LA A2CS (ATRIAL LENGTH): 3.7 CM
BH CV ECHO MEAS - LV DIASTOLIC VOL/BSA (35-75): 57.4 CM2
BH CV ECHO MEAS - LV MASS(C)D: 201.3 GRAMS
BH CV ECHO MEAS - LV MAX PG: 4 MMHG
BH CV ECHO MEAS - LV MEAN PG: 2.45 MMHG
BH CV ECHO MEAS - LV SYSTOLIC VOL/BSA (12-30): 48.4 CM2
BH CV ECHO MEAS - LV V1 MAX: 100.5 CM/SEC
BH CV ECHO MEAS - LV V1 VTI: 15.9 CM
BH CV ECHO MEAS - LVIDD: 5 CM
BH CV ECHO MEAS - LVIDS: 4.7 CM
BH CV ECHO MEAS - LVOT AREA: 2.8 CM2
BH CV ECHO MEAS - LVOT DIAM: 1.88 CM
BH CV ECHO MEAS - LVPWD: 0.97 CM
BH CV ECHO MEAS - MV MAX PG: 10.4 MMHG
BH CV ECHO MEAS - MV MEAN PG: 4.9 MMHG
BH CV ECHO MEAS - MV V2 VTI: 14.9 CM
BH CV ECHO MEAS - MVA(VTI): 2.9 CM2
BH CV ECHO MEAS - PA ACC TIME: 0.11 SEC
BH CV ECHO MEAS - PA PR(ACCEL): 29.2 MMHG
BH CV ECHO MEAS - PA V2 MAX: 106 CM/SEC
BH CV ECHO MEAS - PULM A REVS DUR: 0.15 SEC
BH CV ECHO MEAS - PULM A REVS VEL: 38.7 CM/SEC
BH CV ECHO MEAS - PULM DIAS VEL: 34.8 CM/SEC
BH CV ECHO MEAS - PULM S/D: 1.36
BH CV ECHO MEAS - PULM SYS VEL: 47.3 CM/SEC
BH CV ECHO MEAS - RV MAX PG: 2.8 MMHG
BH CV ECHO MEAS - RV V1 MAX: 84 CM/SEC
BH CV ECHO MEAS - RV V1 VTI: 18.2 CM
BH CV ECHO MEAS - RVDD: 1.88 CM
BH CV ECHO MEAS - SI(MOD-SP2): 17.4 ML/M2
BH CV ECHO MEAS - SI(MOD-SP4): 9 ML/M2
BH CV ECHO MEAS - SV(LVOT): 44 ML
BH CV ECHO MEAS - SV(MOD-SP2): 30 ML
BH CV ECHO MEAS - SV(MOD-SP4): 15.5 ML
MAXIMAL PREDICTED HEART RATE: 156 BPM
STRESS TARGET HR: 133 BPM

## 2022-08-01 ENCOUNTER — OFFICE VISIT (OUTPATIENT)
Dept: SURGERY | Facility: CLINIC | Age: 62
End: 2022-08-01

## 2022-08-01 ENCOUNTER — TELEPHONE (OUTPATIENT)
Dept: SURGERY | Facility: CLINIC | Age: 62
End: 2022-08-01

## 2022-08-01 ENCOUNTER — TREATMENT (OUTPATIENT)
Dept: CARDIAC REHAB | Facility: HOSPITAL | Age: 62
End: 2022-08-01

## 2022-08-01 VITALS
OXYGEN SATURATION: 98 % | DIASTOLIC BLOOD PRESSURE: 76 MMHG | WEIGHT: 150 LBS | HEIGHT: 64 IN | SYSTOLIC BLOOD PRESSURE: 122 MMHG | HEART RATE: 94 BPM | BODY MASS INDEX: 25.61 KG/M2

## 2022-08-01 DIAGNOSIS — E11.65 TYPE 2 DIABETES MELLITUS WITH HYPERGLYCEMIA, WITH LONG-TERM CURRENT USE OF INSULIN: ICD-10-CM

## 2022-08-01 DIAGNOSIS — F41.8 MIXED ANXIETY DEPRESSIVE DISORDER: ICD-10-CM

## 2022-08-01 DIAGNOSIS — F41.8 ANXIETY ABOUT HEALTH: ICD-10-CM

## 2022-08-01 DIAGNOSIS — G47.30 SLEEP APNEA, UNSPECIFIED TYPE: ICD-10-CM

## 2022-08-01 DIAGNOSIS — D05.12 BREAST NEOPLASM, TIS (DCIS), LEFT: Primary | ICD-10-CM

## 2022-08-01 DIAGNOSIS — I50.22 CHRONIC SYSTOLIC HEART FAILURE: Primary | ICD-10-CM

## 2022-08-01 DIAGNOSIS — Z80.3 FH: BREAST CANCER: ICD-10-CM

## 2022-08-01 DIAGNOSIS — Z79.4 TYPE 2 DIABETES MELLITUS WITH HYPERGLYCEMIA, WITH LONG-TERM CURRENT USE OF INSULIN: ICD-10-CM

## 2022-08-01 DIAGNOSIS — R92.1 BREAST CALCIFICATIONS ON MAMMOGRAM: ICD-10-CM

## 2022-08-01 DIAGNOSIS — Z87.891 PAST USE OF TOBACCO: ICD-10-CM

## 2022-08-01 PROCEDURE — 99213 OFFICE O/P EST LOW 20 MIN: CPT | Performed by: SURGERY

## 2022-08-01 PROCEDURE — 93798 PHYS/QHP OP CAR RHAB W/ECG: CPT

## 2022-08-01 RX ORDER — ALPRAZOLAM 1 MG/1
1 TABLET ORAL 2 TIMES DAILY PRN
Qty: 60 TABLET | Refills: 0 | Status: SHIPPED | OUTPATIENT
Start: 2022-08-01 | End: 2022-09-10 | Stop reason: SDUPTHER

## 2022-08-01 NOTE — TELEPHONE ENCOUNTER
1 yr f/u no imaging scheduled for Tues 8/1/23 with Guru LEDEZMA at 9:20 am arrival 9:05 am.    Spoke with pt who v/u.    MEB

## 2022-08-01 NOTE — PROGRESS NOTES
Chief Complaint: Susy Hanson is a 62 y.o. female who was seen in consultation at the request of Erica Muñoz  for newly diagnosed DCIS and a followup visit    History of Present Illness:  Patient presents with newly diagnosed DCIS. She noted no new masses, skin changes, nipple discharge, nipple changes prior to her most recent imaging.  Her most recent imaging includes the followin/15/2019 BILATERAL SCREENING MAMMO WITH LYLE          [facility]           SUSY HANSON  Scattered areas of fibroglandular density.   BI-RADS 1: Negative.    2021 BILATERAL SCREENING MAMMO WITH LYLE         [facility]            SUSY HANSON  There are scattered areas of fibroglandular density. 10 mm asymmetry in the central right breast, middle depth, visible on the CC view. Indeterminate calcifications in the lower outer left breast, posterior depth.  BI-RADS 0.    2021   BILATERAL DIAGNOSTIC MAMMO WITH LYLE & RIGHT BREAST US   KEIRA HANSON  MM.8 cm grouping of coarse and somewhat heterogeneous calcifications within the outer central left breast posterior third depth which are mildly suspicious. There is an oval partial circumscribed, partially obscured mass within the lower central right breast anterior/middle third depth corresponding to previously questioned asymmetry. In retrospect, this finding appears similar to multiple prior exam dating back to 2006.  US:  Right breast 6:00 position 2 cm from nipple is a 1.0 x 0.3 x 0.8 cm grouping of oval circumscribed anechoic avascular masses consistent with a benign cluster of cysts.  IMPRESSION:  1. Mildly suspicious subcentimeter grouping of calcifications within the outer central left breast posterior third depth.  RECOMMENDATION:  Recommend stereotactic guided core needle biopsy of the left breast calcifications for further evaluation.  BI-RADS 4.    2021 BILATERAL BREAST MRI               [facility]                              YADIRA BURTONSMITH  LEFT BREAST:  4:00 posterior right breast, 7.8 cm posterior to the nipple, there is a 1.2 cm AP dimension, 1.8 cm transverse dimension, 0.8 cm craniocaudal dimension irregular enhancing mass with a central biopsy clip. There is approximately 0.7 cm linear nonmass enhancement extending posteriorly from the mass. Together the overall AP extent of suspicious enhancement measures approximately 1.8 cm. The visualized axilla is within normal limits.  2. No MRI evidence of malignancy in the right breast.    She had a biopsy on the following day that showed:   06/21/2021 BIOPSY OF THE LEFT BREAST PERCUTANEOUS             [facility]         YADIRA WEN  PATHOLOGY ADDENDUM:  Final pathology is concordant.  Target: microcalcifications. Location: Outer central left breast posterior third depth. 8 g Mammotome. Sample number: 4. Marker migration on post-procedure mammogram: None.  PATHOLOGY:  Calcifications, left breast, biopsies:  Ductal carcinoma in situ, low to intermediate nuclear grade, solid type. Atypical lobular hyperplasia. No invasive carcinoma is identified. Microcalcifications identified within neoplastic ducts.  ER: 90%  MO: 95%      She has not had a breast biopsy in the past.  She has had her uterus and ovaries removed, is postmenopausal, and takes nor hormones. She took a patch for one year in 2006 and then vaginal estrogen for about 10 years.  Her family history includes the following: MA80, PA age uncertain, PGM 60s with breast cancer  Had an [employer] cancer next expanded panel sent 77 genes, sent 6-25-21- negative for mutation    She is here for evaluation.    Review of Systems:  Review of Systems   Constitutional: Negative for unexpected weight change (12 lb wt loss).   All other systems reviewed and are negative.       Past Medical and Surgical History:  Breast Biopsy History:  Patient had not had a breast biopsy prior to her cancer diagnosis.  Breast Cancer HIstory:  Patient  does not have a past medical history of breast cancer.  Breast Operations, and year:  NONE   Obstetric/Gynecologic History:  Age menstrual periods began: 9  Patient is postmenopausal due to removal of her uterus and both ovaries in the following year: 2005   Number of pregnancies:3  Number of live births: 3  Number of abortions or miscarriages: 0  Age of delivery of first child: 14  Patient breast fed, for the following lenth of time: 6 MONTHS LAST CHILD   Length of time taking birth control pills: 2 YRS   Patient took hormone replacement during the following dates: AFTER 2005, PATCH 1 YR, VAGINAL ESTROGEN MUCH LONGER.     PATIENT HAS UTERUS AND OVARIES REMOVED.       Past Surgical History:   Procedure Laterality Date   • BLADDER REPAIR     • BREAST BIOPSY     • BREAST RECONSTRUCTION Bilateral 08/26/2021    Procedure: BILATERAL PREPECTORALPLACMENT TISSUE EXPANDERS AND ALLODERM;  Surgeon: Heri Arreaga MD;  Location: [facility];  Service: Plastics;  Laterality: Bilateral;   • BREAST TISSUE EXPANDER REMOVAL INSERTION OF IMPLANT Bilateral 12/23/2021    Procedure: BILATERAL REMOVAL TISSUE EXPANDERS AND PLACEMENT OF IMPLANTS;  Surgeon: Heri Arreaga MD;  Location: [facility];  Service: Plastics;  Laterality: Bilateral;   • CARDIAC CATHETERIZATION Left 04/13/2022    Procedure: Cardiac Catheterization/Vascular Study;  Surgeon: Christo Murphy MD;  Location: [facility] INVASIVE LOCATION;  Service: Cardiovascular;  Laterality: Left;   • CARDIOVASCULAR STRESS TEST  2020   • COLONOSCOPY  11/2012   • HYSTERECTOMY     • LYMPH NODE BIOPSY  8/29/2021   • MASTECTOMY W/ SENTINEL NODE BIOPSY Bilateral 08/26/2021    Procedure: bilateral total mastectomy, left axillary sentinel lymph node biopsy, possible reconstruction.;  Surgeon: Asia Perkins MD;  Location: [facility];  Service: General;  Laterality: Bilateral;   • TUBAL ABDOMINAL LIGATION  1981       Past Medical History:   Diagnosis Date   •  Anxiety    • Breast cancer (HCC)     LEFT BREAST 2021   • CHF (congestive heart failure) (HCC)    • Depression    • DM (diabetes mellitus) ([facility])    • HL (hearing loss)    • Hyperlipidemia    • Hypertension     SAW CARDIO/ STRESS TEST  - NOW MEDS MANAGED BY PCP    • Irritable bowel syndrome    • Peripheral neuropathy    • PONV (postoperative nausea and vomiting)    • Sleep apnea     WEARS CPAP       Prior Hospitalizations, other than for surgery or childbirth, and year:  [facility]  AND     Social History     Socioeconomic History   • Marital status:    Tobacco Use   • Smoking status: Former Smoker     Packs/day: 2.00     Years: 30.00     Pack years: 60.00     Types: Cigarettes, Cigarettes     Start date: 1972     Quit date: 2008     Years since quittin.5   • Smokeless tobacco: Never Used   Vaping Use   • Vaping Use: Never used   Substance and Sexual Activity   • Alcohol use: No   • Drug use: No   • Sexual activity: Yes     Partners: Male     Birth control/protection: None     Patient is .  Patient is retired.  Patient drinks 2 servings of caffeine per day.    Family History:  Family History   Problem Relation Age of Onset   • Alzheimer's disease Father    • Diabetes Father    • Heart disease Father    • Other Father    • Sleep apnea Father    • Snoring Father    • Hyperlipidemia Father    • Other Mother    • Diabetes Mother    • Heart disease Mother    • Sleep apnea Mother    • Snoring Mother    • COPD Mother    • Hyperlipidemia Mother    • Diabetes Sister    • Heart disease Sister    • Other Sister    • Sleep apnea Sister    • Snoring Sister    • COPD Sister    • Breast cancer Paternal Grandmother 60   • Cancer Paternal Grandmother    • Diabetes Paternal Grandmother    • Breast cancer Maternal Aunt 80   • Breast cancer Paternal Aunt    • Diabetes Maternal Grandmother    • Hyperlipidemia Sister    • Hyperlipidemia Brother    • Hyperlipidemia Son    • Malig Hyperthermia Neg  "Hx        Vital Signs:  /76   Pulse 94   Ht 162.6 cm (64\")   Wt 68 kg (150 lb)   LMP  (LMP Unknown)   SpO2 98%   Breastfeeding No   BMI 25.75 kg/m²      Medications:    Current Outpatient Medications:   •  ALPRAZolam (XANAX) 1 MG tablet, Take 1 tablet by mouth 2 (Two) Times a Day As Needed for Anxiety or Sleep., Disp: 60 tablet, Rfl: 0  •  atorvastatin (LIPITOR) 20 MG tablet, Take 1 tablet by mouth Daily., Disp: 90 tablet, Rfl: 2  •  bumetanide (BUMEX) 1 MG tablet, Take 1 tablet by mouth Daily., Disp: 90 tablet, Rfl: 1  •  cetirizine (zyrTEC) 10 MG tablet, , Disp: , Rfl:   •  metFORMIN (GLUCOPHAGE) 1000 MG tablet, Take one tablet by mouth with breakfast and 1 tablets with supper, Disp: , Rfl:   •  metoprolol succinate XL (TOPROL-XL) 100 MG 24 hr tablet, take 1 tablet by oral route  every day, Disp: 90 tablet, Rfl: 3  •  sacubitril-valsartan (Entresto) 24-26 MG tablet, Take 1 tablet by mouth Every 12 (Twelve) Hours., Disp: 180 tablet, Rfl: 1  •  Triamcinolone Acetonide (NASACORT) 55 MCG/ACT nasal inhaler, APPLY 2 SPRAYS BY NASAL ROUTE DAILY FOR 30 DAYS, Disp: 10.8 mL, Rfl: 6     Allergies:  Allergies   Allergen Reactions   • Hydrocodone-Acetaminophen Hives     Lortab, takes tylenol       Physical Examination:  /76   Pulse 94   Ht 162.6 cm (64\")   Wt 68 kg (150 lb)   LMP  (LMP Unknown)   SpO2 98%   Breastfeeding No   BMI 25.75 kg/m²   General Appearance:  Patient is in no distress.  She is well kept and has an overweight build.   Psychiatric:  Patient with appropriate mood and affect. Alert and oriented to self, time, and place.    Breast, RIGHT:  medium sized, asymmetric with the contralateral side, RIGHT slightly larger than LEFT.  Breast skin is without erythema, edema, rashes.  There are no visible abnormalities upon inspection during the arm-raising maneuver or with hands on hips in the sitting position. There is no nipple retraction, discharge or nipple/areolar skin changes.There are no " masses palpable in the sitting or supine positions.    Breast, LEFT:  medium sized, asymmetric with the contralateral side, RIGHT slightly larger than left.  Breast skin is without erythema, edema, rashes.  There are no visible abnormalities upon inspection during the arm-raising maneuver or with hands on hips in the sitting position. There is no nipple retraction, discharge or nipple/areolar skin changes.There are no masses palpable in the sitting or supine positions. Well healing mammotomy with no erythema, warmth, drainage. No palpable mass.    Lymphatic:  There is no axillary, cervical, infraclavicular, or supraclavicular adenopathy bilaterally.  Eyes:  Pupils are round and reactive to light.  Cardiovascular:  Heart rate and rhythm are regular.  Respiratory:  Lungs are clear bilaterally with no crackles or wheezes in any lung field.  Gastrointestinal:  Abdomen is soft, nondistended, and nontender.     Musculoskeletal:  Good strength in all 4 extremities.   There is good range of motion in both shoulders.    Skin:  No new skin lesions or rashes on the skin excluding the breast (see breast exam above).        Imagin2018 BILATERAL SCREENING MAMMO WITH LYLE            [facility]         YADIRA BETTYE  Scattered areas of fibroglandular density.   BI-RADS 2: Benign Findings.    11/15/2019 BILATERAL SCREENING MAMMO WITH LYLE          [facility]           YADIRA BETTYE  Scattered areas of fibroglandular density.   BI-RADS 1: Negative.    2021 BILATERAL SCREENING MAMMO WITH LYLE         [facility]            YADIRA BETTYE  There are scattered areas of fibroglandular density. 10 mm asymmetry in the central right breast, middle depth, visible on the CC view. Indeterminate calcifications in the lower outer left breast, posterior depth.  BI-RADS 0.    2021   BILATERAL DIAGNOSTIC MAMMO WITH LYLE & RIGHT BREAST US   [facility]   YADIRA BETTYE  MM.8 cm grouping of coarse and somewhat heterogeneous  calcifications within the outer central left breast posterior third depth which are mildly suspicious. There is an oval partial circumscribed, partially obscured mass within the lower central right breast anterior/middle third depth corresponding to previously questioned asymmetry. In retrospect, this finding appears similar to multiple prior exam dating back to 06/29/2006.  US:  Right breast 6:00 position 2 cm from nipple is a 1.0 x 0.3 x 0.8 cm grouping of oval circumscribed anechoic avascular masses consistent with a benign cluster of cysts.  IMPRESSION:  1. Mildly suspicious subcentimeter grouping of calcifications within the outer central left breast posterior third depth.  RECOMMENDATION:  Recommend stereotactic guided core needle biopsy of the left breast calcifications for further evaluation.  BI-RADS 4.    07/14/2021 BILATERAL BREAST MRI               [facility]                             YADIRA GUZMANITH  LEFT BREAST:  4:00 posterior right breast, 7.8 cm posterior to the nipple, there is a 1.2 cm AP dimension, 1.8 cm transverse dimension, 0.8 cm craniocaudal dimension irregular enhancing mass with a central biopsy clip. There is approximately 0.7 cm linear nonmass enhancement extending posteriorly from the mass. Together the overall AP extent of suspicious enhancement measures approximately 1.8 cm. The visualized axilla is within normal limits.  2. No MRI evidence of malignancy in the right breast.    Pathology:  06/21/2021 BIOPSY OF THE LEFT BREAST [facility]CUTANE[facility]             [facility]         YADIRA GUZMANITH  PATHOLOGY ADDENDUM:  Final pathology is concordant.  Target: microcalcifications. Location: Outer central left breast posterior third depth. 8 g Mammotome. Sample number: 4. Marker migration on post-procedure mammogram: None.  PATHOLOGY:  Calcifications, left breast, biopsies:  Ductal carcinoma in situ, low to intermediate nuclear grade, solid type. Atypical lobular hyperplasia. No invasive carcinoma is  identified. Microcalcifications identified within neoplastic ducts.  ER: 90%  NJ: 95%    07/01/21 [facility] OUTSIDE PATH REVIEW  YADIRA WEN   Final Diagnosis   Outside Breast Consultation on Material from [facility] ([id number]):     1.   Breast, Left Outer Central Posterior One-third, Core Biopsy for Calcifications:               A. Ductal carcinoma in situ (DCIS), low to intermediate nuclear grade with solid and cribriform        architecture with spotty single cell and focal expansive necrosis.  B. In situ carcinoma biomarkers: ER positive (85% intermediate, Sendy 7/7); NJ positive (90%, strong,        Sendy score 8/8). Ki-67 (performed and interpreted in-house) 30%.     [facility]                  PROGRESS NOTES    YADIRA WEN  Diabetes. Chest pain. Hypertension.    06/25/2021 HEMATOLOGY/ONCOLOGY PROGRESS NOTES   DR DEANNA WEN  Comprehensive gene analysis with cancer next technology. Follow-up with Dr. Perkins for surgical discussions.        LABS:   06/25/2021 CANCERNEXT-EXPANDED                      [email or web address] [facility]                     YADIRA WEN  Analyses of 77 genes. Negative: No Clinically Significant Variants Detected.       Procedures:      Assessment:  No diagnosis found.1-2  Left breast posterior one third 4:00, 8 cm from the nipple, single propeller-shaped marker in the breast.  8 mm of calcification on mammogram, 1.8 cm masslike area on MRI with 8 mm of this tracking posteriorly from the clip and biopsy site.  On bedside ultrasound the marker is anterior and lateral to the imaging abnormality.  Dr. Ramos in situ, low to intermediate grade, solid, cribriform, with spotty single cell and focal expansive necrosis.  Estrogen 85, progesterone 90%.    Clinical stage Tis N0    Note residual calcifications on postbiopsy images.    3-  Diabetes longstanding diagnosed in 2008.  Started taking injectable insulin in 2011.  Currently on Metformin and injectable Humalog.   Tells me that sugars run good.  Managed by Dr. Avila    4-  2 to 3 pack a day of cigarettes for 36 years stopped in 2008      5-  Anxiety about her breast cancer risk and current diagnosis      6-     RICARDO NEAL age uncertain, PGM 60s with breast cancer  Had an [employer] cancer next expanded panel sent 77 genes, sent 6-25-21- negative for mutation      7-  RIGHT larger than LEFT affected    8  Sleep apnea, uses CPAP at night.    Plan:  The patient goes by Susy.  She works at [employer] in [employer].  She is here today in the office with her daughter.    We reviewed the difference in systemic therapy versus locoregional. We discussed that she will be seeing a medical oncologist in the adjuvant setting. We discussed that for DCIS, that we would expect a 98% breast cancer specific survival and that the medications offered by medical oncology are for risk reduction for additional breast cancers and for a reduction in ipsilateral locoregional recurrence.     We discussed that for unifocal DCIS, there are 2 options for locoregional treatment that have equivalent survival: total mastectomy versus lumpectomy and radiation, the former with sentinel node biopsy.     We discussed that she would be an excellent candidate for a needle localized lumpectomy with bilateral oncoplastics, based on her baseline asymmetry.  She has significant concern about leaving residual breast tissue and her future risk for developing breast cancer.  We discussed that this would be 1 %/year risk for developing a second breast cancer which would be reduced to 2.5 %/year if taking the hormonal blockade.      She understands the risks of mastectomy including bleeding, infection, seroma and chance of skin ischemia/necrosis. She understands the risks of  sentinel node biopsy, including 7% chance of lymphedema, injury to nerves or vessels in the axilla,  seroma. We discussed that we will not proceed with a frozen section analysis of the  sentinel node in the operating room, due to the presence of ductal carcinoma in situ and no invasive encore.      She understands the risks and benefits and wishes to proceed with a bilateral mastectomy.  We discussed the procedure of a bilateral total mastectomy, left axillary sentinel lymph node biopsy, possible reconstruction.    I will plan to take skin overlying the disease if possible.  We discussed that the diabetes increase her risk for wound healing difficulties and infection.    We will arrange for her to see plastic surgery.  We will arrange for her to see Talia Martinez from behavioral oncology.  We discussed that Talia may be able to help her with her anxiety as she proceeds through this journey.  I asked her to bring her CPAP machine.  She has seen Dr. Ramos from medical oncology and will see her postoperatively.  Note that she has an allergy to Lortab.          NCCN guidelines have been followed.    We gave her EMLA cream and tegederm for her sentinel node procedure, and arranged for her to see our nurse navigator.     She will receive a recommendation about radiation after surgery.I am hopeful that she will not need this.      Susan Lynn MA        We have spent 60 minutes in face to face visit today, 45 in face to face .      Next Appointment:  No follow-ups on file.      EMR Dragon/transcription disclaimer:    Much of this encounter note is an electronic transcription/translocation of spoken language to printed text.  The electronic translation of spoken language may permit erroneous, or at times, nonsensical words or phrases to be inadvertently transcribed.  Although I have reviewed the note from such areas, some may still exist.

## 2022-08-01 NOTE — PROGRESS NOTES
Patient EF has not improved. Actually lower, 15-22%. Feeling 'down' about that. Has been coming to cardiac rehab and progressing. Individual discussion with RN for education and emotional support. See scanned session report. ALKO Gutierrez

## 2022-08-01 NOTE — PROGRESS NOTES
Chief Complaint: Susy Hanson is a 62 y.o. female who was seen in consultation at the request of Erica Pina*  for newly diagnosed DCIS   History of Present Illness:  Patient presents with newly diagnosed DCIS. She noted no new masses, skin changes, nipple discharge, nipple changes prior to her most recent imaging.  Her most recent imaging includes the followin/15/2019 BILATERAL SCREENING MAMMO WITH LYLE          [facility]           SUSY HANSON  Scattered areas of fibroglandular density.   BI-RADS 1: Negative.    2021 BILATERAL SCREENING MAMMO WITH LYLE         [facility]            SUSY HANSON  There are scattered areas of fibroglandular density. 10 mm asymmetry in the central right breast, middle depth, visible on the CC view. Indeterminate calcifications in the lower outer left breast, posterior depth.  BI-RADS 0.    2021   BILATERAL DIAGNOSTIC MAMMO WITH LYLE & RIGHT BREAST US   KEIRA HANSON  MM.8 cm grouping of coarse and somewhat heterogeneous calcifications within the outer central left breast posterior third depth which are mildly suspicious. There is an oval partial circumscribed, partially obscured mass within the lower central right breast anterior/middle third depth corresponding to previously questioned asymmetry. In retrospect, this finding appears similar to multiple prior exam dating back to 2006.  US:  Right breast 6:00 position 2 cm from nipple is a 1.0 x 0.3 x 0.8 cm grouping of oval circumscribed anechoic avascular masses consistent with a benign cluster of cysts.  IMPRESSION:  1. Mildly suspicious subcentimeter grouping of calcifications within the outer central left breast posterior third depth.  RECOMMENDATION:  Recommend stereotactic guided core needle biopsy of the left breast calcifications for further evaluation.  BI-RADS 4.    2021 BILATERAL BREAST MRI               [facility]                             SUSY  BETTYE  LEFT BREAST:  4:00 posterior right breast, 7.8 cm posterior to the nipple, there is a 1.2 cm AP dimension, 1.8 cm transverse dimension, 0.8 cm craniocaudal dimension irregular enhancing mass with a central biopsy clip. There is approximately 0.7 cm linear nonmass enhancement extending posteriorly from the mass. Together the overall AP extent of suspicious enhancement measures approximately 1.8 cm. The visualized axilla is within normal limits.  2. No MRI evidence of malignancy in the right breast.    She had a biopsy on the following day that showed:   06/21/2021 BIOPSY OF THE LEFT BREAST PERCUTANEOUS             KEIRA         YADIRA BETTYE  PATHOLOGY ADDENDUM:  Final pathology is concordant.  Target: microcalcifications. Location: Outer central left breast posterior third depth. 8 g Mammotome. Sample number: 4. Marker migration on post-procedure mammogram: None.  PATHOLOGY:  Calcifications, left breast, biopsies:  Ductal carcinoma in situ, low to intermediate nuclear grade, solid type. Atypical lobular hyperplasia. No invasive carcinoma is identified. Microcalcifications identified within neoplastic ducts.  ER: 90%  WA: 95%      She has not had a breast biopsy in the past.  She has had her uterus and ovaries removed, is postmenopausal, and takes nor hormones. She took a patch for one year in 2006 and then vaginal estrogen for about 10 years.  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  Single benign sentinel lymph node  0 of 1 sentinel node.  Right total mastectomy benign breast tissue with focal micropapilloma, squamous metaplasia of lactiferous ducts.       Pathologic stage Tis N0 stage 0.     She has had 1 of 4 drains removed.  She denies any redness warmth or drainage at her incisions.  She is seeing Dr. Arreaga this afternoon.        Interval HIstory:  In the interim, Susy has done okay.  In December 2021 she had her expanders exchanged for implants.  Dr. Arreaga.  She then got COVID in April and shortly thereafter developed congestive heart failure, presenting with shortness of breath.  She has lost 12 pounds intentionally in cardiac rehab.  She is going to see a specialist and congestive heart failure next week.  She has been told that her ejection fraction is 10 to 15% yet she laid a porch in her backyard this past weekend without difficulty.    She denies any skin nodules or discolorations on either side.  She denies any headache, bone pain, belly pain, cough, changes in vision or gait.        Review of Systems:  Review of Systems   Constitutional: Negative for unexpected weight change (12 lb wt loss).   All other systems reviewed and are negative.       Past Medical and Surgical History:  Breast Biopsy History:  Patient had not had a breast biopsy prior to her cancer diagnosis.  Breast Cancer HIstory:  Patient does not have a past medical history of breast cancer.  Breast Operations, and year:  NONE   Obstetric/Gynecologic History:  Age menstrual periods began: 9  Patient is postmenopausal due to removal of her uterus and both ovaries in the following year: 2005   Number of pregnancies:3  Number of live births: 3  Number of abortions or miscarriages: 0  Age of delivery of first child: 14  Patient breast fed, for the following lenth of time: 6 MONTHS LAST CHILD   Length of time taking birth control pills: 2 YRS   Patient took hormone replacement during the following dates: AFTER 2005, PATCH 1 YR, VAGINAL ESTROGEN  MUCH LONGER.     PATIENT HAS UTERUS AND OVARIES REMOVED.       Past Surgical History:   Procedure Laterality Date   • BLADDER REPAIR     • BREAST BIOPSY     • BREAST RECONSTRUCTION Bilateral 08/26/2021    Procedure: BILATERAL PREPECTORALPLACMENT TISSUE EXPANDERS AND ALLODERM;  Surgeon: Heri Arreaga MD;  Location: [facility];  Service: Plastics;  Laterality: Bilateral;   • BREAST TISSUE EXPANDER REMOVAL INSERTION OF IMPLANT Bilateral 12/23/2021    Procedure: BILATERAL REMOVAL TISSUE EXPANDERS AND PLACEMENT OF IMPLANTS;  Surgeon: Heri Arreaga MD;  Location: [facility] OR;  Service: Plastics;  Laterality: Bilateral;   • CARDIAC CATHETERIZATION Left 04/13/2022    Procedure: Cardiac Catheterization/Vascular Study;  Surgeon: Christo Murphy MD;  Location: [facility] INVASIVE LOCATION;  Service: Cardiovascular;  Laterality: Left;   • CARDIOVASCULAR STRESS TEST  2020   • COLONOSCOPY  11/2012   • HYSTERECTOMY     • LYMPH NODE BIOPSY  8/29/2021   • MASTECTOMY W/ SENTINEL NODE BIOPSY Bilateral 08/26/2021    Procedure: bilateral total mastectomy, left axillary sentinel lymph node biopsy, possible reconstruction.;  Surgeon: Asia Perkins MD;  Location: [facility];  Service: General;  Laterality: Bilateral;   • TUBAL ABDOMINAL LIGATION  1981       Past Medical History:   Diagnosis Date   • Anxiety 2000   • Breast cancer (HCC)     LEFT BREAST 7/2021   • CHF (congestive heart failure) ([facility])    • Depression    • DM (diabetes mellitus) ([facility])    • HL (hearing loss)    • Hyperlipidemia    • Hypertension     SAW CARDIO/ STRESS TEST 2020 - NOW MEDS MANAGED BY PCP    • Irritable bowel syndrome 2006   • Peripheral neuropathy    • PONV (postoperative nausea and vomiting)    • Sleep apnea     WEARS CPAP       Prior Hospitalizations, other than for surgery or childbirth, and year:  [facility] 2018 AND 2019    Social History     Socioeconomic History   • Marital status:    Tobacco Use   • Smoking status:  "Former Smoker     Packs/day: 2.00     Years: 30.00     Pack years: 60.00     Types: Cigarettes, Cigarettes     Start date: 1972     Quit date: 2008     Years since quittin.5   • Smokeless tobacco: Never Used   Vaping Use   • Vaping Use: Never used   Substance and Sexual Activity   • Alcohol use: No   • Drug use: No   • Sexual activity: Yes     Partners: Male     Birth control/protection: None     Patient is .  Patient is retired.  Patient drinks 2 servings of caffeine per day.    Family History:  Family History   Problem Relation Age of Onset   • Alzheimer's disease Father    • Diabetes Father    • Heart disease Father    • Other Father    • Sleep apnea Father    • Snoring Father    • Hyperlipidemia Father    • Other Mother    • Diabetes Mother    • Heart disease Mother    • Sleep apnea Mother    • Snoring Mother    • COPD Mother    • Hyperlipidemia Mother    • Diabetes Sister    • Heart disease Sister    • Other Sister    • Sleep apnea Sister    • Snoring Sister    • COPD Sister    • Breast cancer Paternal Grandmother 60   • Cancer Paternal Grandmother    • Diabetes Paternal Grandmother    • Breast cancer Maternal Aunt 80   • Breast cancer Paternal Aunt    • Diabetes Maternal Grandmother    • Hyperlipidemia Sister    • Hyperlipidemia Brother    • Hyperlipidemia Son    • Malig Hyperthermia Neg Hx        Vital Signs:  /76   Pulse 94   Ht 162.6 cm (64\")   Wt 68 kg (150 lb)   LMP  (LMP Unknown)   SpO2 98%   Breastfeeding No   BMI 25.75 kg/m²      Medications:    Current Outpatient Medications:   •  ALPRAZolam (XANAX) 1 MG tablet, Take 1 tablet by mouth 2 (Two) Times a Day As Needed for Anxiety or Sleep., Disp: 60 tablet, Rfl: 0  •  atorvastatin (LIPITOR) 20 MG tablet, Take 1 tablet by mouth Daily., Disp: 90 tablet, Rfl: 2  •  bumetanide (BUMEX) 1 MG tablet, Take 1 tablet by mouth Daily., Disp: 90 tablet, Rfl: 1  •  cetirizine (zyrTEC) 10 MG tablet, , Disp: , Rfl:   •  metFORMIN " "(GLUCOPHAGE) 1000 MG tablet, Take one tablet by mouth with breakfast and 1 tablets with supper, Disp: , Rfl:   •  metoprolol succinate XL (TOPROL-XL) 100 MG 24 hr tablet, take 1 tablet by oral route  every day, Disp: 90 tablet, Rfl: 3  •  sacubitril-valsartan (Entresto) 24-26 MG tablet, Take 1 tablet by mouth Every 12 (Twelve) Hours., Disp: 180 tablet, Rfl: 1  •  Triamcinolone Acetonide (NASACORT) 55 MCG/ACT nasal inhaler, APPLY 2 SPRAYS BY NASAL ROUTE DAILY FOR 30 DAYS, Disp: 10.8 mL, Rfl: 6     Allergies:  Allergies   Allergen Reactions   • Hydrocodone-Acetaminophen Hives     Lortab, takes tylenol       Physical Examination:  /76   Pulse 94   Ht 162.6 cm (64\")   Wt 68 kg (150 lb)   LMP  (LMP Unknown)   SpO2 98%   Breastfeeding No   BMI 25.75 kg/m²   General Appearance:  Patient is in no distress.  She is well kept and has an overweight build.   Psychiatric:  Patient with appropriate mood and affect. Alert and oriented to self, time, and place.    Breast, RIGHT:  Surgically absent with well healed transverse incision. No skin nodules or discolorations.  No nipple areolar complex constructed or tattooed at this juncture.      Breast, LEFT:   Surgically absent with well healed transverse incision. No skin nodules or discolorations.  No nipple areolar complex constructed or tattooed at this juncture.    Lymphatic:  There is no axillary, cervical, infraclavicular, or supraclavicular adenopathy bilaterally.  Eyes:  Pupils are round and reactive to light.  Cardiovascular:  Heart rate and rhythm are regular.  Respiratory:  Lungs are clear bilaterally with no crackles or wheezes in any lung field.  Gastrointestinal:  Abdomen is soft, nondistended, and nontender.     Musculoskeletal:  Good strength in all 4 extremities.   There is good range of motion in both shoulders.    Skin:  No new skin lesions or rashes on the skin excluding the breast (see breast exam above).        Imagin2018 BILATERAL " SCREENING MAMMO WITH LYLE            [facility]         YADIRA WEN  Scattered areas of fibroglandular density.   BI-RADS 2: Benign Findings.    11/15/2019 BILATERAL SCREENING MAMMO WITH LYLE          [facility]           YADIRA WEN  Scattered areas of fibroglandular density.   BI-RADS 1: Negative.    2021 BILATERAL SCREENING MAMMO WITH LYLE         [facility]            YADIRA WEN  There are scattered areas of fibroglandular density. 10 mm asymmetry in the central right breast, middle depth, visible on the CC view. Indeterminate calcifications in the lower outer left breast, posterior depth.  BI-RADS 0.    2021   BILATERAL DIAGNOSTIC MAMMO WITH LYLE & RIGHT BREAST US   BHYADIRA WEN  MM.8 cm grouping of coarse and somewhat heterogeneous calcifications within the outer central left breast posterior third depth which are mildly suspicious. There is an oval partial circumscribed, partially obscured mass within the lower central right breast anterior/middle third depth corresponding to previously questioned asymmetry. In retrospect, this finding appears similar to multiple prior exam dating back to 2006.  US:  Right breast 6:00 position 2 cm from nipple is a 1.0 x 0.3 x 0.8 cm grouping of oval circumscribed anechoic avascular masses consistent with a benign cluster of cysts.  IMPRESSION:  1. Mildly suspicious subcentimeter grouping of calcifications within the outer central left breast posterior third depth.  RECOMMENDATION:  Recommend stereotactic guided core needle biopsy of the left breast calcifications for further evaluation.  BI-RADS 4.    2021 BILATERAL BREAST MRI               [facility]                             YADIRA WEN  LEFT BREAST:  4:00 posterior right breast, 7.8 cm posterior to the nipple, there is a 1.2 cm AP dimension, 1.8 cm transverse dimension, 0.8 cm craniocaudal dimension irregular enhancing mass with a central biopsy clip. There is  approximately 0.7 cm linear nonmass enhancement extending posteriorly from the mass. Together the overall AP extent of suspicious enhancement measures approximately 1.8 cm. The visualized axilla is within normal limits.  2. No MRI evidence of malignancy in the right breast.    Pathology:  06/21/2021 BIOPSY OF THE LEFT BREAST PERCUTANEOUS             KEIRA WEN  PATHOLOGY ADDENDUM:  Final pathology is concordant.  Target: microcalcifications. Location: Outer central left breast posterior third depth. 8 g Mammotome. Sample number: 4. Marker migration on post-procedure mammogram: None.  PATHOLOGY:  Calcifications, left breast, biopsies:  Ductal carcinoma in situ, low to intermediate nuclear grade, solid type. Atypical lobular hyperplasia. No invasive carcinoma is identified. Microcalcifications identified within neoplastic ducts.  ER: 90%  DC: 95%    07/01/21 [facility] OUTSIDE PATH REVIEW  YADIRA WEN   Final Diagnosis   Outside Breast Consultation on Material from [facility] ([id number]):     1.   Breast, Left Outer Central Posterior One-third, Core Biopsy for Calcifications:               A. Ductal carcinoma in situ (DCIS), low to intermediate nuclear grade with solid and cribriform        architecture with spotty single cell and focal expansive necrosis.  B. In situ carcinoma biomarkers: ER positive (85% intermediate, Sendy 7/7); DC positive (90%, strong,        Sendy score 8/8). Ki-67 (performed and interpreted in-house) 30%.     [facility]                  PROGRESS NOTES    YADIRA WEN  Diabetes. Chest pain. Hypertension.    06/25/2021 HEMATOLOGY/ONCOLOGY PROGRESS NOTES   DR DEANNA WEN  Comprehensive gene analysis with cancer next technology. Follow-up with Dr. Perkins for surgical discussions.        LABS:   06/25/2021 CANCERNEXT-EXPANDED                      [facility]                     YADIRA WEN  Analyses of 77 genes. Negative: No Clinically  Significant Variants Detected.       Procedures:      Assessment:   Diagnosis Plan   1. Breast neoplasm, Tis (DCIS), left     2. Breast calcifications on mammogram     3. Type 2 diabetes mellitus with hyperglycemia, with long-term current use of insulin (HCC)     4. Past use of tobacco     5. Anxiety about health     6. FH: breast cancer     7. Sleep apnea, unspecified type     1-2  Left breast posterior one third 4:00, 8 cm from the nipple, single propeller-shaped marker in the breast.  8 mm of calcification on mammogram, 1.8 cm masslike area on MRI with 8 mm of this tracking posteriorly from the clip and biopsy site.  On bedside ultrasound the marker is anterior and lateral to the imaging abnormality.  Dr. Ramos in situ, low to intermediate grade, solid, cribriform, with spotty single cell and focal expansive necrosis.  Estrogen 85, progesterone 90%.    Clinical stage Tis N0    Note residual calcifications on postbiopsy images.    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  Single benign sentinel lymph node 0 of 1 sentinel node.  Right total mastectomy benign breast tissue with focal micropapilloma, squamous metaplasia of lactiferous ducts.       Pathologic stage Tis N0 stage 0.     - no need for medical oncology or radiation oncology.    December 2021 she had her expanders exchanged for implants.  Dr. Arreaga.      3-  Diabetes longstanding diagnosed in 2008.  Started taking injectable insulin in 2011.  Currently on Metformin and injectable Humalog.  Tells me that sugars run good.  Managed by Dr. Avila    4-  2 to 3 pack a day of cigarettes for 36 years stopped in 2008      5-  Anxiety about her breast cancer risk and  current diagnosis      6-     MA80, PA age uncertain, PGM 60s with breast cancer  Had an [employer] cancer next expanded panel sent 77 genes, sent 6-25-21- negative for mutation    7  Sleep apnea, uses CPAP at night.    Plan:  The patient goes by Susy.  She works at [employer] in [employer].    We reviewed her interval history, treatment, examination together today.  There is no evidence of recurrence.  We discussed her heart issues and she is seeing a specialist in the heart failure clinic next week.  It is interesting that her ejection fraction is reported at 10 to 15% yet she participates in coronary rehab and does fairly extreme physical activity with no limitations.  We reviewed her surveillance for her breast cancer today.  We discussed that there is no role for mammography.  We discussed that MRI would be indicated if Dr. Arreaga had a concern about her implants.  We discussed that if there were any exam findings that we would follow these with ultrasound.    We will follow her in our office for 5 years.  She is not seeing medical oncology or radiation oncology.  I will have her back in 1 year to see our nurse practitioner.      I asked her to continue her self breast exam and to call us in the interim with any concerns would be happy to see her back sooner.      Asia Perkins MD    Next Appointment:  Return in about 1 year (around 8/1/2023) for no imaging, with nurse practitioner.    Today I spent 20 minutes doing the following: Reviewing records, labs, outside imaging and reports in preparation for the patient visit; obtaining medical history; performing the physical exam; counseling and educating the patient and any available family or caregivers; ordering medications, tests or procedures; coordinating care with any other physicians on her care team as needed, and documenting all of the above in the medical record as well as sending communications with her other healthcare professionals.    EMR  Dragon/transcription disclaimer:    Much of this encounter note is an electronic transcription/translocation of spoken language to printed text.  The electronic translation of spoken language may permit erroneous, or at times, nonsensical words or phrases to be inadvertently transcribed.  Although I have reviewed the note from such areas, some may still exist.

## 2022-08-02 ENCOUNTER — OFFICE VISIT (OUTPATIENT)
Dept: CARDIOLOGY | Facility: CLINIC | Age: 62
End: 2022-08-02

## 2022-08-02 ENCOUNTER — PREP FOR SURGERY (OUTPATIENT)
Dept: OTHER | Facility: HOSPITAL | Age: 62
End: 2022-08-02

## 2022-08-02 ENCOUNTER — APPOINTMENT (OUTPATIENT)
Dept: CARDIAC REHAB | Facility: HOSPITAL | Age: 62
End: 2022-08-02

## 2022-08-02 VITALS
WEIGHT: 147 LBS | SYSTOLIC BLOOD PRESSURE: 152 MMHG | DIASTOLIC BLOOD PRESSURE: 81 MMHG | BODY MASS INDEX: 25.1 KG/M2 | OXYGEN SATURATION: 99 % | HEIGHT: 64 IN | HEART RATE: 77 BPM

## 2022-08-02 DIAGNOSIS — I42.9 CARDIOMYOPATHY: Primary | ICD-10-CM

## 2022-08-02 DIAGNOSIS — I10 ESSENTIAL HYPERTENSION: ICD-10-CM

## 2022-08-02 DIAGNOSIS — I50.21 ACUTE SYSTOLIC HEART FAILURE: Primary | ICD-10-CM

## 2022-08-02 DIAGNOSIS — I44.7 LBBB (LEFT BUNDLE BRANCH BLOCK): ICD-10-CM

## 2022-08-02 DIAGNOSIS — I42.8 NICM (NONISCHEMIC CARDIOMYOPATHY): ICD-10-CM

## 2022-08-02 PROCEDURE — 99205 OFFICE O/P NEW HI 60 MIN: CPT | Performed by: INTERNAL MEDICINE

## 2022-08-02 RX ORDER — SODIUM CHLORIDE 0.9 % (FLUSH) 0.9 %
3 SYRINGE (ML) INJECTION EVERY 12 HOURS SCHEDULED
Status: CANCELLED | OUTPATIENT
Start: 2022-08-02

## 2022-08-02 RX ORDER — SODIUM CHLORIDE 0.9 % (FLUSH) 0.9 %
3-10 SYRINGE (ML) INJECTION AS NEEDED
Status: CANCELLED | OUTPATIENT
Start: 2022-08-02

## 2022-08-02 NOTE — PROGRESS NOTES
HP      Name: Susy Hanson ADMIT: (Not on file)   : 1960  PCP: Erica Avila MD    MRN: [id number] LOS: 0 days   AGE/SEX: 62 y.o. female  ROOM: Room/bed info not found     Chief Complaint   Patient presents with   • Consult     Pacemaker        Subjective        History of present illness  Susy Hanson is a 62-year-old female patient who has cardiomyopathy, mostly nonischemic, chronic left bundle branch block, diabetes, dyslipidemia, hypertension, breast cancer status post bilateral mastectomy in 2021, with 1 lymph node removal from the left.  Also had reconstructive surgery bilaterally afterwards.  Patient is here today for consideration of ICD implant.  She does have class II heart failure symptoms which is currently well managed with Bumex, Entresto and Toprol.      Past Medical History:   Diagnosis Date   • Anxiety    • Breast cancer (HCC)     LEFT BREAST 2021   • CHF (congestive heart failure) ([facility])    • Depression    • DM (diabetes mellitus) ([facility])    • HL (hearing loss)    • Hyperlipidemia    • Hypertension     SAW CARDIO/ STRESS TEST  - NOW MEDS MANAGED BY PCP    • Irritable bowel syndrome    • Peripheral neuropathy    • PONV (postoperative nausea and vomiting)    • Sleep apnea     WEARS CPAP     Past Surgical History:   Procedure Laterality Date   • BLADDER REPAIR     • BREAST BIOPSY     • BREAST RECONSTRUCTION Bilateral 2021    Procedure: BILATERAL PREPECTORALPLACMENT TISSUE EXPANDERS AND ALLODERM;  Surgeon: Heri Arreaga MD;  Location: [facility];  Service: Plastics;  Laterality: Bilateral;   • BREAST TISSUE EXPANDER REMOVAL INSERTION OF IMPLANT Bilateral 2021    Procedure: BILATERAL REMOVAL TISSUE EXPANDERS AND PLACEMENT OF IMPLANTS;  Surgeon: Heri Arreaga MD;  Location: [facility] OR;  Service: Plastics;  Laterality: Bilateral;   • CARDIAC CATHETERIZATION Left 2022    Procedure: Cardiac  Catheterization/Vascular Study;  Surgeon: Christo Mruphy MD;  Location: [facility] CATH INVASIVE LOCATION;  Service: Cardiovascular;  Laterality: Left;   • CARDIOVASCULAR STRESS TEST     • COLONOSCOPY  2012   • HYSTERECTOMY     • LYMPH NODE BIOPSY  2021   • MASTECTOMY W/ SENTINEL NODE BIOPSY Bilateral 2021    Procedure: bilateral total mastectomy, left axillary sentinel lymph node biopsy, possible reconstruction.;  Surgeon: Asia Perkins MD;  Location: [facility] MAIN OR;  Service: General;  Laterality: Bilateral;   • TUBAL ABDOMINAL LIGATION       Family History   Problem Relation Age of Onset   • Alzheimer's disease Father    • Diabetes Father    • Heart disease Father    • Other Father    • Sleep apnea Father    • Snoring Father    • Hyperlipidemia Father    • Other Mother    • Diabetes Mother    • Heart disease Mother    • Sleep apnea Mother    • Snoring Mother    • COPD Mother    • Hyperlipidemia Mother    • Diabetes Sister    • Heart disease Sister    • Other Sister    • Sleep apnea Sister    • Snoring Sister    • COPD Sister    • Breast cancer Paternal Grandmother 60   • Cancer Paternal Grandmother    • Diabetes Paternal Grandmother    • Breast cancer Maternal Aunt 80   • Breast cancer Paternal Aunt    • Diabetes Maternal Grandmother    • Hyperlipidemia Sister    • Hyperlipidemia Brother    • Hyperlipidemia Son    • Malig Hyperthermia Neg Hx      Social History     Tobacco Use   • Smoking status: Former Smoker     Packs/day: 2.00     Years: 30.00     Pack years: 60.00     Types: Cigarettes     Start date: 1972     Quit date: 2008     Years since quittin.5   • Smokeless tobacco: Never Used   Vaping Use   • Vaping Use: Never used   Substance Use Topics   • Alcohol use: No   • Drug use: No     (Not in a hospital admission)    Allergies:  Hydrocodone-acetaminophen    Review of systems    Constitutional: Negative.    Respiratory and cardiovascular: As detailed in HPI  section.  Gastrointestinal: Negative for constipation, nausea and vomiting negative for abdominal distention, abdominal pain and diarrhea.   Genitourinary: Negative for difficulty urinating and flank pain.   Musculoskeletal: Negative for arthralgias, joint swelling and myalgias.   Skin: Negative for color change, rash and wound.   Neurological: Negative for dizziness, syncope, weakness and headaches.   Hematological: Negative for adenopathy.   Psychiatric/Behavioral: Negative for confusion.   All other systems reviewed and are negative.    Physical Exam  VITALS REVIEWED    General:      well developed, in no acute distress.    Head:      normocephalic and atraumatic.    Eyes:      PERRL/EOM intact, conjunctiva and sclera clear with out nystagmus.    Neck:      no masses, thyromegaly,  trachea central with normal respiratory effort and PMI displaced laterally  Lungs:      Clear to auscultation bilaterally  Heart:       Regular rate and rhythm  Msk:      no deformity or scoliosis noted of thoracic or lumbar spine.    Pulses:      pulses normal in all 4 extremities.    Extremities:       No lower extremity edema  Neurologic:      no focal deficits.   alert oriented x3  Skin:      intact without lesions or rashes.    Psych:      alert and cooperative; normal mood and affect; normal attention span and concentration.      Result Review :               Pertinent cardiac workup    1. Echo 7/28/2022 severely dilated left ventricle with severely reduced systolic function ejection fraction 15 to 20%, significant dyskinesia.  Personally reviewed.  2. EKG 7/27/2022 sinus rhythm, left bundle branch block.  3. Echocardiogram 4/12/2022 ejection fraction 25 to 30%.  4. EKG 9/25/2019, sinus rhythm, narrow QRS.  5. Heart catheterization 4/13/2022, mild nonobstructive CAD.      Procedures        Assessment and Plan      Susy Hanson is a 62-year-old female patient who has nonischemic cardiomyopathy with ejection fraction of  around 20% since at least April 2022.  Patient also has class II heart failure.  In fact she was in CHF exacerbation before but symptomatically states she feels better since addition of Bumex.  Patient has been on guideline directed medical therapy for cardiomyopathy and CHF with Toprol and Entresto for over 3 months and her ejection fraction has not improved.  She does have left bundle branch block on her EKG.  In fact in 2019 she did not have left bundle branch block and her ejection fraction at that time was normal.  It seems that her cardiomyopathy coincided or even brought on by her left bundle branch block.  Due to persistently low ejection fraction and CHF despite optimal medical therapy, patient qualifies for a ICD for primary prevention of sudden cardiac death.  In her case the device of choice would be biventricular ICD for cardiac resynchronization in an attempt to improve her cardiac function and CHF symptoms.  Her life expectancy with an ICD is greater than 1 year.  I went over the risk and benefits with the patient of having an ICD, using the [facility] decision aid, to help the patient make an informed decision to have the procedure done. The patient voiced understanding and agreed to proceed with defibrillator implantation.    Diagnoses and all orders for this visit:    1. Acute systolic heart failure (CMS/HCC) (Primary)    2. LBBB (left bundle branch block)    3. NICM (nonischemic cardiomyopathy) (HCC)    4. Essential hypertension           No follow-ups on file.  Patient was given instructions and counseling regarding her condition or for health maintenance advice. Please see specific information pulled into the AVS if appropriate.

## 2022-08-03 ENCOUNTER — TREATMENT (OUTPATIENT)
Dept: CARDIAC REHAB | Facility: HOSPITAL | Age: 62
End: 2022-08-03

## 2022-08-03 DIAGNOSIS — I50.22 CHRONIC SYSTOLIC HEART FAILURE: Primary | ICD-10-CM

## 2022-08-03 PROCEDURE — 93798 PHYS/QHP OP CAR RHAB W/ECG: CPT

## 2022-08-04 ENCOUNTER — PATIENT ROUNDING (BHMG ONLY) (OUTPATIENT)
Dept: CARDIOLOGY | Facility: CLINIC | Age: 62
End: 2022-08-04

## 2022-08-04 ENCOUNTER — TREATMENT (OUTPATIENT)
Dept: CARDIAC REHAB | Facility: HOSPITAL | Age: 62
End: 2022-08-04

## 2022-08-04 DIAGNOSIS — I50.22 CHRONIC SYSTOLIC HEART FAILURE: Primary | ICD-10-CM

## 2022-08-04 PROCEDURE — 93798 PHYS/QHP OP CAR RHAB W/ECG: CPT

## 2022-08-04 NOTE — PROGRESS NOTES
A My-Chart message has been sent to the patient for PATIENT ROUNDING with [facility]

## 2022-08-08 ENCOUNTER — TREATMENT (OUTPATIENT)
Dept: CARDIAC REHAB | Facility: HOSPITAL | Age: 62
End: 2022-08-08

## 2022-08-08 DIAGNOSIS — I50.22 CHRONIC SYSTOLIC HEART FAILURE: Primary | ICD-10-CM

## 2022-08-08 PROCEDURE — 93798 PHYS/QHP OP CAR RHAB W/ECG: CPT

## 2022-08-09 ENCOUNTER — TREATMENT (OUTPATIENT)
Dept: CARDIAC REHAB | Facility: HOSPITAL | Age: 62
End: 2022-08-09

## 2022-08-09 DIAGNOSIS — I50.22 CHRONIC SYSTOLIC HEART FAILURE: Primary | ICD-10-CM

## 2022-08-09 PROCEDURE — 93798 PHYS/QHP OP CAR RHAB W/ECG: CPT

## 2022-08-10 ENCOUNTER — TREATMENT (OUTPATIENT)
Dept: CARDIAC REHAB | Facility: HOSPITAL | Age: 62
End: 2022-08-10

## 2022-08-10 DIAGNOSIS — I50.22 CHRONIC SYSTOLIC HEART FAILURE: Primary | ICD-10-CM

## 2022-08-10 PROCEDURE — 93798 PHYS/QHP OP CAR RHAB W/ECG: CPT

## 2022-08-11 ENCOUNTER — TREATMENT (OUTPATIENT)
Dept: CARDIAC REHAB | Facility: HOSPITAL | Age: 62
End: 2022-08-11

## 2022-08-11 DIAGNOSIS — I50.22 CHRONIC SYSTOLIC HEART FAILURE: Primary | ICD-10-CM

## 2022-08-11 PROCEDURE — 93798 PHYS/QHP OP CAR RHAB W/ECG: CPT

## 2022-08-15 ENCOUNTER — TREATMENT (OUTPATIENT)
Dept: CARDIAC REHAB | Facility: HOSPITAL | Age: 62
End: 2022-08-15

## 2022-08-15 DIAGNOSIS — I50.22 CHRONIC SYSTOLIC HEART FAILURE: Primary | ICD-10-CM

## 2022-08-15 PROCEDURE — 93798 PHYS/QHP OP CAR RHAB W/ECG: CPT

## 2022-08-16 ENCOUNTER — TREATMENT (OUTPATIENT)
Dept: CARDIAC REHAB | Facility: HOSPITAL | Age: 62
End: 2022-08-16

## 2022-08-16 DIAGNOSIS — I50.22 CHRONIC SYSTOLIC HEART FAILURE: Primary | ICD-10-CM

## 2022-08-16 PROCEDURE — 93798 PHYS/QHP OP CAR RHAB W/ECG: CPT

## 2022-08-17 ENCOUNTER — APPOINTMENT (OUTPATIENT)
Dept: CARDIAC REHAB | Facility: HOSPITAL | Age: 62
End: 2022-08-17

## 2022-08-18 ENCOUNTER — TREATMENT (OUTPATIENT)
Dept: CARDIAC REHAB | Facility: HOSPITAL | Age: 62
End: 2022-08-18

## 2022-08-18 ENCOUNTER — TELEPHONE (OUTPATIENT)
Dept: FAMILY MEDICINE CLINIC | Facility: CLINIC | Age: 62
End: 2022-08-18

## 2022-08-18 DIAGNOSIS — I50.22 CHRONIC SYSTOLIC HEART FAILURE: Primary | ICD-10-CM

## 2022-08-18 PROCEDURE — 93798 PHYS/QHP OP CAR RHAB W/ECG: CPT

## 2022-08-18 RX ORDER — METHYLPREDNISOLONE 4 MG/1
TABLET ORAL
Qty: 21 TABLET | Refills: 0 | Status: SHIPPED | OUTPATIENT
Start: 2022-08-18 | End: 2022-09-21 | Stop reason: HOSPADM

## 2022-08-18 NOTE — TELEPHONE ENCOUNTER
----- Message from Susy Hanson sent at 8/18/2022  2:56 AM EDT -----  Regarding: Poison Ivy  I woke up tonight with blisters in between my fingers.  I was pulling weeds today and think I might have gotten into poison ivy or oak.  I just want to treat it before it gets worse, especially since I am going out of town on Friday.  I usually get it bad.  I scrubbed my hands with soap, then alcohol and put hydrocortisone cream on them.      Thanks and have a great day,  Susy

## 2022-08-22 ENCOUNTER — TELEPHONE (OUTPATIENT)
Dept: FAMILY MEDICINE CLINIC | Facility: CLINIC | Age: 62
End: 2022-08-22

## 2022-08-22 ENCOUNTER — TREATMENT (OUTPATIENT)
Dept: CARDIAC REHAB | Facility: HOSPITAL | Age: 62
End: 2022-08-22

## 2022-08-22 DIAGNOSIS — I50.22 CHRONIC SYSTOLIC HEART FAILURE: Primary | ICD-10-CM

## 2022-08-22 PROCEDURE — 93798 PHYS/QHP OP CAR RHAB W/ECG: CPT

## 2022-08-22 NOTE — TELEPHONE ENCOUNTER
Caller: Susy Hanson     Relationship: [unfilled]     Best call back number: [phone]     What is your medical concern? PATIENT CALLING STATING THAT THE POISON IVY ISNT ANY BETTER IT HAS SPREAD TO HER FACE AROUND HER EYE SHE WOULD LIKE TO KNOW IF SHE SHOULD GET A SHOT OR BE PRESCRIBED SOMETHING STRONGER SHE WILL BE FINISHING UP THE ZPACK TOMORROW     How long has this issue been going on? 4 DAYS     Is your provider already aware of this issue? YES    Have you been treated for this issue? YES     PLEASE CALL PATIENT

## 2022-08-23 ENCOUNTER — TREATMENT (OUTPATIENT)
Dept: CARDIAC REHAB | Facility: HOSPITAL | Age: 62
End: 2022-08-23

## 2022-08-23 ENCOUNTER — OFFICE VISIT (OUTPATIENT)
Dept: FAMILY MEDICINE CLINIC | Facility: CLINIC | Age: 62
End: 2022-08-23

## 2022-08-23 VITALS
HEIGHT: 64 IN | SYSTOLIC BLOOD PRESSURE: 120 MMHG | HEART RATE: 89 BPM | BODY MASS INDEX: 24.92 KG/M2 | TEMPERATURE: 96.8 F | DIASTOLIC BLOOD PRESSURE: 76 MMHG | OXYGEN SATURATION: 97 % | WEIGHT: 146 LBS

## 2022-08-23 DIAGNOSIS — L23.7 ALLERGIC CONTACT DERMATITIS DUE TO PLANTS, EXCEPT FOOD: Primary | ICD-10-CM

## 2022-08-23 DIAGNOSIS — I50.22 CHRONIC SYSTOLIC HEART FAILURE: Primary | ICD-10-CM

## 2022-08-23 PROCEDURE — 96372 THER/PROPH/DIAG INJ SC/IM: CPT | Performed by: FAMILY MEDICINE

## 2022-08-23 PROCEDURE — 99213 OFFICE O/P EST LOW 20 MIN: CPT | Performed by: FAMILY MEDICINE

## 2022-08-23 PROCEDURE — 93798 PHYS/QHP OP CAR RHAB W/ECG: CPT

## 2022-08-23 RX ORDER — METHYLPREDNISOLONE SODIUM SUCCINATE 125 MG/2ML
125 INJECTION, POWDER, LYOPHILIZED, FOR SOLUTION INTRAMUSCULAR; INTRAVENOUS ONCE
Status: COMPLETED | OUTPATIENT
Start: 2022-08-23 | End: 2022-08-23

## 2022-08-23 RX ADMIN — METHYLPREDNISOLONE SODIUM SUCCINATE 125 MG: 125 INJECTION, POWDER, LYOPHILIZED, FOR SOLUTION INTRAMUSCULAR; INTRAVENOUS at 09:37

## 2022-08-23 NOTE — PROGRESS NOTES
Answers for HPI/ROS submitted by the patient on 8/22/2022  What is the primary reason for your visit?: Rash  Chronicity: new  Onset: in the past 7 days  Progression since onset: rapidly worsening  Affected locations: face, left arm, left hand, right arm, right hand  Characteristics: blistering, pain, itchiness  Exposed to: plant contact    Subjective   Susy Hanson is a 62 y.o. female who presents today with concerns about poison ivy.     Rash  This is a new problem. The current episode started in the past 7 days. The problem has been rapidly worsening since onset. The affected locations include the face, left arm, left hand, right arm and right hand. The rash is characterized by blistering, pain and itchiness. She was exposed to plant contact.     She states the day she noticed the poison ivy, she was pulling weeds and noticed a blister on her left 4th finger. She was prescribed a steroid pack and started on immediate treatment. The rash has now spread to her hands, face, and the medial aspect of the left thigh. The rash is itchy. She has not returned to the bushes.     Her  has a dog that goes outside but she never has physical contact with the dog    The following portions of the patient's history were reviewed and updated as appropriate: allergies, current medications, past family history, past medical history, past social history, past surgical history, and problem list.  Past Medical History:   Diagnosis Date   • Anxiety 2000   • Breast cancer (HCC)     LEFT BREAST 7/2021   • CHF (congestive heart failure) ([facility])    • Depression    • DM (diabetes mellitus) (HCC)    • HL (hearing loss)    • Hyperlipidemia    • Hypertension     SAW CARDIO/ STRESS TEST 2020 - NOW MEDS MANAGED BY PCP    • Irritable bowel syndrome 2006   • Peripheral neuropathy    • PONV (postoperative nausea and vomiting)    • Sleep apnea     WEARS CPAP     Past Surgical History:   Procedure Laterality Date   • BLADDER REPAIR     •  BREAST BIOPSY     • BREAST RECONSTRUCTION Bilateral 08/26/2021    Procedure: BILATERAL PREPECTORALPLACMENT TISSUE EXPANDERS AND ALLODERM;  Surgeon: Heri Arreaga MD;  Location: [facility] OR;  Service: Plastics;  Laterality: Bilateral;   • BREAST TISSUE EXPANDER REMOVAL INSERTION OF IMPLANT Bilateral 12/23/2021    Procedure: BILATERAL REMOVAL TISSUE EXPANDERS AND PLACEMENT OF IMPLANTS;  Surgeon: Heri Arreaga MD;  Location: [facility] OR;  Service: Plastics;  Laterality: Bilateral;   • CARDIAC CATHETERIZATION Left 04/13/2022    Procedure: Cardiac Catheterization/Vascular Study;  Surgeon: Christo Murphy MD;  Location: [facility] CATH INVASIVE LOCATION;  Service: Cardiovascular;  Laterality: Left;   • CARDIOVASCULAR STRESS TEST  2020   • COLONOSCOPY  11/2012   • HYSTERECTOMY     • LYMPH NODE BIOPSY  8/29/2021   • MASTECTOMY W/ SENTINEL NODE BIOPSY Bilateral 08/26/2021    Procedure: bilateral total mastectomy, left axillary sentinel lymph node biopsy, possible reconstruction.;  Surgeon: Asia Perkins MD;  Location: [facility];  Service: General;  Laterality: Bilateral;   • TUBAL ABDOMINAL LIGATION  1981     Family History   Problem Relation Age of Onset   • Alzheimer's disease Father    • Diabetes Father    • Heart disease Father    • Other Father    • Sleep apnea Father    • Snoring Father    • Hyperlipidemia Father    • Other Mother    • Diabetes Mother    • Heart disease Mother    • Sleep apnea Mother    • Snoring Mother    • COPD Mother    • Hyperlipidemia Mother    • Diabetes Sister    • Heart disease Sister    • Other Sister    • Sleep apnea Sister    • Snoring Sister    • COPD Sister    • Breast cancer Paternal Grandmother 60   • Cancer Paternal Grandmother    • Diabetes Paternal Grandmother    • Breast cancer Maternal Aunt 80   • Breast cancer Paternal Aunt    • Diabetes Maternal Grandmother    • Hyperlipidemia Sister    • Hyperlipidemia Brother    • Hyperlipidemia Son    • Francesco  "Hyperthermia Neg Hx      Social History     Socioeconomic History   • Marital status:    Tobacco Use   • Smoking status: Former Smoker     Packs/day: 2.00     Years: 30.00     Pack years: 60.00     Types: Cigarettes     Start date: 1972     Quit date: 2008     Years since quittin.6   • Smokeless tobacco: Never Used   Vaping Use   • Vaping Use: Never used   Substance and Sexual Activity   • Alcohol use: No   • Drug use: No   • Sexual activity: Yes     Partners: Male     Birth control/protection: None         Current Outpatient Medications:   •  ALPRAZolam (XANAX) 1 MG tablet, Take 1 tablet by mouth 2 (Two) Times a Day As Needed for Anxiety or Sleep., Disp: 60 tablet, Rfl: 0  •  atorvastatin (LIPITOR) 20 MG tablet, Take 1 tablet by mouth Daily., Disp: 90 tablet, Rfl: 2  •  bumetanide (BUMEX) 1 MG tablet, Take 1 tablet by mouth Daily., Disp: 90 tablet, Rfl: 1  •  cetirizine (zyrTEC) 10 MG tablet, , Disp: , Rfl:   •  metFORMIN (GLUCOPHAGE) 1000 MG tablet, Take one tablet by mouth with breakfast and 1 tablets with supper, Disp: , Rfl:   •  methylPREDNISolone (MEDROL) 4 MG dose pack, Take as directed on package instructions., Disp: 21 tablet, Rfl: 0  •  metoprolol succinate XL (TOPROL-XL) 100 MG 24 hr tablet, take 1 tablet by oral route  every day, Disp: 90 tablet, Rfl: 3  •  sacubitril-valsartan (Entresto) 24-26 MG tablet, Take 1 tablet by mouth Every 12 (Twelve) Hours., Disp: 180 tablet, Rfl: 1  •  Triamcinolone Acetonide (NASACORT) 55 MCG/ACT nasal inhaler, APPLY 2 SPRAYS BY NASAL ROUTE DAILY FOR 30 DAYS, Disp: 10.8 mL, Rfl: 6  No current facility-administered medications for this visit.    Review of Systems   Skin: Positive for rash.     /76 (BP Location: Left arm, Patient Position: Sitting, Cuff Size: Adult)   Pulse 89   Temp 96.8 °F (36 °C) (Temporal)   Ht 162.6 cm (64\")   Wt 66.2 kg (146 lb)   LMP  (LMP Unknown)   SpO2 97%   Breastfeeding No   BMI 25.06 kg/m²       Objective "   Physical Exam  Constitutional:       Comments: Well nourished, well developed. She is in no acute distress, alert and cooperative with exam.   Cardiovascular:      Comments: Heart has regular rate and rhythm without murmur.  Pulmonary:      Comments: Lungs are clear to auscultation bilaterally, good air movement throughout  Musculoskeletal:      Comments: No pedal edema, no facial swelling.   Skin:     Comments: She does have an erythematous vesicular rash on the left temple, the lateral aspect of the left upper and lower arm, as well as the right forearm vesicles are linear in nature.           Assessment & Plan    1. Contact dermatitis       -     Blood sugars have been running well at home in the 80s mg/dL. I will give her 125 mg of Solu-Medrol and counseled her on the need to avoid products that have perfumes and dyes so that there is less irritation.    Problems Addressed this Visit    None     Visit Diagnoses     Allergic contact dermatitis due to plants, except food    -  Primary    Relevant Medications    methylPREDNISolone sodium succinate (SOLU-Medrol) injection 125 mg (Completed)      Diagnoses       Codes Comments    Allergic contact dermatitis due to plants, except food    -  Primary ICD-10-CM: L23.7  ICD-9-CM: 692.6                 Transcribed from ambient dictation for Erica Avila MD by Sanam Bhakta.  08/23/22   11:37 EDT    Patient verbalized consent to the visit recording.

## 2022-08-24 ENCOUNTER — APPOINTMENT (OUTPATIENT)
Dept: CARDIAC REHAB | Facility: HOSPITAL | Age: 62
End: 2022-08-24

## 2022-08-25 ENCOUNTER — TREATMENT (OUTPATIENT)
Dept: CARDIAC REHAB | Facility: HOSPITAL | Age: 62
End: 2022-08-25

## 2022-08-25 DIAGNOSIS — I50.22 CHRONIC SYSTOLIC HEART FAILURE: Primary | ICD-10-CM

## 2022-08-25 PROCEDURE — 93798 PHYS/QHP OP CAR RHAB W/ECG: CPT

## 2022-08-29 ENCOUNTER — APPOINTMENT (OUTPATIENT)
Dept: CARDIAC REHAB | Facility: HOSPITAL | Age: 62
End: 2022-08-29

## 2022-08-30 ENCOUNTER — APPOINTMENT (OUTPATIENT)
Dept: CARDIAC REHAB | Facility: HOSPITAL | Age: 62
End: 2022-08-30

## 2022-08-31 ENCOUNTER — APPOINTMENT (OUTPATIENT)
Dept: CARDIAC REHAB | Facility: HOSPITAL | Age: 62
End: 2022-08-31

## 2022-09-01 ENCOUNTER — APPOINTMENT (OUTPATIENT)
Dept: CARDIAC REHAB | Facility: HOSPITAL | Age: 62
End: 2022-09-01

## 2022-09-10 DIAGNOSIS — F41.8 MIXED ANXIETY DEPRESSIVE DISORDER: ICD-10-CM

## 2022-09-11 RX ORDER — ALPRAZOLAM 1 MG/1
1 TABLET ORAL 2 TIMES DAILY PRN
Qty: 60 TABLET | Refills: 0 | Status: SHIPPED | OUTPATIENT
Start: 2022-09-11 | End: 2022-11-28 | Stop reason: SDUPTHER

## 2022-09-12 ENCOUNTER — TREATMENT (OUTPATIENT)
Dept: CARDIAC REHAB | Facility: HOSPITAL | Age: 62
End: 2022-09-12

## 2022-09-12 DIAGNOSIS — I50.22 CHRONIC SYSTOLIC HEART FAILURE: Primary | ICD-10-CM

## 2022-09-12 PROCEDURE — 93798 PHYS/QHP OP CAR RHAB W/ECG: CPT

## 2022-09-13 ENCOUNTER — TREATMENT (OUTPATIENT)
Dept: CARDIAC REHAB | Facility: HOSPITAL | Age: 62
End: 2022-09-13

## 2022-09-13 DIAGNOSIS — I50.22 CHRONIC SYSTOLIC HEART FAILURE: Primary | ICD-10-CM

## 2022-09-13 PROCEDURE — 93798 PHYS/QHP OP CAR RHAB W/ECG: CPT

## 2022-09-15 ENCOUNTER — TREATMENT (OUTPATIENT)
Dept: CARDIAC REHAB | Facility: HOSPITAL | Age: 62
End: 2022-09-15

## 2022-09-15 DIAGNOSIS — I50.22 CHRONIC SYSTOLIC HEART FAILURE: Primary | ICD-10-CM

## 2022-09-15 PROCEDURE — 93798 PHYS/QHP OP CAR RHAB W/ECG: CPT

## 2022-09-17 ENCOUNTER — TRANSCRIBE ORDERS (OUTPATIENT)
Dept: ADMINISTRATIVE | Facility: HOSPITAL | Age: 62
End: 2022-09-17

## 2022-09-17 ENCOUNTER — LAB (OUTPATIENT)
Dept: LAB | Facility: HOSPITAL | Age: 62
End: 2022-09-17

## 2022-09-17 DIAGNOSIS — E11.8 DIABETIC COMPLICATION: ICD-10-CM

## 2022-09-17 DIAGNOSIS — N18.30 STAGE 3 CHRONIC KIDNEY DISEASE, UNSPECIFIED WHETHER STAGE 3A OR 3B CKD: Primary | ICD-10-CM

## 2022-09-17 DIAGNOSIS — E55.9 VITAMIN D DEFICIENCY, UNSPECIFIED: ICD-10-CM

## 2022-09-17 DIAGNOSIS — N18.30 STAGE 3 CHRONIC KIDNEY DISEASE, UNSPECIFIED WHETHER STAGE 3A OR 3B CKD: ICD-10-CM

## 2022-09-17 DIAGNOSIS — I42.9 CARDIOMYOPATHY: ICD-10-CM

## 2022-09-17 LAB
25(OH)D3 SERPL-MCNC: 52.3 NG/ML (ref 30–100)
ALBUMIN SERPL-MCNC: 4.4 G/DL (ref 3.5–5.2)
ALBUMIN/GLOB SERPL: 1.9 G/DL
ALP SERPL-CCNC: 58 U/L (ref 39–117)
ALT SERPL W P-5'-P-CCNC: 15 U/L (ref 1–33)
ANION GAP SERPL CALCULATED.3IONS-SCNC: 10 MMOL/L (ref 5–15)
AST SERPL-CCNC: 17 U/L (ref 1–32)
BACTERIA UR QL AUTO: NORMAL /HPF
BASOPHILS # BLD AUTO: 0.04 10*3/MM3 (ref 0–0.2)
BASOPHILS NFR BLD AUTO: 0.9 % (ref 0–1.5)
BILIRUB SERPL-MCNC: 0.2 MG/DL (ref 0–1.2)
BILIRUB UR QL STRIP: NEGATIVE
BUN SERPL-MCNC: 36 MG/DL (ref 8–23)
BUN/CREAT SERPL: 30.3 (ref 7–25)
CALCIUM SPEC-SCNC: 9.9 MG/DL (ref 8.6–10.5)
CHLORIDE SERPL-SCNC: 100 MMOL/L (ref 98–107)
CK SERPL-CCNC: 24 U/L (ref 20–180)
CLARITY UR: CLEAR
CO2 SERPL-SCNC: 28 MMOL/L (ref 22–29)
COLOR UR: YELLOW
CREAT SERPL-MCNC: 1.19 MG/DL (ref 0.57–1)
CREAT UR-MCNC: 43.9 MG/DL
DEPRECATED RDW RBC AUTO: 47.7 FL (ref 37–54)
EGFRCR SERPLBLD CKD-EPI 2021: 51.2 ML/MIN/1.73
EOSINOPHIL # BLD AUTO: 0.47 10*3/MM3 (ref 0–0.4)
EOSINOPHIL NFR BLD AUTO: 10.1 % (ref 0.3–6.2)
ERYTHROCYTE [DISTWIDTH] IN BLOOD BY AUTOMATED COUNT: 14.2 % (ref 12.3–15.4)
GLOBULIN UR ELPH-MCNC: 2.3 GM/DL
GLUCOSE SERPL-MCNC: 129 MG/DL (ref 65–99)
GLUCOSE UR STRIP-MCNC: NEGATIVE MG/DL
HCT VFR BLD AUTO: 34.5 % (ref 34–46.6)
HGB BLD-MCNC: 10.9 G/DL (ref 12–15.9)
HGB UR QL STRIP.AUTO: NEGATIVE
HYALINE CASTS UR QL AUTO: NORMAL /LPF
IMM GRANULOCYTES # BLD AUTO: 0.01 10*3/MM3 (ref 0–0.05)
IMM GRANULOCYTES NFR BLD AUTO: 0.2 % (ref 0–0.5)
INR PPP: <0.93 (ref 0.93–1.1)
KETONES UR QL STRIP: NEGATIVE
LEUKOCYTE ESTERASE UR QL STRIP.AUTO: NEGATIVE
LYMPHOCYTES # BLD AUTO: 1.24 10*3/MM3 (ref 0.7–3.1)
LYMPHOCYTES NFR BLD AUTO: 26.7 % (ref 19.6–45.3)
MAGNESIUM SERPL-MCNC: 2 MG/DL (ref 1.6–2.4)
MCH RBC QN AUTO: 28.8 PG (ref 26.6–33)
MCHC RBC AUTO-ENTMCNC: 31.6 G/DL (ref 31.5–35.7)
MCV RBC AUTO: 91.3 FL (ref 79–97)
MONOCYTES # BLD AUTO: 0.49 10*3/MM3 (ref 0.1–0.9)
MONOCYTES NFR BLD AUTO: 10.5 % (ref 5–12)
NEUTROPHILS NFR BLD AUTO: 2.4 10*3/MM3 (ref 1.7–7)
NEUTROPHILS NFR BLD AUTO: 51.6 % (ref 42.7–76)
NITRITE UR QL STRIP: NEGATIVE
NRBC BLD AUTO-RTO: 0.2 /100 WBC (ref 0–0.2)
PH UR STRIP.AUTO: 5.5 [PH] (ref 5–8)
PHOSPHATE SERPL-MCNC: 4.7 MG/DL (ref 2.5–4.5)
PLATELET # BLD AUTO: 207 10*3/MM3 (ref 140–450)
PMV BLD AUTO: 10.7 FL (ref 6–12)
POTASSIUM SERPL-SCNC: 4.4 MMOL/L (ref 3.5–5.2)
PROT ?TM UR-MCNC: 11.5 MG/DL
PROT SERPL-MCNC: 6.7 G/DL (ref 6–8.5)
PROT UR QL STRIP: NEGATIVE
PROT/CREAT UR: 262 MG/G CREA (ref 0–200)
PROTHROMBIN TIME: 9.4 SECONDS (ref 9.6–11.7)
PTH-INTACT SERPL-MCNC: 49.8 PG/ML (ref 15–65)
RBC # BLD AUTO: 3.78 10*6/MM3 (ref 3.77–5.28)
RBC # UR STRIP: NORMAL /HPF
REF LAB TEST METHOD: NORMAL
SARS-COV-2 RNA RESP QL NAA+PROBE: NOT DETECTED
SODIUM SERPL-SCNC: 138 MMOL/L (ref 136–145)
SP GR UR STRIP: 1.01 (ref 1–1.03)
SQUAMOUS #/AREA URNS HPF: NORMAL /HPF
URATE SERPL-MCNC: 7.8 MG/DL (ref 2.4–5.7)
UROBILINOGEN UR QL STRIP: NORMAL
WBC # UR STRIP: NORMAL /HPF
WBC NRBC COR # BLD: 4.65 10*3/MM3 (ref 3.4–10.8)

## 2022-09-17 PROCEDURE — 83735 ASSAY OF MAGNESIUM: CPT

## 2022-09-17 PROCEDURE — 84156 ASSAY OF PROTEIN URINE: CPT

## 2022-09-17 PROCEDURE — 36415 COLL VENOUS BLD VENIPUNCTURE: CPT

## 2022-09-17 PROCEDURE — 83036 HEMOGLOBIN GLYCOSYLATED A1C: CPT

## 2022-09-17 PROCEDURE — 82306 VITAMIN D 25 HYDROXY: CPT

## 2022-09-17 PROCEDURE — 82570 ASSAY OF URINE CREATININE: CPT

## 2022-09-17 PROCEDURE — 81001 URINALYSIS AUTO W/SCOPE: CPT

## 2022-09-17 PROCEDURE — 85610 PROTHROMBIN TIME: CPT

## 2022-09-17 PROCEDURE — C9803 HOPD COVID-19 SPEC COLLECT: HCPCS

## 2022-09-17 PROCEDURE — 84550 ASSAY OF BLOOD/URIC ACID: CPT

## 2022-09-17 PROCEDURE — 85025 COMPLETE CBC W/AUTO DIFF WBC: CPT

## 2022-09-17 PROCEDURE — U0003 INFECTIOUS AGENT DETECTION BY NUCLEIC ACID (DNA OR RNA); SEVERE ACUTE RESPIRATORY SYNDROME CORONAVIRUS 2 (SARS-COV-2) (CORONAVIRUS DISEASE [COVID-19]), AMPLIFIED PROBE TECHNIQUE, MAKING USE OF HIGH THROUGHPUT TECHNOLOGIES AS DESCRIBED BY CMS-2020-01-R: HCPCS

## 2022-09-17 PROCEDURE — 80053 COMPREHEN METABOLIC PANEL: CPT

## 2022-09-17 PROCEDURE — 82550 ASSAY OF CK (CPK): CPT

## 2022-09-17 PROCEDURE — 84100 ASSAY OF PHOSPHORUS: CPT

## 2022-09-17 PROCEDURE — 83970 ASSAY OF PARATHORMONE: CPT

## 2022-09-19 ENCOUNTER — APPOINTMENT (OUTPATIENT)
Dept: GENERAL RADIOLOGY | Facility: HOSPITAL | Age: 62
End: 2022-09-19

## 2022-09-19 ENCOUNTER — ANESTHESIA EVENT (OUTPATIENT)
Dept: CARDIOLOGY | Facility: HOSPITAL | Age: 62
End: 2022-09-19

## 2022-09-19 ENCOUNTER — HOSPITAL ENCOUNTER (OUTPATIENT)
Facility: HOSPITAL | Age: 62
Discharge: HOME | End: 2022-09-21
Attending: INTERNAL MEDICINE | Admitting: INTERNAL MEDICINE

## 2022-09-19 ENCOUNTER — APPOINTMENT (OUTPATIENT)
Dept: CARDIAC REHAB | Facility: HOSPITAL | Age: 62
End: 2022-09-19

## 2022-09-19 ENCOUNTER — ANESTHESIA (OUTPATIENT)
Dept: CARDIOLOGY | Facility: HOSPITAL | Age: 62
End: 2022-09-19

## 2022-09-19 DIAGNOSIS — I42.9 CARDIOMYOPATHY: ICD-10-CM

## 2022-09-19 LAB
GLUCOSE BLDC GLUCOMTR-MCNC: 114 MG/DL (ref 70–105)
HBA1C MFR BLD: 6.2 % (ref 3.5–5.6)

## 2022-09-19 PROCEDURE — C1898 LEAD, PMKR, OTHER THAN TRANS: HCPCS | Performed by: INTERNAL MEDICINE

## 2022-09-19 PROCEDURE — S0260 H&P FOR SURGERY: HCPCS | Performed by: INTERNAL MEDICINE

## 2022-09-19 PROCEDURE — C1777 LEAD, AICD, ENDO SINGLE COIL: HCPCS | Performed by: INTERNAL MEDICINE

## 2022-09-19 PROCEDURE — 25010000002 MIDAZOLAM PER 1 MG: Performed by: NURSE ANESTHETIST, CERTIFIED REGISTERED

## 2022-09-19 PROCEDURE — C1769 GUIDE WIRE: HCPCS | Performed by: INTERNAL MEDICINE

## 2022-09-19 PROCEDURE — 82962 GLUCOSE BLOOD TEST: CPT

## 2022-09-19 PROCEDURE — 33249 INSJ/RPLCMT DEFIB W/LEAD(S): CPT | Performed by: INTERNAL MEDICINE

## 2022-09-19 PROCEDURE — C1894 INTRO/SHEATH, NON-LASER: HCPCS | Performed by: INTERNAL MEDICINE

## 2022-09-19 PROCEDURE — 0 IOPAMIDOL PER 1 ML: Performed by: INTERNAL MEDICINE

## 2022-09-19 PROCEDURE — 25010000002 FENTANYL CITRATE (PF) 100 MCG/2ML SOLUTION: Performed by: NURSE ANESTHETIST, CERTIFIED REGISTERED

## 2022-09-19 PROCEDURE — 25010000002 CEFAZOLIN PER 500 MG: Performed by: INTERNAL MEDICINE

## 2022-09-19 PROCEDURE — C1882 AICD, OTHER THAN SING/DUAL: HCPCS | Performed by: INTERNAL MEDICINE

## 2022-09-19 PROCEDURE — G0378 HOSPITAL OBSERVATION PER HR: HCPCS

## 2022-09-19 PROCEDURE — C1900 LEAD, CORONARY VENOUS: HCPCS | Performed by: INTERNAL MEDICINE

## 2022-09-19 PROCEDURE — 71045 X-RAY EXAM CHEST 1 VIEW: CPT

## 2022-09-19 PROCEDURE — 25010000002 ONDANSETRON PER 1 MG: Performed by: NURSE ANESTHETIST, CERTIFIED REGISTERED

## 2022-09-19 PROCEDURE — C1892 INTRO/SHEATH,FIXED,PEEL-AWAY: HCPCS | Performed by: INTERNAL MEDICINE

## 2022-09-19 PROCEDURE — 25010000002 PROPOFOL 10 MG/ML EMULSION: Performed by: NURSE ANESTHETIST, CERTIFIED REGISTERED

## 2022-09-19 PROCEDURE — 33225 L VENTRIC PACING LEAD ADD-ON: CPT | Performed by: INTERNAL MEDICINE

## 2022-09-19 DEVICE — LD PM TENDRIL STS 6F52CM 2088TC52: Type: IMPLANTABLE DEVICE | Status: FUNCTIONAL

## 2022-09-19 DEVICE — LD DEFIB DURATA SJ4 58CM 7122Q58: Type: IMPLANTABLE DEVICE | Status: FUNCTIONAL

## 2022-09-19 DEVICE — LD QUARTET LV S/CRV BIPOL 1458Q/86: Type: IMPLANTABLE DEVICE | Status: FUNCTIONAL

## 2022-09-19 DEVICE — IMPLANTABLE DEVICE: Type: IMPLANTABLE DEVICE | Status: FUNCTIONAL

## 2022-09-19 RX ORDER — TRAMADOL HYDROCHLORIDE 50 MG/1
50 TABLET ORAL EVERY 6 HOURS PRN
Status: DISCONTINUED | OUTPATIENT
Start: 2022-09-19 | End: 2022-09-21 | Stop reason: HOSPADM

## 2022-09-19 RX ORDER — MIDAZOLAM HYDROCHLORIDE 1 MG/ML
INJECTION INTRAMUSCULAR; INTRAVENOUS AS NEEDED
Status: DISCONTINUED | OUTPATIENT
Start: 2022-09-19 | End: 2022-09-19 | Stop reason: SURG

## 2022-09-19 RX ORDER — SODIUM CHLORIDE 9 MG/ML
INJECTION, SOLUTION INTRAVENOUS CONTINUOUS PRN
Status: DISCONTINUED | OUTPATIENT
Start: 2022-09-19 | End: 2022-09-19 | Stop reason: SURG

## 2022-09-19 RX ORDER — SODIUM CHLORIDE 0.9 % (FLUSH) 0.9 %
3-10 SYRINGE (ML) INJECTION AS NEEDED
Status: DISCONTINUED | OUTPATIENT
Start: 2022-09-19 | End: 2022-09-19

## 2022-09-19 RX ORDER — SODIUM CHLORIDE 0.9 % (FLUSH) 0.9 %
3 SYRINGE (ML) INJECTION EVERY 12 HOURS SCHEDULED
Status: DISCONTINUED | OUTPATIENT
Start: 2022-09-19 | End: 2022-09-19

## 2022-09-19 RX ORDER — FENTANYL CITRATE 50 UG/ML
INJECTION, SOLUTION INTRAMUSCULAR; INTRAVENOUS AS NEEDED
Status: DISCONTINUED | OUTPATIENT
Start: 2022-09-19 | End: 2022-09-19 | Stop reason: SURG

## 2022-09-19 RX ORDER — LIDOCAINE HYDROCHLORIDE AND EPINEPHRINE BITARTRATE 20; .01 MG/ML; MG/ML
INJECTION, SOLUTION SUBCUTANEOUS AS NEEDED
Status: DISCONTINUED | OUTPATIENT
Start: 2022-09-19 | End: 2022-09-19 | Stop reason: HOSPADM

## 2022-09-19 RX ORDER — ALPRAZOLAM 1 MG/1
1 TABLET ORAL 2 TIMES DAILY PRN
Status: DISCONTINUED | OUTPATIENT
Start: 2022-09-19 | End: 2022-09-21 | Stop reason: HOSPADM

## 2022-09-19 RX ORDER — ATORVASTATIN CALCIUM 20 MG/1
20 TABLET, FILM COATED ORAL DAILY
Status: DISCONTINUED | OUTPATIENT
Start: 2022-09-19 | End: 2022-09-21 | Stop reason: HOSPADM

## 2022-09-19 RX ORDER — PHENYLEPHRINE HCL IN 0.9% NACL 1 MG/10 ML
SYRINGE (ML) INTRAVENOUS AS NEEDED
Status: DISCONTINUED | OUTPATIENT
Start: 2022-09-19 | End: 2022-09-19 | Stop reason: SURG

## 2022-09-19 RX ORDER — ONDANSETRON 2 MG/ML
INJECTION INTRAMUSCULAR; INTRAVENOUS AS NEEDED
Status: DISCONTINUED | OUTPATIENT
Start: 2022-09-19 | End: 2022-09-19 | Stop reason: SURG

## 2022-09-19 RX ORDER — METOPROLOL SUCCINATE 50 MG/1
100 TABLET, EXTENDED RELEASE ORAL DAILY
Status: DISCONTINUED | OUTPATIENT
Start: 2022-09-19 | End: 2022-09-21 | Stop reason: HOSPADM

## 2022-09-19 RX ORDER — BUMETANIDE 1 MG/1
1 TABLET ORAL DAILY
Status: DISCONTINUED | OUTPATIENT
Start: 2022-09-19 | End: 2022-09-21 | Stop reason: HOSPADM

## 2022-09-19 RX ADMIN — ONDANSETRON 4 MG: 2 INJECTION INTRAMUSCULAR; INTRAVENOUS at 14:38

## 2022-09-19 RX ADMIN — MIDAZOLAM HYDROCHLORIDE 2 MG: 1 INJECTION, SOLUTION INTRAMUSCULAR; INTRAVENOUS at 12:26

## 2022-09-19 RX ADMIN — ALPRAZOLAM 1 MG: 1 TABLET ORAL at 22:29

## 2022-09-19 RX ADMIN — SACUBITRIL AND VALSARTAN 1 TABLET: 24; 26 TABLET, FILM COATED ORAL at 20:24

## 2022-09-19 RX ADMIN — FENTANYL CITRATE 50 MCG: 50 INJECTION, SOLUTION INTRAMUSCULAR; INTRAVENOUS at 13:50

## 2022-09-19 RX ADMIN — TRAMADOL HYDROCHLORIDE 50 MG: 50 TABLET, COATED ORAL at 22:29

## 2022-09-19 RX ADMIN — CEFAZOLIN 2 G: 2 INJECTION, POWDER, FOR SOLUTION INTRAMUSCULAR; INTRAVENOUS at 20:23

## 2022-09-19 RX ADMIN — PROPOFOL INJECTABLE EMULSION 100 MCG/KG/MIN: 10 INJECTION, EMULSION INTRAVENOUS at 12:29

## 2022-09-19 RX ADMIN — TRAMADOL HYDROCHLORIDE 50 MG: 50 TABLET, COATED ORAL at 16:18

## 2022-09-19 RX ADMIN — CEFAZOLIN 2 G: 2 INJECTION, POWDER, FOR SOLUTION INTRAMUSCULAR; INTRAVENOUS at 12:17

## 2022-09-19 RX ADMIN — BUMETANIDE 1 MG: 1 TABLET ORAL at 16:38

## 2022-09-19 RX ADMIN — METOPROLOL SUCCINATE 100 MG: 50 TABLET, EXTENDED RELEASE ORAL at 16:38

## 2022-09-19 RX ADMIN — ATORVASTATIN CALCIUM 20 MG: 20 TABLET, FILM COATED ORAL at 16:38

## 2022-09-19 RX ADMIN — SODIUM CHLORIDE: 0.9 INJECTION, SOLUTION INTRAVENOUS at 12:24

## 2022-09-19 RX ADMIN — Medication 100 MCG: at 14:08

## 2022-09-19 NOTE — ANESTHESIA PREPROCEDURE EVALUATION
Anesthesia Evaluation     Patient summary reviewed and Nursing notes reviewed   history of anesthetic complications: PONV  NPO Solid Status: > 8 hours  NPO Liquid Status: > 8 hours           Airway   Mallampati: II  TM distance: >3 FB  Neck ROM: full  No difficulty expected  Dental      Pulmonary     breath sounds clear to auscultation  (+) shortness of breath, sleep apnea on CPAP,   Cardiovascular     ECG reviewed  Rhythm: regular  Rate: normal    (+) hypertension, dysrhythmias, CHF Systolic <55%, hyperlipidemia,       Neuro/Psych  (+) psychiatric history Anxiety and Depression,    GI/Hepatic/Renal/Endo    (+)   diabetes mellitus type 2 poorly controlled,     Musculoskeletal (-) negative ROS    Abdominal     Abdomen: soft.   Substance History - negative use     OB/GYN negative ob/gyn ROS         Other - negative ROS       ROS/Med Hx Other: · Left ventricular systolic function is severely decreased.  · Left ventricular ejection fraction is 15 to 20%  · Dyskinetic septum and akinetic apex is noted  · Left ventricular wall thickness is consistent with mild concentric hypertrophy.  · Left ventricular diastolic function was normal.                       Anesthesia Plan    ASA 4     MAC     intravenous induction     Anesthetic plan, risks, benefits, and alternatives have been provided, discussed and informed consent has been obtained with: patient.    Plan discussed with CRNA.        CODE STATUS:

## 2022-09-19 NOTE — PLAN OF CARE
Problem: Adult Inpatient Plan of Care  Goal: Readiness for Transition of Care  Intervention: Mutually Develop Transition Plan  Recent Flowsheet Documentation  Taken 9/19/2022 1624 by Joy King, RN  Transportation Anticipated: family or friend will provide  Patient/Family Anticipated Services at Transition: none  Patient/Family Anticipates Transition to: home  Taken 9/19/2022 1622 by Joy King, RN  Equipment Currently Used at Home:   cpap   bp cuff   glucometer   Goal Outcome Evaluation:

## 2022-09-19 NOTE — ANESTHESIA POSTPROCEDURE EVALUATION
Patient: Susy Hanson    Procedure Summary     Date: 09/19/22 Room / Location: [facility] CATH LAB 3 /  [facility] CATH INVASIVE LOCATION    Anesthesia Start: 1224 Anesthesia Stop: 1452    Procedure: ICD implant [place] [facility] aware (N/A ) Diagnosis:       Cardiomyopathy (HCC)      (NYHA class II CHF despite revascularization and optimal medical therapy and EF < 35%)    Providers: Esteban Kendrick MD Provider: Kalani Morrissey MD    Anesthesia Type: MAC ASA Status: 4          Anesthesia Type: MAC    Vitals  Vitals Value Taken Time   /82 09/19/22 1602   Temp     Pulse 94 09/19/22 1611   Resp 14 09/19/22 1500   SpO2 99 % 09/19/22 1611   Vitals shown include unvalidated device data.        Post Anesthesia Care and Evaluation    Patient location during evaluation: PACU  Patient participation: complete - patient participated  Level of consciousness: awake  Pain management: adequate    Airway patency: patent  Anesthetic complications: No anesthetic complications  PONV Status: controlled  Cardiovascular status: acceptable  Respiratory status: acceptable  Hydration status: acceptable

## 2022-09-19 NOTE — H&P
History of present illness  Susy Hanson is a 62-year-old female patient who has cardiomyopathy, mostly nonischemic, chronic left bundle branch block, diabetes, dyslipidemia, hypertension, breast cancer status post bilateral mastectomy in August 2021, with 1 lymph node removal from the left.  Also had reconstructive surgery bilaterally afterwards.  Patient is here today for consideration of ICD implant.  She does have class II heart failure symptoms which is currently well managed with Bumex, Entresto and Toprol.             Past Medical History:   Diagnosis Date   • Anxiety 2000   • Breast cancer (HCC)       LEFT BREAST 7/2021   • CHF (congestive heart failure) (HCC)     • Depression     • DM (diabetes mellitus) (HCC)     • HL (hearing loss)     • Hyperlipidemia     • Hypertension       SAW CARDIO/ STRESS TEST 2020 - NOW MEDS MANAGED BY PCP    • Irritable bowel syndrome 2006   • Peripheral neuropathy     • PONV (postoperative nausea and vomiting)     • Sleep apnea       WEARS CPAP            Past Surgical History:   Procedure Laterality Date   • BLADDER REPAIR       • BREAST BIOPSY       • BREAST RECONSTRUCTION Bilateral 08/26/2021     Procedure: BILATERAL PREPECTORALPLACMENT TISSUE EXPANDERS AND ALLODERM;  Surgeon: Heri Arreaga MD;  Location: [facility];  Service: Plastics;  Laterality: Bilateral;   • BREAST TISSUE EXPANDER REMOVAL INSERTION OF IMPLANT Bilateral 12/23/2021     Procedure: BILATERAL REMOVAL TISSUE EXPANDERS AND PLACEMENT OF IMPLANTS;  Surgeon: Heri Arreaga MD;  Location: [facility];  Service: Plastics;  Laterality: Bilateral;   • CARDIAC CATHETERIZATION Left 04/13/2022     Procedure: Cardiac Catheterization/Vascular Study;  Surgeon: Christo Murphy MD;  Location: [facility] INVASIVE LOCATION;  Service: Cardiovascular;  Laterality: Left;   • CARDIOVASCULAR STRESS TEST   2020   • COLONOSCOPY   11/2012   • HYSTERECTOMY       • LYMPH NODE BIOPSY   8/29/2021   •  MASTECTOMY W/ SENTINEL NODE BIOPSY Bilateral 2021     Procedure: bilateral total mastectomy, left axillary sentinel lymph node biopsy, possible reconstruction.;  Surgeon: Asia Perkins MD;  Location: [facility];  Service: General;  Laterality: Bilateral;   • TUBAL ABDOMINAL LIGATION               Family History   Problem Relation Age of Onset   • Alzheimer's disease Father     • Diabetes Father     • Heart disease Father     • Other Father     • Sleep apnea Father     • Snoring Father     • Hyperlipidemia Father     • Other Mother     • Diabetes Mother     • Heart disease Mother     • Sleep apnea Mother     • Snoring Mother     • COPD Mother     • Hyperlipidemia Mother     • Diabetes Sister     • Heart disease Sister     • Other Sister     • Sleep apnea Sister     • Snoring Sister     • COPD Sister     • Breast cancer Paternal Grandmother 60   • Cancer Paternal Grandmother     • Diabetes Paternal Grandmother     • Breast cancer Maternal Aunt 80   • Breast cancer Paternal Aunt     • Diabetes Maternal Grandmother     • Hyperlipidemia Sister     • Hyperlipidemia Brother     • Hyperlipidemia Son     • Malig Hyperthermia Neg Hx        Social History            Tobacco Use   • Smoking status: Former Smoker       Packs/day: 2.00       Years: 30.00       Pack years: 60.00       Types: Cigarettes       Start date: 1972       Quit date: 2008       Years since quittin.5   • Smokeless tobacco: Never Used   Vaping Use   • Vaping Use: Never used   Substance Use Topics   • Alcohol use: No   • Drug use: No      (Not in a hospital admission)     Allergies:  Hydrocodone-acetaminophen     Review of systems     Constitutional: Negative.    Respiratory and cardiovascular: As detailed in HPI section.  Gastrointestinal: Negative for constipation, nausea and vomiting negative for abdominal distention, abdominal pain and diarrhea.   Genitourinary: Negative for difficulty urinating and flank pain.    Musculoskeletal: Negative for arthralgias, joint swelling and myalgias.   Skin: Negative for color change, rash and wound.   Neurological: Negative for dizziness, syncope, weakness and headaches.   Hematological: Negative for adenopathy.   Psychiatric/Behavioral: Negative for confusion.   All other systems reviewed and are negative.     Physical Exam  VITALS REVIEWED     General:      well developed, in no acute distress.    Head:      normocephalic and atraumatic.    Eyes:      PERRL/EOM intact, conjunctiva and sclera clear with out nystagmus.    Neck:      no masses, thyromegaly,  trachea central with normal respiratory effort and PMI displaced laterally  Lungs:      Clear to auscultation bilaterally  Heart:       Regular rate and rhythm  Msk:      no deformity or scoliosis noted of thoracic or lumbar spine.    Pulses:      pulses normal in all 4 extremities.    Extremities:       No lower extremity edema  Neurologic:      no focal deficits.   alert oriented x3  Skin:      intact without lesions or rashes.    Psych:      alert and cooperative; normal mood and affect; normal attention span and concentration.             Result Review    :                    Pertinent cardiac workup     1. Echo 7/28/2022 severely dilated left ventricle with severely reduced systolic function ejection fraction 15 to 20%, significant dyskinesia.  Personally reviewed.  2. EKG 7/27/2022 sinus rhythm, left bundle branch block.  3. Echocardiogram 4/12/2022 ejection fraction 25 to 30%.  4. EKG 9/25/2019, sinus rhythm, narrow QRS.  5. Heart catheterization 4/13/2022, mild nonobstructive CAD.        Procedures            Assessment      Assessment and Plan       Susy Hanson is a 62-year-old female patient who has nonischemic cardiomyopathy with ejection fraction of around 20% since at least April 2022.  Patient also has class II heart failure.  In fact she was in CHF exacerbation before but symptomatically states she feels better  since addition of Bumex.  Patient has been on guideline directed medical therapy for cardiomyopathy and CHF with Toprol and Entresto for over 3 months and her ejection fraction has not improved.  She does have left bundle branch block on her EKG.  In fact in 2019 she did not have left bundle branch block and her ejection fraction at that time was normal.  It seems that her cardiomyopathy coincided or even brought on by her left bundle branch block.  Due to persistently low ejection fraction and CHF despite optimal medical therapy, patient qualifies for a ICD for primary prevention of sudden cardiac death.  In her case the device of choice would be biventricular ICD for cardiac resynchronization in an attempt to improve her cardiac function and CHF symptoms.  Her life expectancy with an ICD is greater than 1 year.  I went over the risk and benefits with the patient of having an ICD, using the [facility] decision aid, to help the patient make an informed decision to have the procedure done. The patient voiced understanding and agreed to proceed with defibrillator implantation.

## 2022-09-20 ENCOUNTER — APPOINTMENT (OUTPATIENT)
Dept: CARDIAC REHAB | Facility: HOSPITAL | Age: 62
End: 2022-09-20

## 2022-09-20 ENCOUNTER — APPOINTMENT (OUTPATIENT)
Dept: GENERAL RADIOLOGY | Facility: HOSPITAL | Age: 62
End: 2022-09-20

## 2022-09-20 ENCOUNTER — PREP FOR SURGERY (OUTPATIENT)
Dept: OTHER | Facility: HOSPITAL | Age: 62
End: 2022-09-20

## 2022-09-20 DIAGNOSIS — I42.9 CARDIOMYOPATHY: Primary | ICD-10-CM

## 2022-09-20 PROCEDURE — 33215 REPOSITION PACING-DEFIB LEAD: CPT | Performed by: INTERNAL MEDICINE

## 2022-09-20 PROCEDURE — 33223 RELOCATE POCKET FOR DEFIB: CPT | Performed by: INTERNAL MEDICINE

## 2022-09-20 PROCEDURE — 99153 MOD SED SAME PHYS/QHP EA: CPT | Performed by: INTERNAL MEDICINE

## 2022-09-20 PROCEDURE — 25010000002 VANCOMYCIN 1 G RECONSTITUTED SOLUTION: Performed by: INTERNAL MEDICINE

## 2022-09-20 PROCEDURE — 25010000002 CEFAZOLIN PER 500 MG: Performed by: INTERNAL MEDICINE

## 2022-09-20 PROCEDURE — G0378 HOSPITAL OBSERVATION PER HR: HCPCS

## 2022-09-20 PROCEDURE — 99152 MOD SED SAME PHYS/QHP 5/>YRS: CPT | Performed by: INTERNAL MEDICINE

## 2022-09-20 PROCEDURE — 25010000002 FENTANYL CITRATE (PF) 100 MCG/2ML SOLUTION: Performed by: INTERNAL MEDICINE

## 2022-09-20 PROCEDURE — 25010000002 ONDANSETRON PER 1 MG: Performed by: INTERNAL MEDICINE

## 2022-09-20 PROCEDURE — 71045 X-RAY EXAM CHEST 1 VIEW: CPT

## 2022-09-20 PROCEDURE — 25010000002 MIDAZOLAM PER 1 MG: Performed by: INTERNAL MEDICINE

## 2022-09-20 RX ORDER — SODIUM CHLORIDE 9 MG/ML
INJECTION, SOLUTION INTRAVENOUS CONTINUOUS PRN
Status: COMPLETED | OUTPATIENT
Start: 2022-09-20 | End: 2022-09-20

## 2022-09-20 RX ORDER — VANCOMYCIN/0.9 % SOD CHLORIDE 1 G/100 ML
PLASTIC BAG, INJECTION (ML) INTRAVENOUS AS NEEDED
Status: DISCONTINUED | OUTPATIENT
Start: 2022-09-20 | End: 2022-09-20 | Stop reason: HOSPADM

## 2022-09-20 RX ORDER — ONDANSETRON 2 MG/ML
4 INJECTION INTRAMUSCULAR; INTRAVENOUS EVERY 6 HOURS PRN
Status: DISCONTINUED | OUTPATIENT
Start: 2022-09-20 | End: 2022-09-21 | Stop reason: HOSPADM

## 2022-09-20 RX ORDER — LIDOCAINE HYDROCHLORIDE AND EPINEPHRINE BITARTRATE 20; .01 MG/ML; MG/ML
INJECTION, SOLUTION SUBCUTANEOUS AS NEEDED
Status: DISCONTINUED | OUTPATIENT
Start: 2022-09-20 | End: 2022-09-20 | Stop reason: HOSPADM

## 2022-09-20 RX ORDER — SODIUM CHLORIDE 0.9 % (FLUSH) 0.9 %
3-10 SYRINGE (ML) INJECTION AS NEEDED
Status: CANCELLED | OUTPATIENT
Start: 2022-09-20

## 2022-09-20 RX ORDER — FENTANYL CITRATE 50 UG/ML
INJECTION, SOLUTION INTRAMUSCULAR; INTRAVENOUS AS NEEDED
Status: DISCONTINUED | OUTPATIENT
Start: 2022-09-20 | End: 2022-09-20 | Stop reason: HOSPADM

## 2022-09-20 RX ORDER — CEFAZOLIN SODIUM 1 G/3ML
INJECTION, POWDER, FOR SOLUTION INTRAMUSCULAR; INTRAVENOUS AS NEEDED
Status: DISCONTINUED | OUTPATIENT
Start: 2022-09-20 | End: 2022-09-20 | Stop reason: HOSPADM

## 2022-09-20 RX ORDER — MIDAZOLAM HYDROCHLORIDE 1 MG/ML
INJECTION INTRAMUSCULAR; INTRAVENOUS AS NEEDED
Status: DISCONTINUED | OUTPATIENT
Start: 2022-09-20 | End: 2022-09-20 | Stop reason: HOSPADM

## 2022-09-20 RX ORDER — TRAMADOL HYDROCHLORIDE 50 MG/1
50 TABLET ORAL EVERY 6 HOURS PRN
Status: DISCONTINUED | OUTPATIENT
Start: 2022-09-20 | End: 2022-09-21 | Stop reason: HOSPADM

## 2022-09-20 RX ORDER — SODIUM CHLORIDE 0.9 % (FLUSH) 0.9 %
3 SYRINGE (ML) INJECTION EVERY 12 HOURS SCHEDULED
Status: DISCONTINUED | OUTPATIENT
Start: 2022-09-20 | End: 2022-09-20 | Stop reason: HOSPADM

## 2022-09-20 RX ORDER — SODIUM CHLORIDE 0.9 % (FLUSH) 0.9 %
3 SYRINGE (ML) INJECTION EVERY 12 HOURS SCHEDULED
Status: CANCELLED | OUTPATIENT
Start: 2022-09-20

## 2022-09-20 RX ORDER — SODIUM CHLORIDE 0.9 % (FLUSH) 0.9 %
3-10 SYRINGE (ML) INJECTION AS NEEDED
Status: DISCONTINUED | OUTPATIENT
Start: 2022-09-20 | End: 2022-09-20 | Stop reason: HOSPADM

## 2022-09-20 RX ADMIN — TRAMADOL HYDROCHLORIDE 50 MG: 50 TABLET, COATED ORAL at 16:45

## 2022-09-20 RX ADMIN — BUMETANIDE 1 MG: 1 TABLET ORAL at 09:19

## 2022-09-20 RX ADMIN — TRAMADOL HYDROCHLORIDE 50 MG: 50 TABLET, COATED ORAL at 10:48

## 2022-09-20 RX ADMIN — ONDANSETRON 4 MG: 2 INJECTION INTRAMUSCULAR; INTRAVENOUS at 22:16

## 2022-09-20 RX ADMIN — TRAMADOL HYDROCHLORIDE 50 MG: 50 TABLET, COATED ORAL at 04:25

## 2022-09-20 RX ADMIN — Medication 3 ML: at 21:04

## 2022-09-20 RX ADMIN — METOPROLOL SUCCINATE 100 MG: 50 TABLET, EXTENDED RELEASE ORAL at 09:19

## 2022-09-20 RX ADMIN — CEFAZOLIN 2 G: 2 INJECTION, POWDER, FOR SOLUTION INTRAMUSCULAR; INTRAVENOUS at 04:25

## 2022-09-20 NOTE — CASE MANAGEMENT/SOCIAL WORK
Discharge Planning Assessment   [facility]     Patient Name: Susy Hanson  MRN: [id number]  Today's Date: 9/20/2022    Admit Date: 9/19/2022    Plan: Home with spouse.   Discharge Needs Assessment     Row Name 09/20/22 1234       Living Environment    People in Home spouse    Current Living Arrangements home    Primary Care Provided by self    Provides Primary Care For no one    Family Caregiver if Needed spouse    Quality of Family Relationships helpful;involved;supportive    Able to Return to Prior Arrangements yes       Resource/Environmental Concerns    Resource/Environmental Concerns none    Transportation Concerns none       Transition Planning    Patient/Family Anticipates Transition to home with family    Patient/Family Anticipated Services at Transition none    Transportation Anticipated family or friend will provide       Discharge Needs Assessment    Readmission Within the Last 30 Days no previous admission in last 30 days    Equipment Currently Used at Home cpap    Concerns to be Addressed no discharge needs identified;denies needs/concerns at this time    Anticipated Changes Related to Illness none    Equipment Needed After Discharge none               Discharge Plan     Row Name 09/20/22 1234       Plan    Plan Home with spouse.    Patient/Family in Agreement with Plan yes    Plan Comments Patient lives at home with spouse. Patient drives, patient's daughter or spouse will provide transportation at discharge. Patient performs ADL. PCP and pharmacy confirmed. Denies financial assistance for medication and/or food. Denies HH and/or PT needs post discharge. D/C barriers: 9/20-ICD revision.               Demographic Summary     Row Name 09/20/22 1230       General Information    Admission Type observation    Arrived From PACU/recovery room    Referral Source admission list    Reason for Consult discharge planning    Preferred Language English               Functional Status     Row Name 09/20/22 1238        Functional Status    Usual Activity Tolerance good    Current Activity Tolerance good       Functional Status, IADL    Medications independent    Meal Preparation independent    Housekeeping independent    Laundry independent    Shopping independent            Met with patient in room wearing PPE: mask.    Maintained distance greater than six feet and spent less than 15 minutes in the room.          Evelyn Maher RN

## 2022-09-20 NOTE — PLAN OF CARE
Problem: Adult Inpatient Plan of Care  Goal: Absence of Hospital-Acquired Illness or Injury  Intervention: Identify and Manage Fall Risk  Recent Flowsheet Documentation  Taken 9/19/2022 2010 by Sanchez Murrell RN  Safety Promotion/Fall Prevention:   assistive device/personal items within reach   clutter free environment maintained   nonskid shoes/slippers when out of bed   room organization consistent   safety round/check completed  Intervention: Prevent Skin Injury  Recent Flowsheet Documentation  Taken 9/19/2022 2010 by Sanchez Murrell RN  Body Position: position changed independently  Intervention: Prevent and Manage VTE (Venous Thromboembolism) Risk  Recent Flowsheet Documentation  Taken 9/19/2022 2010 by Sanchez Murrell RN  Activity Management: bedrest  Intervention: Prevent Infection  Recent Flowsheet Documentation  Taken 9/19/2022 2010 by Sanchez Murrell RN  Infection Prevention:   environmental surveillance performed   hand hygiene promoted   rest/sleep promoted   single patient room provided  Goal: Optimal Comfort and Wellbeing  Intervention: Monitor Pain and Promote Comfort  Recent Flowsheet Documentation  Taken 9/19/2022 2229 by Sanchez Murrell RN  Pain Management Interventions: see MAR  Intervention: Provide Person-Centered Care  Recent Flowsheet Documentation  Taken 9/20/2022 0300 by Sanchez Murrell RN  Trust Relationship/Rapport:   care explained   choices provided   questions answered   questions encouraged  Taken 9/19/2022 2300 by Sanchez Murrell RN  Trust Relationship/Rapport:   care explained   choices provided   questions answered   questions encouraged  Taken 9/19/2022 2010 by Sanchez Murrell RN  Trust Relationship/Rapport:   care explained   choices provided   questions answered   questions encouraged     Problem: Adjustment to Device (Cardiac Rhythm Management Device)  Goal: Optimal Adjustment to Device  Intervention: Optimize Psychosocial Response to Device  Implantation  Recent Flowsheet Documentation  Taken 9/19/2022 2300 by Sanchez Murrell RN  Supportive Measures: active listening utilized  Taken 9/19/2022 2010 by Sanchez Murrell RN  Supportive Measures:   active listening utilized   decision-making supported     Problem: Bleeding (Cardiac Rhythm Management Device)  Goal: Absence of Bleeding  Intervention: Monitor and Manage Bleeding  Recent Flowsheet Documentation  Taken 9/19/2022 2010 by Sanchez Murrell RN  Bleeding Precautions: monitored for signs of bleeding     Problem: Device-Related Complication Risk (Cardiac Rhythm Management Device)  Goal: Effective Device Function  Intervention: Optimize Device Care and Function  Recent Flowsheet Documentation  Taken 9/20/2022 0300 by Sanchez Murrell RN  Pacer Function: (AICD) other (see comments)  Taken 9/19/2022 2300 by Sanchez Murrell RN  Pacer Function: (AICD) other (see comments)  Taken 9/19/2022 2010 by Sanchez Murrell RN  Pacer Function: (AICD) other (see comments)     Problem: Pain (Cardiac Rhythm Management Device)  Goal: Acceptable Pain Level  Intervention: Prevent or Manage Pain  Recent Flowsheet Documentation  Taken 9/19/2022 2229 by Sanchez Murrell RN  Pain Management Interventions: see MAR     Problem: Respiratory Compromise (Cardiac Rhythm Management Device)  Goal: Effective Oxygenation and Ventilation  Intervention: Optimize Oxygenation and Ventilation  Recent Flowsheet Documentation  Taken 9/20/2022 0300 by Sanchez Murrell RN  Cough And Deep Breathing: done independently per patient  Taken 9/19/2022 2300 by Sanchez Murrell RN  Cough And Deep Breathing: done independently per patient  Taken 9/19/2022 2010 by Sanchez Murrell RN  Cough And Deep Breathing: done independently per patient   Goal Outcome Evaluation:

## 2022-09-21 ENCOUNTER — READMISSION MANAGEMENT (OUTPATIENT)
Dept: CALL CENTER | Facility: HOSPITAL | Age: 62
End: 2022-09-21

## 2022-09-21 VITALS
BODY MASS INDEX: 23.82 KG/M2 | DIASTOLIC BLOOD PRESSURE: 69 MMHG | RESPIRATION RATE: 15 BRPM | OXYGEN SATURATION: 95 % | SYSTOLIC BLOOD PRESSURE: 102 MMHG | TEMPERATURE: 98.2 F | WEIGHT: 139.55 LBS | HEART RATE: 85 BPM | HEIGHT: 64 IN

## 2022-09-21 PROCEDURE — 25010000002 ONDANSETRON PER 1 MG: Performed by: INTERNAL MEDICINE

## 2022-09-21 PROCEDURE — G0378 HOSPITAL OBSERVATION PER HR: HCPCS

## 2022-09-21 RX ORDER — CEPHALEXIN 500 MG/1
500 CAPSULE ORAL 2 TIMES DAILY
Qty: 10 CAPSULE | Refills: 0 | Status: SHIPPED | OUTPATIENT
Start: 2022-09-21 | End: 2022-09-26

## 2022-09-21 RX ORDER — TRAMADOL HYDROCHLORIDE 50 MG/1
50 TABLET ORAL EVERY 6 HOURS PRN
Qty: 20 TABLET | Refills: 0 | Status: SHIPPED | OUTPATIENT
Start: 2022-09-21 | End: 2022-09-23

## 2022-09-21 RX ORDER — ONDANSETRON 4 MG/1
4 TABLET, FILM COATED ORAL EVERY 8 HOURS PRN
Qty: 20 TABLET | Refills: 0 | Status: SHIPPED | OUTPATIENT
Start: 2022-09-21 | End: 2023-03-14

## 2022-09-21 RX ADMIN — METFORMIN HYDROCHLORIDE 500 MG: 500 TABLET ORAL at 09:34

## 2022-09-21 RX ADMIN — TRAMADOL HYDROCHLORIDE 50 MG: 50 TABLET, COATED ORAL at 05:12

## 2022-09-21 RX ADMIN — SACUBITRIL AND VALSARTAN 1 TABLET: 24; 26 TABLET, FILM COATED ORAL at 09:35

## 2022-09-21 RX ADMIN — METOPROLOL SUCCINATE 100 MG: 50 TABLET, EXTENDED RELEASE ORAL at 09:35

## 2022-09-21 RX ADMIN — BUMETANIDE 1 MG: 1 TABLET ORAL at 09:35

## 2022-09-21 RX ADMIN — ATORVASTATIN CALCIUM 20 MG: 20 TABLET, FILM COATED ORAL at 09:35

## 2022-09-21 RX ADMIN — ONDANSETRON 4 MG: 2 INJECTION INTRAMUSCULAR; INTRAVENOUS at 05:08

## 2022-09-21 NOTE — PLAN OF CARE
Goal Outcome Evaluation:  Pt A&Ox3, v/s stable, call light in reach, dtr to transport home in private vehicle.

## 2022-09-21 NOTE — CASE MANAGEMENT/SOCIAL WORK
Case Management Discharge Note      Final Note: Home     Transportation Services  Private: Car    Final Discharge Disposition Code: 01 - home or self-care

## 2022-09-21 NOTE — DISCHARGE SUMMARY
[facility]    Date of Admission: 9/19/2022    Date of Discharge:  9/21/2022    Length of stay:  LOS: 0 days     Admission Diagnosis: CHF, low ejection fraction less than 35%, class II heart failure, left bundle branch block    Discharge Diagnosis: Same, status post BiV ICD implantation    Presenting Problem/History of Present Illness  Active Hospital Problems    Diagnosis  POA   • **NICM (nonischemic cardiomyopathy) ([facility]) [I42.8]  Unknown   • Cardiomyopathy (HCC) [I42.9]  Yes      Resolved Hospital Problems   No resolved problems to display.          Hospital Course  Susy Hanson is a 62 y.o. female presented for elective BiV ICD implantation, which was performed on 9/19/2022.  Next morning threshold in the RV had gone up significantly, chest x-ray did not show any visible dislodgment of the lead, however it was decided to reposition the lead to get better capture thresholds.  Repositioning was done on 9/20/2022 and the device was also positioned more medially and inferiorly away from the clavicle and breast implant.  Next morning patient was seen and examined, chest x-ray showed good lead positioning and device interrogation showed appropriate capture thresholds on all leads.  Patient was discharged home in a stable condition.  She will be given Keflex, tramadol and Zofran upon discharge.  2-week wound check at Dr. Murphy's office.  The discharge process took about 35 minutes during which we discussed about ICD monitoring, wound care, medications as well as arm restrictions.  The patient voiced understanding and all her  questions were answered to her  satisfaction.    Past Medical History:   Diagnosis Date   • Anxiety 2000   • Breast cancer (HCC)     LEFT BREAST 7/2021   • CHF (congestive heart failure) ([facility])    • Depression    • DM (diabetes mellitus) ([facility])    • HL (hearing loss)    • Hyperlipidemia    • Hypertension     SAW CARDIO/ STRESS TEST 2020 - NOW MEDS MANAGED BY PCP    • Irritable bowel syndrome 2006    • Peripheral neuropathy    • PONV (postoperative nausea and vomiting)    • Sleep apnea     WEARS CPAP     Past Surgical History:   Procedure Laterality Date   • BLADDER REPAIR     • BREAST BIOPSY     • BREAST RECONSTRUCTION Bilateral 2021    Procedure: BILATERAL PREPECTORALPLACMENT TISSUE EXPANDERS AND ALLODERM;  Surgeon: Heri Arreaga MD;  Location: [facility] MAIN OR;  Service: Plastics;  Laterality: Bilateral;   • BREAST TISSUE EXPANDER REMOVAL INSERTION OF IMPLANT Bilateral 2021    Procedure: BILATERAL REMOVAL TISSUE EXPANDERS AND PLACEMENT OF IMPLANTS;  Surgeon: Heri Arreaga MD;  Location: [facility] MAIN OR;  Service: Plastics;  Laterality: Bilateral;   • CARDIAC CATHETERIZATION Left 2022    Procedure: Cardiac Catheterization/Vascular Study;  Surgeon: Christo Murphy MD;  Location: [facility] CATH INVASIVE LOCATION;  Service: Cardiovascular;  Laterality: Left;   • CARDIAC ELECTROPHYSIOLOGY PROCEDURE N/A 2022    Procedure: ICD implant [place] [facility] aware;  Surgeon: Esteban Kendrick MD;  Location: [facility] CATH INVASIVE LOCATION;  Service: Cardiology;  Laterality: N/A;   • CARDIOVASCULAR STRESS TEST     • COLONOSCOPY  2012   • HYSTERECTOMY     • LYMPH NODE BIOPSY  2021   • MASTECTOMY W/ SENTINEL NODE BIOPSY Bilateral 2021    Procedure: bilateral total mastectomy, left axillary sentinel lymph node biopsy, possible reconstruction.;  Surgeon: Asia Perkins MD;  Location: [facility];  Service: General;  Laterality: Bilateral;   • TUBAL ABDOMINAL LIGATION       Social History     Socioeconomic History   • Marital status:    Tobacco Use   • Smoking status: Former Smoker     Packs/day: 2.00     Years: 30.00     Pack years: 60.00     Types: Cigarettes     Start date: 1972     Quit date: 2008     Years since quittin.7   • Smokeless tobacco: Never Used   Vaping Use   • Vaping Use: Never used   Substance and Sexual Activity   • Alcohol use: No  "  • Drug use: No   • Sexual activity: Yes     Partners: Male     Birth control/protection: None       Procedures Performed: BiV ICD implantation, followed by RV lead repositioning       Pertinent Test Results:     Lab Results (last 72 hours)     Procedure Component Value Units Date/Time    POC Glucose Once [[id number]]  (Abnormal) Collected: 09/19/22 2016    Specimen: Blood Updated: 09/19/22 2016     Glucose 114 mg/dL      Comment: Serial Number: [id number]:  [id number]             Condition on Discharge: Stable    Vital Signs  /65 (BP Location: Right arm, Patient Position: Lying)   Pulse 92   Temp 98.4 °F (36.9 °C) (Oral)   Resp 20   Ht 162.6 cm (64\")   Wt 63.3 kg (139 lb 8.8 oz)   LMP  (LMP Unknown)   SpO2 95%   BMI 23.95 kg/m²     Physical Exam    Physical Exam  VITALS REVIEWED    General:      well developed, in no acute distress.    Head:      normocephalic and atraumatic.    Eyes:      PERRL/EOM intact, conjunctiva and sclera clear with out nystagmus.    Neck:      no masses, thyromegaly,  trachea central with normal respiratory effort and PMI displaced laterally  Lungs:      Clear  Heart:       Regular rate and rhythm  Msk:      no deformity or scoliosis noted of thoracic or lumbar spine.    Pulses:      pulses normal in all 4 extremities.    Extremities:       No lower extremity edema  Neurologic:      no focal deficits.   alert oriented x3  Skin:      intact without lesions or rashes.  No bleeding or hematoma at surgical incision site  Psych:      alert and cooperative; normal mood and affect; normal attention span and concentration.      Discharge Disposition  Home or Self Care    Discharge Medications     Discharge Medications      New Medications      Instructions Start Date   cephalexin 500 MG capsule  Commonly known as: Keflex   500 mg, Oral, 2 Times Daily      ondansetron 4 MG tablet  Commonly known as: Zofran   4 mg, Oral, Every 8 Hours PRN      traMADol 50 MG tablet  Commonly " known as: ULTRAM   50 mg, Oral, Every 6 Hours PRN         Continue These Medications      Instructions Start Date   ALPRAZolam 1 MG tablet  Commonly known as: XANAX   1 mg, Oral, 2 Times Daily PRN      atorvastatin 20 MG tablet  Commonly known as: LIPITOR   20 mg, Oral, Daily      bumetanide 1 MG tablet  Commonly known as: BUMEX   1 mg, Oral, Daily      cetirizine 10 MG tablet  Commonly known as: zyrTEC   10 mg, Oral, Daily PRN      Entresto 24-26 MG tablet  Generic drug: sacubitril-valsartan   1 tablet, Oral, Every 12 Hours Scheduled      metFORMIN 1000 MG tablet  Commonly known as: GLUCOPHAGE   Take one tablet by mouth with breakfast and 1 tablets with supper      metoprolol succinate  MG 24 hr tablet  Commonly known as: TOPROL-XL   take 1 tablet by oral route  every day      Nasacort Allergy 24HR 55 MCG/ACT nasal inhaler  Generic drug: Triamcinolone Acetonide   APPLY 2 SPRAYS BY NASAL ROUTE DAILY FOR 30 DAYS         Stop These Medications    methylPREDNISolone 4 MG dose pack  Commonly known as: MEDROL            Discharge Diet: Cardiac    Activity at Discharge: Left arm restrictions were given to the patient.    Follow-up Appointments  Future Appointments   Date Time Provider Department Center   9/29/2022  8:00 AM PHASE II - CARD REHAB [facility] None   10/3/2022  8:00 AM PHASE II - CARD REHAB [facility] None   12/22/2022  1:00 PM Christo Murphy MD [facility] P [facility]   1/3/2023  9:00 AM Sam Quintana MD [facility]   3/14/2023 12:30 PM Erica Avila MD [facility]   8/1/2023  9:20 AM Guru Coulter APRN [facility]       Risk for Readmission (LACE) Score: 8 (9/21/2022  6:00 AM)      Esteban Kendrick MD  09/21/22  08:40 EDT

## 2022-09-21 NOTE — PLAN OF CARE
Goal Outcome Evaluation:  Plan of Care Reviewed With: patient        Progress: improving  Outcome Evaluation: Patient vomited shortly after return from surgery but was able to settle to sleep after receiving PRN zofran. Patient hoping for discharge

## 2022-09-21 NOTE — DISCHARGE INSTR - ACTIVITY
Don't lift left arm above chest x 30 days  Wear arm brace at night to prevent movement  Don't get surgical site wet x 3 days

## 2022-09-22 ENCOUNTER — TRANSITIONAL CARE MANAGEMENT TELEPHONE ENCOUNTER (OUTPATIENT)
Dept: CALL CENTER | Facility: HOSPITAL | Age: 62
End: 2022-09-22

## 2022-09-22 ENCOUNTER — APPOINTMENT (OUTPATIENT)
Dept: CARDIAC REHAB | Facility: HOSPITAL | Age: 62
End: 2022-09-22

## 2022-09-22 NOTE — OUTREACH NOTE
Prep Survey    Flowsheet Row Responses   [facility] patient discharged from? [facility]   Is LACE score < 7 ? No   Emergency Room discharge w/ pulse ox? No   Eligibility [facility]   Date of Admission 09/19/22   Date of Discharge 09/21/22   Discharge Disposition Home or Self Care   Discharge diagnosis NICM (nonischemic cardiomyopathy)   Does the patient have one of the following disease processes/diagnoses(primary or secondary)? Other   Does the patient have Home health ordered? No   Is there a DME ordered? No   Medication alerts for this patient She will be given Keflex, tramadol and Zofran upon discharge   Prep survey completed? Yes          SIVA JOSEPH - Registered Nurse

## 2022-09-22 NOTE — OUTREACH NOTE
"Call Center TCM Note    Flowsheet Row Responses   [facility] patient discharged from? [facility]   Does the patient have one of the following disease processes/diagnoses(primary or secondary)? Other   TCM attempt successful? Yes   Call start time 1440   Call end time 1442   Discharge diagnosis NICM (nonischemic cardiomyopathy)   Meds reviewed with patient/caregiver? Yes   Is the patient having any side effects they believe may be caused by any medication additions or changes? Yes   Side effects comments  Tramadol - causing itching pt believes    Does the patient have all medications ordered at discharge? Yes   Is the patient taking all medications as directed (includes completed medication regime)? Yes   Comments Hosp dc [facility] apt on 10/4/22 with PCP    Psychosocial issues? No   Did the patient receive a copy of their discharge instructions? Yes   Nursing interventions Reviewed instructions with patient   What is the patient's perception of their health status since discharge? Improving  [\"still a little pain\" ]   Is the patient/caregiver able to teach back signs and symptoms related to disease process for when to call PCP? Yes   Is the patient/caregiver able to teach back signs and symptoms related to disease process for when to call 911? Yes   Is the patient/caregiver able to teach back the hierarchy of who to call/visit for symptoms/problems? PCP, Specialist, Home health nurse, Urgent Care, ED, 911 Yes   If the patient is a current smoker, are they able to teach back resources for cessation? Not a smoker   TCM call completed? Yes          Theresa Sotelo RN    9/22/2022, 14:42 EDT      "

## 2022-09-23 ENCOUNTER — HOSPITAL ENCOUNTER (EMERGENCY)
Facility: HOSPITAL | Age: 62
Discharge: HOME | End: 2022-09-23
Attending: EMERGENCY MEDICINE | Admitting: EMERGENCY MEDICINE

## 2022-09-23 ENCOUNTER — APPOINTMENT (OUTPATIENT)
Dept: GENERAL RADIOLOGY | Facility: HOSPITAL | Age: 62
End: 2022-09-23

## 2022-09-23 VITALS
SYSTOLIC BLOOD PRESSURE: 120 MMHG | DIASTOLIC BLOOD PRESSURE: 65 MMHG | HEART RATE: 91 BPM | TEMPERATURE: 98.1 F | BODY MASS INDEX: 23.9 KG/M2 | OXYGEN SATURATION: 97 % | WEIGHT: 140 LBS | RESPIRATION RATE: 18 BRPM | HEIGHT: 64 IN

## 2022-09-23 DIAGNOSIS — N18.9 CHRONIC KIDNEY DISEASE, UNSPECIFIED CKD STAGE: ICD-10-CM

## 2022-09-23 DIAGNOSIS — R79.81 ABNORMAL PULSE OXIMETRY: Primary | ICD-10-CM

## 2022-09-23 LAB
ANION GAP SERPL CALCULATED.3IONS-SCNC: 15 MMOL/L (ref 5–15)
BASOPHILS # BLD AUTO: 0.1 10*3/MM3 (ref 0–0.2)
BASOPHILS NFR BLD AUTO: 1.4 % (ref 0–1.5)
BUN SERPL-MCNC: 42 MG/DL (ref 8–23)
BUN/CREAT SERPL: 29.8 (ref 7–25)
CALCIUM SPEC-SCNC: 9.7 MG/DL (ref 8.6–10.5)
CHLORIDE SERPL-SCNC: 96 MMOL/L (ref 98–107)
CO2 SERPL-SCNC: 28 MMOL/L (ref 22–29)
CREAT SERPL-MCNC: 1.41 MG/DL (ref 0.57–1)
DEPRECATED RDW RBC AUTO: 47.3 FL (ref 37–54)
EGFRCR SERPLBLD CKD-EPI 2021: 41.7 ML/MIN/1.73
EOSINOPHIL # BLD AUTO: 0.4 10*3/MM3 (ref 0–0.4)
EOSINOPHIL NFR BLD AUTO: 7.7 % (ref 0.3–6.2)
ERYTHROCYTE [DISTWIDTH] IN BLOOD BY AUTOMATED COUNT: 15.2 % (ref 12.3–15.4)
GLUCOSE SERPL-MCNC: 119 MG/DL (ref 65–99)
HCT VFR BLD AUTO: 33.6 % (ref 34–46.6)
HGB BLD-MCNC: 11 G/DL (ref 12–15.9)
LYMPHOCYTES # BLD AUTO: 1.1 10*3/MM3 (ref 0.7–3.1)
LYMPHOCYTES NFR BLD AUTO: 20.7 % (ref 19.6–45.3)
MCH RBC QN AUTO: 28.7 PG (ref 26.6–33)
MCHC RBC AUTO-ENTMCNC: 32.9 G/DL (ref 31.5–35.7)
MCV RBC AUTO: 87.4 FL (ref 79–97)
MONOCYTES # BLD AUTO: 0.7 10*3/MM3 (ref 0.1–0.9)
MONOCYTES NFR BLD AUTO: 13.8 % (ref 5–12)
NEUTROPHILS NFR BLD AUTO: 3 10*3/MM3 (ref 1.7–7)
NEUTROPHILS NFR BLD AUTO: 56.4 % (ref 42.7–76)
NRBC BLD AUTO-RTO: 0.1 /100 WBC (ref 0–0.2)
PLATELET # BLD AUTO: 220 10*3/MM3 (ref 140–450)
PMV BLD AUTO: 8.4 FL (ref 6–12)
POTASSIUM SERPL-SCNC: 4.6 MMOL/L (ref 3.5–5.2)
RBC # BLD AUTO: 3.84 10*6/MM3 (ref 3.77–5.28)
SODIUM SERPL-SCNC: 139 MMOL/L (ref 136–145)
TROPONIN T SERPL-MCNC: 0.05 NG/ML (ref 0–0.03)
WBC NRBC COR # BLD: 5.3 10*3/MM3 (ref 3.4–10.8)

## 2022-09-23 PROCEDURE — 93005 ELECTROCARDIOGRAM TRACING: CPT | Performed by: EMERGENCY MEDICINE

## 2022-09-23 PROCEDURE — 84484 ASSAY OF TROPONIN QUANT: CPT | Performed by: EMERGENCY MEDICINE

## 2022-09-23 PROCEDURE — 71045 X-RAY EXAM CHEST 1 VIEW: CPT

## 2022-09-23 PROCEDURE — 85025 COMPLETE CBC W/AUTO DIFF WBC: CPT | Performed by: EMERGENCY MEDICINE

## 2022-09-23 PROCEDURE — 99283 EMERGENCY DEPT VISIT LOW MDM: CPT

## 2022-09-23 PROCEDURE — 93005 ELECTROCARDIOGRAM TRACING: CPT

## 2022-09-23 PROCEDURE — 80048 BASIC METABOLIC PNL TOTAL CA: CPT | Performed by: EMERGENCY MEDICINE

## 2022-09-23 RX ORDER — SODIUM CHLORIDE 0.9 % (FLUSH) 0.9 %
10 SYRINGE (ML) INJECTION AS NEEDED
Status: DISCONTINUED | OUTPATIENT
Start: 2022-09-23 | End: 2022-09-23 | Stop reason: HOSPADM

## 2022-09-23 NOTE — ED NOTES
Pt. States she had defib. Placed on Monday by Dr. Chimchiarian and had to get a lead replaced and defibrillator repositioned on Tuesday. States she has general pain in left chest from insertion site. Site is red but does not have drainage. States she has felt palpitations, 'hard beats' . States she had to get defib. For 10-15% cardiac output. Previous history of breast CA with double mastectomy. Pt. States her O2 levels dropped at home to around 84%. States her finger tips were white and they hurt. States color came back to normal on the ride here.  RIGHT STICK ONLY per patient.

## 2022-09-23 NOTE — ED PROVIDER NOTES
Subjective   History of Present Illness  Chief complaint: Low oxygen saturation    62-year-old female presents stating she had low oxygen saturation at home.  Patient states she was preparing supper this evening when she noticed that her fingers were white.  She also reports having some pain in her fingers.  She had a finger pulse oximeter at home and her oxygen saturation was in the low 80s and upper 70s.  She denies any chest pain or shortness of breath.  She just had an AICD placed earlier this week due to congestive heart failure.  She states after arriving to the emergency room she is now feeling much better.  Her oxygen saturation in triage was 99% on room air.  She denies any other complaints.    History provided by:  Patient      Review of Systems   Constitutional: Negative for fever.   HENT: Negative for congestion and sore throat.    Eyes: Negative for redness.   Respiratory: Negative for cough and shortness of breath.    Cardiovascular: Negative for chest pain.   Gastrointestinal: Negative for abdominal pain, diarrhea and vomiting.   Genitourinary: Negative for dysuria.   Musculoskeletal: Negative for back pain.   Skin: Negative for rash.   Neurological: Negative for dizziness and headaches.   Psychiatric/Behavioral: Negative for confusion.       Past Medical History:   Diagnosis Date   • Anxiety 2000   • Breast cancer (HCC)     LEFT BREAST 7/2021   • CHF (congestive heart failure) (HCC)    • Depression    • DM (diabetes mellitus) (HCC)    • HL (hearing loss)    • Hyperlipidemia    • Hypertension     SAW CARDIO/ STRESS TEST 2020 - NOW MEDS MANAGED BY PCP    • Irritable bowel syndrome 2006   • Peripheral neuropathy    • PONV (postoperative nausea and vomiting)    • Sleep apnea     WEARS CPAP       Allergies   Allergen Reactions   • Hydrocodone-Acetaminophen Hives     Lortab, takes tylenol   • Tramadol Hives       Past Surgical History:   Procedure Laterality Date   • BLADDER REPAIR     • BREAST BIOPSY      • BREAST RECONSTRUCTION Bilateral 08/26/2021    Procedure: BILATERAL PREPECTORALPLACMENT TISSUE EXPANDERS AND ALLODERM;  Surgeon: Heri Arreaga MD;  Location: [facility] MAIN OR;  Service: Plastics;  Laterality: Bilateral;   • BREAST TISSUE EXPANDER REMOVAL INSERTION OF IMPLANT Bilateral 12/23/2021    Procedure: BILATERAL REMOVAL TISSUE EXPANDERS AND PLACEMENT OF IMPLANTS;  Surgeon: Heri Arreaga MD;  Location: [facility] MAIN OR;  Service: Plastics;  Laterality: Bilateral;   • CARDIAC CATHETERIZATION Left 04/13/2022    Procedure: Cardiac Catheterization/Vascular Study;  Surgeon: Christo Murphy MD;  Location:  [facility];  Service: Cardiovascular;  Laterality: Left;   • CARDIAC ELECTROPHYSIOLOGY PROCEDURE N/A 9/19/2022    Procedure: ICD implant [employer] Sude aware;  Surgeon: Esteban Kendrick MD;  Location:  [facility];  Service: Cardiology;  Laterality: N/A;   • CARDIAC ELECTROPHYSIOLOGY PROCEDURE N/A 9/20/2022    Procedure: lead repositioning;  Surgeon: Esteban Kendrick MD;  Location:  [facility];  Service: Cardiology;  Laterality: N/A;   • CARDIOVASCULAR STRESS TEST  2020   • COLONOSCOPY  11/2012   • HYSTERECTOMY     • LYMPH NODE BIOPSY  8/29/2021   • MASTECTOMY W/ SENTINEL NODE BIOPSY Bilateral 08/26/2021    Procedure: bilateral total mastectomy, left axillary sentinel lymph node biopsy, possible reconstruction.;  Surgeon: Asia Perkins MD;  Location: [facility] MAIN OR;  Service: General;  Laterality: Bilateral;   • TUBAL ABDOMINAL LIGATION  1981       Family History   Problem Relation Age of Onset   • Alzheimer's disease Father    • Diabetes Father    • Heart disease Father    • Other Father    • Sleep apnea Father    • Snoring Father    • Hyperlipidemia Father    • Other Mother    • Diabetes Mother    • Heart disease Mother    • Sleep apnea Mother    • Snoring Mother    • COPD Mother    • Hyperlipidemia Mother    • Diabetes Sister    • Heart  "disease Sister    • Other Sister    • Sleep apnea Sister    • Snoring Sister    • COPD Sister    • Breast cancer Paternal Grandmother 60   • Cancer Paternal Grandmother    • Diabetes Paternal Grandmother    • Breast cancer Maternal Aunt 80   • Breast cancer Paternal Aunt    • Diabetes Maternal Grandmother    • Hyperlipidemia Sister    • Hyperlipidemia Brother    • Hyperlipidemia Son    • Malig Hyperthermia Neg Hx        Social History     Socioeconomic History   • Marital status:    Tobacco Use   • Smoking status: Former Smoker     Packs/day: 2.00     Years: 30.00     Pack years: 60.00     Types: Cigarettes     Start date: 1972     Quit date: 2008     Years since quittin.7   • Smokeless tobacco: Never Used   Vaping Use   • Vaping Use: Never used   Substance and Sexual Activity   • Alcohol use: No   • Drug use: No   • Sexual activity: Yes     Partners: Male     Birth control/protection: None       /72   Pulse 106   Temp 98.3 °F (36.8 °C)   Resp 16   Ht 162.6 cm (64\")   Wt 63.5 kg (140 lb)   LMP  (LMP Unknown)   SpO2 98%   BMI 24.03 kg/m²       Objective   Physical Exam  Vitals and nursing note reviewed.   Constitutional:       Appearance: Normal appearance. She is well-developed.   HENT:      Head: Normocephalic and atraumatic.   Eyes:      Pupils: Pupils are equal, round, and reactive to light.   Cardiovascular:      Rate and Rhythm: Normal rate and regular rhythm.      Heart sounds: Normal heart sounds.   Pulmonary:      Effort: Pulmonary effort is normal. No respiratory distress.      Breath sounds: Normal breath sounds.   Abdominal:      General: Bowel sounds are normal.      Palpations: Abdomen is soft.      Tenderness: There is no abdominal tenderness.   Musculoskeletal:         General: Normal range of motion.      Cervical back: Normal range of motion and neck supple.   Skin:     General: Skin is warm and dry.   Neurological:      Mental Status: She is alert and oriented to " person, place, and time.         Procedures           ED Course      My interpretation of EKG shows atrial sensed ventricular paced rhythm, rate of 104     Results for orders placed or performed during the hospital encounter of 09/23/22   Basic Metabolic Panel    Specimen: Blood   Result Value Ref Range    Glucose 119 (H) 65 - 99 mg/dL    BUN 42 (H) 8 - 23 mg/dL    Creatinine 1.41 (H) 0.57 - 1.00 mg/dL    Sodium 139 136 - 145 mmol/L    Potassium 4.6 3.5 - 5.2 mmol/L    Chloride 96 (L) 98 - 107 mmol/L    CO2 28.0 22.0 - 29.0 mmol/L    Calcium 9.7 8.6 - 10.5 mg/dL    BUN/Creatinine Ratio 29.8 (H) 7.0 - 25.0    Anion Gap 15.0 5.0 - 15.0 mmol/L    eGFR 41.7 (L) >60.0 mL/min/1.73   Troponin    Specimen: Blood   Result Value Ref Range    Troponin T 0.054 (C) 0.000 - 0.030 ng/mL   CBC Auto Differential    Specimen: Blood   Result Value Ref Range    WBC 5.30 3.40 - 10.80 10*3/mm3    RBC 3.84 3.77 - 5.28 10*6/mm3    Hemoglobin 11.0 (L) 12.0 - 15.9 g/dL    Hematocrit 33.6 (L) 34.0 - 46.6 %    MCV 87.4 79.0 - 97.0 fL    MCH 28.7 26.6 - 33.0 pg    MCHC 32.9 31.5 - 35.7 g/dL    RDW 15.2 12.3 - 15.4 %    RDW-SD 47.3 37.0 - 54.0 fl    MPV 8.4 6.0 - 12.0 fL    Platelets 220 140 - 450 10*3/mm3    Neutrophil % 56.4 42.7 - 76.0 %    Lymphocyte % 20.7 19.6 - 45.3 %    Monocyte % 13.8 (H) 5.0 - 12.0 %    Eosinophil % 7.7 (H) 0.3 - 6.2 %    Basophil % 1.4 0.0 - 1.5 %    Neutrophils, Absolute 3.00 1.70 - 7.00 10*3/mm3    Lymphocytes, Absolute 1.10 0.70 - 3.10 10*3/mm3    Monocytes, Absolute 0.70 0.10 - 0.90 10*3/mm3    Eosinophils, Absolute 0.40 0.00 - 0.40 10*3/mm3    Basophils, Absolute 0.10 0.00 - 0.20 10*3/mm3    nRBC 0.1 0.0 - 0.2 /100 WBC   ECG 12 Lead   Result Value Ref Range    QT Interval 376 ms     XR Chest 1 View    Result Date: 9/23/2022  IMPRESSION : Limited negative portable chest[  Electronically Signed By-Patrick Harris On:9/23/2022 7:52 PM This report was finalized on [id number] by  Patrick Harris, .                                     MDM   Patient had the above evaluation.  Results were discussed with the patient.  Patient remains well-appearing in the emergency room.  Chest x-ray shows no acute disease.  EKG shows no acute ischemia.  Troponin is borderline elevated at 0.054 however this is likely related to her recent AICD placement.  Patient has had no chest pain or shortness of breath.  Her oxygen saturation has been in the upper 90s to 100% throughout her ER stay.  Creatinine is mildly elevated at 1.41.  This was discussed with the patient and she states she has chronic kidney disease and is getting ready to see her kidney doctor.  She is eager to go home.  She will be discharged to follow-up with her primary doctor and cardiologist.      Final diagnoses:   Abnormal pulse oximetry   Chronic kidney disease, unspecified CKD stage       ED Disposition  ED Disposition     ED Disposition   Discharge    Condition   Stable    Comment   --             Erica Avila MD  [address]  [place] [facility]  [phone]    Call in 2 days           Medication List      No changes were made to your prescriptions during this visit.          Leonard Carr MD  09/23/22 2029

## 2022-09-24 NOTE — ED NOTES
MD with patient explaining results/plan of care. S/O at bedside. Paced/SR on monitor. Vitals otherwise stable. No needs. No changes in patient status.

## 2022-09-24 NOTE — DISCHARGE INSTRUCTIONS
Follow-up with your primary doctor, cardiologist, nephrologist.  Return to the emergency room for any new or worsening symptoms or if you have any other questions or concerns.

## 2022-09-26 ENCOUNTER — APPOINTMENT (OUTPATIENT)
Dept: CARDIAC REHAB | Facility: HOSPITAL | Age: 62
End: 2022-09-26

## 2022-09-27 ENCOUNTER — APPOINTMENT (OUTPATIENT)
Dept: CARDIAC REHAB | Facility: HOSPITAL | Age: 62
End: 2022-09-27

## 2022-09-27 ENCOUNTER — TREATMENT (OUTPATIENT)
Dept: CARDIAC REHAB | Facility: HOSPITAL | Age: 62
End: 2022-09-27

## 2022-09-27 DIAGNOSIS — I50.22 CHRONIC SYSTOLIC HEART FAILURE: Primary | ICD-10-CM

## 2022-09-27 PROCEDURE — 93798 PHYS/QHP OP CAR RHAB W/ECG: CPT

## 2022-09-28 ENCOUNTER — TRANSCRIBE ORDERS (OUTPATIENT)
Dept: ADMINISTRATIVE | Facility: HOSPITAL | Age: 62
End: 2022-09-28

## 2022-09-28 ENCOUNTER — LAB (OUTPATIENT)
Dept: LAB | Facility: HOSPITAL | Age: 62
End: 2022-09-28

## 2022-09-28 DIAGNOSIS — N18.31 CHRONIC KIDNEY DISEASE (CKD) STAGE G3A/A1, MODERATELY DECREASED GLOMERULAR FILTRATION RATE (GFR) BETWEEN 45-59 ML/MIN/1.73 SQUARE METER AND ALBUMINURIA CREATININE RATIO LESS THAN 30 MG/G (CMS/H*: Primary | ICD-10-CM

## 2022-09-28 DIAGNOSIS — N18.31 CHRONIC KIDNEY DISEASE (CKD) STAGE G3A/A1, MODERATELY DECREASED GLOMERULAR FILTRATION RATE (GFR) BETWEEN 45-59 ML/MIN/1.73 SQUARE METER AND ALBUMINURIA CREATININE RATIO LESS THAN 30 MG/G (CMS/H*: ICD-10-CM

## 2022-09-28 LAB
ANION GAP SERPL CALCULATED.3IONS-SCNC: 15 MMOL/L (ref 5–15)
BUN SERPL-MCNC: 25 MG/DL (ref 8–23)
BUN/CREAT SERPL: 23.1 (ref 7–25)
CALCIUM SPEC-SCNC: 10.3 MG/DL (ref 8.6–10.5)
CHLORIDE SERPL-SCNC: 98 MMOL/L (ref 98–107)
CO2 SERPL-SCNC: 26 MMOL/L (ref 22–29)
CREAT SERPL-MCNC: 1.08 MG/DL (ref 0.57–1)
EGFRCR SERPLBLD CKD-EPI 2021: 57.5 ML/MIN/1.73
GLUCOSE SERPL-MCNC: 114 MG/DL (ref 65–99)
POTASSIUM SERPL-SCNC: 5.3 MMOL/L (ref 3.5–5.2)
SODIUM SERPL-SCNC: 139 MMOL/L (ref 136–145)

## 2022-09-28 PROCEDURE — 36415 COLL VENOUS BLD VENIPUNCTURE: CPT

## 2022-09-28 PROCEDURE — 80048 BASIC METABOLIC PNL TOTAL CA: CPT

## 2022-09-29 ENCOUNTER — READMISSION MANAGEMENT (OUTPATIENT)
Dept: CALL CENTER | Facility: HOSPITAL | Age: 62
End: 2022-09-29

## 2022-09-29 ENCOUNTER — TREATMENT (OUTPATIENT)
Dept: CARDIAC REHAB | Facility: HOSPITAL | Age: 62
End: 2022-09-29

## 2022-09-29 DIAGNOSIS — I50.22 CHRONIC SYSTOLIC HEART FAILURE: Primary | ICD-10-CM

## 2022-09-29 LAB — QT INTERVAL: 376 MS

## 2022-09-29 PROCEDURE — 93798 PHYS/QHP OP CAR RHAB W/ECG: CPT

## 2022-09-29 NOTE — OUTREACH NOTE
Medical Week 2 Survey    Flowsheet Row Responses   [facility] patient discharged from? [facility]   Does the patient have one of the following disease processes/diagnoses(primary or secondary)? Other   Week 2 attempt successful? Yes   Call start time 1554   Call end time 1559   Meds reviewed with patient/caregiver? Yes   Is the patient having any side effects they believe may be caused by any medication additions or changes? No   Does the patient have all medications ordered at discharge? Yes   Is the patient taking all medications as directed (includes completed medication regime)? Yes   Comments regarding appointments Nephrologist appt was yesterday. Cardiologist wound appt 10/03/22. PCP appt 10/04/22. Cardiologist appt 10/07/22.   Does the patient have a primary care provider?  Yes   Does the patient have an appointment with their PCP within 7 days of discharge? Greater than 7 days   What is preventing the patient from scheduling follow up appointments within 7 days of discharge? Earlier appointment not available   Nursing Interventions Verified appointment date/time/provider   Has the patient kept scheduled appointments due by today? Yes   Comments Continues with cardiac rehab.   Has home health visited the patient within 72 hours of discharge? N/A   Psychosocial issues? No   Did the patient receive a copy of their discharge instructions? Yes   Nursing interventions Reviewed instructions with patient   What is the patient's perception of their health status since discharge? Improving   Is the patient/caregiver able to teach back signs and symptoms related to disease process for when to call PCP? Yes   Is the patient/caregiver able to teach back signs and symptoms related to disease process for when to call 911? Yes   Is the patient/caregiver able to teach back the hierarchy of who to call/visit for symptoms/problems? PCP, Specialist, Home health nurse, Urgent Care, ED, 911 Yes   If the patient is a current  smoker, are they able to teach back resources for cessation? Not a smoker   Week 2 Call Completed? Yes   Wrap up additional comments States slowly improving. States has had some sharp neck pain, and had vomiting yesterday. Encouraged to call cardiologist for evaluation. States was dx with Raynaud's disease at cardiac rehab. Denies any needs today.          BENJAMIN DUBOSE - Registered Nurse

## 2022-10-03 ENCOUNTER — CLINICAL SUPPORT NO REQUIREMENTS (OUTPATIENT)
Dept: CARDIOLOGY | Facility: CLINIC | Age: 62
End: 2022-10-03

## 2022-10-03 ENCOUNTER — TREATMENT (OUTPATIENT)
Dept: CARDIAC REHAB | Facility: HOSPITAL | Age: 62
End: 2022-10-03

## 2022-10-03 ENCOUNTER — HOSPITAL ENCOUNTER (INPATIENT)
Facility: HOSPITAL | Age: 62
LOS: 3 days | Discharge: HOME | End: 2022-10-07
Attending: EMERGENCY MEDICINE | Admitting: INTERNAL MEDICINE

## 2022-10-03 ENCOUNTER — TELEPHONE (OUTPATIENT)
Dept: CARDIOLOGY | Facility: CLINIC | Age: 62
End: 2022-10-03

## 2022-10-03 DIAGNOSIS — I42.8 NICM (NONISCHEMIC CARDIOMYOPATHY): Primary | ICD-10-CM

## 2022-10-03 DIAGNOSIS — I31.2 HEMOPERICARDIUM: ICD-10-CM

## 2022-10-03 DIAGNOSIS — I50.22 CHRONIC SYSTOLIC HEART FAILURE: Primary | ICD-10-CM

## 2022-10-03 DIAGNOSIS — Z95.810 PRESENCE OF AUTOMATIC CARDIOVERTER/DEFIBRILLATOR (AICD): ICD-10-CM

## 2022-10-03 DIAGNOSIS — R07.9 CHEST PAIN, UNSPECIFIED TYPE: Primary | ICD-10-CM

## 2022-10-03 PROCEDURE — 99285 EMERGENCY DEPT VISIT HI MDM: CPT

## 2022-10-03 PROCEDURE — 93284 PRGRMG EVAL IMPLANTABLE DFB: CPT | Performed by: INTERNAL MEDICINE

## 2022-10-03 PROCEDURE — 93798 PHYS/QHP OP CAR RHAB W/ECG: CPT

## 2022-10-03 PROCEDURE — 93005 ELECTROCARDIOGRAM TRACING: CPT

## 2022-10-03 PROCEDURE — 93005 ELECTROCARDIOGRAM TRACING: CPT | Performed by: EMERGENCY MEDICINE

## 2022-10-03 NOTE — TELEPHONE ENCOUNTER
Patient came to office today s/p BiV ICD, interrogation of device completed, atrial lead and left ventricular leads are stable. RV lead was revised, threshold on 9/21/22 was .5 @ .5 and today it is 3.25 @ .5 DDD and VVI modes tested the same. Patient concerned with sharp shooting pain that goes down arm and up her neck. She is not sure she is willing to revise RV lead at this time. Offered to have rep check her at Dr Murphy's appt to see if any programming options are available and to recheck numbers. Spoke to COURTNEY Cleveland at Dr Murphy's office and spoke to [employer] rep Snell to inform him as well.

## 2022-10-03 NOTE — PROGRESS NOTES
Patient came to office today s/p BiV ICD, interrogation of device completed, atrial lead and ventricular leads are stable.  RV lead was revised, threshold on 9/21/22 was .5 @ .5 and today it is 3.25 @ .5, DDD and VVI modes tested the same. Patient concerned with sharp shooting pain that goes down arm and up her neck. She is not sure she is willing to revise RV lead at this time. Offered to have rep check her at Dr Murphy's appt to see if any programming options are available and to recheck numbers. Spoke to COURTNEY Cleveland at Dr Murphy's office and spoke to [employer] rep Snell to inform him as well. Removed steri strips with no issues. Incision well approximated, no redness, swelling or drainage noted. Patient teaching r/t left arm precautions, incision care and follow up with Dr Murphy.

## 2022-10-04 ENCOUNTER — APPOINTMENT (OUTPATIENT)
Dept: GENERAL RADIOLOGY | Facility: HOSPITAL | Age: 62
End: 2022-10-04

## 2022-10-04 ENCOUNTER — APPOINTMENT (OUTPATIENT)
Dept: CARDIOLOGY | Facility: HOSPITAL | Age: 62
End: 2022-10-04

## 2022-10-04 ENCOUNTER — READMISSION MANAGEMENT (OUTPATIENT)
Dept: CALL CENTER | Facility: HOSPITAL | Age: 62
End: 2022-10-04

## 2022-10-04 ENCOUNTER — APPOINTMENT (OUTPATIENT)
Dept: CT IMAGING | Facility: HOSPITAL | Age: 62
End: 2022-10-04

## 2022-10-04 PROBLEM — I31.2 HEMOPERICARDIUM: Status: ACTIVE | Noted: 2022-10-04

## 2022-10-04 LAB
ALBUMIN SERPL-MCNC: 4.6 G/DL (ref 3.5–5.2)
ALBUMIN/GLOB SERPL: 1.5 G/DL
ALP SERPL-CCNC: 77 U/L (ref 39–117)
ALT SERPL W P-5'-P-CCNC: 11 U/L (ref 1–33)
ANION GAP SERPL CALCULATED.3IONS-SCNC: 10 MMOL/L (ref 5–15)
ANION GAP SERPL CALCULATED.3IONS-SCNC: 12 MMOL/L (ref 5–15)
ANION GAP SERPL CALCULATED.3IONS-SCNC: 15 MMOL/L (ref 5–15)
AST SERPL-CCNC: 18 U/L (ref 1–32)
BASOPHILS # BLD AUTO: 0.1 10*3/MM3 (ref 0–0.2)
BASOPHILS # BLD AUTO: 0.1 10*3/MM3 (ref 0–0.2)
BASOPHILS NFR BLD AUTO: 0.5 % (ref 0–1.5)
BASOPHILS NFR BLD AUTO: 1.2 % (ref 0–1.5)
BH CV ECHO MEAS - ACS: 1.92 CM
BH CV ECHO MEAS - AO MAX PG: 6.4 MMHG
BH CV ECHO MEAS - AO MEAN PG: 3.9 MMHG
BH CV ECHO MEAS - AO ROOT DIAM: 2.9 CM
BH CV ECHO MEAS - AO V2 MAX: 126.9 CM/SEC
BH CV ECHO MEAS - AO V2 VTI: 22.2 CM
BH CV ECHO MEAS - AVA(I,D): 1.53 CM2
BH CV ECHO MEAS - EDV(CUBED): 95.2 ML
BH CV ECHO MEAS - EDV(MOD-SP4): 54.6 ML
BH CV ECHO MEAS - EF(MOD-BP): 53 %
BH CV ECHO MEAS - EF(MOD-SP4): 53.4 %
BH CV ECHO MEAS - ESV(CUBED): 46.7 ML
BH CV ECHO MEAS - ESV(MOD-SP4): 25.5 ML
BH CV ECHO MEAS - FS: 21.1 %
BH CV ECHO MEAS - IVS/LVPW: 0.86 CM
BH CV ECHO MEAS - IVSD: 0.96 CM
BH CV ECHO MEAS - LA DIMENSION: 3.1 CM
BH CV ECHO MEAS - LV DIASTOLIC VOL/BSA (35-75): 31.6 CM2
BH CV ECHO MEAS - LV MASS(C)D: 164.7 GRAMS
BH CV ECHO MEAS - LV MAX PG: 1.6 MMHG
BH CV ECHO MEAS - LV MEAN PG: 0.91 MMHG
BH CV ECHO MEAS - LV SYSTOLIC VOL/BSA (12-30): 14.8 CM2
BH CV ECHO MEAS - LV V1 MAX: 63.2 CM/SEC
BH CV ECHO MEAS - LV V1 VTI: 12.2 CM
BH CV ECHO MEAS - LVIDD: 4.6 CM
BH CV ECHO MEAS - LVIDS: 3.6 CM
BH CV ECHO MEAS - LVOT AREA: 2.8 CM2
BH CV ECHO MEAS - LVOT DIAM: 1.88 CM
BH CV ECHO MEAS - LVPWD: 1.11 CM
BH CV ECHO MEAS - MV A MAX VEL: 62.1 CM/SEC
BH CV ECHO MEAS - MV DEC SLOPE: 380.8 CM/SEC2
BH CV ECHO MEAS - MV DEC TIME: 0.16 MSEC
BH CV ECHO MEAS - MV E MAX VEL: 59.4 CM/SEC
BH CV ECHO MEAS - MV E/A: 0.96
BH CV ECHO MEAS - MV MAX PG: 3.2 MMHG
BH CV ECHO MEAS - MV MEAN PG: 1.57 MMHG
BH CV ECHO MEAS - MV V2 VTI: 21.4 CM
BH CV ECHO MEAS - MVA(VTI): 1.58 CM2
BH CV ECHO MEAS - PA V2 MAX: 84.8 CM/SEC
BH CV ECHO MEAS - PULM A REVS DUR: 0.09 SEC
BH CV ECHO MEAS - PULM A REVS VEL: 27.9 CM/SEC
BH CV ECHO MEAS - PULM DIAS VEL: 31.7 CM/SEC
BH CV ECHO MEAS - PULM S/D: 1.63
BH CV ECHO MEAS - PULM SYS VEL: 51.7 CM/SEC
BH CV ECHO MEAS - RAP SYSTOLE: 15 MMHG
BH CV ECHO MEAS - RV MAX PG: 0.96 MMHG
BH CV ECHO MEAS - RV V1 MAX: 49 CM/SEC
BH CV ECHO MEAS - RV V1 VTI: 8.4 CM
BH CV ECHO MEAS - RVDD: 2.39 CM
BH CV ECHO MEAS - SI(MOD-SP4): 16.9 ML/M2
BH CV ECHO MEAS - SV(LVOT): 33.8 ML
BH CV ECHO MEAS - SV(MOD-SP4): 29.2 ML
BILIRUB SERPL-MCNC: 0.2 MG/DL (ref 0–1.2)
BUN SERPL-MCNC: 24 MG/DL (ref 8–23)
BUN SERPL-MCNC: 29 MG/DL (ref 8–23)
BUN SERPL-MCNC: 31 MG/DL (ref 8–23)
BUN/CREAT SERPL: 26.1 (ref 7–25)
BUN/CREAT SERPL: 26.3 (ref 7–25)
BUN/CREAT SERPL: 27.6 (ref 7–25)
CALCIUM SPEC-SCNC: 10.1 MG/DL (ref 8.6–10.5)
CALCIUM SPEC-SCNC: 8.8 MG/DL (ref 8.6–10.5)
CALCIUM SPEC-SCNC: 9.6 MG/DL (ref 8.6–10.5)
CHLORIDE SERPL-SCNC: 102 MMOL/L (ref 98–107)
CHLORIDE SERPL-SCNC: 105 MMOL/L (ref 98–107)
CHLORIDE SERPL-SCNC: 97 MMOL/L (ref 98–107)
CO2 SERPL-SCNC: 23 MMOL/L (ref 22–29)
CO2 SERPL-SCNC: 23 MMOL/L (ref 22–29)
CO2 SERPL-SCNC: 24 MMOL/L (ref 22–29)
CREAT SERPL-MCNC: 0.92 MG/DL (ref 0.57–1)
CREAT SERPL-MCNC: 1.05 MG/DL (ref 0.57–1)
CREAT SERPL-MCNC: 1.18 MG/DL (ref 0.57–1)
D DIMER PPP FEU-MCNC: 2.04 MG/L (FEU) (ref 0–0.59)
DEPRECATED RDW RBC AUTO: 45.9 FL (ref 37–54)
DEPRECATED RDW RBC AUTO: 46.8 FL (ref 37–54)
EGFRCR SERPLBLD CKD-EPI 2021: 51.7 ML/MIN/1.73
EGFRCR SERPLBLD CKD-EPI 2021: 59.5 ML/MIN/1.73
EGFRCR SERPLBLD CKD-EPI 2021: 69.7 ML/MIN/1.73
EOSINOPHIL # BLD AUTO: 0.1 10*3/MM3 (ref 0–0.4)
EOSINOPHIL # BLD AUTO: 0.4 10*3/MM3 (ref 0–0.4)
EOSINOPHIL NFR BLD AUTO: 0.5 % (ref 0.3–6.2)
EOSINOPHIL NFR BLD AUTO: 3.5 % (ref 0.3–6.2)
ERYTHROCYTE [DISTWIDTH] IN BLOOD BY AUTOMATED COUNT: 14.9 % (ref 12.3–15.4)
ERYTHROCYTE [DISTWIDTH] IN BLOOD BY AUTOMATED COUNT: 15.1 % (ref 12.3–15.4)
GLOBULIN UR ELPH-MCNC: 3 GM/DL
GLUCOSE BLDC GLUCOMTR-MCNC: 101 MG/DL (ref 70–105)
GLUCOSE BLDC GLUCOMTR-MCNC: 152 MG/DL (ref 70–105)
GLUCOSE BLDC GLUCOMTR-MCNC: 65 MG/DL (ref 70–105)
GLUCOSE BLDC GLUCOMTR-MCNC: 84 MG/DL (ref 70–105)
GLUCOSE SERPL-MCNC: 109 MG/DL (ref 65–99)
GLUCOSE SERPL-MCNC: 124 MG/DL (ref 65–99)
GLUCOSE SERPL-MCNC: 77 MG/DL (ref 65–99)
HBA1C MFR BLD: 5.9 % (ref 3.5–5.6)
HCT VFR BLD AUTO: 32 % (ref 34–46.6)
HCT VFR BLD AUTO: 35.2 % (ref 34–46.6)
HGB BLD-MCNC: 10.3 G/DL (ref 12–15.9)
HGB BLD-MCNC: 11.4 G/DL (ref 12–15.9)
LYMPHOCYTES # BLD AUTO: 1.5 10*3/MM3 (ref 0.7–3.1)
LYMPHOCYTES # BLD AUTO: 1.9 10*3/MM3 (ref 0.7–3.1)
LYMPHOCYTES NFR BLD AUTO: 12.6 % (ref 19.6–45.3)
LYMPHOCYTES NFR BLD AUTO: 17.8 % (ref 19.6–45.3)
MAXIMAL PREDICTED HEART RATE: 156 BPM
MCH RBC QN AUTO: 28.1 PG (ref 26.6–33)
MCH RBC QN AUTO: 28.6 PG (ref 26.6–33)
MCHC RBC AUTO-ENTMCNC: 32.1 G/DL (ref 31.5–35.7)
MCHC RBC AUTO-ENTMCNC: 32.5 G/DL (ref 31.5–35.7)
MCV RBC AUTO: 87.5 FL (ref 79–97)
MCV RBC AUTO: 88.2 FL (ref 79–97)
MONOCYTES # BLD AUTO: 1 10*3/MM3 (ref 0.1–0.9)
MONOCYTES # BLD AUTO: 1.1 10*3/MM3 (ref 0.1–0.9)
MONOCYTES NFR BLD AUTO: 10.3 % (ref 5–12)
MONOCYTES NFR BLD AUTO: 8 % (ref 5–12)
NEUTROPHILS NFR BLD AUTO: 67.2 % (ref 42.7–76)
NEUTROPHILS NFR BLD AUTO: 7.3 10*3/MM3 (ref 1.7–7)
NEUTROPHILS NFR BLD AUTO: 78.4 % (ref 42.7–76)
NEUTROPHILS NFR BLD AUTO: 9.5 10*3/MM3 (ref 1.7–7)
NRBC BLD AUTO-RTO: 0 /100 WBC (ref 0–0.2)
NRBC BLD AUTO-RTO: 0 /100 WBC (ref 0–0.2)
NT-PROBNP SERPL-MCNC: 360.8 PG/ML (ref 0–900)
PLATELET # BLD AUTO: 257 10*3/MM3 (ref 140–450)
PLATELET # BLD AUTO: 302 10*3/MM3 (ref 140–450)
PMV BLD AUTO: 7.7 FL (ref 6–12)
PMV BLD AUTO: 8.3 FL (ref 6–12)
POTASSIUM SERPL-SCNC: 4 MMOL/L (ref 3.5–5.2)
POTASSIUM SERPL-SCNC: 4.2 MMOL/L (ref 3.5–5.2)
POTASSIUM SERPL-SCNC: 5.9 MMOL/L (ref 3.5–5.2)
PROT SERPL-MCNC: 7.6 G/DL (ref 6–8.5)
RBC # BLD AUTO: 3.66 10*6/MM3 (ref 3.77–5.28)
RBC # BLD AUTO: 3.99 10*6/MM3 (ref 3.77–5.28)
SODIUM SERPL-SCNC: 135 MMOL/L (ref 136–145)
SODIUM SERPL-SCNC: 136 MMOL/L (ref 136–145)
SODIUM SERPL-SCNC: 140 MMOL/L (ref 136–145)
STRESS TARGET HR: 133 BPM
TROPONIN T SERPL-MCNC: <0.01 NG/ML (ref 0–0.03)
WBC NRBC COR # BLD: 10.9 10*3/MM3 (ref 3.4–10.8)
WBC NRBC COR # BLD: 12.1 10*3/MM3 (ref 3.4–10.8)

## 2022-10-04 PROCEDURE — 85025 COMPLETE CBC W/AUTO DIFF WBC: CPT | Performed by: NURSE PRACTITIONER

## 2022-10-04 PROCEDURE — 25010000002 ONDANSETRON PER 1 MG: Performed by: NURSE PRACTITIONER

## 2022-10-04 PROCEDURE — 93306 TTE W/DOPPLER COMPLETE: CPT | Performed by: INTERNAL MEDICINE

## 2022-10-04 PROCEDURE — 85379 FIBRIN DEGRADATION QUANT: CPT | Performed by: NURSE PRACTITIONER

## 2022-10-04 PROCEDURE — 36415 COLL VENOUS BLD VENIPUNCTURE: CPT | Performed by: INTERNAL MEDICINE

## 2022-10-04 PROCEDURE — 0 IOPAMIDOL PER 1 ML: Performed by: EMERGENCY MEDICINE

## 2022-10-04 PROCEDURE — 85025 COMPLETE CBC W/AUTO DIFF WBC: CPT | Performed by: INTERNAL MEDICINE

## 2022-10-04 PROCEDURE — 36415 COLL VENOUS BLD VENIPUNCTURE: CPT

## 2022-10-04 PROCEDURE — 80053 COMPREHEN METABOLIC PANEL: CPT | Performed by: NURSE PRACTITIONER

## 2022-10-04 PROCEDURE — 82962 GLUCOSE BLOOD TEST: CPT

## 2022-10-04 PROCEDURE — 63710000001 ONDANSETRON PER 8 MG: Performed by: INTERNAL MEDICINE

## 2022-10-04 PROCEDURE — 93306 TTE W/DOPPLER COMPLETE: CPT

## 2022-10-04 PROCEDURE — 99222 1ST HOSP IP/OBS MODERATE 55: CPT | Performed by: INTERNAL MEDICINE

## 2022-10-04 PROCEDURE — 83880 ASSAY OF NATRIURETIC PEPTIDE: CPT | Performed by: NURSE PRACTITIONER

## 2022-10-04 PROCEDURE — 84484 ASSAY OF TROPONIN QUANT: CPT | Performed by: NURSE PRACTITIONER

## 2022-10-04 PROCEDURE — 25010000002 MORPHINE PER 10 MG: Performed by: NURSE PRACTITIONER

## 2022-10-04 PROCEDURE — 71275 CT ANGIOGRAPHY CHEST: CPT

## 2022-10-04 PROCEDURE — 93005 ELECTROCARDIOGRAM TRACING: CPT | Performed by: INTERNAL MEDICINE

## 2022-10-04 PROCEDURE — 71045 X-RAY EXAM CHEST 1 VIEW: CPT

## 2022-10-04 PROCEDURE — 83036 HEMOGLOBIN GLYCOSYLATED A1C: CPT | Performed by: INTERNAL MEDICINE

## 2022-10-04 RX ORDER — COLCHICINE 0.6 MG/1
0.6 TABLET ORAL DAILY
Status: DISCONTINUED | OUTPATIENT
Start: 2022-10-04 | End: 2022-10-07 | Stop reason: HOSPADM

## 2022-10-04 RX ORDER — OLANZAPINE 10 MG/2ML
1 INJECTION, POWDER, LYOPHILIZED, FOR SOLUTION INTRAMUSCULAR AS NEEDED
Status: DISCONTINUED | OUTPATIENT
Start: 2022-10-04 | End: 2022-10-07 | Stop reason: HOSPADM

## 2022-10-04 RX ORDER — SODIUM CHLORIDE 0.9 % (FLUSH) 0.9 %
10 SYRINGE (ML) INJECTION EVERY 12 HOURS SCHEDULED
Status: DISCONTINUED | OUTPATIENT
Start: 2022-10-04 | End: 2022-10-07 | Stop reason: HOSPADM

## 2022-10-04 RX ORDER — ONDANSETRON 2 MG/ML
4 INJECTION INTRAMUSCULAR; INTRAVENOUS ONCE
Status: COMPLETED | OUTPATIENT
Start: 2022-10-04 | End: 2022-10-04

## 2022-10-04 RX ORDER — ACETAMINOPHEN 650 MG/1
650 SUPPOSITORY RECTAL EVERY 4 HOURS PRN
Status: DISCONTINUED | OUTPATIENT
Start: 2022-10-04 | End: 2022-10-07 | Stop reason: HOSPADM

## 2022-10-04 RX ORDER — METOPROLOL SUCCINATE 50 MG/1
100 TABLET, EXTENDED RELEASE ORAL
Status: DISCONTINUED | OUTPATIENT
Start: 2022-10-04 | End: 2022-10-04

## 2022-10-04 RX ORDER — SODIUM CHLORIDE 9 MG/ML
75 INJECTION, SOLUTION INTRAVENOUS CONTINUOUS
Status: DISCONTINUED | OUTPATIENT
Start: 2022-10-04 | End: 2022-10-07

## 2022-10-04 RX ORDER — DEXTROSE MONOHYDRATE 25 G/50ML
25 INJECTION, SOLUTION INTRAVENOUS
Status: DISCONTINUED | OUTPATIENT
Start: 2022-10-04 | End: 2022-10-07 | Stop reason: HOSPADM

## 2022-10-04 RX ORDER — AMOXICILLIN 250 MG
2 CAPSULE ORAL 2 TIMES DAILY
Status: DISCONTINUED | OUTPATIENT
Start: 2022-10-04 | End: 2022-10-07 | Stop reason: HOSPADM

## 2022-10-04 RX ORDER — ATORVASTATIN CALCIUM 20 MG/1
20 TABLET, FILM COATED ORAL NIGHTLY
Status: DISCONTINUED | OUTPATIENT
Start: 2022-10-04 | End: 2022-10-07 | Stop reason: HOSPADM

## 2022-10-04 RX ORDER — SODIUM CHLORIDE 0.9 % (FLUSH) 0.9 %
10 SYRINGE (ML) INJECTION AS NEEDED
Status: DISCONTINUED | OUTPATIENT
Start: 2022-10-04 | End: 2022-10-07 | Stop reason: HOSPADM

## 2022-10-04 RX ORDER — ACETAMINOPHEN 160 MG/5ML
650 SOLUTION ORAL EVERY 4 HOURS PRN
Status: DISCONTINUED | OUTPATIENT
Start: 2022-10-04 | End: 2022-10-07 | Stop reason: HOSPADM

## 2022-10-04 RX ORDER — BISACODYL 10 MG
10 SUPPOSITORY, RECTAL RECTAL DAILY PRN
Status: DISCONTINUED | OUTPATIENT
Start: 2022-10-04 | End: 2022-10-07 | Stop reason: HOSPADM

## 2022-10-04 RX ORDER — ALUMINA, MAGNESIA, AND SIMETHICONE 2400; 2400; 240 MG/30ML; MG/30ML; MG/30ML
15 SUSPENSION ORAL EVERY 6 HOURS PRN
Status: DISCONTINUED | OUTPATIENT
Start: 2022-10-04 | End: 2022-10-07 | Stop reason: HOSPADM

## 2022-10-04 RX ORDER — ALPRAZOLAM 0.5 MG/1
0.5 TABLET ORAL 3 TIMES DAILY PRN
Status: DISCONTINUED | OUTPATIENT
Start: 2022-10-04 | End: 2022-10-07 | Stop reason: HOSPADM

## 2022-10-04 RX ORDER — POLYETHYLENE GLYCOL 3350 17 G/17G
17 POWDER, FOR SOLUTION ORAL DAILY PRN
Status: DISCONTINUED | OUTPATIENT
Start: 2022-10-04 | End: 2022-10-07 | Stop reason: HOSPADM

## 2022-10-04 RX ORDER — BISACODYL 5 MG/1
5 TABLET, DELAYED RELEASE ORAL DAILY PRN
Status: DISCONTINUED | OUTPATIENT
Start: 2022-10-04 | End: 2022-10-07 | Stop reason: HOSPADM

## 2022-10-04 RX ORDER — ONDANSETRON 2 MG/ML
4 INJECTION INTRAMUSCULAR; INTRAVENOUS EVERY 6 HOURS PRN
Status: DISCONTINUED | OUTPATIENT
Start: 2022-10-04 | End: 2022-10-07 | Stop reason: HOSPADM

## 2022-10-04 RX ORDER — CHOLECALCIFEROL (VITAMIN D3) 125 MCG
5 CAPSULE ORAL NIGHTLY PRN
Status: DISCONTINUED | OUTPATIENT
Start: 2022-10-04 | End: 2022-10-07 | Stop reason: HOSPADM

## 2022-10-04 RX ORDER — NITROGLYCERIN 0.4 MG/1
0.4 TABLET SUBLINGUAL
Status: DISCONTINUED | OUTPATIENT
Start: 2022-10-04 | End: 2022-10-07 | Stop reason: HOSPADM

## 2022-10-04 RX ORDER — MIDODRINE HYDROCHLORIDE 2.5 MG/1
2.5 TABLET ORAL
Status: DISCONTINUED | OUTPATIENT
Start: 2022-10-04 | End: 2022-10-07 | Stop reason: HOSPADM

## 2022-10-04 RX ORDER — ACETAMINOPHEN 325 MG/1
650 TABLET ORAL EVERY 4 HOURS PRN
Status: DISCONTINUED | OUTPATIENT
Start: 2022-10-04 | End: 2022-10-07 | Stop reason: HOSPADM

## 2022-10-04 RX ORDER — METOPROLOL SUCCINATE 25 MG/1
25 TABLET, EXTENDED RELEASE ORAL
Status: DISCONTINUED | OUTPATIENT
Start: 2022-10-04 | End: 2022-10-05

## 2022-10-04 RX ORDER — ONDANSETRON 4 MG/1
4 TABLET, FILM COATED ORAL EVERY 6 HOURS PRN
Status: DISCONTINUED | OUTPATIENT
Start: 2022-10-04 | End: 2022-10-07 | Stop reason: HOSPADM

## 2022-10-04 RX ORDER — NICOTINE POLACRILEX 4 MG
15 LOZENGE BUCCAL
Status: DISCONTINUED | OUTPATIENT
Start: 2022-10-04 | End: 2022-10-07 | Stop reason: HOSPADM

## 2022-10-04 RX ORDER — INSULIN LISPRO 100 [IU]/ML
0-7 INJECTION, SOLUTION INTRAVENOUS; SUBCUTANEOUS EVERY 6 HOURS SCHEDULED
Status: DISCONTINUED | OUTPATIENT
Start: 2022-10-04 | End: 2022-10-07 | Stop reason: HOSPADM

## 2022-10-04 RX ADMIN — METOPROLOL SUCCINATE 25 MG: 25 TABLET, EXTENDED RELEASE ORAL at 19:22

## 2022-10-04 RX ADMIN — Medication 5 MG: at 20:33

## 2022-10-04 RX ADMIN — Medication 10 ML: at 09:40

## 2022-10-04 RX ADMIN — SODIUM CHLORIDE 1000 ML: 9 INJECTION, SOLUTION INTRAVENOUS at 01:12

## 2022-10-04 RX ADMIN — IOPAMIDOL 100 ML: 755 INJECTION, SOLUTION INTRAVENOUS at 02:13

## 2022-10-04 RX ADMIN — ONDANSETRON 4 MG: 2 INJECTION INTRAMUSCULAR; INTRAVENOUS at 00:24

## 2022-10-04 RX ADMIN — SODIUM CHLORIDE 500 ML: 9 INJECTION, SOLUTION INTRAVENOUS at 20:31

## 2022-10-04 RX ADMIN — MORPHINE SULFATE 4 MG: 4 INJECTION INTRAVENOUS at 00:24

## 2022-10-04 RX ADMIN — ATORVASTATIN CALCIUM 20 MG: 20 TABLET, FILM COATED ORAL at 20:33

## 2022-10-04 RX ADMIN — MIDODRINE HYDROCHLORIDE 2.5 MG: 2.5 TABLET ORAL at 19:22

## 2022-10-04 RX ADMIN — SODIUM CHLORIDE 75 ML/HR: 9 INJECTION, SOLUTION INTRAVENOUS at 06:18

## 2022-10-04 RX ADMIN — Medication 10 ML: at 20:33

## 2022-10-04 RX ADMIN — COLCHICINE 0.6 MG: 0.6 TABLET, FILM COATED ORAL at 12:02

## 2022-10-04 RX ADMIN — SENNOSIDES AND DOCUSATE SODIUM 2 TABLET: 50; 8.6 TABLET ORAL at 20:33

## 2022-10-04 RX ADMIN — ONDANSETRON HYDROCHLORIDE 4 MG: 4 TABLET, FILM COATED ORAL at 20:33

## 2022-10-04 RX ADMIN — SODIUM CHLORIDE 75 ML/HR: 9 INJECTION, SOLUTION INTRAVENOUS at 09:39

## 2022-10-04 NOTE — CONSULTS
HP      Name: Susy Hanson ADMIT: 10/3/2022   : 1960  PCP: Erica Avila MD    MRN: [id number] LOS: 0 days   AGE/SEX: 62 y.o. female  ROOM:      Chief Complaint   Patient presents with   • Chest Pain       Subjective        History of present illness  Susy Hanson is a 62-year-old female patient who has cardiomyopathy most likely nonischemic, chronic left bundle branch block, diabetes, dyslipidemia, hypertension, breast cancer s/p bilateral mastectomy in 2021 and reconstructive surgery, has a biventricular ICD implanted on 2022 with repositioning of the RV lead the next day, is admitted to the hospital with complaints of chest discomfort.  Patient is having sharp pain in her chest, worse with deep breath or cough for the last couple of days which prompted her to present to the hospital.  CT scan was negative for pulmonary embolism, however it showed small pericardial effusion.      Past Medical History:   Diagnosis Date   • Anxiety    • Breast cancer (HCC)     LEFT BREAST 2021   • CHF (congestive heart failure) (HCC)    • Depression    • DM (diabetes mellitus) (HCC)    • HL (hearing loss)    • Hyperlipidemia    • Hypertension     SAW CARDIO/ STRESS TEST  - NOW MEDS MANAGED BY PCP    • Irritable bowel syndrome    • Peripheral neuropathy    • PONV (postoperative nausea and vomiting)    • Sleep apnea     WEARS CPAP     Past Surgical History:   Procedure Laterality Date   • BLADDER REPAIR     • BREAST BIOPSY     • BREAST RECONSTRUCTION Bilateral 2021    Procedure: BILATERAL PREPECTORALPLACMENT TISSUE EXPANDERS AND ALLODERM;  Surgeon: Heri Arreaga MD;  Location: [facility];  Service: Plastics;  Laterality: Bilateral;   • BREAST TISSUE EXPANDER REMOVAL INSERTION OF IMPLANT Bilateral 2021    Procedure: BILATERAL REMOVAL TISSUE EXPANDERS AND PLACEMENT OF IMPLANTS;  Surgeon: Heri Arreaga MD;  Location: [facility] OR;   Service: Plastics;  Laterality: Bilateral;   • CARDIAC CATHETERIZATION Left 04/13/2022    Procedure: Cardiac Catheterization/Vascular Study;  Surgeon: Christo Murphy MD;  Location:  [facility];  Service: Cardiovascular;  Laterality: Left;   • CARDIAC ELECTROPHYSIOLOGY PROCEDURE N/A 9/19/2022    Procedure: ICD implant St. Sude aware;  Surgeon: Esteban Kendrick MD;  Location:  [facility];  Service: Cardiology;  Laterality: N/A;   • CARDIAC ELECTROPHYSIOLOGY PROCEDURE N/A 9/20/2022    Procedure: lead repositioning;  Surgeon: Esteban Kendrick MD;  Location:  [facility];  Service: Cardiology;  Laterality: N/A;   • CARDIOVASCULAR STRESS TEST  2020   • COLONOSCOPY  11/2012   • HYSTERECTOMY     • LYMPH NODE BIOPSY  8/29/2021   • MASTECTOMY W/ SENTINEL NODE BIOPSY Bilateral 08/26/2021    Procedure: bilateral total mastectomy, left axillary sentinel lymph node biopsy, possible reconstruction.;  Surgeon: Asia Perkins MD;  Location: [facility];  Service: General;  Laterality: Bilateral;   • TUBAL ABDOMINAL LIGATION  1981     Family History   Problem Relation Age of Onset   • Alzheimer's disease Father    • Diabetes Father    • Heart disease Father    • Other Father    • Sleep apnea Father    • Snoring Father    • Hyperlipidemia Father    • Other Mother    • Diabetes Mother    • Heart disease Mother    • Sleep apnea Mother    • Snoring Mother    • COPD Mother    • Hyperlipidemia Mother    • Diabetes Sister    • Heart disease Sister    • Other Sister    • Sleep apnea Sister    • Snoring Sister    • COPD Sister    • Breast cancer Paternal Grandmother 60   • Cancer Paternal Grandmother    • Diabetes Paternal Grandmother    • Breast cancer Maternal Aunt 80   • Breast cancer Paternal Aunt    • Diabetes Maternal Grandmother    • Hyperlipidemia Sister    • Hyperlipidemia Brother    • Hyperlipidemia Son    • Malig Hyperthermia Neg Hx      Social History     Tobacco Use    • Smoking status: Former Smoker     Packs/day: 2.00     Years: 30.00     Pack years: 60.00     Types: Cigarettes     Start date: 1972     Quit date: 2008     Years since quittin.7   • Smokeless tobacco: Never Used   Vaping Use   • Vaping Use: Never used   Substance Use Topics   • Alcohol use: No   • Drug use: No     Medications Prior to Admission   Medication Sig Dispense Refill Last Dose   • ALPRAZolam (XANAX) 1 MG tablet Take 1 tablet by mouth 2 (Two) Times a Day As Needed for Anxiety or Sleep. 60 tablet 0 Past Week at Unknown time   • atorvastatin (LIPITOR) 20 MG tablet Take 1 tablet by mouth Daily. 90 tablet 2 10/3/2022 at Unknown time   • bumetanide (BUMEX) 1 MG tablet Take 1 tablet by mouth Daily. 90 tablet 1 10/3/2022 at Unknown time   • cetirizine (zyrTEC) 10 MG tablet Take 10 mg by mouth Daily As Needed.   Past Week at Unknown time   • metFORMIN (GLUCOPHAGE) 1000 MG tablet Take 1,000 mg by mouth Daily With Breakfast.   10/3/2022 at Unknown time   • metFORMIN (GLUCOPHAGE) 1000 MG tablet Take 1,500 mg by mouth Daily With Dinner.   10/3/2022 at Unknown time   • metoprolol succinate XL (TOPROL-XL) 100 MG 24 hr tablet take 1 tablet by oral route  every day 90 tablet 3 10/3/2022 at Unknown time   • ondansetron (Zofran) 4 MG tablet Take 1 tablet by mouth Every 8 (Eight) Hours As Needed for Nausea or Vomiting. 20 tablet 0 Past Week at Unknown time   • sacubitril-valsartan (Entresto) 24-26 MG tablet Take 1 tablet by mouth Every 12 (Twelve) Hours. 180 tablet 1 10/3/2022 at Unknown time   • Triamcinolone Acetonide (NASACORT) 55 MCG/ACT nasal inhaler APPLY 2 SPRAYS BY NASAL ROUTE DAILY FOR 30 DAYS 10.8 mL 6 Past Week at Unknown time     Allergies:  Hydrocodone-acetaminophen and Tramadol    Review of systems    Constitutional: Negative.    Respiratory and cardiovascular: As detailed in HPI section.  Gastrointestinal: Negative for constipation, nausea and vomiting negative for abdominal distention,  abdominal pain and diarrhea.   Genitourinary: Negative for difficulty urinating and flank pain.   Musculoskeletal: Negative for arthralgias, joint swelling and myalgias.   Skin: Negative for color change, rash and wound.   Neurological: Negative for dizziness, syncope, weakness and headaches.   Hematological: Negative for adenopathy.   Psychiatric/Behavioral: Negative for confusion.   All other systems reviewed and are negative.       Physical Exam  VITALS REVIEWED    General:      well developed, in no acute distress.    Head:      normocephalic and atraumatic.    Eyes:      PERRL/EOM intact, conjunctiva and sclera clear with out nystagmus.    Neck:      no masses, thyromegaly,  trachea central with normal respiratory effort and PMI displaced laterally  Lungs:      Clear to auscultation bilaterally  Heart:       Regular rate and rhythm  Msk:      no deformity or scoliosis noted of thoracic or lumbar spine.    Pulses:      pulses normal in all 4 extremities.    Extremities:       No lower extremity edema  Neurologic:      no focal deficits.   alert oriented x3  Skin:      intact without lesions or rashes.    Psych:      alert and cooperative; normal mood and affect; normal attention span and concentration.      Result Review :               Pertinent cardiac workup    1. EKG 10/4/2022 sinus rhythm with BiV pacing  2. Echo 7/28/2022 ejection fraction 15 to 20%.      Assessment and Plan         Hemopericardium    Essential hypertension    Type 2 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 (HCC)    Chest pain, unspecified type    Acute systolic heart failure (CMS/HCC)      Susy Hanson is a 62-year-old female patient who has history of breast cancer, bilateral mastectomy, nonischemic cardiomyopathy, chronic left bundle branch block, BiV ICD implantation on September 19, 2022.  It was very challenging to find a good spot for the RV lead due to high thresholds, in fact the lead had to be  repositioned the next day due to increased thresholds.  Upon discharge her RV threshold was good, however follow-up interrogation of the device showed increased thresholds again.  This is most likely due to pathologic RV tissue, as the lead did not move on x-ray and also the other parameters including sensing and impedance values are within range.  The device was programmed today for LV only pacing and narrow QRS was achieved.  I do believe that with the setting, she will get the benefit of BiV pacing.  As far as the pericardial effusion, that is most likely due to minor trauma from multiple repositioning.  We will get an echocardiogram to have an assessment of the effusion, it did not look large on CT scan.  Her chest pain is probably due to pericarditis, I will start her on colchicine for that.  Findings and management plans were discussed with Dr. Murphy who is agreeable.        No follow-ups on file.  Patient was given instructions and counseling regarding her condition or for health maintenance advice. Please see specific information pulled into the AVS if appropriate.

## 2022-10-04 NOTE — CONSULTS
Cardiology [place]        Subjective:     Encounter Date:10/03/2022      Patient ID: Susy Hanson is a 62 y.o. female.    Chief Complaint: chest pain    Referring Physician: Aleks Headley    HPI:  Susy Hanson is a 62 y.o. female who presents with chest pain.  Ms. Hanson is a patient of Dr. Murphy.  Pmh includes NICM with LVEF 25%, minimal non obstructive CAD on [facility] 4/2022, HTN, HLD, ANDREAS, breast CA s/p bilateral mastectomy August 2021, DM, chronic left bundle branch block.    Ms. Hanson underwent BiV ICD 9/19/2022 with repositioning of the RV lead the next day due to increased thresholds.  On follow up interrogation revealed increased thresholds again.  Patient also developed chest discomfort yesterday described as sharp pain, radiation to her back.      Patient presented to ER. CT PE protocol of chest showed no PE, no dissection, small to moderate amount of pericardial effusion. Troponin negative. ECG  V paced.  Patient was given IV morphine. Pain improved. She has been started on colchicine and NSAIDS for pericarditis.  EP following and have adjusted settings for BiV pacing.       Past Medical History:   Diagnosis Date   • Anxiety 2000   • Breast cancer (HCC)     LEFT BREAST 7/2021   • CHF (congestive heart failure) (HCC)    • Depression    • DM (diabetes mellitus) (HCC)    • HL (hearing loss)    • Hyperlipidemia    • Hypertension     SAW CARDIO/ STRESS TEST 2020 - NOW MEDS MANAGED BY PCP    • Irritable bowel syndrome 2006   • Peripheral neuropathy    • PONV (postoperative nausea and vomiting)    • Sleep apnea     WEARS CPAP       Past Surgical History:   Procedure Laterality Date   • BLADDER REPAIR     • BREAST BIOPSY     • BREAST RECONSTRUCTION Bilateral 08/26/2021    Procedure: BILATERAL PREPECTORALPLACMENT TISSUE EXPANDERS AND ALLODERM;  Surgeon: Heri Arreaga MD;  Location: [facility];  Service: Plastics;  Laterality: Bilateral;   • BREAST TISSUE EXPANDER REMOVAL INSERTION OF  IMPLANT Bilateral 12/23/2021    Procedure: BILATERAL REMOVAL TISSUE EXPANDERS AND PLACEMENT OF IMPLANTS;  Surgeon: Heri Arreaga MD;  Location: [facility] MAIN OR;  Service: Plastics;  Laterality: Bilateral;   • CARDIAC CATHETERIZATION Left 04/13/2022    Procedure: Cardiac Catheterization/Vascular Study;  Surgeon: Christo Murphy MD;  Location:  [facility];  Service: Cardiovascular;  Laterality: Left;   • CARDIAC ELECTROPHYSIOLOGY PROCEDURE N/A 9/19/2022    Procedure: ICD implant [place] aware;  Surgeon: Esteban Kendrick MD;  Location:  [facility];  Service: Cardiology;  Laterality: N/A;   • CARDIAC ELECTROPHYSIOLOGY PROCEDURE N/A 9/20/2022    Procedure: lead repositioning;  Surgeon: Esteban Kendrick MD;  Location:  [facility];  Service: Cardiology;  Laterality: N/A;   • CARDIOVASCULAR STRESS TEST  2020   • COLONOSCOPY  11/2012   • HYSTERECTOMY     • LYMPH NODE BIOPSY  8/29/2021   • MASTECTOMY W/ SENTINEL NODE BIOPSY Bilateral 08/26/2021    Procedure: bilateral total mastectomy, left axillary sentinel lymph node biopsy, possible reconstruction.;  Surgeon: Asia Perkins MD;  Location: [facility] MAIN OR;  Service: General;  Laterality: Bilateral;   • TUBAL ABDOMINAL LIGATION  1981       Family History   Problem Relation Age of Onset   • Alzheimer's disease Father    • Diabetes Father    • Heart disease Father    • Other Father    • Sleep apnea Father    • Snoring Father    • Hyperlipidemia Father    • Other Mother    • Diabetes Mother    • Heart disease Mother    • Sleep apnea Mother    • Snoring Mother    • COPD Mother    • Hyperlipidemia Mother    • Diabetes Sister    • Heart disease Sister    • Other Sister    • Sleep apnea Sister    • Snoring Sister    • COPD Sister    • Breast cancer Paternal Grandmother 60   • Cancer Paternal Grandmother    • Diabetes Paternal Grandmother    • Breast cancer Maternal Aunt 80   • Breast cancer Paternal Aunt    •  Diabetes Maternal Grandmother    • Hyperlipidemia Sister    • Hyperlipidemia Brother    • Hyperlipidemia Son    • Malig Hyperthermia Neg Hx        Social History     Socioeconomic History   • Marital status:    Tobacco Use   • Smoking status: Former Smoker     Packs/day: 2.00     Years: 30.00     Pack years: 60.00     Types: Cigarettes     Start date: 1972     Quit date: 2008     Years since quittin.7   • Smokeless tobacco: Never Used   Vaping Use   • Vaping Use: Never used   Substance and Sexual Activity   • Alcohol use: No   • Drug use: No   • Sexual activity: Yes     Partners: Male     Birth control/protection: None         Allergies   Allergen Reactions   • Hydrocodone-Acetaminophen Hives     Lortab, takes tylenol   • Tramadol Hives       Current Medications:   Scheduled Meds:atorvastatin, 20 mg, Oral, Nightly  colchicine, 0.6 mg, Oral, Daily  insulin lispro, 0-7 Units, Subcutaneous, Q6H  metoprolol succinate XL, 25 mg, Oral, Q24H  midodrine, 2.5 mg, Oral, TID AC  senna-docusate sodium, 2 tablet, Oral, BID  sodium chloride, 10 mL, Intravenous, Q12H      Continuous Infusions:sodium chloride, 75 mL/hr, Last Rate: 75 mL/hr (10/04/22 0939)        Review of Systems   Constitutional: Negative for chills, diaphoresis and malaise/fatigue.   Cardiovascular: Positive for chest pain. Negative for dyspnea on exertion, irregular heartbeat, leg swelling, near-syncope, orthopnea, palpitations, paroxysmal nocturnal dyspnea and syncope.   Respiratory: Negative for cough, shortness of breath, sleep disturbances due to breathing and sputum production.    Gastrointestinal: Negative for change in bowel habit.   Genitourinary: Negative for urgency.   Neurological: Negative for dizziness and headaches.   Psychiatric/Behavioral: Negative for altered mental status.            Objective:         /60 (BP Location: Right arm, Patient Position: Lying)   Pulse 94   Temp 97.1 °F (36.2 °C) (Oral)   Resp 16   Ht  "162.6 cm (64\")   Wt 67.6 kg (149 lb)   LMP  (LMP Unknown)   SpO2 99%   BMI 25.58 kg/m²     Physical Exam:  General Appearance:    Alert, cooperative, in no acute distress                                Head: Atraumatic, normocephalic, PERRLA               Neck:   supple, no JVD   Lungs:     Clear to auscultation,respirations regular, even and               unlabored    Heart:    Regular rhythm and normal rate, normal S1 and S2   Abdomen:     Normal bowel sounds, no masses, no organomegaly, soft  non-tender, non-distended, no guarding, no rebound  tenderness   Extremities:   Moves all extremities well, no edema, no cyanosis, no  redness   Pulses:   Pulses palpable and equal bilaterally   Skin:   No bleeding, bruising or rash   Neurologic:   Awake, alert, oriented x3                 ASCVD RIsk Score::  The ASCVD Risk score (Hammondzeeshan MCDERMOTT Jr., et al., 2013) failed to calculate for the following reasons:    The valid total cholesterol range is 130 to 320 mg/dL      Lab Review:     Results from last 7 days   Lab Units 10/04/22  0601 10/04/22  0017   SODIUM mmol/L 136 135*   POTASSIUM mmol/L 5.9* 4.2   CHLORIDE mmol/L 102 97*   CO2 mmol/L 24.0 23.0   BUN mg/dL 31* 29*   CREATININE mg/dL 1.18* 1.05*   GLUCOSE mg/dL 124* 109*   CALCIUM mg/dL 9.6 10.1   AST (SGOT) U/L  --  18   ALT (SGPT) U/L  --  11     Results from last 7 days   Lab Units 10/04/22  0017   TROPONIN T ng/mL <0.010     Results from last 7 days   Lab Units 10/04/22  0601 10/04/22  0017   WBC 10*3/mm3 12.10* 10.90*   HEMOGLOBIN g/dL 10.3* 11.4*   HEMATOCRIT % 32.0* 35.2   PLATELETS 10*3/mm3 257 302                   Invalid input(s): LDLCALC  Results from last 7 days   Lab Units 10/04/22  0017   PROBNP pg/mL 360.8           Recent Radiology:  Imaging Results (Most Recent)     Procedure Component Value Units Date/Time    CT Angiogram Chest Pulmonary Embolism [[id number]] Collected: 10/04/22 0342     Updated: 10/04/22 0353    Narrative:      Exam: CT Angiography of " the Chest.    Date: 10/4/2022.     Comparison: 4/11/2022.    History: Defibrillator placement with chest pain.    Technique: CT examination of the chest was performed following the intravenous administration of 100 mL of Isovue-370. Sagittal, coronal and 3-D reformatted images were provided. CT dose lowering techniques were used, to include: automated exposure   control, adjustment for patient size, and/or use of iterative reconstruction.    FINDINGS:    CHEST WALL: There are bilateral breast implants.    Mediastinum and Theresa: There is no axillary, mediastinal or hilar lymphadenopathy.    Pleural and Pericardial spaces: There is a small to moderate pericardial effusion which is higher density than simple fluid which may relate to a hemopericardium.    Upper Abdomen: The visualized upper abdomen is unremarkable.    Cardiovascular: AICD generator has leads in the right atrium and right ventricle. There is mild vascular calcification throughout the thoracic aorta without evidence of aneurysmal dilation or dissection.    Pulmonary Artery: There are no filling defects in the pulmonary arteries.    Lung Parenchyma and Airways: The lungs are clear.    Bones: No fracture or aggressive osseous lesion.      Impression:        1. No evidence of pulmonary embolism.  2. No evidence of thoracic aortic aneurysm or dissection.  3. Small to moderate amount of hemopericardium.    These findings were discussed with COURTNEY Atkinson.      Electronically signed by:  Amish Ellsworth D.O.    10/4/2022 1:51 AM    XR Chest 1 View [[id number]] Collected: 10/04/22 0232     Updated: 10/04/22 0234    Narrative:      EXAMINATION: XR CHEST 1 VW      DATE OF EXAM: 10/4/2022 12:28 AM  SLOT: 60    HISTORY: Chest pain     COMPARISON: None.    FINDINGS:    HEART/MEDIASTINUM: Mildly enlarged cardiac silhouette. Radiation is the origin of the right atrium, right ventricle, and coronary sinus.    LUNGS/PLEURA: Linear subsegmental atelectasis in the left  lung base. Otherwise, grossly clear.     MUSCULOSKELETAL: No acute osseous pathology. Surgical clips overlie the axilla bilaterally.        Impression:        1.  No acute cardiopulmonary abnormality detected.          Electronically signed by:  Yony Patricio M.D.    10/4/2022 12:33 AM            ECHOCARDIOGRAM:    Results for orders placed during the hospital encounter of 07/28/22    Adult Transthoracic Echo Complete W/ Cont if Necessary Per Protocol    Interpretation Summary  · Left ventricular systolic function is severely decreased.  · Left ventricular ejection fraction is 15 to 20%  · Dyskinetic septum and akinetic apex is noted  · Left ventricular wall thickness is consistent with mild concentric hypertrophy.  · Left ventricular diastolic function was normal.            Assessment:         Active Hospital Problems    Diagnosis  POA   • **Hemopericardium [I31.2]  Yes   • Acute systolic heart failure (CMS/HCC) [I50.21]  Yes   • Chest pain, unspecified type [R07.9]  Yes   • Type 2 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 (HCC) [E11.49]  Yes   • Essential hypertension [I10]  Yes     1. Chest Pain / pericardial effusion / pericarditis  - check 2D ECHO  - started on NSAIDS and colchicine    2. Increase RV thresholds  - EP following, per EP device reprogrammed for LV only pacing     3. NICM, chronic systolic HF  - LVEF 15-20%  - GDMT: toprol XL, entresto    4. Non obstructive CAD on [facility] 4/2022    5. HTN although patient has been borderline blood pressures while hospitalized    6. HLD    7. DM     Plan:   Ms Hanson presents with chest pain. She had BiV implantation last week and had lead adjustment. Was noted to have pericardial effusion on CT scan.  ECHO pending.  EP on board, Dr. Kendrick has made adjustments to LV lead to get pt benefit of BiV pacing  Colchicine started, NSAIDS  Repeat BMP, K was slightly elevated  entresto and toprol XL held this am secondary to borderline blood  pressure, IVF gently started    Patient is seen and examined and findings are verified.  All data is reviewed by me personally.  Assessment and plan formulated by APC was done after discussion with attending.  I spent more than 50% of time in taking care of the patient.    Patient is admitted with chest pain.    Blood pressure is low.  Patient had episode of tachycardia    Normal S1 and S2.  No pericardial rub or murmur.  No leg edema noted.    CAT scan of the chest showed possible pericardial effusion.  Echocardiogram is done and it showed small pericardial effusion.  No tamponade.  I would recommend to start colchicine.  I would decrease the dose of Toprol-XL to 25 mg daily.  Entresto would be held for now.  Ejection fraction has improved.  We shall follow.    Electronically signed by Christo Murphy MD, 10/04/22, 5:15 PM EDT.                 Christo Murphy MD  10/04/22  17:15 EDT

## 2022-10-04 NOTE — PLAN OF CARE
Goal Outcome Evaluation:              Outcome Evaluation: Patient admitted to 209 with chest pain. Pacemaker interigated x 2 today, Echo done at bedside. Patient with severe tachycardia with activity. HR up to 200 when up to bedside commode with chest pain increased to 10/10 with that activity, MD notified. Patient on bedrest with purewick placed for voiding. BP low on admission. Fluids up for hydration, MD aware of vital signs. Will continue to monitor.

## 2022-10-04 NOTE — PAYOR COMM NOTE
"AUTHORIZATION PENDING:   PLEASE CALL OR FAX DETERMINATION TO CONTACT BELOW. THANK YOU.        Мария Toussaint RN MSN  /UR  [facility]  [phone] office  [phone] fax  [email or web address]    [facility]  NPI: [id number]  Tax: [phone]            Susy Wen (62 y.o. Female)             Date of Birth   1960    Social Security Number       Address   [address] [place] [address] [place] [address]    Home Phone   [phone]    MRN   [id number]       Islam   Yazidi    Marital Status                               Admission Date   10/3/22    Admission Type   Emergency    Admitting Provider   Aleks Headley MD    Attending Provider   Aleks Headley MD    Department, Room/Bed   [facility] 2A PEDIATRICS, 209/1       Discharge Date       Discharge Disposition       Discharge Destination                               Attending Provider: Aleks Headley MD    Allergies: Hydrocodone-acetaminophen, Tramadol    Isolation: None   Infection: None   Code Status: CPR   Advance Care Planning Activity    Ht: 162.6 cm (64\")   Wt: 68 kg (149 lb 14.6 oz)    Admission Cmt: None   Principal Problem: Hemopericardium [I31.2]                 Active Insurance as of 10/3/2022     Primary Coverage     Payor Plan Insurance Group Employer/Plan Group    [employer] [facility] [employer] [facility] EMPLOYEE [id number]     Payor Plan Address Payor Plan Phone Number Payor Plan Fax Number Effective Dates    [address] [phone]  8/1/2022 - None Entered    [place] [address]       Subscriber Name Subscriber Birth Date Member ID       SUSY WEN 1960 [id number]                 Emergency Contacts      (Rel.) Home Phone Work Phone Mobile Phone    BETTYEKIARA (Spouse) [phone] -- [phone]    Deborah Jenkins (Daughter) [phone] -- --        10/04/22 0426  Inpatient Admission  Once     Completed     Level of Care: Telemetry  "   Diagnosis: Chest pain, unspecified type [8744715]    Admitting Physician: LORENE ENRIQUEZ [[id number]]    Attending Physician: LORENE ENRIQUEZ [[id number]]    Certification: I Certify That Inpatient Hospital Services Are Medically Necessary For Greater Than 2 Midnights                   History & Physical      Lorene Enriquez MD at 10/04/22 0624              [facility] Medicine Services      Patient Name: Susy Hanson  : 1960  MRN: [id number]  Primary Care Physician:  Erica Avila MD  Date of admission: 10/3/2022      Subjective      Chief Complaint: Chest pain    History of Present Illness: Susy Hanson is a 62 y.o. female who presented to [facility] on 10/3/2022 with a past medical history of congestive heart failure, anxiety depression, obstructive sleep apnea, type 2 diabetes non-insulin-dependent, hypertension, hyperlipidemia, and cardiomyopathy.  The patient 2 weeks ago had an ICD and pacer placed this had the complication of one of the leads which had to be replaced.  She was discharged from home but still was having some persistent pain.  Finally became so severe she came to the emergency room.  She describes the pain is fairly constant and burning and throbbing that goes up the side of her neck into her arm.  In the emergency room a CT scan was performed of the chest and showed hemopericardium.  The cardiologist who placed the leads were contacted and will see the patient and requested the hospitalist admit the patient.  She is currently stable we will do pain control and she will be n.p.o.      ROS chest pain, shortness of breath the pain is worse with breathing, denies fever, chills, did have some neck pain that would throb periodically, denied congestion, cough, leg swelling, nausea, vomiting, abdominal pain, dysuria, increased frequency, rash, changes in mental status syncope    Personal History     Past Medical History:   Diagnosis Date   • Anxiety  2000   • Breast cancer (HCC)     LEFT BREAST 7/2021   • CHF (congestive heart failure) (HCC)    • Depression    • DM (diabetes mellitus) ([facility])    • HL (hearing loss)    • Hyperlipidemia    • Hypertension     SAW CARDIO/ STRESS TEST 2020 - NOW MEDS MANAGED BY PCP    • Irritable bowel syndrome 2006   • Peripheral neuropathy    • PONV (postoperative nausea and vomiting)    • Sleep apnea     WEARS CPAP       Past Surgical History:   Procedure Laterality Date   • BLADDER REPAIR     • BREAST BIOPSY     • BREAST RECONSTRUCTION Bilateral 08/26/2021    Procedure: BILATERAL PREPECTORALPLACMENT TISSUE EXPANDERS AND ALLODERM;  Surgeon: Heri Arreaga MD;  Location: [facility] OR;  Service: Plastics;  Laterality: Bilateral;   • BREAST TISSUE EXPANDER REMOVAL INSERTION OF IMPLANT Bilateral 12/23/2021    Procedure: BILATERAL REMOVAL TISSUE EXPANDERS AND PLACEMENT OF IMPLANTS;  Surgeon: Heri Arreaga MD;  Location: [facility] MAIN OR;  Service: Plastics;  Laterality: Bilateral;   • CARDIAC CATHETERIZATION Left 04/13/2022    Procedure: Cardiac Catheterization/Vascular Study;  Surgeon: Christo Murphy MD;  Location: [facility] CATH INVASIVE LOCATION;  Service: Cardiovascular;  Laterality: Left;   • CARDIAC ELECTROPHYSIOLOGY PROCEDURE N/A 9/19/2022    Procedure: ICD implant St. Sude aware;  Surgeon: Esteban Kendrick MD;  Location:  [facility] CATH INVASIVE LOCATION;  Service: Cardiology;  Laterality: N/A;   • CARDIAC ELECTROPHYSIOLOGY PROCEDURE N/A 9/20/2022    Procedure: lead repositioning;  Surgeon: Esteban Kendrick MD;  Location:  [facility] CATH INVASIVE LOCATION;  Service: Cardiology;  Laterality: N/A;   • CARDIOVASCULAR STRESS TEST  2020   • COLONOSCOPY  11/2012   • HYSTERECTOMY     • LYMPH NODE BIOPSY  8/29/2021   • MASTECTOMY W/ SENTINEL NODE BIOPSY Bilateral 08/26/2021    Procedure: bilateral total mastectomy, left axillary sentinel lymph node biopsy, possible reconstruction.;  Surgeon: Asia Perkins MD;  [facility];  Service: General;  Laterality: Bilateral;   • TUBAL ABDOMINAL LIGATION  1981       Family History: family history includes Alzheimer's disease in her father; Breast cancer in her paternal aunt; Breast cancer (age of onset: 60) in her paternal grandmother; Breast cancer (age of onset: 80) in her maternal aunt; COPD in her mother and sister; Cancer in her paternal grandmother; Diabetes in her father, maternal grandmother, mother, paternal grandmother, and sister; Heart disease in her father, mother, and sister; Hyperlipidemia in her brother, father, mother, sister, and son; Other in her father, mother, and sister; Sleep apnea in her father, mother, and sister; Snoring in her father, mother, and sister. Otherwise pertinent FHx was reviewed and not pertinent to current issue.    Social History:  reports that she quit smoking about 14 years ago. Her smoking use included cigarettes. She started smoking about 50 years ago. She has a 60.00 pack-year smoking history. She has never used smokeless tobacco. She reports that she does not drink alcohol and does not use drugs.    Home Medications:  Prior to Admission Medications     Prescriptions Last Dose Informant Patient Reported? Taking?    ALPRAZolam (XANAX) 1 MG tablet   No No    Take 1 tablet by mouth 2 (Two) Times a Day As Needed for Anxiety or Sleep.    atorvastatin (LIPITOR) 20 MG tablet   No No    Take 1 tablet by mouth Daily.    bumetanide (BUMEX) 1 MG tablet   No No    Take 1 tablet by mouth Daily.    cetirizine (zyrTEC) 10 MG tablet   Yes No    Take 10 mg by mouth Daily As Needed.    metFORMIN (GLUCOPHAGE) 1000 MG tablet   No No    Take one tablet by mouth with breakfast and 1 tablets with supper    metoprolol succinate XL (TOPROL-XL) 100 MG 24 hr tablet   No No    take 1 tablet by oral route  every day    ondansetron (Zofran) 4 MG tablet   No No    Take 1 tablet by mouth Every 8 (Eight) Hours As Needed for Nausea or Vomiting.    sacubitril-valsartan  (Entresto) 24-26 MG tablet   No No    Take 1 tablet by mouth Every 12 (Twelve) Hours.    Triamcinolone Acetonide (NASACORT) 55 MCG/ACT nasal inhaler   No No    APPLY 2 SPRAYS BY NASAL ROUTE DAILY FOR 30 DAYS            Allergies:  Allergies   Allergen Reactions   • Hydrocodone-Acetaminophen Hives     Lortab, takes tylenol   • Tramadol Hives       Objective      Vitals:   Temp:  [98.4 °F (36.9 °C)] 98.4 °F (36.9 °C)  Heart Rate:  [] 81  Resp:  [17-24] 17  BP: ()/(40-96) 120/69    Physical Exam   General patient does not look comfortable but no distress looks tired  Head atraumatic  Nares no discharge  Eyes pupils were reactive to light ocular motion intact  Oropharynx membranes moist no erythema  Neck no thyromegaly or lymphadenopathy  Pulmonary lungs clear to auscultation bilateral no accessory muscle use  Cardiac regular rate and rhythm could not appreciate any murmur on the chest there was a well-healed scar where the pacer ICD was placed  Abdomen positive bowel sounds no guarding no rebound no hepatosplenomegaly no pain on palpation  Extremities symmetric no cyanosis or edema  Psych patient was alert and oriented x4 appropriate mood and affect  Neuro cranial nerves II through XII were intact no obvious focal neurologic deficit  Derm no rash    Result Review    Result Review:  I have personally reviewed the results from the time of this admission to 10/4/2022 06:35 EDT and agree with these findings:  [x]  Laboratory  []  Microbiology  [x]  Radiology  [x]  EKG/Telemetry   [x]  Cardiology/Vascular   []  Pathology  [x]  Old records  []  Other:  Most notable findings include:  Troponin less than 0.01, proBNP 360  Glucose 109, sodium 135, potassium 4.2, bicarb 23, creatinine 1.05, BUN 29, calcium 10.1  CBC WBCs 12,000, hemoglobin 10.3, platelets 257,000    IMPRESSION:   1. No evidence of pulmonary embolism.  2. No evidence of thoracic aortic aneurysm or dissection.  3. Small to moderate amount of  hemopericardium.       Assessment & Plan        Active Hospital Problems:  Active Hospital Problems    Diagnosis    • **Hemopericardium    • Acute systolic heart failure (CMS/HCC)    • Chest pain, unspecified type    • Type 2 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 (HCC)    • Essential hypertension      Plan:   1.  Hemopericardium -cardiology has been consulted.  We will monitor the patient closely holding anticoagulation for now  2.  Chest pain troponins are negative most likely related to #1  3.  Congestive heart failure systolic appears to be well compensated continue current home medication no changes at this time holding the Bumex for now for possible procedure did not want to stress the kidneys  4.  Type 2 diabetes patient only on metformin correction insulin low-dose prescribed and patient,  5.  Essential hypertension continuing home medications except for the Bumex for now in case a procedure with dye as needed    DVT prophylaxis:  Mechanical DVT prophylaxis orders are present.    CODE STATUS:    Level Of Support Discussed With: Patient  Code Status (Patient has no pulse and is not breathing): CPR (Attempt to Resuscitate)  Medical Interventions (Patient has pulse or is breathing): Full Support    Admission Status:  I believe this patient meets inpatient status.    I discussed the patient's findings and my recommendations with patient and family.    This patient has been examined wearing appropriate Personal Protective Equipment and discussed with . 10/04/22      Signature:     Electronically signed by Aleks Headley MD at 10/04/22 0637          Emergency Department Notes      Jackie Atkinson APRN at 10/04/22 0000     Attestation signed by Leonard Carr MD at 10/04/22 0654        NON FACE TO FACE: This visit was performed by BOTH a physician and an APC. I performed all aspects of the MDM as documented.  Leonard Carr MD 10/4/2022 06:54 EDT                         Subjective    History of Present Illness  Patient presents with:  Chest Pain    Erica Avila MD   No LMP recorded (lmp unknown). Patient has had a hysterectomy.    Patient is a 62-year-old female presents to the ED with a complaint of chest pain.  Patient recently had a pacemaker, AICD placed, she also had to have a lead replaced, she resents that she has a shocking pain in her chest, and has pain with movement and breathing.  She was seen in the office today with cardiology by the nurse who evaluated her, but was not seen by the cardiologist.  She denies any dizziness, lightheadedness, diaphoresis or syncope.  No nausea vomiting diarrhea.  No abnormal leg pain or swelling        Review of Systems   Constitutional: Negative for chills and fever.   Eyes: Negative for photophobia and visual disturbance.   Respiratory: Positive for shortness of breath. Negative for chest tightness.    Cardiovascular: Positive for chest pain. Negative for palpitations and leg swelling.   Gastrointestinal: Negative for abdominal pain.   Genitourinary: Negative for dysuria.   Musculoskeletal: Negative for back pain and neck pain.   Skin: Negative for color change and rash.   Neurological: Negative for dizziness, syncope, weakness and light-headedness.       Past Medical History:   Diagnosis Date   • Anxiety 2000   • Breast cancer (HCC)     LEFT BREAST 7/2021   • CHF (congestive heart failure) ([facility])    • Depression    • DM (diabetes mellitus) ([facility])    • HL (hearing loss)    • Hyperlipidemia    • Hypertension     SAW CARDIO/ STRESS TEST 2020 - NOW MEDS MANAGED BY PCP    • Irritable bowel syndrome 2006   • Peripheral neuropathy    • PONV (postoperative nausea and vomiting)    • Sleep apnea     WEARS CPAP       Allergies   Allergen Reactions   • Hydrocodone-Acetaminophen Hives     Lortab, takes tylenol   • Tramadol Hives       Past Surgical History:   Procedure Laterality Date   • BLADDER REPAIR     • BREAST BIOPSY     • BREAST  RECONSTRUCTION Bilateral 08/26/2021    Procedure: BILATERAL PREPECTORALPLACMENT TISSUE EXPANDERS AND ALLODERM;  Surgeon: Heri Arreaga MD;  Location: [facility] MAIN OR;  Service: Plastics;  Laterality: Bilateral;   • BREAST TISSUE EXPANDER REMOVAL INSERTION OF IMPLANT Bilateral 12/23/2021    Procedure: BILATERAL REMOVAL TISSUE EXPANDERS AND PLACEMENT OF IMPLANTS;  Surgeon: Heri Arreaga MD;  [facility];  Service: Plastics;  Laterality: Bilateral;   • CARDIAC CATHETERIZATION Left 04/13/2022    Procedure: Cardiac Catheterization/Vascular Study;  Surgeon: Christo Murphy MD;  Location:  [facility] CATH INVASIVE LOCATION;  Service: Cardiovascular;  Laterality: Left;   • CARDIAC ELECTROPHYSIOLOGY PROCEDURE N/A 9/19/2022    Procedure: ICD implant St. Sude aware;  Surgeon: Esteban Kendrick MD;  Location:  [facility] CATH INVASIVE LOCATION;  Service: Cardiology;  Laterality: N/A;   • CARDIAC ELECTROPHYSIOLOGY PROCEDURE N/A 9/20/2022    Procedure: lead repositioning;  Surgeon: Esteban Kendrick MD;  Location:  [facility] CATH INVASIVE LOCATION;  Service: Cardiology;  Laterality: N/A;   • CARDIOVASCULAR STRESS TEST  2020   • COLONOSCOPY  11/2012   • HYSTERECTOMY     • LYMPH NODE BIOPSY  8/29/2021   • MASTECTOMY W/ SENTINEL NODE BIOPSY Bilateral 08/26/2021    Procedure: bilateral total mastectomy, left axillary sentinel lymph node biopsy, possible reconstruction.;  Surgeon: Asia Perkins MD;  Location: [facility] MAIN OR;  Service: General;  Laterality: Bilateral;   • TUBAL ABDOMINAL LIGATION  1981       Family History   Problem Relation Age of Onset   • Alzheimer's disease Father    • Diabetes Father    • Heart disease Father    • Other Father    • Sleep apnea Father    • Snoring Father    • Hyperlipidemia Father    • Other Mother    • Diabetes Mother    • Heart disease Mother    • Sleep apnea Mother    • Snoring Mother    • COPD Mother    • Hyperlipidemia Mother    • Diabetes Sister    • Heart disease  Sister    • Other Sister    • Sleep apnea Sister    • Snoring Sister    • COPD Sister    • Breast cancer Paternal Grandmother 60   • Cancer Paternal Grandmother    • Diabetes Paternal Grandmother    • Breast cancer Maternal Aunt 80   • Breast cancer Paternal Aunt    • Diabetes Maternal Grandmother    • Hyperlipidemia Sister    • Hyperlipidemia Brother    • Hyperlipidemia Son    • Malig Hyperthermia Neg Hx        Social History     Socioeconomic History   • Marital status:    Tobacco Use   • Smoking status: Former Smoker     Packs/day: 2.00     Years: 30.00     Pack years: 60.00     Types: Cigarettes     Start date: 1972     Quit date: 2008     Years since quittin.7   • Smokeless tobacco: Never Used   Vaping Use   • Vaping Use: Never used   Substance and Sexual Activity   • Alcohol use: No   • Drug use: No   • Sexual activity: Yes     Partners: Male     Birth control/protection: None           Objective   Physical Exam  Vitals and nursing note reviewed.   Constitutional:       Appearance: She is ill-appearing.   HENT:      Head: Normocephalic and atraumatic.   Eyes:      Extraocular Movements: Extraocular movements intact.      Pupils: Pupils are equal, round, and reactive to light.   Cardiovascular:      Rate and Rhythm: Normal rate and regular rhythm.      Pulses:           Radial pulses are 2+ on the right side and 2+ on the left side.      Heart sounds: Normal heart sounds.   Pulmonary:      Effort: Pulmonary effort is normal.      Breath sounds: Normal breath sounds.   Chest:      Comments: Left anterior chest wall pacemaker site, incision well approximated.  No surrounding warmth erythema or drainage.  No palpable masses or fluctuance.  Abdominal:      General: Bowel sounds are normal.      Palpations: Abdomen is soft.   Musculoskeletal:         General: Normal range of motion.      Cervical back: Normal range of motion and neck supple.   Skin:     General: Skin is warm and dry.       "Capillary Refill: Capillary refill takes less than 2 seconds.   Neurological:      General: No focal deficit present.      Mental Status: She is alert and oriented to person, place, and time.         Procedures          ED Course  ED Course as of 10/04/22 0443   Tue Oct 04, 2022   0111 Patient experienced hypotension after morphine.  Given IV fluids. [LB]   0118 Resting.  Pains under control.  Her blood pressure did drop after receiving morphine, IV fluids were initiated. [LB]   0315 Care obtained from LOYDA Hayden pending CT PE protocol and disposition. [BH]   0340 Continue to await CT PE protocol results  []   0417 Spoke with Dr. Headley who was agreeable to patient admission but has requested call be placed to cardiology.  Brut on call.  []   0426 Spoke with Dr. Dallas with cardiology who requested that Dr. Kendrick and Dr. Murphy be added to the patients care team.  []      ED Course User Index  [] Jackie Atkinson, APRN  [LB] Edita Banks, APRN      BP 93/55   Pulse 79   Temp 98.4 °F (36.9 °C) (Oral)   Resp 17   Ht 162.6 cm (64\")   Wt 68 kg (149 lb 14.6 oz)   LMP  (LMP Unknown)   SpO2 97%   BMI 25.73 kg/m²   Labs Reviewed   COMPREHENSIVE METABOLIC PANEL - Abnormal; Notable for the following components:       Result Value    Glucose 109 (*)     BUN 29 (*)     Creatinine 1.05 (*)     Sodium 135 (*)     Chloride 97 (*)     BUN/Creatinine Ratio 27.6 (*)     eGFR 59.5 (*)     All other components within normal limits    Narrative:     GFR Normal >60  Chronic Kidney Disease <60  Kidney Failure <15     D-DIMER, QUANTITATIVE - Abnormal; Notable for the following components:    D-Dimer, Quantitative 2.04 (*)     All other components within normal limits    Narrative:     Reference Range  --------------------------------------------------------------------     < 0.50   Negative Predictive Value  0.50-0.59   Indeterminate    >= 0.60   Probable VTE             A very low percentage of " patients with DVT may yield D-Dimer results   below the cut-off of 0.50 mg/L FEU.  This is known to be more   prevalent in patients with distal DVT.             Results of this test should always be interpreted in conjunction with   the patient's medical history, clinical presentation and other   findings.  Clinical diagnosis should not be based on the result of   [place] D-Dimer alone.   CBC WITH AUTO DIFFERENTIAL - Abnormal; Notable for the following components:    WBC 10.90 (*)     Hemoglobin 11.4 (*)     Lymphocyte % 17.8 (*)     Neutrophils, Absolute 7.30 (*)     Monocytes, Absolute 1.10 (*)     All other components within normal limits   BNP (IN-HOUSE) - Normal    Narrative:     Among patients with dyspnea, NT-proBNP is highly sensitive for the detection of acute congestive heart failure. In addition NT-proBNP of <300 pg/ml effectively rules out acute congestive heart failure with 99% negative predictive value.    Results may be falsely decreased if patient taking Biotin.     TROPONIN (IN-HOUSE) - Normal    Narrative:     Troponin T Reference Range:  <= 0.03 ng/mL-   Negative for AMI  >0.03 ng/mL-     Abnormal for myocardial necrosis.  Clinicians would have to utilize clinical acumen, EKG, Troponin and serial changes to determine if it is an Acute Myocardial Infarction or myocardial injury due to an underlying chronic condition.       Results may be falsely decreased if patient taking Biotin.     CBC AND DIFFERENTIAL    Narrative:     The following orders were created for panel order CBC & Differential.  Procedure                               Abnormality         Status                     ---------                               -----------         ------                     CBC Auto Differential[[id number]]        Abnormal            Final result                 Please view results for these tests on the individual orders.     Medications   sodium chloride 0.9 % flush 10 mL (has no administration in time  range)   morphine injection 4 mg (4 mg Intravenous Given 10/4/22 0024)   ondansetron (ZOFRAN) injection 4 mg (4 mg Intravenous Given 10/4/22 0024)   sodium chloride 0.9 % bolus 1,000 mL (0 mL Intravenous Stopped 10/4/22 0247)   iopamidol (ISOVUE-370) 76 % injection 100 mL (100 mL Intravenous Given 10/4/22 0213)     XR Chest 1 View    Result Date: 10/4/2022  1.  No acute cardiopulmonary abnormality detected.  Electronically signed by:  Yony Patricio M.D.  10/4/2022 12:33 AM    CT Angiogram Chest Pulmonary Embolism    Result Date: 10/4/2022  1. No evidence of pulmonary embolism. 2. No evidence of thoracic aortic aneurysm or dissection. 3. Small to moderate amount of hemopericardium. These findings were discussed with NP Jackie Atkinson. Electronically signed by:  Amish Ellsworth D.O.  10/4/2022 1:51 AM                                         MDM  Number of Diagnoses or Management Options  Chest pain, unspecified type  Diagnosis management comments: Record/Chart Review:Cardiology RN office note.  Patient had lead revised on 921, concern for sharp shooting pain that goes down the arm and up the neck.  Unsure if she was going to revise the lead at this time.  Susy Hanson is a 62 y.o. female presented for elective BiV ICD implantation, which was performed on 9/19/2022.  Next morning threshold in the RV had gone up significantly, chest x-ray did not show any visible dislodgment of the lead, however it was decided to reposition the lead to get better capture thresholds.  Repositioning was done on 9/20/2022 and the device was also positioned more medially and inferiorly away from the clavicle and breast implant.  Next morning patient was seen and examined, chest x-ray showed good lead positioning and device interrogation showed appropriate capture thresholds on all leads.  Patient was discharged home in a stable condition.  She will be given Keflex, tramadol and Zofran upon discharge.  2-week wound check at   Jeffrey's office.  Clinical information obtained from an independent historian. History obtained from or confirmed by : Family at bedside  Differentials: STEMI, NSTEMI, pacemaker problem  Patient was brought back to the emergency department room for evaluation and placed on appropriate monitoring.  Patient had IV established and blood work obtained.  Presented with left-sided chest pain, rating down the left arm, into the neck.  Patient describes as a sharp shooting pain, she was concerned it could be related to recently changes with her AICD.  She was actually seen in the office for this today.  CBC CMP reviewed.  Troponin negative.  BMP normal.  D-dimer elevated at 2.0, CT chest PE protocol was obtained.  \EKG reviewed by MD and MAEGAN, our findings are: Ventricular paced rhythm, rate 111 compared to previous 9/23/2022.  Managment of patient discussed with:   Patients Care impacted by:   Note Disclaimer: At [facility], we believe that sharing information builds trust and better relationships. You are receiving this note because you recently visited [facility]. It is possible you will see health information before a provider has talked with you about it. This kind of information can be easy to misunderstand. To help you fully understand what it means for your health, we urge you to discuss this note with your provider.  Note dicatated utilizing Dragon Dictation.          Amount and/or Complexity of Data Reviewed  Clinical lab tests: reviewed  Tests in the radiology section of CPT®: reviewed  Tests in the medicine section of CPT®: reviewed        Final diagnoses:   Chest pain, unspecified type   Hemopericardium       ED Disposition  ED Disposition     ED Disposition   Decision to Admit    Condition   --    Comment   Level of Care: Telemetry [5]   Diagnosis: Chest pain, unspecified type [[id number]]   Admitting Physician: LORENE ENRIQUEZ [[id number]]   Attending Physician: LORENE ENRIQUEZ [[id number]]   Certification: I Certify  That Inpatient Hospital Services Are Medically Necessary For Greater Than 2 Midnights               No follow-up provider specified.       Medication List      No changes were made to your prescriptions during this visit.          Jackie Atkinson, APRN  10/04/22 5117      Electronically signed by Leonard Carr MD at 10/04/22 2702

## 2022-10-04 NOTE — H&P
[facility] Medicine Services      Patient Name: Susy Hanson  : 1960  MRN: [id number]  Primary Care Physician:  Erica Avila MD  Date of admission: 10/3/2022      Subjective      Chief Complaint: Chest pain    History of Present Illness: Susy Hanson is a 62 y.o. female who presented to [facility] on 10/3/2022 with a past medical history of congestive heart failure, anxiety depression, obstructive sleep apnea, type 2 diabetes non-insulin-dependent, hypertension, hyperlipidemia, and cardiomyopathy.  The patient 2 weeks ago had an ICD and pacer placed this had the complication of one of the leads which had to be replaced.  She was discharged from home but still was having some persistent pain.  Finally became so severe she came to the emergency room.  She describes the pain is fairly constant and burning and throbbing that goes up the side of her neck into her arm.  In the emergency room a CT scan was performed of the chest and showed hemopericardium.  The cardiologist who placed the leads were contacted and will see the patient and requested the hospitalist admit the patient.  She is currently stable we will do pain control and she will be n.p.o.      ROS chest pain, shortness of breath the pain is worse with breathing, denies fever, chills, did have some neck pain that would throb periodically, denied congestion, cough, leg swelling, nausea, vomiting, abdominal pain, dysuria, increased frequency, rash, changes in mental status syncope    Personal History     Past Medical History:   Diagnosis Date   • Anxiety    • Breast cancer (HCC)     LEFT BREAST 2021   • CHF (congestive heart failure) ([facility])    • Depression    • DM (diabetes mellitus) ([facility])    • HL (hearing loss)    • Hyperlipidemia    • Hypertension     SAW CARDIO/ STRESS TEST  - NOW MEDS MANAGED BY PCP    • Irritable bowel syndrome    • Peripheral neuropathy    • PONV (postoperative  nausea and vomiting)    • Sleep apnea     WEARS CPAP       Past Surgical History:   Procedure Laterality Date   • BLADDER REPAIR     • BREAST BIOPSY     • BREAST RECONSTRUCTION Bilateral 08/26/2021    Procedure: BILATERAL PREPECTORALPLACMENT TISSUE EXPANDERS AND ALLODERM;  Surgeon: Heri Arreaga MD;  Location: [facility] MAIN OR;  Service: Plastics;  Laterality: Bilateral;   • BREAST TISSUE EXPANDER REMOVAL INSERTION OF IMPLANT Bilateral 12/23/2021    Procedure: BILATERAL REMOVAL TISSUE EXPANDERS AND PLACEMENT OF IMPLANTS;  Surgeon: Heri Arreaga MD;  Location: [facility] MAIN OR;  Service: Plastics;  Laterality: Bilateral;   • CARDIAC CATHETERIZATION Left 04/13/2022    Procedure: Cardiac Catheterization/Vascular Study;  Surgeon: Christo Murphy MD;  Location:  [facility];  Service: Cardiovascular;  Laterality: Left;   • CARDIAC ELECTROPHYSIOLOGY PROCEDURE N/A 9/19/2022    Procedure: ICD implant [place] aware;  Surgeon: Esteban Kendrick MD;  Location:  [facility];  Service: Cardiology;  Laterality: N/A;   • CARDIAC ELECTROPHYSIOLOGY PROCEDURE N/A 9/20/2022    Procedure: lead repositioning;  Surgeon: Esteban Kendrick MD;  Location:  [facility];  Service: Cardiology;  Laterality: N/A;   • CARDIOVASCULAR STRESS TEST  2020   • COLONOSCOPY  11/2012   • HYSTERECTOMY     • LYMPH NODE BIOPSY  8/29/2021   • MASTECTOMY W/ SENTINEL NODE BIOPSY Bilateral 08/26/2021    Procedure: bilateral total mastectomy, left axillary sentinel lymph node biopsy, possible reconstruction.;  Surgeon: Asia Perkins MD;  Location: [facility];  Service: General;  Laterality: Bilateral;   • TUBAL ABDOMINAL LIGATION  1981       Family History: family history includes Alzheimer's disease in her father; Breast cancer in her paternal aunt; Breast cancer (age of onset: 60) in her paternal grandmother; Breast cancer (age of onset: 80) in her maternal aunt; COPD in her mother and  sister; Cancer in her paternal grandmother; Diabetes in her father, maternal grandmother, mother, paternal grandmother, and sister; Heart disease in her father, mother, and sister; Hyperlipidemia in her brother, father, mother, sister, and son; Other in her father, mother, and sister; Sleep apnea in her father, mother, and sister; Snoring in her father, mother, and sister. Otherwise pertinent FHx was reviewed and not pertinent to current issue.    Social History:  reports that she quit smoking about 14 years ago. Her smoking use included cigarettes. She started smoking about 50 years ago. She has a 60.00 pack-year smoking history. She has never used smokeless tobacco. She reports that she does not drink alcohol and does not use drugs.    Home Medications:  Prior to Admission Medications     Prescriptions Last Dose Informant Patient Reported? Taking?    ALPRAZolam (XANAX) 1 MG tablet   No No    Take 1 tablet by mouth 2 (Two) Times a Day As Needed for Anxiety or Sleep.    atorvastatin (LIPITOR) 20 MG tablet   No No    Take 1 tablet by mouth Daily.    bumetanide (BUMEX) 1 MG tablet   No No    Take 1 tablet by mouth Daily.    cetirizine (zyrTEC) 10 MG tablet   Yes No    Take 10 mg by mouth Daily As Needed.    metFORMIN (GLUCOPHAGE) 1000 MG tablet   No No    Take one tablet by mouth with breakfast and 1 tablets with supper    metoprolol succinate XL (TOPROL-XL) 100 MG 24 hr tablet   No No    take 1 tablet by oral route  every day    ondansetron (Zofran) 4 MG tablet   No No    Take 1 tablet by mouth Every 8 (Eight) Hours As Needed for Nausea or Vomiting.    sacubitril-valsartan (Entresto) 24-26 MG tablet   No No    Take 1 tablet by mouth Every 12 (Twelve) Hours.    Triamcinolone Acetonide (NASACORT) 55 MCG/ACT nasal inhaler   No No    APPLY 2 SPRAYS BY NASAL ROUTE DAILY FOR 30 DAYS            Allergies:  Allergies   Allergen Reactions   • Hydrocodone-Acetaminophen Hives     Lortab, takes tylenol   • Tramadol Hives        Objective      Vitals:   Temp:  [98.4 °F (36.9 °C)] 98.4 °F (36.9 °C)  Heart Rate:  [] 81  Resp:  [17-24] 17  BP: ()/(40-96) 120/69    Physical Exam   General patient does not look comfortable but no distress looks tired  Head atraumatic  Nares no discharge  Eyes pupils were reactive to light ocular motion intact  Oropharynx membranes moist no erythema  Neck no thyromegaly or lymphadenopathy  Pulmonary lungs clear to auscultation bilateral no accessory muscle use  Cardiac regular rate and rhythm could not appreciate any murmur on the chest there was a well-healed scar where the pacer ICD was placed  Abdomen positive bowel sounds no guarding no rebound no hepatosplenomegaly no pain on palpation  Extremities symmetric no cyanosis or edema  Psych patient was alert and oriented x4 appropriate mood and affect  Neuro cranial nerves II through XII were intact no obvious focal neurologic deficit  Derm no rash    Result Review    Result Review:  I have personally reviewed the results from the time of this admission to 10/4/2022 06:35 EDT and agree with these findings:  [x]  Laboratory  []  Microbiology  [x]  Radiology  [x]  EKG/Telemetry   [x]  Cardiology/Vascular   []  Pathology  [x]  Old records  []  Other:  Most notable findings include:  Troponin less than 0.01, proBNP 360  Glucose 109, sodium 135, potassium 4.2, bicarb 23, creatinine 1.05, BUN 29, calcium 10.1  CBC WBCs 12,000, hemoglobin 10.3, platelets 257,000    IMPRESSION:   1. No evidence of pulmonary embolism.  2. No evidence of thoracic aortic aneurysm or dissection.  3. Small to moderate amount of hemopericardium.       Assessment & Plan        Active Hospital Problems:  Active Hospital Problems    Diagnosis    • **Hemopericardium    • Acute systolic heart failure (CMS/HCC)    • Chest pain, unspecified type    • Type 2 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 (HCC)    • Essential hypertension      Plan:   1.   Hemopericardium -cardiology has been consulted.  We will monitor the patient closely holding anticoagulation for now  2.  Chest pain troponins are negative most likely related to #1  3.  Congestive heart failure systolic appears to be well compensated continue current home medication no changes at this time holding the Bumex for now for possible procedure did not want to stress the kidneys  4.  Type 2 diabetes patient only on metformin correction insulin low-dose prescribed and patient,  5.  Essential hypertension continuing home medications except for the Bumex for now in case a procedure with dye as needed    DVT prophylaxis:  Mechanical DVT prophylaxis orders are present.    CODE STATUS:    Level Of Support Discussed With: Patient  Code Status (Patient has no pulse and is not breathing): CPR (Attempt to Resuscitate)  Medical Interventions (Patient has pulse or is breathing): Full Support    Admission Status:  I believe this patient meets inpatient status.    I discussed the patient's findings and my recommendations with patient and family.    This patient has been examined wearing appropriate Personal Protective Equipment and discussed with . 10/04/22      Signature:

## 2022-10-04 NOTE — CASE MANAGEMENT/SOCIAL WORK
Discharge Planning Assessment  [facility]     Patient Name: Susy Hanson  MRN: [id number]  Today's Date: 10/4/2022    Admit Date: 10/3/2022    Plan: D/C plan: Anticipate routine home with    Discharge Needs Assessment     Row Name 10/04/22 1608       Living Environment    People in Home spouse    Name(s) of People in Home  Jorge    Current Living Arrangements home    Primary Care Provided by self    Provides Primary Care For no one    Family Caregiver if Needed spouse    Family Caregiver Names Jorge    Quality of Family Relationships helpful;involved;supportive    Able to Return to Prior Arrangements yes       Resource/Environmental Concerns    Resource/Environmental Concerns none    Transportation Concerns none       Transition Planning    Patient/Family Anticipates Transition to home with family    Patient/Family Anticipated Services at Transition none    Transportation Anticipated family or friend will provide       Discharge Needs Assessment    Readmission Within the Last 30 Days no previous admission in last 30 days    Equipment Currently Used at Home cpap    Concerns to be Addressed discharge planning    Anticipated Changes Related to Illness none    Equipment Needed After Discharge none    Provided Post Acute Provider List? N/A    N/A Provider List Comment Anticipate routine home               Discharge Plan     Row Name 10/04/22 1609       Plan    Plan D/C plan: Anticipate routine home with     Patient/Family in Agreement with Plan yes    Plan Comments Met with pt and  at bedside. Pt lives at home with  Jorge. Pt is IADL, including driving. Jorge to transport at d/c. Pt has cpap at home. PCP and pharmacy confirmed. No financial barriers to medications. No previous  agency use. No d/c needs identified at this time, will follow for changes.                   Expected Discharge Date and Time     Expected Discharge Date Expected Discharge Time    Oct 6, 2022           Demographic Summary     Row Name 10/04/22 1607       General Information    Admission Type inpatient    Arrived From emergency department    Referral Source admission list    Reason for Consult discharge planning    Preferred Language English               Functional Status     Row Name 10/04/22 1608       Functional Status    Usual Activity Tolerance good    Current Activity Tolerance good       Functional Status, IADL    Medications independent    Meal Preparation independent    Housekeeping independent    Laundry independent    Shopping independent                     Met with patient in room wearing PPE: mask    Maintained distance greater than six feet and spent less than 15 minutes in the room.            Andrae Urena RN

## 2022-10-04 NOTE — OUTREACH NOTE
Medical Week 3 Survey    Flowsheet Row Responses   [facility] facility patient discharged from? [facility]   Does the patient have one of the following disease processes/diagnoses(primary or secondary)? Other   Week 3 attempt successful? No   Unsuccessful attempts Attempt 1   Revoke Readmitted          CORBY DINERO - Registered Nurse

## 2022-10-04 NOTE — ED PROVIDER NOTES
Subjective   History of Present Illness  Patient presents with:  Chest Pain    Erica Avila MD   No LMP recorded (lmp unknown). Patient has had a hysterectomy.    Patient is a 62-year-old female presents to the ED with a complaint of chest pain.  Patient recently had a pacemaker, AICD placed, she also had to have a lead replaced, she resents that she has a shocking pain in her chest, and has pain with movement and breathing.  She was seen in the office today with cardiology by the nurse who evaluated her, but was not seen by the cardiologist.  She denies any dizziness, lightheadedness, diaphoresis or syncope.  No nausea vomiting diarrhea.  No abnormal leg pain or swelling        Review of Systems   Constitutional: Negative for chills and fever.   Eyes: Negative for photophobia and visual disturbance.   Respiratory: Positive for shortness of breath. Negative for chest tightness.    Cardiovascular: Positive for chest pain. Negative for palpitations and leg swelling.   Gastrointestinal: Negative for abdominal pain.   Genitourinary: Negative for dysuria.   Musculoskeletal: Negative for back pain and neck pain.   Skin: Negative for color change and rash.   Neurological: Negative for dizziness, syncope, weakness and light-headedness.       Past Medical History:   Diagnosis Date   • Anxiety 2000   • Breast cancer (HCC)     LEFT BREAST 7/2021   • CHF (congestive heart failure) ([facility])    • Depression    • DM (diabetes mellitus) ([facility])    • HL (hearing loss)    • Hyperlipidemia    • Hypertension     SAW CARDIO/ STRESS TEST 2020 - NOW MEDS MANAGED BY PCP    • Irritable bowel syndrome 2006   • Peripheral neuropathy    • PONV (postoperative nausea and vomiting)    • Sleep apnea     WEARS CPAP       Allergies   Allergen Reactions   • Hydrocodone-Acetaminophen Hives     Lortab, takes tylenol   • Tramadol Hives       Past Surgical History:   Procedure Laterality Date   • BLADDER REPAIR     • BREAST BIOPSY     • BREAST  RECONSTRUCTION Bilateral 08/26/2021    Procedure: BILATERAL PREPECTORALPLACMENT TISSUE EXPANDERS AND ALLODERM;  Surgeon: Heri Arreaga MD;  Location: [facility] MAIN OR;  Service: Plastics;  Laterality: Bilateral;   • BREAST TISSUE EXPANDER REMOVAL INSERTION OF IMPLANT Bilateral 12/23/2021    Procedure: BILATERAL REMOVAL TISSUE EXPANDERS AND PLACEMENT OF IMPLANTS;  Surgeon: Heri Arreaga MD;  Location[facility] MAIN OR;  Service: Plastics;  Laterality: Bilateral;   • CARDIAC CATHETERIZATION Left 04/13/2022    Procedure: Cardiac Catheterization/Vascular Study;  Surgeon: Christo Murphy MD;  Location:  [facility] CATH INVASIVE LOCATION;  Service: Cardiovascular;  Laterality: Left;   • CARDIAC ELECTROPHYSIOLOGY PROCEDURE N/A 9/19/2022    Procedure: ICD implant St[employer] Sude aware;  Surgeon: Esteban Kendrick MD;  Location:  [facility] CATH INVASIVE LOCATION;  Service: Cardiology;  Laterality: N/A;   • CARDIAC ELECTROPHYSIOLOGY PROCEDURE N/A 9/20/2022    Procedure: lead repositioning;  Surgeon: Esteban Kendrick MD;  Location:  [facility] INVASIVE [facility]ATION;  Service: Cardiology;  Laterality: N/A;   • CARDIOVASCULAR STRESS TEST  2020   • COLONOSCOPY  11/2012   • HYSTERECTOMY     • LYMPH NODE BIOPSY  8/29/2021   • MASTECTOMY W/ SENTINEL NODE BIOPSY Bilateral 08/26/2021    Procedure: bilateral total mastectomy, left axillary sentinel lymph node biopsy, possible reconstruction.;  Surgeon: Asia Perkins MD;  Location: [facility] MAIN OR;  Service: General;  Laterality: Bilateral;   • TUBAL ABDOMINAL LIGATION  1981       Family History   Problem Relation Age of Onset   • Alzheimer's disease Father    • Diabetes Father    • Heart disease Father    • Other Father    • Sleep apnea Father    • Snoring Father    • Hyperlipidemia Father    • Other Mother    • Diabetes Mother    • Heart disease Mother    • Sleep apnea Mother    • Snoring Mother    • COPD Mother    • Hyperlipidemia Mother    • Diabetes Sister    • Heart disease  Sister    • Other Sister    • Sleep apnea Sister    • Snoring Sister    • COPD Sister    • Breast cancer Paternal Grandmother 60   • Cancer Paternal Grandmother    • Diabetes Paternal Grandmother    • Breast cancer Maternal Aunt 80   • Breast cancer Paternal Aunt    • Diabetes Maternal Grandmother    • Hyperlipidemia Sister    • Hyperlipidemia Brother    • Hyperlipidemia Son    • Malig Hyperthermia Neg Hx        Social History     Socioeconomic History   • Marital status:    Tobacco Use   • Smoking status: Former Smoker     Packs/day: 2.00     Years: 30.00     Pack years: 60.00     Types: Cigarettes     Start date: 1972     Quit date: 2008     Years since quittin.7   • Smokeless tobacco: Never Used   Vaping Use   • Vaping Use: Never used   Substance and Sexual Activity   • Alcohol use: No   • Drug use: No   • Sexual activity: Yes     Partners: Male     Birth control/protection: None           Objective   Physical Exam  Vitals and nursing note reviewed.   Constitutional:       Appearance: She is ill-appearing.   HENT:      Head: Normocephalic and atraumatic.   Eyes:      Extraocular Movements: Extraocular movements intact.      Pupils: Pupils are equal, round, and reactive to light.   Cardiovascular:      Rate and Rhythm: Normal rate and regular rhythm.      Pulses:           Radial pulses are 2+ on the right side and 2+ on the left side.      Heart sounds: Normal heart sounds.   Pulmonary:      Effort: Pulmonary effort is normal.      Breath sounds: Normal breath sounds.   Chest:      Comments: Left anterior chest wall pacemaker site, incision well approximated.  No surrounding warmth erythema or drainage.  No palpable masses or fluctuance.  Abdominal:      General: Bowel sounds are normal.      Palpations: Abdomen is soft.   Musculoskeletal:         General: Normal range of motion.      Cervical back: Normal range of motion and neck supple.   Skin:     General: Skin is warm and dry.       "Capillary Refill: Capillary refill takes less than 2 seconds.   Neurological:      General: No focal deficit present.      Mental Status: She is alert and oriented to person, place, and time.         Procedures           ED Course  ED Course as of 10/04/22 0443   Tue Oct 04, 2022   0111 Patient experienced hypotension after morphine.  Given IV fluids. [LB]   0118 Resting.  Pains under control.  Her blood pressure did drop after receiving morphine, IV fluids were initiated. [LB]   0315 Care obtained from LOYDA Hayden pending CT PE protocol and disposition. [BH]   0340 Continue to await CT PE protocol results  []   0417 Spoke with Dr. Headley who was agreeable to patient admission but has requested call be placed to cardiology.  Burt on call.  []   0426 Spoke with Dr. Dallas with cardiology who requested that Dr. Kendrick and Dr. Murphy be added to the patients care team.  []      ED Course User Index  [] Jackie Atkinson, APRN  [LB] Edita Banks, APRN      BP 93/55   Pulse 79   Temp 98.4 °F (36.9 °C) (Oral)   Resp 17   Ht 162.6 cm (64\")   Wt 68 kg (149 lb 14.6 oz)   LMP  (LMP Unknown)   SpO2 97%   BMI 25.73 kg/m²   Labs Reviewed   COMPREHENSIVE METABOLIC PANEL - Abnormal; Notable for the following components:       Result Value    Glucose 109 (*)     BUN 29 (*)     Creatinine 1.05 (*)     Sodium 135 (*)     Chloride 97 (*)     BUN/Creatinine Ratio 27.6 (*)     eGFR 59.5 (*)     All other components within normal limits    Narrative:     GFR Normal >60  Chronic Kidney Disease <60  Kidney Failure <15     D-DIMER, QUANTITATIVE - Abnormal; Notable for the following components:    D-Dimer, Quantitative 2.04 (*)     All other components within normal limits    Narrative:     Reference Range  --------------------------------------------------------------------     < 0.50   Negative Predictive Value  0.50-0.59   Indeterminate    >= 0.60   Probable VTE             A very low percentage of " patients with DVT may yield D-Dimer results   below the cut-off of 0.50 mg/L FEU.  This is known to be more   prevalent in patients with distal DVT.             Results of this test should always be interpreted in conjunction with   the patient's medical history, clinical presentation and other   findings.  Clinical diagnosis should not be based on the result of   INNOVANCE D-Dimer alone.   CBC WITH AUTO DIFFERENTIAL - Abnormal; Notable for the following components:    WBC 10.90 (*)     Hemoglobin 11.4 (*)     Lymphocyte % 17.8 (*)     Neutrophils, Absolute 7.30 (*)     Monocytes, Absolute 1.10 (*)     All other components within normal limits   BNP (IN-HOUSE) - Normal    Narrative:     Among patients with dyspnea, NT-proBNP is highly sensitive for the detection of acute congestive heart failure. In addition NT-proBNP of <300 pg/ml effectively rules out acute congestive heart failure with 99% negative predictive value.    Results may be falsely decreased if patient taking Biotin.     TROPONIN (IN-HOUSE) - Normal    Narrative:     Troponin T Reference Range:  <= 0.03 ng/mL-   Negative for AMI  >0.03 ng/mL-     Abnormal for myocardial necrosis.  Clinicians would have to utilize clinical acumen, EKG, Troponin and serial changes to determine if it is an Acute Myocardial Infarction or myocardial injury due to an underlying chronic condition.       Results may be falsely decreased if patient taking Biotin.     CBC AND DIFFERENTIAL    Narrative:     The following orders were created for panel order CBC & Differential.  Procedure                               Abnormality         Status                     ---------                               -----------         ------                     CBC Auto Differential[[id number]]        Abnormal            Final result                 Please view results for these tests on the individual orders.     Medications   sodium chloride 0.9 % flush 10 mL (has no administration in time  range)   morphine injection 4 mg (4 mg Intravenous Given 10/4/22 0024)   ondansetron (ZOFRAN) injection 4 mg (4 mg Intravenous Given 10/4/22 0024)   sodium chloride 0.9 % bolus 1,000 mL (0 mL Intravenous Stopped 10/4/22 0247)   iopamidol (ISOVUE-370) 76 % injection 100 mL (100 mL Intravenous Given 10/4/22 0213)     XR Chest 1 View    Result Date: 10/4/2022  1.  No acute cardiopulmonary abnormality detected.  Electronically signed by:  Yony Patricio M.D.  10/4/2022 12:33 AM    CT Angiogram Chest Pulmonary Embolism    Result Date: 10/4/2022  1. No evidence of pulmonary embolism. 2. No evidence of thoracic aortic aneurysm or dissection. 3. Small to moderate amount of hemopericardium. These findings were discussed with NP Jackie Atkinson. Electronically signed by:  Amish Ellsworth D.O.  10/4/2022 1:51 AM                                         MDM  Number of Diagnoses or Management Options  Chest pain, unspecified type  Diagnosis management comments: Record/Chart Review:Cardiology RN office note.  Patient had lead revised on 921, concern for sharp shooting pain that goes down the arm and up the neck.  Unsure if she was going to revise the lead at this time.  Susy Hanson is a 62 y.o. female presented for elective BiV ICD implantation, which was performed on 9/19/2022.  Next morning threshold in the RV had gone up significantly, chest x-ray did not show any visible dislodgment of the lead, however it was decided to reposition the lead to get better capture thresholds.  Repositioning was done on 9/20/2022 and the device was also positioned more medially and inferiorly away from the clavicle and breast implant.  Next morning patient was seen and examined, chest x-ray showed good lead positioning and device interrogation showed appropriate capture thresholds on all leads.  Patient was discharged home in a stable condition.  She will be given Keflex, tramadol and Zofran upon discharge.  2-week wound check at   Jeffrey's office.  Clinical information obtained from an independent historian. History obtained from or confirmed by : Family at bedside  Differentials: STEMI, NSTEMI, pacemaker problem  Patient was brought back to the emergency department room for evaluation and placed on appropriate monitoring.  Patient had IV established and blood work obtained.  Presented with left-sided chest pain, rating down the left arm, into the neck.  Patient describes as a sharp shooting pain, she was concerned it could be related to recently changes with her AICD.  She was actually seen in the office for this today.  CBC CMP reviewed.  Troponin negative.  BMP normal.  D-dimer elevated at 2.0, CT chest PE protocol was obtained.  \EKG reviewed by MD and MAEGAN, our findings are: Ventricular paced rhythm, rate 111 compared to previous 9/23/2022.  Managment of patient discussed with:   Patients Care impacted by:   Note Disclaimer: At [facility], we believe that sharing information builds trust and better relationships. You are receiving this note because you recently visited [facility]. It is possible you will see health information before a provider has talked with you about it. This kind of information can be easy to misunderstand. To help you fully understand what it means for your health, we urge you to discuss this note with your provider.  Note dicatated utilizing Dragon Dictation.          Amount and/or Complexity of Data Reviewed  Clinical lab tests: reviewed  Tests in the radiology section of CPT®: reviewed  Tests in the medicine section of CPT®: reviewed        Final diagnoses:   Chest pain, unspecified type   Hemopericardium       ED Disposition  ED Disposition     ED Disposition   Decision to Admit    Condition   --    Comment   Level of Care: Telemetry [5]   Diagnosis: Chest pain, unspecified type [[id number]]   Admitting Physician: LORENE ENRIQUEZ [[id number]]   Attending Physician: LORENE ENRIQUEZ [[id number]]   Certification: I Certify  That Inpatient Hospital Services Are Medically Necessary For Greater Than 2 Midnights               No follow-up provider specified.       Medication List      No changes were made to your prescriptions during this visit.          Jackie Atkinson, APRN  10/04/22 0443

## 2022-10-05 LAB
ANION GAP SERPL CALCULATED.3IONS-SCNC: 10 MMOL/L (ref 5–15)
BUN SERPL-MCNC: 15 MG/DL (ref 8–23)
BUN/CREAT SERPL: 17.2 (ref 7–25)
CALCIUM SPEC-SCNC: 9 MG/DL (ref 8.6–10.5)
CHLORIDE SERPL-SCNC: 107 MMOL/L (ref 98–107)
CO2 SERPL-SCNC: 23 MMOL/L (ref 22–29)
CREAT SERPL-MCNC: 0.87 MG/DL (ref 0.57–1)
DEPRECATED RDW RBC AUTO: 46.8 FL (ref 37–54)
EGFRCR SERPLBLD CKD-EPI 2021: 74.5 ML/MIN/1.73
ERYTHROCYTE [DISTWIDTH] IN BLOOD BY AUTOMATED COUNT: 15.1 % (ref 12.3–15.4)
GLUCOSE BLDC GLUCOMTR-MCNC: 159 MG/DL (ref 70–105)
GLUCOSE BLDC GLUCOMTR-MCNC: 79 MG/DL (ref 70–105)
GLUCOSE BLDC GLUCOMTR-MCNC: 85 MG/DL (ref 70–105)
GLUCOSE BLDC GLUCOMTR-MCNC: 93 MG/DL (ref 70–105)
GLUCOSE BLDC GLUCOMTR-MCNC: 95 MG/DL (ref 70–105)
GLUCOSE BLDC GLUCOMTR-MCNC: 97 MG/DL (ref 70–105)
GLUCOSE SERPL-MCNC: 110 MG/DL (ref 65–99)
HCT VFR BLD AUTO: 29.3 % (ref 34–46.6)
HGB BLD-MCNC: 9.7 G/DL (ref 12–15.9)
MCH RBC QN AUTO: 28.9 PG (ref 26.6–33)
MCHC RBC AUTO-ENTMCNC: 33.2 G/DL (ref 31.5–35.7)
MCV RBC AUTO: 87.2 FL (ref 79–97)
PLATELET # BLD AUTO: 234 10*3/MM3 (ref 140–450)
PMV BLD AUTO: 8.2 FL (ref 6–12)
POTASSIUM SERPL-SCNC: 5.4 MMOL/L (ref 3.5–5.2)
RBC # BLD AUTO: 3.36 10*6/MM3 (ref 3.77–5.28)
SODIUM SERPL-SCNC: 140 MMOL/L (ref 136–145)
WBC NRBC COR # BLD: 7.9 10*3/MM3 (ref 3.4–10.8)

## 2022-10-05 PROCEDURE — 85027 COMPLETE CBC AUTOMATED: CPT | Performed by: NURSE PRACTITIONER

## 2022-10-05 PROCEDURE — 99232 SBSQ HOSP IP/OBS MODERATE 35: CPT | Performed by: INTERNAL MEDICINE

## 2022-10-05 PROCEDURE — 82962 GLUCOSE BLOOD TEST: CPT

## 2022-10-05 PROCEDURE — 80048 BASIC METABOLIC PNL TOTAL CA: CPT | Performed by: NURSE PRACTITIONER

## 2022-10-05 RX ORDER — METOPROLOL SUCCINATE 25 MG/1
25 TABLET, EXTENDED RELEASE ORAL EVERY 12 HOURS SCHEDULED
Status: DISCONTINUED | OUTPATIENT
Start: 2022-10-05 | End: 2022-10-07 | Stop reason: HOSPADM

## 2022-10-05 RX ADMIN — Medication 10 ML: at 09:15

## 2022-10-05 RX ADMIN — MIDODRINE HYDROCHLORIDE 2.5 MG: 2.5 TABLET ORAL at 09:14

## 2022-10-05 RX ADMIN — METOPROLOL SUCCINATE 25 MG: 25 TABLET, EXTENDED RELEASE ORAL at 21:39

## 2022-10-05 RX ADMIN — Medication 10 ML: at 21:39

## 2022-10-05 RX ADMIN — METOPROLOL SUCCINATE 25 MG: 25 TABLET, EXTENDED RELEASE ORAL at 09:13

## 2022-10-05 RX ADMIN — ATORVASTATIN CALCIUM 20 MG: 20 TABLET, FILM COATED ORAL at 21:39

## 2022-10-05 RX ADMIN — COLCHICINE 0.6 MG: 0.6 TABLET, FILM COATED ORAL at 09:14

## 2022-10-05 RX ADMIN — SENNOSIDES AND DOCUSATE SODIUM 2 TABLET: 50; 8.6 TABLET ORAL at 21:38

## 2022-10-05 RX ADMIN — SENNOSIDES AND DOCUSATE SODIUM 2 TABLET: 50; 8.6 TABLET ORAL at 09:13

## 2022-10-05 RX ADMIN — SODIUM CHLORIDE 75 ML/HR: 9 INJECTION, SOLUTION INTRAVENOUS at 05:08

## 2022-10-05 RX ADMIN — MIDODRINE HYDROCHLORIDE 2.5 MG: 2.5 TABLET ORAL at 12:06

## 2022-10-05 NOTE — CONSULTS
Nutrition Services    Patient Name: Susy Hanson  YOB: 1960  MRN: [id number]  Admission date: 10/3/2022    Comment:  -- Add Boost Plus q daily (Provides 360 kcals, 14 g of protein if consumed)       PPE Documentation        PPE Worn By Provider mask, gloves and eye protection   PPE Worn By Patient  None      CLINICAL NUTRITION ASSESSMENT      Reason for Assessment 10/5: MST of 5      H&P      Past Medical History:   Diagnosis Date   • Anxiety 2000   • Breast cancer (HCC)     LEFT BREAST 7/2021   • CHF (congestive heart failure) (HCC)    • Depression    • DM (diabetes mellitus) (HCC)    • HL (hearing loss)    • Hyperlipidemia    • Hypertension     SAW CARDIO/ STRESS TEST 2020 - NOW MEDS MANAGED BY PCP    • Irritable bowel syndrome 2006   • Peripheral neuropathy    • PONV (postoperative nausea and vomiting)    • Sleep apnea     WEARS CPAP       Past Surgical History:   Procedure Laterality Date   • BLADDER REPAIR     • BREAST BIOPSY     • BREAST RECONSTRUCTION Bilateral 08/26/2021    Procedure: BILATERAL PREPECTORALPLACMENT TISSUE EXPANDERS AND ALLODERM;  Surgeon: Heri Arreaga MD;  Location: [facility];  Service: Plastics;  Laterality: Bilateral;   • BREAST TISSUE EXPANDER REMOVAL INSERTION OF IMPLANT Bilateral 12/23/2021    Procedure: BILATERAL REMOVAL TISSUE EXPANDERS AND PLACEMENT OF IMPLANTS;  Surgeon: Heri Arreaga MD;  Location: [facility] OR;  Service: Plastics;  Laterality: Bilateral;   • CARDIAC CATHETERIZATION Left 04/13/2022    Procedure: Cardiac Catheterization/Vascular Study;  Surgeon: Christo Murphy MD;  Location: [facility] CATH INVASIVE LOCATION;  Service: Cardiovascular;  Laterality: Left;   • CARDIAC ELECTROPHYSIOLOGY PROCEDURE N/A 9/19/2022    Procedure: ICD implant St. Sude aware;  Surgeon: Esteban Kendrick MD;  Location: [facility] CATH INVASIVE LOCATION;  Service: Cardiology;  Laterality: N/A;   • CARDIAC ELECTROPHYSIOLOGY PROCEDURE N/A 9/20/2022     "Procedure: lead repositioning;  Surgeon: Esteban Kendrick MD;  Location: [facility] INVASIVE LOCATION;  Service: Cardiology;  Laterality: N/A;   • CARDIOVASCULAR STRESS TEST  2020   • COLONOSCOPY  11/2012   • HYSTERECTOMY     • LYMPH NODE BIOPSY  8/29/2021   • MASTECTOMY W/ SENTINEL NODE BIOPSY Bilateral 08/26/2021    Procedure: bilateral total mastectomy, left axillary sentinel lymph node biopsy, possible reconstruction.;  Surgeon: Asia Perkins MD;  Location: [facility] MAIN OR;  Service: General;  Laterality: Bilateral;   • TUBAL ABDOMINAL LIGATION  1981        Current Problems   Hemopericardium    Chest pain  - cardiology following    Congestive heart failure    Type 2 diabetes    Essential hypertension       Encounter Information        Trending Narrative     10/5: RD visited patient at bedside.  Patient reports appetite increasing, no N/V, no chewing/swallowing issues noted, reports 50# recent weight loss x last 4-5 months after she changed her diet dramatically to Consistent CHO/Heart Healthy.  Reports drinking a 20 gram protein Slimfast shake at breakfast.  Patient reports she just retired from [employer] department this past summer.  Discussed how she is still very good friends with 2 RDs at the system level who help her out with her diet.       Anthropometrics        Current Height, Weight Height: 162.6 cm (64\")  Weight: 67.6 kg (149 lb) (10/04/22 1508)       Ideal Body Weight (IBW) 120#   Usual Body Weight (UBW) Unable to obtain from patient        Trending Weight Hx     This admission: 10/5: 149# (unsure if scale weight)             PTA: 8.5% weight loss x 10 months (due to healthy lifestyle changes per patient)    Wt Readings from Last 30 Encounters:   10/04/22 1508 67.6 kg (149 lb)   10/03/22 2316 68 kg (149 lb 14.6 oz)   09/23/22 1809 63.5 kg (140 lb)   09/19/22 0921 63.3 kg (139 lb 8.8 oz)   08/23/22 0848 66.2 kg (146 lb)   08/02/22 1030 66.7 kg (147 lb)   08/01/22 1014 68 kg (150 lb)   07/28/22 " 0959 67.1 kg (148 lb)   07/27/22 0921 67.1 kg (148 lb)   07/26/22 0958 66.7 kg (147 lb)   05/28/22 1119 70.8 kg (156 lb)   04/28/22 1526 71.7 kg (158 lb)   04/26/22 1515 74.4 kg (164 lb)   04/12/22 0732 74.8 kg (165 lb)   04/11/22 1849 74.8 kg (165 lb)   04/11/22 1736 74.8 kg (165 lb)   01/26/22 0835 74.8 kg (165 lb)   01/04/22 0957 74.8 kg (165 lb)   12/23/21 0651 75.2 kg (165 lb 11.2 oz)   12/20/21 1334 73.9 kg (163 lb)   09/22/21 1330 73.5 kg (162 lb)   09/08/21 1300 73.6 kg (162 lb 3.2 oz)   08/26/21 0616 73.4 kg (161 lb 13.1 oz)   08/18/21 1114 74.4 kg (164 lb)   08/16/21 0917 73.5 kg (162 lb)   07/23/21 0804 73.5 kg (162 lb)   07/22/21 1449 72.6 kg (160 lb)   06/25/21 0911 74.8 kg (165 lb)   05/18/21 1052 75.8 kg (167 lb)   01/13/21 0842 75.3 kg (166 lb)   11/17/20 0942 76.7 kg (169 lb)   08/19/20 0943 77.6 kg (171 lb)      BMI kg/m2 Body mass index is 25.58 kg/m².       Labs        Pertinent Labs    Results from last 7 days   Lab Units 10/04/22  1714 10/04/22  0601 10/04/22  0017   SODIUM mmol/L 140 136 135*   POTASSIUM mmol/L 4.0 5.9* 4.2   CHLORIDE mmol/L 105 102 97*   CO2 mmol/L 23.0 24.0 23.0   BUN mg/dL 24* 31* 29*   CREATININE mg/dL 0.92 1.18* 1.05*   CALCIUM mg/dL 8.8 9.6 10.1   BILIRUBIN mg/dL  --   --  0.2   ALK PHOS U/L  --   --  77   ALT (SGPT) U/L  --   --  11   AST (SGOT) U/L  --   --  18   GLUCOSE mg/dL 77 124* 109*     Results from last 7 days   Lab Units 10/04/22  0601   HEMOGLOBIN g/dL 10.3*   HEMATOCRIT % 32.0*     COVID19   Date Value Ref Range Status   09/17/2022 Not Detected Not Detected - Ref. Range Final     Lab Results   Component Value Date    HGBA1C 5.9 (H) 10/04/2022        Medications    Scheduled Medications atorvastatin, 20 mg, Oral, Nightly  colchicine, 0.6 mg, Oral, Daily  insulin lispro, 0-7 Units, Subcutaneous, Q6H  metoprolol succinate XL, 25 mg, Oral, Q24H  midodrine, 2.5 mg, Oral, TID AC  senna-docusate sodium, 2 tablet, Oral, BID  sodium chloride, 10 mL, Intravenous,  Q12H        Infusions sodium chloride, 75 mL/hr, Last Rate: 75 mL/hr (10/05/22 8965)        PRN Medications •  acetaminophen **OR** acetaminophen **OR** acetaminophen  •  ALPRAZolam  •  aluminum-magnesium hydroxide-simethicone  •  senna-docusate sodium **AND** polyethylene glycol **AND** bisacodyl **AND** bisacodyl  •  dextrose  •  dextrose  •  glucagon (human recombinant)  •  HYDROmorphone  •  influenza vaccine  •  melatonin  •  nitroglycerin  •  ondansetron **OR** ondansetron  •  [COMPLETED] Insert peripheral IV **AND** sodium chloride  •  sodium chloride     Physical Findings        Trending Physical   Appearance, NFPE 10/5: NFPE completed and not consistent with nutrition diagnosis of malnutrition at this time using AND/ASPEN criteria      --  Edema  No edema documented      Bowel Function Last BM 10/3 (x 2 days)     Tubes No feeding tube      Chewing/Swallowing No known issues      Skin Intact      --  Current Nutrition Orders & Evaluation of Intake       Oral Nutrition     Food Allergies NKFA   Current PO Diet Diet Cardiac; Healthy Heart   Supplement None ordered    PO Evaluation     Trending % PO Intake 10/5: 25% average PO intakes x 1 documented meal since admission    --  Nutritional Risk Screening        NRS-2002 Score          Nutrition Diagnosis         Nutrition Dx Problem 1 No nutritional diagnosis noted at this time, RD will continue to monitor for any nutritional diagnosis that may arise.      Nutrition Dx Problem 2        Intervention Goal         Intervention Goal(s) PO intakes at least 50%  Accept ONS     Nutrition Intervention        RD Action Monitor for PO intakes   Add ONS     Nutrition Prescription          Diet Prescription Heart Healthy    Supplement Prescription Boost Plus daily    --  Monitor/Evaluation        Monitor Per protocol, I&O, PO intake, Supplement intake, Pertinent labs, Weight, Skin status, GI status, Symptoms, POC/GOC       Electronically signed by:  Hilda Alcantara,  MADELINE  10/05/22 11:35 EDT

## 2022-10-05 NOTE — PROGRESS NOTES
CARDIOLOGY FOLLOW-UP PROGRESS NOTE      Reason for follow-up:    SOA  Chest pain  Cardiomyopathy  S/p ICD.       Attending: Josh Tinoco,*      Subjective .     Denies chest pain or dyspnea currently     Review of Systems   Constitutional: Negative for chills and fever.   HENT: Negative for ear discharge and nosebleeds.    Eyes: Negative for discharge and redness.   Cardiovascular: Negative for chest pain, orthopnea, palpitations, paroxysmal nocturnal dyspnea and syncope.   Respiratory: Negative for cough, shortness of breath and wheezing.    Endocrine: Negative for heat intolerance.   Skin: Negative for rash.   Musculoskeletal: Negative for arthritis and myalgias.   Gastrointestinal: Negative for abdominal pain, melena, nausea and vomiting.   Genitourinary: Negative for dysuria and hematuria.   Neurological: Negative for dizziness, light-headedness, numbness and tremors.   Psychiatric/Behavioral: Negative for depression. The patient is not nervous/anxious.        Allergies: Hydrocodone-acetaminophen and Tramadol    Scheduled Meds:atorvastatin, 20 mg, Oral, Nightly  colchicine, 0.6 mg, Oral, Daily  insulin lispro, 0-7 Units, Subcutaneous, Q6H  metoprolol succinate XL, 25 mg, Oral, Q12H  midodrine, 2.5 mg, Oral, TID AC  senna-docusate sodium, 2 tablet, Oral, BID  sodium chloride, 10 mL, Intravenous, Q12H        Continuous Infusions:sodium chloride, 75 mL/hr, Last Rate: 75 mL/hr (10/05/22 1841)        PRN Meds:.•  acetaminophen **OR** acetaminophen **OR** acetaminophen  •  ALPRAZolam  •  aluminum-magnesium hydroxide-simethicone  •  senna-docusate sodium **AND** polyethylene glycol **AND** bisacodyl **AND** bisacodyl  •  dextrose  •  dextrose  •  glucagon (human recombinant)  •  HYDROmorphone  •  influenza vaccine  •  melatonin  •  nitroglycerin  •  ondansetron **OR** ondansetron  •  [COMPLETED] Insert peripheral IV **AND** sodium chloride  •  sodium chloride    Objective     VITAL SIGNS  Patient Vitals for  "the past 24 hrs:   BP Temp Temp src Pulse Resp SpO2   10/06/22 0741 142/74 97.5 °F (36.4 °C) Oral 83 15 100 %   10/06/22 0729 142/73 97.5 °F (36.4 °C) Oral 67 -- --   10/06/22 0422 122/64 98 °F (36.7 °C) Axillary 66 17 96 %   10/06/22 0015 -- 98.1 °F (36.7 °C) Axillary 60 16 98 %   10/05/22 2040 138/67 98.1 °F (36.7 °C) Axillary 66 20 99 %   10/05/22 1731 132/96 98.3 °F (36.8 °C) Oral 79 21 99 %        Flowsheet Rows    Flowsheet Row First Filed Value   Admission Height 162.6 cm (64\") Documented at 10/03/2022 2316   Admission Weight 68 kg (149 lb 14.6 oz) Documented at 10/03/2022 2316          Body mass index is 25.58 kg/m².      Intake/Output Summary (Last 24 hours) at 10/6/2022 0752  Last data filed at 10/6/2022 0422  Gross per 24 hour   Intake 760 ml   Output 1000 ml   Net -240 ml        TELEMETRY:     Paced    Physical Exam:  Constitutional:       Appearance: Well-developed.   Eyes:      General: No scleral icterus.        Right eye: No discharge.   HENT:      Head: Normocephalic and atraumatic.   Neck:      Thyroid: No thyromegaly.      Lymphadenopathy: No cervical adenopathy.   Pulmonary:      Effort: Pulmonary effort is normal. No respiratory distress.      Breath sounds: Normal breath sounds. No wheezing. No rales.   Cardiovascular:      Normal rate. Regular rhythm.      No gallop.   Abdominal:      Tenderness: There is no abdominal tenderness.   Skin:     Findings: No erythema or rash.   Neurological:      Mental Status: Alert and oriented to person, place, and time.            Results Review:   I reviewed the patient's new clinical results.    CBC    Results from last 7 days   Lab Units 10/05/22  1548 10/04/22  0601 10/04/22  0017   WBC 10*3/mm3 7.90 12.10* 10.90*   HEMOGLOBIN g/dL 9.7* 10.3* 11.4*   PLATELETS 10*3/mm3 234 257 302     BMP   Results from last 7 days   Lab Units 10/05/22  1548 10/04/22  1714 10/04/22  0601 10/04/22  0017   SODIUM mmol/L 140 140 136 135*   POTASSIUM mmol/L 5.4* 4.0 5.9* 4.2 "   CHLORIDE mmol/L 107 105 102 97*   CO2 mmol/L 23.0 23.0 24.0 23.0   BUN mg/dL 15 24* 31* 29*   CREATININE mg/dL 0.87 0.92 1.18* 1.05*   GLUCOSE mg/dL 110* 77 124* 109*     Cr Clearance Estimated Creatinine Clearance: 61.8 mL/min (by C-G formula based on SCr of 0.87 mg/dL).  Coag     HbA1C   Lab Results   Component Value Date    HGBA1C 5.9 (H) 10/04/2022    HGBA1C 6.2 (H) 09/17/2022    HGBA1C 6.3 (H) 07/26/2022     Blood Glucose   Glucose   Date/Time Value Ref Range Status   10/06/2022 0019 86 70 - 105 mg/dL Final     Comment:     Serial Number: [id number]:  [id number]   10/05/2022 2043 159 (H) 70 - 105 mg/dL Final     Comment:     Serial Number: [id number]:  [id number]   10/05/2022 1732 79 70 - 105 mg/dL Final     Comment:     Serial Number: [id number]:  [id number]   10/05/2022 1232 85 70 - 105 mg/dL Final     Comment:     Serial Number: [id number]:  [id number]   10/05/2022 0742 93 70 - 105 mg/dL Final     Comment:     Serial Number: [id number]:  [id number]   10/05/2022 0510 97 70 - 105 mg/dL Final     Comment:     Serial Number: [id number]:  [id number]   10/05/2022 0001 95 70 - 105 mg/dL Final     Comment:     Serial Number: [id number]:  [id number]   10/04/2022 1912 152 (H) 70 - 105 mg/dL Final     Comment:     Serial Number: [id number]:  [id number]     Infection     CMP   Results from last 7 days   Lab Units 10/05/22  1548 10/04/22  1714 10/04/22  0601 10/04/22  0017   SODIUM mmol/L 140 140 136 135*   POTASSIUM mmol/L 5.4* 4.0 5.9* 4.2   CHLORIDE mmol/L 107 105 102 97*   CO2 mmol/L 23.0 23.0 24.0 23.0   BUN mg/dL 15 24* 31* 29*   CREATININE mg/dL 0.87 0.92 1.18* 1.05*   GLUCOSE mg/dL 110* 77 124* 109*   ALBUMIN g/dL  --   --   --  4.60   BILIRUBIN mg/dL  --   --   --  0.2   ALK PHOS U/L  --   --   --  77   AST (SGOT) U/L  --   --   --  18   ALT (SGPT) U/L  --   --   --  11     ABG      UA      LJ  No results found for: POCMETH, POCAMPHET, POCBARBITUR, POCBENZO,  POCCOCAINE, POCOPIATES, POCOXYCODO, POCPHENCYC, POCPROPOXY, POCTHC, POCTRICYC  Lysis Labs   Results from last 7 days   Lab Units 10/05/22  1548 10/04/22  1714 10/04/22  0601 10/04/22  0017   HEMOGLOBIN g/dL 9.7*  --  10.3* 11.4*   PLATELETS 10*3/mm3 234  --  257 302   CREATININE mg/dL 0.87 0.92 1.18* 1.05*     Radiology(recent) No radiology results for the last day    Imaging Results (Last 24 Hours)     ** No results found for the last 24 hours. **          Results from last 7 days   Lab Units 10/04/22  0017   TROPONIN T ng/mL <0.010       EKG                      I personally viewed and interpreted the patient's EKG/Telemetry data:        ECHOCARDIOGRAM:     Results for orders placed during the hospital encounter of 10/03/22    Adult Transthoracic Echo Complete W/ Cont if Necessary Per Protocol    Interpretation Summary  · Mild to moderate left ventricular dysfunction  · Left ventricular ejection fraction is 45%  · Hypokinetic inferior wall noted  · Left ventricular wall thickness is consistent with mild concentric hypertrophy.  · Left ventricular diastolic function is consistent with (grade I) impaired relaxation.  · The right ventricular cavity is small in size.  · There is a moderate (1-2cm) circumferential pericardial effusion.        STRESS MYOVIEW:      CARDIAC CATHETERIZATION:      OTHER:         Assessment & Plan            Hemopericardium    Essential hypertension    Type 2 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 (HCC)    Chest pain, unspecified type    Acute systolic heart failure (CMS/HCC)        ASSESSMENT:    Chest Pain/Pericardial Effusion/pericarditis    -moderate pericardial effusion with no chamber compromise  SJ BiV ICD  9/19/2022  RV lead malfunction/Elevated thresholds    -s/p RV lead revision 9/20/22    -Recurrent RV lead elevated thresholds; RV lead programmed off with LV only pacing  NICM    -EF previously as low as 15-20%    -Improved to 45% by echocardiogram  yesterday  Non obstructive CAD on [facility] 4/2022  History of HTN though hypotensive on admission  Dyslipidemia  DM    PLAN:    Continue colchicine  BP is improving 148/76  Received and tolerated metoprolol this am  EF has improved to 45%  Will recheck BMP/CBC  Pt requesting second opinion  Dr. Murphy to discuss with pt.       Additional recommendations per Dr. Murphy    Patient is seen and examined and findings are verified.  All data is reviewed by me personally.  Assessment and plan formulated by APC was done after discussion with attending.  I spent more than 50% of time in taking care of the patient.    Patient is sitting up in chair.  She is feeling much better.  There is no chest pain.  Shortness of breath is absent.    Hemodynamics are much better.  Blood pressure is improved.  Patient is still slightly tachycardic    Normal S1 and S2.  There is no pericardial rub or murmur decreased breath sound at the bases.  No leg edema noted.    Echocardiogram showed small to moderate sized pericardial effusion.  LV function has improved.  I would repeat echocardiogram in 1 to 2 weeks.  Blood pressure is better.  I would increase Lopressor XL 25 mg twice daily.  I would add Entresto tomorrow if blood pressure is better.  Current treatment would be continued.  Possible discharge tomorrow or day after.    I discussed the patients findings and my recommendations with patient and RN    Electronically signed by Christo Murphy MD, 10/06/22, 7:52 AM EDT.                    Electronically signed by BRISA Hightower, 10/05/22, 3:33 PM EDT.          Christo Murphy MD  10/06/22  07:52 EDT

## 2022-10-05 NOTE — CASE MANAGEMENT/SOCIAL WORK
Continued Stay Note  [facility]     Patient Name: Susy Hanson  MRN: [id number]  Today's Date: 10/5/2022    Admit Date: 10/3/2022    Plan: D/C plan: Anticipate routine home with    Discharge Plan     Row Name 10/05/22 1126       Plan    Plan D/C plan: Anticipate routine home with     Patient/Family in Agreement with Plan yes    Plan Comments Anticipate routine home when medically ready. Barrier to d/c: Cardiology following                   Expected Discharge Date and Time     Expected Discharge Date Expected Discharge Time    Oct 6, 2022         Phone communication or documentation only - no physical contact with patient or family.      Andrae Urena RN

## 2022-10-05 NOTE — PROGRESS NOTES
[facility] Daily Progress Note    Patient Name: Susy Hanson  : 1960  MRN: [id number]  Primary Care Physician:  Erica Avila MD  Date of admission: 10/3/2022      Subjective      Chief Complaint: Chest pain      Patient Reports     10/5/22: Chest pain worse with movements.  Afebrile.  Seen by EP cardiology and general cardiology.    Review of Systems   All other systems reviewed and are negative.         Objective      Vitals:   Temp:  [97.1 °F (36.2 °C)-99 °F (37.2 °C)] 98.2 °F (36.8 °C)  Heart Rate:  [85-94] 85  Resp:  [16-24] 18  BP: (111-136)/(60-76) 136/76    Physical Exam  HENT:      Head: Normocephalic.      Nose: Nose normal.   Eyes:      Extraocular Movements: Extraocular movements intact.      Pupils: Pupils are equal, round, and reactive to light.   Cardiovascular:      Rate and Rhythm: Normal rate and regular rhythm.   Pulmonary:      Effort: Pulmonary effort is normal.   Abdominal:      General: Bowel sounds are normal.   Musculoskeletal:         General: Normal range of motion.      Cervical back: Normal range of motion.   Skin:     General: Skin is warm.   Neurological:      Mental Status: She is alert. Mental status is at baseline.   Psychiatric:         Mood and Affect: Mood normal.             Result Review    Result Review:  I have personally reviewed the results from the time of this admission to 10/5/2022 15:24 EDT and agree with these findings:  [x]  Laboratory  []  Microbiology  [x]  Radiology  []  EKG/Telemetry   []  Cardiology/Vascular   []  Pathology  [x]  Old records  []  Other:            Assessment & Plan      Brief Patient Summary:        atorvastatin, 20 mg, Oral, Nightly  colchicine, 0.6 mg, Oral, Daily  insulin lispro, 0-7 Units, Subcutaneous, Q6H  metoprolol succinate XL, 25 mg, Oral, Q24H  midodrine, 2.5 mg, Oral, TID AC  senna-docusate sodium, 2 tablet, Oral, BID  sodium chloride, 10 mL, Intravenous, Q12H        sodium chloride, 75 mL/hr, Last Rate: 75 mL/hr (10/05/22 7224)         Active Hospital Problems:  Active Hospital Problems    Diagnosis    • **Hemopericardium    • Acute systolic heart failure (CMS/HCC)    • Chest pain, unspecified type    • Type 2 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 (HCC)    • Essential hypertension      Pericarditis:  -Due to multiple lead positioning due to increased threshold  -s/p CTA chest in ED  -Cardiology and EP cardiology consulted  -On colchicine    Nonischemic cardiomyopathy s/p biventricular ICD 9/19/2022 s/p repositioning of RV lead complicated with pericarditis:  -Device interrogated  -EP cardiology following at home  -On Entresto, metoprolol succinate at home  -s/p [facility] 4/13/2022--> nonobstructive CAD.    Type 2 diabetes mellitus:  -Continue ISS  -Hold metformin during hospitalization    Hypertension complicated with hypotension  -Continue metoprolol  -Continue midodrine    Hyperlipidemia:  -Continue statin    DVT prophylaxis:  Mechanical DVT prophylaxis orders are present.    CODE STATUS:    Level Of Support Discussed With: Patient  Code Status (Patient has no pulse and is not breathing): CPR (Attempt to Resuscitate)  Medical Interventions (Patient has pulse or is breathing): Full Support      Disposition:  I expect patient to be discharged home    This patient has been examined wearing appropriate Personal Protective Equipment and discussed with nursing. 10/05/22      Electronically signed by Josh Tinoco DO, 10/05/22, 15:24 EDT.  [facility] Hospitalist Team

## 2022-10-06 LAB
GLUCOSE BLDC GLUCOMTR-MCNC: 101 MG/DL (ref 70–105)
GLUCOSE BLDC GLUCOMTR-MCNC: 126 MG/DL (ref 70–105)
GLUCOSE BLDC GLUCOMTR-MCNC: 159 MG/DL (ref 70–105)
GLUCOSE BLDC GLUCOMTR-MCNC: 86 MG/DL (ref 70–105)
GLUCOSE BLDC GLUCOMTR-MCNC: 96 MG/DL (ref 70–105)

## 2022-10-06 PROCEDURE — 63710000001 INSULIN LISPRO (HUMAN) PER 5 UNITS: Performed by: INTERNAL MEDICINE

## 2022-10-06 PROCEDURE — 99232 SBSQ HOSP IP/OBS MODERATE 35: CPT | Performed by: INTERNAL MEDICINE

## 2022-10-06 PROCEDURE — 82962 GLUCOSE BLOOD TEST: CPT

## 2022-10-06 PROCEDURE — 99222 1ST HOSP IP/OBS MODERATE 55: CPT | Performed by: INTERNAL MEDICINE

## 2022-10-06 RX ADMIN — MIDODRINE HYDROCHLORIDE 2.5 MG: 2.5 TABLET ORAL at 17:39

## 2022-10-06 RX ADMIN — MIDODRINE HYDROCHLORIDE 2.5 MG: 2.5 TABLET ORAL at 07:29

## 2022-10-06 RX ADMIN — Medication 10 ML: at 08:56

## 2022-10-06 RX ADMIN — INSULIN LISPRO 2 UNITS: 100 INJECTION, SOLUTION INTRAVENOUS; SUBCUTANEOUS at 11:43

## 2022-10-06 RX ADMIN — Medication 10 ML: at 21:03

## 2022-10-06 RX ADMIN — METOPROLOL SUCCINATE 25 MG: 25 TABLET, EXTENDED RELEASE ORAL at 08:54

## 2022-10-06 RX ADMIN — SACUBITRIL AND VALSARTAN 0.5 TABLET: 24; 26 TABLET, FILM COATED ORAL at 21:01

## 2022-10-06 RX ADMIN — COLCHICINE 0.6 MG: 0.6 TABLET, FILM COATED ORAL at 08:55

## 2022-10-06 RX ADMIN — SENNOSIDES AND DOCUSATE SODIUM 2 TABLET: 50; 8.6 TABLET ORAL at 08:55

## 2022-10-06 RX ADMIN — Medication 5 MG: at 21:11

## 2022-10-06 RX ADMIN — SODIUM CHLORIDE 75 ML/HR: 9 INJECTION, SOLUTION INTRAVENOUS at 09:33

## 2022-10-06 RX ADMIN — MIDODRINE HYDROCHLORIDE 2.5 MG: 2.5 TABLET ORAL at 11:05

## 2022-10-06 RX ADMIN — Medication 10 ML: at 11:08

## 2022-10-06 RX ADMIN — METOPROLOL SUCCINATE 25 MG: 25 TABLET, EXTENDED RELEASE ORAL at 21:01

## 2022-10-06 RX ADMIN — ATORVASTATIN CALCIUM 20 MG: 20 TABLET, FILM COATED ORAL at 21:01

## 2022-10-06 NOTE — CONSULTS
CC--- post biventricular ICD insertion and pericarditis with pericardial effusion and RV ICD lead malfunction    Sub  62-year-old pleasant patient has severe nonischemic cardiomyopathy with left bundle branch block with EF of 20%.  She underwent a biventricular ICD placement by Dr. Stroud 2 weeks ago.  After ICD implantation RV lead had to be repositioned which was technically challenging because of issues of sensing and pacing.  Subsequently she was discharged home and she presented on Monday of this week with acute chest pain consistent with pericarditis.  Pain was precordial into the back and worsened with positional changes and with deep breath.  Echocardiography and CT chest were done which reveals small to moderate effusion and was started on colchicine with resolution of symptoms.  RV ICD lead was not capturing and impedances fell down with appropriate sensing and however lead impedances appropriate.  The right atrial and coronary sinus leads were properly functioning and a consultation was requested a second opinion.        Past Medical History:   Diagnosis Date   • Anxiety 2000   • Breast cancer (HCC)     LEFT BREAST 7/2021   • CHF (congestive heart failure) (HCC)    • Depression    • DM (diabetes mellitus) ([facility])    • HL (hearing loss)    • Hyperlipidemia    • Hypertension     SAW CARDIO/ STRESS TEST 2020 - NOW MEDS MANAGED BY PCP    • Irritable bowel syndrome 2006   • Peripheral neuropathy    • PONV (postoperative nausea and vomiting)    • Sleep apnea     WEARS CPAP     Past Surgical History:   Procedure Laterality Date   • BLADDER REPAIR     • BREAST BIOPSY     • BREAST RECONSTRUCTION Bilateral 08/26/2021    Procedure: BILATERAL PREPECTORALPLACMENT TISSUE EXPANDERS AND ALLODERM;  Surgeon: Heri Arreaga MD;  Location: [facility];  Service: Plastics;  Laterality: Bilateral;   • BREAST TISSUE EXPANDER REMOVAL INSERTION OF IMPLANT Bilateral 12/23/2021    Procedure: BILATERAL REMOVAL TISSUE  EXPANDERS AND PLACEMENT OF IMPLANTS;  Surgeon: Heri Arreaga MD;  Location: [facility] MAIN OR;  Service: Plastics;  Laterality: Bilateral;   • CARDIAC CATHETERIZATION Left 04/13/2022    Procedure: Cardiac Catheterization/Vascular Study;  Surgeon: Christo Murphy MD;  Location:  [facility];  Service: Cardiovascular;  Laterality: Left;   • CARDIAC ELECTROPHYSIOLOGY PROCEDURE N/A 9/19/2022    Procedure: ICD implant [place] aware;  Surgeon: Esteban Kendrick MD;  Location:  [facility];  Service: Cardiology;  Laterality: N/A;   • CARDIAC ELECTROPHYSIOLOGY PROCEDURE N/A 9/20/2022    Procedure: lead repositioning;  Surgeon: Esteban Kendrick MD;  Location:  [facility];  Service: Cardiology;  Laterality: N/A;   • CARDIOVASCULAR STRESS TEST  2020   • COLONOSCOPY  11/2012   • HYSTERECTOMY     • LYMPH NODE BIOPSY  8/29/2021   • MASTECTOMY W/ SENTINEL NODE BIOPSY Bilateral 08/26/2021    Procedure: bilateral total mastectomy, left axillary sentinel lymph node biopsy, possible reconstruction.;  Surgeon: Asia Perkins MD;  Location: [facility] MAIN OR;  Service: General;  Laterality: Bilateral;   • TUBAL ABDOMINAL LIGATION  1981     Family History   Problem Relation Age of Onset   • Alzheimer's disease Father    • Diabetes Father    • Heart disease Father    • Other Father    • Sleep apnea Father    • Snoring Father    • Hyperlipidemia Father    • Other Mother    • Diabetes Mother    • Heart disease Mother    • Sleep apnea Mother    • Snoring Mother    • COPD Mother    • Hyperlipidemia Mother    • Diabetes Sister    • Heart disease Sister    • Other Sister    • Sleep apnea Sister    • Snoring Sister    • COPD Sister    • Breast cancer Paternal Grandmother 60   • Cancer Paternal Grandmother    • Diabetes Paternal Grandmother    • Breast cancer Maternal Aunt 80   • Breast cancer Paternal Aunt    • Diabetes Maternal Grandmother    • Hyperlipidemia Sister    •  Hyperlipidemia Brother    • Hyperlipidemia Son    • Malig Hyperthermia Neg Hx      Social History     Tobacco Use   • Smoking status: Former Smoker     Packs/day: 2.00     Years: 30.00     Pack years: 60.00     Types: Cigarettes     Start date: 1972     Quit date: 2008     Years since quittin.7   • Smokeless tobacco: Never Used   Vaping Use   • Vaping Use: Never used   Substance Use Topics   • Alcohol use: No   • Drug use: No     Medications Prior to Admission   Medication Sig Dispense Refill Last Dose   • ALPRAZolam (XANAX) 1 MG tablet Take 1 tablet by mouth 2 (Two) Times a Day As Needed for Anxiety or Sleep. 60 tablet 0 Past Week at Unknown time   • atorvastatin (LIPITOR) 20 MG tablet Take 1 tablet by mouth Daily. 90 tablet 2 10/3/2022 at Unknown time   • bumetanide (BUMEX) 1 MG tablet Take 1 tablet by mouth Daily. 90 tablet 1 10/3/2022 at Unknown time   • cetirizine (zyrTEC) 10 MG tablet Take 10 mg by mouth Daily As Needed.   Past Week at Unknown time   • metFORMIN (GLUCOPHAGE) 1000 MG tablet Take 1,000 mg by mouth Daily With Breakfast.   10/3/2022 at Unknown time   • metFORMIN (GLUCOPHAGE) 1000 MG tablet Take 1,500 mg by mouth Daily With Dinner.   10/3/2022 at Unknown time   • metoprolol succinate XL (TOPROL-XL) 100 MG 24 hr tablet take 1 tablet by oral route  every day 90 tablet 3 10/3/2022 at Unknown time   • ondansetron (Zofran) 4 MG tablet Take 1 tablet by mouth Every 8 (Eight) Hours As Needed for Nausea or Vomiting. 20 tablet 0 Past Week at Unknown time   • sacubitril-valsartan (Entresto) 24-26 MG tablet Take 1 tablet by mouth Every 12 (Twelve) Hours. 180 tablet 1 10/3/2022 at Unknown time   • Triamcinolone Acetonide (NASACORT) 55 MCG/ACT nasal inhaler APPLY 2 SPRAYS BY NASAL ROUTE DAILY FOR 30 DAYS 10.8 mL 6 Past Week at Unknown time     Allergies:  Hydrocodone-acetaminophen and Tramadol    Review of Systems   General:  no fatigue and tiredness  Eyes: No redness  Cardiovascular: No chest pain,  no palpitations  Respiratory:   No shortness of breath  Gastrointestinal: No nausea or vomiting, bleeding  Genitourinary: no hematuria or dysuria  Musculoskeletal: No arthralgia or myalgia  Skin: No rash  Neurologic: No numbness, tingling, syncope  Hematologic/Lymphatic: No abnormal bleeding      Physical Exam  VITALS REVIEWED  Blood pressure 153/88 pulse rate was 60 patient is afebrile respiration 12 times a minute    General:      well developed, well nourished, in no acute distress.    Head:      normocephalic and atraumatic.    Eyes:      PERRL/EOM intact, conjunctivae and sclerae clear without nystagmus.    Neck:      no masses, thyromegaly,  trachea central with normal respiratory effort   Lungs:      clear bilaterally to auscultation.    Heart:       sinus rhythm without any rub   Msk:      no deformity or scoliosis noted of thoracic or lumbar spine.    Pulses:      pulses normal in all 4 extremities.    Extremities:       no cyanosis or clubbing    Neurologic:      no focal deficits.   alert oriented x3  Skin:      intact without lesions or rashes.    Psych:      alert and cooperative; normal mood and affect; normal attention span and concentration.          CBC    Results from last 7 days   Lab Units 10/05/22  1548 10/04/22  0601 10/04/22  0017   WBC 10*3/mm3 7.90 12.10* 10.90*   HEMOGLOBIN g/dL 9.7* 10.3* 11.4*   PLATELETS 10*3/mm3 234 257 302     BMP   Results from last 7 days   Lab Units 10/05/22  1548 10/04/22  1714 10/04/22  0601 10/04/22  0017   SODIUM mmol/L 140 140 136 135*   POTASSIUM mmol/L 5.4* 4.0 5.9* 4.2   CHLORIDE mmol/L 107 105 102 97*   CO2 mmol/L 23.0 23.0 24.0 23.0   BUN mg/dL 15 24* 31* 29*   CREATININE mg/dL 0.87 0.92 1.18* 1.05*   GLUCOSE mg/dL 110* 77 124* 109*               Assessment and plan      Severe nonischemic cardiomyopathy  Left bundle branch block  Essential hypertension  History of breast carcinoma     Acute pericarditis with small effusion with resolution of symptoms with  appropriate therapy with colchicine    CT scan and echo reviewed  ICD device functioning reviewed    Right atrial and coronary sinus lead with normal function and excellent pacing complex with coronary sinus lead with improvement of EF markedly from 20% to nearly 50%    RV ICD lead sensing is appropriate with a decrease of impedance from 900 to 300 ohms with a lack of capture  High-voltage impedance within normal limits    Based on the findings and the RV lead performance, there is a microperforation of the RV    Since the patient is not using her RV lead for pacing, careful outpatient follow-up can be done with serial measurement of her effusion and functioning of the lead.    Patient was educated about the device    Discussed with Dr. Murphy    Potentially can be discharged home in the morning      Follow-up as an outpatient      Electronically signed by Vishal Jin MD, 10/06/22, 6:54 PM EDT.

## 2022-10-06 NOTE — PROGRESS NOTES
[facility] Medicine Services Daily Progress Note    Patient Name: Susy Hanson  : 1960  MRN: [id number]  Primary Care Physician:  Erica Avila MD  Date of admission: 10/3/2022      Subjective      Chief Complaint: Chest pain      Patient Reports     10/5/22: Chest pain worse with movements.  Afebrile.  Seen by EP cardiology and general cardiology.    10/6/22: Feels better.  No chest pain.  Afebrile.  Able to ambulate okay.    Review of Systems   All other systems reviewed and are negative.         Objective      Vitals:   Temp:  [97.5 °F (36.4 °C)-98.3 °F (36.8 °C)] 97.5 °F (36.4 °C)  Heart Rate:  [60-83] 70  Resp:  [15-21] 15  BP: (120-142)/(64-96) 120/69    Physical Exam  HENT:      Head: Normocephalic.      Nose: Nose normal.   Eyes:      Extraocular Movements: Extraocular movements intact.      Pupils: Pupils are equal, round, and reactive to light.   Cardiovascular:      Rate and Rhythm: Normal rate and regular rhythm.   Pulmonary:      Effort: Pulmonary effort is normal.   Abdominal:      General: Bowel sounds are normal.   Musculoskeletal:         General: Normal range of motion.      Cervical back: Normal range of motion.   Skin:     General: Skin is warm.   Neurological:      Mental Status: She is alert. Mental status is at baseline.   Psychiatric:         Mood and Affect: Mood normal.             Result Review    Result Review:  I have personally reviewed the results from the time of this admission to 10/6/2022 09:24 EDT and agree with these findings:  [x]  Laboratory  []  Microbiology  [x]  Radiology  []  EKG/Telemetry   []  Cardiology/Vascular   []  Pathology  [x]  Old records  []  Other:            Assessment & Plan      Brief Patient Summary:    62-year-old female with recent AICD placement and subsequent repositioning of RV lead and presentation with chest pain due to pericarditis    atorvastatin, 20 mg, Oral, Nightly  colchicine, 0.6 mg, Oral,  Daily  insulin lispro, 0-7 Units, Subcutaneous, Q6H  metoprolol succinate XL, 25 mg, Oral, Q12H  midodrine, 2.5 mg, Oral, TID AC  senna-docusate sodium, 2 tablet, Oral, BID  sodium chloride, 10 mL, Intravenous, Q12H       sodium chloride, 75 mL/hr, Last Rate: 75 mL/hr (10/05/22 5640)         Active Hospital Problems:  Active Hospital Problems    Diagnosis    • **Hemopericardium    • Acute systolic heart failure (CMS/HCC)    • Chest pain, unspecified type    • Type 2 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 (HCC)    • Essential hypertension      Chest pain due to pericarditis:  -Due to multiple lead positioning due to increased threshold  -s/p CTA chest in ED  -Appreciate cardiology and EP cardiology input  -Responding to colchicine    Nonischemic cardiomyopathy s/p biventricular ICD 9/19/2022 s/p repositioning of RV lead complicated with pericarditis:  -Device interrogated  -EP cardiology following at home  -On Entresto, metoprolol succinate at home  -s/p C 4/13/2022--> nonobstructive CAD.    Type 2 diabetes mellitus:  -Continue ISS  -Hold metformin during hospitalization    Hypertension complicated with hypotension  -Continue metoprolol  -Continue midodrine    Hyperlipidemia:  -Continue statin    DVT prophylaxis:  Mechanical DVT prophylaxis orders are present.    CODE STATUS:    Level Of Support Discussed With: Patient  Code Status (Patient has no pulse and is not breathing): CPR (Attempt to Resuscitate)  Medical Interventions (Patient has pulse or is breathing): Full Support      Disposition:  I expect patient to be discharged home    This patient has been examined wearing appropriate Personal Protective Equipment and discussed with nursing. 10/06/22      Electronically signed by Josh Tinoco DO, 10/06/22, 09:24 EDT.  [facility]

## 2022-10-06 NOTE — PLAN OF CARE
Goal Outcome Evaluation:              Outcome Evaluation: Patient alert, oriented x 4, vital signs stable. Denies chest pain or discomfort this shift. Tolerating PO food and fluids. Up to bathroom independently. Took shower today. Family at bedside. Will continue to monitor.

## 2022-10-06 NOTE — PROGRESS NOTES
Cardiology [place]        LOS:  LOS: 2 days   Patient Name: Susy Hanson  Age/Sex: 62 y.o. female  : 1960  MRN: [id number]    Day of Service: 10/06/22   Length of Stay: 2  Encounter Provider: Christo Murphy MD  Place of Service: [facility] CARDIOLOGY  Patient Care Team:  Erica Avila MD as PCP - General  Sb Kerr MD as Consulting Physician (Nephrology)  Esteban Kendrick MD as Consulting Physician (Cardiology)  Sandra Harrington RN as Ambulatory  ([facility])    Subjective:     Chief Complaint: f/u chest pain / pericarditis / NICM    Subjective: patient's blood pressure improving, no acute CV events.    Current Medications:   Scheduled Meds:atorvastatin, 20 mg, Oral, Nightly  colchicine, 0.6 mg, Oral, Daily  insulin lispro, 0-7 Units, Subcutaneous, Q6H  metoprolol succinate XL, 25 mg, Oral, Q12H  midodrine, 2.5 mg, Oral, TID AC  sacubitril-valsartan, 0.5 tablet, Oral, Q12H  senna-docusate sodium, 2 tablet, Oral, BID  sodium chloride, 10 mL, Intravenous, Q12H      Continuous Infusions:sodium chloride, 75 mL/hr, Last Rate: 75 mL/hr (10/06/22 0933)        Allergies:  Allergies   Allergen Reactions   • Hydrocodone-Acetaminophen Hives     Lortab, takes tylenol   • Tramadol Hives       Review of Systems   Constitutional: Negative for chills and fever.   HENT: Negative for ear discharge and nosebleeds.    Eyes: Negative for discharge and redness.   Cardiovascular: Negative for chest pain, orthopnea, palpitations, paroxysmal nocturnal dyspnea and syncope.   Respiratory: Negative for cough, shortness of breath and wheezing.    Endocrine: Negative for heat intolerance.   Skin: Negative for rash.   Musculoskeletal: Negative for arthritis and myalgias.   Gastrointestinal: Negative for abdominal pain, melena, nausea and vomiting.   Genitourinary: Negative for dysuria and hematuria.   Neurological: Negative for dizziness, light-headedness, numbness  and tremors.   Psychiatric/Behavioral: Negative for depression. The patient is nervous/anxious.          Objective:     Temp:  [97.5 °F (36.4 °C)-98.3 °F (36.8 °C)] 97.8 °F (36.6 °C)  Heart Rate:  [60-83] 60  Resp:  [15-23] 19  BP: (120-160)/(64-96) 160/78     Intake/Output Summary (Last 24 hours) at 10/6/2022 1720  Last data filed at 10/6/2022 1500  Gross per 24 hour   Intake 1680 ml   Output 800 ml   Net 880 ml     Body mass index is 25.58 kg/m².      10/03/22  2316 10/04/22  1508   Weight: 68 kg (149 lb 14.6 oz) 67.6 kg (149 lb)         General Appearance:    Alert, cooperative, in no acute distress                                Head: Atraumatic, normocephalic, PERRLA               Neck:   supple, trachea midline, no thyromegaly, no carotid bruit, no JVD   Lungs:     Clear to auscultation, respirations regular, even and               unlabored    Heart:    Regular rhythm and normal rate, normal S1 and S2   Abdomen:     Normal bowel sounds, no masses, no organomegaly, soft  nontender, nondistended, no guarding, no rebound  tenderness   Extremities:   Moves all extremities well, no edema, no cyanosis, no  redness   Pulses:   Pulses palpable and equal bilaterally   Skin:   No bleeding, bruising or rash   Neurologic:   Awake, alert, oriented x3         Lab Review:   Results from last 7 days   Lab Units 10/05/22  1548 10/04/22  1714 10/04/22  0601 10/04/22  0017   SODIUM mmol/L 140 140   < > 135*   POTASSIUM mmol/L 5.4* 4.0   < > 4.2   CHLORIDE mmol/L 107 105   < > 97*   CO2 mmol/L 23.0 23.0   < > 23.0   BUN mg/dL 15 24*   < > 29*   CREATININE mg/dL 0.87 0.92   < > 1.05*   GLUCOSE mg/dL 110* 77   < > 109*   CALCIUM mg/dL 9.0 8.8   < > 10.1   AST (SGOT) U/L  --   --   --  18   ALT (SGPT) U/L  --   --   --  11    < > = values in this interval not displayed.     Results from last 7 days   Lab Units 10/04/22  0017   TROPONIN T ng/mL <0.010     Results from last 7 days   Lab Units 10/05/22  1548 10/04/22  0601   WBC  10*3/mm3 7.90 12.10*   HEMOGLOBIN g/dL 9.7* 10.3*   HEMATOCRIT % 29.3* 32.0*   PLATELETS 10*3/mm3 234 257                   Invalid input(s): LDLCALC  Results from last 7 days   Lab Units 10/04/22  0017   PROBNP pg/mL 360.8           Recent Radiology:  Imaging Results (Most Recent)     Procedure Component Value Units Date/Time    CT Angiogram Chest Pulmonary Embolism [[id number]] Collected: 10/04/22 0342     Updated: 10/04/22 0353    Narrative:      Exam: CT Angiography of the Chest.    Date: 10/4/2022.     Comparison: 4/11/2022.    History: Defibrillator placement with chest pain.    Technique: CT examination of the chest was performed following the intravenous administration of 100 mL of Isovue-370. Sagittal, coronal and 3-D reformatted images were provided. CT dose lowering techniques were used, to include: automated exposure   control, adjustment for patient size, and/or use of iterative reconstruction.    FINDINGS:    CHEST WALL: There are bilateral breast implants.    Mediastinum and Theresa: There is no axillary, mediastinal or hilar lymphadenopathy.    Pleural and Pericardial spaces: There is a small to moderate pericardial effusion which is higher density than simple fluid which may relate to a hemopericardium.    Upper Abdomen: The visualized upper abdomen is unremarkable.    Cardiovascular: AICD generator has leads in the right atrium and right ventricle. There is mild vascular calcification throughout the thoracic aorta without evidence of aneurysmal dilation or dissection.    Pulmonary Artery: There are no filling defects in the pulmonary arteries.    Lung Parenchyma and Airways: The lungs are clear.    Bones: No fracture or aggressive osseous lesion.      Impression:        1. No evidence of pulmonary embolism.  2. No evidence of thoracic aortic aneurysm or dissection.  3. Small to moderate amount of hemopericardium.    These findings were discussed with COURTNEY Atkinson.      Electronically signed by:   Amish Ellsworth D.O.    10/4/2022 1:51 AM    XR Chest 1 View [[id number]] Collected: 10/04/22 0232     Updated: 10/04/22 0234    Narrative:      EXAMINATION: XR CHEST 1 VW      DATE OF EXAM: 10/4/2022 12:28 AM  SLOT: 60    HISTORY: Chest pain     COMPARISON: None.    FINDINGS:    HEART/MEDIASTINUM: Mildly enlarged cardiac silhouette. Radiation is the origin of the right atrium, right ventricle, and coronary sinus.    LUNGS/PLEURA: Linear subsegmental atelectasis in the left lung base. Otherwise, grossly clear.     MUSCULOSKELETAL: No acute osseous pathology. Surgical clips overlie the axilla bilaterally.        Impression:        1.  No acute cardiopulmonary abnormality detected.          Electronically signed by:  Yony Patricio M.D.    10/4/2022 12:33 AM          ECHOCARDIOGRAM:    Results for orders placed during the hospital encounter of 10/03/22    Adult Transthoracic Echo Complete W/ Cont if Necessary Per Protocol    Interpretation Summary  · Mild to moderate left ventricular dysfunction  · Left ventricular ejection fraction is 45%  · Hypokinetic inferior wall noted  · Left ventricular wall thickness is consistent with mild concentric hypertrophy.  · Left ventricular diastolic function is consistent with (grade I) impaired relaxation.  · The right ventricular cavity is small in size.  · There is a moderate (1-2cm) circumferential pericardial effusion.        I reviewed the patient's new clinical results.    EKG:      Assessment:       Hemopericardium    Essential hypertension    Type 2 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 (HCC)    Chest pain, unspecified type    Acute systolic heart failure (CMS/HCC)    1. Chest Pain / pericardial effusion / pericarditis  - 2D ECHO shows LVEF 45%, grade 1 DD, 1-2cm moderate circumferential pericardial effusion  - started on NSAIDS and colchicine     2. Increase RV thresholds  - EP following, per EP device reprogrammed for LV only pacing      3.  NICM, chronic systolic HF  - LVEF 15-20%  - GDMT: toprol XL, entresto     4. Non obstructive CAD on [facility] 4/2022     5. HTN although patient has been borderline blood pressures while hospitalized     6. HLD     7. DM    Plan:   Continue colchicine and NSAIDS  Blood pressure improving, toprol restarted  Will start entresto at 1/2 the tab  BID  LVEF improved to 45%  Patient requesting EP second opinion- will ask Dr. Jin to see     Patient is seen and examined and findings are verified.  All data is reviewed by me personally.  Assessment and plan formulated by APC was done after discussion with attending.  I spent more than 50% of time in taking care of the patient.    Patient is overall doing well.  Patient denies any chest pain.    Normal S1 and S2.  No pericardial rub or murmur abdominal exam is benign.    Patient wants to have a second opinion instead of Dr. Lele Thomas.  I have asked Dr. Jin to see the patient.  I reviewed CAT scan of chest to rule out any perforation of right ventricle.  However I have been unable to delineate myself.  Radiology has not commented on it.  I would asked Dr. Jin.  I would repeat echocardiogram tomorrow.  I would start Entresto again as blood pressure is high.    Electronically signed by Christo Murphy MD, 10/06/22, 5:20 PM EDT.          Christo Murphy MD  10/06/22  17:20 EDT

## 2022-10-06 NOTE — CASE MANAGEMENT/SOCIAL WORK
Continued Stay Note  [facility]     Patient Name: Susy Hanson  MRN: [id number]  Today's Date: 10/6/2022    Admit Date: 10/3/2022    Plan: D/C plan: Anticipate routine home.   Discharge Plan     Row Name 10/06/22 1254       Plan    Plan D/C plan: Anticipate routine home.    Patient/Family in Agreement with Plan yes    Plan Comments Anticipate routine home at d/c. Barrier to d/c: Cardiology following, medication changes.                   Expected Discharge Date and Time     Expected Discharge Date Expected Discharge Time    Oct 7, 2022         Phone communication or documentation only - no physical contact with patient or family.      Andrae Urena RN

## 2022-10-07 ENCOUNTER — READMISSION MANAGEMENT (OUTPATIENT)
Dept: CALL CENTER | Facility: HOSPITAL | Age: 62
End: 2022-10-07

## 2022-10-07 ENCOUNTER — APPOINTMENT (OUTPATIENT)
Dept: CARDIOLOGY | Facility: HOSPITAL | Age: 62
End: 2022-10-07

## 2022-10-07 VITALS
OXYGEN SATURATION: 100 % | BODY MASS INDEX: 26.29 KG/M2 | DIASTOLIC BLOOD PRESSURE: 66 MMHG | TEMPERATURE: 97.8 F | HEIGHT: 64 IN | WEIGHT: 154 LBS | SYSTOLIC BLOOD PRESSURE: 144 MMHG | HEART RATE: 66 BPM | RESPIRATION RATE: 18 BRPM

## 2022-10-07 LAB
ANION GAP SERPL CALCULATED.3IONS-SCNC: 10 MMOL/L (ref 5–15)
BH CV ECHO MEAS - EDV(MOD-SP4): 96.5 ML
BH CV ECHO MEAS - EF(MOD-SP4): 55.6 %
BH CV ECHO MEAS - ESV(MOD-SP4): 42.8 ML
BH CV ECHO MEAS - LV DIASTOLIC VOL/BSA (35-75): 55.1 CM2
BH CV ECHO MEAS - LV SYSTOLIC VOL/BSA (12-30): 24.4 CM2
BH CV ECHO MEAS - SI(MOD-SP4): 30.7 ML/M2
BH CV ECHO MEAS - SV(MOD-SP4): 53.7 ML
BUN SERPL-MCNC: 11 MG/DL (ref 8–23)
BUN/CREAT SERPL: 12.9 (ref 7–25)
CALCIUM SPEC-SCNC: 9 MG/DL (ref 8.6–10.5)
CHLORIDE SERPL-SCNC: 109 MMOL/L (ref 98–107)
CO2 SERPL-SCNC: 24 MMOL/L (ref 22–29)
CREAT SERPL-MCNC: 0.85 MG/DL (ref 0.57–1)
DEPRECATED RDW RBC AUTO: 45.9 FL (ref 37–54)
EGFRCR SERPLBLD CKD-EPI 2021: 76.6 ML/MIN/1.73
ERYTHROCYTE [DISTWIDTH] IN BLOOD BY AUTOMATED COUNT: 14.8 % (ref 12.3–15.4)
GLUCOSE BLDC GLUCOMTR-MCNC: 113 MG/DL (ref 70–105)
GLUCOSE BLDC GLUCOMTR-MCNC: 116 MG/DL (ref 70–105)
GLUCOSE SERPL-MCNC: 92 MG/DL (ref 65–99)
HCT VFR BLD AUTO: 28.2 % (ref 34–46.6)
HGB BLD-MCNC: 9.3 G/DL (ref 12–15.9)
MAXIMAL PREDICTED HEART RATE: 156 BPM
MCH RBC QN AUTO: 28.6 PG (ref 26.6–33)
MCHC RBC AUTO-ENTMCNC: 32.9 G/DL (ref 31.5–35.7)
MCV RBC AUTO: 86.9 FL (ref 79–97)
PLATELET # BLD AUTO: 208 10*3/MM3 (ref 140–450)
PMV BLD AUTO: 8.3 FL (ref 6–12)
POTASSIUM SERPL-SCNC: 4.3 MMOL/L (ref 3.5–5.2)
RBC # BLD AUTO: 3.24 10*6/MM3 (ref 3.77–5.28)
SODIUM SERPL-SCNC: 143 MMOL/L (ref 136–145)
STRESS TARGET HR: 133 BPM
WBC NRBC COR # BLD: 5.3 10*3/MM3 (ref 3.4–10.8)

## 2022-10-07 PROCEDURE — 80048 BASIC METABOLIC PNL TOTAL CA: CPT | Performed by: HOSPITALIST

## 2022-10-07 PROCEDURE — 82962 GLUCOSE BLOOD TEST: CPT

## 2022-10-07 PROCEDURE — 93308 TTE F-UP OR LMTD: CPT | Performed by: INTERNAL MEDICINE

## 2022-10-07 PROCEDURE — 93308 TTE F-UP OR LMTD: CPT

## 2022-10-07 PROCEDURE — 85027 COMPLETE CBC AUTOMATED: CPT | Performed by: HOSPITALIST

## 2022-10-07 PROCEDURE — 99232 SBSQ HOSP IP/OBS MODERATE 35: CPT | Performed by: INTERNAL MEDICINE

## 2022-10-07 PROCEDURE — 97161 PT EVAL LOW COMPLEX 20 MIN: CPT

## 2022-10-07 RX ORDER — COLCHICINE 0.6 MG/1
0.6 TABLET ORAL DAILY
Qty: 30 TABLET | Refills: 0 | Status: SHIPPED | OUTPATIENT
Start: 2022-10-08 | End: 2022-10-24

## 2022-10-07 RX ORDER — MIDODRINE HYDROCHLORIDE 2.5 MG/1
2.5 TABLET ORAL
Qty: 90 TABLET | Refills: 0 | Status: SHIPPED | OUTPATIENT
Start: 2022-10-07 | End: 2022-10-24

## 2022-10-07 RX ADMIN — SENNOSIDES AND DOCUSATE SODIUM 2 TABLET: 50; 8.6 TABLET ORAL at 09:57

## 2022-10-07 RX ADMIN — Medication 10 ML: at 09:58

## 2022-10-07 RX ADMIN — METOPROLOL SUCCINATE 25 MG: 25 TABLET, EXTENDED RELEASE ORAL at 09:57

## 2022-10-07 RX ADMIN — SACUBITRIL AND VALSARTAN 0.5 TABLET: 24; 26 TABLET, FILM COATED ORAL at 09:57

## 2022-10-07 RX ADMIN — COLCHICINE 0.6 MG: 0.6 TABLET, FILM COATED ORAL at 09:57

## 2022-10-07 NOTE — PAYOR COMM NOTE
"CLINICAL UPDATE 10/07/2022:          AUTHORIZATION PENDING:   PLEASE CALL OR FAX DETERMINATION TO CONTACT BELOW. THANK YOU.        Мария Toussaint RN MSN  /UR  [facility]  [phone] office  [phone] fax  [email or web address]    [facility]  NPI: [id number]  Tax: [id number]            Susy Hanson (62 y.o. Female)             Date of Birth   1960    Social Security Number       Address   [address] [place] [address] [place] [address]    Home Phone   [phone]    MRN   [id number]       Judaism   Anabaptism    Marital Status                               Admission Date   10/3/22    Admission Type   Emergency    Admitting Provider   Aleks Headley MD    Attending Provider   Josh Tinoco DO    Department, Room/Bed   [facility] 2A PEDIATRICS, 209/1       Discharge Date       Discharge Disposition       Discharge Destination                               Attending Provider: Josh Tinoco DO    Allergies: Hydrocodone-acetaminophen, Tramadol    Isolation: None   Infection: None   Code Status: CPR   Advance Care Planning Activity    Ht: 162.6 cm (64\")   Wt: 70.3 kg (154 lb 15.7 oz)    Admission Cmt: None   Principal Problem: Hemopericardium [I31.2]                 Active Insurance as of 10/3/2022     Primary Coverage     Payor Plan Insurance Group Employer/Plan Group    [facility] BLUE [facility] [employer] EMPLOYEE [id number]     Payor Plan Address Payor Plan Phone Number Payor Plan Fax Number Effective Dates    [address] [phone]  8/1/2022 - None Entered    [facility] [address]       Subscriber Name Subscriber Birth Date Member ID       SUSY HANSON 1960 [id number]                 Emergency Contacts      (Rel.) Home Phone Work Phone Mobile Phone    BETTYEKIARA (Spouse) [phone] -- [phone]    Deborah Jenkins (Daughter) [phone] -- --               Physician Progress Notes (last 72 " hours)      Christo Murphy MD at 10/06/22 1436          Cardiology [place]        LOS:  LOS: 2 days   Patient Name: Susy Hanson  Age/Sex: 62 y.o. female  : 1960  MRN: [id number]    Day of Service: 10/06/22   Length of Stay: 2  Encounter Provider: Christo Murphy MD  Place of Service: [facility] CARDIOLOGY  Patient Care Team:  Erica Avila MD as PCP - General  CiriloSb arnold MD as Consulting Physician (Nephrology)  Esteban Kendrick MD as Consulting Physician (Cardiology)  Sandra Harrington RN as Ambulatory  ([facility])    Subjective:     Chief Complaint: f/u chest pain / pericarditis / NICM    Subjective: patient's blood pressure improving, no acute CV events.    Current Medications:   Scheduled Meds:atorvastatin, 20 mg, Oral, Nightly  colchicine, 0.6 mg, Oral, Daily  insulin lispro, 0-7 Units, Subcutaneous, Q6H  metoprolol succinate XL, 25 mg, Oral, Q12H  midodrine, 2.5 mg, Oral, TID AC  sacubitril-valsartan, 0.5 tablet, Oral, Q12H  senna-docusate sodium, 2 tablet, Oral, BID  sodium chloride, 10 mL, Intravenous, Q12H      Continuous Infusions:sodium chloride, 75 mL/hr, Last Rate: 75 mL/hr (10/06/22 0933)        Allergies:  Allergies   Allergen Reactions   • Hydrocodone-Acetaminophen Hives     Lortab, takes tylenol   • Tramadol Hives       Review of Systems   Constitutional: Negative for chills and fever.   HENT: Negative for ear discharge and nosebleeds.    Eyes: Negative for discharge and redness.   Cardiovascular: Negative for chest pain, orthopnea, palpitations, paroxysmal nocturnal dyspnea and syncope.   Respiratory: Negative for cough, shortness of breath and wheezing.    Endocrine: Negative for heat intolerance.   Skin: Negative for rash.   Musculoskeletal: Negative for arthritis and myalgias.   Gastrointestinal: Negative for abdominal pain, melena, nausea and vomiting.   Genitourinary: Negative for dysuria and hematuria.    Neurological: Negative for dizziness, light-headedness, numbness and tremors.   Psychiatric/Behavioral: Negative for depression. The patient is nervous/anxious.          Objective:     Temp:  [97.5 °F (36.4 °C)-98.3 °F (36.8 °C)] 97.8 °F (36.6 °C)  Heart Rate:  [60-83] 60  Resp:  [15-23] 19  BP: (120-160)/(64-96) 160/78     Intake/Output Summary (Last 24 hours) at 10/6/2022 1720  Last data filed at 10/6/2022 1500  Gross per 24 hour   Intake 1680 ml   Output 800 ml   Net 880 ml     Body mass index is 25.58 kg/m².      10/03/22  2316 10/04/22  1508   Weight: 68 kg (149 lb 14.6 oz) 67.6 kg (149 lb)         General Appearance:    Alert, cooperative, in no acute distress                                Head: Atraumatic, normocephalic, PERRLA               Neck:   supple, trachea midline, no thyromegaly, no carotid bruit, no JVD   Lungs:     Clear to auscultation, respirations regular, even and               unlabored    Heart:    Regular rhythm and normal rate, normal S1 and S2   Abdomen:     Normal bowel sounds, no masses, no organomegaly, soft  nontender, nondistended, no guarding, no rebound  tenderness   Extremities:   Moves all extremities well, no edema, no cyanosis, no  redness   Pulses:   Pulses palpable and equal bilaterally   Skin:   No bleeding, bruising or rash   Neurologic:   Awake, alert, oriented x3         Lab Review:   Results from last 7 days   Lab Units 10/05/22  1548 10/04/22  1714 10/04/22  0601 10/04/22  0017   SODIUM mmol/L 140 140   < > 135*   POTASSIUM mmol/L 5.4* 4.0   < > 4.2   CHLORIDE mmol/L 107 105   < > 97*   CO2 mmol/L 23.0 23.0   < > 23.0   BUN mg/dL 15 24*   < > 29*   CREATININE mg/dL 0.87 0.92   < > 1.05*   GLUCOSE mg/dL 110* 77   < > 109*   CALCIUM mg/dL 9.0 8.8   < > 10.1   AST (SGOT) U/L  --   --   --  18   ALT (SGPT) U/L  --   --   --  11    < > = values in this interval not displayed.     Results from last 7 days   Lab Units 10/04/22  0017   TROPONIN T ng/mL <0.010     Results  from last 7 days   Lab Units 10/05/22  1548 10/04/22  0601   WBC 10*3/mm3 7.90 12.10*   HEMOGLOBIN g/dL 9.7* 10.3*   HEMATOCRIT % 29.3* 32.0*   PLATELETS 10*3/mm3 234 257                   Invalid input(s): LDLCALC  Results from last 7 days   Lab Units 10/04/22  0017   PROBNP pg/mL 360.8           Recent Radiology:  Imaging Results (Most Recent)     Procedure Component Value Units Date/Time    CT Angiogram Chest Pulmonary Embolism [[id number]] Collected: 10/04/22 0342     Updated: 10/04/22 0353    Narrative:      Exam: CT Angiography of the Chest.    Date: 10/4/2022.     Comparison: 4/11/2022.    History: Defibrillator placement with chest pain.    Technique: CT examination of the chest was performed following the intravenous administration of 100 mL of Isovue-370. Sagittal, coronal and 3-D reformatted images were provided. CT dose lowering techniques were used, to include: automated exposure   control, adjustment for patient size, and/or use of iterative reconstruction.    FINDINGS:    CHEST WALL: There are bilateral breast implants.    Mediastinum and Theresa: There is no axillary, mediastinal or hilar lymphadenopathy.    Pleural and Pericardial spaces: There is a small to moderate pericardial effusion which is higher density than simple fluid which may relate to a hemopericardium.    Upper Abdomen: The visualized upper abdomen is unremarkable.    Cardiovascular: AICD generator has leads in the right atrium and right ventricle. There is mild vascular calcification throughout the thoracic aorta without evidence of aneurysmal dilation or dissection.    Pulmonary Artery: There are no filling defects in the pulmonary arteries.    Lung Parenchyma and Airways: The lungs are clear.    Bones: No fracture or aggressive osseous lesion.      Impression:        1. No evidence of pulmonary embolism.  2. No evidence of thoracic aortic aneurysm or dissection.  3. Small to moderate amount of hemopericardium.    These findings were  discussed with NP Jackie Atkinson.      Electronically signed by:  Amish Ellsworth D.O.    10/4/2022 1:51 AM    XR Chest 1 View [[id number]] Collected: 10/04/22 0232     Updated: 10/04/22 0234    Narrative:      EXAMINATION: XR CHEST 1 VW      DATE OF EXAM: 10/4/2022 12:28 AM  SLOT: 60    HISTORY: Chest pain     COMPARISON: None.    FINDINGS:    HEART/MEDIASTINUM: Mildly enlarged cardiac silhouette. Radiation is the origin of the right atrium, right ventricle, and coronary sinus.    LUNGS/PLEURA: Linear subsegmental atelectasis in the left lung base. Otherwise, grossly clear.     MUSCULOSKELETAL: No acute osseous pathology. Surgical clips overlie the axilla bilaterally.        Impression:        1.  No acute cardiopulmonary abnormality detected.          Electronically signed by:  Yony Patricio M.D.    10/4/2022 12:33 AM          ECHOCARDIOGRAM:    Results for orders placed during the hospital encounter of 10/03/22    Adult Transthoracic Echo Complete W/ Cont if Necessary Per Protocol    Interpretation Summary  · Mild to moderate left ventricular dysfunction  · Left ventricular ejection fraction is 45%  · Hypokinetic inferior wall noted  · Left ventricular wall thickness is consistent with mild concentric hypertrophy.  · Left ventricular diastolic function is consistent with (grade I) impaired relaxation.  · The right ventricular cavity is small in size.  · There is a moderate (1-2cm) circumferential pericardial effusion.        I reviewed the patient's new clinical results.    EKG:      Assessment:       Hemopericardium    Essential hypertension    Type 2 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 (HCC)    Chest pain, unspecified type    Acute systolic heart failure (CMS/HCC)    1. Chest Pain / pericardial effusion / pericarditis  - 2D ECHO shows LVEF 45%, grade 1 DD, 1-2cm moderate circumferential pericardial effusion  - started on NSAIDS and colchicine     2. Increase RV thresholds  - EP  following, per EP device reprogrammed for LV only pacing      3. NICM, chronic systolic HF  - LVEF 15-20%  - GDMT: toprol XL, entresto     4. Non obstructive CAD on [facility] 2022     5. HTN although patient has been borderline blood pressures while hospitalized     6. HLD     7. DM    Plan:   Continue colchicine and NSAIDS  Blood pressure improving, toprol restarted  Will start entresto at 1/2 the tab  BID  LVEF improved to 45%  Patient requesting EP second opinion- will ask Dr. Jin to see     Patient is seen and examined and findings are verified.  All data is reviewed by me personally.  Assessment and plan formulated by APC was done after discussion with attending.  I spent more than 50% of time in taking care of the patient.    Patient is overall doing well.  Patient denies any chest pain.    Normal S1 and S2.  No pericardial rub or murmur abdominal exam is benign.    Patient wants to have a second opinion instead of Dr. Lele Thomas.  I have asked Dr. Jin to see the patient.  I reviewed CAT scan of chest to rule out any perforation of right ventricle.  However I have been unable to delineate myself.  Radiology has not commented on it.  I would asked Dr. Jin.  I would repeat echocardiogram tomorrow.  I would start Entresto again as blood pressure is high.    Electronically signed by Christo Murphy MD, 10/06/22, 5:20 PM EDT.          Christo Murphy MD  10/06/22  17:20 EDT    Electronically signed by Christo Murphy MD at 10/06/22 1720     Josh Tinoco DO at 10/06/22 0924              [facility] Medicine Services Daily Progress Note    Patient Name: Susy Hanson  : 1960  MRN: [id number]  Primary Care Physician:  Erica Avila MD  Date of admission: 10/3/2022      Subjective      Chief Complaint: Chest pain      Patient Reports     10/5/22: Chest pain worse with movements.  Afebrile.  Seen by EP cardiology and general cardiology.    10/6/22: Feels  better.  No chest pain.  Afebrile.  Able to ambulate okay.    Review of Systems   All other systems reviewed and are negative.         Objective      Vitals:   Temp:  [97.5 °F (36.4 °C)-98.3 °F (36.8 °C)] 97.5 °F (36.4 °C)  Heart Rate:  [60-83] 70  Resp:  [15-21] 15  BP: (120-142)/(64-96) 120/69    Physical Exam  HENT:      Head: Normocephalic.      Nose: Nose normal.   Eyes:      Extraocular Movements: Extraocular movements intact.      Pupils: Pupils are equal, round, and reactive to light.   Cardiovascular:      Rate and Rhythm: Normal rate and regular rhythm.   Pulmonary:      Effort: Pulmonary effort is normal.   Abdominal:      General: Bowel sounds are normal.   Musculoskeletal:         General: Normal range of motion.      Cervical back: Normal range of motion.   Skin:     General: Skin is warm.   Neurological:      Mental Status: She is alert. Mental status is at baseline.   Psychiatric:         Mood and Affect: Mood normal.             Result Review    Result Review:  I have personally reviewed the results from the time of this admission to 10/6/2022 09:24 EDT and agree with these findings:  [x]  Laboratory  []  Microbiology  [x]  Radiology  []  EKG/Telemetry   []  Cardiology/Vascular   []  Pathology  [x]  Old records  []  Other:            Assessment & Plan      Brief Patient Summary:    62-year-old female with recent AICD placement and subsequent repositioning of RV lead and presentation with chest pain due to pericarditis    atorvastatin, 20 mg, Oral, Nightly  colchicine, 0.6 mg, Oral, Daily  insulin lispro, 0-7 Units, Subcutaneous, Q6H  metoprolol succinate XL, 25 mg, Oral, Q12H  midodrine, 2.5 mg, Oral, TID AC  senna-docusate sodium, 2 tablet, Oral, BID  sodium chloride, 10 mL, Intravenous, Q12H       sodium chloride, 75 mL/hr, Last Rate: 75 mL/hr (10/05/22 5148)         Active Hospital Problems:  Active Hospital Problems    Diagnosis    • **Hemopericardium    • Acute systolic heart failure (CMS/HCC)     • Chest pain, unspecified type    • Type 2 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 (HCC)    • Essential hypertension      Chest pain due to pericarditis:  -Due to multiple lead positioning due to increased threshold  -s/p CTA chest in ED  -Appreciate cardiology and EP cardiology input  -Responding to colchicine    Nonischemic cardiomyopathy s/p biventricular ICD 9/19/2022 s/p repositioning of RV lead complicated with pericarditis:  -Device interrogated  -EP cardiology following at home  -On Entresto, metoprolol succinate at home  -s/p C 4/13/2022--> nonobstructive CAD.    Type 2 diabetes mellitus:  -Continue ISS  -Hold metformin during hospitalization    Hypertension complicated with hypotension  -Continue metoprolol  -Continue midodrine    Hyperlipidemia:  -Continue statin    DVT prophylaxis:  Mechanical DVT prophylaxis orders are present.    CODE STATUS:    Level Of Support Discussed With: Patient  Code Status (Patient has no pulse and is not breathing): CPR (Attempt to Resuscitate)  Medical Interventions (Patient has pulse or is breathing): Full Support      Disposition:  I expect patient to be discharged home    This patient has been examined wearing appropriate Personal Protective Equipment and discussed with nursing. 10/06/22      Electronically signed by Josh Tinoco DO, 10/06/22, 09:24 EDT.  [facility] Hospitalist Team             Electronically signed by Josh Tinoco DO at 10/06/22 0926     Christo Murphy MD at 10/05/22 1533          CARDIOLOGY FOLLOW-UP PROGRESS NOTE      Reason for follow-up:    SOA  Chest pain  Cardiomyopathy  S/p ICD.       Attending: Josh Tinoco,*      Subjective .     Denies chest pain or dyspnea currently     Review of Systems   Constitutional: Negative for chills and fever.   HENT: Negative for ear discharge and nosebleeds.    Eyes: Negative for discharge and redness.   Cardiovascular: Negative for chest pain,  orthopnea, palpitations, paroxysmal nocturnal dyspnea and syncope.   Respiratory: Negative for cough, shortness of breath and wheezing.    Endocrine: Negative for heat intolerance.   Skin: Negative for rash.   Musculoskeletal: Negative for arthritis and myalgias.   Gastrointestinal: Negative for abdominal pain, melena, nausea and vomiting.   Genitourinary: Negative for dysuria and hematuria.   Neurological: Negative for dizziness, light-headedness, numbness and tremors.   Psychiatric/Behavioral: Negative for depression. The patient is not nervous/anxious.        Allergies: Hydrocodone-acetaminophen and Tramadol    Scheduled Meds:atorvastatin, 20 mg, Oral, Nightly  colchicine, 0.6 mg, Oral, Daily  insulin lispro, 0-7 Units, Subcutaneous, Q6H  metoprolol succinate XL, 25 mg, Oral, Q12H  midodrine, 2.5 mg, Oral, TID AC  senna-docusate sodium, 2 tablet, Oral, BID  sodium chloride, 10 mL, Intravenous, Q12H        Continuous Infusions:sodium chloride, 75 mL/hr, Last Rate: 75 mL/hr (10/05/22 0506)        PRN Meds:.•  acetaminophen **OR** acetaminophen **OR** acetaminophen  •  ALPRAZolam  •  aluminum-magnesium hydroxide-simethicone  •  senna-docusate sodium **AND** polyethylene glycol **AND** bisacodyl **AND** bisacodyl  •  dextrose  •  dextrose  •  glucagon (human recombinant)  •  HYDROmorphone  •  influenza vaccine  •  melatonin  •  nitroglycerin  •  ondansetron **OR** ondansetron  •  [COMPLETED] Insert peripheral IV **AND** sodium chloride  •  sodium chloride    Objective     VITAL SIGNS  Patient Vitals for the past 24 hrs:   BP Temp Temp src Pulse Resp SpO2   10/06/22 0741 142/74 97.5 °F (36.4 °C) Oral 83 15 100 %   10/06/22 0729 142/73 97.5 °F (36.4 °C) Oral 67 -- --   10/06/22 0422 122/64 98 °F (36.7 °C) Axillary 66 17 96 %   10/06/22 0015 -- 98.1 °F (36.7 °C) Axillary 60 16 98 %   10/05/22 2040 138/67 98.1 °F (36.7 °C) Axillary 66 20 99 %   10/05/22 1731 132/96 98.3 °F (36.8 °C) Oral 79 21 99 %        Flowsheet Rows   "  Flowsheet Row First Filed Value   Admission Height 162.6 cm (64\") Documented at 10/03/2022 2316   Admission Weight 68 kg (149 lb 14.6 oz) Documented at 10/03/2022 2316          Body mass index is 25.58 kg/m².      Intake/Output Summary (Last 24 hours) at 10/6/2022 0752  Last data filed at 10/6/2022 0422  Gross per 24 hour   Intake 760 ml   Output 1000 ml   Net -240 ml        TELEMETRY:     Paced    Physical Exam:  Constitutional:       Appearance: Well-developed.   Eyes:      General: No scleral icterus.        Right eye: No discharge.   HENT:      Head: Normocephalic and atraumatic.   Neck:      Thyroid: No thyromegaly.      Lymphadenopathy: No cervical adenopathy.   Pulmonary:      Effort: Pulmonary effort is normal. No respiratory distress.      Breath sounds: Normal breath sounds. No wheezing. No rales.   Cardiovascular:      Normal rate. Regular rhythm.      No gallop.   Abdominal:      Tenderness: There is no abdominal tenderness.   Skin:     Findings: No erythema or rash.   Neurological:      Mental Status: Alert and oriented to person, place, and time.            Results Review:   I reviewed the patient's new clinical results.    CBC    Results from last 7 days   Lab Units 10/05/22  1548 10/04/22  0601 10/04/22  0017   WBC 10*3/mm3 7.90 12.10* 10.90*   HEMOGLOBIN g/dL 9.7* 10.3* 11.4*   PLATELETS 10*3/mm3 234 257 302     BMP   Results from last 7 days   Lab Units 10/05/22  1548 10/04/22  1714 10/04/22  0601 10/04/22  0017   SODIUM mmol/L 140 140 136 135*   POTASSIUM mmol/L 5.4* 4.0 5.9* 4.2   CHLORIDE mmol/L 107 105 102 97*   CO2 mmol/L 23.0 23.0 24.0 23.0   BUN mg/dL 15 24* 31* 29*   CREATININE mg/dL 0.87 0.92 1.18* 1.05*   GLUCOSE mg/dL 110* 77 124* 109*     Cr Clearance Estimated Creatinine Clearance: 61.8 mL/min (by C-G formula based on SCr of 0.87 mg/dL).  Coag     HbA1C   Lab Results   Component Value Date    HGBA1C 5.9 (H) 10/04/2022    HGBA1C 6.2 (H) 09/17/2022    HGBA1C 6.3 (H) 07/26/2022 "     Blood Glucose   Glucose   Date/Time Value Ref Range Status   10/06/2022 0019 86 70 - 105 mg/dL Final     Comment:     Serial Number: [id number]:  [id number]   10/05/2022 2043 159 (H) 70 - 105 mg/dL Final     Comment:     Serial Number: [id number]:  [id number]   10/05/2022 1732 79 70 - 105 mg/dL Final     Comment:     Serial Number: [id number]:  [id number]   10/05/2022 1232 85 70 - 105 mg/dL Final     Comment:     Serial Number: [id number]:  [id number]   10/05/2022 0742 93 70 - 105 mg/dL Final     Comment:     Serial Number: [id number]:  [id number]   10/05/2022 0510 97 70 - 105 mg/dL Final     Comment:     Serial Number: [id number]:  [id number]   10/05/2022 0001 95 70 - 105 mg/dL Final     Comment:     Serial Number: [id number]:  [id number]   10/04/2022 1912 152 (H) 70 - 105 mg/dL Final     Comment:     Serial Number: [id number]:  [id number]     Infection     CMP   Results from last 7 days   Lab Units 10/05/22  1548 10/04/22  1714 10/04/22  0601 10/04/22  0017   SODIUM mmol/L 140 140 136 135*   POTASSIUM mmol/L 5.4* 4.0 5.9* 4.2   CHLORIDE mmol/L 107 105 102 97*   CO2 mmol/L 23.0 23.0 24.0 23.0   BUN mg/dL 15 24* 31* 29*   CREATININE mg/dL 0.87 0.92 1.18* 1.05*   GLUCOSE mg/dL 110* 77 124* 109*   ALBUMIN g/dL  --   --   --  4.60   BILIRUBIN mg/dL  --   --   --  0.2   ALK PHOS U/L  --   --   --  77   AST (SGOT) U/L  --   --   --  18   ALT (SGPT) U/L  --   --   --  11     ABG      UA      LJ  No results found for: POCMETH, POCAMPHET, POCBARBITUR, POCBENZO, POCCOCAINE, POCOPIATES, POCOXYCODO, POCPHENCYC, POCPROPOXY, POCTHC, POCTRICYC  Lysis Labs   Results from last 7 days   Lab Units 10/05/22  1548 10/04/22  1714 10/04/22  0601 10/04/22  0017   HEMOGLOBIN g/dL 9.7*  --  10.3* 11.4*   PLATELETS 10*3/mm3 234  --  257 302   CREATININE mg/dL 0.87 0.92 1.18* 1.05*     Radiology(recent) No radiology results for the last day    Imaging Results (Last 24 Hours)     ** No  results found for the last 24 hours. **          Results from last 7 days   Lab Units 10/04/22  0017   TROPONIN T ng/mL <0.010       EKG                      I personally viewed and interpreted the patient's EKG/Telemetry data:        ECHOCARDIOGRAM:     Results for orders placed during the hospital encounter of 10/03/22    Adult Transthoracic Echo Complete W/ Cont if Necessary Per Protocol    Interpretation Summary  · Mild to moderate left ventricular dysfunction  · Left ventricular ejection fraction is 45%  · Hypokinetic inferior wall noted  · Left ventricular wall thickness is consistent with mild concentric hypertrophy.  · Left ventricular diastolic function is consistent with (grade I) impaired relaxation.  · The right ventricular cavity is small in size.  · There is a moderate (1-2cm) circumferential pericardial effusion.        STRESS MYOVIEW:      CARDIAC CATHETERIZATION:      OTHER:         Assessment & Plan            Hemopericardium    Essential hypertension    Type 2 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 (HCC)    Chest pain, unspecified type    Acute systolic heart failure (CMS/HCC)        ASSESSMENT:    Chest Pain/Pericardial Effusion/pericarditis    -moderate pericardial effusion with no chamber compromise  SJM BiV ICD  9/19/2022  RV lead malfunction/Elevated thresholds    -s/p RV lead revision 9/20/22    -Recurrent RV lead elevated thresholds; RV lead programmed off with LV only pacing  NICM    -EF previously as low as 15-20%    -Improved to 45% by echocardiogram yesterday  Non obstructive CAD on [facility] 4/2022  History of HTN though hypotensive on admission  Dyslipidemia  DM    PLAN:    Continue colchicine  BP is improving 148/76  Received and tolerated metoprolol this am  EF has improved to 45%  Will recheck BMP/CBC  Pt requesting second opinion  Dr. Murphy to discuss with pt.       Additional recommendations per Dr. Murphy    Patient is seen and examined and findings are  verified.  All data is reviewed by me personally.  Assessment and plan formulated by APC was done after discussion with attending.  I spent more than 50% of time in taking care of the patient.    Patient is sitting up in chair.  She is feeling much better.  There is no chest pain.  Shortness of breath is absent.    Hemodynamics are much better.  Blood pressure is improved.  Patient is still slightly tachycardic    Normal S1 and S2.  There is no pericardial rub or murmur decreased breath sound at the bases.  No leg edema noted.    Echocardiogram showed small to moderate sized pericardial effusion.  LV function has improved.  I would repeat echocardiogram in 1 to 2 weeks.  Blood pressure is better.  I would increase Lopressor XL 25 mg twice daily.  I would add Entresto tomorrow if blood pressure is better.  Current treatment would be continued.  Possible discharge tomorrow or day after.    I discussed the patients findings and my recommendations with patient and RN    Electronically signed by Christo Murphy MD, 10/06/22, 7:52 AM EDT.                    Electronically signed by BRISA Hightower, 10/05/22, 3:33 PM EDT.          Christo Murphy MD  10/06/22  07:52 EDT              Electronically signed by Christo Murphy MD at 10/06/22 0752     BorisJosh Waldron DO at 10/05/22 1524              [facility] Daily Progress Note    Patient Name: Susy Hanson  : 1960  MRN: [id number]  Primary Care Physician:  Erica Avila MD  Date of admission: 10/3/2022      Subjective      Chief Complaint: Chest pain      Patient Reports     10/5/22: Chest pain worse with movements.  Afebrile.  Seen by EP cardiology and general cardiology.    Review of Systems   All other systems reviewed and are negative.         Objective      Vitals:   Temp:  [97.1 °F (36.2 °C)-99 °F (37.2 °C)] 98.2 °F (36.8 °C)  Heart Rate:  [85-94] 85  Resp:  [16-24] 18  BP: (111-136)/(60-76)  136/76    Physical Exam  HENT:      Head: Normocephalic.      Nose: Nose normal.   Eyes:      Extraocular Movements: Extraocular movements intact.      Pupils: Pupils are equal, round, and reactive to light.   Cardiovascular:      Rate and Rhythm: Normal rate and regular rhythm.   Pulmonary:      Effort: Pulmonary effort is normal.   Abdominal:      General: Bowel sounds are normal.   Musculoskeletal:         General: Normal range of motion.      Cervical back: Normal range of motion.   Skin:     General: Skin is warm.   Neurological:      Mental Status: She is alert. Mental status is at baseline.   Psychiatric:         Mood and Affect: Mood normal.             Result Review    Result Review:  I have personally reviewed the results from the time of this admission to 10/5/2022 15:24 EDT and agree with these findings:  [x]  Laboratory  []  Microbiology  [x]  Radiology  []  EKG/Telemetry   []  Cardiology/Vascular   []  Pathology  [x]  Old records  []  Other:            Assessment & Plan      Brief Patient Summary:        atorvastatin, 20 mg, Oral, Nightly  colchicine, 0.6 mg, Oral, Daily  insulin lispro, 0-7 Units, Subcutaneous, Q6H  metoprolol succinate XL, 25 mg, Oral, Q24H  midodrine, 2.5 mg, Oral, TID AC  senna-docusate sodium, 2 tablet, Oral, BID  sodium chloride, 10 mL, Intravenous, Q12H       sodium chloride, 75 mL/hr, Last Rate: 75 mL/hr (10/05/22 8662)         Active Hospital Problems:  Active Hospital Problems    Diagnosis    • **Hemopericardium    • Acute systolic heart failure (CMS/HCC)    • Chest pain, unspecified type    • Type 2 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 (HCC)    • Essential hypertension      Pericarditis:  -Due to multiple lead positioning due to increased threshold  -s/p CTA chest in ED  -Cardiology and EP cardiology consulted  -On colchicine    Nonischemic cardiomyopathy s/p biventricular ICD 9/19/2022 s/p repositioning of RV lead complicated with  pericarditis:  -Device interrogated  -EP cardiology following at home  -On Entresto, metoprolol succinate at home  -s/p [facility] 4/13/2022--> nonobstructive CAD.    Type 2 diabetes mellitus:  -Continue ISS  -Hold metformin during hospitalization    Hypertension complicated with hypotension  -Continue metoprolol  -Continue midodrine    Hyperlipidemia:  -Continue statin    DVT prophylaxis:  Mechanical DVT prophylaxis orders are present.    CODE STATUS:    Level Of Support Discussed With: Patient  Code Status (Patient has no pulse and is not breathing): CPR (Attempt to Resuscitate)  Medical Interventions (Patient has pulse or is breathing): Full Support      Disposition:  I expect patient to be discharged home    This patient has been examined wearing appropriate Personal Protective Equipment and discussed with nursing. 10/05/22      Electronically signed by Josh Tinoco DO, 10/05/22, 15:24 EDT.  [facility] Hospitalist Team             Electronically signed by Josh Tinoco DO at 10/05/22 1533          Consult Notes (last 72 hours)      Vishal Jin MD at 10/06/22 1844          CC--- post biventricular ICD insertion and pericarditis with pericardial effusion and RV ICD lead malfunction    Sub  62-year-old pleasant patient has severe nonischemic cardiomyopathy with left bundle branch block with EF of 20%.  She underwent a biventricular ICD placement by Dr. Stroud 2 weeks ago.  After ICD implantation RV lead had to be repositioned which was technically challenging because of issues of sensing and pacing.  Subsequently she was discharged home and she presented on Monday of this week with acute chest pain consistent with pericarditis.  Pain was precordial into the back and worsened with positional changes and with deep breath.  Echocardiography and CT chest were done which reveals small to moderate effusion and was started on colchicine with resolution of symptoms.  RV ICD lead was not  capturing and impedances fell down with appropriate sensing and however lead impedances appropriate.  The right atrial and coronary sinus leads were properly functioning and a consultation was requested a second opinion.        Past Medical History:   Diagnosis Date   • Anxiety 2000   • Breast cancer (HCC)     LEFT BREAST 7/2021   • CHF (congestive heart failure) (HCC)    • Depression    • DM (diabetes mellitus) (HCC)    • HL (hearing loss)    • Hyperlipidemia    • Hypertension     SAW CARDIO/ STRESS TEST 2020 - NOW MEDS MANAGED BY PCP    • Irritable bowel syndrome 2006   • Peripheral neuropathy    • PONV (postoperative nausea and vomiting)    • Sleep apnea     WEARS CPAP     Past Surgical History:   Procedure Laterality Date   • BLADDER REPAIR     • BREAST BIOPSY     • BREAST RECONSTRUCTION Bilateral 08/26/2021    Procedure: BILATERAL PREPECTORALPLACMENT TISSUE EXPANDERS AND ALLODERM;  Surgeon: Heri Arreaga MD;  Location: [facility] OR;  Service: Plastics;  Laterality: Bilateral;   • BREAST TISSUE EXPANDER REMOVAL INSERTION OF IMPLANT Bilateral 12/23/2021    Procedure: BILATERAL REMOVAL TISSUE EXPANDERS AND PLACEMENT OF IMPLANTS;  Surgeon: Heri Arreaga MD;  Location: [facility] OR;  Service: Plastics;  Laterality: Bilateral;   • CARDIAC CATHETERIZATION Left 04/13/2022    Procedure: Cardiac Catheterization/Vascular Study;  Surgeon: Christo Murphy MD;  Location: [facility] CATH INVASIVE LOCATION;  Service: Cardiovascular;  Laterality: Left;   • CARDIAC ELECTROPHYSIOLOGY PROCEDURE N/A 9/19/2022    Procedure: ICD implant St. Sude aware;  Surgeon: Esteban Kendrick MD;  Location: [facility] CATH INVASIVE LOCATION;  Service: Cardiology;  Laterality: N/A;   • CARDIAC ELECTROPHYSIOLOGY PROCEDURE N/A 9/20/2022    Procedure: lead repositioning;  Surgeon: Esteban Kendrick MD;  Location: [facility] CATH INVASIVE LOCATION;  Service: Cardiology;  Laterality: N/A;   • CARDIOVASCULAR STRESS TEST  2020   • COLONOSCOPY   2012   • HYSTERECTOMY     • LYMPH NODE BIOPSY  2021   • MASTECTOMY W/ SENTINEL NODE BIOPSY Bilateral 2021    Procedure: bilateral total mastectomy, left axillary sentinel lymph node biopsy, possible reconstruction.;  Surgeon: Asia Perkins MD;  Location: [facility];  Service: General;  Laterality: Bilateral;   • TUBAL ABDOMINAL LIGATION       Family History   Problem Relation Age of Onset   • Alzheimer's disease Father    • Diabetes Father    • Heart disease Father    • Other Father    • Sleep apnea Father    • Snoring Father    • Hyperlipidemia Father    • Other Mother    • Diabetes Mother    • Heart disease Mother    • Sleep apnea Mother    • Snoring Mother    • COPD Mother    • Hyperlipidemia Mother    • Diabetes Sister    • Heart disease Sister    • Other Sister    • Sleep apnea Sister    • Snoring Sister    • COPD Sister    • Breast cancer Paternal Grandmother 60   • Cancer Paternal Grandmother    • Diabetes Paternal Grandmother    • Breast cancer Maternal Aunt 80   • Breast cancer Paternal Aunt    • Diabetes Maternal Grandmother    • Hyperlipidemia Sister    • Hyperlipidemia Brother    • Hyperlipidemia Son    • Malig Hyperthermia Neg Hx      Social History     Tobacco Use   • Smoking status: Former Smoker     Packs/day: 2.00     Years: 30.00     Pack years: 60.00     Types: Cigarettes     Start date: 1972     Quit date: 2008     Years since quittin.7   • Smokeless tobacco: Never Used   Vaping Use   • Vaping Use: Never used   Substance Use Topics   • Alcohol use: No   • Drug use: No     Medications Prior to Admission   Medication Sig Dispense Refill Last Dose   • ALPRAZolam (XANAX) 1 MG tablet Take 1 tablet by mouth 2 (Two) Times a Day As Needed for Anxiety or Sleep. 60 tablet 0 Past Week at Unknown time   • atorvastatin (LIPITOR) 20 MG tablet Take 1 tablet by mouth Daily. 90 tablet 2 10/3/2022 at Unknown time   • bumetanide (BUMEX) 1 MG tablet Take 1 tablet by mouth  Daily. 90 tablet 1 10/3/2022 at Unknown time   • cetirizine (zyrTEC) 10 MG tablet Take 10 mg by mouth Daily As Needed.   Past Week at Unknown time   • metFORMIN (GLUCOPHAGE) 1000 MG tablet Take 1,000 mg by mouth Daily With Breakfast.   10/3/2022 at Unknown time   • metFORMIN (GLUCOPHAGE) 1000 MG tablet Take 1,500 mg by mouth Daily With Dinner.   10/3/2022 at Unknown time   • metoprolol succinate XL (TOPROL-XL) 100 MG 24 hr tablet take 1 tablet by oral route  every day 90 tablet 3 10/3/2022 at Unknown time   • ondansetron (Zofran) 4 MG tablet Take 1 tablet by mouth Every 8 (Eight) Hours As Needed for Nausea or Vomiting. 20 tablet 0 Past Week at Unknown time   • sacubitril-valsartan (Entresto) 24-26 MG tablet Take 1 tablet by mouth Every 12 (Twelve) Hours. 180 tablet 1 10/3/2022 at Unknown time   • Triamcinolone Acetonide (NASACORT) 55 MCG/ACT nasal inhaler APPLY 2 SPRAYS BY NASAL ROUTE DAILY FOR 30 DAYS 10.8 mL 6 Past Week at Unknown time     Allergies:  Hydrocodone-acetaminophen and Tramadol    Review of Systems   General:  no fatigue and tiredness  Eyes: No redness  Cardiovascular: No chest pain, no palpitations  Respiratory:   No shortness of breath  Gastrointestinal: No nausea or vomiting, bleeding  Genitourinary: no hematuria or dysuria  Musculoskeletal: No arthralgia or myalgia  Skin: No rash  Neurologic: No numbness, tingling, syncope  Hematologic/Lymphatic: No abnormal bleeding      Physical Exam  VITALS REVIEWED  Blood pressure 153/88 pulse rate was 60 patient is afebrile respiration 12 times a minute    General:      well developed, well nourished, in no acute distress.    Head:      normocephalic and atraumatic.    Eyes:      PERRL/EOM intact, conjunctivae and sclerae clear without nystagmus.    Neck:      no masses, thyromegaly,  trachea central with normal respiratory effort   Lungs:      clear bilaterally to auscultation.    Heart:       sinus rhythm without any rub   Msk:      no deformity or scoliosis  noted of thoracic or lumbar spine.    Pulses:      pulses normal in all 4 extremities.    Extremities:       no cyanosis or clubbing    Neurologic:      no focal deficits.   alert oriented x3  Skin:      intact without lesions or rashes.    Psych:      alert and cooperative; normal mood and affect; normal attention span and concentration.          CBC    Results from last 7 days   Lab Units 10/05/22  1548 10/04/22  0601 10/04/22  0017   WBC 10*3/mm3 7.90 12.10* 10.90*   HEMOGLOBIN g/dL 9.7* 10.3* 11.4*   PLATELETS 10*3/mm3 234 257 302     BMP   Results from last 7 days   Lab Units 10/05/22  1548 10/04/22  1714 10/04/22  0601 10/04/22  0017   SODIUM mmol/L 140 140 136 135*   POTASSIUM mmol/L 5.4* 4.0 5.9* 4.2   CHLORIDE mmol/L 107 105 102 97*   CO2 mmol/L 23.0 23.0 24.0 23.0   BUN mg/dL 15 24* 31* 29*   CREATININE mg/dL 0.87 0.92 1.18* 1.05*   GLUCOSE mg/dL 110* 77 124* 109*               Assessment and plan      Severe nonischemic cardiomyopathy  Left bundle branch block  Essential hypertension  History of breast carcinoma     Acute pericarditis with small effusion with resolution of symptoms with appropriate therapy with colchicine    CT scan and echo reviewed  ICD device functioning reviewed    Right atrial and coronary sinus lead with normal function and excellent pacing complex with coronary sinus lead with improvement of EF markedly from 20% to nearly 50%    RV ICD lead sensing is appropriate with a decrease of impedance from 900 to 300 ohms with a lack of capture  High-voltage impedance within normal limits    Based on the findings and the RV lead performance, there is a microperforation of the RV    Since the patient is not using her RV lead for pacing, careful outpatient follow-up can be done with serial measurement of her effusion and functioning of the lead.    Patient was educated about the device    Discussed with Dr. Murphy    Potentially can be discharged home in the morning      Follow-up as an  outpatient      Electronically signed by Vishal RODRIGUEZ. MD Annabel, 10/06/22, 6:54 PM EDT.        Electronically signed by Vishal Jin MD at 10/06/22 9235     Christo Murphy MD at 10/04/22 1306      Consult Orders    1. Inpatient Cardiology Consult [[id number]] ordered by Aleks Headley MD at 10/04/22 0507               Cardiology [facility]        Subjective:     Encounter Date:10/03/2022    Subjective  Patient ID: Susy Hanson is a 62 y.o. female.    Chief Complaint: chest pain    Referring Physician: Aleks Headley    HPI:  Susy Hanson is a 62 y.o. female who presents with chest pain.  Ms. Hanson is a patient of Dr. Murphy.  Pmh includes NICM with LVEF 25%, minimal non obstructive CAD on [facility] 4/2022, HTN, HLD, ANDREAS, breast CA s/p bilateral mastectomy August 2021, DM, chronic left bundle branch block.    Ms. Hanson underwent BiV ICD 9/19/2022 with repositioning of the RV lead the next day due to increased thresholds.  On follow up interrogation revealed increased thresholds again.  Patient also developed chest discomfort yesterday described as sharp pain, radiation to her back.      Patient presented to ER. CT PE protocol of chest showed no PE, no dissection, small to moderate amount of pericardial effusion. Troponin negative. ECG  V paced.  Patient was given IV morphine. Pain improved. She has been started on colchicine and NSAIDS for pericarditis.  EP following and have adjusted settings for BiV pacing.       Past Medical History:   Diagnosis Date   • Anxiety 2000   • Breast cancer (HCC)     LEFT BREAST 7/2021   • CHF (congestive heart failure) (HCC)    • Depression    • DM (diabetes mellitus) (HCC)    • HL (hearing loss)    • Hyperlipidemia    • Hypertension     SAW CARDIO/ STRESS TEST 2020 - NOW MEDS MANAGED BY PCP    • Irritable bowel syndrome 2006   • Peripheral neuropathy    • PONV (postoperative nausea and vomiting)    • Sleep apnea     WEARS CPAP       Past Surgical History:   Procedure  Laterality Date   • BLADDER REPAIR     • BREAST BIOPSY     • BREAST RECONSTRUCTION Bilateral 08/26/2021    Procedure: BILATERAL PREPECTORALPLACMENT TISSUE EXPANDERS AND ALLODERM;  Surgeon: Heri Arreaga MD;  Location: [facility] MAIN OR;  Service: Plastics;  Laterality: Bilateral;   • BREAST TISSUE EXPANDER REMOVAL INSERTION OF IMPLANT Bilateral 12/23/2021    Procedure: BILATERAL REMOVAL TISSUE EXPANDERS AND PLACEMENT OF IMPLANTS;  Surgeon: Heri Arreaga MD;  Location: [facility] MAIN OR;  Service: Plastics;  Laterality: Bilateral;   • CARDIAC CATHETERIZATION Left 04/13/2022    Procedure: Cardiac Catheterization/Vascular Study;  Surgeon: Christo Murphy MD;  Location:  [facility] CATH INVASIVE LOCATION;  Service: Cardiovascular;  Laterality: Left;   • CARDIAC ELECTROPHYSIOLOGY PROCEDURE N/A 9/19/2022    Procedure: ICD implant St. Sude aware;  Surgeon: Esteban Kendrick MD;  Location:  [facility] CATH INVASIVE LOCATION;  Service: Cardiology;  Laterality: N/A;   • CARDIAC ELECTROPHYSIOLOGY PROCEDURE N/A 9/20/2022    Procedure: lead repositioning;  Surgeon: Esteban Kendrick MD;  Location:  [facility] CATH INVASIVE LOCATION;  Service: Cardiology;  Laterality: N/A;   • CARDIOVASCULAR STRESS TEST  2020   • COLONOSCOPY  11/2012   • HYSTERECTOMY     • LYMPH NODE BIOPSY  8/29/2021   • MASTECTOMY W/ SENTINEL NODE BIOPSY Bilateral 08/26/2021    Procedure: bilateral total mastectomy, left axillary sentinel lymph node biopsy, possible reconstruction.;  Surgeon: Asia Perkins MD;  Location: [facility] MAIN OR;  Service: General;  Laterality: Bilateral;   • TUBAL ABDOMINAL LIGATION  1981       Family History   Problem Relation Age of Onset   • Alzheimer's disease Father    • Diabetes Father    • Heart disease Father    • Other Father    • Sleep apnea Father    • Snoring Father    • Hyperlipidemia Father    • Other Mother    • Diabetes Mother    • Heart disease Mother    • Sleep apnea Mother    • Snoring Mother    • COPD Mother    •  Hyperlipidemia Mother    • Diabetes Sister    • Heart disease Sister    • Other Sister    • Sleep apnea Sister    • Snoring Sister    • COPD Sister    • Breast cancer Paternal Grandmother 60   • Cancer Paternal Grandmother    • Diabetes Paternal Grandmother    • Breast cancer Maternal Aunt 80   • Breast cancer Paternal Aunt    • Diabetes Maternal Grandmother    • Hyperlipidemia Sister    • Hyperlipidemia Brother    • Hyperlipidemia Son    • Malig Hyperthermia Neg Hx        Social History     Socioeconomic History   • Marital status:    Tobacco Use   • Smoking status: Former Smoker     Packs/day: 2.00     Years: 30.00     Pack years: 60.00     Types: Cigarettes     Start date: 1972     Quit date: 2008     Years since quittin.7   • Smokeless tobacco: Never Used   Vaping Use   • Vaping Use: Never used   Substance and Sexual Activity   • Alcohol use: No   • Drug use: No   • Sexual activity: Yes     Partners: Male     Birth control/protection: None         Allergies   Allergen Reactions   • Hydrocodone-Acetaminophen Hives     Lortab, takes tylenol   • Tramadol Hives       Current Medications:   Scheduled Meds:atorvastatin, 20 mg, Oral, Nightly  colchicine, 0.6 mg, Oral, Daily  insulin lispro, 0-7 Units, Subcutaneous, Q6H  metoprolol succinate XL, 25 mg, Oral, Q24H  midodrine, 2.5 mg, Oral, TID AC  senna-docusate sodium, 2 tablet, Oral, BID  sodium chloride, 10 mL, Intravenous, Q12H      Continuous Infusions:sodium chloride, 75 mL/hr, Last Rate: 75 mL/hr (10/04/22 0939)        Review of Systems   Constitutional: Negative for chills, diaphoresis and malaise/fatigue.   Cardiovascular: Positive for chest pain. Negative for dyspnea on exertion, irregular heartbeat, leg swelling, near-syncope, orthopnea, palpitations, paroxysmal nocturnal dyspnea and syncope.   Respiratory: Negative for cough, shortness of breath, sleep disturbances due to breathing and sputum production.    Gastrointestinal: Negative for  "change in bowel habit.   Genitourinary: Negative for urgency.   Neurological: Negative for dizziness and headaches.   Psychiatric/Behavioral: Negative for altered mental status.           Objective:   Objective      /60 (BP Location: Right arm, Patient Position: Lying)   Pulse 94   Temp 97.1 °F (36.2 °C) (Oral)   Resp 16   Ht 162.6 cm (64\")   Wt 67.6 kg (149 lb)   LMP  (LMP Unknown)   SpO2 99%   BMI 25.58 kg/m²     Physical Exam:  General Appearance:    Alert, cooperative, in no acute distress                                Head: Atraumatic, normocephalic, PERRLA               Neck:   supple, no JVD   Lungs:     Clear to auscultation,respirations regular, even and               unlabored    Heart:    Regular rhythm and normal rate, normal S1 and S2   Abdomen:     Normal bowel sounds, no masses, no organomegaly, soft  non-tender, non-distended, no guarding, no rebound  tenderness   Extremities:   Moves all extremities well, no edema, no cyanosis, no  redness   Pulses:   Pulses palpable and equal bilaterally   Skin:   No bleeding, bruising or rash   Neurologic:   Awake, alert, oriented x3                 ASCVD RIsk Score::  The ASCVD Risk score (Stephanie DC Jr., et al., 2013) failed to calculate for the following reasons:    The valid total cholesterol range is 130 to 320 mg/dL      Lab Review:     Results from last 7 days   Lab Units 10/04/22  0601 10/04/22  0017   SODIUM mmol/L 136 135*   POTASSIUM mmol/L 5.9* 4.2   CHLORIDE mmol/L 102 97*   CO2 mmol/L 24.0 23.0   BUN mg/dL 31* 29*   CREATININE mg/dL 1.18* 1.05*   GLUCOSE mg/dL 124* 109*   CALCIUM mg/dL 9.6 10.1   AST (SGOT) U/L  --  18   ALT (SGPT) U/L  --  11     Results from last 7 days   Lab Units 10/04/22  0017   TROPONIN T ng/mL <0.010     Results from last 7 days   Lab Units 10/04/22  0601 10/04/22  0017   WBC 10*3/mm3 12.10* 10.90*   HEMOGLOBIN g/dL 10.3* 11.4*   HEMATOCRIT % 32.0* 35.2   PLATELETS 10*3/mm3 257 302                   Invalid " input(s): LDLCALC  Results from last 7 days   Lab Units 10/04/22  0017   PROBNP pg/mL 360.8           Recent Radiology:  Imaging Results (Most Recent)     Procedure Component Value Units Date/Time    CT Angiogram Chest Pulmonary Embolism [[id number]] Collected: 10/04/22 0342     Updated: 10/04/22 0353    Narrative:      Exam: CT Angiography of the Chest.    Date: 10/4/2022.     Comparison: 4/11/2022.    History: Defibrillator placement with chest pain.    Technique: CT examination of the chest was performed following the intravenous administration of 100 mL of Isovue-370. Sagittal, coronal and 3-D reformatted images were provided. CT dose lowering techniques were used, to include: automated exposure   control, adjustment for patient size, and/or use of iterative reconstruction.    FINDINGS:    CHEST WALL: There are bilateral breast implants.    Mediastinum and Theresa: There is no axillary, mediastinal or hilar lymphadenopathy.    Pleural and Pericardial spaces: There is a small to moderate pericardial effusion which is higher density than simple fluid which may relate to a hemopericardium.    Upper Abdomen: The visualized upper abdomen is unremarkable.    Cardiovascular: AICD generator has leads in the right atrium and right ventricle. There is mild vascular calcification throughout the thoracic aorta without evidence of aneurysmal dilation or dissection.    Pulmonary Artery: There are no filling defects in the pulmonary arteries.    Lung Parenchyma and Airways: The lungs are clear.    Bones: No fracture or aggressive osseous lesion.      Impression:        1. No evidence of pulmonary embolism.  2. No evidence of thoracic aortic aneurysm or dissection.  3. Small to moderate amount of hemopericardium.    These findings were discussed with COURTNEY Atkinson.      Electronically signed by:  Amish Ellsworth D.O.    10/4/2022 1:51 AM    XR Chest 1 View [[id number]] Collected: 10/04/22 0232     Updated: 10/04/22 0234     Narrative:      EXAMINATION: XR CHEST 1 VW      DATE OF EXAM: 10/4/2022 12:28 AM  SLOT: 60    HISTORY: Chest pain     COMPARISON: None.    FINDINGS:    HEART/MEDIASTINUM: Mildly enlarged cardiac silhouette. Radiation is the origin of the right atrium, right ventricle, and coronary sinus.    LUNGS/PLEURA: Linear subsegmental atelectasis in the left lung base. Otherwise, grossly clear.     MUSCULOSKELETAL: No acute osseous pathology. Surgical clips overlie the axilla bilaterally.        Impression:        1.  No acute cardiopulmonary abnormality detected.          Electronically signed by:  Yony Patricio M.D.    10/4/2022 12:33 AM            ECHOCARDIOGRAM:    Results for orders placed during the hospital encounter of 07/28/22    Adult Transthoracic Echo Complete W/ Cont if Necessary Per Protocol    Interpretation Summary  · Left ventricular systolic function is severely decreased.  · Left ventricular ejection fraction is 15 to 20%  · Dyskinetic septum and akinetic apex is noted  · Left ventricular wall thickness is consistent with mild concentric hypertrophy.  · Left ventricular diastolic function was normal.           Assessment:   Assessment      Active Hospital Problems    Diagnosis  POA   • **Hemopericardium [I31.2]  Yes   • Acute systolic heart failure (CMS/HCC) [I50.21]  Yes   • Chest pain, unspecified type [R07.9]  Yes   • Type 2 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 (HCC) [E11.49]  Yes   • Essential hypertension [I10]  Yes     1. Chest Pain / pericardial effusion / pericarditis  - check 2D ECHO  - started on NSAIDS and colchicine    2. Increase RV thresholds  - EP following, per EP device reprogrammed for LV only pacing     3. NICM, chronic systolic HF  - LVEF 15-20%  - GDMT: toprol XL, entresto    4. Non obstructive CAD on [facility] 4/2022    5. HTN although patient has been borderline blood pressures while hospitalized    6. HLD    7. DM    Plan:   Ms Hanson presents with chest  pain. She had BiV implantation last week and had lead adjustment. Was noted to have pericardial effusion on CT scan.  ECHO pending.  EP on board, Dr. Kendrick has made adjustments to LV lead to get pt benefit of BiV pacing  Colchicine started, NSAIDS  Repeat BMP, K was slightly elevated  entresto and toprol XL held this am secondary to borderline blood pressure, IVF gently started    Patient is seen and examined and findings are verified.  All data is reviewed by me personally.  Assessment and plan formulated by APC was done after discussion with attending.  I spent more than 50% of time in taking care of the patient.    Patient is admitted with chest pain.    Blood pressure is low.  Patient had episode of tachycardia    Normal S1 and S2.  No pericardial rub or murmur.  No leg edema noted.    CAT scan of the chest showed possible pericardial effusion.  Echocardiogram is done and it showed small pericardial effusion.  No tamponade.  I would recommend to start colchicine.  I would decrease the dose of Toprol-XL to 25 mg daily.  Entresto would be held for now.  Ejection fraction has improved.  We shall follow.    Electronically signed by Christo Murphy MD, 10/04/22, 5:15 PM EDT.                 Christo Murphy MD  10/04/22  17:15 EDT      Electronically signed by Christo Murphy MD at 10/04/22 1715     Esteban Kendrick MD at 10/04/22 1053           HP      Name: Susy Hanson ADMIT: 10/3/2022   : 1960  PCP: Erica Avila MD    MRN: [id number] LOS: 0 days   AGE/SEX: 62 y.o. female  ROOM: 209/1     Chief Complaint   Patient presents with   • Chest Pain       Subjective        History of present illness  Susy Hanson is a 62-year-old female patient who has cardiomyopathy most likely nonischemic, chronic left bundle branch block, diabetes, dyslipidemia, hypertension, breast cancer s/p bilateral mastectomy in 2021 and reconstructive surgery, has a biventricular ICD implanted on  9/19/2022 with repositioning of the RV lead the next day, is admitted to the hospital with complaints of chest discomfort.  Patient is having sharp pain in her chest, worse with deep breath or cough for the last couple of days which prompted her to present to the hospital.  CT scan was negative for pulmonary embolism, however it showed small pericardial effusion.      Past Medical History:   Diagnosis Date   • Anxiety 2000   • Breast cancer (HCC)     LEFT BREAST 7/2021   • CHF (congestive heart failure) (HCC)    • Depression    • DM (diabetes mellitus) (HCC)    • HL (hearing loss)    • Hyperlipidemia    • Hypertension     SAW CARDIO/ STRESS TEST 2020 - NOW MEDS MANAGED BY PCP    • Irritable bowel syndrome 2006   • Peripheral neuropathy    • PONV (postoperative nausea and vomiting)    • Sleep apnea     WEARS CPAP     Past Surgical History:   Procedure Laterality Date   • BLADDER REPAIR     • BREAST BIOPSY     • BREAST RECONSTRUCTION Bilateral 08/26/2021    Procedure: BILATERAL PREPECTORALPLACMENT TISSUE EXPANDERS AND ALLODERM;  Surgeon: Heri Arreaga MD;  Location: [facility] OR;  Service: Plastics;  Laterality: Bilateral;   • BREAST TISSUE EXPANDER REMOVAL INSERTION OF IMPLANT Bilateral 12/23/2021    Procedure: BILATERAL REMOVAL TISSUE EXPANDERS AND PLACEMENT OF IMPLANTS;  Surgeon: Heri Arreaga MD;  Location: [facility] OR;  Service: Plastics;  Laterality: Bilateral;   • CARDIAC CATHETERIZATION Left 04/13/2022    Procedure: Cardiac Catheterization/Vascular Study;  Surgeon: Christo Murphy MD;  Location: [facility] CATH INVASIVE LOCATION;  Service: Cardiovascular;  Laterality: Left;   • CARDIAC ELECTROPHYSIOLOGY PROCEDURE N/A 9/19/2022    Procedure: ICD implant St. Sude aware;  Surgeon: Esteban Kendrick MD;  Location: [facility] CATH INVASIVE LOCATION;  Service: Cardiology;  Laterality: N/A;   • CARDIAC ELECTROPHYSIOLOGY PROCEDURE N/A 9/20/2022    Procedure: lead repositioning;  Surgeon: Mireille  MD Esteban;  Location: [facility] CATH INVASIVE LOCATION;  Service: Cardiology;  Laterality: N/A;   • CARDIOVASCULAR STRESS TEST     • COLONOSCOPY  2012   • HYSTERECTOMY     • LYMPH NODE BIOPSY  2021   • MASTECTOMY W/ SENTINEL NODE BIOPSY Bilateral 2021    Procedure: bilateral total mastectomy, left axillary sentinel lymph node biopsy, possible reconstruction.;  Surgeon: Asia Perkins MD;  Location: [facility] OR;  Service: General;  Laterality: Bilateral;   • TUBAL ABDOMINAL LIGATION       Family History   Problem Relation Age of Onset   • Alzheimer's disease Father    • Diabetes Father    • Heart disease Father    • Other Father    • Sleep apnea Father    • Snoring Father    • Hyperlipidemia Father    • Other Mother    • Diabetes Mother    • Heart disease Mother    • Sleep apnea Mother    • Snoring Mother    • COPD Mother    • Hyperlipidemia Mother    • Diabetes Sister    • Heart disease Sister    • Other Sister    • Sleep apnea Sister    • Snoring Sister    • COPD Sister    • Breast cancer Paternal Grandmother 60   • Cancer Paternal Grandmother    • Diabetes Paternal Grandmother    • Breast cancer Maternal Aunt 80   • Breast cancer Paternal Aunt    • Diabetes Maternal Grandmother    • Hyperlipidemia Sister    • Hyperlipidemia Brother    • Hyperlipidemia Son    • Malig Hyperthermia Neg Hx      Social History     Tobacco Use   • Smoking status: Former Smoker     Packs/day: 2.00     Years: 30.00     Pack years: 60.00     Types: Cigarettes     Start date: 1972     Quit date: 2008     Years since quittin.7   • Smokeless tobacco: Never Used   Vaping Use   • Vaping Use: Never used   Substance Use Topics   • Alcohol use: No   • Drug use: No     Medications Prior to Admission   Medication Sig Dispense Refill Last Dose   • ALPRAZolam (XANAX) 1 MG tablet Take 1 tablet by mouth 2 (Two) Times a Day As Needed for Anxiety or Sleep. 60 tablet 0 Past Week at Unknown time   • atorvastatin  (LIPITOR) 20 MG tablet Take 1 tablet by mouth Daily. 90 tablet 2 10/3/2022 at Unknown time   • bumetanide (BUMEX) 1 MG tablet Take 1 tablet by mouth Daily. 90 tablet 1 10/3/2022 at Unknown time   • cetirizine (zyrTEC) 10 MG tablet Take 10 mg by mouth Daily As Needed.   Past Week at Unknown time   • metFORMIN (GLUCOPHAGE) 1000 MG tablet Take 1,000 mg by mouth Daily With Breakfast.   10/3/2022 at Unknown time   • metFORMIN (GLUCOPHAGE) 1000 MG tablet Take 1,500 mg by mouth Daily With Dinner.   10/3/2022 at Unknown time   • metoprolol succinate XL (TOPROL-XL) 100 MG 24 hr tablet take 1 tablet by oral route  every day 90 tablet 3 10/3/2022 at Unknown time   • ondansetron (Zofran) 4 MG tablet Take 1 tablet by mouth Every 8 (Eight) Hours As Needed for Nausea or Vomiting. 20 tablet 0 Past Week at Unknown time   • sacubitril-valsartan (Entresto) 24-26 MG tablet Take 1 tablet by mouth Every 12 (Twelve) Hours. 180 tablet 1 10/3/2022 at Unknown time   • Triamcinolone Acetonide (NASACORT) 55 MCG/ACT nasal inhaler APPLY 2 SPRAYS BY NASAL ROUTE DAILY FOR 30 DAYS 10.8 mL 6 Past Week at Unknown time     Allergies:  Hydrocodone-acetaminophen and Tramadol    Review of systems    Constitutional: Negative.    Respiratory and cardiovascular: As detailed in HPI section.  Gastrointestinal: Negative for constipation, nausea and vomiting negative for abdominal distention, abdominal pain and diarrhea.   Genitourinary: Negative for difficulty urinating and flank pain.   Musculoskeletal: Negative for arthralgias, joint swelling and myalgias.   Skin: Negative for color change, rash and wound.   Neurological: Negative for dizziness, syncope, weakness and headaches.   Hematological: Negative for adenopathy.   Psychiatric/Behavioral: Negative for confusion.   All other systems reviewed and are negative.       Physical Exam  VITALS REVIEWED    General:      well developed, in no acute distress.    Head:      normocephalic and atraumatic.    Eyes:       PERRL/EOM intact, conjunctiva and sclera clear with out nystagmus.    Neck:      no masses, thyromegaly,  trachea central with normal respiratory effort and PMI displaced laterally  Lungs:      Clear to auscultation bilaterally  Heart:       Regular rate and rhythm  Msk:      no deformity or scoliosis noted of thoracic or lumbar spine.    Pulses:      pulses normal in all 4 extremities.    Extremities:       No lower extremity edema  Neurologic:      no focal deficits.   alert oriented x3  Skin:      intact without lesions or rashes.    Psych:      alert and cooperative; normal mood and affect; normal attention span and concentration.      Result Review :               Pertinent cardiac workup    1. EKG 10/4/2022 sinus rhythm with BiV pacing  2. Echo 7/28/2022 ejection fraction 15 to 20%.      Assessment and Plan         Hemopericardium    Essential hypertension    Type 2 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 (HCC)    Chest pain, unspecified type    Acute systolic heart failure (CMS/HCC)      Susy Hanson is a 62-year-old female patient who has history of breast cancer, bilateral mastectomy, nonischemic cardiomyopathy, chronic left bundle branch block, BiV ICD implantation on September 19, 2022.  It was very challenging to find a good spot for the RV lead due to high thresholds, in fact the lead had to be repositioned the next day due to increased thresholds.  Upon discharge her RV threshold was good, however follow-up interrogation of the device showed increased thresholds again.  This is most likely due to pathologic RV tissue, as the lead did not move on x-ray and also the other parameters including sensing and impedance values are within range.  The device was programmed today for LV only pacing and narrow QRS was achieved.  I do believe that with the setting, she will get the benefit of BiV pacing.  As far as the pericardial effusion, that is most likely due to minor trauma  from multiple repositioning.  We will get an echocardiogram to have an assessment of the effusion, it did not look large on CT scan.  Her chest pain is probably due to pericarditis, I will start her on colchicine for that.  Findings and management plans were discussed with Dr. Murphy who is agreeable.        No follow-ups on file.  Patient was given instructions and counseling regarding her condition or for health maintenance advice. Please see specific information pulled into the AVS if appropriate.     Electronically signed by Esteban Kendrick MD at 10/04/22 1100

## 2022-10-07 NOTE — PROGRESS NOTES
Nutrition Services    Patient Name: Susy Hanson  YOB: 1960  MRN: [id number]  Admission date: 10/3/2022    PPE Documentation        PPE Worn By Provider Did not enter room for this encounter   PPE Worn By Patient  N/A     PROGRESS NOTE      Encounter Information: Progress note to check on PO intakes. Pt has been eating well, averaging 75% intake at meals. Current diet continues to meet needs.       PO Diet: Diet Cardiac; Healthy Heart   PO Supplements: Boost Plus once daily    PO Intake:  75% or better at recent meals        Nutrition support orders:    Nutrition support review:        Labs (reviewed below): Reviewed; C/W clinical picture         GI Function:  BM 10/6       Nutrition Intervention: Continue current diet and ONS as tolerated, which are meeting needs.       Results from last 7 days   Lab Units 10/07/22  0441 10/05/22  1548 10/04/22  1714 10/04/22  0601 10/04/22  0017   SODIUM mmol/L 143 140 140   < > 135*   POTASSIUM mmol/L 4.3 5.4* 4.0   < > 4.2   CHLORIDE mmol/L 109* 107 105   < > 97*   CO2 mmol/L 24.0 23.0 23.0   < > 23.0   BUN mg/dL 11 15 24*   < > 29*   CREATININE mg/dL 0.85 0.87 0.92   < > 1.05*   CALCIUM mg/dL 9.0 9.0 8.8   < > 10.1   BILIRUBIN mg/dL  --   --   --   --  0.2   ALK PHOS U/L  --   --   --   --  77   ALT (SGPT) U/L  --   --   --   --  11   AST (SGOT) U/L  --   --   --   --  18   GLUCOSE mg/dL 92 110* 77   < > 109*    < > = values in this interval not displayed.     Results from last 7 days   Lab Units 10/07/22  0441   HEMOGLOBIN g/dL 9.3*   HEMATOCRIT % 28.2*     COVID19   Date Value Ref Range Status   09/17/2022 Not Detected Not Detected - Ref. Range Final     Lab Results   Component Value Date    HGBA1C 5.9 (H) 10/04/2022     RD to follow up per protocol.    Electronically signed by:  Martha Mustafa RD  10/07/22 15:44 EDT

## 2022-10-07 NOTE — CASE MANAGEMENT/SOCIAL WORK
Continued Stay Note  [facility]     Patient Name: Susy Hanson  MRN: [id number]  Today's Date: 10/7/2022    Admit Date: 10/3/2022    Plan: D/C plan: Anticipate routine home   Discharge Plan     Row Name 10/07/22 1604       Plan    Plan D/C plan: Anticipate routine home    Patient/Family in Agreement with Plan yes    Plan Comments Anticipate routine home when medically ready.                   Expected Discharge Date and Time     Expected Discharge Date Expected Discharge Time    Oct 8, 2022         Phone communication or documentation only - no physical contact with patient or family.      Andrae Urena RN

## 2022-10-07 NOTE — PLAN OF CARE
Goal Outcome Evaluation:  Plan of Care Reviewed With: patient           Outcome Evaluation: Pt is a 63 YO F admitted with chest pain, symptoms resolved this date. Pt states she lives at home wiht spouse, typically is totally independent with ADLs, ambulation without AD and no recent falls. This date pt demonstrates baseline mobility, ambulating without need for assistance and no LOB. VSS throughout evaluation. Pt appears safe to d/c home with family and PT to sign off at this time. If status changes new orders required.

## 2022-10-07 NOTE — PLAN OF CARE
Goal Outcome Evaluation:      Discussed plan of care with patient. Pt was able to rest through the night with eyes closed. No major complaints.

## 2022-10-07 NOTE — THERAPY EVALUATION
Patient Name: Susy Hanson  : 1960    MRN: [id number]                              Today's Date: 10/7/2022       Admit Date: 10/3/2022    Visit Dx:     ICD-10-CM ICD-9-CM   1. Chest pain, unspecified type  R07.9 786.50   2. Hemopericardium  I31.2 423.0     Patient Active Problem List   Diagnosis   • Encounter for general adult medical examination without abnormal findings   • Essential hypertension   • Irritable bowel syndrome   • Mixed anxiety depressive disorder   • Mixed hyperlipidemia   • Obesity   • Encounter for screening for malignant neoplasm of colon   • Type 2 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 (HCC)   • Vitamin D deficiency   • Hypertensive urgency   • Peripheral neuropathy   • ANDREAS on CPAP   • Chest pain, atypical   • Breast neoplasm, Tis (DCIS), left   • Cold intolerance   • Insomnia   • Chest pain, unspecified type   • Dyspnea   • Acute systolic heart failure (CMS/HCC)   • Acute kidney injury (HCC)   • LBBB (left bundle branch block)   • NICM (nonischemic cardiomyopathy) (HCC)   • Cardiomyopathy (HCC)   • Presence of automatic cardioverter/defibrillator (AICD)   • Hemopericardium     Past Medical History:   Diagnosis Date   • Anxiety    • Breast cancer (HCC)     LEFT BREAST 2021   • CHF (congestive heart failure) (HCC)    • Depression    • DM (diabetes mellitus) (HCC)    • HL (hearing loss)    • Hyperlipidemia    • Hypertension     SAW CARDIO/ STRESS TEST  - NOW MEDS MANAGED BY PCP    • Irritable bowel syndrome    • Peripheral neuropathy    • PONV (postoperative nausea and vomiting)    • Sleep apnea     WEARS CPAP     Past Surgical History:   Procedure Laterality Date   • BLADDER REPAIR     • BREAST BIOPSY     • BREAST RECONSTRUCTION Bilateral 2021    Procedure: BILATERAL PREPECTORALPLACMENT TISSUE EXPANDERS AND ALLODERM;  Surgeon: Heri Arreaga MD;  Location: [facility] OR;  Service: Plastics;  Laterality: Bilateral;   • BREAST  TISSUE EXPANDER REMOVAL INSERTION OF IMPLANT Bilateral 12/23/2021    Procedure: BILATERAL REMOVAL TISSUE EXPANDERS AND PLACEMENT OF IMPLANTS;  Surgeon: Heri Arreaga MD;  Location: [facility] MAIN OR;  Service: Plastics;  Laterality: Bilateral;   • CARDIAC CATHETERIZATION Left 04/13/2022    Procedure: Cardiac Catheterization/Vascular Study;  Surgeon: Christo Murphy MD;  Location:  [facility] CATH INVASIVE LOCATION;  Service: Cardiovascular;  Laterality: Left;   • CARDIAC ELECTROPHYSIOLOGY PROCEDURE N/A 9/19/2022    Procedure: ICD implant St. Sude aware;  Surgeon: Esteban Kendrick MD;  Location:  [facility] CATH INVASIVE LOCATION;  Service: Cardiology;  Laterality: N/A;   • CARDIAC ELECTROPHYSIOLOGY PROCEDURE N/A 9/20/2022    Procedure: lead repositioning;  Surgeon: Esteban Kendrick MD;  Location: [facility] CATH INVASIVE LOCATION;  Service: Cardiology;  Laterality: N/A;   • CARDIOVASCULAR STRESS TEST  2020   • COLONOSCOPY  11/2012   • HYSTERECTOMY     • LYMPH NODE BIOPSY  8/29/2021   • MASTECTOMY W/ SENTINEL NODE BIOPSY Bilateral 08/26/2021    Procedure: bilateral total mastectomy, left axillary sentinel lymph node biopsy, possible reconstruction.;  Surgeon: Asia Perkins MD;  Location: [facility] OR;  Service: General;  Laterality: Bilateral;   • TUBAL ABDOMINAL LIGATION  1981      General Information     Row Name 10/07/22 1526          Physical Therapy Time and Intention    Document Type evaluation  -EL     Mode of Treatment individual therapy;physical therapy  -EL     Row Name 10/07/22 1526          General Information    Patient Profile Reviewed yes  -EL     Prior Level of Function independent:;all household mobility;ADL's;driving  -EL     Row Name 10/07/22 1526          Living Environment    People in Home spouse  -EL     Row Name 10/07/22 1526          Home Main Entrance    Number of Stairs, Main Entrance three  -EL     Row Name 10/07/22 1526          Stairs Within Home, Primary    Number of Stairs, Within  Home, Primary none  -EL     Row Name 10/07/22 1526          Cognition    Orientation Status (Cognition) oriented x 4  -EL           User Key  (r) = Recorded By, (t) = Taken By, (c) = Cosigned By    Initials Name Provider Type    Deuce Jenkins, PT Physical Therapist               Mobility     Row Name 10/07/22 1532          Bed Mobility    Bed Mobility bed mobility (all) activities  -EL     All Activities, [place] (Bed Mobility) independent  -EL     Row Name 10/07/22 1532          Sit-Stand Transfer    Sit-Stand [place] (Transfers) independent  -EL     Row Name 10/07/22 1532          Gait/Stairs (Locomotion)    [place] Level (Gait) independent  -EL     Distance in Feet (Gait) 120  -EL     Comment, (Gait/Stairs) no significant gait deficits, VSS throughout  -EL           User Key  (r) = Recorded By, (t) = Taken By, (c) = Cosigned By    Initials Name Provider Type    Deuce Jenkins, DAKOTAH Physical Therapist               Obj/Interventions     Row Name 10/07/22 1532          Range of Motion Comprehensive    General Range of Motion no range of motion deficits identified  -EL     Row Name 10/07/22 1532          Strength Comprehensive (MMT)    General Manual Muscle Testing (MMT) Assessment no strength deficits identified  -     Row Name 10/07/22 1532          Balance    Balance Assessment sitting static balance;standing static balance;standing dynamic balance  -EL     Static Sitting Balance independent  -EL     Static Standing Balance independent  -EL     Dynamic Standing Balance independent  -EL     Row Name 10/07/22 1532          Sensory Assessment (Somatosensory)    Sensory Assessment (Somatosensory) sensation intact  -EL           User Key  (r) = Recorded By, (t) = Taken By, (c) = Cosigned By    Initials Name Provider Type    Deuce Jenkins, DAKOTAH Physical Therapist               Goals/Plan    No documentation.                Clinical Impression     Row Name 10/07/22 1537          Pain    Pretreatment Pain  Rating 0/10 - no pain  -EL     Posttreatment Pain Rating 0/10 - no pain  -EL     Row Name 10/07/22 1537          Plan of Care Review    Plan of Care Reviewed With patient  -EL     Outcome Evaluation Pt is a 61 YO F admitted with chest pain, symptoms resolved this date. Pt states she lives at home wiht spouse, typically is totally independent with ADLs, ambulation without AD and no recent falls. This date pt demonstrates baseline mobility, ambulating without need for assistance and no LOB. VSS throughout evaluation. Pt appears safe to d/c home with family and PT to sign off at this time. If status changes new orders required.  -     Row Name 10/07/22 1537          Therapy Assessment/Plan (PT)    Criteria for Skilled Interventions Met (PT) no;no problems identified which require skilled intervention  -EL     Therapy Frequency (PT) evaluation only  -     Row Name 10/07/22 1537          Vital Signs    O2 Delivery Pre Treatment room air  -EL     O2 Delivery Intra Treatment room air  -EL     O2 Delivery Post Treatment room air  -EL     Pre Patient Position Supine  -EL     Intra Patient Position Standing  -EL     Post Patient Position Supine  -EL     Row Name 10/07/22 1537          Positioning and Restraints    Pre-Treatment Position in bed  -EL     Post Treatment Position bed  -EL           User Key  (r) = Recorded By, (t) = Taken By, (c) = Cosigned By    Initials Name Provider Type    Deuce Jenkins, DAKOTAH Physical Therapist               Outcome Measures    No documentation.                              Physical Therapy Education                 Title: PT OT SLP Therapies (Done)     Topic: Physical Therapy (Done)     Point: Mobility training (Done)     Learning Progress Summary           Patient Acceptance, E,TB, VU by WILLIS at 10/7/2022 1545                   Point: Precautions (Done)     Learning Progress Summary           Patient Acceptance, E,TB, VU by WILLIS at 10/7/2022 1545                               User Key      Initials Effective Dates Name Provider Type Discipline     06/23/20 -  Deuce Guerin PT Physical Therapist PT              PT Recommendation and Plan     Plan of Care Reviewed With: patient  Outcome Evaluation: Pt is a 63 YO F admitted with chest pain, symptoms resolved this date. Pt states she lives at home wiht spouse, typically is totally independent with ADLs, ambulation without AD and no recent falls. This date pt demonstrates baseline mobility, ambulating without need for assistance and no LOB. VSS throughout evaluation. Pt appears safe to d/c home with family and PT to sign off at this time. If status changes new orders required.     Time Calculation:    PT Charges     Row Name 10/07/22 1545             Time Calculation    Start Time 1426  -EL      Stop Time 1436  -EL      Time Calculation (min) 10 min  -EL      PT Received On 10/07/22  -EL            User Key  (r) = Recorded By, (t) = Taken By, (c) = Cosigned By    Initials Name Provider Type    EL Deuce Guerin PT Physical Therapist              Therapy Charges for Today     Code Description Service Date Service Provider Modifiers Qty    [id number] HC PT EVAL LOW COMPLEXITY 2 10/7/2022 Deuce Guerin, PT GP 1          PT G-Codes  AM-PAC 6 Clicks Score (PT): 24    Deuce Guerin PT  10/7/2022

## 2022-10-07 NOTE — OUTREACH NOTE
Prep Survey    Flowsheet Row Responses   [facility] facility patient discharged from? [facility]   Is LACE score < 7 ? No   Emergency Room discharge w/ pulse ox? No   Eligibility [facility]   Date of Admission 10/03/22   Date of Discharge 10/07/22   Discharge Disposition Home or Self Care   Discharge diagnosis Hemopericardium  Acute systolic heart failure    Does the patient have one of the following disease processes/diagnoses(primary or secondary)? CHF   Does the patient have Home health ordered? No   Is there a DME ordered? No   Prep survey completed? Yes          XAVIER LUJAN - Registered Nurse

## 2022-10-07 NOTE — PROGRESS NOTES
[facility] Medicine Services Daily Progress Note    Patient Name: Susy Hanson  : 1960  MRN: [id number]  Primary Care Physician:  Erica Avila MD  Date of admission: 10/3/2022      Subjective      Chief Complaint: Chest pain      Patient Reports     10/5/22: Chest pain worse with movements.  Afebrile.  Seen by EP cardiology and general cardiology.    10/6/22: Feels better.  No chest pain.  Afebrile.  Able to ambulate okay.    10/7/22: Denies chest pain.  Appreciate cardiology input.    Review of Systems   All other systems reviewed and are negative.         Objective      Vitals:   Temp:  [97.8 °F (36.6 °C)-98.3 °F (36.8 °C)] 98 °F (36.7 °C)  Heart Rate:  [60-71] 60  Resp:  [14-19] 18  BP: (132-160)/(66-78) 155/68    Physical Exam  HENT:      Head: Normocephalic.      Nose: Nose normal.   Eyes:      Extraocular Movements: Extraocular movements intact.      Pupils: Pupils are equal, round, and reactive to light.   Cardiovascular:      Rate and Rhythm: Normal rate and regular rhythm.   Pulmonary:      Effort: Pulmonary effort is normal.   Abdominal:      General: Bowel sounds are normal.   Musculoskeletal:         General: Normal range of motion.      Cervical back: Normal range of motion.   Skin:     General: Skin is warm.   Neurological:      Mental Status: She is alert. Mental status is at baseline.   Psychiatric:         Mood and Affect: Mood normal.             Result Review    Result Review:  I have personally reviewed the results from the time of this admission to 10/7/2022 14:20 EDT and agree with these findings:  [x]  Laboratory  []  Microbiology  [x]  Radiology  []  EKG/Telemetry   []  Cardiology/Vascular   []  Pathology  [x]  Old records  []  Other:            Assessment & Plan      Brief Patient Summary:    62-year-old female with recent AICD placement and subsequent repositioning of RV lead and presentation with chest pain due to  pericarditis    atorvastatin, 20 mg, Oral, Nightly  colchicine, 0.6 mg, Oral, Daily  insulin lispro, 0-7 Units, Subcutaneous, Q6H  metoprolol succinate XL, 25 mg, Oral, Q12H  midodrine, 2.5 mg, Oral, TID AC  sacubitril-valsartan, 0.5 tablet, Oral, Q12H  senna-docusate sodium, 2 tablet, Oral, BID  sodium chloride, 10 mL, Intravenous, Q12H             Active Hospital Problems:  Active Hospital Problems    Diagnosis    • **Hemopericardium    • Acute systolic heart failure (CMS/HCC)    • Chest pain, unspecified type    • Type 2 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 (HCC)    • Essential hypertension      Chest pain due to pericarditis:  -Due to multiple lead positioning due to increased threshold  -s/p CTA chest in ED  -Appreciate cardiology and EP cardiology input  -Responding to colchicine    Nonischemic cardiomyopathy s/p biventricular ICD 9/19/2022 s/p repositioning of RV lead complicated with pericarditis:  -Device interrogated  -EP cardiology following at home  -On Entresto, metoprolol succinate at home  -s/p C 4/13/2022--> nonobstructive CAD.    Type 2 diabetes mellitus:  -Continue ISS  -Hold metformin during hospitalization    Hypertension complicated with hypotension  -Continue metoprolol  -Continue midodrine    Hyperlipidemia:  -Continue statin    DVT prophylaxis:  Mechanical DVT prophylaxis orders are present.    CODE STATUS:    Level Of Support Discussed With: Patient  Code Status (Patient has no pulse and is not breathing): CPR (Attempt to Resuscitate)  Medical Interventions (Patient has pulse or is breathing): Full Support      Disposition:  I expect patient to be discharged home    This patient has been examined wearing appropriate Personal Protective Equipment and discussed with nursing. 10/07/22      Electronically signed by Josh Tinoco DO, 10/07/22, 14:20 EDT.  [facility]ist Team

## 2022-10-07 NOTE — PLAN OF CARE
Goal Outcome Evaluation:              Outcome Evaluation: Patient pleasant and cooperative. Denies pain. Family at bedside. PT ordered.

## 2022-10-07 NOTE — PROGRESS NOTES
Cardiology [place]        LOS:  LOS: 3 days   Patient Name: Susy Hanson  Age/Sex: 62 y.o. female  : 1960  MRN: [id number]    Day of Service: 10/07/22   Length of Stay: 3  Encounter Provider: Christo Murphy MD  Place of Service: [facility] CARDIOLOGY  Patient Care Team:  Erica Avila MD as PCP - Sb Peterson MD as Consulting Physician (Nephrology)  Esteban Kendrick MD as Consulting Physician (Cardiology)  Sandra Harrington RN as Ambulatory  ([facility])    Subjective:     Chief Complaint: f/u chest pain / pericarditis / NICM    Subjective: patient's blood pressure improving, no acute CV events.    Current Medications:   Scheduled Meds:atorvastatin, 20 mg, Oral, Nightly  colchicine, 0.6 mg, Oral, Daily  insulin lispro, 0-7 Units, Subcutaneous, Q6H  metoprolol succinate XL, 25 mg, Oral, Q12H  midodrine, 2.5 mg, Oral, TID AC  sacubitril-valsartan, 0.5 tablet, Oral, Q12H  senna-docusate sodium, 2 tablet, Oral, BID  sodium chloride, 10 mL, Intravenous, Q12H      Continuous Infusions:     Allergies:  Allergies   Allergen Reactions   • Hydrocodone-Acetaminophen Hives     Lortab, takes tylenol   • Tramadol Hives       Review of Systems   Constitutional: Negative for chills and fever.   HENT: Negative for ear discharge and nosebleeds.    Eyes: Negative for discharge and redness.   Cardiovascular: Negative for chest pain, orthopnea, palpitations, paroxysmal nocturnal dyspnea and syncope.   Respiratory: Negative for cough, shortness of breath and wheezing.    Endocrine: Negative for heat intolerance.   Skin: Negative for rash.   Musculoskeletal: Negative for arthritis and myalgias.   Gastrointestinal: Negative for abdominal pain, melena, nausea and vomiting.   Genitourinary: Negative for dysuria and hematuria.   Neurological: Negative for dizziness, light-headedness, numbness and tremors.   Psychiatric/Behavioral: Negative for depression.  The patient is not nervous/anxious.          Objective:     Temp:  [97.8 °F (36.6 °C)-98.3 °F (36.8 °C)] 97.8 °F (36.6 °C)  Heart Rate:  [60-71] 66  Resp:  [14-19] 18  BP: (132-155)/(66-74) 144/66     Intake/Output Summary (Last 24 hours) at 10/7/2022 1650  Last data filed at 10/7/2022 1633  Gross per 24 hour   Intake 740 ml   Output 200 ml   Net 540 ml     Body mass index is 26.43 kg/m².      10/04/22  1508 10/07/22  0408 10/07/22  1332   Weight: 67.6 kg (149 lb) 70.3 kg (154 lb 15.7 oz) 69.9 kg (154 lb)         General Appearance:    Alert, cooperative, in no acute distress                                Head: Atraumatic, normocephalic, PERRLA               Neck:   supple, trachea midline, no thyromegaly, no carotid bruit, no JVD   Lungs:     Clear to auscultation, respirations regular, even and               unlabored    Heart:    Regular rhythm and normal rate, normal S1 and S2   Abdomen:     Normal bowel sounds, no masses, no organomegaly, soft  nontender, nondistended, no guarding, no rebound  tenderness   Extremities:   Moves all extremities well, no edema, no cyanosis, no  redness   Pulses:   Pulses palpable and equal bilaterally   Skin:   No bleeding, bruising or rash   Neurologic:   Awake, alert, oriented x3         Lab Review:   Results from last 7 days   Lab Units 10/07/22  0441 10/05/22  1548 10/04/22  0601 10/04/22  0017   SODIUM mmol/L 143 140   < > 135*   POTASSIUM mmol/L 4.3 5.4*   < > 4.2   CHLORIDE mmol/L 109* 107   < > 97*   CO2 mmol/L 24.0 23.0   < > 23.0   BUN mg/dL 11 15   < > 29*   CREATININE mg/dL 0.85 0.87   < > 1.05*   GLUCOSE mg/dL 92 110*   < > 109*   CALCIUM mg/dL 9.0 9.0   < > 10.1   AST (SGOT) U/L  --   --   --  18   ALT (SGPT) U/L  --   --   --  11    < > = values in this interval not displayed.     Results from last 7 days   Lab Units 10/04/22  0017   TROPONIN T ng/mL <0.010     Results from last 7 days   Lab Units 10/07/22  0441 10/05/22  1548   WBC 10*3/mm3 5.30 7.90   HEMOGLOBIN  g/dL 9.3* 9.7*   HEMATOCRIT % 28.2* 29.3*   PLATELETS 10*3/mm3 208 234                   Invalid input(s): LDLCALC  Results from last 7 days   Lab Units 10/04/22  0017   PROBNP pg/mL 360.8           Recent Radiology:  Imaging Results (Most Recent)     Procedure Component Value Units Date/Time    CT Angiogram Chest Pulmonary Embolism [[id number]] Collected: 10/04/22 0342     Updated: 10/04/22 0353    Narrative:      Exam: CT Angiography of the Chest.    Date: 10/4/2022.     Comparison: 4/11/2022.    History: Defibrillator placement with chest pain.    Technique: CT examination of the chest was performed following the intravenous administration of 100 mL of Isovue-370. Sagittal, coronal and 3-D reformatted images were provided. CT dose lowering techniques were used, to include: automated exposure   control, adjustment for patient size, and/or use of iterative reconstruction.    FINDINGS:    CHEST WALL: There are bilateral breast implants.    Mediastinum and Theresa: There is no axillary, mediastinal or hilar lymphadenopathy.    Pleural and Pericardial spaces: There is a small to moderate pericardial effusion which is higher density than simple fluid which may relate to a hemopericardium.    Upper Abdomen: The visualized upper abdomen is unremarkable.    Cardiovascular: AICD generator has leads in the right atrium and right ventricle. There is mild vascular calcification throughout the thoracic aorta without evidence of aneurysmal dilation or dissection.    Pulmonary Artery: There are no filling defects in the pulmonary arteries.    Lung Parenchyma and Airways: The lungs are clear.    Bones: No fracture or aggressive osseous lesion.      Impression:        1. No evidence of pulmonary embolism.  2. No evidence of thoracic aortic aneurysm or dissection.  3. Small to moderate amount of hemopericardium.    These findings were discussed with COURTNEY Atkinson.      Electronically signed by:  Amish Ellsworth D.O.    10/4/2022  1:51 AM    XR Chest 1 View [[id number]] Collected: 10/04/22 0232     Updated: 10/04/22 0234    Narrative:      EXAMINATION: XR CHEST 1 VW      DATE OF EXAM: 10/4/2022 12:28 AM  SLOT: 60    HISTORY: Chest pain     COMPARISON: None.    FINDINGS:    HEART/MEDIASTINUM: Mildly enlarged cardiac silhouette. Radiation is the origin of the right atrium, right ventricle, and coronary sinus.    LUNGS/PLEURA: Linear subsegmental atelectasis in the left lung base. Otherwise, grossly clear.     MUSCULOSKELETAL: No acute osseous pathology. Surgical clips overlie the axilla bilaterally.        Impression:        1.  No acute cardiopulmonary abnormality detected.          Electronically signed by:  Yony Patricio M.D.    10/4/2022 12:33 AM          ECHOCARDIOGRAM:    Results for orders placed during the hospital encounter of 10/03/22    Adult Transthoracic Echo Limited W/ Cont if Necessary Per Protocol    Interpretation Summary  · Left ventricular systolic function is moderately decreased.  · Left ventricular ejection fraction is 45%  · Left ventricular wall thickness is consistent with mild concentric hypertrophy.  · Small to moderate pericardial effusion measuring 1.1 cm. It appears to be slightly better than previous echocardiogram. No pericardial tamponade noted.        I reviewed the patient's new clinical results.    EKG:      Assessment:       Hemopericardium    Essential hypertension    Type 2 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 (HCC)    Chest pain, unspecified type    Acute systolic heart failure (CMS/HCC)    1. Chest Pain / pericardial effusion / pericarditis  - 2D ECHO shows LVEF 45%, grade 1 DD, 1-2cm moderate circumferential pericardial effusion  - started on NSAIDS and colchicine     2. Increase RV thresholds  - EP following, per EP device reprogrammed for LV only pacing      3. NICM, chronic systolic HF  - LVEF 15-20% now improved to 45%  - GDMT: toprol XL, entresto     4. Non obstructive  CAD on [facility] 4/2022     5. HTN although patient has been borderline blood pressures while hospitalized     6. HLD     7. DM    Plan:   Continue colchicine and NSAIDS  Blood pressure improving, toprol restarted  Will start entresto at 1/2 the tab  BID  LVEF improved to 45%  Patient requesting EP second opinion- will ask Dr. Jin to see   Repeat limited echo today to assess effusion  Remote transmission of Device reviewed, no events.   LV pacing 100%  Will repeat echocardiogram as op in 1-2 weeks  Additional recommendations per Dr. Murphy     Patient is seen and examined and findings are verified.  All data is reviewed by me personally.  Assessment and plan formulated by APC was done after discussion with attending.  I spent more than 50% of time in taking care of the patient.    Patient is sitting up in the chair and all family members were there.  Patient denies any chest pain.  No shortness of breath    Hemodynamics are stable    Normal S1 and S2.  No pericardial rub no murmur abdominal exam is benign.    Repeat echocardiogram showed small pericardial effusion.  It has slightly improved from earlier echo.  There is no tamponade.  RV size was improved.  At this stage I will continue current treatment.  Patient can be discharged and follow-up with Dr. Jin in 2 weeks and follow-up with me in 4 weeks.    Electronically signed by Christo Murphy MD, 10/07/22, 4:50 PM EDT.            Christo Murphy MD  10/07/22  16:50 EDT

## 2022-10-08 PROBLEM — I31.9 PERICARDITIS: Status: ACTIVE | Noted: 2022-10-08

## 2022-10-08 PROBLEM — R07.9 CHEST PAIN, UNSPECIFIED TYPE: Status: RESOLVED | Noted: 2022-04-11 | Resolved: 2022-10-08

## 2022-10-08 NOTE — DISCHARGE SUMMARY
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] Yes     Priority: Medium   • ANDREAS on CPAP [G47.33, Z99.89] Not Applicable     Priority: Medium   • Type 2 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 (HCC) [E11.49] Yes     Priority: Medium   • Mixed anxiety depressive disorder [F41.8] Yes     Priority: Medium   • Essential hypertension [I10] Yes     Priority: Medium   • Mixed hyperlipidemia [E78.2] Yes     Priority: Medium   • NICM (nonischemic cardiomyopathy) (HCC) [I42.8] Yes   • Acute systolic heart failure (CMS/HCC) [I50.21] Yes      Resolved Hospital Problems    Diagnosis POA   • Chest pain, unspecified type [R07.9] Yes     Priority: High         Hospital Course     Hospital Course:    The patient is a very pleasant 62-year-old female with significant history of systolic heart failure, anxiety, depression, ANDREAS, diabetes mellitus, hypertension and hyperlipidemia.  The patient had recently undergone AICD placement  (2022) and RV lead revision on 2022 and recurrent RV lead elevated threshold requiring RV lead eventually being programmed off with left ventricle only pacing nonischemic cardiomyopathy.      The patient was experiencing chest pain at home worse with movements thus came to the ED on 10/3/2022.  CT of the chest in the ED showed hemopericardium and was diagnosed with pericarditis.    The patient was admitted to the medical floor and was treated  with colchicine and chest pain resolved.  General surgeon and EP cardiologist evaluated the patient.  She has been able to tolerate diet and has not had any chest pain or shortness of air.  She has been hemodynamically stable during the hospital stay.  She was discharged home on 10/7/2022.  She will follow-up with Dr. Jin in 2 weeks and Dr. Murphy in 4 weeks.      DISCHARGE Follow Up Recommendations for labs and diagnostics:      Reasons For Change In Medications and Indications for New Medications:      Day of Discharge     Vital Signs:       Physical Exam:  Physical Exam  HENT:      Head: Normocephalic.      Nose: Nose normal.   Eyes:      Extraocular Movements: Extraocular movements intact.      Pupils: Pupils are equal, round, and reactive to light.   Cardiovascular:      Rate and Rhythm: Normal rate and regular rhythm.   Pulmonary:      Effort: Pulmonary effort is normal.   Abdominal:      General: Bowel sounds are normal.   Musculoskeletal:         General: Normal range of motion.      Cervical back: Normal range of motion.   Skin:     General: Skin is warm.   Neurological:      Mental Status: She is alert. Mental status is at baseline.   Psychiatric:         Mood and Affect: Mood normal.            Pertinent  and/or Most Recent Results     LAB RESULTS:      Lab 10/07/22  0441 10/05/22  1548 10/04/22  0601 10/04/22  0017   WBC 5.30 7.90 12.10* 10.90*   HEMOGLOBIN 9.3* 9.7* 10.3* 11.4*   HEMATOCRIT 28.2* 29.3* 32.0* 35.2   PLATELETS 208 234 257 302   NEUTROS ABS  --   --  9.50* 7.30*   LYMPHS ABS  --   --  1.50 1.90   MONOS ABS  --   --  1.00* 1.10*   EOS ABS  --   --  0.10 0.40   MCV 86.9 87.2 87.5 88.2         Lab 10/07/22  0441 10/05/22  1548 10/04/22  1714 10/04/22  0601 10/04/22  0017   SODIUM 143 140 140 136 135*   POTASSIUM 4.3 5.4* 4.0 5.9* 4.2   CHLORIDE 109* 107 105 102 97*   CO2 24.0 23.0 23.0 24.0 23.0   ANION GAP 10.0 10.0 12.0 10.0 15.0   BUN 11 15 24* 31* 29*   CREATININE 0.85 0.87 0.92 1.18*  1.05*   EGFR 76.6 74.5 69.7 51.7* 59.5*   GLUCOSE 92 110* 77 124* 109*   CALCIUM 9.0 9.0 8.8 9.6 10.1   HEMOGLOBIN A1C  --   --   --  5.9*  --          Lab 10/04/22  0017   TOTAL PROTEIN 7.6   ALBUMIN 4.60   GLOBULIN 3.0   ALT (SGPT) 11   AST (SGOT) 18   BILIRUBIN 0.2   ALK PHOS 77         Lab 10/04/22  0017   PROBNP 360.8   TROPONIN T <0.010                 Brief Urine Lab Results  (Last result in the past 365 days)      Color   Clarity   Blood   Leuk Est   Nitrite   Protein   CREAT   Urine HCG        09/17/22 0827             43.9         09/17/22 0827 Yellow   Clear   Negative   Negative   Negative   Negative               Microbiology Results (last 10 days)     ** No results found for the last 240 hours. **          XR Chest 1 View    Result Date: 10/4/2022  Impression: 1.  No acute cardiopulmonary abnormality detected.  Electronically signed by:  Yony Patricio M.D.  10/4/2022 12:33 AM    XR Chest 1 View    Result Date: 9/23/2022  Impression: IMPRESSION : Limited negative portable chest[  Electronically Signed By-Patrick Harris On:9/23/2022 7:52 PM This report was finalized on [id number] by  Patrick Harris, .    XR Chest 1 View    Result Date: 9/20/2022  Impression: IMPRESSION : No acute process.[  Electronically Signed By-Patrick Harris On:9/20/2022 9:48 PM This report was finalized on [id number] by  Patrick Harris, .    XR Chest 1 View    Result Date: 9/20/2022  Impression: 1.     Stable appearance of pacemaker/ICD leads. No visible pneumothorax. 2.     Lungs appear clear.  Electronically Signed By-Court Sheikh MD On:9/20/2022 10:53 AM This report was finalized on [id number] by  Court Sheikh MD.    XR Chest 1 View    Result Date: 9/19/2022  Impression: 1.     No pneumothorax visible following pacemaker placement. 2.     Mild bibasilar airspace opacities, likely atelectasis.  Electronically Signed By-Court Sheikh MD On:9/19/2022 3:32 PM This report was finalized on [id number] by   Court Sheikh MD.    CT Angiogram Chest Pulmonary Embolism    Result Date: 10/4/2022  Impression: 1. No evidence of pulmonary embolism. 2. No evidence of thoracic aortic aneurysm or dissection. 3. Small to moderate amount of hemopericardium. These findings were discussed with NP Jackie Atkinson. Electronically signed by:  Amish Ellsworth D.O.  10/4/2022 1:51 AM              Results for orders placed during the hospital encounter of 10/03/22    Adult Transthoracic Echo Limited W/ Cont if Necessary Per Protocol    Interpretation Summary  · Left ventricular systolic function is moderately decreased.  · Left ventricular ejection fraction is 45%  · Left ventricular wall thickness is consistent with mild concentric hypertrophy.  · Small to moderate pericardial effusion measuring 1.1 cm. It appears to be slightly better than previous echocardiogram. No pericardial tamponade noted.      Labs Pending at Discharge:      Procedures Performed           Consults:   Consults     Date and Time Order Name Status Description    10/4/2022  5:07 AM Inpatient Cardiology Consult Completed             Discharge Details        Discharge Medications      New Medications      Instructions Start Date   colchicine 0.6 MG tablet   0.6 mg, Oral, Daily      midodrine 2.5 MG tablet  Commonly known as: PROAMATINE   2.5 mg, Oral, 3 Times Daily Before Meals         Continue These Medications      Instructions Start Date   ALPRAZolam 1 MG tablet  Commonly known as: XANAX   1 mg, Oral, 2 Times Daily PRN      atorvastatin 20 MG tablet  Commonly known as: LIPITOR   20 mg, Oral, Daily      bumetanide 1 MG tablet  Commonly known as: BUMEX   1 mg, Oral, Daily      cetirizine 10 MG tablet  Commonly known as: zyrTEC   10 mg, Oral, Daily PRN      Entresto 24-26 MG tablet  Generic drug: sacubitril-valsartan   1 tablet, Oral, Every 12 Hours Scheduled      metFORMIN 1000 MG tablet  Commonly known as: GLUCOPHAGE   1,000 mg, Oral, Daily With Breakfast       metFORMIN 1000 MG tablet  Commonly known as: GLUCOPHAGE   1,500 mg, Oral, Daily With Dinner      metFORMIN 1000 MG tablet  Commonly known as: GLUCOPHAGE   Take one tablet by mouth with breakfast and 1 1/2 tablets with supper      metoprolol succinate  MG 24 hr tablet  Commonly known as: TOPROL-XL   take 1 tablet by oral route  every day      Nasacort Allergy 24HR 55 MCG/ACT nasal inhaler  Generic drug: Triamcinolone Acetonide   APPLY 2 SPRAYS BY NASAL ROUTE DAILY FOR 30 DAYS      ondansetron 4 MG tablet  Commonly known as: Zofran   4 mg, Oral, Every 8 Hours PRN             Allergies   Allergen Reactions   • Hydrocodone-Acetaminophen Hives     Lortab, takes tylenol   • Tramadol Hives         Discharge Disposition:   Home or Self Care    Diet:  Hospital:No active diet order        Discharge Activity:   Activity Instructions    Resume activity as tolerated.           Discharge Condition: Stable.  No chest pain      CODE STATUS:  Code Status and Medical Interventions:   Ordered at: 10/04/22 0503     Level Of Support Discussed With:    Patient     Code Status (Patient has no pulse and is not breathing):    CPR (Attempt to Resuscitate)     Medical Interventions (Patient has pulse or is breathing):    Full Support         Future Appointments   Date Time Provider Department Center   12/22/2022  1:00 PM Christo Murphy MD [facility]   1/3/2023  9:00 AM Sam Quintana MD [facility]   3/14/2023 12:30 PM Erica Avila MD [facility]   8/1/2023  9:20 AM Guru Coulter APRN [facility]       Additional Instructions for the Follow-ups that You Need to Schedule     Discharge Follow-up with PCP   As directed       Currently Documented PCP:    Erica Avila MD    PCP Phone Number:    [phone]     Follow Up Details: 2 weeks         Discharge Follow-up with Specified Provider: Dr. Murphy, cardiology; 1 Month   As directed      To: Dr. Murphy, cardiology    Follow Up: 1  Month         Discharge Follow-up with Specified Provider: Dr. greer; 2 Weeks   As directed      To: Dr. greer    Follow Up: 2 Weeks    Follow Up Details: EP cardiologist               Time spent on Discharge including face to face service: 20 minutes    This patient has been examined wearing appropriate Personal Protective Equipment and discussed with ant. 10/08/22      Signature: Electronically signed by Josh Tinoco DO, 10/08/22, 5:53 PM EDT.

## 2022-10-09 LAB — QT INTERVAL: 352 MS

## 2022-10-10 ENCOUNTER — TRANSITIONAL CARE MANAGEMENT TELEPHONE ENCOUNTER (OUTPATIENT)
Dept: CALL CENTER | Facility: HOSPITAL | Age: 62
End: 2022-10-10

## 2022-10-10 NOTE — OUTREACH NOTE
Call Center TCM Note    Flowsheet Row Responses   [facility] patient discharged from? [place]   Does the patient have one of the following disease processes/diagnoses(primary or secondary)? CHF   TCM attempt successful? Yes   Call start time 1226   Call end time 1229   Discharge diagnosis Hemopericardium  Acute systolic heart failure    Meds reviewed with patient/caregiver? Yes   Is the patient having any side effects they believe may be caused by any medication additions or changes? No   Does the patient have all medications ordered at discharge? Yes   Is the patient taking all medications as directed (includes completed medication regime)? Yes   Comments NO APPOINTMENTS AVAILABLE WITHING TCM TIMEFRAME   Has home health visited the patient within 72 hours of discharge? N/A   Psychosocial issues? No   Did the patient receive a copy of their discharge instructions? Yes   Nursing interventions Reviewed instructions with patient   What is the patient's perception of their health status since discharge? Improving   Nursing interventions Nurse provided patient education   Is the patient able to teach back signs and symptoms of worsening condition? (i.e. weight gain, shortness of air, etc.) Yes   If the patient is a current smoker, are they able to teach back resources for cessation? Not a smoker   Is the patient/caregiver able to teach back the hierarchy of who to call/visit for symptoms/problems? PCP, Specialist, Home health nurse, Urgent Care, ED, 911 Yes   TCM call completed? Yes           Molly Miramontes LPN    10/10/2022, 12:34 EDT

## 2022-10-10 NOTE — PROGRESS NOTES
"Enter Query Response Below      Query Response:     No chf this admission      Electronically signed by Christo Murphy MD, 10/10/22, 4:21 PM EDT.           If applicable, please update the problem list.      Patient: Susy Hanson        : 1960  Account: [id number]           Admit Date: 10/3/2022        How to Respond to this query:       a. Click New Note     b. Answer query within the yellow box.                c. Update the Problem List, if applicable.      If you have any questions about this query contact me at: [phone]    Dr. Murphy:    62-year-old female with history of systolic heart failure who recently underwent AICD placement on  and RV lead revision on   presented with chest pain and found to have hemopericardium and pericarditis.  Labs include proBNP 360.8.  Echocardiogram showed left ventricular ejection fraction is 45%.  H&P includes \"congestive heart failure systolic appears to be well compensated.\"  Cardiology progress note states \"chronic systolic HF.\"  Treatment includes home medications of toprox XL and entresto.      Can this be further clarified as:    - chronic systolic heart failure  - acute on chronic systolic heart failure  - other (specify) ___________________  - unable to determine       By submitting this query, we are merely seeking further clarification of documentation to accurately reflect all conditions that you are monitoring, evaluating, treating or that extend the hospitalization or utilize additional resources of care. Please utilize your independent clinical judgment when addressing the question(s) above.     This query and your response, once completed, will be entered into the legal medical record.    Sincerely,  Vandana Cosme RN, MSN  Clinical Documentation Integrity Program   [email or web address]    "

## 2022-10-10 NOTE — CASE MANAGEMENT/SOCIAL WORK
Case Management Discharge Note      Final Note: Home    Provided Post Acute Provider List?: N/A  N/A Provider List Comment: Anticipate routine home    Selected Continued Care - Discharged on 10/7/2022 Admission date: 10/3/2022 - Discharge disposition: Home or Self Care               Transportation Services  Private: Car    Final Discharge Disposition Code: 01 - home or self-care

## 2022-10-13 LAB — QT INTERVAL: 389 MS

## 2022-10-24 ENCOUNTER — OFFICE VISIT (OUTPATIENT)
Dept: CARDIOLOGY | Facility: CLINIC | Age: 62
End: 2022-10-24

## 2022-10-24 VITALS
HEART RATE: 77 BPM | SYSTOLIC BLOOD PRESSURE: 148 MMHG | DIASTOLIC BLOOD PRESSURE: 93 MMHG | BODY MASS INDEX: 23.05 KG/M2 | OXYGEN SATURATION: 98 % | WEIGHT: 135 LBS | HEIGHT: 64 IN

## 2022-10-24 DIAGNOSIS — I30.8 OTHER ACUTE PERICARDITIS: ICD-10-CM

## 2022-10-24 DIAGNOSIS — I42.8 NICM (NONISCHEMIC CARDIOMYOPATHY): Primary | ICD-10-CM

## 2022-10-24 DIAGNOSIS — I42.0 DILATED CARDIOMYOPATHY: ICD-10-CM

## 2022-10-24 PROCEDURE — 99213 OFFICE O/P EST LOW 20 MIN: CPT | Performed by: NURSE PRACTITIONER

## 2022-10-24 NOTE — PROGRESS NOTES
"  [facility] CARDIOLOGY      REASON FOR FOLLOW-UP:  Follow-up hospitalization for pericarditis          Chief Complaint   Patient presents with   • Hypertension   • Hyperlipidemia         Dear Erica Avila MD        History of Present Illness   It was my pleasure to see Ms. Hanson in the office today.  She is a 62-year-old female with no known history of ischemic heart disease.  Past medical history includes hypertension, dyslipidemia, diabetes mellitus 2, nonischemic cardiomyopathy, chronic left bundle branch block, breast cancer status post bilateral mastectomy with reconstructive surgery 2021, class II heart failure.    The underwent a biventricular ICD placement by Dr. Ellis 9/19/2022.  After ICD implantation RV lead had to be repositioned which was technically challenging because of issues of sensing and pacing.  Subsequently she was discharged home and she presented back to the hospital with acute chest pain consistent with pericarditis.  Pain was precordial into the back and worsened with positional changes and with deep breath.  Echocardiography and CT chest were done which reveals small to moderate effusion.  She was started on colchicine with resolution of symptoms.  RV ICD lead was not capturing and impedances fell down with appropriate sensing and however lead impedances appropriate.  The right atrial and coronary sinus leads were properly functioning and a consultation was requested a second opinion.  Ms. Hanson presents today in follow-up from that hospitalization.    Today, patient reports that she feels much better, but still feels very \"wiped out\" from all of her recent cardiac procedures and hospitalizations.  She denies any actual complaints of chest discomfort but does report that her device feels as if it is \"sitting on my implant\".  She denies any shortness of breath at rest, dyspnea with exertion, discomfort with inspiration.  She has had no " lower extremity edema, dizziness or lightheadedness.  She is still on colchicine.  Blood pressure is elevated 148/93      ASSESSMENT:  Severe nonischemic cardiomyopathy  Left bundle branch block  Essential hypertension  History of breast carcinoma   Acute pericarditis with small effusion      PLAN:  Stop colchicine  Stop midodrine  Patient okay for flu vaccine  Continue current CV plan of care that includes BB, Entresto, bumetanide, statin  Follow-up with Dr. Jin in 3 months or sooner if needed        The following portions of the patient's history were reviewed and updated as appropriate: allergies, current medications, past family history, past medical history, past social history, past surgical history and problem list.    REVIEW OF SYSTEMS:    Review of Systems   All other systems reviewed and are negative.      Vitals:    10/24/22 1408   BP: 148/93   Pulse: 77   SpO2: 98%         PHYSICAL EXAM:    General: Alert, cooperative, no distress, appears stated age  Head:  Normocephalic, atraumatic, mucous membranes moist  Eyes:  Conjunctiva/corneas clear, EOM's intact     Neck:  Supple,  no JVD or bruit     Lungs: Clear to auscultation bilaterally, no wheezes rhonchi rales are noted  Chest wall: No tenderness  Musculoskeletal:   Ambulates freely without assistance  Heart::  Regular rate and rhythm, S1 and S2 normal, no murmur, rub or gallop  Abdomen: Soft, non-tender, nondistended, bowel sounds active, no abdominal bruit  Extremities: No cyanosis, clubbing, or edema   Pulses: 2+ and symmetric all extremities  Skin:  No rashes or lesions  Neuro/psych: A&O x3. CN II through XII are grossly intact with appropriate affect        Past Medical History:   Diagnosis Date   • Anxiety 2000   • Breast cancer (HCC)     LEFT BREAST 7/2021   • CHF (congestive heart failure) (HCC)    • Depression    • DM (diabetes mellitus) (HCC)    • HL (hearing loss)    • Hyperlipidemia    • Hypertension     SAW CARDIO/ STRESS TEST 2020 -  NOW MEDS MANAGED BY PCP    • Irritable bowel syndrome 2006   • Peripheral neuropathy    • PONV (postoperative nausea and vomiting)    • Sleep apnea     WEARS CPAP       Past Surgical History:   Procedure Laterality Date   • BLADDER REPAIR     • BREAST BIOPSY     • BREAST RECONSTRUCTION Bilateral 08/26/2021    Procedure: BILATERAL PREPECTORALPLACMENT TISSUE EXPANDERS AND ALLODERM;  Surgeon: Heri Arreaga MD;  Location: [facility] MAIN OR;  Service: Plastics;  Laterality: Bilateral;   • BREAST TISSUE EXPANDER REMOVAL INSERTION OF IMPLANT Bilateral 12/23/2021    Procedure: BILATERAL REMOVAL TISSUE EXPANDERS AND PLACEMENT OF IMPLANTS;  Surgeon: Heri Arreaga MD;  Location: [facility] MAIN OR;  Service: Plastics;  Laterality: Bilateral;   • CARDIAC CATHETERIZATION Left 04/13/2022    Procedure: Cardiac Catheterization/Vascular Study;  Surgeon: Christo Murphy MD;  Location:  [facility] CATH INVASIVE LOCATION;  Service: Cardiovascular;  Laterality: Left;   • CARDIAC ELECTROPHYSIOLOGY PROCEDURE N/A 9/19/2022    Procedure: ICD implant St. Sude aware;  Surgeon: Esteban Kendrick MD;  Location:  [facility] CATH INVASIVE LOCATION;  Service: Cardiology;  Laterality: N/A;   • CARDIAC ELECTROPHYSIOLOGY PROCEDURE N/A 9/20/2022    Procedure: lead repositioning;  Surgeon: Esteban Kendrick MD;  Location:  [facility] C[facility] INVASIVE LOCATION;  Service: Cardiology;  Laterality: N/A;   • CARDIOVASCULAR STRESS TEST  2020   • COLONOSCOPY  11/2012   • HYSTERECTOMY     • LYMPH NODE BIOPSY  8/29/2021   • MASTECTOMY W/ SENTINEL NODE BIOPSY Bilateral 08/26/2021    Procedure: bilateral total mastectomy, left axillary sentinel lymph node biopsy, possible reconstruction.;  Surgeon: Asia Perkins MD;  Location: [facility] MAIN OR;  Service: General;  Laterality: Bilateral;   • TUBAL ABDOMINAL LIGATION  1981         Current Outpatient Medications:   •  ALPRAZolam (XANAX) 1 MG tablet, Take 1 tablet by mouth 2 (Two) Times a Day As Needed for Anxiety or  Sleep., Disp: 60 tablet, Rfl: 0  •  atorvastatin (LIPITOR) 20 MG tablet, Take 1 tablet by mouth Daily., Disp: 90 tablet, Rfl: 2  •  bumetanide (BUMEX) 1 MG tablet, Take 1 tablet by mouth Daily., Disp: 90 tablet, Rfl: 1  •  cetirizine (zyrTEC) 10 MG tablet, Take 10 mg by mouth Daily As Needed., Disp: , Rfl:   •  colchicine 0.6 MG tablet, Take 1 tablet by mouth Daily., Disp: 30 tablet, Rfl: 0  •  metFORMIN (GLUCOPHAGE) 1000 MG tablet, Take one tablet by mouth with breakfast and 1 1/2 tablets with supper (Patient taking differently: Take one tablet by mouth with breakfast and 1 1/2 tablets with supper), Disp: 225 tablet, Rfl: 1  •  metoprolol succinate XL (TOPROL-XL) 100 MG 24 hr tablet, take 1 tablet by oral route  every day, Disp: 90 tablet, Rfl: 3  •  midodrine (PROAMATINE) 2.5 MG tablet, Take 1 tablet by mouth 3 (Three) Times a Day Before Meals., Disp: 90 tablet, Rfl: 0  •  ondansetron (Zofran) 4 MG tablet, Take 1 tablet by mouth Every 8 (Eight) Hours As Needed for Nausea or Vomiting., Disp: 20 tablet, Rfl: 0  •  sacubitril-valsartan (Entresto) 24-26 MG tablet, Take 1 tablet by mouth Every 12 (Twelve) Hours., Disp: 180 tablet, Rfl: 1  •  Triamcinolone Acetonide (NASACORT) 55 MCG/ACT nasal inhaler, APPLY 2 SPRAYS BY NASAL ROUTE DAILY FOR 30 DAYS, Disp: 10.8 mL, Rfl: 6  •  metFORMIN (GLUCOPHAGE) 1000 MG tablet, Take 1,000 mg by mouth Daily With Breakfast., Disp: , Rfl:   •  metFORMIN (GLUCOPHAGE) 1000 MG tablet, Take 1,500 mg by mouth Daily With Dinner., Disp: , Rfl:     Allergies   Allergen Reactions   • Hydrocodone-Acetaminophen Hives     Lortab, takes tylenol   • Tramadol Hives       Family History   Problem Relation Age of Onset   • Alzheimer's disease Father    • Diabetes Father    • Heart disease Father    • Other Father    • Sleep apnea Father    • Snoring Father    • Hyperlipidemia Father    • Other Mother    • Diabetes Mother    • Heart disease Mother    • Sleep apnea Mother    • Snoring Mother    • COPD  "Mother    • Hyperlipidemia Mother    • Diabetes Sister    • Heart disease Sister    • Other Sister    • Sleep apnea Sister    • Snoring Sister    • COPD Sister    • Breast cancer Paternal Grandmother 60   • Cancer Paternal Grandmother    • Diabetes Paternal Grandmother    • Breast cancer Maternal Aunt 80   • Breast cancer Paternal Aunt    • Diabetes Maternal Grandmother    • Hyperlipidemia Sister    • Hyperlipidemia Brother    • Hyperlipidemia Son    • Malig Hyperthermia Neg Hx        Social History     Tobacco Use   • Smoking status: Former     Packs/day: 2.00     Years: 30.00     Pack years: 60.00     Types: Cigarettes     Start date: 1972     Quit date: 2008     Years since quittin.8   • Smokeless tobacco: Never   Substance Use Topics   • Alcohol use: No           Current Electrocardiogram:  Procedures        EMR Dragon/Transcription:   \"Dictated utilizing Dragon dictation\".       "

## 2022-10-28 ENCOUNTER — HOSPITAL ENCOUNTER (OUTPATIENT)
Dept: CARDIOLOGY | Facility: HOSPITAL | Age: 62
Discharge: HOME | End: 2022-10-28
Admitting: NURSE PRACTITIONER

## 2022-10-28 DIAGNOSIS — I31.2 HEMOPERICARDIUM: ICD-10-CM

## 2022-10-28 PROCEDURE — 93325 DOPPLER ECHO COLOR FLOW MAPG: CPT

## 2022-10-28 PROCEDURE — 93321 DOPPLER ECHO F-UP/LMTD STD: CPT

## 2022-10-28 PROCEDURE — 93308 TTE F-UP OR LMTD: CPT | Performed by: INTERNAL MEDICINE

## 2022-10-28 PROCEDURE — 93308 TTE F-UP OR LMTD: CPT

## 2022-10-28 PROCEDURE — 93321 DOPPLER ECHO F-UP/LMTD STD: CPT | Performed by: INTERNAL MEDICINE

## 2022-10-28 PROCEDURE — 93325 DOPPLER ECHO COLOR FLOW MAPG: CPT | Performed by: INTERNAL MEDICINE

## 2022-11-03 ENCOUNTER — CLINICAL SUPPORT NO REQUIREMENTS (OUTPATIENT)
Dept: CARDIOLOGY | Facility: CLINIC | Age: 62
End: 2022-11-03

## 2022-11-03 ENCOUNTER — OFFICE VISIT (OUTPATIENT)
Dept: CARDIOLOGY | Facility: CLINIC | Age: 62
End: 2022-11-03

## 2022-11-03 VITALS
WEIGHT: 137.6 LBS | DIASTOLIC BLOOD PRESSURE: 70 MMHG | HEIGHT: 64 IN | RESPIRATION RATE: 18 BRPM | BODY MASS INDEX: 23.49 KG/M2 | SYSTOLIC BLOOD PRESSURE: 122 MMHG | HEART RATE: 88 BPM

## 2022-11-03 DIAGNOSIS — I42.8 NICM (NONISCHEMIC CARDIOMYOPATHY): Primary | ICD-10-CM

## 2022-11-03 DIAGNOSIS — I10 ESSENTIAL HYPERTENSION: ICD-10-CM

## 2022-11-03 DIAGNOSIS — Z95.810 PRESENCE OF BIVENTRICULAR IMPLANTABLE CARDIOVERTER-DEFIBRILLATOR (ICD): ICD-10-CM

## 2022-11-03 DIAGNOSIS — I31.39 PERICARDIAL EFFUSION: ICD-10-CM

## 2022-11-03 LAB
MAXIMAL PREDICTED HEART RATE: 156 BPM
STRESS TARGET HR: 133 BPM

## 2022-11-03 PROCEDURE — 93284 PRGRMG EVAL IMPLANTABLE DFB: CPT | Performed by: NURSE PRACTITIONER

## 2022-11-03 PROCEDURE — 99024 POSTOP FOLLOW-UP VISIT: CPT | Performed by: NURSE PRACTITIONER

## 2022-11-03 NOTE — PROGRESS NOTES
Cardiology Office Follow Up Visit      Primary Care Provider:  Erica Avila MD    Reason for f/u:     Nonischemic cardiomyopathy  Pericardial effusion  Presence of biventricular ICD  Hypertension      Subjective     CC:    Denies chest pain or dyspnea    History of Present Illness       Susy Hanson is a 62 y.o. female.  Patient is a very pleasant 62-year-old female who has a history of nonischemic cardiomyopathy.  Ejection fraction initially was 25%.  She underwent cardiac catheterization in April 2022 which showed minimal nonobstructive coronary artery disease.  Patient is also known to have hypertension, dyslipidemia, obstructive sleep apnea, breast cancer with previous bilateral mastectomy in August 2021, diabetes and chronic left bundle branch block.    In September 2022 the patient had biventricular ICD implant and the next day required repositioning of the RV lead due to increased threshold.  On device follow-up RV thresholds remained elevated after the lead revision.    Patient developed chest pain postprocedure.  She returned to the emergency department in early October.  CT PE protocol of her chest showed no pulmonary embolism and no dissection there was a small to moderate amount of pericardial effusion.  She was started on colchicine and NSAIDs for pericarditis.    Patient was seen by electrophysiology and the patient requested a second opinion.  She was evaluated by Dr. Jin.  The decision was made to turn off her RV lead and continue with right atrial and coronary sinus lead pacing    Echocardiogram was repeated on October 7 prior to leaving the hospital.  Her ejection fraction had improved to 45%.  She was scheduled for repeat echocardiogram in 4 weeks to reassess her pericardial effusion.    On October 28 the patient underwent a limited echo which showed her ejection fraction had improved additionally to 60 to 65%.  The pericardial effusion was now only trace.    Patient  reports she overall has been feeling okay.  She reports some periods of dizziness when she is up on her feet.  She has had no chest pain or worsening dyspnea.  She denies edema.    She reports compliance with medical therapy          ASSESSMENT/PLAN:        Diagnoses and all orders for this visit:    1. NICM (nonischemic cardiomyopathy) ([facility]) (Primary)    2. Pericardial effusion    3. Presence of biventricular implantable cardioverter-defibrillator (ICD)    4. Essential hypertension           MEDICAL DECISION MAKING:      Patient has had significant improvement in her LV function.  Her pericardial effusion has reduced to trace.  She is not having chest pain or worsening dyspnea at this time.    We have reviewed her medications and at this time since her EF has completely normalized we will go ahead and stop her Bumex.    We will continue other cardiac medications as prescribed.    Device interrogation shows stable device function with no arrhythmias, no VT or VF episodes.    The device continues to be programmed to LV paced and atrial paced only.  RV lead is turned off.    The case was reviewed with Dr. Murphy who is in agreement to this plan.  The patient has been released from travel limitations.  In addition she can return to cardiac rehab.  He would like to see the patient back in 6 months for follow-up.  We will continue monitoring her device via Merlin if she develops any new or worsening problems of asked her to contact the office sooner.    Past Medical History:   Diagnosis Date   • Anxiety 2000   • Breast cancer ([facility])     LEFT BREAST 7/2021   • CHF (congestive heart failure) ([facility])    • Depression    • DM (diabetes mellitus) ([facility])    • HL (hearing loss)    • Hyperlipidemia    • Hypertension     SAW CARDIO/ STRESS TEST 2020 - NOW MEDS MANAGED BY PCP    • Irritable bowel syndrome 2006   • Peripheral neuropathy    • PONV (postoperative nausea and vomiting)    • Sleep apnea     WEARS CPAP       Past Surgical  History:   Procedure Laterality Date   • BLADDER REPAIR     • BREAST BIOPSY     • BREAST RECONSTRUCTION Bilateral 08/26/2021    Procedure: BILATERAL PREPECTORALPLACMENT TISSUE EXPANDERS AND ALLODERM;  Surgeon: Heri Arreaga MD;  Location: [facility] MAIN OR;  Service: Plastics;  Laterality: Bilateral;   • BREAST TISSUE EXPANDER REMOVAL INSERTION OF IMPLANT Bilateral 12/23/2021    Procedure: BILATERAL REMOVAL TISSUE EXPANDERS AND PLACEMENT OF IMPLANTS;  Surgeon: Heri Arreaga MD;  Location: [facility] MAIN OR;  Service: Plastics;  Laterality: Bilateral;   • CARDIAC CATHETERIZATION Left 04/13/2022    Procedure: Cardiac Catheterization/Vascular Study;  Surgeon: Christo Murphy MD;  Location:  [facility] CATH INVASIVE LOCATION;  Service: Cardiovascular;  Laterality: Left;   • CARDIAC ELECTROPHYSIOLOGY PROCEDURE N/A 9/19/2022    Procedure: ICD implant St. Sude aware;  Surgeon: Esteban Kendrick MD;  Location:  [facility] CATH INVASIVE LOCATION;  Service: Cardiology;  Laterality: N/A;   • CARDIAC ELECTROPHYSIOLOGY PROCEDURE N/A 9/20/2022    Procedure: lead repositioning;  Surgeon: Esteban Kendrick MD;  Location:  [facility] CATH INVASIVE LOCATION;  Service: Cardiology;  Laterality: N/A;   • CARDIOVASCULAR STRESS TEST  2020   • COLONOSCOPY  11/2012   • HYSTERECTOMY     • LYMPH NODE BIOPSY  8/29/2021   • MASTECTOMY W/ SENTINEL NODE BIOPSY Bilateral 08/26/2021    Procedure: bilateral total mastectomy, left axillary sentinel lymph node biopsy, possible reconstruction.;  Surgeon: Asia Perkins MD;  Location: [facility] MAIN OR;  Service: General;  Laterality: Bilateral;   • TUBAL ABDOMINAL LIGATION  1981         Current Outpatient Medications:   •  ALPRAZolam (XANAX) 1 MG tablet, Take 1 tablet by mouth 2 (Two) Times a Day As Needed for Anxiety or Sleep., Disp: 60 tablet, Rfl: 0  •  atorvastatin (LIPITOR) 20 MG tablet, Take 1 tablet by mouth Daily., Disp: 90 tablet, Rfl: 2  •  bumetanide (BUMEX) 1 MG tablet, Take 1 tablet by  mouth Daily., Disp: 90 tablet, Rfl: 1  •  cetirizine (zyrTEC) 10 MG tablet, Take 10 mg by mouth Daily As Needed., Disp: , Rfl:   •  metFORMIN (GLUCOPHAGE) 1000 MG tablet, Take one tablet by mouth with breakfast and 1 1/2 tablets with supper (Patient taking differently: Take one tablet by mouth with breakfast and 1 1/2 tablets with supper), Disp: 225 tablet, Rfl: 1  •  metoprolol succinate XL (TOPROL-XL) 100 MG 24 hr tablet, take 1 tablet by oral route  every day, Disp: 90 tablet, Rfl: 3  •  ondansetron (Zofran) 4 MG tablet, Take 1 tablet by mouth Every 8 (Eight) Hours As Needed for Nausea or Vomiting., Disp: 20 tablet, Rfl: 0  •  sacubitril-valsartan (Entresto) 24-26 MG tablet, Take 1 tablet by mouth Every 12 (Twelve) Hours., Disp: 180 tablet, Rfl: 1  •  Triamcinolone Acetonide (NASACORT) 55 MCG/ACT nasal inhaler, APPLY 2 SPRAYS BY NASAL ROUTE DAILY FOR 30 DAYS, Disp: 10.8 mL, Rfl: 6    Social History     Socioeconomic History   • Marital status:    Tobacco Use   • Smoking status: Former     Packs/day: 2.00     Years: 30.00     Pack years: 60.00     Types: Cigarettes     Start date: 1972     Quit date: 2008     Years since quittin.8   • Smokeless tobacco: Never   Vaping Use   • Vaping Use: Never used   Substance and Sexual Activity   • Alcohol use: No   • Drug use: No   • Sexual activity: Yes     Partners: Male     Birth control/protection: None       Family History   Problem Relation Age of Onset   • Alzheimer's disease Father    • Diabetes Father    • Heart disease Father    • Other Father    • Sleep apnea Father    • Snoring Father    • Hyperlipidemia Father    • Other Mother    • Diabetes Mother    • Heart disease Mother    • Sleep apnea Mother    • Snoring Mother    • COPD Mother    • Hyperlipidemia Mother    • Diabetes Sister    • Heart disease Sister    • Other Sister    • Sleep apnea Sister    • Snoring Sister    • COPD Sister    • Breast cancer Paternal Grandmother 60   • Cancer Paternal  "Grandmother    • Diabetes Paternal Grandmother    • Breast cancer Maternal Aunt 80   • Breast cancer Paternal Aunt    • Diabetes Maternal Grandmother    • Hyperlipidemia Sister    • Hyperlipidemia Brother    • Hyperlipidemia Son    • Malig Hyperthermia Neg Hx        The following portions of the patient's history were reviewed and updated as appropriate: allergies, current medications, past family history, past medical history, past social history, past surgical history and problem list.    Review of Systems   Constitutional: Negative for decreased appetite and diaphoresis.   HENT: Negative for congestion, hearing loss and nosebleeds.    Cardiovascular: Negative for chest pain, claudication, dyspnea on exertion, irregular heartbeat, leg swelling, near-syncope, orthopnea, palpitations, paroxysmal nocturnal dyspnea and syncope.   Respiratory: Negative for cough, shortness of breath and sleep disturbances due to breathing.    Endocrine: Negative for polyuria.   Hematologic/Lymphatic: Does not bruise/bleed easily.   Skin: Negative for itching and rash.   Musculoskeletal: Negative for back pain, muscle weakness and myalgias.   Gastrointestinal: Negative for abdominal pain, change in bowel habit and nausea.   Genitourinary: Negative for dysuria, flank pain, frequency and hesitancy.   Neurological: Positive for dizziness, light-headedness and weakness. Negative for tremors.   Psychiatric/Behavioral: Negative for altered mental status. The patient does not have insomnia.        /70 (BP Location: Left arm, Patient Position: Sitting)   Pulse 88   Resp 18   Ht 162.6 cm (64\")   Wt 62.4 kg (137 lb 9.6 oz)   LMP  (LMP Unknown)   BMI 23.62 kg/m² .    Objective     Vitals reviewed.   Constitutional:       General: Not in acute distress.     Appearance: Normal appearance. Well-developed.   Eyes:      Pupils: Pupils are equal, round, and reactive to light.   HENT:      Head: Normocephalic and atraumatic.   Neck:      " Vascular: No JVD.   Pulmonary:      Effort: Pulmonary effort is normal.      Breath sounds: Normal breath sounds.   Cardiovascular:      Normal rate. Regular rhythm.   Pulses:     Intact distal pulses.   Edema:     Peripheral edema absent.   Abdominal:      General: There is no distension.      Palpations: Abdomen is soft.      Tenderness: There is no abdominal tenderness.   Musculoskeletal: Normal range of motion.      Cervical back: Normal range of motion and neck supple. Skin:     General: Skin is warm and dry.   Neurological:      Mental Status: Alert and oriented to person, place, and time.           EKG ordered and reviewed by me in office  Procedures        In Office Device Interrogation: Reviewed    DEVICE INTERROGATION:  IN OFFICE    DEVICE TYPE:   Biventricular ICD    :   [employer]    BATTERY:  Stable    TIME TO ELECTIVE REPLACEMENT INDICATORS:   8 years    CHARGE TIME:   8.9 seconds        LEAD DATA:   LEADS Reprogrammed for testing purposes    Atrial:   4.4 mV, 440 ohms, 0.5 V@0.5 ms    Ventricular:     RV lead programmed off    LV: 1.0@1.0 ms 860 ohms      Pacemaker Dependent: No      Atrial pacing percentage: 9%    Ventricular pacing percentage: 99%      Arrhythmia Logbook Reviewed: No A. fib, no VT or VF episodes        Summary:    Stable Device Function    No significant arhythmia burden.     Battery status is stable.      NEXT IN OFFICE DEVICE CHECK DUE: 6-month    REMOTE DEVICE INTERROGATIONS: Ongoing

## 2022-11-07 ENCOUNTER — TREATMENT (OUTPATIENT)
Dept: CARDIAC REHAB | Facility: HOSPITAL | Age: 62
End: 2022-11-07
Payer: COMMERCIAL

## 2022-11-07 DIAGNOSIS — I50.22 CHRONIC SYSTOLIC HEART FAILURE: Primary | ICD-10-CM

## 2022-11-07 PROCEDURE — 93798 PHYS/QHP OP CAR RHAB W/ECG: CPT

## 2022-11-08 ENCOUNTER — OFFICE VISIT (OUTPATIENT)
Dept: CARDIAC REHAB | Facility: HOSPITAL | Age: 62
End: 2022-11-08
Payer: COMMERCIAL

## 2022-11-08 DIAGNOSIS — I50.22 CHRONIC SYSTOLIC HEART FAILURE: Primary | ICD-10-CM

## 2022-11-08 NOTE — PROGRESS NOTES
Patient Registration collected $60.00 for cardiac rehab 12 sessions/1card. Patient brought receipt to cardiac rehab department as proof.

## 2022-11-10 ENCOUNTER — APPOINTMENT (OUTPATIENT)
Dept: CARDIAC REHAB | Facility: HOSPITAL | Age: 62
End: 2022-11-10

## 2022-11-10 ENCOUNTER — OFFICE VISIT (OUTPATIENT)
Dept: FAMILY MEDICINE CLINIC | Facility: CLINIC | Age: 62
End: 2022-11-10

## 2022-11-10 VITALS
BODY MASS INDEX: 23.73 KG/M2 | DIASTOLIC BLOOD PRESSURE: 81 MMHG | HEIGHT: 64 IN | WEIGHT: 139 LBS | OXYGEN SATURATION: 100 % | HEART RATE: 83 BPM | SYSTOLIC BLOOD PRESSURE: 157 MMHG

## 2022-11-10 DIAGNOSIS — I30.8 OTHER ACUTE PERICARDITIS: Primary | ICD-10-CM

## 2022-11-10 DIAGNOSIS — E11.49 TYPE 2 DIABETES MELLITUS WITH OTHER DIABETIC NEUROLOGICAL COMPLICATION: ICD-10-CM

## 2022-11-10 DIAGNOSIS — I31.39 PERICARDIAL EFFUSION: ICD-10-CM

## 2022-11-10 PROCEDURE — 99213 OFFICE O/P EST LOW 20 MIN: CPT | Performed by: FAMILY MEDICINE

## 2022-11-10 NOTE — PROGRESS NOTES
Subjective   Susy Hanson is a 62 y.o. female.     History of Present Illness  She comes in for follow-up after she was admitted to the hospital in early October with chest pain and diagnosed with hemopericardium and pericarditis.  She was treated with colchicine and had significant improvement with her symptoms  Repeat echocardiogram at the end of October showed an improved ejection fraction with very limited pericardial effusion  BS running   Off insulin  She is exercising consistently       The following portions of the patient's history were reviewed and updated as appropriate: allergies, current medications, past family history, past medical history, past social history, past surgical history, and problem list.  Past Medical History:   Diagnosis Date   • Anxiety 2000   • Breast cancer (HCC)     LEFT BREAST 7/2021   • CHF (congestive heart failure) (HCC)    • Depression    • DM (diabetes mellitus) (HCC)    • HL (hearing loss)    • Hyperlipidemia    • Hypertension     SAW CARDIO/ STRESS TEST 2020 - NOW MEDS MANAGED BY PCP    • Irritable bowel syndrome 2006   • Peripheral neuropathy    • PONV (postoperative nausea and vomiting)    • Sleep apnea     WEARS CPAP     Past Surgical History:   Procedure Laterality Date   • BLADDER REPAIR     • BREAST BIOPSY     • BREAST RECONSTRUCTION Bilateral 08/26/2021    Procedure: BILATERAL PREPECTORALPLACMENT TISSUE EXPANDERS AND ALLODERM;  Surgeon: Heri Arreaga MD;  Location: [facility];  Service: Plastics;  Laterality: Bilateral;   • BREAST TISSUE EXPANDER REMOVAL INSERTION OF IMPLANT Bilateral 12/23/2021    Procedure: BILATERAL REMOVAL TISSUE EXPANDERS AND PLACEMENT OF IMPLANTS;  Surgeon: Heri Arreaga MD;  Location: [facility] OR;  Service: Plastics;  Laterality: Bilateral;   • CARDIAC CATHETERIZATION Left 04/13/2022    Procedure: Cardiac Catheterization/Vascular Study;  Surgeon: Christo Murphy MD;  Location: [facility] CATH INVASIVE LOCATION;   Service: Cardiovascular;  Laterality: Left;   • CARDIAC ELECTROPHYSIOLOGY PROCEDURE N/A 9/19/2022    Procedure: ICD implant St. Sude aware;  Surgeon: Esteban Kendrick MD;  Location:  [facility] CATH INVASIVE LOCATION;  Service: Cardiology;  Laterality: N/A;   • CARDIAC ELECTROPHYSIOLOGY PROCEDURE N/A 9/20/2022    Procedure: lead repositioning;  Surgeon: Esteban Kendrick MD;  Location:  [facility] CATH INVASIVE LOCATION;  Service: Cardiology;  Laterality: N/A;   • CARDIOVASCULAR STRESS TEST  2020   • COLONOSCOPY  11/2012   • HYSTERECTOMY     • LYMPH NODE BIOPSY  8/29/2021   • MASTECTOMY W/ SENTINEL NODE BIOPSY Bilateral 08/26/2021    Procedure: bilateral total mastectomy, left axillary sentinel lymph node biopsy, possible reconstruction.;  Surgeon: Asia Perkins MD;  Location: [facility] OR;  Service: General;  Laterality: Bilateral;   • TUBAL ABDOMINAL LIGATION  1981     Family History   Problem Relation Age of Onset   • Alzheimer's disease Father    • Diabetes Father    • Heart disease Father    • Other Father    • Sleep apnea Father    • Snoring Father    • Hyperlipidemia Father    • Other Mother    • Diabetes Mother    • Heart disease Mother    • Sleep apnea Mother    • Snoring Mother    • COPD Mother    • Hyperlipidemia Mother    • Diabetes Sister    • Heart disease Sister    • Other Sister    • Sleep apnea Sister    • Snoring Sister    • COPD Sister    • Breast cancer Paternal Grandmother 60   • Cancer Paternal Grandmother    • Diabetes Paternal Grandmother    • Breast cancer Maternal Aunt 80   • Breast cancer Paternal Aunt    • Diabetes Maternal Grandmother    • Hyperlipidemia Sister    • Hyperlipidemia Brother    • Hyperlipidemia Son    • Malig Hyperthermia Neg Hx      Social History     Socioeconomic History   • Marital status:    Tobacco Use   • Smoking status: Former     Packs/day: 2.00     Years: 30.00     Pack years: 60.00     Types: Cigarettes     Start date: 1/1/1972     Quit date: 1/1/2008     " Years since quittin.8   • Smokeless tobacco: Never   Vaping Use   • Vaping Use: Never used   Substance and Sexual Activity   • Alcohol use: No   • Drug use: No   • Sexual activity: Yes     Partners: Male     Birth control/protection: None         Current Outpatient Medications:   •  ALPRAZolam (XANAX) 1 MG tablet, Take 1 tablet by mouth 2 (Two) Times a Day As Needed for Anxiety or Sleep., Disp: 60 tablet, Rfl: 0  •  atorvastatin (LIPITOR) 20 MG tablet, Take 1 tablet by mouth Daily., Disp: 90 tablet, Rfl: 2  •  bumetanide (BUMEX) 1 MG tablet, Take 1 tablet by mouth Daily., Disp: 90 tablet, Rfl: 1  •  cetirizine (zyrTEC) 10 MG tablet, Take 10 mg by mouth Daily As Needed., Disp: , Rfl:   •  metFORMIN (GLUCOPHAGE) 1000 MG tablet, Take one tablet by mouth with breakfast and 1 1/2 tablets with supper (Patient taking differently: Take one tablet by mouth with breakfast and 1 1/2 tablets with supper), Disp: 225 tablet, Rfl: 1  •  metoprolol succinate XL (TOPROL-XL) 100 MG 24 hr tablet, take 1 tablet by oral route  every day, Disp: 90 tablet, Rfl: 3  •  ondansetron (Zofran) 4 MG tablet, Take 1 tablet by mouth Every 8 (Eight) Hours As Needed for Nausea or Vomiting., Disp: 20 tablet, Rfl: 0  •  sacubitril-valsartan (Entresto) 24-26 MG tablet, Take 1 tablet by mouth Every 12 (Twelve) Hours., Disp: 180 tablet, Rfl: 1  •  Triamcinolone Acetonide (NASACORT) 55 MCG/ACT nasal inhaler, APPLY 2 SPRAYS BY NASAL ROUTE DAILY FOR 30 DAYS, Disp: 10.8 mL, Rfl: 6    Review of Systems   Constitutional: Negative.    Respiratory: Negative.    Cardiovascular: Negative.    Gastrointestinal: Negative for nausea and vomiting.   Neurological: Negative for dizziness, syncope, light-headedness and headache.     /81 (BP Location: Left arm, Patient Position: Sitting, Cuff Size: Adult)   Pulse 83   Ht 162.6 cm (64\")   Wt 63 kg (139 lb)   LMP  (LMP Unknown)   SpO2 100%   BMI 23.86 kg/m²       Objective   Physical Exam  Vitals and nursing " "note reviewed.   Constitutional:       Appearance: Normal appearance. She is well-developed, well-groomed and normal weight.   HENT:      Head: Normocephalic and atraumatic.   Cardiovascular:      Rate and Rhythm: Normal rate and regular rhythm.      Heart sounds: Normal heart sounds.   Pulmonary:      Effort: Pulmonary effort is normal.      Breath sounds: Normal breath sounds.   Musculoskeletal:      Cervical back: Neck supple.      Right lower leg: No edema.      Left lower leg: No edema.   Skin:     General: Skin is warm and dry.   Neurological:      Mental Status: She is alert.   Psychiatric:         Mood and Affect: Mood is anxious.         Behavior: Behavior is cooperative.       Ordering physician: Megha Wall APRN Study date: 10/28/22     Patient Information    Patient Name   Susy Hanson MRN   [id number] Legal Sex   Female  (Age)   1960 (62 y.o.)     Patient Hx Of Height, Weight, and Vitals    Height Weight BSA (Calculated - sq m) BMI (Calculated) Retired BMI (kg/m2) Pulse BP   162.6 cm (64\") 62.4 kg (137 lb 9.6 oz) 1.67 sq meters 23.6  88 122/70      [facility] PACS Images     Show images for Adult Transthoracic Echo Limited W/ Cont if Necessary Per Protocol   Clinical Indication    Other Reasons; Additional Reasons; Pericardial; Follow-up Pericardial Effusion   Dx: Hemopericardium [I31.2 (ICD-10-CM)]     Interpretation Summary       •  Left ventricular systolic function is normal.  •  Left ventricular wall thickness is consistent with mild concentric hypertrophy.  •  Left ventricular diastolic function was normal.  •  Left ventricular ejection fraction is 60 to 65%  •  Trace pericardial effusion with significant improvement from previous echocardiogram.  No pericardial tamponade noted.  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  She will keep follow up with cardiology  She was encouraged to continue her exercise   DM is under improved control.  She will continue current medications

## 2022-11-14 ENCOUNTER — TELEPHONE (OUTPATIENT)
Dept: CARDIOLOGY | Facility: CLINIC | Age: 62
End: 2022-11-14

## 2022-11-14 ENCOUNTER — APPOINTMENT (OUTPATIENT)
Dept: CARDIAC REHAB | Facility: HOSPITAL | Age: 62
End: 2022-11-14
Payer: COMMERCIAL

## 2022-11-14 NOTE — TELEPHONE ENCOUNTER
Caller: Susy Hanson    Relationship: Self    Best call back number: [phone]    What is the best time to reach you: ANY    Who are you requesting to speak with (clinical staff, provider,  specific staff member): ANY      What was the call regarding: PT WAS CALLING IN. SHE HAS BEEN HAVING HIGHGER THAN NORMAL BP READINGS AND WANTED MS. KAUR TO KNOW. HERE ARE SOME OF THE READINGS.    11.08.22: PRE: 169/94 POST: 154/88  11.09.22: 177/99 @ 8AM, 173/103 @ 10AM  11.10.22: PRE: 157/88, POST: 152/79  11.11.22: 159-77, 156/97  11.14.22 PRE: 169/89, POST: 150/89    Do you require a callback: YES

## 2022-11-15 ENCOUNTER — APPOINTMENT (OUTPATIENT)
Dept: CARDIAC REHAB | Facility: HOSPITAL | Age: 62
End: 2022-11-15
Payer: COMMERCIAL

## 2022-11-17 ENCOUNTER — APPOINTMENT (OUTPATIENT)
Dept: CARDIAC REHAB | Facility: HOSPITAL | Age: 62
End: 2022-11-17
Payer: COMMERCIAL

## 2022-11-21 ENCOUNTER — TELEPHONE (OUTPATIENT)
Dept: CARDIOLOGY | Facility: CLINIC | Age: 62
End: 2022-11-21

## 2022-11-21 ENCOUNTER — APPOINTMENT (OUTPATIENT)
Dept: CARDIAC REHAB | Facility: HOSPITAL | Age: 62
End: 2022-11-21
Payer: COMMERCIAL

## 2022-11-21 RX ORDER — SACUBITRIL AND VALSARTAN 49; 51 MG/1; MG/1
1 TABLET, FILM COATED ORAL 2 TIMES DAILY
Qty: 180 TABLET | Refills: 1 | Status: SHIPPED | OUTPATIENT
Start: 2022-11-21 | End: 2023-03-31 | Stop reason: SDUPTHER

## 2022-11-21 NOTE — TELEPHONE ENCOUNTER
Caller: Susy Hanson    Relationship to patient: Self    Best call back number: [phone]    Patient is needing: PT STATES THAT DR. VARGAS TOLD HER TO REPORT HER BP DUE TO IT BEING CONTINUOUSLY HIGH. LAST WEEK THEY HA HER ADJUST HER MEDICATION TO SEE IF IT HELP, IT WAS 1 METOPROLOL IN THE MORNING AND HALF AT NIGHT.. SHE ADJUSTED IT TOTAKING 1 METOPROLOL IN THE MORNING AND A WHOLE AT NIGHT DUE TO IT NOT WORKING OVER THE WEEKEND AND IT STILL HAS NOT HELPED ANY.AT CARDIAC REHAB  BEFORE AND AFTER WORKOUT BP RUNNING AT  November 14 - 169/89  AND THEN 150/89  NOV. 15 - 167/86 AND THEN 149/95  NOV. 17-  118/88 AND THEN 142/80  NOV. 21- 159/83 AND THEN 160/89    AT HOME ON  NOV 17- 117/105  NOV 18 -168/97  NOV 19 -160/92  NOV 21 -162/98

## 2022-11-21 NOTE — TELEPHONE ENCOUNTER
I will send this medication in let patient know what ric said      L/m for patient to call back    Hub can release this information

## 2022-11-22 ENCOUNTER — TELEPHONE (OUTPATIENT)
Dept: CARDIOLOGY | Facility: CLINIC | Age: 62
End: 2022-11-22

## 2022-11-22 ENCOUNTER — APPOINTMENT (OUTPATIENT)
Dept: CARDIAC REHAB | Facility: HOSPITAL | Age: 62
End: 2022-11-22
Payer: COMMERCIAL

## 2022-11-22 NOTE — TELEPHONE ENCOUNTER
Patient will be seenn on Monday at 1230 with Emelyn LEDEZMA  --------------------------------    Patient returned call and agreed to be seen in office tomorrow by BRISA Beltre    -------------------------------  Called patient LVM to return call to office

## 2022-11-22 NOTE — TELEPHONE ENCOUNTER
Caller: Susy Hanson    Relationship: Self    Best call back number: [phone]    What is the best time to reach you: ANY    Who are you requesting to speak with (clinical staff, provider,  specific staff member): ANY      What was the call regarding: DR. VARGAS RECENTLY UPPED THE DOSAGE OF THE METOPROLOL AND THEN HE DOUBLED THE DOSAGE OF THE ENTRESTO. PT IS INQUIRING IF THE DOSAGE OF EACH MEDICATION IS ACCURATE SINCE THEY BOTH WERE RAISED RECENTLY, SHE EXPRESSES CONCERN ONLY BECAUSE THEY WERE BOTH INCREASED. PLEASE ADVISE.     Do you require a callback: YES         

## 2022-11-28 ENCOUNTER — OFFICE VISIT (OUTPATIENT)
Dept: CARDIOLOGY | Facility: CLINIC | Age: 62
End: 2022-11-28

## 2022-11-28 ENCOUNTER — APPOINTMENT (OUTPATIENT)
Dept: CARDIAC REHAB | Facility: HOSPITAL | Age: 62
End: 2022-11-28
Payer: COMMERCIAL

## 2022-11-28 VITALS
WEIGHT: 139 LBS | SYSTOLIC BLOOD PRESSURE: 163 MMHG | HEART RATE: 73 BPM | HEIGHT: 64 IN | OXYGEN SATURATION: 100 % | BODY MASS INDEX: 23.73 KG/M2 | DIASTOLIC BLOOD PRESSURE: 90 MMHG

## 2022-11-28 DIAGNOSIS — I42.0 DILATED CARDIOMYOPATHY: Primary | ICD-10-CM

## 2022-11-28 DIAGNOSIS — I10 ESSENTIAL HYPERTENSION: ICD-10-CM

## 2022-11-28 DIAGNOSIS — F41.8 MIXED ANXIETY DEPRESSIVE DISORDER: ICD-10-CM

## 2022-11-28 PROCEDURE — 93000 ELECTROCARDIOGRAM COMPLETE: CPT | Performed by: NURSE PRACTITIONER

## 2022-11-28 PROCEDURE — 99213 OFFICE O/P EST LOW 20 MIN: CPT | Performed by: NURSE PRACTITIONER

## 2022-11-28 RX ORDER — SACUBITRIL AND VALSARTAN 24; 26 MG/1; MG/1
1 TABLET, FILM COATED ORAL 2 TIMES DAILY
COMMUNITY
End: 2022-11-28

## 2022-11-28 RX ORDER — METOPROLOL SUCCINATE 100 MG/1
100 TABLET, EXTENDED RELEASE ORAL 2 TIMES DAILY
Start: 2022-11-28 | End: 2022-11-28 | Stop reason: SDUPTHER

## 2022-11-28 RX ORDER — METOPROLOL SUCCINATE 100 MG/1
100 TABLET, EXTENDED RELEASE ORAL 2 TIMES DAILY
Start: 2022-11-28 | End: 2022-11-29 | Stop reason: SDUPTHER

## 2022-11-28 NOTE — PROGRESS NOTES
Cardiology Office Follow Up Visit      Primary Care Provider:  Erica Avila MD    Reason for f/u:     Hypertension  History of nonischemic cardiomyopathy  Biventricular ICD      Subjective     CC:    No chest pain or dyspnea, returns for difficult to manage blood pressure    History of Present Illness       Susy Hanson is a 62 y.o. female. Patient is a very pleasant 62-year-old female who has a history of nonischemic cardiomyopathy.  Ejection fraction initially was 25%.  She underwent cardiac catheterization in April 2022 which showed minimal nonobstructive coronary artery disease.  Patient is also known to have hypertension, dyslipidemia, obstructive sleep apnea, breast cancer with previous bilateral mastectomy in August 2021, diabetes and chronic left bundle branch block.     In September 2022 the patient had biventricular ICD implant and the next day required repositioning of the RV lead due to increased threshold.  On device follow-up RV thresholds remained elevated after the lead revision.     Patient developed chest pain postprocedure.  She returned to the emergency department in early October.  CT PE protocol of her chest showed no pulmonary embolism and no dissection there was a small to moderate amount of pericardial effusion.  She was started on colchicine and NSAIDs for pericarditis.     Patient was seen by electrophysiology and the patient requested a second opinion.  She was evaluated by Dr. Jin.  The decision was made to turn off her RV lead and continue with right atrial and coronary sinus lead pacing     Echocardiogram was repeated on October 7 prior to leaving the hospital.  Her ejection fraction had improved to 45%.  She was scheduled for repeat echocardiogram in 4 weeks to reassess her pericardial effusion.     On October 28 the patient underwent a limited echo which showed her ejection fraction had improved additionally to 60 to 65%.  The pericardial effusion was now  only trace.     On her last office visit her Bumex was discontinued.    The patient states since that time she has had difficult to control blood pressure.  Her metoprolol XL was increased to 100 mg twice daily.  In addition we discussed about increasing Entresto to 49/61.  There was some confusion with the patient in our office about accurate dosing of medications we asked her to come back into the office to review home meds before I made additional changes.                 ASSESSMENT/PLAN:      Diagnoses and all orders for this visit:    1. Dilated cardiomyopathy (HCC) (Primary)  Comments:  recovered EF  Recently discontinued Bumex    2. Essential hypertension  Comments:  BP more difficult to control recently    Other orders  -     metoprolol succinate XL (TOPROL-XL) 100 MG 24 hr tablet; Take 1 tablet by mouth 2 (Two) Times a Day.            MEDICAL DECISION MAKING:    We will go ahead and increase her Entresto to 49/51 twice daily.  Advised her to log her blood pressure for the next week and contact our office with those numbers.  She will continue metoprolol as prescribed.    We will continue monitoring her ICD remotely and she will keep scheduled follow-up in January if she develops any new or worsening problems of asked her to contact the office sooner        Past Medical History:   Diagnosis Date   • Anxiety 2000   • Breast cancer (HCC)     LEFT BREAST 7/2021   • CHF (congestive heart failure) (HCC)    • Depression    • DM (diabetes mellitus) (HCC)    • HL (hearing loss)    • Hyperlipidemia    • Hypertension     SAW CARDIO/ STRESS TEST 2020 - NOW MEDS MANAGED BY PCP    • Irritable bowel syndrome 2006   • Peripheral neuropathy    • PONV (postoperative nausea and vomiting)    • Sleep apnea     WEARS CPAP       Past Surgical History:   Procedure Laterality Date   • BLADDER REPAIR     • BREAST BIOPSY     • BREAST RECONSTRUCTION Bilateral 08/26/2021    Procedure: BILATERAL PREPECTORALPLACMENT TISSUE EXPANDERS AND  ALLODERM;  Surgeon: Heri Arreaga MD;  Location: [facility] MAIN OR;  Service: Plastics;  Laterality: Bilateral;   • BREAST TISSUE EXPANDER REMOVAL INSERTION OF IMPLANT Bilateral 12/23/2021    Procedure: BILATERAL REMOVAL TISSUE EXPANDERS AND PLACEMENT OF IMPLANTS;  Surgeon: Heri Arreaga MD;  [facility];  Service: Plastics;  Laterality: Bilateral;   • CARDIAC CATHETERIZATION Left 04/13/2022    Procedure: Cardiac Catheterization/Vascular Study;  Surgeon: Christo Murphy MD;  Location:  [facility] CATH INVASIVE LOCATION;  Service: Cardiovascular;  Laterality: Left;   • CARDIAC ELECTROPHYSIOLOGY PROCEDURE N/A 09/19/2022    Procedure: ICD implant St. Sude aware;  Surgeon: Esteban Kendrick MD;  Location:  [facility] CATH INVASIVE LOCATION;  Service: Cardiology;  Laterality: N/A;   • CARDIAC ELECTROPHYSIOLOGY PROCEDURE N/A 09/20/2022    Procedure: lead repositioning;  Surgeon: Esteban Kendrick MD;  Location:  [facility] CATH INVASIVE LOCATION;  Service: Cardiology;  Laterality: N/A;   • CARDIOVASCULAR STRESS TEST  2020   • COLONOSCOPY  11/2012   • HYSTERECTOMY     • LYMPH NODE BIOPSY  8/29/2021   • MASTECTOMY W/ SENTINEL NODE BIOPSY Bilateral 08/26/2021    Procedure: bilateral total mastectomy, left axillary sentinel lymph node biopsy, possible reconstruction.;  Surgeon: Asia Perkins MD;  Location: [facility] MAIN OR;  Service: General;  Laterality: Bilateral;   • TUBAL ABDOMINAL LIGATION  1981         Current Outpatient Medications:   •  ALPRAZolam (XANAX) 1 MG tablet, Take 1 tablet by mouth 2 (Two) Times a Day As Needed for Anxiety or Sleep., Disp: 60 tablet, Rfl: 0  •  atorvastatin (LIPITOR) 20 MG tablet, Take 1 tablet by mouth Daily., Disp: 90 tablet, Rfl: 2  •  cetirizine (zyrTEC) 10 MG tablet, Take 10 mg by mouth Daily As Needed., Disp: , Rfl:   •  metFORMIN (GLUCOPHAGE) 1000 MG tablet, Take one tablet by mouth with breakfast and 1 1/2 tablets with supper (Patient taking differently: Take one  tablet by mouth with breakfast and 1 1/2 tablets with supper), Disp: 225 tablet, Rfl: 1  •  metoprolol succinate XL (TOPROL-XL) 100 MG 24 hr tablet, Take 1 tablet by mouth 2 (Two) Times a Day., Disp: , Rfl:   •  ondansetron (Zofran) 4 MG tablet, Take 1 tablet by mouth Every 8 (Eight) Hours As Needed for Nausea or Vomiting., Disp: 20 tablet, Rfl: 0  •  Triamcinolone Acetonide (NASACORT) 55 MCG/ACT nasal inhaler, APPLY 2 SPRAYS BY NASAL ROUTE DAILY FOR 30 DAYS, Disp: 10.8 mL, Rfl: 6  •  sacubitril-valsartan (Entresto) 49-51 MG tablet, Take 1 tablet by mouth 2 (Two) Times a Day., Disp: 180 tablet, Rfl: 1    Social History     Socioeconomic History   • Marital status:    Tobacco Use   • Smoking status: Former     Packs/day: 2.00     Years: 30.00     Pack years: 60.00     Types: Cigarettes     Start date: 1972     Quit date: 2008     Years since quittin.9   • Smokeless tobacco: Never   Vaping Use   • Vaping Use: Never used   Substance and Sexual Activity   • Alcohol use: No   • Drug use: No   • Sexual activity: Yes     Partners: Male     Birth control/protection: None       Family History   Problem Relation Age of Onset   • Alzheimer's disease Father    • Diabetes Father    • Heart disease Father    • Other Father    • Sleep apnea Father    • Snoring Father    • Hyperlipidemia Father    • Other Mother    • Diabetes Mother    • Heart disease Mother    • Sleep apnea Mother    • Snoring Mother    • COPD Mother    • Hyperlipidemia Mother    • Diabetes Sister    • Heart disease Sister    • Other Sister    • Sleep apnea Sister    • Snoring Sister    • COPD Sister    • Breast cancer Paternal Grandmother 60   • Cancer Paternal Grandmother    • Diabetes Paternal Grandmother    • Breast cancer Maternal Aunt 80   • Breast cancer Paternal Aunt    • Diabetes Maternal Grandmother    • Hyperlipidemia Sister    • Hyperlipidemia Brother    • Hyperlipidemia Son    • Malig Hyperthermia Neg Hx        The following  "portions of the patient's history were reviewed and updated as appropriate: allergies, current medications, past family history, past medical history, past social history, past surgical history and problem list.    Review of Systems   Constitutional: Negative for decreased appetite and diaphoresis.   HENT: Negative for congestion, hearing loss and nosebleeds.    Cardiovascular: Negative for chest pain, claudication, dyspnea on exertion, irregular heartbeat, leg swelling, near-syncope, orthopnea, palpitations, paroxysmal nocturnal dyspnea and syncope.   Respiratory: Negative for cough, shortness of breath and sleep disturbances due to breathing.    Endocrine: Negative for polyuria.   Hematologic/Lymphatic: Does not bruise/bleed easily.   Skin: Negative for itching and rash.   Musculoskeletal: Negative for back pain, muscle weakness and myalgias.   Gastrointestinal: Negative for abdominal pain, change in bowel habit and nausea.   Genitourinary: Negative for dysuria, flank pain, frequency and hesitancy.   Neurological: Negative for dizziness, tremors and weakness.   Psychiatric/Behavioral: Negative for altered mental status. The patient does not have insomnia.      /90 (BP Location: Right arm, Patient Position: Sitting, Cuff Size: Large Adult)   Pulse 73   Ht 162.6 cm (64.02\")   Wt 63 kg (139 lb)   LMP  (LMP Unknown)   SpO2 100%   BMI 23.85 kg/m² .  Objective     Vitals reviewed.   Constitutional:       General: Not in acute distress.     Appearance: Normal appearance. Well-developed.   Eyes:      Pupils: Pupils are equal, round, and reactive to light.   HENT:      Head: Normocephalic and atraumatic.   Neck:      Vascular: No JVD.   Pulmonary:      Effort: Pulmonary effort is normal.      Breath sounds: Normal breath sounds.   Cardiovascular:      Normal rate. Regular rhythm.   Pulses:     Intact distal pulses.   Edema:     Peripheral edema absent.   Abdominal:      General: There is no distension.      " Palpations: Abdomen is soft.      Tenderness: There is no abdominal tenderness.   Musculoskeletal: Normal range of motion.      Cervical back: Normal range of motion and neck supple. Skin:     General: Skin is warm and dry.   Neurological:      Mental Status: Alert and oriented to person, place, and time.             ECG 12 Lead    Date/Time: 11/28/2022 1:04 PM  Performed by: Megha Wall APRN  Authorized by: Megha Wall APRN   Rhythm: sinus rhythm and paced  BPM: 73  Pacing: ventricular pacing  Clinical impression: abnormal EKG  Comments: Biv pacing            EKG ordered by and reviewed by me in office

## 2022-11-29 ENCOUNTER — APPOINTMENT (OUTPATIENT)
Dept: CARDIAC REHAB | Facility: HOSPITAL | Age: 62
End: 2022-11-29
Payer: COMMERCIAL

## 2022-11-29 RX ORDER — ALPRAZOLAM 1 MG/1
1 TABLET ORAL 2 TIMES DAILY PRN
Qty: 60 TABLET | Refills: 0 | Status: SHIPPED | OUTPATIENT
Start: 2022-11-29 | End: 2023-02-01 | Stop reason: SDUPTHER

## 2022-11-29 RX ORDER — METOPROLOL SUCCINATE 100 MG/1
100 TABLET, EXTENDED RELEASE ORAL 2 TIMES DAILY
Qty: 180 TABLET | Refills: 1 | Status: SHIPPED | OUTPATIENT
Start: 2022-11-29

## 2022-12-02 ENCOUNTER — TELEPHONE (OUTPATIENT)
Dept: CARDIOLOGY | Facility: CLINIC | Age: 62
End: 2022-12-02

## 2022-12-02 NOTE — TELEPHONE ENCOUNTER
Caller: Susy Hanson    Relationship: Self    Best call back number: [phone]    What is the best time to reach you: ANYTIME     Who are you requesting to speak with (clinical staff, provider,  specific staff member): ANYONE     What was the call regarding: PATIENT CALLING TO REPORT BLOOD PRESSURE READINGS WITH ENTRESTO INCREASE.     11.29.22 - AM /89 HR 65                     PM /77 HR 80    11.30.22 - AM /106 HR 70                          /106                          /101 HR 69                          /97 HR 74                                                                      /88                    PM /95 HR 78     12.1.22 - AM /80 HR 79                        /92 HR 75                  PM /87 HR 89     12.2.22 - 7:30AM /94 HR 66                  8:30AM /97 HR 69                  9:10AM /97 HR 68    Do you require a callback: YES

## 2022-12-09 ENCOUNTER — TELEPHONE (OUTPATIENT)
Dept: FAMILY MEDICINE CLINIC | Facility: CLINIC | Age: 62
End: 2022-12-09

## 2022-12-09 ENCOUNTER — TELEPHONE (OUTPATIENT)
Dept: CARDIOLOGY | Facility: CLINIC | Age: 62
End: 2022-12-09

## 2022-12-09 NOTE — TELEPHONE ENCOUNTER
Pt advised to keep apt on Monday with urology. It is not a lot of bleeding at the moment. No apts in office today. Advised that pt be seen in er as direct by provider if she feels like she going going to pass out or if symptoms worsen.

## 2022-12-09 NOTE — TELEPHONE ENCOUNTER
Pt was up all night feeling like her bladder was full but unable to empty. This morning started bleeding vaginally. Had hysterectomy several yrs ago. 2 bladder mesh repairs in the past. Urologist cant get her in until Monday at 2pm. Not sure if she needs to be seen before then or if there is anything else that you recommend. See's dr. Roper, but is concerned a new pt as she has not been seen in yrs. Fine with any other urologist that you recommend if needed.

## 2022-12-09 NOTE — TELEPHONE ENCOUNTER
Caller: Susy Hanson    Relationship to patient: Self    Best call back number: [phone]    Patient is needing: PT REPORTING BP READINGS  12.2 178/94 -MORNING  153/97 AFTERNOON  12.3 159/85 MORNING  151/88 AFTERNOON  12.4 178/103 MORNING  145/83 AFTERNOON  12.5 163/ 89  MORNING  159/86 AFTERNOON  12.6 159/96 MORNING  155/89 AFTERNOON  12.7 155/87 MORNING  148/82 AFTERNOON  12.8 153/94 MORNING  165/92 AFTERNOON  12.9 158/958 MORNING

## 2022-12-09 NOTE — TELEPHONE ENCOUNTER
If she is bleeding heavily or feels like she is going to pass out, she needs to be in the emergency room immediately.  I would not recommend that she wait till Tuesday.  I do not think it is going to make a difference with regards to which urologist she sees in terms of being able to get in faster than Tuesday unless there is something that is found in the emergency room.  If she is having associated flank pain, she should go to the emergency room.  If she is having severe belly pain, she should go to the emergency room

## 2022-12-21 PROCEDURE — 93296 REM INTERROG EVL PM/IDS: CPT | Performed by: NURSE PRACTITIONER

## 2022-12-21 PROCEDURE — 93295 DEV INTERROG REMOTE 1/2/MLT: CPT | Performed by: NURSE PRACTITIONER

## 2022-12-31 RX ORDER — ATORVASTATIN CALCIUM 20 MG/1
20 TABLET, FILM COATED ORAL DAILY
Qty: 90 TABLET | Refills: 2 | Status: SHIPPED | OUTPATIENT
Start: 2022-12-31

## 2023-02-01 DIAGNOSIS — F41.8 MIXED ANXIETY DEPRESSIVE DISORDER: ICD-10-CM

## 2023-02-01 RX ORDER — ALPRAZOLAM 1 MG/1
1 TABLET ORAL 2 TIMES DAILY PRN
Qty: 60 TABLET | Refills: 0 | Status: SHIPPED | OUTPATIENT
Start: 2023-02-01 | End: 2023-03-31 | Stop reason: SDUPTHER

## 2023-03-14 ENCOUNTER — LAB (OUTPATIENT)
Dept: FAMILY MEDICINE CLINIC | Facility: CLINIC | Age: 63
End: 2023-03-14
Payer: COMMERCIAL

## 2023-03-14 ENCOUNTER — OFFICE VISIT (OUTPATIENT)
Dept: FAMILY MEDICINE CLINIC | Facility: CLINIC | Age: 63
End: 2023-03-14
Payer: COMMERCIAL

## 2023-03-14 VITALS
HEIGHT: 64 IN | WEIGHT: 154.2 LBS | DIASTOLIC BLOOD PRESSURE: 85 MMHG | BODY MASS INDEX: 26.32 KG/M2 | TEMPERATURE: 98 F | OXYGEN SATURATION: 99 % | HEART RATE: 74 BPM | SYSTOLIC BLOOD PRESSURE: 144 MMHG

## 2023-03-14 DIAGNOSIS — Z12.11 SCREENING FOR COLON CANCER: ICD-10-CM

## 2023-03-14 DIAGNOSIS — Z00.00 ENCOUNTER FOR GENERAL ADULT MEDICAL EXAMINATION WITHOUT ABNORMAL FINDINGS: Primary | ICD-10-CM

## 2023-03-14 DIAGNOSIS — Z00.00 ENCOUNTER FOR GENERAL ADULT MEDICAL EXAMINATION WITHOUT ABNORMAL FINDINGS: ICD-10-CM

## 2023-03-14 LAB
ALBUMIN SERPL-MCNC: 4.7 G/DL (ref 3.5–5.2)
ALBUMIN UR-MCNC: 40.5 MG/DL
ALBUMIN/GLOB SERPL: 2 G/DL
ALP SERPL-CCNC: 53 U/L (ref 39–117)
ALT SERPL W P-5'-P-CCNC: 16 U/L (ref 1–33)
ANION GAP SERPL CALCULATED.3IONS-SCNC: 7.7 MMOL/L (ref 5–15)
AST SERPL-CCNC: 13 U/L (ref 1–32)
BILIRUB SERPL-MCNC: 0.3 MG/DL (ref 0–1.2)
BUN SERPL-MCNC: 21 MG/DL (ref 8–23)
BUN/CREAT SERPL: 19.4 (ref 7–25)
CALCIUM SPEC-SCNC: 10.2 MG/DL (ref 8.6–10.5)
CHLORIDE SERPL-SCNC: 106 MMOL/L (ref 98–107)
CHOLEST SERPL-MCNC: 140 MG/DL (ref 0–200)
CO2 SERPL-SCNC: 29.3 MMOL/L (ref 22–29)
CREAT SERPL-MCNC: 1.08 MG/DL (ref 0.57–1)
EGFRCR SERPLBLD CKD-EPI 2021: 57.5 ML/MIN/1.73
GLOBULIN UR ELPH-MCNC: 2.3 GM/DL
GLUCOSE SERPL-MCNC: 96 MG/DL (ref 65–99)
HBA1C MFR BLD: 5.6 % (ref 4.8–5.6)
HDLC SERPL-MCNC: 50 MG/DL (ref 40–60)
LDLC SERPL CALC-MCNC: 64 MG/DL (ref 0–100)
LDLC/HDLC SERPL: 1.19 {RATIO}
POTASSIUM SERPL-SCNC: 5.3 MMOL/L (ref 3.5–5.2)
PROT SERPL-MCNC: 7 G/DL (ref 6–8.5)
SODIUM SERPL-SCNC: 143 MMOL/L (ref 136–145)
TRIGL SERPL-MCNC: 153 MG/DL (ref 0–150)
VLDLC SERPL-MCNC: 26 MG/DL (ref 5–40)

## 2023-03-14 PROCEDURE — 80053 COMPREHEN METABOLIC PANEL: CPT | Performed by: FAMILY MEDICINE

## 2023-03-14 PROCEDURE — 80061 LIPID PANEL: CPT | Performed by: FAMILY MEDICINE

## 2023-03-14 PROCEDURE — 82043 UR ALBUMIN QUANTITATIVE: CPT | Performed by: FAMILY MEDICINE

## 2023-03-14 PROCEDURE — 83036 HEMOGLOBIN GLYCOSYLATED A1C: CPT | Performed by: FAMILY MEDICINE

## 2023-03-14 PROCEDURE — 99396 PREV VISIT EST AGE 40-64: CPT | Performed by: FAMILY MEDICINE

## 2023-03-14 PROCEDURE — 36415 COLL VENOUS BLD VENIPUNCTURE: CPT | Performed by: FAMILY MEDICINE

## 2023-03-14 NOTE — PROGRESS NOTES
"Subjective   Susy Hanson is a 62 y.o. female.     History of Present Illness  Here for cpe     Ms. Hanson is a 62-year-old female who presents for a physical.    She feels better since starting to gain weight back. She joined [employer] and has been working out. Her appetite is back, so she is not following the diet like she was. She did not have an appetite and was nauseous all the time.    She has not had her colonoscopy done, and does not have her packet. She thinks she should get it done. She has a fear, and is just going to need to be \"knocked out.\"    Her sugars are doing good. She has not been doing any insulin. Her fasting is usually never over 100.    She denies any headaches, chest pain, or shortness of breath. She has a lot of pain in her hip. Her bowels are moving okay. She is urinating okay. She is emotionally holding her own.    The following portions of the patient's history were reviewed and updated as appropriate: allergies, current medications, past family history, past medical history, past social history, past surgical history, and problem list.  Past Medical History:   Diagnosis Date   • Anxiety 2000   • Breast cancer (HCC)     LEFT BREAST 7/2021   • CHF (congestive heart failure) (HCC)    • Depression    • DM (diabetes mellitus) (HCC)    • HL (hearing loss)    • Hyperlipidemia    • Hypertension     SAW CARDIO/ STRESS TEST 2020 - NOW MEDS MANAGED BY PCP    • Irritable bowel syndrome 2006   • Peripheral neuropathy    • PONV (postoperative nausea and vomiting)    • Sleep apnea     WEARS CPAP     Past Surgical History:   Procedure Laterality Date   • BLADDER REPAIR     • BREAST BIOPSY     • BREAST RECONSTRUCTION Bilateral 08/26/2021    Procedure: BILATERAL PREPECTORALPLACMENT TISSUE EXPANDERS AND ALLODERM;  Surgeon: Heri Arreaga MD;  Location: [facility];  Service: Plastics;  Laterality: Bilateral;   • BREAST TISSUE EXPANDER REMOVAL INSERTION OF IMPLANT Bilateral " 12/23/2021    Procedure: BILATERAL REMOVAL TISSUE EXPANDERS AND PLACEMENT OF IMPLANTS;  Surgeon: Heri Arreaga MD;  Location: [facility] MAIN OR;  Service: Plastics;  Laterality: Bilateral;   • CARDIAC CATHETERIZATION Left 04/13/2022    Procedure: Cardiac Catheterization/Vascular Study;  Surgeon: Christo Murphy MD;  Location:  [facility];  Service: Cardiovascular;  Laterality: Left;   • CARDIAC ELECTROPHYSIOLOGY PROCEDURE N/A 09/19/2022    Procedure: ICD implant [place] aware;  Surgeon: Esteban Kendrick MD;  Location:  [facility];  Service: Cardiology;  Laterality: N/A;   • CARDIAC ELECTROPHYSIOLOGY PROCEDURE N/A 09/20/2022    Procedure: lead repositioning;  Surgeon: Esteban Kendrick MD;  Location:  [facility];  Service: Cardiology;  Laterality: N/A;   • CARDIOVASCULAR STRESS TEST  2020   • COLONOSCOPY  11/2012   • HYSTERECTOMY     • LYMPH NODE BIOPSY  8/29/2021   • MASTECTOMY W/ SENTINEL NODE BIOPSY Bilateral 08/26/2021    Procedure: bilateral total mastectomy, left axillary sentinel lymph node biopsy, possible reconstruction.;  Surgeon: Asia Perkins MD;  Location: [facility] MAIN OR;  Service: General;  Laterality: Bilateral;   • TUBAL ABDOMINAL LIGATION  1981     Family History   Problem Relation Age of Onset   • Alzheimer's disease Father    • Diabetes Father    • Heart disease Father    • Other Father    • Sleep apnea Father    • Snoring Father    • Hyperlipidemia Father    • Other Mother    • Diabetes Mother    • Heart disease Mother    • Sleep apnea Mother    • Snoring Mother    • COPD Mother    • Hyperlipidemia Mother    • Diabetes Sister    • Heart disease Sister    • Other Sister    • Sleep apnea Sister    • Snoring Sister    • COPD Sister    • Breast cancer Paternal Grandmother 60   • Cancer Paternal Grandmother    • Diabetes Paternal Grandmother    • Breast cancer Maternal Aunt 80   • Breast cancer Paternal Aunt    • Diabetes Maternal  "Grandmother    • Hyperlipidemia Sister    • Hyperlipidemia Brother    • Hyperlipidemia Son    • Malig Hyperthermia Neg Hx      Social History     Socioeconomic History   • Marital status:    Tobacco Use   • Smoking status: Former     Packs/day: 2.00     Years: 30.00     Pack years: 60.00     Types: Cigarettes     Start date: 1/1/1972     Quit date: 1/1/2008     Years since quitting: 15.2   • Smokeless tobacco: Never   Vaping Use   • Vaping Use: Never used   Substance and Sexual Activity   • Alcohol use: No   • Drug use: No   • Sexual activity: Yes     Partners: Male     Birth control/protection: None         Current Outpatient Medications:   •  ALPRAZolam (XANAX) 1 MG tablet, Take 1 tablet by mouth 2 (Two) Times a Day As Needed for Anxiety or Sleep., Disp: 60 tablet, Rfl: 0  •  atorvastatin (LIPITOR) 20 MG tablet, Take 1 tablet by mouth Daily., Disp: 90 tablet, Rfl: 2  •  cetirizine (zyrTEC) 10 MG tablet, Take 1 tablet by mouth Daily As Needed., Disp: , Rfl:   •  metFORMIN (GLUCOPHAGE) 1000 MG tablet, Take 1 tablet by mouth with breakfast and 1.5 tablets with supper, Disp: 225 tablet, Rfl: 1  •  metoprolol succinate XL (TOPROL-XL) 100 MG 24 hr tablet, Take 1 tablet by mouth 2 (Two) Times a Day., Disp: 180 tablet, Rfl: 1  •  sacubitril-valsartan (Entresto) 49-51 MG tablet, Take 1 tablet by mouth 2 (Two) Times a Day., Disp: 180 tablet, Rfl: 1  •  Triamcinolone Acetonide (NASACORT) 55 MCG/ACT nasal inhaler, APPLY 2 SPRAYS BY NASAL ROUTE DAILY FOR 30 DAYS, Disp: 10.8 mL, Rfl: 6    Review of Systems  /85 (BP Location: Left arm, Patient Position: Sitting, Cuff Size: Large Adult)   Pulse 74   Temp 98 °F (36.7 °C) (Temporal)   Ht 162.6 cm (64\")   Wt 69.9 kg (154 lb 3.2 oz)   LMP  (LMP Unknown)   SpO2 99%   BMI 26.47 kg/m²   A review of systems was performed, and the pertinent positives are noted in the HPI.      Objective   Physical Exam  Vitals and nursing note reviewed.   Constitutional:       " Appearance: Normal appearance. She is well-developed and well-groomed.   HENT:      Head: Normocephalic and atraumatic.      Right Ear: Tympanic membrane, ear canal and external ear normal.      Left Ear: Tympanic membrane, ear canal and external ear normal.      Nose: Nose normal.      Mouth/Throat:      Mouth: Mucous membranes are moist.      Pharynx: Oropharynx is clear.   Eyes:      Extraocular Movements: Extraocular movements intact.      Conjunctiva/sclera: Conjunctivae normal.      Pupils: Pupils are equal, round, and reactive to light.   Neck:      Thyroid: No thyromegaly.      Vascular: No carotid bruit.   Cardiovascular:      Rate and Rhythm: Normal rate and regular rhythm.      Pulses: Normal pulses.      Heart sounds: Normal heart sounds.   Pulmonary:      Effort: Pulmonary effort is normal.      Breath sounds: Normal breath sounds.   Abdominal:      General: Abdomen is flat. Bowel sounds are normal.      Palpations: Abdomen is soft. There is no hepatomegaly, splenomegaly or mass.      Tenderness: There is no abdominal tenderness.      Hernia: No hernia is present.   Musculoskeletal:      Cervical back: Normal range of motion and neck supple.      Right lower leg: No edema.      Left lower leg: No edema.   Lymphadenopathy:      Cervical: No cervical adenopathy.      Upper Body:      Right upper body: No supraclavicular adenopathy.      Left upper body: No supraclavicular adenopathy.   Skin:     General: Skin is warm and dry.      Findings: No lesion or rash.   Neurological:      General: No focal deficit present.      Mental Status: She is alert.      Motor: Motor function is intact.      Deep Tendon Reflexes: Reflexes are normal and symmetric.   Psychiatric:         Attention and Perception: Attention normal.         Mood and Affect: Mood is anxious.         Behavior: Behavior is cooperative.           Assessment & Plan   Problems Addressed this Visit        Health Encounters    Encounter for general  adult medical examination without abnormal findings - Primary    Relevant Orders    Comprehensive Metabolic Panel    Lipid Panel    Hemoglobin A1c    MicroAlbumin, Urine, Random - Urine, Clean Catch   Other Visit Diagnoses     Screening for colon cancer        Relevant Orders    Ambulatory Referral For Screening Colonoscopy (Completed)      Diagnoses       Codes Comments    Encounter for general adult medical examination without abnormal findings    -  Primary ICD-10-CM: Z00.00  ICD-9-CM: V70.9     Screening for colon cancer     ICD-10-CM: Z12.11  ICD-9-CM: V76.51           1. General adult medical exam  - She is due for her colonoscopy that was ordered. Her lab work was ordered, CMP, lipid, A1c, and microalbumin. She was counseled on the need for Shingrix, deferred at this time. I will see her back in 6 months.          Transcribed from ambient dictation for Erica Avila MD by Meena Eisenberg.  03/14/23   12:53 EDT    Patient or patient representative verbalized consent to the visit recording.  I have personally performed the services described in this document as transcribed by the above individual, and it is both accurate and complete.

## 2023-03-22 PROCEDURE — 93295 DEV INTERROG REMOTE 1/2/MLT: CPT | Performed by: NURSE PRACTITIONER

## 2023-03-22 PROCEDURE — 93296 REM INTERROG EVL PM/IDS: CPT | Performed by: NURSE PRACTITIONER

## 2023-03-31 ENCOUNTER — PATIENT MESSAGE (OUTPATIENT)
Dept: FAMILY MEDICINE CLINIC | Facility: CLINIC | Age: 63
End: 2023-03-31
Payer: COMMERCIAL

## 2023-03-31 DIAGNOSIS — F41.8 MIXED ANXIETY DEPRESSIVE DISORDER: ICD-10-CM

## 2023-03-31 RX ORDER — DOXYCYCLINE HYCLATE 100 MG/1
100 CAPSULE ORAL 2 TIMES DAILY
Qty: 20 CAPSULE | Refills: 0 | Status: SHIPPED | OUTPATIENT
Start: 2023-03-31 | End: 2023-04-10

## 2023-04-01 RX ORDER — ALPRAZOLAM 1 MG/1
1 TABLET ORAL 2 TIMES DAILY PRN
Qty: 60 TABLET | Refills: 0 | Status: SHIPPED | OUTPATIENT
Start: 2023-04-01

## 2023-04-03 RX ORDER — SACUBITRIL AND VALSARTAN 49; 51 MG/1; MG/1
1 TABLET, FILM COATED ORAL 2 TIMES DAILY
Qty: 180 TABLET | Refills: 1 | Status: SHIPPED | OUTPATIENT
Start: 2023-04-03

## 2023-05-19 DIAGNOSIS — F41.8 MIXED ANXIETY DEPRESSIVE DISORDER: ICD-10-CM

## 2023-05-19 RX ORDER — ALPRAZOLAM 1 MG/1
1 TABLET ORAL 2 TIMES DAILY PRN
Qty: 60 TABLET | Refills: 0 | Status: SHIPPED | OUTPATIENT
Start: 2023-05-19

## 2023-05-19 RX ORDER — METOPROLOL SUCCINATE 100 MG/1
100 TABLET, EXTENDED RELEASE ORAL 2 TIMES DAILY
Qty: 180 TABLET | Refills: 1 | Status: SHIPPED | OUTPATIENT
Start: 2023-05-19

## 2023-06-08 DIAGNOSIS — F41.8 MIXED ANXIETY DEPRESSIVE DISORDER: ICD-10-CM

## 2023-06-08 RX ORDER — ALPRAZOLAM 1 MG/1
1 TABLET ORAL 2 TIMES DAILY PRN
Qty: 60 TABLET | Refills: 0 | Status: CANCELLED | OUTPATIENT
Start: 2023-06-08

## 2023-06-08 RX ORDER — ALPRAZOLAM 1 MG/1
1 TABLET ORAL 2 TIMES DAILY PRN
Qty: 60 TABLET | Refills: 0 | OUTPATIENT
Start: 2023-06-08

## 2023-07-25 ENCOUNTER — OFFICE VISIT (OUTPATIENT)
Dept: SLEEP MEDICINE | Facility: CLINIC | Age: 63
End: 2023-07-25
Payer: MEDICARE

## 2023-07-25 VITALS
DIASTOLIC BLOOD PRESSURE: 90 MMHG | WEIGHT: 160 LBS | SYSTOLIC BLOOD PRESSURE: 169 MMHG | RESPIRATION RATE: 16 BRPM | HEART RATE: 84 BPM | HEIGHT: 64 IN | OXYGEN SATURATION: 100 % | BODY MASS INDEX: 27.31 KG/M2

## 2023-07-25 DIAGNOSIS — Z99.89 OSA ON CPAP: Primary | ICD-10-CM

## 2023-07-25 DIAGNOSIS — G47.33 OSA ON CPAP: Primary | ICD-10-CM

## 2023-07-25 DIAGNOSIS — Z95.810 PRESENCE OF BIVENTRICULAR IMPLANTABLE CARDIOVERTER-DEFIBRILLATOR (ICD): ICD-10-CM

## 2023-07-25 DIAGNOSIS — I42.0 DILATED CARDIOMYOPATHY: ICD-10-CM

## 2023-07-25 PROCEDURE — 99213 OFFICE O/P EST LOW 20 MIN: CPT | Performed by: INTERNAL MEDICINE

## 2023-07-25 PROCEDURE — G0463 HOSPITAL OUTPT CLINIC VISIT: HCPCS

## 2023-07-25 PROCEDURE — 1160F RVW MEDS BY RX/DR IN RCRD: CPT | Performed by: INTERNAL MEDICINE

## 2023-07-25 PROCEDURE — 1159F MED LIST DOCD IN RCRD: CPT | Performed by: INTERNAL MEDICINE

## 2023-07-25 PROCEDURE — 3077F SYST BP >= 140 MM HG: CPT | Performed by: INTERNAL MEDICINE

## 2023-07-25 PROCEDURE — 3080F DIAST BP >= 90 MM HG: CPT | Performed by: INTERNAL MEDICINE

## 2023-07-25 NOTE — LETTER
July 25, 2023       No Recipients    Patient: Susy Hanson   YOB: 1960   Date of Visit: 7/25/2023     Dear Christo Murphy MD:       Thank you for referring Susy Hanson to me for evaluation. Below are the relevant portions of my assessment and plan of care.    If you have questions, please do not hesitate to call me. I look forward to following Susy along with you.         Sincerely,        Sam Quintana MD        CC:   No Recipients    Sam Quintana MD  07/25/23 1042  Sign when Signing Visit    [address], [address]  [place] [address]  Phone   Fax       SLEEP CLINIC FOLLOW UP PROGRESS NOTE.    Susy Hanson  [id number]   1960  63 y.o.  female      PCP: Erica Avila MD      Date of visit: 7/25/2023    Chief Complaint   Patient presents with   • Sleep Apnea       HPI:  This is a 63 y.o. years old patient is here for the management of obstructive sleep apnea.  Sleep apnea is mild in severity with a AHI of 11/hr. Patient is using positive airway pressure therapy with auto CPAP and the symptoms of sleep apnea have improved significantly on the therapy. Normally patient goes to bed at 9 PM and wakes up at 7 AM .  The patient wakes up 3 time(s) during the night and has no problem going back to sleep.  Feels refreshed after waking up.     Since her last visit she had developed breast cancer and underwent bilateral mastectomy.  She also has been diagnosed with cardiomyopathy with acute heart failure and has a defibrillator.  She is being followed by Dr. Murphy.  She states that her ejection fraction has improved significantly and she uses the CPAP on a regular basis now feels well rested.  In addition she is retired from [employer] and enjoying her senior living    Medications and allergies are reviewed by me and documented in the encounter.     SOCIAL (habits pertaining to sleep medicine)  History  "tobacco use:No   History of alcohol use: 0 per week  Caffeine use: 1     REVIEW OF SYSTEMS:   Pertaining positive symptoms are:  [place] Sleepiness Scale :    Acid reflux      PHYSICAL EXAMINATION:  CONSTITUTIONAL:  Vitals:    07/25/23 1013   BP: 169/90   BP Location: Right arm   Patient Position: Sitting   Cuff Size: Adult   Pulse: 84   Resp: 16   SpO2: 100%   Weight: 72.6 kg (160 lb)   Height: 162.6 cm (64\")    Body mass index is 27.46 kg/m².   NOSE: nasal passages are clear, No deformities noted   RESP SYSTEM: Not in any respiratory distress, no chest deformities noted,   CARDIOVASULAR: No edema noted  NEURO: Oriented x 3, gait normal,  Mood and affect appeared appropriate      Data reviewed:  The Smart card downloaded on 7/25/2023 has been reviewed independently by me for compliance and discussed the data with the patient.   Compliance; 100%  More than 4 hr use, 100%  Average use of the device 9 hours per night  Residual AHI: 1.7/hr (goal < 5.0 /hr)  Mask type: Fullface mask  Device: DreamStation 2  DME: [facility]      ASSESSMENT AND PLAN:  Obstructive sleep apnea ( G 47.33).  The symptoms of sleep apnea have improved with the device and the treatment.  Patient's compliance with the device is excellent for treatment of sleep apnea.  I have independently reviewed the smart card down load and discussed with the patient the download data and encouarged the patient to continue to use the device.The residual AHI is acceptable. The device is benefiting the patient and the device is medically necessary.  Without proper control of sleep apnea and good compliance there is a increased risk for hypertension, diabetes mellitus and nonrestorative sleep with hypersomnia which can increase risk for motor vehicle accidents.  Untreated sleep apnea is also a risk factor for development of atrial fibrillation, pulmonary hypertension, insulin resistance and stroke. The patient is also instructed to get the supplies from the " [employer] company and and change them on a regular basis.  A prescription for supplies has been sent to the [employer] company.  I have also discussed the good sleep hygiene habits and adequate amount of sleep needed for good health.  Cardiomyopathy/heart failure  History of breast cancer  Return in about 1 year (around 7/25/2024) for with smart card down load. . Patient's questions were answered.    7/25/2023  Sam Quintana MD  Sleep Medicine.  Medical Director,   [facility], [place] [facility] [place] [facility].

## 2023-07-25 NOTE — PROGRESS NOTES
"  [facility]  [facility], [address]  [place] [address]  Phone   Fax       SLEEP CLINIC FOLLOW UP PROGRESS NOTE.    Susy Hanson  [id number]   1960  63 y.o.  female      PCP: Erica Avila MD      Date of visit: 7/25/2023    Chief Complaint   Patient presents with    Sleep Apnea       HPI:  This is a 63 y.o. years old patient is here for the management of obstructive sleep apnea.  Sleep apnea is mild in severity with a AHI of 11/hr. Patient is using positive airway pressure therapy with auto CPAP and the symptoms of sleep apnea have improved significantly on the therapy. Normally patient goes to bed at 9 PM and wakes up at 7 AM .  The patient wakes up 3 time(s) during the night and has no problem going back to sleep.  Feels refreshed after waking up.     Since her last visit she had developed breast cancer and underwent bilateral mastectomy.  She also has been diagnosed with cardiomyopathy with acute heart failure and has a defibrillator.  She is being followed by Dr. Murphy.  She states that her ejection fraction has improved significantly and she uses the CPAP on a regular basis now feels well rested.  In addition she is retired from [employer] and enjoying her longterm    Medications and allergies are reviewed by me and documented in the encounter.     SOCIAL (habits pertaining to sleep medicine)  History tobacco use:No   History of alcohol use: 0 per week  Caffeine use: 1     REVIEW OF SYSTEMS:   Pertaining positive symptoms are:  [place] Sleepiness Scale :    Acid reflux      PHYSICAL EXAMINATION:  CONSTITUTIONAL:  Vitals:    07/25/23 1013   BP: 169/90   BP Location: Right arm   Patient Position: Sitting   Cuff Size: Adult   Pulse: 84   Resp: 16   SpO2: 100%   Weight: 72.6 kg (160 lb)   Height: 162.6 cm (64\")    Body mass index is 27.46 kg/m².   NOSE: nasal passages are clear, No deformities noted   RESP SYSTEM: Not in any respiratory " distress, no chest deformities noted,   CARDIOVASULAR: No edema noted  NEURO: Oriented x 3, gait normal,  Mood and affect appeared appropriate      Data reviewed:  The Smart card downloaded on 7/25/2023 has been reviewed independently by me for compliance and discussed the data with the patient.   Compliance; 100%  More than 4 hr use, 100%  Average use of the device 9 hours per night  Residual AHI: 1.7/hr (goal < 5.0 /hr)  Mask type: Fullface mask  Device: DreamStation 2  DME: [facility]      ASSESSMENT AND PLAN:  Obstructive sleep apnea ( G 47.33).  The symptoms of sleep apnea have improved with the device and the treatment.  Patient's compliance with the device is excellent for treatment of sleep apnea.  I have independently reviewed the smart card down load and discussed with the patient the download data and encouarged the patient to continue to use the device.The residual AHI is acceptable. The device is benefiting the patient and the device is medically necessary.  Without proper control of sleep apnea and good compliance there is a increased risk for hypertension, diabetes mellitus and nonrestorative sleep with hypersomnia which can increase risk for motor vehicle accidents.  Untreated sleep apnea is also a risk factor for development of atrial fibrillation, pulmonary hypertension, insulin resistance and stroke. The patient is also instructed to get the supplies from the [employer] company and and change them on a regular basis.  A prescription for supplies has been sent to the [employer] company.  I have also discussed the good sleep hygiene habits and adequate amount of sleep needed for good health.  Cardiomyopathy/heart failure  History of breast cancer  Return in about 1 year (around 7/25/2024) for with smart card down load. . Patient's questions were answered.    7/25/2023  Sam Quintana MD  Sleep Medicine.  Medical Director,   [facility], [facility].

## 2023-07-26 ENCOUNTER — PATIENT MESSAGE (OUTPATIENT)
Dept: FAMILY MEDICINE CLINIC | Facility: CLINIC | Age: 63
End: 2023-07-26
Payer: MEDICARE

## 2023-07-26 DIAGNOSIS — F41.8 MIXED ANXIETY DEPRESSIVE DISORDER: ICD-10-CM

## 2023-07-26 RX ORDER — AMLODIPINE BESYLATE 5 MG/1
5 TABLET ORAL DAILY
Qty: 90 TABLET | Refills: 1 | Status: SHIPPED | OUTPATIENT
Start: 2023-07-26

## 2023-07-27 RX ORDER — ATORVASTATIN CALCIUM 20 MG/1
20 TABLET, FILM COATED ORAL DAILY
Qty: 90 TABLET | Refills: 1 | Status: SHIPPED | OUTPATIENT
Start: 2023-07-27

## 2023-07-27 RX ORDER — ALPRAZOLAM 1 MG/1
1 TABLET ORAL 2 TIMES DAILY PRN
Qty: 60 TABLET | Refills: 0 | Status: SHIPPED | OUTPATIENT
Start: 2023-07-27

## 2023-08-01 ENCOUNTER — TELEPHONE (OUTPATIENT)
Dept: SURGERY | Facility: CLINIC | Age: 63
End: 2023-08-01
Payer: MEDICARE

## 2023-08-01 ENCOUNTER — OFFICE VISIT (OUTPATIENT)
Dept: SURGERY | Facility: CLINIC | Age: 63
End: 2023-08-01
Payer: MEDICARE

## 2023-08-01 VITALS
SYSTOLIC BLOOD PRESSURE: 162 MMHG | WEIGHT: 173 LBS | RESPIRATION RATE: 17 BRPM | OXYGEN SATURATION: 98 % | HEART RATE: 78 BPM | HEIGHT: 64 IN | DIASTOLIC BLOOD PRESSURE: 78 MMHG | BODY MASS INDEX: 29.53 KG/M2

## 2023-08-01 DIAGNOSIS — N64.4 BREAST PAIN: ICD-10-CM

## 2023-08-01 DIAGNOSIS — D05.12 BREAST NEOPLASM, TIS (DCIS), LEFT: Primary | ICD-10-CM

## 2023-08-01 PROCEDURE — 1159F MED LIST DOCD IN RCRD: CPT | Performed by: NURSE PRACTITIONER

## 2023-08-01 PROCEDURE — 99213 OFFICE O/P EST LOW 20 MIN: CPT | Performed by: NURSE PRACTITIONER

## 2023-08-01 PROCEDURE — 3077F SYST BP >= 140 MM HG: CPT | Performed by: NURSE PRACTITIONER

## 2023-08-01 PROCEDURE — 1160F RVW MEDS BY RX/DR IN RCRD: CPT | Performed by: NURSE PRACTITIONER

## 2023-08-01 PROCEDURE — 3078F DIAST BP <80 MM HG: CPT | Performed by: NURSE PRACTITIONER

## 2023-08-02 ENCOUNTER — TELEPHONE (OUTPATIENT)
Dept: CARDIOLOGY | Facility: CLINIC | Age: 63
End: 2023-08-02

## 2023-08-02 ENCOUNTER — CLINICAL SUPPORT NO REQUIREMENTS (OUTPATIENT)
Dept: CARDIOLOGY | Facility: CLINIC | Age: 63
End: 2023-08-02
Payer: MEDICARE

## 2023-08-02 ENCOUNTER — OFFICE VISIT (OUTPATIENT)
Dept: CARDIOLOGY | Facility: CLINIC | Age: 63
End: 2023-08-02
Payer: MEDICARE

## 2023-08-02 VITALS
HEIGHT: 64 IN | BODY MASS INDEX: 27.66 KG/M2 | SYSTOLIC BLOOD PRESSURE: 132 MMHG | HEART RATE: 76 BPM | OXYGEN SATURATION: 99 % | WEIGHT: 162 LBS | DIASTOLIC BLOOD PRESSURE: 70 MMHG

## 2023-08-02 DIAGNOSIS — I42.0 DILATED CARDIOMYOPATHY: ICD-10-CM

## 2023-08-02 DIAGNOSIS — Z95.810 PRESENCE OF BIVENTRICULAR IMPLANTABLE CARDIOVERTER-DEFIBRILLATOR (ICD): ICD-10-CM

## 2023-08-02 DIAGNOSIS — E78.2 MIXED HYPERLIPIDEMIA: ICD-10-CM

## 2023-08-02 DIAGNOSIS — I42.0 DILATED CARDIOMYOPATHY: Primary | ICD-10-CM

## 2023-08-02 DIAGNOSIS — I10 ESSENTIAL HYPERTENSION: Primary | ICD-10-CM

## 2023-08-02 DIAGNOSIS — E11.49 TYPE 2 DIABETES MELLITUS WITH OTHER DIABETIC NEUROLOGICAL COMPLICATION: ICD-10-CM

## 2023-08-02 PROCEDURE — 3075F SYST BP GE 130 - 139MM HG: CPT | Performed by: INTERNAL MEDICINE

## 2023-08-02 PROCEDURE — 99214 OFFICE O/P EST MOD 30 MIN: CPT | Performed by: INTERNAL MEDICINE

## 2023-08-02 PROCEDURE — 3078F DIAST BP <80 MM HG: CPT | Performed by: INTERNAL MEDICINE

## 2023-08-02 PROCEDURE — 1160F RVW MEDS BY RX/DR IN RCRD: CPT | Performed by: INTERNAL MEDICINE

## 2023-08-02 PROCEDURE — 1159F MED LIST DOCD IN RCRD: CPT | Performed by: INTERNAL MEDICINE

## 2023-08-02 RX ORDER — DILTIAZEM HYDROCHLORIDE 180 MG/1
180 CAPSULE, COATED, EXTENDED RELEASE ORAL DAILY
Qty: 90 CAPSULE | Refills: 1 | Status: SHIPPED | OUTPATIENT
Start: 2023-08-02

## 2023-08-23 DIAGNOSIS — I10 ESSENTIAL HYPERTENSION: ICD-10-CM

## 2023-08-23 DIAGNOSIS — E11.49 TYPE 2 DIABETES MELLITUS WITH OTHER DIABETIC NEUROLOGICAL COMPLICATION: ICD-10-CM

## 2023-08-23 DIAGNOSIS — Z95.810 PRESENCE OF BIVENTRICULAR IMPLANTABLE CARDIOVERTER-DEFIBRILLATOR (ICD): ICD-10-CM

## 2023-08-23 DIAGNOSIS — I42.0 DILATED CARDIOMYOPATHY: ICD-10-CM

## 2023-08-23 DIAGNOSIS — G47.33 OSA ON CPAP: Primary | ICD-10-CM

## 2023-08-23 DIAGNOSIS — Z99.89 OSA ON CPAP: Primary | ICD-10-CM

## 2023-08-23 DIAGNOSIS — E78.2 MIXED HYPERLIPIDEMIA: ICD-10-CM

## 2023-08-23 RX ORDER — DILTIAZEM HYDROCHLORIDE 180 MG/1
180 CAPSULE, COATED, EXTENDED RELEASE ORAL DAILY
Qty: 90 CAPSULE | Refills: 1 | Status: SHIPPED | OUTPATIENT
Start: 2023-08-23

## 2023-08-23 NOTE — PROGRESS NOTES
DEVICE INTERROGATION:  IN OFFICE    DEVICE TYPE:   Biventricular ICD    :   [facility]    BATTERY:  Stable    TIME TO ELECTIVE REPLACEMENT INDICATORS:   7.2 years    CHARGE TIME:   9.4 seconds        LEAD DATA:       DEVICE REPROGRAMMED FOR TESTING      Atrial:   4.6 mV, 390 ohms, 0.5 V@0.5 ms    Ventricular:     RV lead        LV: 0.5 V@1.0 ms, 850 ohms      Pacemaker dependent:   No      Atrial pacing percentage: 2.6%    Ventricular pacing percentage: LV 99% %      Arrhythmia Logbook Reviewed: No VT or VF episodes, no A-fib.  Brief SVT        Summary:    Stable Device Function    No significant arhythmia burden.     Battery status is stable.    Reviewed with: Dr. Murphy      NEXT IN OFFICE DEVICE CHECK DUE: 6 months    REMOTE DEVICE INTERROGATIONS: Ongoing

## 2023-08-25 ENCOUNTER — TELEPHONE (OUTPATIENT)
Dept: FAMILY MEDICINE CLINIC | Facility: CLINIC | Age: 63
End: 2023-08-25
Payer: MEDICARE

## 2023-08-25 NOTE — TELEPHONE ENCOUNTER
Prior Auth completed for metFORMIN HCl 1000MG tablets via covermymeds. Pending determination.      Key: [id number] - PA Case ID: [id number] - Rx #: [id number]    Approved today  PA Case: [id number], Status: Approved, Coverage Starts on: 7/1/2023 12:00:00 AM, Coverage Ends on: 8/24/2024 12:00:00 AM.    Sent to pharmacy to get filled

## 2023-08-28 ENCOUNTER — PATIENT MESSAGE (OUTPATIENT)
Dept: CARDIOLOGY | Facility: CLINIC | Age: 63
End: 2023-08-28
Payer: MEDICARE

## 2023-08-28 RX ORDER — METOPROLOL SUCCINATE 100 MG/1
100 TABLET, EXTENDED RELEASE ORAL 2 TIMES DAILY
Qty: 180 TABLET | Refills: 1 | Status: SHIPPED | OUTPATIENT
Start: 2023-08-28

## 2023-08-28 NOTE — TELEPHONE ENCOUNTER
From: Susy Hanson  To: Megha Wall  Sent: 8/28/2023 2:30 PM EDT  Subject: Metoprolol succinate xl 100 mg 1 tab 2x per day    Can you send a new script in to [facility] please? I haven't had filled since If went on [employer]. Thanks :)

## 2023-08-29 DIAGNOSIS — F41.8 MIXED ANXIETY DEPRESSIVE DISORDER: ICD-10-CM

## 2023-08-29 RX ORDER — ALPRAZOLAM 1 MG/1
TABLET ORAL
Qty: 60 TABLET | Refills: 0 | Status: SHIPPED | OUTPATIENT
Start: 2023-08-29

## 2023-08-30 ENCOUNTER — PATIENT MESSAGE (OUTPATIENT)
Dept: FAMILY MEDICINE CLINIC | Facility: CLINIC | Age: 63
End: 2023-08-30
Payer: MEDICARE

## 2023-08-31 ENCOUNTER — OFFICE (OUTPATIENT)
Dept: URBAN - METROPOLITAN AREA CLINIC 64 | Facility: CLINIC | Age: 63
End: 2023-08-31
Payer: COMMERCIAL

## 2023-08-31 VITALS
WEIGHT: 168 LBS | HEIGHT: 64 IN | HEART RATE: 75 BPM | SYSTOLIC BLOOD PRESSURE: 187 MMHG | DIASTOLIC BLOOD PRESSURE: 105 MMHG

## 2023-08-31 DIAGNOSIS — I25.10 ATHEROSCLEROTIC HEART DISEASE OF NATIVE CORONARY ARTERY WITH: ICD-10-CM

## 2023-08-31 DIAGNOSIS — R13.19 OTHER DYSPHAGIA: ICD-10-CM

## 2023-08-31 DIAGNOSIS — Z86.010 PERSONAL HISTORY OF COLONIC POLYPS: ICD-10-CM

## 2023-08-31 DIAGNOSIS — Z95.0 PRESENCE OF CARDIAC PACEMAKER: ICD-10-CM

## 2023-08-31 PROCEDURE — 99214 OFFICE O/P EST MOD 30 MIN: CPT | Performed by: NURSE PRACTITIONER

## 2023-09-14 ENCOUNTER — OFFICE VISIT (OUTPATIENT)
Dept: FAMILY MEDICINE CLINIC | Facility: CLINIC | Age: 63
End: 2023-09-14
Payer: MEDICARE

## 2023-09-14 VITALS
WEIGHT: 161 LBS | DIASTOLIC BLOOD PRESSURE: 84 MMHG | HEART RATE: 66 BPM | HEIGHT: 64 IN | BODY MASS INDEX: 27.49 KG/M2 | OXYGEN SATURATION: 100 % | SYSTOLIC BLOOD PRESSURE: 165 MMHG | TEMPERATURE: 97.7 F

## 2023-09-14 DIAGNOSIS — I10 ESSENTIAL HYPERTENSION: Primary | ICD-10-CM

## 2023-09-14 DIAGNOSIS — E55.9 VITAMIN D DEFICIENCY: ICD-10-CM

## 2023-09-14 DIAGNOSIS — E11.49 TYPE 2 DIABETES MELLITUS WITH OTHER DIABETIC NEUROLOGICAL COMPLICATION: ICD-10-CM

## 2023-09-14 DIAGNOSIS — R30.0 DYSURIA: ICD-10-CM

## 2023-09-14 DIAGNOSIS — R82.81 PYURIA: ICD-10-CM

## 2023-09-14 DIAGNOSIS — E78.2 MIXED HYPERLIPIDEMIA: ICD-10-CM

## 2023-09-14 LAB
BILIRUB BLD-MCNC: NEGATIVE MG/DL
CLARITY, POC: ABNORMAL
COLOR UR: YELLOW
EXPIRATION DATE: ABNORMAL
GLUCOSE UR STRIP-MCNC: NEGATIVE MG/DL
KETONES UR QL: NEGATIVE
LEUKOCYTE EST, POC: ABNORMAL
Lab: ABNORMAL
NITRITE UR-MCNC: POSITIVE MG/ML
PH UR: 6.5 [PH] (ref 5–8)
PROT UR STRIP-MCNC: ABNORMAL MG/DL
RBC # UR STRIP: ABNORMAL /UL
SP GR UR: 1.01 (ref 1–1.03)
UROBILINOGEN UR QL: ABNORMAL

## 2023-09-14 PROCEDURE — 87077 CULTURE AEROBIC IDENTIFY: CPT | Performed by: FAMILY MEDICINE

## 2023-09-14 PROCEDURE — 87186 SC STD MICRODIL/AGAR DIL: CPT | Performed by: FAMILY MEDICINE

## 2023-09-14 PROCEDURE — 87086 URINE CULTURE/COLONY COUNT: CPT | Performed by: FAMILY MEDICINE

## 2023-09-14 PROCEDURE — 3044F HG A1C LEVEL LT 7.0%: CPT | Performed by: FAMILY MEDICINE

## 2023-09-14 PROCEDURE — 99213 OFFICE O/P EST LOW 20 MIN: CPT | Performed by: FAMILY MEDICINE

## 2023-09-14 PROCEDURE — 1159F MED LIST DOCD IN RCRD: CPT | Performed by: FAMILY MEDICINE

## 2023-09-14 PROCEDURE — 81003 URINALYSIS AUTO W/O SCOPE: CPT | Performed by: FAMILY MEDICINE

## 2023-09-14 PROCEDURE — 3077F SYST BP >= 140 MM HG: CPT | Performed by: FAMILY MEDICINE

## 2023-09-14 PROCEDURE — 1160F RVW MEDS BY RX/DR IN RCRD: CPT | Performed by: FAMILY MEDICINE

## 2023-09-14 PROCEDURE — 3079F DIAST BP 80-89 MM HG: CPT | Performed by: FAMILY MEDICINE

## 2023-09-14 RX ORDER — CEFDINIR 300 MG/1
300 CAPSULE ORAL 2 TIMES DAILY
Qty: 14 CAPSULE | Refills: 0 | Status: SHIPPED | OUTPATIENT
Start: 2023-09-14 | End: 2023-09-21

## 2023-09-14 NOTE — PROGRESS NOTES
Subjective   Susy Hanson is a 63 y.o. female.     History of Present Illness  Here for follow-up on blood pressure, cholesterol, diabetes, vitamin D      Susy Hanson presents today for follow-up on her blood pressure, cholesterol, diabetes, vitamin D, and anxiety.    The patient reports that she has been gaining a lot of weight. She has lost weight since 08/2023. She weighed 173 pounds on 08/01/2023 and weighs 166 pounds today. She denies any chest pain or trouble breathing. She denies needing medication refills.    She is unsure if her blood glucose levels are secondary to leaving her machine on the airplane.    The patient reports that she is having urinary frequency and wants to have a urine specimen today. She had labs done in 07/2023.    The following portions of the patient's history were reviewed and updated as appropriate: allergies, current medications, past family history, past medical history, past social history, past surgical history, and problem list.  Past Medical History:   Diagnosis Date    Anxiety 2000    Breast cancer     LEFT BREAST 7/2021    Cataract     Had surgery to remove August 2023    CHF (congestive heart failure)     Coronary artery disease 2019    Depression     DM (diabetes mellitus)     HL (hearing loss)     Hyperlipidemia     Hypertension     SAW CARDIO/ STRESS TEST 2020 - NOW MEDS MANAGED BY PCP     Irritable bowel syndrome 2006    Peripheral neuropathy     PONV (postoperative nausea and vomiting)     Renal insufficiency 2020    See Dr. funes for moderate kidney failure    Sleep apnea     WEARS CPAP    Urinary tract infection 6/23    UTI seen at [facility]     Past Surgical History:   Procedure Laterality Date    BLADDER REPAIR      BREAST BIOPSY      BREAST RECONSTRUCTION Bilateral 08/26/2021    Procedure: BILATERAL PREPECTORALPLACMENT TISSUE EXPANDERS AND ALLODERM;  Surgeon: Heri Arreaga MD;  Location: [facility];  Service: Plastics;  Laterality:  Bilateral;    BREAST TISSUE EXPANDER REMOVAL INSERTION OF IMPLANT Bilateral 12/23/2021    Procedure: BILATERAL REMOVAL TISSUE EXPANDERS AND PLACEMENT OF IMPLANTS;  Surgeon: Heri Arreaga MD;  Location: [facility] MAIN OR;  Service: Plastics;  Laterality: Bilateral;    CARDIAC CATHETERIZATION Left 04/13/2022    Procedure: Cardiac Catheterization/Vascular Study;  Surgeon: Christo Murphy MD;  Location:  [facility];  Service: Cardiovascular;  Laterality: Left;    CARDIAC ELECTROPHYSIOLOGY PROCEDURE N/A 09/19/2022    Procedure: ICD implant [place] aware;  Surgeon: Esteban Kendrick MD;  Location:  [facility];  Service: Cardiology;  Laterality: N/A;    CARDIAC ELECTROPHYSIOLOGY PROCEDURE N/A 09/20/2022    Procedure: lead repositioning;  Surgeon: Esteban Kendrick MD;  Location:  [facility];  Service: Cardiology;  Laterality: N/A;    CARDIAC SURGERY  2022    Defibrillator/pacemaker implant    CARDIOVASCULAR STRESS TEST  2020    COLONOSCOPY  11/2012    EYE SURGERY  7/23    Cataracts removed    HYSTERECTOMY      INSERT / REPLACE / REMOVE PACEMAKER  2022    LYMPH NODE BIOPSY  8/29/2021    MASTECTOMY W/ SENTINEL NODE BIOPSY Bilateral 08/26/2021    Procedure: bilateral total mastectomy, left axillary sentinel lymph node biopsy, possible reconstruction.;  Surgeon: Asia Perkins MD;  Location: [facility] MAIN OR;  Service: General;  Laterality: Bilateral;    TUBAL ABDOMINAL LIGATION  1981     Family History   Problem Relation Age of Onset    Other Mother     Diabetes Mother     Heart disease Mother     Sleep apnea Mother     Snoring Mother     COPD Mother     Hyperlipidemia Mother     Alzheimer's disease Father     Diabetes Father     Heart disease Father     Other Father     Sleep apnea Father     Snoring Father     Hyperlipidemia Father     Heart failure Father     Diabetes Sister     Heart disease Sister     Other Sister     Sleep apnea Sister     Snoring Sister      COPD Sister     Hyperlipidemia Sister     Clotting disorder Sister     Hyperlipidemia Brother     Breast cancer Maternal Aunt 80    Breast cancer Paternal Aunt     Diabetes Maternal Grandmother     Breast cancer Paternal Grandmother 60    Cancer Paternal Grandmother     Diabetes Paternal Grandmother     Hyperlipidemia Son     Malig Hyperthermia Neg Hx      Social History     Socioeconomic History    Marital status:    Tobacco Use    Smoking status: Former     Packs/day: 2.00     Years: 30.00     Pack years: 60.00     Types: Cigarettes     Start date: 1/1/1972     Quit date: 1/1/2008     Years since quitting: 15.7     Passive exposure: Past    Smokeless tobacco: Never   Vaping Use    Vaping Use: Never used   Substance and Sexual Activity    Alcohol use: No    Drug use: No    Sexual activity: Yes     Partners: Male     Birth control/protection: None, Tubal ligation, Hysterectomy         Current Outpatient Medications:     ALPRAZolam (XANAX) 1 MG tablet, TAKE 1 TABLET TWICE A DAY  AS NEEDED FOR ANXIETY OR   SLEEP, Disp: 60 tablet, Rfl: 0    atorvastatin (LIPITOR) 20 MG tablet, Take 1 tablet by mouth Daily., Disp: 90 tablet, Rfl: 1    cetirizine (zyrTEC) 10 MG tablet, Take 1 tablet by mouth Daily As Needed., Disp: , Rfl:     dilTIAZem CD (Cardizem CD) 180 MG 24 hr capsule, Take 1 capsule by mouth Daily., Disp: 90 capsule, Rfl: 1    metFORMIN (GLUCOPHAGE) 1000 MG tablet, TAKE 1 TABLET WITH         BREAKFAST AND TAKE 1 AND   1/2 TABLETS WITH SUPPER, Disp: 225 tablet, Rfl: 1    metoprolol succinate XL (TOPROL-XL) 100 MG 24 hr tablet, Take 1 tablet by mouth 2 (Two) Times a Day., Disp: 180 tablet, Rfl: 1    sacubitril-valsartan (Entresto) 49-51 MG tablet, Take 1 tablet by mouth 2 (Two) Times a Day., Disp: 180 tablet, Rfl: 1    Triamcinolone Acetonide (NASACORT) 55 MCG/ACT nasal inhaler, APPLY 2 SPRAYS BY NASAL ROUTE DAILY FOR 30 DAYS, Disp: 10.8 mL, Rfl: 6    cefdinir (OMNICEF) 300 MG capsule, Take 1 capsule by  "mouth 2 (Two) Times a Day for 7 days., Disp: 14 capsule, Rfl: 0    Review of Systems  /84 (BP Location: Right arm, Patient Position: Sitting, Cuff Size: Adult)   Pulse 66   Temp 97.7 °F (36.5 °C) (Temporal)   Ht 162.6 cm (64.02\")   Wt 73 kg (161 lb)   LMP  (LMP Unknown)   SpO2 100%   BMI 27.62 kg/m²     A review of systems was performed, and the pertinent positives are noted in the HPI.     Objective   Physical Exam  Vitals and nursing note reviewed.   Constitutional:       Appearance: Normal appearance. She is well-developed, well-groomed and overweight.   Cardiovascular:      Rate and Rhythm: Normal rate and regular rhythm.      Heart sounds: Normal heart sounds.   Pulmonary:      Effort: Pulmonary effort is normal.      Breath sounds: Normal breath sounds.   Musculoskeletal:      Right lower leg: No edema.      Left lower leg: No edema.   Neurological:      Mental Status: She is alert.   Psychiatric:         Behavior: Behavior is cooperative.     Lab Results   Component Value Date    GLUCOSE 139 (H) 07/03/2023    BUN 15 07/03/2023    CREATININE 1.06 (H) 07/03/2023    EGFR 58.8 (L) 07/03/2023    BCR 14.2 07/03/2023    K 5.0 07/03/2023    CO2 28.0 07/03/2023    CALCIUM 9.8 07/03/2023    ALBUMIN 4.8 07/03/2023    BILITOT 0.2 07/03/2023    AST 14 07/03/2023    ALT 19 07/03/2023     Lab Results   Component Value Date    GLUCOSE 139 (H) 07/03/2023    BUN 15 07/03/2023    CREATININE 1.06 (H) 07/03/2023    EGFR 58.8 (L) 07/03/2023    BCR 14.2 07/03/2023    K 5.0 07/03/2023    CO2 28.0 07/03/2023    CALCIUM 9.8 07/03/2023    ALBUMIN 4.8 07/03/2023    BILITOT 0.2 07/03/2023    AST 14 07/03/2023    ALT 19 07/03/2023     Lab Results   Component Value Date    HGBA1C 6.30 (H) 07/03/2023     Lab Results   Component Value Date    CHOL 140 03/14/2023    TRIG 153 (H) 03/14/2023    HDL 50 03/14/2023    LDL 64 03/14/2023     Brief Urine Lab Results  (Last result in the past 365 days)        Color   Clarity   Blood   " Leuk Est   Nitrite   Protein   CREAT   Urine HCG        09/14/23 1338 Yellow   Cloudy   Trace   Large (3+)   Positive   100 mg/dL                     Assessment & Plan   Problems Addressed this Visit          Cardiac and Vasculature    Essential hypertension - Primary    Mixed hyperlipidemia       Endocrine and Metabolic    Vitamin D deficiency    Type 2 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     Other Visit Diagnoses       Dysuria        Relevant Orders    POCT urinalysis dipstick, automated (Completed)    Urine Culture - Urine, Urine, Clean Catch    Pyuria        Relevant Orders    Urine Culture - Urine, Urine, Clean Catch          Diagnoses         Codes Comments    Essential hypertension    -  Primary ICD-10-CM: I10  ICD-9-CM: 401.9     Mixed hyperlipidemia     ICD-10-CM: E78.2  ICD-9-CM: 272.2     Type 2 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     ICD-10-CM: E11.49  ICD-9-CM: 250.60     Vitamin D deficiency     ICD-10-CM: E55.9  ICD-9-CM: 268.9     Dysuria     ICD-10-CM: R30.0  ICD-9-CM: 788.1     Pyuria     ICD-10-CM: R82.81  ICD-9-CM: 791.9           She will continue current meds for blood pressure, cholesterol, and diabetes.  Recent labs were reviewed.    Urinary frequency  - Urinalysis is abnormal, so I will start her on antibiotics and send her urine for culture.       Transcribed from ambient dictation for Erica Avila MD by Tori Broderick.  09/14/23   13:45 EDT    Patient or patient representative verbalized consent to the visit recording.  I have personally performed the services described in this document as transcribed by the above individual, and it is both accurate and complete.

## 2023-09-16 LAB — BACTERIA SPEC AEROBE CULT: ABNORMAL

## 2023-09-25 ENCOUNTER — CLINICAL SUPPORT (OUTPATIENT)
Dept: FAMILY MEDICINE CLINIC | Facility: CLINIC | Age: 63
End: 2023-09-25

## 2023-09-25 ENCOUNTER — LAB (OUTPATIENT)
Dept: FAMILY MEDICINE CLINIC | Facility: CLINIC | Age: 63
End: 2023-09-25
Payer: MEDICARE

## 2023-09-25 ENCOUNTER — TELEPHONE (OUTPATIENT)
Dept: FAMILY MEDICINE CLINIC | Facility: CLINIC | Age: 63
End: 2023-09-25

## 2023-09-25 DIAGNOSIS — R30.0 DYSURIA: Primary | ICD-10-CM

## 2023-09-25 DIAGNOSIS — R30.0 DYSURIA: ICD-10-CM

## 2023-09-25 DIAGNOSIS — L23.7 POISON IVY: Primary | ICD-10-CM

## 2023-09-25 LAB
BACTERIA UR QL AUTO: NORMAL /HPF
BILIRUB UR QL STRIP: NEGATIVE
CLARITY UR: CLEAR
COLOR UR: YELLOW
GLUCOSE UR STRIP-MCNC: NEGATIVE MG/DL
HGB UR QL STRIP.AUTO: NEGATIVE
HOLD SPECIMEN: NORMAL
HYALINE CASTS UR QL AUTO: NORMAL /LPF
KETONES UR QL STRIP: NEGATIVE
LEUKOCYTE ESTERASE UR QL STRIP.AUTO: ABNORMAL
NITRITE UR QL STRIP: NEGATIVE
PH UR STRIP.AUTO: 6 [PH] (ref 5–8)
PROT UR QL STRIP: ABNORMAL
RBC # UR STRIP: NORMAL /HPF
REF LAB TEST METHOD: NORMAL
SP GR UR STRIP: 1.01 (ref 1–1.03)
SQUAMOUS #/AREA URNS HPF: NORMAL /HPF
UROBILINOGEN UR QL STRIP: ABNORMAL
WBC # UR STRIP: NORMAL /HPF

## 2023-09-25 PROCEDURE — 96372 THER/PROPH/DIAG INJ SC/IM: CPT | Performed by: FAMILY MEDICINE

## 2023-09-25 PROCEDURE — 81001 URINALYSIS AUTO W/SCOPE: CPT | Performed by: FAMILY MEDICINE

## 2023-09-25 RX ORDER — METHYLPREDNISOLONE SODIUM SUCCINATE 125 MG/2ML
125 INJECTION, POWDER, LYOPHILIZED, FOR SOLUTION INTRAMUSCULAR; INTRAVENOUS ONCE
Status: COMPLETED | OUTPATIENT
Start: 2023-09-25 | End: 2023-09-25

## 2023-09-25 RX ADMIN — METHYLPREDNISOLONE SODIUM SUCCINATE 125 MG: 125 INJECTION, POWDER, LYOPHILIZED, FOR SOLUTION INTRAMUSCULAR; INTRAVENOUS at 13:53

## 2023-09-25 NOTE — TELEPHONE ENCOUNTER
Caller: Susy Hanson    Relationship to patient: Self    Best call back number: [phone]     Chief complaint: PATIENT WANTS TO DO ANOTHER URINE TEST, SHE FEELS LIKE SHE STILL HAS A UTI. PATIENT ALSO NEEDS TO GET A SHOT FOR POISON HEATHER. SHE HAS IT AND IS VERY ALLERGIC.     Type of visit: NURSE/MA    Requested date: ASAP     Additional notes: PLEASE CALL TO SCHEDULE.

## 2023-09-25 NOTE — TELEPHONE ENCOUNTER
She can come in and I will put in an order to do a UA through lab and she can also have 125 solumedrol IM for the poison ivy

## 2023-09-27 DIAGNOSIS — F41.8 MIXED ANXIETY DEPRESSIVE DISORDER: ICD-10-CM

## 2023-09-27 RX ORDER — ALPRAZOLAM 1 MG/1
TABLET ORAL
Qty: 60 TABLET | Refills: 0 | Status: SHIPPED | OUTPATIENT
Start: 2023-09-27

## 2023-11-14 DIAGNOSIS — F41.8 MIXED ANXIETY DEPRESSIVE DISORDER: ICD-10-CM

## 2023-11-15 RX ORDER — ALPRAZOLAM 1 MG/1
TABLET ORAL
Qty: 60 TABLET | Refills: 0 | Status: SHIPPED | OUTPATIENT
Start: 2023-11-15

## 2023-11-16 ENCOUNTER — ON CAMPUS - OUTPATIENT (OUTPATIENT)
Dept: URBAN - METROPOLITAN AREA HOSPITAL 2 | Facility: HOSPITAL | Age: 63
End: 2023-11-16
Payer: COMMERCIAL

## 2023-11-16 ENCOUNTER — OFFICE (OUTPATIENT)
Dept: URBAN - METROPOLITAN AREA PATHOLOGY 4 | Facility: PATHOLOGY | Age: 63
End: 2023-11-16
Payer: COMMERCIAL

## 2023-11-16 VITALS
DIASTOLIC BLOOD PRESSURE: 85 MMHG | OXYGEN SATURATION: 100 % | HEART RATE: 73 BPM | OXYGEN SATURATION: 98 % | OXYGEN SATURATION: 9 % | DIASTOLIC BLOOD PRESSURE: 74 MMHG | HEART RATE: 70 BPM | HEART RATE: 74 BPM | RESPIRATION RATE: 17 BRPM | SYSTOLIC BLOOD PRESSURE: 130 MMHG | SYSTOLIC BLOOD PRESSURE: 142 MMHG | SYSTOLIC BLOOD PRESSURE: 116 MMHG | HEART RATE: 86 BPM | SYSTOLIC BLOOD PRESSURE: 153 MMHG | HEART RATE: 82 BPM | DIASTOLIC BLOOD PRESSURE: 71 MMHG | DIASTOLIC BLOOD PRESSURE: 70 MMHG | OXYGEN SATURATION: 99 % | SYSTOLIC BLOOD PRESSURE: 138 MMHG | SYSTOLIC BLOOD PRESSURE: 128 MMHG | DIASTOLIC BLOOD PRESSURE: 76 MMHG | WEIGHT: 167 LBS | SYSTOLIC BLOOD PRESSURE: 152 MMHG | HEART RATE: 79 BPM | SYSTOLIC BLOOD PRESSURE: 139 MMHG | RESPIRATION RATE: 18 BRPM | DIASTOLIC BLOOD PRESSURE: 61 MMHG | RESPIRATION RATE: 15 BRPM | DIASTOLIC BLOOD PRESSURE: 84 MMHG | DIASTOLIC BLOOD PRESSURE: 77 MMHG | SYSTOLIC BLOOD PRESSURE: 168 MMHG | TEMPERATURE: 97.7 F | DIASTOLIC BLOOD PRESSURE: 62 MMHG | RESPIRATION RATE: 16 BRPM | DIASTOLIC BLOOD PRESSURE: 79 MMHG | HEIGHT: 64 IN

## 2023-11-16 DIAGNOSIS — D12.3 BENIGN NEOPLASM OF TRANSVERSE COLON: ICD-10-CM

## 2023-11-16 DIAGNOSIS — D12.0 BENIGN NEOPLASM OF CECUM: ICD-10-CM

## 2023-11-16 DIAGNOSIS — Z12.11 ENCOUNTER FOR SCREENING FOR MALIGNANT NEOPLASM OF COLON: ICD-10-CM

## 2023-11-16 DIAGNOSIS — K63.5 POLYP OF COLON: ICD-10-CM

## 2023-11-16 DIAGNOSIS — K64.0 FIRST DEGREE HEMORRHOIDS: ICD-10-CM

## 2023-11-16 DIAGNOSIS — R13.19 OTHER DYSPHAGIA: ICD-10-CM

## 2023-11-16 DIAGNOSIS — K31.89 OTHER DISEASES OF STOMACH AND DUODENUM: ICD-10-CM

## 2023-11-16 DIAGNOSIS — K22.2 ESOPHAGEAL OBSTRUCTION: ICD-10-CM

## 2023-11-16 DIAGNOSIS — K29.70 GASTRITIS, UNSPECIFIED, WITHOUT BLEEDING: ICD-10-CM

## 2023-11-16 DIAGNOSIS — K20.80 OTHER ESOPHAGITIS WITHOUT BLEEDING: ICD-10-CM

## 2023-11-16 PROBLEM — K20.90 ESOPHAGITIS, UNSPECIFIED WITHOUT BLEEDING: Status: ACTIVE | Noted: 2023-11-16

## 2023-11-16 PROBLEM — K62.1 RECTAL POLYP: Status: ACTIVE | Noted: 2023-11-16

## 2023-11-16 LAB
GI HISTOLOGY: A. SELECT: (no result)
GI HISTOLOGY: B. UNSPECIFIED: (no result)
GI HISTOLOGY: C. UNSPECIFIED: (no result)
GI HISTOLOGY: D. UNSPECIFIED: (no result)
GI HISTOLOGY: PDF REPORT: (no result)

## 2023-11-16 PROCEDURE — 45380 COLONOSCOPY AND BIOPSY: CPT | Mod: 33,59 | Performed by: INTERNAL MEDICINE

## 2023-11-16 PROCEDURE — 43239 EGD BIOPSY SINGLE/MULTIPLE: CPT | Performed by: INTERNAL MEDICINE

## 2023-11-16 PROCEDURE — 43450 DILATE ESOPHAGUS 1/MULT PASS: CPT | Performed by: INTERNAL MEDICINE

## 2023-11-16 PROCEDURE — 88305 TISSUE EXAM BY PATHOLOGIST: CPT | Mod: 26 | Performed by: INTERNAL MEDICINE

## 2023-11-16 PROCEDURE — 45385 COLONOSCOPY W/LESION REMOVAL: CPT | Mod: 33 | Performed by: INTERNAL MEDICINE

## 2023-11-17 ENCOUNTER — TELEPHONE (OUTPATIENT)
Dept: CARDIOLOGY | Facility: CLINIC | Age: 63
End: 2023-11-17
Payer: MEDICARE

## 2023-11-17 NOTE — TELEPHONE ENCOUNTER
Caller: Susy Hanson    Relationship to patient: Self    Best call back number: [phone]    Patient is needin : PATIENT REQUESTING A CALLBACK FROM [place] TO DISCUSS [facility]. PATIENT STATED SHE HAS TO GET THE PAPERWORK SUBMITTED BY DECEMBER 7TH.

## 2023-11-30 ENCOUNTER — OFFICE VISIT (OUTPATIENT)
Dept: FAMILY MEDICINE CLINIC | Facility: CLINIC | Age: 63
End: 2023-11-30
Payer: MEDICARE

## 2023-11-30 VITALS
WEIGHT: 172.3 LBS | BODY MASS INDEX: 29.41 KG/M2 | SYSTOLIC BLOOD PRESSURE: 159 MMHG | DIASTOLIC BLOOD PRESSURE: 82 MMHG | HEIGHT: 64 IN | HEART RATE: 73 BPM | TEMPERATURE: 98.6 F | OXYGEN SATURATION: 100 %

## 2023-11-30 DIAGNOSIS — Z78.0 ENCOUNTER FOR OSTEOPOROSIS SCREENING IN ASYMPTOMATIC POSTMENOPAUSAL PATIENT: Primary | ICD-10-CM

## 2023-11-30 DIAGNOSIS — Z13.820 ENCOUNTER FOR OSTEOPOROSIS SCREENING IN ASYMPTOMATIC POSTMENOPAUSAL PATIENT: Primary | ICD-10-CM

## 2023-11-30 DIAGNOSIS — Z23 NEED FOR PNEUMOCOCCAL 20-VALENT CONJUGATE VACCINATION: ICD-10-CM

## 2023-11-30 RX ORDER — CETIRIZINE HYDROCHLORIDE 10 MG/1
10 TABLET, CHEWABLE ORAL DAILY
COMMUNITY

## 2023-11-30 RX ORDER — DILTIAZEM HYDROCHLORIDE EXTENDED-RELEASE TABLETS 360 MG/1
360 TABLET, EXTENDED RELEASE ORAL DAILY
COMMUNITY

## 2023-11-30 RX ORDER — AMLODIPINE BESYLATE 5 MG/1
5 TABLET ORAL DAILY
COMMUNITY

## 2023-11-30 RX ORDER — PANTOPRAZOLE SODIUM 40 MG/1
40 TABLET, DELAYED RELEASE ORAL DAILY
COMMUNITY
Start: 2023-11-16

## 2023-11-30 RX ORDER — BLOOD SUGAR DIAGNOSTIC
1 STRIP MISCELLANEOUS AS NEEDED
COMMUNITY
Start: 2023-10-13

## 2023-11-30 NOTE — PROGRESS NOTES
"Chief Complaint  No chief complaint on file.    Subjective        Susy Hanson presents to [facility]  History of Present Illness    Pt is here today to get a dexa scan ordered. She has been through breast cancer. She wants to stay on top of her health screening. She denies any fractures.     Objective   Vital Signs:  /82 (BP Location: Left arm, Patient Position: Sitting, Cuff Size: Adult)   Pulse 73   Temp 98.6 °F (37 °C) (Temporal)   Ht 162.6 cm (64\")   Wt 78.2 kg (172 lb 4.8 oz)   SpO2 100%   BMI 29.58 kg/m²   Estimated body mass index is 29.58 kg/m² as calculated from the following:    Height as of this encounter: 162.6 cm (64\").    Weight as of this encounter: 78.2 kg (172 lb 4.8 oz).                  Physical Exam   Result Review :                Assessment and Plan   Diagnoses and all orders for this visit:    1. Encounter for osteoporosis screening in asymptomatic postmenopausal patient (Primary)  -     DEXA Bone Density Axial; Future    2. Need for pneumococcal 20-valent conjugate vaccination             Follow Up   Return for Next scheduled follow up.  Patient was given instructions and counseling regarding her condition or for health maintenance advice. Please see specific information pulled into the AVS if appropriate.       "

## 2023-12-01 ENCOUNTER — TELEPHONE (OUTPATIENT)
Dept: CARDIOLOGY | Facility: CLINIC | Age: 63
End: 2023-12-01
Payer: MEDICARE

## 2023-12-18 ENCOUNTER — TELEPHONE (OUTPATIENT)
Dept: FAMILY MEDICINE CLINIC | Facility: CLINIC | Age: 63
End: 2023-12-18
Payer: MEDICARE

## 2023-12-18 ENCOUNTER — TELEPHONE (OUTPATIENT)
Dept: CARDIOLOGY | Facility: CLINIC | Age: 63
End: 2023-12-18

## 2023-12-18 RX ORDER — ONDANSETRON 4 MG/1
4 TABLET, FILM COATED ORAL EVERY 8 HOURS PRN
Qty: 20 TABLET | Refills: 0 | Status: SHIPPED | OUTPATIENT
Start: 2023-12-18 | End: 2023-12-20 | Stop reason: SDUPTHER

## 2023-12-18 NOTE — TELEPHONE ENCOUNTER
Caller: Susy Hanson    Relationship: Self    Best call back number: [phone]     What medication are you requesting: ZOFRAN    What are your current symptoms: NAUSEA    How long have you been experiencing symptoms:     Have you had these symptoms before:    [x] Yes  [] No    Have you been treated for these symptoms before:   [x] Yes  [] No    If a prescription is needed, what is your preferred pharmacy and phone number: [facility] #[id number] - [address] [phone] [facility] [phone]      Additional notes: SCHEDULED TO SEE BAKER ON 12/20/23

## 2023-12-18 NOTE — TELEPHONE ENCOUNTER
Caller: Susy Hanson    Relationship to patient: Self    Best call back number: [phone]    Chief complaint: WIEGHT LOSS    Type of visit: FOLLOW UP     Requested date: ASAP    Additional notes: PT SAID SHE HAS BEEN NAUSEOUS, LOST 10 LBS, HR IS 95, BLOOD PRESSURE /94. SHE WANTED TO SPEAK WITH A NURSE TO SEE IF SHE SHOULD GO TO THE ER OR GO TO THE [facility].

## 2023-12-20 ENCOUNTER — OFFICE VISIT (OUTPATIENT)
Dept: FAMILY MEDICINE CLINIC | Facility: CLINIC | Age: 63
End: 2023-12-20
Payer: MEDICARE

## 2023-12-20 ENCOUNTER — LAB (OUTPATIENT)
Dept: FAMILY MEDICINE CLINIC | Facility: CLINIC | Age: 63
End: 2023-12-20
Payer: MEDICARE

## 2023-12-20 VITALS
HEIGHT: 64 IN | TEMPERATURE: 97.7 F | SYSTOLIC BLOOD PRESSURE: 95 MMHG | WEIGHT: 168 LBS | DIASTOLIC BLOOD PRESSURE: 62 MMHG | BODY MASS INDEX: 28.68 KG/M2 | HEART RATE: 96 BPM | OXYGEN SATURATION: 96 %

## 2023-12-20 DIAGNOSIS — R19.7 DIARRHEA, UNSPECIFIED TYPE: Primary | ICD-10-CM

## 2023-12-20 DIAGNOSIS — R19.7 DIARRHEA, UNSPECIFIED TYPE: ICD-10-CM

## 2023-12-20 DIAGNOSIS — R11.0 NAUSEA: ICD-10-CM

## 2023-12-20 DIAGNOSIS — R51.9 NONINTRACTABLE HEADACHE, UNSPECIFIED CHRONICITY PATTERN, UNSPECIFIED HEADACHE TYPE: ICD-10-CM

## 2023-12-20 DIAGNOSIS — E11.49 TYPE 2 DIABETES MELLITUS WITH OTHER DIABETIC NEUROLOGICAL COMPLICATION: ICD-10-CM

## 2023-12-20 DIAGNOSIS — E11.43 TYPE 2 DIABETES MELLITUS WITH AUTONOMIC NEUROPATHY, UNSPECIFIED WHETHER LONG TERM INSULIN USE: ICD-10-CM

## 2023-12-20 LAB
ALBUMIN SERPL-MCNC: 4.7 G/DL (ref 3.5–5.2)
ALBUMIN/GLOB SERPL: 1.9 G/DL
ALP SERPL-CCNC: 59 U/L (ref 39–117)
ALT SERPL W P-5'-P-CCNC: 19 U/L (ref 1–33)
ANION GAP SERPL CALCULATED.3IONS-SCNC: 16.7 MMOL/L (ref 5–15)
AST SERPL-CCNC: 14 U/L (ref 1–32)
BASOPHILS # BLD AUTO: 0.06 10*3/MM3 (ref 0–0.2)
BASOPHILS NFR BLD AUTO: 1 % (ref 0–1.5)
BILIRUB SERPL-MCNC: 0.2 MG/DL (ref 0–1.2)
BUN SERPL-MCNC: 32 MG/DL (ref 8–23)
BUN/CREAT SERPL: 20.3 (ref 7–25)
CALCIUM SPEC-SCNC: 9.6 MG/DL (ref 8.6–10.5)
CHLORIDE SERPL-SCNC: 103 MMOL/L (ref 98–107)
CO2 SERPL-SCNC: 22.3 MMOL/L (ref 22–29)
CREAT SERPL-MCNC: 1.58 MG/DL (ref 0.57–1)
DEPRECATED RDW RBC AUTO: 43 FL (ref 37–54)
EGFRCR SERPLBLD CKD-EPI 2021: 36.2 ML/MIN/1.73
EOSINOPHIL # BLD AUTO: 0.35 10*3/MM3 (ref 0–0.4)
EOSINOPHIL NFR BLD AUTO: 5.7 % (ref 0.3–6.2)
ERYTHROCYTE [DISTWIDTH] IN BLOOD BY AUTOMATED COUNT: 13.3 % (ref 12.3–15.4)
GLOBULIN UR ELPH-MCNC: 2.5 GM/DL
GLUCOSE SERPL-MCNC: 142 MG/DL (ref 65–99)
HBA1C MFR BLD: 7.2 % (ref 4.8–5.6)
HCT VFR BLD AUTO: 36.8 % (ref 34–46.6)
HGB BLD-MCNC: 12.2 G/DL (ref 12–15.9)
IMM GRANULOCYTES # BLD AUTO: 0.02 10*3/MM3 (ref 0–0.05)
IMM GRANULOCYTES NFR BLD AUTO: 0.3 % (ref 0–0.5)
LYMPHOCYTES # BLD AUTO: 1.52 10*3/MM3 (ref 0.7–3.1)
LYMPHOCYTES NFR BLD AUTO: 24.8 % (ref 19.6–45.3)
MCH RBC QN AUTO: 29.5 PG (ref 26.6–33)
MCHC RBC AUTO-ENTMCNC: 33.2 G/DL (ref 31.5–35.7)
MCV RBC AUTO: 88.9 FL (ref 79–97)
MONOCYTES # BLD AUTO: 0.63 10*3/MM3 (ref 0.1–0.9)
MONOCYTES NFR BLD AUTO: 10.3 % (ref 5–12)
NEUTROPHILS NFR BLD AUTO: 3.54 10*3/MM3 (ref 1.7–7)
NEUTROPHILS NFR BLD AUTO: 57.9 % (ref 42.7–76)
NRBC BLD AUTO-RTO: 0 /100 WBC (ref 0–0.2)
PLATELET # BLD AUTO: 221 10*3/MM3 (ref 140–450)
PMV BLD AUTO: 11.3 FL (ref 6–12)
POTASSIUM SERPL-SCNC: 5.3 MMOL/L (ref 3.5–5.2)
PROT SERPL-MCNC: 7.2 G/DL (ref 6–8.5)
RBC # BLD AUTO: 4.14 10*6/MM3 (ref 3.77–5.28)
SODIUM SERPL-SCNC: 142 MMOL/L (ref 136–145)
WBC NRBC COR # BLD AUTO: 6.12 10*3/MM3 (ref 3.4–10.8)

## 2023-12-20 PROCEDURE — 3078F DIAST BP <80 MM HG: CPT

## 2023-12-20 PROCEDURE — 3074F SYST BP LT 130 MM HG: CPT

## 2023-12-20 PROCEDURE — 1160F RVW MEDS BY RX/DR IN RCRD: CPT

## 2023-12-20 PROCEDURE — 36415 COLL VENOUS BLD VENIPUNCTURE: CPT

## 2023-12-20 PROCEDURE — 85025 COMPLETE CBC W/AUTO DIFF WBC: CPT

## 2023-12-20 PROCEDURE — 80053 COMPREHEN METABOLIC PANEL: CPT

## 2023-12-20 PROCEDURE — 99213 OFFICE O/P EST LOW 20 MIN: CPT

## 2023-12-20 PROCEDURE — 83036 HEMOGLOBIN GLYCOSYLATED A1C: CPT

## 2023-12-20 PROCEDURE — 1159F MED LIST DOCD IN RCRD: CPT

## 2023-12-20 PROCEDURE — 3044F HG A1C LEVEL LT 7.0%: CPT

## 2023-12-20 RX ORDER — ONDANSETRON 4 MG/1
4 TABLET, FILM COATED ORAL EVERY 8 HOURS PRN
Qty: 20 TABLET | Refills: 0 | Status: SHIPPED | OUTPATIENT
Start: 2023-12-20

## 2023-12-20 NOTE — PROGRESS NOTES
"Chief Complaint  Nausea, Diarrhea (Over a week ), Hypertension (Called cardiology, think its due to the nausea per pt/their office ), and Headache    Subjective        Susy Hanson presents to [facility]  History of Present Illness    Pt is here today for diarrhea, nausea, vomiting that have been going on for over 1 week. She also reports a headache. She denies dizziness. It started with nausea, then diarrhea, then vomiting. She started holding down food 2 days ago and stopped vomiting. She is still having diarrhea. She denies fever, chills, malaise.       Objective   Vital Signs:  BP 95/62 (BP Location: Right arm, Patient Position: Sitting, Cuff Size: Large Adult)   Pulse 96   Temp 97.7 °F (36.5 °C) (Temporal)   Ht 162.6 cm (64\")   Wt 76.2 kg (168 lb)   SpO2 96%   BMI 28.84 kg/m²   Estimated body mass index is 28.84 kg/m² as calculated from the following:    Height as of this encounter: 162.6 cm (64\").    Weight as of this encounter: 76.2 kg (168 lb).                  Physical Exam  Vitals reviewed.   Constitutional:       General: She is not in acute distress.     Appearance: Normal appearance. She is ill-appearing. She is not toxic-appearing or diaphoretic.   Cardiovascular:      Rate and Rhythm: Normal rate and regular rhythm.      Pulses: Normal pulses.      Heart sounds: Normal heart sounds.   Pulmonary:      Effort: Pulmonary effort is normal. No respiratory distress.      Breath sounds: Normal breath sounds.   Abdominal:      General: Bowel sounds are decreased. There is no distension.      Palpations: Abdomen is soft. There is no mass.      Tenderness: There is abdominal tenderness (left upper and lower, mild). There is no right CVA tenderness, left CVA tenderness, guarding or rebound.      Hernia: No hernia is present.   Skin:     General: Skin is warm and dry.      Capillary Refill: Capillary refill takes less than 2 seconds.   Neurological:      General: No " focal deficit present.      Mental Status: She is alert and oriented to person, place, and time.   Psychiatric:         Mood and Affect: Mood normal.         Behavior: Behavior normal.         Thought Content: Thought content normal.         Judgment: Judgment normal.        Result Review :                Assessment and Plan   Diagnoses and all orders for this visit:    1. Diarrhea, unspecified type (Primary)  Comments:  -GI panel first  -labs  -possible CT if persists  Orders:  -     CBC w AUTO Differential; Future  -     Comprehensive metabolic panel; Future  -     Gastrointestinal Panel, PCR - Stool, Per Rectum; Future    2. Nausea  -     CBC w AUTO Differential; Future  -     Comprehensive metabolic panel; Future  -     ondansetron (Zofran) 4 MG tablet; Take 1 tablet by mouth Every 8 (Eight) Hours As Needed for Nausea or Vomiting.  Dispense: 20 tablet; Refill: 0    3. Nonintractable headache, unspecified chronicity pattern, unspecified headache type    4. Type 2 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  -     Hemoglobin A1c; Future    5. Type 2 diabetes mellitus with autonomic neuropathy, unspecified whether long term insulin use  -     Gastrointestinal Panel, PCR - Stool, Per Rectum; Future             Follow Up   Return if symptoms worsen or fail to improve.  Patient was given instructions and counseling regarding her condition or for health maintenance advice. Please see specific information pulled into the AVS if appropriate.

## 2023-12-21 ENCOUNTER — LAB (OUTPATIENT)
Dept: FAMILY MEDICINE CLINIC | Facility: CLINIC | Age: 63
End: 2023-12-21
Payer: MEDICARE

## 2023-12-21 DIAGNOSIS — R19.7 DIARRHEA, UNSPECIFIED TYPE: ICD-10-CM

## 2023-12-21 DIAGNOSIS — E11.43 TYPE 2 DIABETES MELLITUS WITH AUTONOMIC NEUROPATHY, UNSPECIFIED WHETHER LONG TERM INSULIN USE: ICD-10-CM

## 2023-12-21 LAB

## 2023-12-21 PROCEDURE — 87507 IADNA-DNA/RNA PROBE TQ 12-25: CPT

## 2023-12-27 DIAGNOSIS — F41.8 MIXED ANXIETY DEPRESSIVE DISORDER: ICD-10-CM

## 2023-12-28 ENCOUNTER — LAB (OUTPATIENT)
Dept: LAB | Facility: HOSPITAL | Age: 63
End: 2023-12-28
Payer: MEDICARE

## 2023-12-28 ENCOUNTER — TRANSCRIBE ORDERS (OUTPATIENT)
Dept: ADMINISTRATIVE | Facility: HOSPITAL | Age: 63
End: 2023-12-28
Payer: MEDICARE

## 2023-12-28 DIAGNOSIS — N18.31 CHRONIC KIDNEY DISEASE (CKD) STAGE G3A/A1, MODERATELY DECREASED GLOMERULAR FILTRATION RATE (GFR) BETWEEN 45-59 ML/MIN/1.73 SQUARE METER AND ALBUMINURIA CREATININE RATIO LESS THAN 30 MG/G (CMS/H*: ICD-10-CM

## 2023-12-28 DIAGNOSIS — N18.31 CHRONIC KIDNEY DISEASE (CKD) STAGE G3A/A1, MODERATELY DECREASED GLOMERULAR FILTRATION RATE (GFR) BETWEEN 45-59 ML/MIN/1.73 SQUARE METER AND ALBUMINURIA CREATININE RATIO LESS THAN 30 MG/G (CMS/H*: Primary | ICD-10-CM

## 2023-12-28 LAB
25(OH)D3 SERPL-MCNC: 34.7 NG/ML (ref 30–100)
ALBUMIN SERPL-MCNC: 4.9 G/DL (ref 3.5–5.2)
ALBUMIN/GLOB SERPL: 2.7 G/DL
ALP SERPL-CCNC: 65 U/L (ref 39–117)
ALT SERPL W P-5'-P-CCNC: 23 U/L (ref 1–33)
ANION GAP SERPL CALCULATED.3IONS-SCNC: 15 MMOL/L (ref 5–15)
AST SERPL-CCNC: 14 U/L (ref 1–32)
BACTERIA UR QL AUTO: ABNORMAL /HPF
BASOPHILS # BLD AUTO: 0.04 10*3/MM3 (ref 0–0.2)
BASOPHILS NFR BLD AUTO: 0.9 % (ref 0–1.5)
BILIRUB SERPL-MCNC: 0.2 MG/DL (ref 0–1.2)
BILIRUB UR QL STRIP: NEGATIVE
BUN SERPL-MCNC: 21 MG/DL (ref 8–23)
BUN/CREAT SERPL: 18.1 (ref 7–25)
CALCIUM SPEC-SCNC: 9.9 MG/DL (ref 8.6–10.5)
CHLORIDE SERPL-SCNC: 105 MMOL/L (ref 98–107)
CK SERPL-CCNC: 25 U/L (ref 20–180)
CLARITY UR: CLEAR
CO2 SERPL-SCNC: 26 MMOL/L (ref 22–29)
COLOR UR: YELLOW
CREAT SERPL-MCNC: 1.16 MG/DL (ref 0.57–1)
CREAT UR-MCNC: 109.7 MG/DL
DEPRECATED RDW RBC AUTO: 43.9 FL (ref 37–54)
EGFRCR SERPLBLD CKD-EPI 2021: 52.4 ML/MIN/1.73
EOSINOPHIL # BLD AUTO: 0.35 10*3/MM3 (ref 0–0.4)
EOSINOPHIL NFR BLD AUTO: 7.5 % (ref 0.3–6.2)
ERYTHROCYTE [DISTWIDTH] IN BLOOD BY AUTOMATED COUNT: 13.3 % (ref 12.3–15.4)
GLOBULIN UR ELPH-MCNC: 1.8 GM/DL
GLUCOSE SERPL-MCNC: 158 MG/DL (ref 65–99)
GLUCOSE UR STRIP-MCNC: NEGATIVE MG/DL
HBA1C MFR BLD: 7.1 % (ref 4.8–5.6)
HCT VFR BLD AUTO: 35.7 % (ref 34–46.6)
HGB BLD-MCNC: 11.7 G/DL (ref 12–15.9)
HGB UR QL STRIP.AUTO: NEGATIVE
HYALINE CASTS UR QL AUTO: ABNORMAL /LPF
IMM GRANULOCYTES # BLD AUTO: 0.02 10*3/MM3 (ref 0–0.05)
IMM GRANULOCYTES NFR BLD AUTO: 0.4 % (ref 0–0.5)
KETONES UR QL STRIP: NEGATIVE
LEUKOCYTE ESTERASE UR QL STRIP.AUTO: ABNORMAL
LYMPHOCYTES # BLD AUTO: 1.39 10*3/MM3 (ref 0.7–3.1)
LYMPHOCYTES NFR BLD AUTO: 30 % (ref 19.6–45.3)
MAGNESIUM SERPL-MCNC: 1.8 MG/DL (ref 1.6–2.4)
MCH RBC QN AUTO: 29.4 PG (ref 26.6–33)
MCHC RBC AUTO-ENTMCNC: 32.8 G/DL (ref 31.5–35.7)
MCV RBC AUTO: 89.7 FL (ref 79–97)
MONOCYTES # BLD AUTO: 0.42 10*3/MM3 (ref 0.1–0.9)
MONOCYTES NFR BLD AUTO: 9.1 % (ref 5–12)
NEUTROPHILS NFR BLD AUTO: 2.42 10*3/MM3 (ref 1.7–7)
NEUTROPHILS NFR BLD AUTO: 52.1 % (ref 42.7–76)
NITRITE UR QL STRIP: NEGATIVE
NRBC BLD AUTO-RTO: 0 /100 WBC (ref 0–0.2)
PH UR STRIP.AUTO: 6 [PH] (ref 5–8)
PHOSPHATE SERPL-MCNC: 4.4 MG/DL (ref 2.5–4.5)
PLATELET # BLD AUTO: 225 10*3/MM3 (ref 140–450)
PMV BLD AUTO: 10.8 FL (ref 6–12)
POTASSIUM SERPL-SCNC: 5.2 MMOL/L (ref 3.5–5.2)
PROT ?TM UR-MCNC: 57.8 MG/DL
PROT SERPL-MCNC: 6.7 G/DL (ref 6–8.5)
PROT UR QL STRIP: ABNORMAL
PROT/CREAT UR: 526.9 MG/G CREA (ref 0–200)
PTH-INTACT SERPL-MCNC: 78 PG/ML (ref 15–65)
RBC # BLD AUTO: 3.98 10*6/MM3 (ref 3.77–5.28)
RBC # UR STRIP: ABNORMAL /HPF
REF LAB TEST METHOD: ABNORMAL
SODIUM SERPL-SCNC: 146 MMOL/L (ref 136–145)
SP GR UR STRIP: 1.02 (ref 1–1.03)
SQUAMOUS #/AREA URNS HPF: ABNORMAL /HPF
URATE SERPL-MCNC: 7.7 MG/DL (ref 2.4–5.7)
UROBILINOGEN UR QL STRIP: ABNORMAL
WBC # UR STRIP: ABNORMAL /HPF
WBC NRBC COR # BLD AUTO: 4.64 10*3/MM3 (ref 3.4–10.8)

## 2023-12-28 PROCEDURE — 82570 ASSAY OF URINE CREATININE: CPT

## 2023-12-28 PROCEDURE — 83735 ASSAY OF MAGNESIUM: CPT

## 2023-12-28 PROCEDURE — 82306 VITAMIN D 25 HYDROXY: CPT

## 2023-12-28 PROCEDURE — 83970 ASSAY OF PARATHORMONE: CPT

## 2023-12-28 PROCEDURE — 85025 COMPLETE CBC W/AUTO DIFF WBC: CPT

## 2023-12-28 PROCEDURE — 84100 ASSAY OF PHOSPHORUS: CPT

## 2023-12-28 PROCEDURE — 36415 COLL VENOUS BLD VENIPUNCTURE: CPT

## 2023-12-28 PROCEDURE — 81001 URINALYSIS AUTO W/SCOPE: CPT

## 2023-12-28 PROCEDURE — 84550 ASSAY OF BLOOD/URIC ACID: CPT

## 2023-12-28 PROCEDURE — 80053 COMPREHEN METABOLIC PANEL: CPT

## 2023-12-28 PROCEDURE — 84156 ASSAY OF PROTEIN URINE: CPT

## 2023-12-28 PROCEDURE — 83036 HEMOGLOBIN GLYCOSYLATED A1C: CPT

## 2023-12-28 PROCEDURE — 82550 ASSAY OF CK (CPK): CPT

## 2023-12-28 RX ORDER — ALPRAZOLAM 1 MG/1
TABLET ORAL
Qty: 60 TABLET | Refills: 0 | Status: SHIPPED | OUTPATIENT
Start: 2023-12-28

## 2024-01-03 ENCOUNTER — OFFICE VISIT (OUTPATIENT)
Dept: FAMILY MEDICINE CLINIC | Facility: CLINIC | Age: 64
End: 2024-01-03
Payer: MEDICARE

## 2024-01-03 VITALS
SYSTOLIC BLOOD PRESSURE: 175 MMHG | HEIGHT: 64 IN | OXYGEN SATURATION: 99 % | BODY MASS INDEX: 29.33 KG/M2 | HEART RATE: 74 BPM | WEIGHT: 171.8 LBS | TEMPERATURE: 98.1 F | DIASTOLIC BLOOD PRESSURE: 75 MMHG

## 2024-01-03 DIAGNOSIS — R11.12 PROJECTILE VOMITING WITH NAUSEA: Primary | ICD-10-CM

## 2024-01-03 DIAGNOSIS — E11.49 TYPE 2 DIABETES MELLITUS WITH OTHER DIABETIC NEUROLOGICAL COMPLICATION: ICD-10-CM

## 2024-01-03 PROCEDURE — 99213 OFFICE O/P EST LOW 20 MIN: CPT | Performed by: FAMILY MEDICINE

## 2024-01-03 PROCEDURE — 3078F DIAST BP <80 MM HG: CPT | Performed by: FAMILY MEDICINE

## 2024-01-03 PROCEDURE — 3077F SYST BP >= 140 MM HG: CPT | Performed by: FAMILY MEDICINE

## 2024-01-03 RX ORDER — GLIMEPIRIDE 4 MG/1
4 TABLET ORAL
Qty: 90 TABLET | Refills: 3 | Status: SHIPPED | OUTPATIENT
Start: 2024-01-03

## 2024-01-03 NOTE — PROGRESS NOTES
"Subjective   Susy Hanson is a 63 y.o. female.     History of Present Illness     Mrs. Susy Hanson her YOB: 1960, she is a 63-year-old female who presents today with complaints of overwhelming nausea and to go over her recent lab results ordered by her nephrologist. She consents to the use of DEANNA.    The patient lost 10 pounds over one week. She was given Zofran, which helped with the vomiting and diarrhea. She has not taken any today. Her stomach feels \"yucky\". She ate a half a bagel this morning and drank water. She is still a little nauseated, but not as bad as she was. She did not take the Zofran today. She woke up in the middle of the night with acid reflux. She is still taking her pantoprazole. She does projectile vomiting.    The patient has silicone implants. The left side where the right lead came loose on her defibrillator is \"deflated.\" Her massage therapist told her every time she lays down on her back, she can tell the difference. She has an appointment with her breast surgeon in the middle of 01/2024.    The patient is still taking metformin. Her diet has not been great. When she was really sick, she got her A1c down to between 5 and 6 percent. She tried Ozempic in the past, but it gave her pancreatitis. She is no longer seeing Dr. De León.    She does still have her gall bladder    The following portions of the patient's history were reviewed and updated as appropriate: allergies, current medications, past family history, past medical history, past social history, past surgical history, and problem list.  Past Medical History:   Diagnosis Date    Anxiety 2000    Breast cancer     LEFT BREAST 7/2021    Cataract     Had surgery to remove August 2023    CHF (congestive heart failure)     Colon polyp 12/2023    Removed during colonoscopy    Coronary artery disease 2019    Depression     DM (diabetes mellitus)     History of medical problems 2008    Bladder prolapse repeat " Dr. Roper    HL (hearing loss)     Hyperlipidemia     Hypertension     SAW CARDIO/ STRESS TEST 2020 - NOW MEDS MANAGED BY PCP     Irritable bowel syndrome 2006    Peripheral neuropathy     PONV (postoperative nausea and vomiting)     Renal insufficiency 2020    See Dr. funes for moderate kidney failure    Sleep apnea     WEARS CPAP    Urinary tract infection 6/23    UTI seen at [facility]     Past Surgical History:   Procedure Laterality Date    BLADDER REPAIR      BREAST BIOPSY      BREAST RECONSTRUCTION Bilateral 08/26/2021    Procedure: BILATERAL PREPECTORALPLACMENT TISSUE EXPANDERS AND ALLODERM;  Surgeon: Heri Arreaga MD;  Location: [facility] MAIN OR;  Service: Plastics;  Laterality: Bilateral;    BREAST TISSUE EXPANDER REMOVAL INSERTION OF IMPLANT Bilateral 12/23/2021    Procedure: BILATERAL REMOVAL TISSUE EXPANDERS AND PLACEMENT OF IMPLANTS;  Surgeon: Heri Arreaga MD;  Location: [facility] MAIN OR;  Service: Plastics;  Laterality: Bilateral;    CARDIAC CATHETERIZATION Left 04/13/2022    Procedure: Cardiac Catheterization/Vascular Study;  Surgeon: Christo Murphy MD;  Location:  [facility];  Service: Cardiovascular;  Laterality: Left;    CARDIAC ELECTROPHYSIOLOGY PROCEDURE N/A 09/19/2022    Procedure: ICD implant St. Sude aware;  Surgeon: Esteban Kendrick MD;  Location:  [facility];  Service: Cardiology;  Laterality: N/A;    CARDIAC ELECTROPHYSIOLOGY PROCEDURE N/A 09/20/2022    Procedure: lead repositioning;  Surgeon: Esteban Kendrick MD;  Location:  [facility];  Service: Cardiology;  Laterality: N/A;    CARDIAC SURGERY  2022    Defibrillator/pacemaker implant    CARDIOVASCULAR STRESS TEST  2020    COLONOSCOPY  11/2012    EYE SURGERY  7/23    Cataracts removed    HYSTERECTOMY      INSERT / REPLACE / REMOVE PACEMAKER  2022    LYMPH NODE BIOPSY  8/29/2021    MASTECTOMY W/ SENTINEL NODE BIOPSY Bilateral 08/26/2021    Procedure: bilateral  total mastectomy, left axillary sentinel lymph node biopsy, possible reconstruction.;  Surgeon: Asia Perkins MD;  Location: [facility] MAIN OR;  Service: General;  Laterality: Bilateral;    TUBAL ABDOMINAL LIGATION       Family History   Problem Relation Age of Onset    Other Mother     Diabetes Mother     Heart disease Mother     Sleep apnea Mother     Snoring Mother     COPD Mother     Hyperlipidemia Mother     Alcohol abuse Mother     Anxiety disorder Mother     Depression Mother     Alzheimer's disease Father     Diabetes Father     Heart disease Father     Other Father     Sleep apnea Father     Snoring Father     Hyperlipidemia Father     Heart failure Father     Kidney disease Father     Diabetes Sister     Heart disease Sister     Other Sister     Sleep apnea Sister     Snoring Sister     COPD Sister     Anxiety disorder Sister     Depression Sister     Hyperlipidemia Sister     Clotting disorder Sister     Hyperlipidemia Brother     Breast cancer Maternal Aunt 80    Breast cancer Paternal Aunt     Diabetes Maternal Grandmother     Breast cancer Paternal Grandmother 60    Cancer Paternal Grandmother     Diabetes Paternal Grandmother     Hyperlipidemia Son     Diabetes Brother     Malig Hyperthermia Neg Hx      Social History     Socioeconomic History    Marital status:    Tobacco Use    Smoking status: Former     Packs/day: 2.00     Years: 30.00     Additional pack years: 0.00     Total pack years: 60.00     Types: Cigarettes     Start date: 1972     Quit date: 2008     Years since quittin.0     Passive exposure: Past    Smokeless tobacco: Never   Vaping Use    Vaping Use: Never used   Substance and Sexual Activity    Alcohol use: No    Drug use: No    Sexual activity: Yes     Partners: Male     Birth control/protection: None, Tubal ligation, Hysterectomy         Current Outpatient Medications:     Accu-Chek Guide test strip, 1 each by Other route As Needed., Disp: , Rfl:      "ALPRAZolam (XANAX) 1 MG tablet, TAKE 1 TABLET TWICE A DAY  AS NEEDED FOR ANXIETY OR   SLEEP, Disp: 60 tablet, Rfl: 0    amLODIPine (NORVASC) 5 MG tablet, Take 1 tablet by mouth Daily., Disp: , Rfl:     atorvastatin (LIPITOR) 20 MG tablet, Take 1 tablet by mouth Daily., Disp: 90 tablet, Rfl: 1    cetirizine (zyrTEC) 10 MG tablet, Take 1 tablet by mouth Daily As Needed., Disp: , Rfl:     dilTIAZem CD (Cardizem CD) 180 MG 24 hr capsule, Take 1 capsule by mouth Daily., Disp: 90 capsule, Rfl: 1    dilTIAZem HCl ER (CARDIZEM LA) 360 MG 24 hr tablet, Take 1 tablet by mouth Daily., Disp: , Rfl:     metoprolol succinate XL (TOPROL-XL) 100 MG 24 hr tablet, Take 1 tablet by mouth 2 (Two) Times a Day., Disp: 180 tablet, Rfl: 1    ondansetron (Zofran) 4 MG tablet, Take 1 tablet by mouth Every 8 (Eight) Hours As Needed for Nausea or Vomiting., Disp: 20 tablet, Rfl: 0    pantoprazole (PROTONIX) 40 MG EC tablet, Take 1 tablet by mouth Daily., Disp: , Rfl:     sacubitril-valsartan (Entresto) 49-51 MG tablet, Take 1 tablet by mouth 2 (Two) Times a Day., Disp: 180 tablet, Rfl: 1    Triamcinolone Acetonide (NASACORT) 55 MCG/ACT nasal inhaler, APPLY 2 SPRAYS BY NASAL ROUTE DAILY FOR 30 DAYS, Disp: 10.8 mL, Rfl: 6    glimepiride (Amaryl) 4 MG tablet, Take 1 tablet by mouth Every Morning Before Breakfast., Disp: 90 tablet, Rfl: 3    Review of Systems    A review of systems was performed, and the pertinent positives are noted in the HPI.    /75 (BP Location: Left arm, Patient Position: Sitting, Cuff Size: Large Adult)   Pulse 74   Temp 98.1 °F (36.7 °C) (Temporal)   Ht 162.6 cm (64\")   Wt 77.9 kg (171 lb 12.8 oz)   LMP  (LMP Unknown)   SpO2 99%   BMI 29.49 kg/m²           Objective   Physical Exam  Vitals and nursing note reviewed.   Constitutional:       Appearance: Normal appearance. She is well-developed and well-groomed.   HENT:      Head: Normocephalic and atraumatic.   Cardiovascular:      Rate and Rhythm: Normal rate " and regular rhythm.      Heart sounds: Normal heart sounds.   Pulmonary:      Effort: Pulmonary effort is normal.      Breath sounds: Normal breath sounds.   Abdominal:      General: Abdomen is flat. Bowel sounds are normal.      Palpations: Abdomen is soft.      Tenderness: There is no abdominal tenderness.   Musculoskeletal:      Cervical back: Normal range of motion and neck supple.      Right lower leg: No edema.      Left lower leg: No edema.   Lymphadenopathy:      Cervical: No cervical adenopathy.   Neurological:      Mental Status: She is alert.   Psychiatric:         Behavior: Behavior is cooperative.         Lab Results   Component Value Date    GLUCOSE 158 (H) 12/28/2023    BUN 21 12/28/2023    CREATININE 1.16 (H) 12/28/2023    EGFR 52.4 (L) 12/28/2023    BCR 18.1 12/28/2023    K 5.2 12/28/2023    CO2 26.0 12/28/2023    CALCIUM 9.9 12/28/2023    ALBUMIN 4.9 12/28/2023    BILITOT 0.2 12/28/2023    AST 14 12/28/2023    ALT 23 12/28/2023     Lab Results   Component Value Date    WBC 4.64 12/28/2023    HGB 11.7 (L) 12/28/2023    HCT 35.7 12/28/2023    MCV 89.7 12/28/2023     12/28/2023     Lab Results   Component Value Date    HGBA1C 7.10 (H) 12/28/2023         Assessment & Plan   Problems Addressed this Visit          Endocrine and Metabolic    Type 2 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    Relevant Medications    glimepiride (Amaryl) 4 MG tablet     Other Visit Diagnoses       Projectile vomiting with nausea    -  Primary    Relevant Orders    US Gallbladder    NM HIDA SCAN WITH PHARMACOLOGICAL INTERVENTION          Diagnoses         Codes Comments    Projectile vomiting with nausea    -  Primary ICD-10-CM: R11.12  ICD-9-CM: 787.01     Type 2 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     ICD-10-CM: E11.49  ICD-9-CM: 250.60             She will follow a bland diet and push fluids  I have ordered a GB ultrasound and HIDA scan  Will stop the metformin and  beryl amaryl  Counseled on the need for dietary compliance                 Transcribed from ambient dictation for Erica Avila MD by Katherine Torres.  01/03/24   16:28 EST    Patient or patient representative verbalized consent to the visit recording.  I have personally performed the services described in this document as transcribed by the above individual, and it is both accurate and complete.

## 2024-01-11 ENCOUNTER — HOSPITAL ENCOUNTER (OUTPATIENT)
Dept: ULTRASOUND IMAGING | Facility: HOSPITAL | Age: 64
Discharge: HOME | End: 2024-01-11
Admitting: FAMILY MEDICINE
Payer: MEDICARE

## 2024-01-11 ENCOUNTER — HOSPITAL ENCOUNTER (OUTPATIENT)
Dept: NUCLEAR MEDICINE | Facility: HOSPITAL | Age: 64
Discharge: HOME | End: 2024-01-11
Payer: MEDICARE

## 2024-01-11 DIAGNOSIS — R11.12 PROJECTILE VOMITING WITH NAUSEA: ICD-10-CM

## 2024-01-11 PROCEDURE — 78227 HEPATOBIL SYST IMAGE W/DRUG: CPT

## 2024-01-11 PROCEDURE — A9537 TC99M MEBROFENIN: HCPCS | Performed by: FAMILY MEDICINE

## 2024-01-11 PROCEDURE — 76705 ECHO EXAM OF ABDOMEN: CPT

## 2024-01-11 PROCEDURE — 0 TECHNETIUM TC 99M MEBROFENIN KIT: Performed by: FAMILY MEDICINE

## 2024-01-11 RX ORDER — BLOOD SUGAR DIAGNOSTIC
1 STRIP MISCELLANEOUS DAILY
Qty: 100 EACH | Refills: 1 | Status: SHIPPED | OUTPATIENT
Start: 2024-01-11

## 2024-01-11 RX ORDER — KIT FOR THE PREPARATION OF TECHNETIUM TC 99M MEBROFENIN 45 MG/10ML
1 INJECTION, POWDER, LYOPHILIZED, FOR SOLUTION INTRAVENOUS
Status: COMPLETED | OUTPATIENT
Start: 2024-01-11 | End: 2024-01-11

## 2024-01-11 RX ORDER — PANTOPRAZOLE SODIUM 40 MG/1
40 TABLET, DELAYED RELEASE ORAL DAILY
Qty: 90 TABLET | Refills: 0 | Status: SHIPPED | OUTPATIENT
Start: 2024-01-11

## 2024-01-11 RX ADMIN — MEBROFENIN 1 DOSE: 45 INJECTION, POWDER, LYOPHILIZED, FOR SOLUTION INTRAVENOUS at 11:29

## 2024-01-11 NOTE — PROGRESS NOTES
Left message to return call to doctor's office at [facility]. Either to get test results or message from provider.

## 2024-01-15 ENCOUNTER — OFFICE VISIT (OUTPATIENT)
Dept: CARDIOLOGY | Facility: CLINIC | Age: 64
End: 2024-01-15
Payer: MEDICARE

## 2024-01-15 VITALS
BODY MASS INDEX: 28.85 KG/M2 | HEART RATE: 72 BPM | SYSTOLIC BLOOD PRESSURE: 172 MMHG | DIASTOLIC BLOOD PRESSURE: 88 MMHG | HEIGHT: 64 IN | WEIGHT: 169 LBS | OXYGEN SATURATION: 99 %

## 2024-01-15 DIAGNOSIS — Z95.810 PRESENCE OF BIVENTRICULAR IMPLANTABLE CARDIOVERTER-DEFIBRILLATOR (ICD): ICD-10-CM

## 2024-01-15 DIAGNOSIS — I10 ESSENTIAL HYPERTENSION: ICD-10-CM

## 2024-01-15 DIAGNOSIS — I44.7 LBBB (LEFT BUNDLE BRANCH BLOCK): ICD-10-CM

## 2024-01-15 DIAGNOSIS — I42.8 NICM (NONISCHEMIC CARDIOMYOPATHY): ICD-10-CM

## 2024-01-15 DIAGNOSIS — I42.0 DILATED CARDIOMYOPATHY: Primary | ICD-10-CM

## 2024-01-15 PROCEDURE — 99214 OFFICE O/P EST MOD 30 MIN: CPT | Performed by: INTERNAL MEDICINE

## 2024-01-15 PROCEDURE — 1159F MED LIST DOCD IN RCRD: CPT | Performed by: INTERNAL MEDICINE

## 2024-01-15 PROCEDURE — 93000 ELECTROCARDIOGRAM COMPLETE: CPT | Performed by: INTERNAL MEDICINE

## 2024-01-15 PROCEDURE — 3077F SYST BP >= 140 MM HG: CPT | Performed by: INTERNAL MEDICINE

## 2024-01-15 PROCEDURE — 3079F DIAST BP 80-89 MM HG: CPT | Performed by: INTERNAL MEDICINE

## 2024-01-15 PROCEDURE — 1160F RVW MEDS BY RX/DR IN RCRD: CPT | Performed by: INTERNAL MEDICINE

## 2024-01-15 NOTE — LETTER
January 15, 2024       No Recipients    Patient: Susy Hanson   YOB: 1960   Date of Visit: 1/15/2024     Dear Erica Avila MD:       Thank you for referring Susy Hnason to me for evaluation. Below are the relevant portions of my assessment and plan of care.    If you have questions, please do not hesitate to call me. I look forward to following Susy along with you.         Sincerely,        Vishal Jin MD        CC:   No Recipients    Vishal Jin MD  01/15/24 0917  Sign when Signing Visit  CC--- post biventricular ICD insertion and pericarditis with pericardial effusion and RV ICD lead malfunction     Sub  63 old pleasant patient comes in for follow-up.  Patient has nonischemic cardiomyopathy and left bundle branch block with EF less than 20%.  Patient underwent biventricular ICD placement by Dr. Stroud   Post device implantation patient developed pericardial effusion with lack of pacing and RV lead and RV lead perforation was noted left to medical management.  Patient has right atrial and coronary sinus lead appropriately functioning with normalization of EF              Medical History        Past Medical History:   Diagnosis Date   • Anxiety 2000   • Breast cancer (HCC)       LEFT BREAST 7/2021   • CHF (congestive heart failure) (HCC)     • Depression     • DM (diabetes mellitus) (HCC)     • HL (hearing loss)     • Hyperlipidemia     • Hypertension       SAW CARDIO/ STRESS TEST 2020 - NOW MEDS MANAGED BY PCP    • Irritable bowel syndrome 2006   • Peripheral neuropathy     • PONV (postoperative nausea and vomiting)     • Sleep apnea       WEARS CPAP         Surgical History         Past Surgical History:   Procedure Laterality Date   • BLADDER REPAIR       • BREAST BIOPSY       • BREAST RECONSTRUCTION Bilateral 08/26/2021     Procedure: BILATERAL PREPECTORALPLACMENT TISSUE EXPANDERS AND ALLODERM;  Surgeon: Heri Arreaga MD;  Location: [facility]  MAIN OR;  Service: Plastics;  Laterality: Bilateral;   • BREAST TISSUE EXPANDER REMOVAL INSERTION OF IMPLANT Bilateral 12/23/2021     Procedure: BILATERAL REMOVAL TISSUE EXPANDERS AND PLACEMENT OF IMPLANTS;  Surgeon: Heri Arreaga MD;  Location: [facility] MAIN OR;  Service: Plastics;  Laterality: Bilateral;   • CARDIAC CATHETERIZATION Left 04/13/2022     Procedure: Cardiac Catheterization/Vascular Study;  Surgeon: Christo Murphy MD;  Location:  [facility];  Service: Cardiovascular;  Laterality: Left;   • CARDIAC ELECTROPHYSIOLOGY PROCEDURE N/A 9/19/2022     Procedure: ICD implant St. Sude aware;  Surgeon: Esteban Kendrick MD;  Location:  [facility];  Service: Cardiology;  Laterality: N/A;   • CARDIAC ELECTROPHYSIOLOGY PROCEDURE N/A 9/20/2022     Procedure: lead repositioning;  Surgeon: Esteban Kendrick MD;  Location:  [facility];  Service: Cardiology;  Laterality: N/A;   • CARDIOVASCULAR STRESS TEST   2020   • COLONOSCOPY   11/2012   • HYSTERECTOMY       • LYMPH NODE BIOPSY   8/29/2021   • MASTECTOMY W/ SENTINEL NODE BIOPSY Bilateral 08/26/2021     Procedure: bilateral total mastectomy, left axillary sentinel lymph node biopsy, possible reconstruction.;  Surgeon: Asia Perkins MD;  Location: [facility] OR;  Service: General;  Laterality: Bilateral;   • TUBAL ABDOMINAL LIGATION   1981             Physical Exam    General:      well developed, well nourished, in no acute distress.    Head:      normocephalic and atraumatic.    Eyes:      PERRL/EOM intact, conjunctivae and sclerae clear without nystagmus.    Neck:      no  thyromegaly, trachea central with normal respiratory effort  Lungs:      clear bilaterally to auscultation.    Heart:       regular rate and rhythm, S1, S2 without murmurs, rubs, or gallops  Skin:      intact without lesions or rashes.    Psych:      alert and cooperative; normal mood and affect; normal attention span and  concentration.             Assessment and plan    Recent hemoglobin 11.7.  Platelets are normal.  Potassium 5.2.  Creatinine of 1.16  Severe nonischemic cardiomyopathy and EF normalized with CRT pacing  Underlying left bundle branch block  Biventricular ICD in situ with nonfunctional RV ICD lead with normal high-voltage impedance with lack of capture from microperforation left to medical management  History of breast carcinoma followed by a physician  Patient currently on Entresto, Toprol-XL for treatment of cardiomyopathy with normalization of EF  Hypertension currently on multiple medications which include Entresto, metoprolol and diltiazem--being managed by nephrology  Medications reviewed and follow-up appointments made    Patient being initiated on Farxiga by nephrologist and hypertension being followed by the nephrologist and that the blood pressure is not well-controlled by current medical therapy, option can be adding low-dose of hydrochlorothiazide or chlorthalidone        ECG 12 Lead    Date/Time: 1/15/2024 9:03 AM  Performed by: Vishal Jin MD    Authorized by: Vishal Jin MD  Comparison: compared with previous ECG   Similar to previous ECG  Rhythm: sinus rhythm and paced  Rate: normal      Electronically signed by Vishal Jin MD, 01/15/24, 9:03 AM EST.

## 2024-01-15 NOTE — PROGRESS NOTES
CC--- post biventricular ICD insertion and pericarditis with pericardial effusion and RV ICD lead malfunction     Sub  63 old pleasant patient comes in for follow-up.  Patient has nonischemic cardiomyopathy and left bundle branch block with EF less than 20%.  Patient underwent biventricular ICD placement by Dr. Stroud   Post device implantation patient developed pericardial effusion with lack of pacing and RV lead and RV lead perforation was noted left to medical management.  Patient has right atrial and coronary sinus lead appropriately functioning with normalization of EF              Medical History        Past Medical History:   Diagnosis Date    Anxiety 2000    Breast cancer (HCC)       LEFT BREAST 7/2021    CHF (congestive heart failure) (HCC)      Depression      DM (diabetes mellitus) (HCC)      HL (hearing loss)      Hyperlipidemia      Hypertension       SAW CARDIO/ STRESS TEST 2020 - NOW MEDS MANAGED BY PCP     Irritable bowel syndrome 2006    Peripheral neuropathy      PONV (postoperative nausea and vomiting)      Sleep apnea       WEARS CPAP         Surgical History         Past Surgical History:   Procedure Laterality Date    BLADDER REPAIR        BREAST BIOPSY        BREAST RECONSTRUCTION Bilateral 08/26/2021     Procedure: BILATERAL PREPECTORALPLACMENT TISSUE EXPANDERS AND ALLODERM;  Surgeon: Heri Arreaga MD;  Location: [facility];  Service: Plastics;  Laterality: Bilateral;    BREAST TISSUE EXPANDER REMOVAL INSERTION OF IMPLANT Bilateral 12/23/2021     Procedure: BILATERAL REMOVAL TISSUE EXPANDERS AND PLACEMENT OF IMPLANTS;  Surgeon: Heri Arreaga MD;  Location: [facility];  Service: Plastics;  Laterality: Bilateral;    CARDIAC CATHETERIZATION Left 04/13/2022     Procedure: Cardiac Catheterization/Vascular Study;  Surgeon: Christo Murphy MD;  Location: [facility] CATH INVASIVE LOCATION;  Service: Cardiovascular;  Laterality: Left;    CARDIAC ELECTROPHYSIOLOGY PROCEDURE N/A  9/19/2022     Procedure: ICD implant St. Sude aware;  Surgeon: Esteban Kendrick MD;  Location:  [facility];  Service: Cardiology;  Laterality: N/A;    CARDIAC ELECTROPHYSIOLOGY PROCEDURE N/A 9/20/2022     Procedure: lead repositioning;  Surgeon: Esteban Kendrick MD;  Location:  [facility];  Service: Cardiology;  Laterality: N/A;    CARDIOVASCULAR STRESS TEST   2020    COLONOSCOPY   11/2012    HYSTERECTOMY        LYMPH NODE BIOPSY   8/29/2021    MASTECTOMY W/ SENTINEL NODE BIOPSY Bilateral 08/26/2021     Procedure: bilateral total mastectomy, left axillary sentinel lymph node biopsy, possible reconstruction.;  Surgeon: Asia Perkins MD;  Location: [facility] MAIN OR;  Service: General;  Laterality: Bilateral;    TUBAL ABDOMINAL LIGATION   1981             Physical Exam    General:      well developed, well nourished, in no acute distress.    Head:      normocephalic and atraumatic.    Eyes:      PERRL/EOM intact, conjunctivae and sclerae clear without nystagmus.    Neck:      no  thyromegaly, trachea central with normal respiratory effort  Lungs:      clear bilaterally to auscultation.    Heart:       regular rate and rhythm, S1, S2 without murmurs, rubs, or gallops  Skin:      intact without lesions or rashes.    Psych:      alert and cooperative; normal mood and affect; normal attention span and concentration.             Assessment and plan    Recent hemoglobin 11.7.  Platelets are normal.  Potassium 5.2.  Creatinine of 1.16  Severe nonischemic cardiomyopathy and EF normalized with CRT pacing  Underlying left bundle branch block  Biventricular ICD in situ with nonfunctional RV ICD lead with normal high-voltage impedance with lack of capture from microperforation left to medical management  History of breast carcinoma followed by a physician  Patient currently on Entresto, Toprol-XL for treatment of cardiomyopathy with normalization of EF  Hypertension currently on  multiple medications which include Entresto, metoprolol and diltiazem--being managed by nephrology  Medications reviewed and follow-up appointments made    Patient being initiated on Farxiga by nephrologist and hypertension being followed by the nephrologist and that the blood pressure is not well-controlled by current medical therapy, option can be adding low-dose of hydrochlorothiazide or chlorthalidone        ECG 12 Lead    Date/Time: 1/15/2024 9:03 AM  Performed by: Vishal Jin MD    Authorized by: Vishal Jin MD  Comparison: compared with previous ECG   Similar to previous ECG  Rhythm: sinus rhythm and paced  Rate: normal      Electronically signed by Vishal Jin MD, 01/15/24, 9:03 AM EST.

## 2024-01-19 DIAGNOSIS — F41.8 MIXED ANXIETY DEPRESSIVE DISORDER: ICD-10-CM

## 2024-01-19 RX ORDER — ALPRAZOLAM 1 MG/1
TABLET ORAL
Qty: 60 TABLET | Refills: 0 | OUTPATIENT
Start: 2024-01-19

## 2024-01-25 ENCOUNTER — HOSPITAL ENCOUNTER (OUTPATIENT)
Dept: CARDIOLOGY | Facility: HOSPITAL | Age: 64
Discharge: HOME | End: 2024-01-25
Payer: MEDICARE

## 2024-01-25 ENCOUNTER — HOSPITAL ENCOUNTER (OUTPATIENT)
Facility: HOSPITAL | Age: 64
Discharge: HOME | End: 2024-01-25
Payer: MEDICARE

## 2024-01-25 ENCOUNTER — DISEASE STATE MANAGEMENT VISIT (OUTPATIENT)
Facility: HOSPITAL | Age: 64
End: 2024-01-25
Payer: MEDICARE

## 2024-01-25 VITALS
RESPIRATION RATE: 20 BRPM | DIASTOLIC BLOOD PRESSURE: 85 MMHG | WEIGHT: 172.6 LBS | SYSTOLIC BLOOD PRESSURE: 168 MMHG | OXYGEN SATURATION: 98 % | BODY MASS INDEX: 29.63 KG/M2 | HEART RATE: 81 BPM

## 2024-01-25 DIAGNOSIS — R06.02 SHORTNESS OF BREATH: ICD-10-CM

## 2024-01-25 DIAGNOSIS — E88.810 METABOLIC SYNDROME X: Chronic | ICD-10-CM

## 2024-01-25 DIAGNOSIS — N17.9 ACUTE KIDNEY INJURY: ICD-10-CM

## 2024-01-25 DIAGNOSIS — F41.8 MIXED ANXIETY DEPRESSIVE DISORDER: ICD-10-CM

## 2024-01-25 DIAGNOSIS — E87.5 HYPERKALEMIA: Primary | ICD-10-CM

## 2024-01-25 DIAGNOSIS — I50.1 LEFT VENTRICULAR FAILURE, UNSPECIFIED: Chronic | ICD-10-CM

## 2024-01-25 DIAGNOSIS — K58.8 OTHER IRRITABLE BOWEL SYNDROME: ICD-10-CM

## 2024-01-25 DIAGNOSIS — I44.7 LBBB (LEFT BUNDLE BRANCH BLOCK): ICD-10-CM

## 2024-01-25 DIAGNOSIS — Z95.810 PRESENCE OF BIVENTRICULAR IMPLANTABLE CARDIOVERTER-DEFIBRILLATOR (ICD): Chronic | ICD-10-CM

## 2024-01-25 DIAGNOSIS — E11.49 TYPE 2 DIABETES MELLITUS WITH OTHER DIABETIC NEUROLOGICAL COMPLICATION: ICD-10-CM

## 2024-01-25 DIAGNOSIS — E08.21 DIABETES MELLITUS DUE TO UNDERLYING CONDITION WITH DIABETIC NEPHROPATHY, UNSPECIFIED WHETHER LONG TERM INSULIN USE: ICD-10-CM

## 2024-01-25 DIAGNOSIS — D05.12 BREAST NEOPLASM, TIS (DCIS), LEFT: ICD-10-CM

## 2024-01-25 DIAGNOSIS — I42.9 CARDIOMYOPATHY, UNSPECIFIED TYPE: ICD-10-CM

## 2024-01-25 DIAGNOSIS — Z91.89 AT RISK FOR FLUID VOLUME OVERLOAD: Chronic | ICD-10-CM

## 2024-01-25 PROBLEM — I50.21 ACUTE SYSTOLIC HEART FAILURE: Status: RESOLVED | Noted: 2022-04-14 | Resolved: 2024-01-25

## 2024-01-25 PROBLEM — I42.8 NICM (NONISCHEMIC CARDIOMYOPATHY): Status: RESOLVED | Noted: 2022-08-02 | Resolved: 2024-01-25

## 2024-01-25 PROBLEM — N18.32 STAGE 3B CHRONIC KIDNEY DISEASE: Status: ACTIVE | Noted: 2024-01-25

## 2024-01-25 LAB
ANION GAP SERPL CALCULATED.3IONS-SCNC: 13 MMOL/L (ref 5–15)
BUN SERPL-MCNC: 30 MG/DL (ref 8–23)
BUN/CREAT SERPL: 22.2 (ref 7–25)
CALCIUM SPEC-SCNC: 10.2 MG/DL (ref 8.6–10.5)
CHLORIDE SERPL-SCNC: 103 MMOL/L (ref 98–107)
CO2 SERPL-SCNC: 26 MMOL/L (ref 22–29)
CREAT SERPL-MCNC: 1.35 MG/DL (ref 0.57–1)
EGFRCR SERPLBLD CKD-EPI 2021: 43.7 ML/MIN/1.73
GLUCOSE SERPL-MCNC: 248 MG/DL (ref 65–99)
HBA1C MFR BLD: 7.9 % (ref 4.8–5.6)
MAGNESIUM SERPL-MCNC: 2.3 MG/DL (ref 1.6–2.4)
NT-PROBNP SERPL-MCNC: 58.9 PG/ML (ref 0–900)
POTASSIUM SERPL-SCNC: 5.7 MMOL/L (ref 3.5–5.2)
QT INTERVAL: 443 MS
QTC INTERVAL: 480 MS
SODIUM SERPL-SCNC: 142 MMOL/L (ref 136–145)
TSH SERPL DL<=0.05 MIU/L-ACNC: 1.5 UIU/ML (ref 0.27–4.2)

## 2024-01-25 PROCEDURE — 83735 ASSAY OF MAGNESIUM: CPT | Performed by: NURSE PRACTITIONER

## 2024-01-25 PROCEDURE — G0463 HOSPITAL OUTPT CLINIC VISIT: HCPCS

## 2024-01-25 PROCEDURE — 84443 ASSAY THYROID STIM HORMONE: CPT | Performed by: NURSE PRACTITIONER

## 2024-01-25 PROCEDURE — 93005 ELECTROCARDIOGRAM TRACING: CPT | Performed by: NURSE PRACTITIONER

## 2024-01-25 PROCEDURE — 83036 HEMOGLOBIN GLYCOSYLATED A1C: CPT | Performed by: NURSE PRACTITIONER

## 2024-01-25 PROCEDURE — 80048 BASIC METABOLIC PNL TOTAL CA: CPT | Performed by: NURSE PRACTITIONER

## 2024-01-25 PROCEDURE — 83880 ASSAY OF NATRIURETIC PEPTIDE: CPT | Performed by: NURSE PRACTITIONER

## 2024-01-25 RX ORDER — SACUBITRIL AND VALSARTAN 24; 26 MG/1; MG/1
1 TABLET, FILM COATED ORAL 2 TIMES DAILY
Qty: 180 TABLET | Refills: 0 | Status: SHIPPED | OUTPATIENT
Start: 2024-01-25

## 2024-01-25 RX ORDER — SODIUM ZIRCONIUM CYCLOSILICATE 10 G/10G
10 POWDER, FOR SUSPENSION ORAL 3 TIMES DAILY
Qty: 6 PACKET | Refills: 0 | Status: SHIPPED | OUTPATIENT
Start: 2024-01-25 | End: 2024-01-27

## 2024-01-25 RX ORDER — HYDRALAZINE HYDROCHLORIDE 25 MG/1
25 TABLET, FILM COATED ORAL 3 TIMES DAILY
COMMUNITY

## 2024-01-25 NOTE — PROGRESS NOTES
HEART FAILURE CLINIC - PHARMACY SERVICE     HFpEF with EF 60-65% (Last Echo: 11/3/22).    NYHA Class I  C    Repeat echocardiogram ordered and scheduled for 1/30/24.      The patient's last EKG was reviewed from today and shows a QTcB of 480 ms.      Cardiologist: Jeffrey/Annabel  Nephrologist: Cirilo  PCP: PILY Avila     -CHF-specific BB:      Pre Visit Dose: Metoprolol succinate  mg PO BID    Post Visit Dose: Metoprolol succinate  mg PO BID (HR 81 bpm, /85 mmHg)     - Target Dose: Metoprolol succinate  mg PO daily.     - Goal HR of 50s to 60s.     - Patient should be seen every 10 to 14 days for a pulse check with plans for up-titration until target heart rate is achieved.        -ACE/ARB/ARNI:     Pre Visit Dose: Sacubitril/valsartan 49/51 mg BID    Post Visit Dose: Sacubitril/valsartan 24/26 mg BID (/85 mmHg, SCr increased from 1.16 mg/dL on 12/28/23 to 1.35 mg/dL today, K increased from 5.2 mmol/L on 12/28/23 to 5.7 mmol/L today)    - Target Dose: Sacubitril/valsartan 97/103 mg PO BID    - Patient should have a follow-up appointment every 2 to 4 weeks for a hemodynamic check with possible up-titration to target dose.         -SGLT2 inhibitor therapy:    Pre Visit Dose:  Dapagliflozin 5 mg PO daily (initiated ~1.5 weeks ago by Dr. Kerr)    Post Visit Dose: Dapagliflozin 10 mg PO daily ( mg/dL, A1c 7.9% today)    - Target Dose: Dapagliflozin 10 mg PO daily : CrCl > 20 mL/min which has shown benefit in patients with HF       -MRA:     Pre Visit Dose: None    Post Visit Dose: None    - Target Dose:  N/A    - K is not < 5 mEq/L and SCr is </= 2 mg/dL in female and eGFR > 30 mL/min/1.73m3        -DIURETIC:     Pre Visit Dose: None    Post Visit Dose: None      -MAGNESIUM:     Mag is > 2 mg/dL    -If Magnesium 1.6-1.9 mg/dL, Initiate Magnesium 400 mg PO daily  -If Magnesium is less than 1.6 mg/dL, Initiate Magnesium 400 mg PO BID    -ANTICOAGULATION:     N/A       -OTHER CV  MEDS:     Pre Visit Dose: atorvastatin 20 mg PO daily, diltiazem 180 mg PO daily    Post Visit Dose: no changes      -Clinic Administered Medications:     None      Vaccines:     Not assessed at this visit       Patient Assistance:      [employer] application completed and faxed. Of note, patient recently filled #90 of dapagliflozin 5 mg tablets so she will double up to make 10 mg dose for now.    Patient previously submitted [employer] [facility] patient assistance application but this is still pending because most recent tax return is required. Will hold off for now on completing this application again as APRN wants to wait until repeat echocardiogram is complete to determine if this medication is still needed.           PLAN:  Initiation/Discontinuation/Dose Adjustment:    A. Decrease sacubtril/valsartan to 24/26 mg PO BID given increase in SCr and hyperK today   B. Increase dapagliflozin to 10 mg PO daily   C. Initiate Lokelma 10 g PO TID x 6 doses (K = 5.7 mmol/L today) - repeat labs to be drawn on Monday, 1/29  Education provided: discussed medication changes and initiation/administraton of Lokelma x 2 days  Coordination of Care: see patient assistance section; test claim ran for Lokelma through [facility] and coordinated with [facility] to get this filled for patient today prior to leaving hospital  Refills: new prescription for Lokelma sent          Estrellita Acevedo PharmD  1/25/2024  10:51 EST

## 2024-01-25 NOTE — LETTER
January 25, 2024       No Recipients    Patient: Susy Hanson   YOB: 1960   Date of Visit: 1/25/2024       Dear Christo Murphy MD:    Thank you for referring Susy Hanson to me for evaluation. Below are the relevant portions of my assessment and plan of care.    Encounter Diagnosis and Orders:  Diagnoses and all orders for this visit:    1. Hyperkalemia (Primary)  -     Basic Metabolic Panel; Future    2. h/o Non- Ischemic, Non- Dilated Cardiomyopathy  -     Adult Transthoracic Echo Complete W/ Cont if Necessary Per Protocol; Future    3. At risk for fluid volume overload  -     Adult Transthoracic Echo Complete W/ Cont if Necessary Per Protocol; Future    4. h/o resolved Left ventricular failure  -     Adult Transthoracic Echo Complete W/ Cont if Necessary Per Protocol; Future    5. h/o Acute kidney injury    6. Presence of biventricular  ICD    7. LBBB (left bundle branch block)    8. Shortness of breath  -     Basic Metabolic Panel; Future  -     Magnesium; Future  -     proBNP; Future  -     TSH; Future  -     Basic Metabolic Panel; Standing  -     Basic Metabolic Panel  -     Magnesium; Standing  -     Magnesium  -     proBNP; Standing  -     proBNP  -     TSH; Standing  -     TSH    9. Type 2 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  -     Hemoglobin A1c; Future  -     Hemoglobin A1c; Standing  -     Hemoglobin A1c    10. Diabetes mellitus due to underlying condition with diabetic nephropathy, unspecified whether long term insulin use  -     TSH; Future  -     TSH; Standing  -     TSH    11. Breast neoplasm, Tis (DCIS), left    12. Other irritable bowel syndrome    13. Mixed anxiety depressive disorder    14. Metabolic syndrome X    Other orders  -     ECG 12 Lead Dyspnea; Standing  -     ECG 12 Lead Dyspnea  -     sodium zirconium cyclosilicate (Lokelma) 10 g pack; Take 10 g by mouth 3 (Three) Times a Day for 2 days.  Dispense: 6 packet; Refill: 0  -      dapagliflozin Propanediol (Farxiga) 10 MG tablet; Take 10 mg by mouth Daily.  -     sacubitril-valsartan (Entresto) 24-26 MG tablet; Take 1 tablet by mouth 2 (Two) Times a Day.  Dispense: 180 tablet; Refill: 0    63-year-old female patient of Dr. Barrow, with a PMH of nonischemic nondilated cardiomyopathy with a.  LVEF less than 20% back in 2022, S\P [facility] aware BiV ICD complicated by pericardial effusion with lack of pacing and RV lead perforation subsequently had recovery of EF at 60 to 65% (TTE 11/3/22), without mention of any diastolic dysfunction, H\O left breast cancer (H cc), diabetes, IBS, metabolic syndrome with HTN, HLD, ANDREAS Rx w/ CPAP, DM.  Comes in for her initial visit at the [facility], [place], operated by [facility] heart failure clinic 1/25/2024, she has done well from a heart failure standpoint with improvement of EF however since no echocardiograms been done in greater than a year it is concerning as to what her current EF might be.  Does not seem to be particularly symptomatic however does have some dyspnea with exertion.  She has concerns regarding her past implant for which she is planning on having those removed      Beta Blocker: Metoprolol 100 mg twice daily  ARNI/ACE/ARB: Entresto decreased from 24 to 26 mg BID  SGLT 2 inhibitors: Farxiga will be increased to 10 mg daily  Diuretics upon release from clinic: None presently  Furoscix: Currently not a candidate  Aldosterone Antagonist: None indicated  Digitalis: N/A  Vasodilators & Nitrates: Not indicated  Already on heart failure core measures including beta-blocker, ARNI, SGLT2  Will decrease her ARNI and increase her SGLT2  Very hyperkalemic today at 5.7 will be treated with Lokelma x 3 days  Repeat labs will be drawn on Monday  Hopefully the Farxiga will improve diabetes control with glucoses seemingly running about to 250 (patient recently taken off metformin)  eGFR is very concerning therefore there are indication for decreasing the Entresto with a creatinine  today at 1.4 but the EGFR is at 44.  H/o EF being less than 20% July 22, repeat echo in November 22 EF improved to 60 to 65%.  No TTE since (ordering transthoracic echocardiogram)  Further treatment to be likely based more on the new EF found on this year's TTE.  Seeing Dr. Murphy relatively soon  We will see again after echo completed and sees Dr. Murphy    If you have questions, please do not hesitate to call me. I look forward to following Susy along with you.         Sincerely,        BRISA Simons        CC:   No Recipients

## 2024-01-25 NOTE — ADDENDUM NOTE
Encounter addended by: Chari Santos RN on: 1/25/2024 11:30 AM   Actions taken: Charge Capture section accepted

## 2024-01-25 NOTE — PROGRESS NOTES
"Cardiology Heart Failure Clinic Note  Rodney \"Renan\" Clay [facility]    Patient ID: Susy Hanson  is a 63 y.o. female.    Encounter Date:01/25/2024       Assessment:    Diagnoses and all orders for this visit:    1. At risk for fluid volume overload (Primary)  Overview:   1. Hx non-dilated, non-ischemic cardiomyopathy          A.  Resolved systolic HF (HFrEF)         B.  LVEF 60 to 65% w/o mention of diastolic dysfunction (TTE 11/3/22)                    I. EF 15-20% w/o DD (July 22)         C. S/p 2022 [place] aware BiV ICD                    I.  Complicated by RV lead perforation   2. CKD stage 3b                    I. H/o VIVEK (4/22)                   II. eGFR 44 (1/25/24    Orders:  -     Adult Transthoracic Echo Complete W/ Cont if Necessary Per Protocol; Future    2. h/o Non- Ischemic, Non- Dilated Cardiomyopathy  Overview:   Hx non-dilated, non-ischemic cardiomyopathy          A.  Resolved systolic HF (HFrEF)         B.  LVEF 60 to 65% w/o mention of diastolic dysfunction (TTE 11/3/22)                    I. EF 15-20% w/o DD (July 22)         C. S/p 2022 [place] aware BiV ICD                    I.  Complicated by RV lead perforatn    Orders:  -     Adult Transthoracic Echo Complete W/ Cont if Necessary Per Protocol; Future    3. h/o resolved Left ventricular failure  Overview:  Hx non-dilated, non-ischemic cardiomyopathy          A.  Resolved systolic HF (HFrEF)         B.  LVEF 60 to 65% w/o mention of diastolic dysfunction (TTE 11/3/22)                    I. EF 15-20% w/o DD (July 22)         C. S/p 2022 [place] aware BiV ICD                    I.  Complicated by RV lead perforatn    Orders:  -     Adult Transthoracic Echo Complete W/ Cont if Necessary Per Protocol; Future    4. h/o Acute kidney injury  Overview:  Noted 4/26/2022      5. Presence of biventricular  ICD    6. LBBB (left bundle branch block)    7. Shortness of breath  -     Basic Metabolic Panel; Future  -     Magnesium; Future  -    "  proBNP; Future  -     TSH; Future  -     Basic Metabolic Panel; Standing  -     Basic Metabolic Panel  -     Magnesium; Standing  -     Magnesium  -     proBNP; Standing  -     proBNP  -     TSH; Standing  -     TSH    8. Type 2 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  -     Hemoglobin A1c; Future  -     Hemoglobin A1c; Standing  -     Hemoglobin A1c    9. Diabetes mellitus due to underlying condition with diabetic nephropathy, unspecified whether long term insulin use  -     TSH; Future  -     TSH; Standing  -     TSH    10. Breast neoplasm, Tis (DCIS), left  Overview:  Added automatically from request for surgery [id number]      11. Other irritable bowel syndrome    12. Mixed anxiety depressive disorder    13. Metabolic syndrome X  Overview:  A. Hypertension  B. Mixed dyslipidemia  C. Obesity           I.ANDREAS uses CPAP  D. T2DM          II. Peripheral neuropathy      Other orders  -     ECG 12 Lead Dyspnea; Standing  -     ECG 12 Lead Dyspnea        Plan/discussion    Volume overload    Heart Failure Core Measures addressed    Type of Overload unspecified    Most recent EF & Diastolic dysfunction if available:   (Nov 22 TTE) 60 to 65% w/o mention of DD  Hx EF 20%  (July 22 TTE)    [place] Heart association Class & Stage : 1c    HF Meds Pre Visit    Beta Blocker: Metoprolol 100 mg twice daily  ARNI/ACE/ARB: Entresto 49/51 twice daily   SGLT 2 inhibitors: None presently  Diuretics: None presently none   Aldosterone Antagonist: None presently  Digitalis: N/A  Vasodilators & Nitrates: Not indicated    HF Meds Post Visit    Beta Blocker: Metoprolol 100 mg twice daily  ARNI/ACE/ARB: Entresto decreased from 24 to 26 mg BID  SGLT 2 inhibitors: Farxiga will be increased to 10 mg daily  Diuretics upon release from clinic: None presently  Furoscix: Currently not a candidate  Aldosterone Antagonist: None indicated  Digitalis: N/A  Vasodilators & Nitrates: Not indicated        Cardiac medicines reviewed  with risk, benefits, and necessity of each discussed.       ____________________________________________________________    Discussion    Already on heart failure core measures including beta-blocker, ARNI, SGLT2  Will decrease her ARNI and increase her SGLT2  Very hyperkalemic today at 5.7 will be treated with Lokelma x 3 days  Repeat labs will be drawn on Monday  Hopefully the Farxiga will improve diabetes control with glucoses seemingly running about to 250 (patient recently taken off metformin)  eGFR is very concerning therefore there are indication for decreasing the Entresto with a creatinine today at 1.4 but the EGFR is at 44.  H/o EF being less than 20% July 22, repeat echo in November 22 EF improved to 60 to 65%.  No TTE since (ordering transthoracic echocardiogram)  Further treatment to be likely based more on the new EF found on this year's TTE.  Seeing Dr. Murphy relatively soon  We will see again after echo completed and sees Dr. Murphy  Would add typical heart failure nursing interventions including:        A. Fluid restriction at less than 2000 cc daily       B. Daily weights        C.  1 g low-sodium diet        I did the following activities:preparing for the visit, with Susy Hanson  including reviewing tests, once pt arrived in clinic I also performed a medically appropriate examination and/or evaluation , I personally spent considerable time counseling and educating the patient/family/caregiver, ordering medications, tests, or procedures, referring and communicating with other health care professionals , and documenting information in the medical record. I estimate including preparation 45 minutes             Subjective:     Chief Complaint   Patient presents with    Congestive Heart Failure       HPI:  63-year-old female patient of Dr. Murphy's, with a PMH of nonischemic nondilated cardiomyopathy with a.  LVEF less than 20% back in 2022, S\P [place] aware BiV ICD complicated by pericardial  effusion with lack of pacing and RV lead perforation subsequently had recovery of EF at 60 to 65% (TTE 11/3/22), without mention of any diastolic dysfunction, H\O left breast cancer (H cc), diabetes, IBS, metabolic syndrome with HTN, HLD, ANDREAS Rx w/ CPAP, DM.  Comes in for her initial visit at the [facility] heart failure clinic 1/25/2024, she has done well from a heart failure standpoint with improvement of EF however since no echocardiograms been done in greater than a year it is concerning as to what her current EF might be.  Does not seem to be particularly symptomatic however does have some dyspnea with exertion.  She has concerns regarding her past implant for which she is planning on having those removed    ROS:  Constitutional:  weakness, & fatigue have what she describes as his normal level low., No fever, rigors, chills   Eyes: No recent vision changes, eye pain   ENT/oropharynx: No recent difficulty swallowing, sore throat, epistaxis, changes in hearing   Cardiovascular: No recent chest pain, chest tightness, palpitations except as noted in HPI, paroxysmal nocturnal dyspnea, orthopnea, diaphoresis, dizziness & no pre or vikram syncopal episodes   Respiratory: No at rest shortness of breath, + dyspnea on exertion, no significant productive cough, no wheezing and no hemoptysis   Gastrointestinal: No abdominal pain, nausea, vomiting, diarrhea, bloody stools   Genitourinary: No hematuria, dysuria other than increased frequency   Neurological: No headache, tremors, numbness,  or hemiparesis    Musculoskeletal: No change in typical cramps, myalgias,  joint pain, w/o joint swelling   Integument: No recent rash, no edema      Patient Active Problem List   Diagnosis    Encounter for general adult medical examination without abnormal findings    Essential hypertension    Irritable bowel syndrome    Mixed anxiety depressive disorder    Mixed hyperlipidemia    Obesity    Encounter for screening for malignant  neoplasm of colon    Type 2 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    Vitamin D deficiency    Hypertensive urgency    Peripheral neuropathy    ANDREAS on CPAP    Chest pain, atypical    Breast neoplasm, Tis (DCIS), left    Cold intolerance    Insomnia    Breast pain    Dyspnea    h/o Acute kidney injury    LBBB (left bundle branch block)    h/o nonischemic, nondilated, cardiomyopathy    Presence of biventricular  ICD    Hemopericardium    Pericarditis    Pericardial effusion    Metabolic syndrome X    At risk for fluid volume overload    h/o resolved Left ventricular failure    Stage 3b chronic kidney disease    Hyperkalemia       Past Medical History:   Diagnosis Date    Anxiety 2000    Breast cancer     LEFT BREAST 7/2021    Cataract     Had surgery to remove August 2023    CHF (congestive heart failure)     Colon polyp 12/2023    Removed during colonoscopy    Coronary artery disease 2019    Depression     DM (diabetes mellitus)     History of medical problems 2008    Bladder prolapse repeat Dr. Roper    HL (hearing loss)     Hyperlipidemia     Hypertension     SAW CARDIO/ STRESS TEST 2020 - NOW MEDS MANAGED BY PCP     Irritable bowel syndrome 2006    Peripheral neuropathy     PONV (postoperative nausea and vomiting)     Renal insufficiency 2020    See Dr. funes for moderate kidney failure    Sleep apnea     WEARS CPAP    Urinary tract infection 6/23    UTI seen at [facility]       Past Surgical History:   Procedure Laterality Date    BLADDER REPAIR      BREAST BIOPSY      BREAST RECONSTRUCTION Bilateral 08/26/2021    Procedure: BILATERAL PREPECTORALPLACMENT TISSUE EXPANDERS AND ALLODERM;  Surgeon: Heri Arreaga MD;  Location: [facility];  Service: Plastics;  Laterality: Bilateral;    BREAST TISSUE EXPANDER REMOVAL INSERTION OF IMPLANT Bilateral 12/23/2021    Procedure: BILATERAL REMOVAL TISSUE EXPANDERS AND PLACEMENT OF IMPLANTS;  Surgeon: Heri Arreaga MD;   Location: [facility] MAIN OR;  Service: Plastics;  Laterality: Bilateral;    CARDIAC CATHETERIZATION Left 2022    Procedure: Cardiac Catheterization/Vascular Study;  Surgeon: Christo Murphy MD;  Location:  [facility];  Service: Cardiovascular;  Laterality: Left;    CARDIAC ELECTROPHYSIOLOGY PROCEDURE N/A 2022    Procedure: ICD implant [place] aware;  Surgeon: Esteban Kendrick MD;  Location:  [facility];  Service: Cardiology;  Laterality: N/A;    CARDIAC ELECTROPHYSIOLOGY PROCEDURE N/A 2022    Procedure: lead repositioning;  Surgeon: Esteban Kendrick MD;  Location:  [facility];  Service: Cardiology;  Laterality: N/A;    CARDIAC SURGERY      Defibrillator/pacemaker implant    CARDIOVASCULAR STRESS TEST      COLONOSCOPY  2012    EYE SURGERY      Cataracts removed    HYSTERECTOMY      INSERT / REPLACE / REMOVE PACEMAKER      LYMPH NODE BIOPSY  2021    MASTECTOMY W/ SENTINEL NODE BIOPSY Bilateral 2021    Procedure: bilateral total mastectomy, left axillary sentinel lymph node biopsy, possible reconstruction.;  Surgeon: Asia Perkins MD;  Location: [facility] MAIN OR;  Service: General;  Laterality: Bilateral;    TUBAL ABDOMINAL LIGATION         Social History     Socioeconomic History    Marital status:    Tobacco Use    Smoking status: Former     Packs/day: 2.00     Years: 30.00     Additional pack years: 0.00     Total pack years: 60.00     Types: Cigarettes     Start date: 1972     Quit date: 2008     Years since quittin.0     Passive exposure: Past    Smokeless tobacco: Never   Vaping Use    Vaping Use: Never used   Substance and Sexual Activity    Alcohol use: No    Drug use: No    Sexual activity: Yes     Partners: Male     Birth control/protection: None, Tubal ligation, Hysterectomy       Allergies   Allergen Reactions    Hydrocodone-Acetaminophen Hives     Lortab, takes tylenol          Current Outpatient Medications:     Accu-Chek Guide test strip, 1 each by Other route Daily. E11.49, Disp: 100 each, Rfl: 1    ALPRAZolam (XANAX) 1 MG tablet, TAKE 1 TABLET TWICE A DAY  AS NEEDED FOR ANXIETY OR   SLEEP, Disp: 60 tablet, Rfl: 0    atorvastatin (LIPITOR) 20 MG tablet, Take 1 tablet by mouth Daily., Disp: 90 tablet, Rfl: 1    cetirizine (zyrTEC) 10 MG tablet, Take 1 tablet by mouth Daily As Needed., Disp: , Rfl:     dapagliflozin (Farxiga) 5 MG tablet tablet, Take 1 tablet by mouth Daily., Disp: , Rfl:     dilTIAZem CD (Cardizem CD) 180 MG 24 hr capsule, Take 1 capsule by mouth Daily., Disp: 90 capsule, Rfl: 1    glimepiride (Amaryl) 4 MG tablet, Take 1 tablet by mouth Every Morning Before Breakfast., Disp: 90 tablet, Rfl: 3    hydrALAZINE (APRESOLINE) 25 MG tablet, Take 1 tablet by mouth 3 (Three) Times a Day., Disp: , Rfl:     metoprolol succinate XL (TOPROL-XL) 100 MG 24 hr tablet, Take 1 tablet by mouth 2 (Two) Times a Day., Disp: 180 tablet, Rfl: 1    ondansetron (Zofran) 4 MG tablet, Take 1 tablet by mouth Every 8 (Eight) Hours As Needed for Nausea or Vomiting., Disp: 20 tablet, Rfl: 0    pantoprazole (PROTONIX) 40 MG EC tablet, Take 1 tablet by mouth Daily., Disp: 90 tablet, Rfl: 0    sacubitril-valsartan (Entresto) 49-51 MG tablet, Take 1 tablet by mouth 2 (Two) Times a Day., Disp: 180 tablet, Rfl: 1    Triamcinolone Acetonide (NASACORT) 55 MCG/ACT nasal inhaler, APPLY 2 SPRAYS BY NASAL ROUTE DAILY FOR 30 DAYS (Patient taking differently: 2 sprays into the nostril(s) as directed by provider Daily As Needed.), Disp: 10.8 mL, Rfl: 6    Immunization History   Administered Date(s) Administered    Arexvy (RSV, Adults 60+ yrs) 09/22/2023    COVID-19 (PFIZER) BIVALENT 12+YRS 11/04/2022, 09/22/2023    COVID-19 (PFIZER) Purple Cap Monovalent 01/06/2021, 01/27/2021, 09/30/2021, 11/01/2021    Fluad Quad 65+ 09/14/2023    H1N1 Inj 11/03/2009    Hepatitis A 04/29/2018, 11/02/2018    Influenza  Injectable Mdck Pf Quad 11/04/2022    Influenza, Unspecified 11/01/2021, 09/14/2023    Pneumococcal Conjugate 20-Valent (PCV20) 11/30/2023    RSV, unspecified 09/22/2023    Tdap 07/26/2022    flucelvax quad pfs =>4 YRS 10/19/2020       Most recent EKG as reviewed:  Procedures     Most recent echo as reviewed:  Results for orders placed during the hospital encounter of 10/28/22    Adult Transthoracic Echo Limited W/ Cont if Necessary Per Protocol    Interpretation Summary    Left ventricular systolic function is normal.    Left ventricular wall thickness is consistent with mild concentric hypertrophy.    Left ventricular diastolic function was normal.    Left ventricular ejection fraction is 60 to 65%    Trace pericardial effusion with significant improvement from previous echocardiogram.  No pericardial tamponade noted.  Pericardial fat is present      Most recent stress test results:  Results for orders placed during the hospital encounter of 04/11/22    Stress Test With Myocardial Perfusion One Day    Interpretation Summary  · This is incomplete Cardiolite imaging study. Stress images were not done because test was aborted. Rest images showed small fixed septal and apical defect. Left ventricle size appear normal. No gated SPECT imaging were noted..      Most recent cardiac catheterization results:  Results for orders placed during the hospital encounter of 04/11/22    Cardiac Catheterization/Vascular Study    Narrative  CARDIAC CATHETERIZATION REPORT      DATE OF PROCEDURE:  4/13/2022    INDICATION FOR PROCEDURE:    Congestive heart failure acute systolic  Dilated cardiomyopathy    PROCEDURE PERFORMED:    Left heart catheterization  coronary angiography  left ventriculography    PROCEDURE COMMENTS:    After informed consent was obtained, the patient was prepped and draped in the usual sterile manner.  Mild to moderate sedation was administered.  Right femoral artery was accessed without difficulty and 6 Welsh  arterial sheath was inserted.  Sheath was flushed with heparinized saline.    Using 6 [place] Roberto catheters, first left coronary artery and the right coronary was electively engaged and appropriate views were taken.  A 6 [place] JR4 catheter was used to cross aortic valve and left heart catheterization was performed.  Left ventriculography was done NAQVI view    Patient tolerated the procedure well.    FINDINGS:    1. HEMODYNAMICS:    Aortic pressure: 167/101 mmHg    LVEDP: 10 to 15 mmHg    Gradient across aortic valve on pullback: No gradient across aortic valve      2. LEFT VENTRICULOGRAPHY: 35 to 40%      3. CORONARY ANGIOGRAPHY:    A: Left main coronary artery: Normal    B: Left anterior descending artery: 25% plaque in proximal and mid segment.  No high-grade stenosis    C: Left circumflex coronary artery: Mild diffuse disease in the midsegment but no high-grade stenosis    D: Right coronary artery: 25% plaque in proximal and mid segment of RCA but no high-grade stenosis.  It is dominant vessel    SUMMARY:    Mild coronary artery disease  Left ventricular dysfunction    RECOMMENDATIONS:    Medical treatment.        Imaging:    Results for orders placed during the hospital encounter of 10/03/22    XR Chest 1 View    Narrative  EXAMINATION: XR CHEST 1 VW    DATE OF EXAM: 10/4/2022 12:28 AM  SLOT: 60    HISTORY: Chest pain    COMPARISON: None.    FINDINGS:    HEART/MEDIASTINUM: Mildly enlarged cardiac silhouette. Radiation is the origin of the right atrium, right ventricle, and coronary sinus.    LUNGS/PLEURA: Linear subsegmental atelectasis in the left lung base. Otherwise, grossly clear.    MUSCULOSKELETAL: No acute osseous pathology. Surgical clips overlie the axilla bilaterally.    Impression  1.  No acute cardiopulmonary abnormality detected.        Electronically signed by:  Yony Patricio M.D.  10/4/2022 12:33 AM       Results for orders placed during the hospital encounter of 01/11/24    US Abdomen  Limited    Narrative  US ABDOMEN LIMITED    Date of Exam: 1/11/2024 10:41 AM EST    Indication: persistent nausea.    Comparison: CT abdomen and pelvis angiography 1/17/2020    Technique: Grayscale and color Doppler ultrasound evaluation of the right upper quadrant was performed.      Findings:  Diffusely increased liver echogenicity compatible with hepatic steatosis. No discrete liver lesion. No perihepatic ascites. Visualized portions of the pancreas are normal. The gallbladder is normal. No biliary dilatation. Common bile duct measures 2 mm.  Hepatopetal flow in the portal vein. Visualized hepatic veins are patent. The right kidney measures 9.1 x 4.1 x 3.7 cm. No right-sided hydronephrosis.    Impression  Impression:  1. Hepatic steatosis.  2. Normal gallbladder.  3. No biliary dilatation.        Electronically Signed: Ottoniel Lee MD  1/11/2024 11:54 AM EST  Workstation ID: [id number]      Results for orders placed during the hospital encounter of 10/03/22    CT Angiogram Chest Pulmonary Embolism    Narrative  Exam: CT Angiography of the Chest.    Date: 10/4/2022.    Comparison: 4/11/2022.    History: Defibrillator placement with chest pain.    Technique: CT examination of the chest was performed following the intravenous administration of 100 mL of Isovue-370. Sagittal, coronal and 3-D reformatted images were provided. CT dose lowering techniques were used, to include: automated exposure  control, adjustment for patient size, and/or use of iterative reconstruction.    FINDINGS:    CHEST WALL: There are bilateral breast implants.    Mediastinum and Theresa: There is no axillary, mediastinal or hilar lymphadenopathy.    Pleural and Pericardial spaces: There is a small to moderate pericardial effusion which is higher density than simple fluid which may relate to a hemopericardium.    Upper Abdomen: The visualized upper abdomen is unremarkable.    Cardiovascular: AICD generator has leads in the right atrium and right  ventricle. There is mild vascular calcification throughout the thoracic aorta without evidence of aneurysmal dilation or dissection.    Pulmonary Artery: There are no filling defects in the pulmonary arteries.    Lung Parenchyma and Airways: The lungs are clear.    Bones: No fracture or aggressive osseous lesion.    Impression  1. No evidence of pulmonary embolism.  2. No evidence of thoracic aortic aneurysm or dissection.  3. Small to moderate amount of hemopericardium.    These findings were discussed with COURTNEY Atkinson.      Electronically signed by:  Amish Ellsworth D.O.  10/4/2022 1:51 AM      Historical data copied forward from previous encounters in EMR including the history, exam, and assessment/plan has been reviewed and is unchanged except as I have noted and otherwise indicated.      Objective:         /85 (BP Location: Right arm)   Pulse 81   Resp 20   Wt 78.3 kg (172 lb 9.6 oz)   LMP  (LMP Unknown)   SpO2 98%   BMI 29.63 kg/m²     Physical Examination    General:  Well-developed, well-nourished, cooperative, no distress, appears stated age if not slightly older    Neuro:  A&O x3. No significantly obvious focal neuro deficet    Psych:  Pleasant affect    HENT:  Normocephalic, atraumatic, moist mucous membranes , Normal ear placement,Throat not injected   Eyes:  PERRLA, Conjunctivae not injected, EOM's intact, conjunctiva does not appear significantly injected   Neck:  Supple, Mildly thickened, no lymph adenopathy nor thyromegaly no JVD or bruit    Lungs:    Symmetrical rise & fall of chest with baseline respiratory pattern. To auscultation lungs are Clear bilaterally, faint late phase expiratory wheezes, no rhonchi or rales are noted    Chest wall:  No significant tenderness when palpated. PMI @ 5th ICS McL  , deflated Left breast, well healed ICD scar   Heart:  Regular rate and rhythm, S1 and S2 normal, no S3 or S4, Gr I/VI systolic ejection murmur best heard at the apex , no obvious  rub, click or gallop.    Abdomen:  non-distended, non-tender, bowel sounds noted in the 4 quadrants of the abdomen, significant adipose tissue identified    Extremities:  Equal color motion temperature and sensitivity, Rapid capillary refill noted within the nailbeds of fingers and toes bilaterally no lower extremity   edema.    Pulses:  2+ and symmetric all extremities, rapid capillary refill    Skin:  No obvious rashes, significant lesions identified, warm dry and of normal turgor      In-Office Procedure(s):  ECG 12 Lead Dyspnea    (Results Pending)        Lab Review:   Hospital Outpatient Visit on 01/25/2024   Component Date Value    Glucose 01/25/2024 248 (H)     BUN 01/25/2024 30 (H)     Creatinine 01/25/2024 1.35 (H)     Sodium 01/25/2024 142     Potassium 01/25/2024 5.7 (H)     Chloride 01/25/2024 103     CO2 01/25/2024 26.0     Calcium 01/25/2024 10.2     BUN/Creatinine Ratio 01/25/2024 22.2     Anion Gap 01/25/2024 13.0     eGFR 01/25/2024 43.7 (L)     Magnesium 01/25/2024 2.3     proBNP 01/25/2024 58.9     TSH 01/25/2024 1.500     Hemoglobin A1C 01/25/2024 7.90 (H)    Lab on 12/28/2023   Component Date Value    25 Hydroxy, Vitamin D 12/28/2023 34.7     Color, UA 12/28/2023 Yellow     Appearance, UA 12/28/2023 Clear     pH, UA 12/28/2023 6.0     Specific Gravity, UA 12/28/2023 1.018     Glucose, UA 12/28/2023 Negative     Ketones, UA 12/28/2023 Negative     Bilirubin, UA 12/28/2023 Negative     Blood, UA 12/28/2023 Negative     Protein, UA 12/28/2023 100 mg/dL (2+) (A)     Leuk Esterase, UA 12/28/2023 Moderate (2+) (A)     Nitrite, UA 12/28/2023 Negative     Urobilinogen, UA 12/28/2023 0.2 E.U./dL     Glucose 12/28/2023 158 (H)     BUN 12/28/2023 21     Creatinine 12/28/2023 1.16 (H)     Sodium 12/28/2023 146 (H)     Potassium 12/28/2023 5.2     Chloride 12/28/2023 105     CO2 12/28/2023 26.0     Calcium 12/28/2023 9.9     Total Protein 12/28/2023 6.7     Albumin 12/28/2023 4.9     ALT (SGPT) 12/28/2023 23      AST (SGOT) 12/28/2023 14     Alkaline Phosphatase 12/28/2023 65     Total Bilirubin 12/28/2023 0.2     Globulin 12/28/2023 1.8     A/G Ratio 12/28/2023 2.7     BUN/Creatinine Ratio 12/28/2023 18.1     Anion Gap 12/28/2023 15.0     eGFR 12/28/2023 52.4 (L)     Phosphorus 12/28/2023 4.4     PTH, Intact 12/28/2023 78.0 (H)     Hemoglobin A1C 12/28/2023 7.10 (H)     Protein/Creatinine Ratio* 12/28/2023 526.9 (H)     Creatinine, Urine 12/28/2023 109.7     Total Protein, Urine 12/28/2023 57.8     Magnesium 12/28/2023 1.8     Creatine Kinase 12/28/2023 25     Uric Acid 12/28/2023 7.7 (H)     WBC 12/28/2023 4.64     RBC 12/28/2023 3.98     Hemoglobin 12/28/2023 11.7 (L)     Hematocrit 12/28/2023 35.7     MCV 12/28/2023 89.7     MCH 12/28/2023 29.4     MCHC 12/28/2023 32.8     RDW 12/28/2023 13.3     RDW-SD 12/28/2023 43.9     MPV 12/28/2023 10.8     Platelets 12/28/2023 225     Neutrophil % 12/28/2023 52.1     Lymphocyte % 12/28/2023 30.0     Monocyte % 12/28/2023 9.1     Eosinophil % 12/28/2023 7.5 (H)     Basophil % 12/28/2023 0.9     Immature Grans % 12/28/2023 0.4     Neutrophils, Absolute 12/28/2023 2.42     Lymphocytes, Absolute 12/28/2023 1.39     Monocytes, Absolute 12/28/2023 0.42     Eosinophils, Absolute 12/28/2023 0.35     Basophils, Absolute 12/28/2023 0.04     Immature Grans, Absolute 12/28/2023 0.02     nRBC 12/28/2023 0.0     RBC, UA 12/28/2023 0-2     WBC, UA 12/28/2023 21-50 (A)     Bacteria, UA 12/28/2023 None Seen     Squamous Epithelial Cell* 12/28/2023 3-6 (A)     Hyaline Casts, UA 12/28/2023 3-6     Methodology 12/28/2023 Automated Microscopy    Lab on 12/21/2023   Component Date Value    Campylobacter 12/21/2023 Not Detected     Plesiomonas shigelloides 12/21/2023 Not Detected     Salmonella 12/21/2023 Not Detected     Vibrio 12/21/2023 Not Detected     Vibrio cholerae 12/21/2023 Not Detected     Yersinia enterocolitica 12/21/2023 Not Detected     Enteroaggregative E. col* 12/21/2023 Not Detected      Enteropathogenic E. coli* 12/21/2023 Not Detected     Enterotoxigenic E. coli * 12/21/2023 Not Detected     Shiga-like toxin-produci* 12/21/2023 Not Detected     Shigella/Enteroinvasive * 12/21/2023 Not Detected     Cryptosporidium 12/21/2023 Not Detected     Cyclospora cayetanensis 12/21/2023 Not Detected     Entamoeba histolytica 12/21/2023 Not Detected     Giardia lamblia 12/21/2023 Not Detected     Adenovirus F40/41 12/21/2023 Not Detected     Astrovirus 12/21/2023 Not Detected     Norovirus GI/GII 12/21/2023 Not Detected     Rotavirus A 12/21/2023 Not Detected     Sapovirus (I, II, IV or * 12/21/2023 Not Detected    Lab on 12/20/2023   Component Date Value    Hemoglobin A1C 12/20/2023 7.20 (H)     Glucose 12/20/2023 142 (H)     BUN 12/20/2023 32 (H)     Creatinine 12/20/2023 1.58 (H)     Sodium 12/20/2023 142     Potassium 12/20/2023 5.3 (H)     Chloride 12/20/2023 103     CO2 12/20/2023 22.3     Calcium 12/20/2023 9.6     Total Protein 12/20/2023 7.2     Albumin 12/20/2023 4.7     ALT (SGPT) 12/20/2023 19     AST (SGOT) 12/20/2023 14     Alkaline Phosphatase 12/20/2023 59     Total Bilirubin 12/20/2023 0.2     Globulin 12/20/2023 2.5     A/G Ratio 12/20/2023 1.9     BUN/Creatinine Ratio 12/20/2023 20.3     Anion Gap 12/20/2023 16.7 (H)     eGFR 12/20/2023 36.2 (L)     WBC 12/20/2023 6.12     RBC 12/20/2023 4.14     Hemoglobin 12/20/2023 12.2     Hematocrit 12/20/2023 36.8     MCV 12/20/2023 88.9     MCH 12/20/2023 29.5     MCHC 12/20/2023 33.2     RDW 12/20/2023 13.3     RDW-SD 12/20/2023 43.0     MPV 12/20/2023 11.3     Platelets 12/20/2023 221     Neutrophil % 12/20/2023 57.9     Lymphocyte % 12/20/2023 24.8     Monocyte % 12/20/2023 10.3     Eosinophil % 12/20/2023 5.7     Basophil % 12/20/2023 1.0     Immature Grans % 12/20/2023 0.3     Neutrophils, Absolute 12/20/2023 3.54     Lymphocytes, Absolute 12/20/2023 1.52     Monocytes, Absolute 12/20/2023 0.63     Eosinophils, Absolute 12/20/2023 0.35      "Basophils, Absolute 12/20/2023 0.06     Immature Grans, Absolute 12/20/2023 0.02     nRBC 12/20/2023 0.0    Lab on 09/25/2023   Component Date Value    Color, UA 09/25/2023 Yellow     Appearance, UA 09/25/2023 Clear     pH, UA 09/25/2023 6.0     Specific Gravity, UA 09/25/2023 1.008     Glucose, UA 09/25/2023 Negative     Ketones, UA 09/25/2023 Negative     Bilirubin, UA 09/25/2023 Negative     Blood, UA 09/25/2023 Negative     Protein, UA 09/25/2023 30 mg/dL (1+) (A)     Leuk Esterase, UA 09/25/2023 Trace (A)     Nitrite, UA 09/25/2023 Negative     Urobilinogen, UA 09/25/2023 0.2 E.U./dL     Extra Tube 09/25/2023 Hold for add-ons.     RBC, UA 09/25/2023 0-2     WBC, UA 09/25/2023 0-2     Bacteria, UA 09/25/2023 None Seen     Squamous Epithelial Cell* 09/25/2023 0-2     Hyaline Casts, UA 09/25/2023 None Seen     Methodology 09/25/2023 Automated Microscopy    Office Visit on 09/14/2023   Component Date Value    Color 09/14/2023 Yellow     Clarity, UA 09/14/2023 Cloudy (A)     Specific Gravity  09/14/2023 1.015     pH, Urine 09/14/2023 6.5     Leukocytes 09/14/2023 Large (3+) (A)     Nitrite, UA 09/14/2023 Positive (A)     Protein, POC 09/14/2023 100 mg/dL (A)     Glucose, UA 09/14/2023 Negative     Ketones, UA 09/14/2023 Negative     Urobilinogen, UA 09/14/2023 0.2 E.U./dL     Bilirubin 09/14/2023 Negative     Blood, UA 09/14/2023 Trace (A)     Lot Number 09/14/2023 [id number]     Expiration Date 09/14/2023 10/04/2023     Urine Culture 09/14/2023 >100,000 CFU/mL Klebsiella pneumoniae ssp pneumoniae (A)      Recent labs reviewed and interpreted for clinical significance and application    We went over labs individually while she was still here in the clinic            It is a pleasure to be involved in this patient's cardiovascular care relating to their heart failure.  Please feel free to call me with any questions or concerns.    Rodney \"Renan\" Clay LEDEZMA, [facility]  Heart failure program clinical " provider    Rodney Williamson, APRN   01/25/24  .

## 2024-01-26 ENCOUNTER — OFFICE VISIT (OUTPATIENT)
Dept: FAMILY MEDICINE CLINIC | Facility: CLINIC | Age: 64
End: 2024-01-26
Payer: MEDICARE

## 2024-01-26 VITALS
HEIGHT: 64 IN | TEMPERATURE: 98.2 F | OXYGEN SATURATION: 100 % | BODY MASS INDEX: 29.37 KG/M2 | SYSTOLIC BLOOD PRESSURE: 187 MMHG | HEART RATE: 64 BPM | WEIGHT: 172 LBS | DIASTOLIC BLOOD PRESSURE: 98 MMHG

## 2024-01-26 DIAGNOSIS — N18.32 STAGE 3B CHRONIC KIDNEY DISEASE: ICD-10-CM

## 2024-01-26 DIAGNOSIS — F41.8 MIXED ANXIETY DEPRESSIVE DISORDER: ICD-10-CM

## 2024-01-26 DIAGNOSIS — I10 ESSENTIAL HYPERTENSION: Primary | ICD-10-CM

## 2024-01-26 DIAGNOSIS — E11.49 TYPE 2 DIABETES MELLITUS WITH OTHER DIABETIC NEUROLOGICAL COMPLICATION: ICD-10-CM

## 2024-01-26 RX ORDER — ALPRAZOLAM 1 MG/1
1 TABLET ORAL 2 TIMES DAILY
Qty: 60 TABLET | Refills: 0 | Status: SHIPPED | OUTPATIENT
Start: 2024-01-26

## 2024-01-26 RX ORDER — LANCETS
EACH MISCELLANEOUS
COMMUNITY
Start: 2024-01-14

## 2024-01-26 NOTE — PROGRESS NOTES
Answers submitted by the patient for this visit:  Primary Reason for Visit (Submitted on 1/19/2024)  What is the primary reason for your visit?: Diabetes  Diabetes Questionnaire (Submitted on 1/19/2024)  Chief Complaint: Diabetes problem  MedicAlert ID: [id number]  Disease duration: 18 Years  Symptom course: worsening  Home blood tests: 1-2 x per day  Monitoring compliance: fair  Treatment compliance: some of the time  High score: >200  Overall: >200  blurred vision: Yes  foot paresthesias: No  foot ulcerations: No  polydipsia: No  polyuria: No  weight loss: No  blackouts: No  hospitalization: No  nocturnal hypoglycemia: No  required assistance: No  required glucagon: No  confusion: No  headaches: Yes  speech difficulty: No  sweats: Yes  tremors: No  Current diet: generally healthy, low salt  Meal planning: avoidance of concentrated sweets, carbohydrate counting  Exercise: intermittently  Eye exam current: Yes  Sees podiatrist: No  Subjective   Susy Hanson is a 63 y.o. female.     History of Present Illness     Susy Hanson is a 63-year-old female who presents today for follow-up.    The patient was seen by BRISA Lane in cardiology yesterday, 01/25/2024. She had laboratory tests completed at that time, including a hemoglobin A1c test. She has not had an echocardiogram completed in over 1 year. She was previously placed on Farxiga by her nephrologist, Dr. Sb Kerr; however, she notes that her blood sugar levels remain elevated on the Farxiga. Her fasting blood glucose level has not been below 250 mg/dL in the last 2 weeks. She has not been compliant with her diet. Her Farxiga was increased to 10 mg by cardiology yesterday, 01/25/2024. She was also prescribed Lokelma to decrease her potassium level. She is taking Entresto. She was seen by cardiologist Dr. Vishal Jin shortly after she was seen by Dr. Kerr. She denies any chest pain or difficulty breathing. Her weight is stable.    She is  experiencing discomfort when she lays on her left side, as her left breast silicone implant pushes on her defibrillator. She has been seen by plastic surgery. She is scheduled to see a breast surgery specialist next week.    The following portions of the patient's history were reviewed and updated as appropriate: allergies, current medications, past family history, past medical history, past social history, past surgical history, and problem list.  Past Medical History:   Diagnosis Date    Anxiety 2000    Breast cancer     LEFT BREAST 7/2021    Cataract     Had surgery to remove August 2023    CHF (congestive heart failure)     Colon polyp 12/2023    Removed during colonoscopy    Coronary artery disease 2019    Depression     DM (diabetes mellitus)     History of medical problems 2008    Bladder prolapse repeat Dr. Roper    HL (hearing loss)     Hyperlipidemia     Hypertension     SAW CARDIO/ STRESS TEST 2020 - NOW MEDS MANAGED BY PCP     Irritable bowel syndrome 2006    Peripheral neuropathy     PONV (postoperative nausea and vomiting)     Renal insufficiency 2020    See Dr. funes for moderate kidney failure    Sleep apnea     WEARS CPAP    Urinary tract infection 6/23    UTI seen at [facility]     Past Surgical History:   Procedure Laterality Date    BLADDER REPAIR      BREAST BIOPSY      BREAST RECONSTRUCTION Bilateral 08/26/2021    Procedure: BILATERAL PREPECTORALPLACMENT TISSUE EXPANDERS AND ALLODERM;  Surgeon: Heri Arreaga MD;  Location: [facility];  Service: Plastics;  Laterality: Bilateral;    BREAST TISSUE EXPANDER REMOVAL INSERTION OF IMPLANT Bilateral 12/23/2021    Procedure: BILATERAL REMOVAL TISSUE EXPANDERS AND PLACEMENT OF IMPLANTS;  Surgeon: Heri Arreaga MD;  Location: [facility];  Service: Plastics;  Laterality: Bilateral;    CARDIAC CATHETERIZATION Left 04/13/2022    Procedure: Cardiac Catheterization/Vascular Study;  Surgeon: Christo Murphy MD;  Location: [facility]  CATH INVASIVE LOCATION;  Service: Cardiovascular;  Laterality: Left;    CARDIAC ELECTROPHYSIOLOGY PROCEDURE N/A 09/19/2022    Procedure: ICD implant [place] aware;  Surgeon: Esteban Kendrick MD;  Location: [facility] CATH INVASIVE LOCATION;  Service: Cardiology;  Laterality: N/A;    CARDIAC ELECTROPHYSIOLOGY PROCEDURE N/A 09/20/2022    Procedure: lead repositioning;  Surgeon: Esteban Kendrick MD;  Location:  [facility] CATH INVASIVE LOCATION;  Service: Cardiology;  Laterality: N/A;    CARDIAC SURGERY  2022    Defibrillator/pacemaker implant    CARDIOVASCULAR STRESS TEST  2020    COLONOSCOPY  11/2012    EYE SURGERY  7/23    Cataracts removed    HYSTERECTOMY      INSERT / REPLACE / REMOVE PACEMAKER  2022    LYMPH NODE BIOPSY  8/29/2021    MASTECTOMY W/ SENTINEL NODE BIOPSY Bilateral 08/26/2021    Procedure: bilateral total mastectomy, left axillary sentinel lymph node biopsy, possible reconstruction.;  Surgeon: Asia Perkins MD;  Location:  [facility];  Service: General;  Laterality: Bilateral;    TUBAL ABDOMINAL LIGATION  1981     Family History   Problem Relation Age of Onset    Other Mother     Diabetes Mother     Heart disease Mother     Sleep apnea Mother     Snoring Mother     COPD Mother     Hyperlipidemia Mother     Alcohol abuse Mother     Anxiety disorder Mother     Depression Mother     Hypertension Mother     Alzheimer's disease Father     Diabetes Father     Heart disease Father     Other Father     Sleep apnea Father     Snoring Father     Hyperlipidemia Father     Heart failure Father     Kidney disease Father     Hypertension Father     Diabetes Sister     Heart disease Sister     Other Sister     Sleep apnea Sister     Snoring Sister     COPD Sister     Anxiety disorder Sister     Depression Sister     Hypertension Sister     Hyperlipidemia Sister     Clotting disorder Sister     Hyperlipidemia Brother     Hypertension Brother     Breast cancer Maternal Aunt 80    Breast cancer Paternal Aunt      Diabetes Maternal Grandmother     Breast cancer Paternal Grandmother 60    Cancer Paternal Grandmother     Diabetes Paternal Grandmother     Hyperlipidemia Son     Diabetes Brother     Hypertension Brother     Malig Hyperthermia Neg Hx      Social History     Socioeconomic History    Marital status:    Tobacco Use    Smoking status: Former     Packs/day: 2.00     Years: 30.00     Additional pack years: 0.00     Total pack years: 60.00     Types: Cigarettes     Start date: 1972     Quit date: 2008     Years since quittin.0     Passive exposure: Past    Smokeless tobacco: Never   Vaping Use    Vaping Use: Never used   Substance and Sexual Activity    Alcohol use: No    Drug use: No    Sexual activity: Yes     Partners: Male     Birth control/protection: None, Tubal ligation, Hysterectomy         Current Outpatient Medications:     Accu-Chek FastClix Lancets misc, USE TO CHECK BLOOD GLUCOSE DAILY, Disp: , Rfl:     Accu-Chek Guide test strip, 1 each by Other route Daily. E11.49, Disp: 100 each, Rfl: 1    ALPRAZolam (XANAX) 1 MG tablet, Take 1 tablet by mouth 2 (Two) Times a Day., Disp: 60 tablet, Rfl: 0    atorvastatin (LIPITOR) 20 MG tablet, Take 1 tablet by mouth Daily., Disp: 90 tablet, Rfl: 1    cetirizine (zyrTEC) 10 MG tablet, Take 1 tablet by mouth Daily As Needed., Disp: , Rfl:     dapagliflozin Propanediol (Farxiga) 10 MG tablet, Take 10 mg by mouth Daily., Disp: , Rfl:     dilTIAZem CD (Cardizem CD) 180 MG 24 hr capsule, Take 1 capsule by mouth Daily., Disp: 90 capsule, Rfl: 1    glimepiride (Amaryl) 4 MG tablet, Take 1 tablet by mouth Every Morning Before Breakfast., Disp: 90 tablet, Rfl: 3    hydrALAZINE (APRESOLINE) 25 MG tablet, Take 1 tablet by mouth 3 (Three) Times a Day., Disp: , Rfl:     metoprolol succinate XL (TOPROL-XL) 100 MG 24 hr tablet, Take 1 tablet by mouth 2 (Two) Times a Day., Disp: 180 tablet, Rfl: 1    ondansetron (Zofran) 4 MG tablet, Take 1 tablet by mouth Every 8  "(Eight) Hours As Needed for Nausea or Vomiting., Disp: 20 tablet, Rfl: 0    pantoprazole (PROTONIX) 40 MG EC tablet, Take 1 tablet by mouth Daily., Disp: 90 tablet, Rfl: 0    sacubitril-valsartan (Entresto) 24-26 MG tablet, Take 1 tablet by mouth 2 (Two) Times a Day., Disp: 180 tablet, Rfl: 0    sodium zirconium cyclosilicate (Lokelma) 10 g pack, Take 10 g by mouth 3 (Three) Times a Day for 2 days., Disp: 6 packet, Rfl: 0    Triamcinolone Acetonide (NASACORT) 55 MCG/ACT nasal inhaler, APPLY 2 SPRAYS BY NASAL ROUTE DAILY FOR 30 DAYS (Patient taking differently: 2 sprays into the nostril(s) as directed by provider Daily As Needed.), Disp: 10.8 mL, Rfl: 6    Review of Systems   Constitutional:  Negative for unexpected weight loss.   Eyes:  Positive for blurred vision.   Respiratory:  Negative for shortness of breath.    Cardiovascular:  Negative for chest pain.   Endocrine: Negative for polydipsia and polyuria.   Neurological:  Negative for tremors, speech difficulty and confusion.     BP (!) 187/98 (BP Location: Left arm, Patient Position: Sitting, Cuff Size: Large Adult)   Pulse 64   Temp 98.2 °F (36.8 °C) (Temporal)   Ht 162.6 cm (64\")   Wt 78 kg (172 lb)   LMP  (LMP Unknown)   SpO2 100%   BMI 29.52 kg/m²           Objective   Physical Exam  Vitals and nursing note reviewed.   Constitutional:       Appearance: Normal appearance. She is well-developed, well-groomed and overweight.   Cardiovascular:      Rate and Rhythm: Normal rate and regular rhythm.      Heart sounds: Normal heart sounds.   Pulmonary:      Effort: Pulmonary effort is normal.      Breath sounds: Normal breath sounds.   Musculoskeletal:      Right lower leg: No edema.      Left lower leg: No edema.   Neurological:      Mental Status: She is alert.   Psychiatric:         Mood and Affect: Mood is anxious.         Assessment & Plan   Problems Addressed this Visit          Cardiac and Vasculature    Essential hypertension - Primary       Endocrine " and Metabolic    Type 2 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       Genitourinary and Reproductive     Stage 3b chronic kidney disease       Mental Health    Mixed anxiety depressive disorder    Relevant Medications    ALPRAZolam (XANAX) 1 MG tablet     Diagnoses         Codes Comments    Essential hypertension    -  Primary ICD-10-CM: I10  ICD-9-CM: 401.9     Type 2 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     ICD-10-CM: E11.49  ICD-9-CM: 250.60     Stage 3b chronic kidney disease     ICD-10-CM: N18.32  ICD-9-CM: 585.3     Mixed anxiety depressive disorder     ICD-10-CM: F41.8  ICD-9-CM: 300.4           1. Essential hypertension  - Blood pressure is elevated here, but she is very anxious about everything. Blood pressure was normal at home.   - She was counseled on the need for stress reduction and compliance with her medications.    2. Type 2 diabetes mellitus  - Her Farxiga was increased to 10 mg yesterday at the cardiology clinic, after her nephrologist started her on 5 mg.   - She was counseled on the importance of dietary compliance because she has not been doing that.    3. Stage 3b chronic kidney disease  - She will keep follow-up with her nephrologist and will continue drinking her water.    4. Mixed anxiety and depressive disorder  - Her Xanax was refilled.   - She was counseled on the importance of working on stress reduction by taking slow, deep breaths and focusing on what is important.    Transcribed from ambient dictation for Erica Avila MD by Katherine La.  01/26/24   14:33 EST    Patient or patient representative verbalized consent to the visit recording.  I have personally performed the services described in this document as transcribed by the above individual, and it is both accurate and complete.

## 2024-01-29 ENCOUNTER — TELEPHONE (OUTPATIENT)
Facility: HOSPITAL | Age: 64
End: 2024-01-29
Payer: MEDICARE

## 2024-01-29 ENCOUNTER — HOSPITAL ENCOUNTER (OUTPATIENT)
Facility: HOSPITAL | Age: 64
Discharge: HOME | End: 2024-01-29
Admitting: NURSE PRACTITIONER
Payer: MEDICARE

## 2024-01-29 VITALS
DIASTOLIC BLOOD PRESSURE: 77 MMHG | RESPIRATION RATE: 18 BRPM | OXYGEN SATURATION: 98 % | HEART RATE: 75 BPM | SYSTOLIC BLOOD PRESSURE: 131 MMHG

## 2024-01-29 DIAGNOSIS — E87.5 HYPERKALEMIA: ICD-10-CM

## 2024-01-29 DIAGNOSIS — R06.09 DYSPNEA ON EXERTION: ICD-10-CM

## 2024-01-29 DIAGNOSIS — E11.49 TYPE 2 DIABETES MELLITUS WITH OTHER DIABETIC NEUROLOGICAL COMPLICATION: Primary | ICD-10-CM

## 2024-01-29 LAB
ANION GAP SERPL CALCULATED.3IONS-SCNC: 11 MMOL/L (ref 5–15)
BUN SERPL-MCNC: 28 MG/DL (ref 8–23)
BUN/CREAT SERPL: 20.3 (ref 7–25)
CALCIUM SPEC-SCNC: 9.8 MG/DL (ref 8.6–10.5)
CHLORIDE SERPL-SCNC: 106 MMOL/L (ref 98–107)
CO2 SERPL-SCNC: 26 MMOL/L (ref 22–29)
CREAT SERPL-MCNC: 1.38 MG/DL (ref 0.57–1)
EGFRCR SERPLBLD CKD-EPI 2021: 42.6 ML/MIN/1.73
GLUCOSE SERPL-MCNC: 209 MG/DL (ref 65–99)
MAGNESIUM SERPL-MCNC: 2.2 MG/DL (ref 1.6–2.4)
NT-PROBNP SERPL-MCNC: 63.3 PG/ML (ref 0–900)
POTASSIUM SERPL-SCNC: 5.4 MMOL/L (ref 3.5–5.2)
SODIUM SERPL-SCNC: 143 MMOL/L (ref 136–145)

## 2024-01-29 PROCEDURE — G0463 HOSPITAL OUTPT CLINIC VISIT: HCPCS

## 2024-01-29 PROCEDURE — 80048 BASIC METABOLIC PNL TOTAL CA: CPT | Performed by: NURSE PRACTITIONER

## 2024-01-29 PROCEDURE — 83735 ASSAY OF MAGNESIUM: CPT | Performed by: NURSE PRACTITIONER

## 2024-01-29 PROCEDURE — 83880 ASSAY OF NATRIURETIC PEPTIDE: CPT | Performed by: NURSE PRACTITIONER

## 2024-01-29 RX ORDER — SODIUM ZIRCONIUM CYCLOSILICATE 10 G/10G
10 POWDER, FOR SUSPENSION ORAL 3 TIMES DAILY
Qty: 6 EACH | Refills: 0 | Status: SHIPPED | OUTPATIENT
Start: 2024-01-29 | End: 2024-01-31

## 2024-01-29 NOTE — TELEPHONE ENCOUNTER
Spoke to patient about referral to endocrinology and dietician that was placed today.  Also, discussed with patient that her potassium was still elevated so prescribed additional Lokelma which has been sent to  [facility] for .  Sent follow up Lab work to [facility] for Wednesday.

## 2024-01-30 ENCOUNTER — HOSPITAL ENCOUNTER (OUTPATIENT)
Dept: CARDIOLOGY | Facility: HOSPITAL | Age: 64
Discharge: HOME | End: 2024-01-30
Admitting: NURSE PRACTITIONER
Payer: MEDICARE

## 2024-01-30 VITALS — WEIGHT: 171.96 LBS | BODY MASS INDEX: 29.36 KG/M2 | HEIGHT: 64 IN

## 2024-01-30 DIAGNOSIS — I50.1 LEFT VENTRICULAR FAILURE, UNSPECIFIED: Chronic | ICD-10-CM

## 2024-01-30 DIAGNOSIS — I42.9 CARDIOMYOPATHY, UNSPECIFIED TYPE: ICD-10-CM

## 2024-01-30 DIAGNOSIS — Z91.89 AT RISK FOR FLUID VOLUME OVERLOAD: Chronic | ICD-10-CM

## 2024-01-30 PROCEDURE — 93306 TTE W/DOPPLER COMPLETE: CPT

## 2024-01-30 NOTE — PROGRESS NOTES
Chief Complaint: Susy Hanson is a 63 y.o. female who was seen in consultation at the request of Erica Pina*  for newly diagnosed DCIS   History of Present Illness:  2021  Patient presents with newly diagnosed DCIS. She noted no new masses, skin changes, nipple discharge, nipple changes prior to her most recent imaging.  Her most recent imaging includes the followin/15/2019 BILATERAL SCREENING MAMMO WITH LYLE          [facility]           SUSY HANSON  Scattered areas of fibroglandular density.   BI-RADS 1: Negative.    2021 BILATERAL SCREENING MAMMO WITH LYLE         [facility]            SUSY HANSON  There are scattered areas of fibroglandular density. 10 mm asymmetry in the central right breast, middle depth, visible on the CC view. Indeterminate calcifications in the lower outer left breast, posterior depth.  BI-RADS 0.    2021   BILATERAL DIAGNOSTIC MAMMO WITH LYLE & RIGHT BREAST US   KEIRA HANSON  MM.8 cm grouping of coarse and somewhat heterogeneous calcifications within the outer central left breast posterior third depth which are mildly suspicious. There is an oval partial circumscribed, partially obscured mass within the lower central right breast anterior/middle third depth corresponding to previously questioned asymmetry. In retrospect, this finding appears similar to multiple prior exam dating back to 2006.  US:  Right breast 6:00 position 2 cm from nipple is a 1.0 x 0.3 x 0.8 cm grouping of oval circumscribed anechoic avascular masses consistent with a benign cluster of cysts.  IMPRESSION:  1. Mildly suspicious subcentimeter grouping of calcifications within the outer central left breast posterior third depth.  RECOMMENDATION:  Recommend stereotactic guided core needle biopsy of the left breast calcifications for further evaluation.  BI-RADS 4.    2021 BILATERAL BREAST MRI               [facility]                             SUSY  BETTYE  LEFT BREAST:  4:00 posterior right breast, 7.8 cm posterior to the nipple, there is a 1.2 cm AP dimension, 1.8 cm transverse dimension, 0.8 cm craniocaudal dimension irregular enhancing mass with a central biopsy clip. There is approximately 0.7 cm linear nonmass enhancement extending posteriorly from the mass. Together the overall AP extent of suspicious enhancement measures approximately 1.8 cm. The visualized axilla is within normal limits.  2. No MRI evidence of malignancy in the right breast.    She had a biopsy on the following day that showed:   06/21/2021 BIOPSY OF THE LEFT BREAST PERCUTANEOUS             KEIRA         YADIRA BETTYE  PATHOLOGY ADDENDUM:  Final pathology is concordant.  Target: microcalcifications. Location: Outer central left breast posterior third depth. 8 g Mammotome. Sample number: 4. Marker migration on post-procedure mammogram: None.  PATHOLOGY:  Calcifications, left breast, biopsies:  Ductal carcinoma in situ, low to intermediate nuclear grade, solid type. Atypical lobular hyperplasia. No invasive carcinoma is identified. Microcalcifications identified within neoplastic ducts.  ER: 90%  CT: 95%      She has not had a breast biopsy in the past.  She has had her uterus and ovaries removed, is postmenopausal, and takes nor hormones. She took a patch for one year in 2006 and then vaginal estrogen for about 10 years.  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  Single benign sentinel  lymph node 0 of 1 sentinel node.  Right total mastectomy benign breast tissue with focal micropapilloma, squamous metaplasia of lactiferous ducts.       Pathologic stage Tis N0 stage 0.     She has had 1 of 4 drains removed.  She denies any redness warmth or drainage at her incisions.  She is seeing Dr. Arreaga this afternoon.        8/1/2022  In the interim, Susy has done okay.  In December 2021 she had her expanders exchanged for implants.  Dr. Arreaga.  She then got COVID in April and shortly thereafter developed congestive heart failure, presenting with shortness of breath.  She has lost 12 pounds intentionally in cardiac rehab.  She is going to see a specialist and congestive heart failure next week.  She has been told that her ejection fraction is 10 to 15% yet she laid a porch in her backyard this past weekend without difficulty.    She denies any skin nodules or discolorations on either side.  She denies any headache, bone pain, belly pain, cough, changes in vision or gait.    8/1/2023   Patient presenting to the office today for routine follow-up. She is still not seeing radiation or oncology.  Patient complaining of bilateral breast pain.  States that the pain is worse when she does not wear a bra.  Her breast also hurt at night when she does not wear a bra.  When she does wear a bra the pain is better but she cannot seem to find a bra that she likes    2/1/2024 interval history  Patient presenting to the office today for routine follow-up.  She dates that her breast pain has significantly improved with wearing a bra.  She is unhappy with her reconstructive surgery at this point and did speak with Dr. Arreaga about removing the implants.  She has not decided on that yet or not but if she does decide to move forward with that it would be towards the end of the year.    Review of Systems:  Review of Systems   Constitutional:  Negative for unexpected weight change (12 lb wt loss).   All other systems  reviewed and are negative.       Past Medical and Surgical History:  Breast Biopsy History:  Patient had not had a breast biopsy prior to her cancer diagnosis.  Breast Cancer HIstory:  Patient does not have a past medical history of breast cancer.  Breast Operations, and year:  NONE   Obstetric/Gynecologic History:  Age menstrual periods began: 9  Patient is postmenopausal due to removal of her uterus and both ovaries in the following year: 2005   Number of pregnancies:3  Number of live births: 3  Number of abortions or miscarriages: 0  Age of delivery of first child: 14  Patient breast fed, for the following lenth of time: 6 MONTHS LAST CHILD   Length of time taking birth control pills: 2 YRS   Patient took hormone replacement during the following dates: AFTER 2005, PATCH 1 YR, VAGINAL ESTROGEN MUCH LONGER.     PATIENT HAS UTERUS AND OVARIES REMOVED.       Past Surgical History:   Procedure Laterality Date    BLADDER REPAIR      BREAST BIOPSY      BREAST RECONSTRUCTION Bilateral 08/26/2021    Procedure: BILATERAL PREPECTORALPLACMENT TISSUE EXPANDERS AND ALLODERM;  Surgeon: Heri Arreaga MD;  Location: [facility];  Service: Plastics;  Laterality: Bilateral;    BREAST TISSUE EXPANDER REMOVAL INSERTION OF IMPLANT Bilateral 12/23/2021    Procedure: BILATERAL REMOVAL TISSUE EXPANDERS AND PLACEMENT OF IMPLANTS;  Surgeon: Heri Arreaga MD;  Location: [facility] OR;  Service: Plastics;  Laterality: Bilateral;    CARDIAC CATHETERIZATION Left 04/13/2022    Procedure: Cardiac Catheterization/Vascular Study;  Surgeon: Christo Murphy MD;  Location: [facility] CATH INVASIVE LOCATION;  Service: Cardiovascular;  Laterality: Left;    CARDIAC ELECTROPHYSIOLOGY PROCEDURE N/A 09/19/2022    Procedure: ICD implant [place] aware;  Surgeon: Esteban Kendrick MD;  Location: [facility] CATH INVASIVE LOCATION;  Service: Cardiology;  Laterality: N/A;    CARDIAC ELECTROPHYSIOLOGY PROCEDURE N/A 09/20/2022    Procedure: lead  repositioning;  Surgeon: Esteban Kendrick MD;  Location:  [facility] CATH INVASIVE LOCATION;  Service: Cardiology;  Laterality: N/A;    CARDIAC SURGERY      Defibrillator/pacemaker implant    CARDIOVASCULAR STRESS TEST      COLONOSCOPY  2012    EYE SURGERY      Cataracts removed    HYSTERECTOMY      INSERT / REPLACE / REMOVE PACEMAKER      LYMPH NODE BIOPSY  2021    MASTECTOMY W/ SENTINEL NODE BIOPSY Bilateral 2021    Procedure: bilateral total mastectomy, left axillary sentinel lymph node biopsy, possible reconstruction.;  Surgeon: Asia Perkins MD;  Location:  [facility];  Service: General;  Laterality: Bilateral;    TUBAL ABDOMINAL LIGATION         Past Medical History:   Diagnosis Date    Anxiety     Breast cancer     LEFT BREAST 2021    Cataract     Had surgery to remove 2023    CHF (congestive heart failure)     Colon polyp 2023    Removed during colonoscopy    Coronary artery disease     Depression     DM (diabetes mellitus)     History of medical problems     Bladder prolapse repeat Dr. Roper    HL (hearing loss)     Hyperlipidemia     Hypertension     SAW CARDIO/ STRESS TEST  - NOW MEDS MANAGED BY PCP     Irritable bowel syndrome     Peripheral neuropathy     PONV (postoperative nausea and vomiting)     Renal insufficiency     See Dr. funes for moderate kidney failure    Sleep apnea     WEARS CPAP    Urinary tract infection     UTI seen at [facility]       Prior Hospitalizations, other than for surgery or childbirth, and year:  [facility]  AND     Social History     Socioeconomic History    Marital status:    Tobacco Use    Smoking status: Former     Packs/day: 2.00     Years: 30.00     Additional pack years: 0.00     Total pack years: 60.00     Types: Cigarettes     Start date: 1972     Quit date: 2008     Years since quittin.0     Passive exposure: Past    Smokeless tobacco: Never   Vaping Use     Vaping Use: Never used   Substance and Sexual Activity    Alcohol use: No    Drug use: No    Sexual activity: Yes     Partners: Male     Birth control/protection: None, Tubal ligation, Hysterectomy     Patient is .  Patient is retired.  Patient drinks 2 servings of caffeine per day.    Family History:  Family History   Problem Relation Age of Onset    Other Mother     Diabetes Mother     Heart disease Mother     Sleep apnea Mother     Snoring Mother     COPD Mother     Hyperlipidemia Mother     Alcohol abuse Mother     Anxiety disorder Mother     Depression Mother     Hypertension Mother     Alzheimer's disease Father     Diabetes Father     Heart disease Father     Other Father     Sleep apnea Father     Snoring Father     Hyperlipidemia Father     Heart failure Father     Kidney disease Father     Hypertension Father     Diabetes Sister     Heart disease Sister     Other Sister     Sleep apnea Sister     Snoring Sister     COPD Sister     Anxiety disorder Sister     Depression Sister     Hypertension Sister     Hyperlipidemia Sister     Clotting disorder Sister     Hyperlipidemia Brother     Hypertension Brother     Breast cancer Maternal Aunt 80    Breast cancer Paternal Aunt     Diabetes Maternal Grandmother     Breast cancer Paternal Grandmother 60    Cancer Paternal Grandmother     Diabetes Paternal Grandmother     Hyperlipidemia Son     Diabetes Brother     Hypertension Brother     Malig Hyperthermia Neg Hx        Vital Signs:  LMP  (LMP Unknown)      Medications:    Current Outpatient Medications:     Accu-Chek FastClix Lancets misc, USE TO CHECK BLOOD GLUCOSE DAILY, Disp: , Rfl:     Accu-Chek Guide test strip, 1 each by Other route Daily. E11.49, Disp: 100 each, Rfl: 1    ALPRAZolam (XANAX) 1 MG tablet, Take 1 tablet by mouth 2 (Two) Times a Day., Disp: 60 tablet, Rfl: 0    atorvastatin (LIPITOR) 20 MG tablet, Take 1 tablet by mouth Daily., Disp: 90 tablet, Rfl: 1    cetirizine (zyrTEC) 10 MG tablet,  Take 1 tablet by mouth Daily As Needed., Disp: , Rfl:     dapagliflozin Propanediol (Farxiga) 10 MG tablet, Take 10 mg by mouth Daily., Disp: 30 tablet, Rfl: 0    dilTIAZem CD (Cardizem CD) 180 MG 24 hr capsule, Take 1 capsule by mouth Daily., Disp: 90 capsule, Rfl: 1    glimepiride (Amaryl) 4 MG tablet, Take 1 tablet by mouth Every Morning Before Breakfast., Disp: 90 tablet, Rfl: 3    hydrALAZINE (APRESOLINE) 25 MG tablet, Take 1 tablet by mouth 3 (Three) Times a Day., Disp: , Rfl:     metoprolol succinate XL (TOPROL-XL) 100 MG 24 hr tablet, Take 1 tablet by mouth 2 (Two) Times a Day., Disp: 180 tablet, Rfl: 1    ondansetron (Zofran) 4 MG tablet, Take 1 tablet by mouth Every 8 (Eight) Hours As Needed for Nausea or Vomiting., Disp: 20 tablet, Rfl: 0    pantoprazole (PROTONIX) 40 MG EC tablet, Take 1 tablet by mouth Daily., Disp: 90 tablet, Rfl: 0    sacubitril-valsartan (Entresto) 24-26 MG tablet, Take 1 tablet by mouth 2 (Two) Times a Day., Disp: 180 tablet, Rfl: 0    sodium zirconium cyclosilicate (Lokelma) 10 g pack, Take 10 g by mouth 3 (Three) Times a Day for 2 days., Disp: 6 each, Rfl: 0    Triamcinolone Acetonide (NASACORT) 55 MCG/ACT nasal inhaler, APPLY 2 SPRAYS BY NASAL ROUTE DAILY FOR 30 DAYS (Patient taking differently: 2 sprays into the nostril(s) as directed by provider Daily As Needed.), Disp: 10.8 mL, Rfl: 6     Allergies:  Allergies   Allergen Reactions    Hydrocodone-Acetaminophen Hives     Lortab, takes tylenol       Physical Examination:  LMP  (LMP Unknown)   General Appearance:  Patient is in no distress.  She is well kept and has an overweight build.   Psychiatric:  Patient with appropriate mood and affect. Alert and oriented to self, time, and place.    Breast, RIGHT:  Surgically absent with well healed transverse incision. No skin nodules or discolorations.  No nipple areolar complex constructed or tattooed at this juncture.      Breast, LEFT:   Surgically absent with well healed transverse  incision. No skin nodules or discolorations.  No nipple areolar complex constructed or tattooed at this juncture.    Lymphatic:  There is no axillary, cervical, infraclavicular, or supraclavicular adenopathy bilaterally.          Imagin2018 BILATERAL SCREENING MAMMO WITH LYLE            [facility]         YADIRA BETTYE  Scattered areas of fibroglandular density.   BI-RADS 2: Benign Findings.    11/15/2019 BILATERAL SCREENING MAMMO WITH LYLE          [facility]           YADIRA BETTYE  Scattered areas of fibroglandular density.   BI-RADS 1: Negative.    2021 BILATERAL SCREENING MAMMO WITH LYLE         [facility]            YADIRA BETTYE  There are scattered areas of fibroglandular density. 10 mm asymmetry in the central right breast, middle depth, visible on the CC view. Indeterminate calcifications in the lower outer left breast, posterior depth.  BI-RADS 0.    2021   BILATERAL DIAGNOSTIC MAMMO WITH LYLE & RIGHT BREAST US   [facility]   YADIRA BETTYE  MM.8 cm grouping of coarse and somewhat heterogeneous calcifications within the outer central left breast posterior third depth which are mildly suspicious. There is an oval partial circumscribed, partially obscured mass within the lower central right breast anterior/middle third depth corresponding to previously questioned asymmetry. In retrospect, this finding appears similar to multiple prior exam dating back to 2006.  US:  Right breast 6:00 position 2 cm from nipple is a 1.0 x 0.3 x 0.8 cm grouping of oval circumscribed anechoic avascular masses consistent with a benign cluster of cysts.  IMPRESSION:  1. Mildly suspicious subcentimeter grouping of calcifications within the outer central left breast posterior third depth.  RECOMMENDATION:  Recommend stereotactic guided core needle biopsy of the left breast calcifications for further evaluation.  BI-RADS 4.    2021 BILATERAL BREAST MRI               [facility]                              YADIRA WEN  LEFT BREAST:  4:00 posterior right breast, 7.8 cm posterior to the nipple, there is a 1.2 cm AP dimension, 1.8 cm transverse dimension, 0.8 cm craniocaudal dimension irregular enhancing mass with a central biopsy clip. There is approximately 0.7 cm linear nonmass enhancement extending posteriorly from the mass. Together the overall AP extent of suspicious enhancement measures approximately 1.8 cm. The visualized axilla is within normal limits.  2. No MRI evidence of malignancy in the right breast.    Pathology:  06/21/2021 BIOPSY OF THE LEFT BREAST PERCUTANEOUS             [facility]         YADIRA WEN  PATHOLOGY ADDENDUM:  Final pathology is concordant.  Target: microcalcifications. Location: Outer central left breast posterior third depth. 8 g Mammotome. Sample number: 4. Marker migration on post-procedure mammogram: None.  PATHOLOGY:  Calcifications, left breast, biopsies:  Ductal carcinoma in situ, low to intermediate nuclear grade, solid type. Atypical lobular hyperplasia. No invasive carcinoma is identified. Microcalcifications identified within neoplastic ducts.  ER: 90%  SD: 95%    07/01/21 [facility] OUTSIDE PATH REVIEW  YADIRA WEN   Final Diagnosis   Outside Breast Consultation on Material from [facility] ([id number]):     1.   Breast, Left Outer Central Posterior One-third, Core Biopsy for Calcifications:               A. Ductal carcinoma in situ (DCIS), low to intermediate nuclear grade with solid and cribriform        architecture with spotty single cell and focal expansive necrosis.  B. In situ carcinoma biomarkers: ER positive (85% intermediate, Sendy 7/7); SD positive (90%, strong,        Sendy score 8/8). Ki-67 (performed and interpreted in-house) 30%.     [facility]                  PROGRESS NOTES    YADIRA WEN  Diabetes. Chest pain. Hypertension.    06/25/2021 HEMATOLOGY/ONCOLOGY PROGRESS NOTES   DR DEANNA WEN  Comprehensive gene analysis  with cancer next technology. Follow-up with Dr. Perkins for surgical discussions.        LABS:   06/25/2021 CANCERNEXT-EXPANDED                      F2G                     SUSY WEN  Analyses of 77 genes. Negative: No Clinically Significant Variants Detected.         Assessment:  No diagnosis found.  1-2  Left breast posterior one third 4:00, 8 cm from the nipple, single propeller-shaped marker in the breast.  8 mm of calcification on mammogram, 1.8 cm masslike area on MRI with 8 mm of this tracking posteriorly from the clip and biopsy site.  On bedside ultrasound the marker is anterior and lateral to the imaging abnormality.  Dr. Ramos in situ, low to intermediate grade, solid, cribriform, with spotty single cell and focal expansive necrosis.  Estrogen 85, progesterone 90%.    Clinical stage Tis N0    Note residual calcifications on postbiopsy images.    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  Single benign sentinel lymph node 0 of 1 sentinel node.  Right total mastectomy benign breast tissue with focal micropapilloma, squamous metaplasia of lactiferous ducts.       Pathologic stage Tis N0 stage 0.     - no need for medical oncology or radiation oncology.    December 2021 she had her expanders exchanged for implants.  Dr. Arreaga.        2     RICARDO NEAL age uncertain, PGM 60s with breast cancer  Had an [employer] cancer next expanded panel sent 77 genes, sent 6-25-21- negative for mutation  3. Breast pain- resolved    Plan:  The patient goes by Susy.  She works at [employer] in [employer].  Return to the office in 6 months for an exam  Cont with sports bra wear at all times at night wear a tight  miguel Coulter, BRISA    Next Appointment:  No follow-ups on file.    Today I spent 20 minutes doing the following: Reviewing records, labs, outside imaging and reports in preparation for the patient visit; obtaining medical history; performing the physical exam; counseling and educating the patient and any available family or caregivers; ordering medications, tests or procedures; coordinating care with any other physicians on her care team as needed, and documenting all of the above in the medical record as well as sending communications with her other healthcare professionals.    EMR Dragon/transcription disclaimer:    Much of this encounter note is an electronic transcription/translocation of spoken language to printed text.  The electronic translation of spoken language may permit erroneous, or at times, nonsensical words or phrases to be inadvertently transcribed.  Although I have reviewed the note from such areas, some may still exist.

## 2024-02-01 ENCOUNTER — OFFICE VISIT (OUTPATIENT)
Dept: SURGERY | Facility: CLINIC | Age: 64
End: 2024-02-01
Payer: MEDICARE

## 2024-02-01 VITALS
BODY MASS INDEX: 29.19 KG/M2 | DIASTOLIC BLOOD PRESSURE: 92 MMHG | HEIGHT: 64 IN | HEART RATE: 76 BPM | OXYGEN SATURATION: 98 % | SYSTOLIC BLOOD PRESSURE: 180 MMHG | WEIGHT: 171 LBS

## 2024-02-01 DIAGNOSIS — D05.12 BREAST NEOPLASM, TIS (DCIS), LEFT: Primary | ICD-10-CM

## 2024-02-01 DIAGNOSIS — N64.4 BREAST PAIN: ICD-10-CM

## 2024-02-01 PROCEDURE — 3077F SYST BP >= 140 MM HG: CPT | Performed by: NURSE PRACTITIONER

## 2024-02-01 PROCEDURE — 3080F DIAST BP >= 90 MM HG: CPT | Performed by: NURSE PRACTITIONER

## 2024-02-01 PROCEDURE — 99213 OFFICE O/P EST LOW 20 MIN: CPT | Performed by: NURSE PRACTITIONER

## 2024-02-01 PROCEDURE — 1159F MED LIST DOCD IN RCRD: CPT | Performed by: NURSE PRACTITIONER

## 2024-02-01 PROCEDURE — 1160F RVW MEDS BY RX/DR IN RCRD: CPT | Performed by: NURSE PRACTITIONER

## 2024-02-03 LAB
BH CV ECHO MEAS - ACS: 1.59 CM
BH CV ECHO MEAS - AI P1/2T: 658.7 MSEC
BH CV ECHO MEAS - AO MAX PG: 7.1 MMHG
BH CV ECHO MEAS - AO MEAN PG: 3.6 MMHG
BH CV ECHO MEAS - AO ROOT DIAM: 2.7 CM
BH CV ECHO MEAS - AO V2 MAX: 133.1 CM/SEC
BH CV ECHO MEAS - AO V2 VTI: 29 CM
BH CV ECHO MEAS - AVA(I,D): 2.8 CM2
BH CV ECHO MEAS - EDV(CUBED): 100.3 ML
BH CV ECHO MEAS - EDV(MOD-SP4): 87 ML
BH CV ECHO MEAS - EF(MOD-BP): 60 %
BH CV ECHO MEAS - EF(MOD-SP4): 57 %
BH CV ECHO MEAS - ESV(CUBED): 31.5 ML
BH CV ECHO MEAS - ESV(MOD-SP4): 37.4 ML
BH CV ECHO MEAS - FS: 32 %
BH CV ECHO MEAS - IVS/LVPW: 0.95 CM
BH CV ECHO MEAS - IVSD: 1.04 CM
BH CV ECHO MEAS - LA DIMENSION: 3.2 CM
BH CV ECHO MEAS - LV DIASTOLIC VOL/BSA (35-75): 47.4 CM2
BH CV ECHO MEAS - LV MASS(C)D: 175.7 GRAMS
BH CV ECHO MEAS - LV MAX PG: 4.6 MMHG
BH CV ECHO MEAS - LV MEAN PG: 2.42 MMHG
BH CV ECHO MEAS - LV SYSTOLIC VOL/BSA (12-30): 20.4 CM2
BH CV ECHO MEAS - LV V1 MAX: 107.2 CM/SEC
BH CV ECHO MEAS - LV V1 VTI: 26 CM
BH CV ECHO MEAS - LVIDD: 4.6 CM
BH CV ECHO MEAS - LVIDS: 3.2 CM
BH CV ECHO MEAS - LVOT AREA: 3.1 CM2
BH CV ECHO MEAS - LVOT DIAM: 1.98 CM
BH CV ECHO MEAS - LVPWD: 1.09 CM
BH CV ECHO MEAS - MR MAX PG: 185.5 MMHG
BH CV ECHO MEAS - MR MAX VEL: 680.4 CM/SEC
BH CV ECHO MEAS - MV A MAX VEL: 142.5 CM/SEC
BH CV ECHO MEAS - MV DEC SLOPE: 427.5 CM/SEC2
BH CV ECHO MEAS - MV DEC TIME: 0.21 SEC
BH CV ECHO MEAS - MV E MAX VEL: 91.6 CM/SEC
BH CV ECHO MEAS - MV E/A: 0.64
BH CV ECHO MEAS - MV MAX PG: 8.7 MMHG
BH CV ECHO MEAS - MV MEAN PG: 4 MMHG
BH CV ECHO MEAS - MV V2 VTI: 32.9 CM
BH CV ECHO MEAS - MVA(VTI): 2.43 CM2
BH CV ECHO MEAS - PA ACC TIME: 0.07 SEC
BH CV ECHO MEAS - PA V2 MAX: 107.3 CM/SEC
BH CV ECHO MEAS - PI END-D VEL: 133.1 CM/SEC
BH CV ECHO MEAS - PULM A REVS DUR: 0.11 SEC
BH CV ECHO MEAS - PULM A REVS VEL: 30.4 CM/SEC
BH CV ECHO MEAS - PULM DIAS VEL: 27.4 CM/SEC
BH CV ECHO MEAS - PULM S/D: 2.25
BH CV ECHO MEAS - PULM SYS VEL: 61.5 CM/SEC
BH CV ECHO MEAS - RAP SYSTOLE: 10 MMHG
BH CV ECHO MEAS - RVSP: 35.5 MMHG
BH CV ECHO MEAS - SI(MOD-SP4): 27 ML/M2
BH CV ECHO MEAS - SV(LVOT): 79.9 ML
BH CV ECHO MEAS - SV(MOD-SP4): 49.5 ML
BH CV ECHO MEAS - TAPSE (>1.6): 2.6 CM
BH CV ECHO MEAS - TR MAX PG: 25.5 MMHG
BH CV ECHO MEAS - TR MAX VEL: 251.6 CM/SEC
BH CV XLRA - RV BASE: 3.6 CM
BH CV XLRA - RV MID: 1.7 CM

## 2024-02-05 NOTE — PROGRESS NOTES
Date of Office Visit: 2024  Encounter Provider: Dr. Christo Murphy  Place of Service: [facility] CARDIOLOGY [place]  Patient Name: Susy Hanson  :1960  Erica Avila MD    Chief Complaint   Patient presents with    Congestive Heart Failure    Hypertension    Hyperlipidemia    Diabetes    Follow-up     History of Present Illness    I am pleased to see Mrs. Hanson as a follow-up.    I am pleased see Mrs. Hanson in my office today as a follow-up     As you know, patient is a 63-year-old white female whose past medical history is significant for hypertension, diabetes mellitus, who came today for follow-up.     Patient has been hypertension from last 10 years.  Patient is also diabetic.  Her blood pressure was fairly well controlled but from last few months her blood pressure is running high.  In 2020, patient was admitted in the hospital with blood pressure greater than 200.  She underwent stress test which was negative for ischemia.  EKG showed left bundle branch block pattern.  MRI of the brain was unremarkable.  CT angiography to rule out renal artery stenosis was done and it was negative.  Patient antihypertensive regimen was adjusted and was discharged.     In 2021, patient was admitted at [facility] with symptom of shortness of breath and accelerated hypertension.  She was noted to be in congestive heart failure.  Echocardiogram showed ejection fraction of 25%.  Patient underwent cardiac catheterization and no significant coronary artery disease was noted.  Patient was started on Entresto and Toprol-XL.  She was discharged home.    In 2022, patient was referred for ICD.  Patient underwent biventricular ICD implant.  Unfortunately patient required repositioning of RV lead due to increased pressure on.  Postprocedure patient had chest pain and echocardiogram showed pericardial effusion.  Patient was treated with colchicine and NSAID.  In  November 2022, repeat echocardiogram showed normal left ventricular size and function with a EF of 60 to 65%.  Trace pericardial effusion noted.    In February 2024, patient underwent echocardiogram which showed normal left ventricular size and function with a EF of 55 to 60%.  No pericardial effusion noted    Patient came today for follow-up.  Patient is overall doing well.  Blood pressure is slightly high.  Patient denies any orthopnea, PND, syncope or presyncope.    Patient Entresto was decreased because of renal dysfunction hydralazine was added.  I would recommend to increase hydralazine to 50 mg every 8.  Monitor the blood pressure at home and interrogated the pacemaker today.        Past Medical History:   Diagnosis Date    Anxiety 2000    Breast cancer     LEFT BREAST 7/2021    Cataract     Had surgery to remove August 2023    CHF (congestive heart failure)     Colon polyp 12/2023    Removed during colonoscopy    Coronary artery disease 2019    Depression     DM (diabetes mellitus)     History of medical problems 2008    Bladder prolapse repeat Dr. Roper    HL (hearing loss)     Hyperlipidemia     Hypertension     SAW CARDIO/ STRESS TEST 2020 - NOW MEDS MANAGED BY PCP     Irritable bowel syndrome 2006    Peripheral neuropathy     PONV (postoperative nausea and vomiting)     Renal insufficiency 2020    See Dr. funes for moderate kidney failure    Sleep apnea     WEARS CPAP    Urinary tract infection 6/23    UTI seen at [facility]         Past Surgical History:   Procedure Laterality Date    BLADDER REPAIR      BREAST BIOPSY      BREAST RECONSTRUCTION Bilateral 08/26/2021    Procedure: BILATERAL PREPECTORALPLACMENT TISSUE EXPANDERS AND ALLODERM;  Surgeon: Heri Arreaga MD;  Location: [facility];  Service: Plastics;  Laterality: Bilateral;    BREAST TISSUE EXPANDER REMOVAL INSERTION OF IMPLANT Bilateral 12/23/2021    Procedure: BILATERAL REMOVAL TISSUE EXPANDERS AND PLACEMENT OF IMPLANTS;   Surgeon: Heri Arreaga MD;  Location: [facility] MAIN OR;  Service: Plastics;  Laterality: Bilateral;    CARDIAC CATHETERIZATION Left 04/13/2022    Procedure: Cardiac Catheterization/Vascular Study;  Surgeon: Christo Murphy MD;  Location:  [facility];  Service: Cardiovascular;  Laterality: Left;    CARDIAC ELECTROPHYSIOLOGY PROCEDURE N/A 09/19/2022    Procedure: ICD implant [employer] aware;  Surgeon: Esteban Kendrick MD;  Location:  [facility];  Service: Cardiology;  Laterality: N/A;    CARDIAC ELECTROPHYSIOLOGY PROCEDURE N/A 09/20/2022    Procedure: lead repositioning;  Surgeon: Esteban Kendrick MD;  Location:  [facility];  Service: Cardiology;  Laterality: N/A;    CARDIAC SURGERY  2022    Defibrillator/pacemaker implant    CARDIOVASCULAR STRESS TEST  2020    COLONOSCOPY  11/2012    EYE SURGERY  7/23    Cataracts removed    HYSTERECTOMY      INSERT / REPLACE / REMOVE PACEMAKER  2022    LYMPH NODE BIOPSY  8/29/2021    MASTECTOMY W/ SENTINEL NODE BIOPSY Bilateral 08/26/2021    Procedure: bilateral total mastectomy, left axillary sentinel lymph node biopsy, possible reconstruction.;  Surgeon: Asia Perkins MD;  Location: [facility] MAIN OR;  Service: General;  Laterality: Bilateral;    TUBAL ABDOMINAL LIGATION  1981           Current Outpatient Medications:     Accu-Chek FastClix Lancets misc, USE TO CHECK BLOOD GLUCOSE DAILY, Disp: , Rfl:     Accu-Chek Guide test strip, 1 each by Other route Daily. E11.49, Disp: 100 each, Rfl: 1    ALPRAZolam (XANAX) 1 MG tablet, Take 1 tablet by mouth 2 (Two) Times a Day., Disp: 60 tablet, Rfl: 0    atorvastatin (LIPITOR) 20 MG tablet, Take 1 tablet by mouth Daily., Disp: 90 tablet, Rfl: 1    cetirizine (zyrTEC) 10 MG tablet, Take 1 tablet by mouth Daily As Needed., Disp: , Rfl:     dapagliflozin Propanediol (Farxiga) 10 MG tablet, Take 10 mg by mouth Daily., Disp: 30 tablet, Rfl: 0    dilTIAZem CD (Cardizem CD) 180 MG  24 hr capsule, Take 1 capsule by mouth Daily., Disp: 90 capsule, Rfl: 1    glimepiride (Amaryl) 4 MG tablet, Take 1 tablet by mouth Every Morning Before Breakfast., Disp: 90 tablet, Rfl: 3    hydrALAZINE (APRESOLINE) 50 MG tablet, Take 1 tablet by mouth 3 (Three) Times a Day., Disp: 270 tablet, Rfl: 1    metoprolol succinate XL (TOPROL-XL) 100 MG 24 hr tablet, Take 1 tablet by mouth 2 (Two) Times a Day., Disp: 180 tablet, Rfl: 1    ondansetron (Zofran) 4 MG tablet, Take 1 tablet by mouth Every 8 (Eight) Hours As Needed for Nausea or Vomiting., Disp: 20 tablet, Rfl: 0    pantoprazole (PROTONIX) 40 MG EC tablet, Take 1 tablet by mouth Daily., Disp: 90 tablet, Rfl: 0    sacubitril-valsartan (Entresto) 24-26 MG tablet, Take 1 tablet by mouth 2 (Two) Times a Day., Disp: 180 tablet, Rfl: 0    Triamcinolone Acetonide (NASACORT) 55 MCG/ACT nasal inhaler, APPLY 2 SPRAYS BY NASAL ROUTE DAILY FOR 30 DAYS (Patient taking differently: 2 sprays into the nostril(s) as directed by provider Daily As Needed.), Disp: 10.8 mL, Rfl: 6      Social History     Socioeconomic History    Marital status:    Tobacco Use    Smoking status: Former     Packs/day: 2.00     Years: 30.00     Additional pack years: 0.00     Total pack years: 60.00     Types: Cigarettes     Start date: 1972     Quit date: 2008     Years since quittin.1     Passive exposure: Past    Smokeless tobacco: Never   Vaping Use    Vaping Use: Never used   Substance and Sexual Activity    Alcohol use: No    Drug use: No    Sexual activity: Yes     Partners: Male     Birth control/protection: None, Tubal ligation, Hysterectomy         Review of Systems   Constitutional: Negative for chills and fever.   HENT:  Negative for ear discharge and nosebleeds.    Eyes:  Negative for discharge and redness.   Cardiovascular:  Negative for chest pain, orthopnea, palpitations, paroxysmal nocturnal dyspnea and syncope.   Respiratory:  Negative for cough, shortness of  "breath and wheezing.    Endocrine: Negative for heat intolerance.   Skin:  Negative for rash.   Musculoskeletal:  Negative for arthritis and myalgias.   Gastrointestinal:  Negative for abdominal pain, melena, nausea and vomiting.   Genitourinary:  Negative for dysuria and hematuria.   Neurological:  Negative for dizziness, light-headedness, numbness and tremors.   Psychiatric/Behavioral:  Negative for depression. The patient is not nervous/anxious.        Procedures    Procedures    No orders to display           Objective:    /74 (BP Location: Right arm, Patient Position: Sitting, Cuff Size: Adult)   Pulse 71   Resp 18   Ht 162.6 cm (64.02\")   Wt 78.5 kg (173 lb)   LMP  (LMP Unknown)   SpO2 98%   BMI 29.68 kg/m²         Constitutional:       Appearance: Well-developed.   Eyes:      General: No scleral icterus.        Right eye: No discharge.   HENT:      Head: Normocephalic and atraumatic.   Neck:      Thyroid: No thyromegaly.      Lymphadenopathy: No cervical adenopathy.   Pulmonary:      Effort: Pulmonary effort is normal. No respiratory distress.      Breath sounds: Normal breath sounds. No wheezing. No rales.   Cardiovascular:      Normal rate. Regular rhythm.      No gallop.    Edema:     Peripheral edema absent.   Abdominal:      Tenderness: There is no abdominal tenderness.   Skin:     Findings: No erythema or rash.   Neurological:      Mental Status: Alert and oriented to person, place, and time.             Assessment:       Diagnosis Plan   1. Presence of biventricular  ICD  hydrALAZINE (APRESOLINE) 50 MG tablet      2. Essential hypertension  hydrALAZINE (APRESOLINE) 50 MG tablet      3. Mixed hyperlipidemia  hydrALAZINE (APRESOLINE) 50 MG tablet      4. Type 2 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  hydrALAZINE (APRESOLINE) 50 MG tablet      5. Cardiomyopathy, dilated  hydrALAZINE (APRESOLINE) 50 MG tablet               Plan:       MDM:    1.  S/p ICD:    ICD is " functioning appropriately.  No change in programming.    2.  Hypertension:    Blood pressure is high increase hydralazine to 50 mg every 8    3.  Dilated cardiomyopathy:    Recent echocardiogram showed recovery of ejection fraction.  I am pleased with the patient condition    4.  Hyperlipidemia:    Patient is on Lipitor.  Repeat lipid panel testing

## 2024-02-06 NOTE — PROGRESS NOTES
Nutrition Services    Patient Name: Susy Hanson  YOB: 1960  MRN: [id number]  Date of Service: 02/07/24      ICD-10-CM ICD-9-CM   1. Overweight (BMI 25.0-29.9)  E66.3 278.02   2. Type 2 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  E11.49 250.60   3. Stage 3b chronic kidney disease  N18.32 585.3        NUTRITION ASSESSMENT - BARIATRIC SURGERY      Reason for Visit Diabetes nutrition education     H&P      Past Medical History:   Diagnosis Date    Anxiety 2000    Breast cancer     LEFT BREAST 7/2021    Cataract     Had surgery to remove August 2023    CHF (congestive heart failure)     Colon polyp 12/2023    Removed during colonoscopy    Coronary artery disease 2019    Depression     DM (diabetes mellitus)     History of medical problems 2008    Bladder prolapse repeat Dr. Roper    HL (hearing loss)     Hyperlipidemia     Hypertension     SAW CARDIO/ STRESS TEST 2020 - NOW MEDS MANAGED BY PCP     Irritable bowel syndrome 2006    Peripheral neuropathy     PONV (postoperative nausea and vomiting)     Renal insufficiency 2020    See Dr. funes for moderate kidney failure    Sleep apnea     WEARS CPAP    Urinary tract infection 6/23    UTI seen at [facility]       Past Surgical History:   Procedure Laterality Date    BLADDER REPAIR      BREAST BIOPSY      BREAST RECONSTRUCTION Bilateral 08/26/2021    Procedure: BILATERAL PREPECTORALPLACMENT TISSUE EXPANDERS AND ALLODERM;  Surgeon: Heri Arreaga MD;  Location: [facility];  Service: Plastics;  Laterality: Bilateral;    BREAST TISSUE EXPANDER REMOVAL INSERTION OF IMPLANT Bilateral 12/23/2021    Procedure: BILATERAL REMOVAL TISSUE EXPANDERS AND PLACEMENT OF IMPLANTS;  Surgeon: Heri Arreaga MD;  Location: [facility];  Service: Plastics;  Laterality: Bilateral;    CARDIAC CATHETERIZATION Left 04/13/2022    Procedure: Cardiac Catheterization/Vascular Study;  Surgeon: Christo Murphy MD;  Location: [facility]  INVASIVE LOCATION;  Service: Cardiovascular;  Laterality: Left;    CARDIAC ELECTROPHYSIOLOGY PROCEDURE N/A 09/19/2022    Procedure: ICD implant [employer] aware;  Surgeon: Esteban Kendrick MD;  Location:  [facility];  Service: Cardiology;  Laterality: N/A;    CARDIAC ELECTROPHYSIOLOGY PROCEDURE N/A 09/20/2022    Procedure: lead repositioning;  Surgeon: Esteban Kendrick MD;  Location:  [facility];  Service: Cardiology;  Laterality: N/A;    CARDIAC SURGERY  2022    Defibrillator/pacemaker implant    CARDIOVASCULAR STRESS TEST  2020    COLONOSCOPY  11/2012    EYE SURGERY  7/23    Cataracts removed    HYSTERECTOMY      INSERT / REPLACE / REMOVE PACEMAKER  2022    LYMPH NODE BIOPSY  8/29/2021    MASTECTOMY W/ SENTINEL NODE BIOPSY Bilateral 08/26/2021    Procedure: bilateral total mastectomy, left axillary sentinel lymph node biopsy, possible reconstruction.;  Surgeon: Asia Perkins MD;  Location: [facility] MAIN OR;  Service: General;  Laterality: Bilateral;    TUBAL ABDOMINAL LIGATION  1981        Previous Goals          Encounter Information        Visit Narrative     Patient here for diet education/questions related to chronic diseases. Patient has had T2DM for awhile but has now been having cardiac issues as well as recently dx with stage 3 CKD. Patient reports confusion with all diet recommendations. Patient is trying to limit potassium as well as sodium while also monitoring blood glucoses.     Provided handouts over potassium amounts in foods as well as a carb counting handout with amounts recommended. Recommended patient choose lower potassium foods more often and monitor how often and how many high potassium foods are added. Patient to aim for 45-60 grams of carb at meals as well as 15 grams at snacks. Patient reports she does not usually eat 3 meals/day so encouraged patient to keep carb amounts consistent. Also encouraged patient to drink at least 64oz of water each  "day.     Diet Recall:   Breakfast:  Lunch:  Dinner:  Snacks:  Beverages:    Exercise:     Supplements:    Self Monitoring:          Anthropometrics        Current Height, Weight Height: 162.6 cm (64\")  Weight: 77.7 kg (171 lb 3.2 oz) (02/07/24 0911)            Wt Readings from Last 30 Encounters:   02/07/24 0911 77.7 kg (171 lb 3.2 oz)   02/01/24 0901 77.6 kg (171 lb)   01/30/24 1605 78 kg (171 lb 15.3 oz)   01/26/24 1245 78 kg (172 lb)   01/25/24 0902 78.3 kg (172 lb 9.6 oz)   01/15/24 0844 76.7 kg (169 lb)   01/03/24 1514 77.9 kg (171 lb 12.8 oz)   12/20/23 0920 76.2 kg (168 lb)   11/30/23 1024 78.2 kg (172 lb 4.8 oz)   09/14/23 1256 73 kg (161 lb)   08/02/23 1438 73.5 kg (162 lb)   08/01/23 0940 78.5 kg (173 lb)   07/25/23 1013 72.6 kg (160 lb)   07/10/23 0819 75.3 kg (166 lb)   03/14/23 1218 69.9 kg (154 lb 3.2 oz)   11/28/22 1224 63 kg (139 lb)   11/10/22 1510 63 kg (139 lb)   11/03/22 1345 62.4 kg (137 lb 9.6 oz)   10/24/22 1408 61.2 kg (135 lb)   10/07/22 1332 69.9 kg (154 lb)   10/07/22 0408 70.3 kg (154 lb 15.7 oz)   10/04/22 1508 67.6 kg (149 lb)   10/03/22 2316 68 kg (149 lb 14.6 oz)   09/23/22 1809 63.5 kg (140 lb)   09/19/22 0921 63.3 kg (139 lb 8.8 oz)   08/23/22 0848 66.2 kg (146 lb)   08/02/22 1030 66.7 kg (147 lb)   08/01/22 1014 68 kg (150 lb)   07/28/22 0959 67.1 kg (148 lb)   07/27/22 0921 67.1 kg (148 lb)   07/26/22 0958 66.7 kg (147 lb)   05/28/22 1119 70.8 kg (156 lb)   04/28/22 1526 71.7 kg (158 lb)      BMI kg/m2 Body mass index is 29.39 kg/m².       Nutrition Diagnosis         Nutrition Dx Statement Food and nutrition related knowledge deficit RT lack of prior nutrition related education AEB new dx of CKD 3 with existing dx of T2DM and HTN         Nutrition Intervention         Nutrition Intervention Nutrition education related to diet modification and physical activity        Monitor/Evaluation        New Goals Patient to monitor sodium, potassium and carbs in diet and aim for recommended " amounts  Patient to drink 64oz of fluids each day       Total time spent with pt 30 minutes of which 30 minutes were spent on education.       Electronically signed by:  Brooke Valenzuela RD  02/07/24 10:29 EST

## 2024-02-07 ENCOUNTER — OFFICE VISIT (OUTPATIENT)
Dept: BARIATRICS/WEIGHT MGMT | Facility: CLINIC | Age: 64
End: 2024-02-07
Payer: MEDICARE

## 2024-02-07 ENCOUNTER — OFFICE VISIT (OUTPATIENT)
Dept: CARDIOLOGY | Facility: CLINIC | Age: 64
End: 2024-02-07
Payer: MEDICARE

## 2024-02-07 ENCOUNTER — CLINICAL SUPPORT NO REQUIREMENTS (OUTPATIENT)
Dept: CARDIOLOGY | Facility: CLINIC | Age: 64
End: 2024-02-07
Payer: MEDICARE

## 2024-02-07 VITALS
OXYGEN SATURATION: 98 % | WEIGHT: 173 LBS | BODY MASS INDEX: 29.53 KG/M2 | SYSTOLIC BLOOD PRESSURE: 153 MMHG | HEART RATE: 71 BPM | RESPIRATION RATE: 18 BRPM | HEIGHT: 64 IN | DIASTOLIC BLOOD PRESSURE: 74 MMHG

## 2024-02-07 VITALS — BODY MASS INDEX: 29.23 KG/M2 | WEIGHT: 171.2 LBS | HEIGHT: 64 IN

## 2024-02-07 DIAGNOSIS — I10 ESSENTIAL HYPERTENSION: ICD-10-CM

## 2024-02-07 DIAGNOSIS — I42.0 DILATED CARDIOMYOPATHY: Primary | ICD-10-CM

## 2024-02-07 DIAGNOSIS — Z95.810 PRESENCE OF BIVENTRICULAR IMPLANTABLE CARDIOVERTER-DEFIBRILLATOR (ICD): Primary | Chronic | ICD-10-CM

## 2024-02-07 DIAGNOSIS — Z95.810 PRESENCE OF BIVENTRICULAR IMPLANTABLE CARDIOVERTER-DEFIBRILLATOR (ICD): Chronic | ICD-10-CM

## 2024-02-07 DIAGNOSIS — I42.0 CARDIOMYOPATHY, DILATED: ICD-10-CM

## 2024-02-07 DIAGNOSIS — E11.49 TYPE 2 DIABETES MELLITUS WITH OTHER DIABETIC NEUROLOGICAL COMPLICATION: ICD-10-CM

## 2024-02-07 DIAGNOSIS — N18.32 STAGE 3B CHRONIC KIDNEY DISEASE: ICD-10-CM

## 2024-02-07 DIAGNOSIS — E78.2 MIXED HYPERLIPIDEMIA: ICD-10-CM

## 2024-02-07 DIAGNOSIS — E11.49 TYPE 2 DIABETES MELLITUS WITH OTHER DIABETIC NEUROLOGICAL COMPLICATION: Primary | ICD-10-CM

## 2024-02-07 RX ORDER — HYDRALAZINE HYDROCHLORIDE 50 MG/1
50 TABLET, FILM COATED ORAL 3 TIMES DAILY
Qty: 270 TABLET | Refills: 1 | Status: SHIPPED | OUTPATIENT
Start: 2024-02-07

## 2024-02-08 NOTE — PROGRESS NOTES
DEVICE INTERROGATION:  IN OFFICE    DEVICE TYPE:   Biventricular ICD    :   [facility]    BATTERY:  Stable    TIME TO ELECTIVE REPLACEMENT INDICATORS:   6.8 years    CHARGE TIME:   9.4 seconds        LEAD DATA:       DEVICE REPROGRAMMED FOR TESTING      Atrial:   4.7 mV, 400 ohms, 0.625 V@0.5 ms    Ventricular:     Not applicable mV, 540 ohms, not applicable V@not applicable ms    LV: 1.0 V at 1.0 ms, 958 ohms      Pacemaker dependent:   No      No VT or VF episodes detected      Summary:    Stable Device Function    No significant arhythmia burden.     Battery status is stable.    Reviewed with: Dr. Murphy      NEXT IN OFFICE DEVICE CHECK DUE: At next visit    REMOTE DEVICE INTERROGATIONS: Ongoing

## 2024-02-09 ENCOUNTER — OFFICE VISIT (OUTPATIENT)
Dept: ENDOCRINOLOGY | Facility: CLINIC | Age: 64
End: 2024-02-09
Payer: MEDICARE

## 2024-02-09 ENCOUNTER — TELEPHONE (OUTPATIENT)
Dept: ENDOCRINOLOGY | Facility: CLINIC | Age: 64
End: 2024-02-09
Payer: MEDICARE

## 2024-02-09 ENCOUNTER — SPECIALTY PHARMACY (OUTPATIENT)
Dept: ENDOCRINOLOGY | Facility: CLINIC | Age: 64
End: 2024-02-09
Payer: MEDICARE

## 2024-02-09 VITALS
HEIGHT: 64 IN | BODY MASS INDEX: 29.71 KG/M2 | HEART RATE: 81 BPM | SYSTOLIC BLOOD PRESSURE: 130 MMHG | DIASTOLIC BLOOD PRESSURE: 78 MMHG | OXYGEN SATURATION: 99 % | WEIGHT: 174 LBS

## 2024-02-09 DIAGNOSIS — E66.3 OVERWEIGHT: ICD-10-CM

## 2024-02-09 DIAGNOSIS — Z79.4 TYPE 2 DIABETES MELLITUS WITH HYPERGLYCEMIA, WITH LONG-TERM CURRENT USE OF INSULIN: Primary | ICD-10-CM

## 2024-02-09 DIAGNOSIS — E11.42 DIABETIC PERIPHERAL NEUROPATHY: ICD-10-CM

## 2024-02-09 DIAGNOSIS — E11.21 DIABETIC NEPHROPATHY ASSOCIATED WITH TYPE 2 DIABETES MELLITUS: ICD-10-CM

## 2024-02-09 DIAGNOSIS — E11.65 TYPE 2 DIABETES MELLITUS WITH HYPERGLYCEMIA, WITH LONG-TERM CURRENT USE OF INSULIN: Primary | ICD-10-CM

## 2024-02-09 PROBLEM — I27.21 PAH (PULMONARY ARTERY HYPERTENSION): Chronic | Status: ACTIVE | Noted: 2024-02-09

## 2024-02-09 RX ORDER — INSULIN GLARGINE 100 [IU]/ML
10 INJECTION, SOLUTION SUBCUTANEOUS NIGHTLY
Qty: 15 ML | Refills: 1 | Status: SHIPPED | OUTPATIENT
Start: 2024-02-09

## 2024-02-09 RX ORDER — BLOOD-GLUCOSE,RECEIVER,CONT
1 EACH MISCELLANEOUS
Qty: 1 EACH | Refills: 0 | Status: SHIPPED | OUTPATIENT
Start: 2024-02-09

## 2024-02-09 RX ORDER — BLOOD-GLUCOSE,RECEIVER,CONT
1 EACH MISCELLANEOUS
Qty: 1 EACH | Refills: 0 | Status: SHIPPED | OUTPATIENT
Start: 2024-02-09 | End: 2024-02-09 | Stop reason: SDUPTHER

## 2024-02-09 RX ORDER — BLOOD-GLUCOSE SENSOR
1 EACH MISCELLANEOUS
Qty: 2 EACH | Refills: 5 | Status: SHIPPED | OUTPATIENT
Start: 2024-02-09 | End: 2024-02-09 | Stop reason: SDUPTHER

## 2024-02-09 RX ORDER — BLOOD-GLUCOSE SENSOR
1 EACH MISCELLANEOUS
Qty: 2 EACH | Refills: 5 | Status: SHIPPED | OUTPATIENT
Start: 2024-02-09

## 2024-02-09 RX ORDER — PEN NEEDLE, DIABETIC 30 GX3/16"
1 NEEDLE, DISPOSABLE MISCELLANEOUS
Qty: 200 EACH | Refills: 5 | Status: SHIPPED | OUTPATIENT
Start: 2024-02-09

## 2024-02-09 NOTE — PROGRESS NOTES
Specialty Pharmacy Patient Management Program  Endocrinology Initial Assessment     Susy Hanson was referred by an Endocrinology provider to the Endocrinology Patient Management program offered by [facility] for Type 2 Diabetes on 02/09/24.  An initial outreach was conducted, including assessment of therapy appropriateness and specialty medication education for Farxiga and FreeStyle Benton 3 sensors. The patient was introduced to services offered by [facility], including: regular assessments, refill coordination, curbside pick-up or mail order delivery options, prior authorization maintenance, and financial assistance programs as applicable. The patient was also provided with contact information for the pharmacy team.     Insurance Coverage & Financial Support  Benefits Investigation Summary    Prescription: New Therapy    Dispensing pharmacy: [facility]    Copay amount: $0/84 days FreeStyle Benton 3    PLAN: [employer]  BIN: [id number]  PCN: [id number]  RX GROUP: [id number]    Relevant Past Medical History and Comorbidities  Relevant medical history and concomitant health conditions were discussed with the patient. The patient's chart has been reviewed for relevant past medical history and comorbid conditions and updated as necessary.  Past Medical History:   Diagnosis Date    Anxiety 2000    Breast cancer     LEFT BREAST 7/2021    Cataract     Had surgery to remove August 2023    CHF (congestive heart failure)     Colon polyp 12/2023    Removed during colonoscopy    Coronary artery disease 2019    Depression     DM (diabetes mellitus)     History of medical problems 2008    Bladder prolapse repeat Dr. Roper    HL (hearing loss)     Hyperlipidemia     Hypertension     SAW CARDIO/ STRESS TEST 2020 - NOW MEDS MANAGED BY PCP     Irritable bowel syndrome 2006    Peripheral neuropathy     PONV (postoperative nausea and vomiting)     Renal insufficiency 2020    See Dr. funes for  moderate kidney failure    Sleep apnea     WEARS CPAP    Type 2 diabetes mellitus     Urinary tract infection     UTI seen at [facility]     Social History     Socioeconomic History    Marital status:    Tobacco Use    Smoking status: Former     Packs/day: 2.00     Years: 30.00     Additional pack years: 0.00     Total pack years: 60.00     Types: Cigarettes     Start date: 1972     Quit date: 2008     Years since quittin.1     Passive exposure: Past    Smokeless tobacco: Never   Vaping Use    Vaping Use: Never used   Substance and Sexual Activity    Alcohol use: No    Drug use: No    Sexual activity: Yes     Partners: Male     Birth control/protection: None, Tubal ligation, Hysterectomy       Problem list reviewed by Eliane Son PharmD on 2024 at  2:25 PM    Allergies  Known allergies and reactions were discussed with the patient. The patient's chart has been reviewed for  allergy information and updated as necessary.   Allergies   Allergen Reactions    Hydrocodone-Acetaminophen Hives     Lortab, takes tylenol       Allergies reviewed by Eliane Son PharmD on 2024 at  2:25 PM    Relevant Laboratory Values  Relevant laboratory values were discussed with the patient. The following specialty medication dose adjustment(s) are recommended: No dosage adjustments currently recommended  A1C Last 3 Results          2023    10:06 2023    10:46 2024    09:10   HGBA1C Last 3 Results   Hemoglobin A1C 7.20  7.10  7.90      Lab Results   Component Value Date    HGBA1C 7.90 (H) 2024     Lab Results   Component Value Date    GLUCOSE 209 (H) 2024    CALCIUM 9.8 2024     2024    K 5.4 (H) 2024    CO2 26.0 2024     2024    BUN 28 (H) 2024    CREATININE 1.38 (H) 2024    EGFRIFAFRI >60 10/13/2017    EGFRIFNONA 57 (L) 2022    BCR 20.3 2024    ANIONGAP 11.0 2024     Lab Results   Component  Value Date    CHOL 140 03/14/2023    TRIG 153 (H) 03/14/2023    HDL 50 03/14/2023    LDL 64 03/14/2023     Microalbumin          3/14/2023    13:02   Microalbumin   Microalbumin, Urine 40.5        Current Medication List  This medication list has been reviewed with the patient and evaluated for any interactions or necessary modifications/recommendations, and updated to include all prescription medications, OTC medications, and supplements the patient is currently taking.  This list reflects what is contained in the patient's profile, which has also been marked as reviewed to communicate to other providers it is the most up to date version of the patient's current medication therapy.     Current Outpatient Medications:     Accu-Chek FastClix Lancets misc, USE TO CHECK BLOOD GLUCOSE DAILY, Disp: , Rfl:     Accu-Chek Guide test strip, 1 each by Other route Daily. E11.49, Disp: 100 each, Rfl: 1    ALPRAZolam (XANAX) 1 MG tablet, Take 1 tablet by mouth 2 (Two) Times a Day., Disp: 60 tablet, Rfl: 0    atorvastatin (LIPITOR) 20 MG tablet, Take 1 tablet by mouth Daily., Disp: 90 tablet, Rfl: 1    cetirizine (zyrTEC) 10 MG tablet, Take 1 tablet by mouth Daily As Needed., Disp: , Rfl:     Continuous Blood Gluc  (FreeStyle Benton 3 [place]) device, Use 1 each 4 (Four) Times a Day Before Meals & at Bedtime., Disp: 1 each, Rfl: 0    Continuous Blood Gluc Sensor (FreeStyle Benton 3 Sensor) misc, Use 1 each Every 14 (Fourteen) Days., Disp: 2 each, Rfl: 5    dapagliflozin Propanediol (Farxiga) 10 MG tablet, Take 10 mg by mouth Daily., Disp: 90 tablet, Rfl: 3    dilTIAZem CD (Cardizem CD) 180 MG 24 hr capsule, Take 1 capsule by mouth Daily., Disp: 90 capsule, Rfl: 1    glimepiride (Amaryl) 4 MG tablet, Take 1 tablet by mouth Every Morning Before Breakfast., Disp: 90 tablet, Rfl: 3    hydrALAZINE (APRESOLINE) 50 MG tablet, Take 1 tablet by mouth 3 (Three) Times a Day., Disp: 270 tablet, Rfl: 1    Insulin Glargine (Lantus  SoloStar) 100 UNIT/ML injection pen, Inject 10 Units under the skin into the appropriate area as directed Every Night., Disp: 15 mL, Rfl: 1    Insulin Pen Needle (Pen Needles) 32G X 4 MM misc, Use 1 each 4 (Four) Times a Day Before Meals & at Bedtime., Disp: 200 each, Rfl: 5    metoprolol succinate XL (TOPROL-XL) 100 MG 24 hr tablet, Take 1 tablet by mouth 2 (Two) Times a Day., Disp: 180 tablet, Rfl: 1    ondansetron (Zofran) 4 MG tablet, Take 1 tablet by mouth Every 8 (Eight) Hours As Needed for Nausea or Vomiting., Disp: 20 tablet, Rfl: 0    pantoprazole (PROTONIX) 40 MG EC tablet, Take 1 tablet by mouth Daily., Disp: 90 tablet, Rfl: 0    sacubitril-valsartan (Entresto) 24-26 MG tablet, Take 1 tablet by mouth 2 (Two) Times a Day., Disp: 180 tablet, Rfl: 0    Triamcinolone Acetonide (NASACORT) 55 MCG/ACT nasal inhaler, APPLY 2 SPRAYS BY NASAL ROUTE DAILY FOR 30 DAYS (Patient taking differently: 2 sprays into the nostril(s) as directed by provider Daily As Needed.), Disp: 10.8 mL, Rfl: 6  No current facility-administered medications for this visit.    Medicines reviewed by Eliane Son, PharmD on 2/9/2024 at  2:25 PM    Drug Interactions  No significant DDIs   Recommended Medications Assessment  Aspirin: Not Taking Currently  Statin: Currently Taking   ACEi/ARB: Currently Taking     Adherence and Self-Administration  Adherence related to the patient's specialty therapy was discussed with the patient. The Adherence segment of this outreach has been reviewed and updated.     Is there a concern with patient's ability to self administer the medication correctly and without issue?: No  Were any potential barriers to adherence identified during the initial assessment or patient education?: No  Are there any concerns regarding the patient's understanding of the importance of medication adherence?: No  Methods for Supporting Patient Adherence and/or Self-Administration: N/A    Open Medication Therapy Problems  No  medication therapy recommendations to display    Goals of Therapy  Goals related to the patient's specialty therapy were discussed with the patient. The Patient Goals segment of this outreach has been reviewed and updated.   Goals Addressed Today        Specialty Pharmacy General Goal      A1c < 7%    Lab Results   Component Value Date    HGBA1C 7.90 (H) 01/25/2024    HGBA1C 7.10 (H) 12/28/2023    HGBA1C 7.20 (H) 12/20/2023    HGBA1C 6.30 (H) 07/03/2023    HGBA1C 5.60 03/14/2023               Reassessment Plan & Follow-Up  1. Medication Therapy Changes: Pt to start utilizing FreeStyle Benton 3 continuous glucose monitor. Pt also on Farxiga 10mg once daily and is working with heart failure clinic to get approved for patient assistance, as current copay is unmanageable for patient. Will coordinate with their office to ensure patient successfully enrolled in Farxiga patient assistance. Pt to initiate Lantus 10 units nightly.   2. Related Plans, Therapy Recommendations, or Therapy Problems to Be Addressed: None  3. Pharmacist to perform regular assessments no more than (6) months from the previous assessment.  4. Care Coordinator to set up future refill outreaches, coordinate prescription delivery, and escalate clinical questions to pharmacist.  5. Welcome information and patient satisfaction survey to be sent by specialty pharmacy team with patient's initial fill.    Attestation  Therapeutic appropriateness: Appropriate   I attest the patient was actively involved in and has agreed to the above plan of care. If the prescribed therapy is at any point deemed not appropriate based on the current or future assessments, a consultation will be initiated with the patient's specialty care provider to determine the best course of action. The revised plan of therapy will be documented along with any required assessments and/or additional patient education provided.     Eliane Son, PharmD, BCPS, BCCCP  Clinical Specialty  Pharmacist, Endocrinology  2/9/2024  14:28 EST

## 2024-02-09 NOTE — PATIENT INSTRUCTIONS
Please,    - Continue Farxiga and glimepiride therapy without any changes.  - Start taking insulin Lantus 10 units every night.    - Please check your blood sugar once a day in the morning and maintain logs.  I have also ordered continuous glucose monitoring system for you, if covered by the insurance you can start checking blood sugar through continuous glucose monitoring and do not have to do fingersticks.    You will see pharmacist today to help you with your medication coverage.    Follow-up in my clinic back again in 3 months time with repeat A1c.    Thank you for your visit today.    If you have any questions or concerns please feel free to reach out of the office.

## 2024-02-09 NOTE — TELEPHONE ENCOUNTER
Specialty Pharmacy Patient Management Program       Susy Hanson is a 63 y.o. female seen by an Endocrinology provider for Type 2 Diabetes and enrolled in the Endocrinology Patient Management program offered by [facility].      Requested Prescriptions     Signed Prescriptions Disp Refills    Continuous Blood Gluc  (FreeStyle Benton 3 [place]) device 1 each 0     Sig: Use 1 each 4 (Four) Times a Day Before Meals & at Bedtime.     Authorizing Provider: TUSHAR HELTON     Ordering User: ELIANE SON    Continuous Blood Gluc Sensor (FreeStyle Benton 3 Sensor) misc 2 each 5     Sig: Use 1 each Every 14 (Fourteen) Days.     Authorizing Provider: TUSHAR HELTON     Ordering User: ELIANE SON       Refill sent in to patient's pharmacy for above medication prescribed by Dr. Helton.   Last office visit 2/9/24.  Next visit scheduled 5/6/24.    Eliane Son, PharmD, BCPS, BCCCP  Clinical Specialty Pharmacist, Endocrinology  2/9/2024  11:14 EST

## 2024-02-09 NOTE — PROGRESS NOTES
-----------------------------------------------------------------  ENDOCRINE CLINIC NOTE  -----------------------------------------------------------------        PATIENT NAME: Susy Hanson  PATIENT : 1960 AGE: 63 y.o.  MRN NUMBER: [id number]  PRIMARY CARE: Erica Avila MD    ==========================================================================    CHIEF COMPLAINT: Type 2 diabetes management.  DATE OF SERVICE: 24    ==========================================================================    HPI / SUBJECTIVE    63 y.o. female is seen in the clinic today for type 2 diabetes mellitus.    -Diagnosis Date:   -Last known A1c: 7.9% on 2024    -Current Therapy / Medications:  Farxiga 10 once a day  Amaryl 4 once a day    -Past tried medications: (Not currently using)  Metformin - GI side-effects  Ozempic - hx of pancreatitis    -Home BG logs / monitoring: Checking every morning    -Hypoglycemia: None    -Meals / Dietary Habits:  Small multiple meals    -Last eye exam: Following ophthalmology at Dr. Jones's office  -Neuropathy: +ve and not on meds  -Nephropathy: CKD III    -Hx of dilated cardiomyopathy.    ==========================================================================                                                PAST MEDICAL HISTORY    Past Medical History:   Diagnosis Date    Anxiety     Breast cancer     LEFT BREAST 2021    Cataract     Had surgery to remove 2023    CHF (congestive heart failure)     Colon polyp 2023    Removed during colonoscopy    Coronary artery disease     Depression     DM (diabetes mellitus)     History of medical problems 2008    Bladder prolapse repeat Dr. Roper    HL (hearing loss)     Hyperlipidemia     Hypertension     SAW CARDIO/ STRESS TEST  - NOW MEDS MANAGED BY PCP     Irritable bowel syndrome 2006    Peripheral neuropathy     PONV (postoperative nausea and vomiting)     Renal insufficiency      See Dr. funes for moderate kidney failure    Sleep apnea     WEARS CPAP    Type 2 diabetes mellitus 2008    Urinary tract infection 6/23    UTI seen at [facility]       ==========================================================================    PAST SURGICAL HISTORY    Past Surgical History:   Procedure Laterality Date    BLADDER REPAIR      BREAST BIOPSY      BREAST RECONSTRUCTION Bilateral 08/26/2021    Procedure: BILATERAL PREPECTORALPLACMENT TISSUE EXPANDERS AND ALLODERM;  Surgeon: Heri Arreaga MD;  Location:  [facility];  Service: Plastics;  Laterality: Bilateral;    BREAST TISSUE EXPANDER REMOVAL INSERTION OF IMPLANT Bilateral 12/23/2021    Procedure: BILATERAL REMOVAL TISSUE EXPANDERS AND PLACEMENT OF IMPLANTS;  Surgeon: Heri Arreaga MD;  Location:  [facility];  Service: Plastics;  Laterality: Bilateral;    CARDIAC CATHETERIZATION Left 04/13/2022    Procedure: Cardiac Catheterization/Vascular Study;  Surgeon: Christo Murphy MD;  Location:  [facility];  Service: Cardiovascular;  Laterality: Left;    CARDIAC ELECTROPHYSIOLOGY PROCEDURE N/A 09/19/2022    Procedure: ICD implant [place] aware;  Surgeon: Esteban Kendrick MD;  Location:  [facility];  Service: Cardiology;  Laterality: N/A;    CARDIAC ELECTROPHYSIOLOGY PROCEDURE N/A 09/20/2022    Procedure: lead repositioning;  Surgeon: Esteban Kendrick MD;  Location:  [facility];  Service: Cardiology;  Laterality: N/A;    CARDIAC SURGERY  2022    Defibrillator/pacemaker implant    CARDIOVASCULAR STRESS TEST  2020    COLONOSCOPY  11/2012    EYE SURGERY  7/23    Cataracts removed    HYSTERECTOMY      INSERT / REPLACE / REMOVE PACEMAKER  2022    LYMPH NODE BIOPSY  8/29/2021    MASTECTOMY W/ SENTINEL NODE BIOPSY Bilateral 08/26/2021    Procedure: bilateral total mastectomy, left axillary sentinel lymph node biopsy, possible reconstruction.;  Surgeon: Asia Perkins MD;   Location: [facility];  Service: General;  Laterality: Bilateral;    TUBAL ABDOMINAL LIGATION  1981       ==========================================================================    FAMILY HISTORY    Family History   Problem Relation Age of Onset    Other Mother     Diabetes Mother     Heart disease Mother     Sleep apnea Mother     Snoring Mother     COPD Mother     Hyperlipidemia Mother     Alcohol abuse Mother     Anxiety disorder Mother     Depression Mother     Hypertension Mother     Alzheimer's disease Father     Diabetes Father     Heart disease Father     Other Father     Sleep apnea Father     Snoring Father     Hyperlipidemia Father     Heart failure Father     Kidney disease Father     Hypertension Father     Obesity Father     Diabetes Sister     Heart disease Sister     Other Sister     Sleep apnea Sister     Snoring Sister     COPD Sister     Anxiety disorder Sister     Depression Sister     Hypertension Sister     Hyperlipidemia Sister     Clotting disorder Sister     Hyperlipidemia Brother     Hypertension Brother     Breast cancer Maternal Aunt 80    Breast cancer Paternal Aunt     Diabetes Maternal Grandmother     Breast cancer Paternal Grandmother 60    Cancer Paternal Grandmother     Diabetes Paternal Grandmother     Hyperlipidemia Son     Diabetes Brother     Hypertension Brother     Malig Hyperthermia Neg Hx        ==========================================================================    SOCIAL HISTORY    Social History     Socioeconomic History    Marital status:    Tobacco Use    Smoking status: Former     Packs/day: 2.00     Years: 30.00     Additional pack years: 0.00     Total pack years: 60.00     Types: Cigarettes     Start date: 1972     Quit date: 2008     Years since quittin.1     Passive exposure: Past    Smokeless tobacco: Never   Vaping Use    Vaping Use: Never used   Substance and Sexual Activity    Alcohol use: No    Drug use: No    Sexual activity:  Yes     Partners: Male     Birth control/protection: None, Tubal ligation, Hysterectomy       ==========================================================================    MEDICATIONS      Current Outpatient Medications:     Accu-Chek FastClix Lancets misc, USE TO CHECK BLOOD GLUCOSE DAILY, Disp: , Rfl:     Accu-Chek Guide test strip, 1 each by Other route Daily. E11.49, Disp: 100 each, Rfl: 1    ALPRAZolam (XANAX) 1 MG tablet, Take 1 tablet by mouth 2 (Two) Times a Day., Disp: 60 tablet, Rfl: 0    atorvastatin (LIPITOR) 20 MG tablet, Take 1 tablet by mouth Daily., Disp: 90 tablet, Rfl: 1    cetirizine (zyrTEC) 10 MG tablet, Take 1 tablet by mouth Daily As Needed., Disp: , Rfl:     dapagliflozin Propanediol (Farxiga) 10 MG tablet, Take 10 mg by mouth Daily., Disp: 90 tablet, Rfl: 3    dilTIAZem CD (Cardizem CD) 180 MG 24 hr capsule, Take 1 capsule by mouth Daily., Disp: 90 capsule, Rfl: 1    glimepiride (Amaryl) 4 MG tablet, Take 1 tablet by mouth Every Morning Before Breakfast., Disp: 90 tablet, Rfl: 3    hydrALAZINE (APRESOLINE) 50 MG tablet, Take 1 tablet by mouth 3 (Three) Times a Day., Disp: 270 tablet, Rfl: 1    metoprolol succinate XL (TOPROL-XL) 100 MG 24 hr tablet, Take 1 tablet by mouth 2 (Two) Times a Day., Disp: 180 tablet, Rfl: 1    ondansetron (Zofran) 4 MG tablet, Take 1 tablet by mouth Every 8 (Eight) Hours As Needed for Nausea or Vomiting., Disp: 20 tablet, Rfl: 0    pantoprazole (PROTONIX) 40 MG EC tablet, Take 1 tablet by mouth Daily., Disp: 90 tablet, Rfl: 0    sacubitril-valsartan (Entresto) 24-26 MG tablet, Take 1 tablet by mouth 2 (Two) Times a Day., Disp: 180 tablet, Rfl: 0    Triamcinolone Acetonide (NASACORT) 55 MCG/ACT nasal inhaler, APPLY 2 SPRAYS BY NASAL ROUTE DAILY FOR 30 DAYS (Patient taking differently: 2 sprays into the nostril(s) as directed by provider Daily As Needed.), Disp: 10.8 mL, Rfl: 6    Continuous Blood Gluc  (FreeStyle Benton 3 [place]) device, Use 1 each 4 (Four)  Times a Day Before Meals & at Bedtime., Disp: 1 each, Rfl: 0    Continuous Blood Gluc Sensor (FreeStyle Benton 3 Sensor) misc, Use 1 each Every 14 (Fourteen) Days., Disp: 2 each, Rfl: 5    Insulin Glargine (Lantus SoloStar) 100 UNIT/ML injection pen, Inject 10 Units under the skin into the appropriate area as directed Every Night., Disp: 15 mL, Rfl: 1    Insulin Pen Needle (Pen Needles) 32G X 4 MM misc, Use 1 each 4 (Four) Times a Day Before Meals & at Bedtime., Disp: 200 each, Rfl: 5    ==========================================================================    ALLERGIES    Allergies   Allergen Reactions    Hydrocodone-Acetaminophen Hives     Lortab, takes tylenol       ==========================================================================    OBJECTIVE    Vitals:    02/09/24 0846   BP: 130/78   Pulse: 81   SpO2: 99%     Body mass index is 29.85 kg/m².     General: Alert, cooperative, no acute distress  Thyroid:  no enlargement/tenderness/palpable nodules  Lungs: Clear to auscultation bilaterally, respirations unlabored  Heart: Regular rate and rhythm, S1 and S2 normal, no murmur, rub or gallop  Abdomen: Soft, NT, ND and Bowel sounds Positive  Extremities:  Extremities normal, atraumatic, no cyanosis or edema    ==========================================================================    LAB EVALUATION    Lab Results   Component Value Date    GLUCOSE 209 (H) 01/29/2024    BUN 28 (H) 01/29/2024    CREATININE 1.38 (H) 01/29/2024    EGFRIFNONA 57 (L) 01/26/2022    EGFRIFAFRI >60 10/13/2017    BCR 20.3 01/29/2024    K 5.4 (H) 01/29/2024    CO2 26.0 01/29/2024    CALCIUM 9.8 01/29/2024    ALBUMIN 4.9 12/28/2023    AST 14 12/28/2023    ALT 23 12/28/2023    CHOL 140 03/14/2023    TRIG 153 (H) 03/14/2023    HDL 50 03/14/2023    LDL 64 03/14/2023     Lab Results   Component Value Date    HGBA1C 7.90 (H) 01/25/2024    HGBA1C 7.10 (H) 12/28/2023    HGBA1C 7.20 (H) 12/20/2023     Lab Results   Component Value Date     "MICROALBUR 40.5 03/14/2023    CREATININE 1.38 (H) 01/29/2024     Lab Results   Component Value Date    TSH 1.500 01/25/2024       ==========================================================================    ASSESSMENT AND PLAN    # Type 2 diabetes with hyperglycemia  # Diabetic peripheral neuropathy  # Diabetic nephropathy with CKD stage IIIa, currently following nephrology in the clinic  # Overweight    - Patient have last A1c of 7.9%  - Currently maintained on glimepiride therapy along with Farxiga  - Previous history of pancreatitis therefore not a good candidate for DPP 4 inhibitor therapy or GLP-1 receptor agonist  - Patient will require insulin therapy, counseled patient in detail and plan to start patient on basal insulin therapy  - Patient to continue checking blood sugar once a day in the morning, she will benefit from continuous glucose monitoring system as well, ordered, pending insurance coverage  - New adjusted therapy:  Farxiga 10 mg p.o. daily  Glimepiride 4 mg once a day  Lantus 10 units nightly  - Patient to follow-up in my clinic back again in 3 months time      Thank you for courtesy of consultation.    Return to clinic: 3 months    Entire assessment and plan was discussed and counseled the patient in detail to which patient verbalized understanding and agreed with care.  Answered all queries and concerns.    This note was created using voice recognition software and is inherently subject to errors including those of syntax and \"sound-alike\" substitutions which may escape proofreading.  In such instances, original meaning may be extrapolated by contextual derivation.    Note: Portions of this note may have been copied from previous notes but documentation have been reviewed and edited as necessary to support clinical decision making for today's visit.    ==========================================================================    INFORMATION PROVIDED TO PATIENT    Patient Instructions "   Please,    - Continue Farxiga and glimepiride therapy without any changes.  - Start taking insulin Lantus 10 units every night.    - Please check your blood sugar once a day in the morning and maintain logs.  I have also ordered continuous glucose monitoring system for you, if covered by the insurance you can start checking blood sugar through continuous glucose monitoring and do not have to do fingersticks.    You will see pharmacist today to help you with your medication coverage.    Follow-up in my clinic back again in 3 months time with repeat A1c.    Thank you for your visit today.    If you have any questions or concerns please feel free to reach out of the office.       ==========================================================================  Shin Helton MD  Department of Endocrine, Diabetes and Metabolism  [place], [place]  ==========================================================================

## 2024-02-11 DIAGNOSIS — Z95.810 PRESENCE OF BIVENTRICULAR IMPLANTABLE CARDIOVERTER-DEFIBRILLATOR (ICD): ICD-10-CM

## 2024-02-11 DIAGNOSIS — I42.0 DILATED CARDIOMYOPATHY: ICD-10-CM

## 2024-02-11 DIAGNOSIS — I10 ESSENTIAL HYPERTENSION: ICD-10-CM

## 2024-02-11 DIAGNOSIS — E78.2 MIXED HYPERLIPIDEMIA: ICD-10-CM

## 2024-02-11 DIAGNOSIS — E11.49 TYPE 2 DIABETES MELLITUS WITH OTHER DIABETIC NEUROLOGICAL COMPLICATION: ICD-10-CM

## 2024-02-12 ENCOUNTER — HOSPITAL ENCOUNTER (OUTPATIENT)
Facility: HOSPITAL | Age: 64
Discharge: HOME | End: 2024-02-12
Payer: MEDICARE

## 2024-02-12 ENCOUNTER — DISEASE STATE MANAGEMENT VISIT (OUTPATIENT)
Facility: HOSPITAL | Age: 64
End: 2024-02-12
Payer: MEDICARE

## 2024-02-12 ENCOUNTER — HOSPITAL ENCOUNTER (OUTPATIENT)
Dept: CARDIOLOGY | Facility: HOSPITAL | Age: 64
Discharge: HOME | End: 2024-02-12
Payer: MEDICARE

## 2024-02-12 VITALS
BODY MASS INDEX: 29.92 KG/M2 | RESPIRATION RATE: 16 BRPM | HEART RATE: 70 BPM | DIASTOLIC BLOOD PRESSURE: 88 MMHG | OXYGEN SATURATION: 99 % | WEIGHT: 174.4 LBS | SYSTOLIC BLOOD PRESSURE: 160 MMHG

## 2024-02-12 DIAGNOSIS — K58.9 IRRITABLE BOWEL SYNDROME, UNSPECIFIED TYPE: ICD-10-CM

## 2024-02-12 DIAGNOSIS — E88.810 METABOLIC SYNDROME X: Chronic | ICD-10-CM

## 2024-02-12 DIAGNOSIS — R06.09 DYSPNEA ON EXERTION: ICD-10-CM

## 2024-02-12 DIAGNOSIS — N18.32 STAGE 3B CHRONIC KIDNEY DISEASE: ICD-10-CM

## 2024-02-12 DIAGNOSIS — I50.1 LEFT VENTRICULAR FAILURE, UNSPECIFIED: Chronic | ICD-10-CM

## 2024-02-12 DIAGNOSIS — D05.12 BREAST NEOPLASM, TIS (DCIS), LEFT: ICD-10-CM

## 2024-02-12 DIAGNOSIS — F41.8 MIXED ANXIETY DEPRESSIVE DISORDER: ICD-10-CM

## 2024-02-12 DIAGNOSIS — E87.5 HYPERKALEMIA: Primary | ICD-10-CM

## 2024-02-12 DIAGNOSIS — I27.21 PAH (PULMONARY ARTERY HYPERTENSION): Chronic | ICD-10-CM

## 2024-02-12 DIAGNOSIS — Z91.89 AT RISK FOR FLUID VOLUME OVERLOAD: Chronic | ICD-10-CM

## 2024-02-12 DIAGNOSIS — N17.9 ACUTE KIDNEY INJURY: ICD-10-CM

## 2024-02-12 DIAGNOSIS — G62.9 PERIPHERAL POLYNEUROPATHY: ICD-10-CM

## 2024-02-12 DIAGNOSIS — I44.7 LBBB (LEFT BUNDLE BRANCH BLOCK): ICD-10-CM

## 2024-02-12 DIAGNOSIS — I42.9 CARDIOMYOPATHY, UNSPECIFIED TYPE: ICD-10-CM

## 2024-02-12 DIAGNOSIS — Z95.810 PRESENCE OF BIVENTRICULAR IMPLANTABLE CARDIOVERTER-DEFIBRILLATOR (ICD): Chronic | ICD-10-CM

## 2024-02-12 LAB
ANION GAP SERPL CALCULATED.3IONS-SCNC: 13 MMOL/L (ref 5–15)
BUN SERPL-MCNC: 28 MG/DL (ref 8–23)
BUN/CREAT SERPL: 20.1 (ref 7–25)
CALCIUM SPEC-SCNC: 9.9 MG/DL (ref 8.6–10.5)
CHLORIDE SERPL-SCNC: 104 MMOL/L (ref 98–107)
CO2 SERPL-SCNC: 23 MMOL/L (ref 22–29)
CREAT SERPL-MCNC: 1.39 MG/DL (ref 0.57–1)
EGFRCR SERPLBLD CKD-EPI 2021: 42.2 ML/MIN/1.73
GLUCOSE SERPL-MCNC: 241 MG/DL (ref 65–99)
NT-PROBNP SERPL-MCNC: 102.7 PG/ML (ref 0–900)
POTASSIUM SERPL-SCNC: 6 MMOL/L (ref 3.5–5.2)
QT INTERVAL: 456 MS
QTC INTERVAL: 496 MS
SODIUM SERPL-SCNC: 140 MMOL/L (ref 136–145)

## 2024-02-12 PROCEDURE — G0463 HOSPITAL OUTPT CLINIC VISIT: HCPCS

## 2024-02-12 PROCEDURE — 83880 ASSAY OF NATRIURETIC PEPTIDE: CPT | Performed by: NURSE PRACTITIONER

## 2024-02-12 PROCEDURE — 99214 OFFICE O/P EST MOD 30 MIN: CPT | Performed by: NURSE PRACTITIONER

## 2024-02-12 PROCEDURE — 93010 ELECTROCARDIOGRAM REPORT: CPT | Performed by: INTERNAL MEDICINE

## 2024-02-12 PROCEDURE — 80048 BASIC METABOLIC PNL TOTAL CA: CPT | Performed by: NURSE PRACTITIONER

## 2024-02-12 PROCEDURE — 93005 ELECTROCARDIOGRAM TRACING: CPT | Performed by: NURSE PRACTITIONER

## 2024-02-12 RX ORDER — DILTIAZEM HYDROCHLORIDE 180 MG/1
180 CAPSULE, COATED, EXTENDED RELEASE ORAL DAILY
Qty: 90 CAPSULE | Refills: 1 | Status: SHIPPED | OUTPATIENT
Start: 2024-02-12

## 2024-02-12 RX ORDER — ATORVASTATIN CALCIUM 20 MG/1
20 TABLET, FILM COATED ORAL DAILY
Qty: 90 TABLET | Refills: 1 | Status: SHIPPED | OUTPATIENT
Start: 2024-02-12

## 2024-02-15 ENCOUNTER — HOSPITAL ENCOUNTER (OUTPATIENT)
Dept: HOSPITAL 99 - REG | Age: 64
End: 2024-02-15
Payer: COMMERCIAL

## 2024-02-15 ENCOUNTER — PATIENT ROUNDING (BHMG ONLY) (OUTPATIENT)
Dept: BARIATRICS/WEIGHT MGMT | Facility: CLINIC | Age: 64
End: 2024-02-15
Payer: MEDICARE

## 2024-02-15 DIAGNOSIS — E87.5: Primary | ICD-10-CM

## 2024-02-15 LAB — POTASSIUM SERPL-SCNC: 3.9 MMOL/L (ref 3.5–5.1)

## 2024-02-16 NOTE — PROGRESS NOTES
"Cardiology Heart Failure Clinic Note  Rodney \"Renan\" Clay LEDEZMA [facility]    Patient ID: Susy Hanson  is a 63 y.o. female.    Encounter Date:02/19/2024     Assessment:   Diagnoses and all orders for this visit:    1. h/o nonischemic, nondilated, cardiomyopathy (Primary)  Overview:   Hx non-dilated, non-ischemic cardiomyopathy          A.  Resolved systolic HF (HFrEF)         B.  EF 56-60% Gr 1 DD (TTE 1-25-24)                   I. EF 60 to 65% w/o mention of diastolic dysfunction (TTE 11/3/22)                   II. EF 15-20% w/o DD (July 22)         C. S/p 2022 [place] aware BiV ICD                    I.  Complicated by RV lead perforatn      2. mild PAH (pulmonary artery hypertension)  Overview:  RVSP 35-45 mmHg (Jan 24 TTE)      3. Stage 3b chronic kidney disease  Overview:  eGFR 1/25/24  is 44      4. At risk for fluid volume overload  Overview:   1. Hx non-dilated, non-ischemic cardiomyopathy          A.  Resolved systolic HF (HFrEF)                I. .  EF 56-60% Gr 1 DD (TTE 1-25-24)               Ii.       EF 60 to 65% w/o mention of diastolic dysfunction (TTE 11/3/22)               III. EF 15-20% w/o DD (July 22)         B. S/p 2022 [place] aware BiV ICD                    I.  Complicated by RV lead perforation   2. CKD stage 3b                    I. H/o VIVEK (4/22)                   II. eGFR 42 (2/12/24)      5. h/o Acute kidney injury  Overview:  Noted 4/26/2022      6. Dyspnea on exertion  -     Basic Metabolic Panel; Future  -     Magnesium; Future  -     proBNP; Future  -     Basic Metabolic Panel; Standing  -     Basic Metabolic Panel  -     Magnesium; Standing  -     Magnesium  -     proBNP; Standing  -     proBNP    7. Presence of biventricular  ICD    8. Breast neoplasm, Tis (DCIS), left  Overview:  Added automatically from request for surgery [id number]      9. Other irritable bowel syndrome    10. LBBB (left bundle branch block)    11. Metabolic syndrome X  Overview:  A. Hypertension  B. Mixed " dyslipidemia  C. Obesity           I.ANDREAS uses CPAP  D. T2DM          II. Peripheral neuropathy      12. Mixed anxiety depressive disorder    13. Type 2 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    14. Pericarditis, unspecified chronicity, unspecified type    15. Other insomnia        Plan/discussion    Volume overload    Heart Failure Core Measures    Type of Overload : unspecified     Most recent EF & Diastolic dysfunction if available:   EF 56-60% Gr 1 DD (TTE 1-25-24)   A.  Hx EF 20%  (July 22 TTE)  B. (Nov 22 TTE) 60 to 65% w/o mention of DD     [place] Heart association Class & Stage : 1c     HF Meds     Beta Blocker: Metoprolol 100 mg twice daily  ARNI/ACE/ARB: stopped 2-12-24 Entresto 24 to 26 mg BID  SGLT 2 inhibitors: Efrmsdr24 mg daily  Diuretics upon release from clinic: None presently  Furoscix: Currently not a candidate  Aldosterone Antagonist: None indicated  Digitalis: N/A  Vasodilators & Nitrates: Not indicated      Cardiac medicines reviewed with risk, benefits, and necessity of each discussed with myself & a RP.     ____________________________________________________________    Discussion    Heart failure core measures including beta-blocker, ARNI, SGLT2  H/o EF being less than 20% July 22, repeat echo in November 22 EF improved to 60 to 65%.   Repeat TTE 1/25/24 EF 56-60% Gr 1 DD w/ RVSP 35-45 mmHg  She will continue follow-ups with Dr. Murphy as well as Dr. Kerr her nephrologist  the day she gets back from [place]  Significantly hyperkalemic 2/12/24 with a potassium of 6 renal function is maintaining the creatinine of 1.4 given Lokelma 10 g x 3 days in hopes of lowering her potassium  Was encouraged not to leave town given her increased K+ however she is rather adamant she saw her brother in [place].   Was told to go to nearest ER by renal and we had BMP done & K+ was WNL ( (3.9) after Lokelma  Today already up to 5.1 therefor I am putting on Lasix 20mg QOD  Blood  pressure is under rather poor control currently however cannot find a lot of room to add medications which would aid in control at this juncture therefore we will have to defer to Dr. Murphy and Cirilo  Am seeing again in 2 week  At the patient request we are going to switch her hydralazine to 75 mg twice daily  Would continue with ongoing heart failure nursing interventions including:                          A. Fluid restriction at less than 2000 cc daily                          B. Daily weights                          C.  1 g low-sodium diet        I did the following activities preparing for the visit with Susy Hanson  including: reviewing tests, once pt arrived in clinic I also performed a medically appropriate examination and/or evaluation, I personally spent considerable time counseling and educating the patient/family/caregiver, ordering medications, tests, or procedures, referring and communicating with other health care professionals, and documenting information in the medical record. I estimate including preparation 20 minutes          Subjective    Subjective:     Chief Complaint   Patient presents with    Congestive Heart Failure       HPI:  63-year-old female patient of Dr. Murphy's, with a PMH of nonischemic nondilated cardiomyopathy with a.  LVEF less than 20% back in 2022, S\P [place] aware BiV ICD complicated by pericardial effusion with lack of pacing and RV lead perforation subsequently had recovery of EF at 60 to 65% (TTE 11/3/22), without mention of any diastolic dysfunction, most recent TTE done in January 2024 shows her EF is now at about 56 to 60% with grade 1 DD. H\O left breast cancer (H cc), diabetes, IBS, metabolic syndrome with HTN, HLD, ANDREAS Rx w/ CPAP, DM.  Comes in for her initial visit at the [facility] heart failure clinic 1/25/2024, she has done well from a heart failure standpoint with improvement of EF however since no echocardiograms been done in greater than a year  it is concerning as to what her current EF might be.  Does not seem to be particularly symptomatic however does have some dyspnea with exertion.  She has concerns regarding her past Breast implant for which she is planning on having those removed.  She was also found to be hyperkalemic treated with Lokelma comes back in f/u 12 Feb 24 after having seen Dr. Murphy who decreased her Entresto due to renal dysfunction. She feels about the same as she did on her last visit.  She was found to be hypertensive 2/12/24 and hyperkalemic with a K+= 6 Was encouraged not to leave town given her increased K+ however she was rather adamant & saw her brother in [place]. Was told to go to nearest ER by renal and we had BMP done in [place] & K+ was WNL after Lokelma.  Comes back on 19 February 2024 after returning home she has felt well but has come back with a K+ up to 5.1.It is still 1.5 months till she sees renal.    ROS:    Constitutional:  weakness, & fatigue have persisted but remains at what she describes as her normal level ., No fever, rigors, chills   Eyes: No recent vision changes, eye pain   ENT/oropharynx: No recent difficulty swallowing, sore throat, epistaxis, changes in hearing   Cardiovascular: No recent chest pain, chest tightness, palpitations except as noted in HPI, paroxysmal nocturnal dyspnea, orthopnea, diaphoresis, dizziness & no pre or vikram syncopal episodes   Respiratory: No at rest shortness of breath, + dyspnea on exertion, no significant productive cough, no wheezing and no hemoptysis   Gastrointestinal: No abdominal pain, nausea, vomiting, diarrhea, bloody stools   Genitourinary: No hematuria, dysuria other than increased frequency   Neurological: No headache, tremors, numbness,  or hemiparesis    Musculoskeletal: No change in typical cramps, myalgias,  joint pain, w/o any new joint swelling   Integument: No recent rash, no edema      Patient Active Problem List   Diagnosis    Encounter for  general adult medical examination without abnormal findings    Essential hypertension    Irritable bowel syndrome    Mixed anxiety depressive disorder    Mixed hyperlipidemia    Obesity    Encounter for screening for malignant neoplasm of colon    Type 2 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    Vitamin D deficiency    Hypertensive urgency    Peripheral neuropathy    ANDREAS on CPAP    Chest pain, atypical    Breast neoplasm, Tis (DCIS), left    Cold intolerance    Insomnia    Breast pain    Dyspnea    h/o Acute kidney injury    LBBB (left bundle branch block)    h/o nonischemic, nondilated, cardiomyopathy    Presence of biventricular  ICD    Hemopericardium    h/o Pericarditis    Pericardial effusion    Metabolic syndrome X    At risk for fluid volume overload    h/o resolved Left ventricular failure    Stage 3b chronic kidney disease    Hyperkalemia    mild PAH (pulmonary artery hypertension)       Past Medical History:   Diagnosis Date    Anxiety 2000    Breast cancer     LEFT BREAST 7/2021    Cataract     Had surgery to remove August 2023    CHF (congestive heart failure)     Colon polyp 12/2023    Removed during colonoscopy    Coronary artery disease 2019    Depression     DM (diabetes mellitus)     History of medical problems 2008    Bladder prolapse repeat Dr. Roper    HL (hearing loss)     Hyperlipidemia     Hypertension     SAW CARDIO/ STRESS TEST 2020 - NOW MEDS MANAGED BY PCP     Irritable bowel syndrome 2006    Peripheral neuropathy     PONV (postoperative nausea and vomiting)     Renal insufficiency 2020    See Dr. funes for moderate kidney failure    Sleep apnea     WEARS CPAP    Type 2 diabetes mellitus 2008    Urinary tract infection 6/23    UTI seen at [facility]       Past Surgical History:   Procedure Laterality Date    BLADDER REPAIR      BREAST BIOPSY      BREAST RECONSTRUCTION Bilateral 08/26/2021    Procedure: BILATERAL PREPECTORALPLACMENT TISSUE EXPANDERS AND  ALLODERM;  Surgeon: Heri Arreaga MD;  Location: [facility] MAIN OR;  Service: Plastics;  Laterality: Bilateral;    BREAST TISSUE EXPANDER REMOVAL INSERTION OF IMPLANT Bilateral 2021    Procedure: BILATERAL REMOVAL TISSUE EXPANDERS AND PLACEMENT OF IMPLANTS;  Surgeon: Heri Arreaga MD;  Location:  [facility];  Service: Plastics;  Laterality: Bilateral;    CARDIAC CATHETERIZATION Left 2022    Procedure: Cardiac Catheterization/Vascular Study;  Surgeon: Christo Murphy MD;  Location:  [facility];  Service: Cardiovascular;  Laterality: Left;    CARDIAC ELECTROPHYSIOLOGY PROCEDURE N/A 2022    Procedure: ICD implant [place] aware;  Surgeon: Esteban Kendrick MD;  Location:  [facility];  Service: Cardiology;  Laterality: N/A;    CARDIAC ELECTROPHYSIOLOGY PROCEDURE N/A 2022    Procedure: lead repositioning;  Surgeon: Esteban Kendrick MD;  Location:  [facility];  Service: Cardiology;  Laterality: N/A;    CARDIAC SURGERY      Defibrillator/pacemaker implant    CARDIOVASCULAR STRESS TEST      COLONOSCOPY  2012    EYE SURGERY      Cataracts removed    HYSTERECTOMY      INSERT / REPLACE / REMOVE PACEMAKER      LYMPH NODE BIOPSY  2021    MASTECTOMY W/ SENTINEL NODE BIOPSY Bilateral 2021    Procedure: bilateral total mastectomy, left axillary sentinel lymph node biopsy, possible reconstruction.;  Surgeon: Asia Perkins MD;  Location: [facility] MAIN OR;  Service: General;  Laterality: Bilateral;    TUBAL ABDOMINAL LIGATION         Social History     Socioeconomic History    Marital status:    Tobacco Use    Smoking status: Former     Packs/day: 2.00     Years: 30.00     Additional pack years: 0.00     Total pack years: 60.00     Types: Cigarettes     Start date: 1972     Quit date: 2008     Years since quittin.1     Passive exposure: Past    Smokeless tobacco: Never   Vaping Use     Vaping Use: Never used   Substance and Sexual Activity    Alcohol use: No    Drug use: No    Sexual activity: Yes     Partners: Male     Birth control/protection: None, Tubal ligation, Hysterectomy       Allergies   Allergen Reactions    Hydrocodone-Acetaminophen Hives     Lortab, takes tylenol         Current Outpatient Medications:     Accu-Chek FastClix Lancets misc, USE TO CHECK BLOOD GLUCOSE DAILY, Disp: , Rfl:     Accu-Chek Guide test strip, 1 each by Other route Daily. E11.49, Disp: 100 each, Rfl: 1    ALPRAZolam (XANAX) 1 MG tablet, Take 1 tablet by mouth 2 (Two) Times a Day., Disp: 60 tablet, Rfl: 0    atorvastatin (LIPITOR) 20 MG tablet, Take 1 tablet by mouth Daily., Disp: 90 tablet, Rfl: 1    cetirizine (zyrTEC) 10 MG tablet, Take 1 tablet by mouth Daily As Needed., Disp: , Rfl:     Continuous Blood Gluc  (FreeStyle [place] 3 [place]) device, Use 1 each 4 (Four) Times a Day Before Meals & at Bedtime., Disp: 1 each, Rfl: 0    Continuous Blood Gluc Sensor (FreeStyle Benton 3 Sensor) misc, Use 1 each Every 14 (Fourteen) Days., Disp: 2 each, Rfl: 5    dapagliflozin Propanediol (Farxiga) 10 MG tablet, Take 10 mg by mouth Daily., Disp: 90 tablet, Rfl: 3    dilTIAZem CD (Cardizem CD) 180 MG 24 hr capsule, Take 1 capsule by mouth Daily., Disp: 90 capsule, Rfl: 1    glimepiride (Amaryl) 4 MG tablet, Take 1 tablet by mouth Every Morning Before Breakfast., Disp: 90 tablet, Rfl: 3    hydrALAZINE (APRESOLINE) 50 MG tablet, Take 1 tablet by mouth 3 (Three) Times a Day., Disp: 270 tablet, Rfl: 1    Insulin Glargine (Lantus SoloStar) 100 UNIT/ML injection pen, Inject 10 Units under the skin into the appropriate area as directed Every Night., Disp: 15 mL, Rfl: 1    Insulin Pen Needle (Pen Needles) 32G X 4 MM misc, Use 1 each 4 (Four) Times a Day Before Meals & at Bedtime., Disp: 200 each, Rfl: 5    metoprolol succinate XL (TOPROL-XL) 100 MG 24 hr tablet, Take 1 tablet by mouth 2 (Two) Times a Day., Disp: 180  tablet, Rfl: 1    ondansetron (Zofran) 4 MG tablet, Take 1 tablet by mouth Every 8 (Eight) Hours As Needed for Nausea or Vomiting., Disp: 20 tablet, Rfl: 0    pantoprazole (PROTONIX) 40 MG EC tablet, Take 1 tablet by mouth Daily., Disp: 90 tablet, Rfl: 0    Triamcinolone Acetonide (NASACORT) 55 MCG/ACT nasal inhaler, APPLY 2 SPRAYS BY NASAL ROUTE DAILY FOR 30 DAYS (Patient taking differently: 2 sprays into the nostril(s) as directed by provider Daily As Needed.), Disp: 10.8 mL, Rfl: 6    Immunization History   Administered Date(s) Administered    Arexvy (RSV, Adults 60+ yrs) 09/22/2023    COVID-19 (PFIZER) BIVALENT 12+YRS 11/04/2022, 09/22/2023    COVID-19 (PFIZER) Purple Cap Monovalent 01/06/2021, 01/27/2021, 09/30/2021, 11/01/2021    Fluad Quad 65+ 09/14/2023    H1N1 Inj 11/03/2009    Hepatitis A 04/29/2018, 11/02/2018    Influenza Injectable Mdck Pf Quad 11/04/2022    Influenza, Unspecified 11/01/2021, 09/14/2023    Pneumococcal Conjugate 20-Valent (PCV20) 11/30/2023    RSV, unspecified (Vaccine or MAB) 09/22/2023    Tdap 07/26/2022    flucelvax quad pfs =>4 YRS 10/19/2020         Most recent EKG as reviewed:  Procedures     Most recent echo as reviewed:  Results for orders placed during the hospital encounter of 01/30/24    Adult Transthoracic Echo Complete W/ Cont if Necessary Per Protocol    Interpretation Summary    Left ventricular systolic function is normal. Left ventricular ejection fraction appears to be 56 - 60%.    Left ventricular diastolic function is consistent with (grade I) impaired relaxation.    Estimated right ventricular systolic pressure from tricuspid regurgitation is mildly elevated (35-45 mmHg).      Most recent stress test results:  Results for orders placed during the hospital encounter of 04/11/22    Stress Test With Myocardial Perfusion One Day    Interpretation Summary  · This is incomplete Cardiolite imaging study. Stress images were not done because test was aborted. Rest images  showed small fixed septal and apical defect. Left ventricle size appear normal. No gated SPECT imaging were noted..      Most recent cardiac catheterization results:  Results for orders placed during the hospital encounter of 04/11/22    Cardiac Catheterization/Vascular Study    Narrative  CARDIAC CATHETERIZATION REPORT      DATE OF PROCEDURE:  4/13/2022    INDICATION FOR PROCEDURE:    Congestive heart failure acute systolic  Dilated cardiomyopathy    PROCEDURE PERFORMED:    Left heart catheterization  coronary angiography  left ventriculography    PROCEDURE COMMENTS:    After informed consent was obtained, the patient was prepped and draped in the usual sterile manner.  Mild to moderate sedation was administered.  Right femoral artery was accessed without difficulty and 6 Andorran arterial sheath was inserted.  Sheath was flushed with heparinized saline.    Using 6 And[place]ran Roberto catheters, first left coronary artery and the right coronary was electively engaged and appropriate views were taken.  A 6 [place]ran JR4 catheter was used to cross aortic valve and left heart catheterization was performed.  Left ventriculography was done NAQVI view    Patient tolerated the procedure well.    FINDINGS:    1. HEMODYNAMICS:    Aortic pressure: 167/101 mmHg    LVEDP: 10 to 15 mmHg    Gradient across aortic valve on pullback: No gradient across aortic valve      2. LEFT VENTRICULOGRAPHY: 35 to 40%      3. CORONARY ANGIOGRAPHY:    A: Left main coronary artery: Normal    B: Left anterior descending artery: 25% plaque in proximal and mid segment.  No high-grade stenosis    C: Left circumflex coronary artery: Mild diffuse disease in the midsegment but no high-grade stenosis    D: Right coronary artery: 25% plaque in proximal and mid segment of RCA but no high-grade stenosis.  It is dominant vessel    SUMMARY:    Mild coronary artery disease  Left ventricular dysfunction    RECOMMENDATIONS:    Medical treatment.        Historical data  copied forward from previous encounters in EMR including the history, exam, and assessment/plan has been reviewed and is unchanged except as I have noted and otherwise indicated.      Objective:         /81 (BP Location: Right arm)   Pulse 71   Resp 16   Wt 77.9 kg (171 lb 12.8 oz)   LMP  (LMP Unknown)   SpO2 98%   BMI 29.47 kg/m²     General:  Well-developed, well-nourished, cooperative, no distress, appears stated age if not slightly older    Neuro:  A&O x3. No significantly obvious focal neuro deficet    Psych:  Pleasant affect    HENT:  Normocephalic, atraumatic, moist mucous membranes , Normal ear placement,Throat not injected   Eyes:  PERRLA, Conjunctivae not injected, EOM's intact, conjunctiva does not appear significantly injected   Neck:  Supple, Mildly thickened, no lymph adenopathy nor thyromegaly no JVD or bruit    Lungs:    Symmetrical rise & fall of chest with baseline respiratory pattern. To auscultation lungs are Clear bilaterally, faint late phase expiratory wheezes, no rhonchi or rales are noted    Chest wall:  No significant tenderness when palpated. PMI @ 5th ICS McL  , deflated Left breast, well healed ICD scar   Heart:  Regular rate and rhythm, S1 and S2 normal, no S3 or S4, Gr I/VI systolic ejection murmur best heard at the apex , no obvious rub, click or gallop.    Abdomen:  non-distended, non-tender, bowel sounds noted in the 4 quadrants of the abdomen, significant adipose tissue identified    Extremities:  Equal color motion temperature and sensitivity, Rapid capillary refill noted within the nailbeds of fingers and toes bilaterally no lower extremity edema.    Pulses:  2+ and symmetric all extremities, rapid capillary refill    Skin:  No obvious rashes, significant lesions identified, warm dry and of normal turgor      In-Office Procedure(s):  No orders to display        Imaging:    Results for orders placed during the hospital encounter of 10/03/22    XR Chest 1  View    Narrative  EXAMINATION: XR CHEST 1 VW    DATE OF EXAM: 10/4/2022 12:28 AM  SLOT: 60    HISTORY: Chest pain    COMPARISON: None.    FINDINGS:    HEART/MEDIASTINUM: Mildly enlarged cardiac silhouette. Radiation is the origin of the right atrium, right ventricle, and coronary sinus.    LUNGS/PLEURA: Linear subsegmental atelectasis in the left lung base. Otherwise, grossly clear.    MUSCULOSKELETAL: No acute osseous pathology. Surgical clips overlie the axilla bilaterally.    Impression  1.  No acute cardiopulmonary abnormality detected.        Electronically signed by:  Yony Patricio M.D.  10/4/2022 12:33 AM       Results for orders placed during the hospital encounter of 01/11/24    US Abdomen Limited    Narrative  US ABDOMEN LIMITED    Date of Exam: 1/11/2024 10:41 AM EST    Indication: persistent nausea.    Comparison: CT abdomen and pelvis angiography 1/17/2020    Technique: Grayscale and color Doppler ultrasound evaluation of the right upper quadrant was performed.      Findings:  Diffusely increased liver echogenicity compatible with hepatic steatosis. No discrete liver lesion. No perihepatic ascites. Visualized portions of the pancreas are normal. The gallbladder is normal. No biliary dilatation. Common bile duct measures 2 mm.  Hepatopetal flow in the portal vein. Visualized hepatic veins are patent. The right kidney measures 9.1 x 4.1 x 3.7 cm. No right-sided hydronephrosis.    Impression  Impression:  1. Hepatic steatosis.  2. Normal gallbladder.  3. No biliary dilatation.        Electronically Signed: Ottoniel Lee MD  1/11/2024 11:54 AM EST  Workstation ID: [id number]      Results for orders placed during the hospital encounter of 10/03/22    CT Angiogram Chest Pulmonary Embolism    Narrative  Exam: CT Angiography of the Chest.    Date: 10/4/2022.    Comparison: 4/11/2022.    History: Defibrillator placement with chest pain.    Technique: CT examination of the chest was performed following the  intravenous administration of 100 mL of Isovue-370. Sagittal, coronal and 3-D reformatted images were provided. CT dose lowering techniques were used, to include: automated exposure  control, adjustment for patient size, and/or use of iterative reconstruction.    FINDINGS:    CHEST WALL: There are bilateral breast implants.    Mediastinum and Theresa: There is no axillary, mediastinal or hilar lymphadenopathy.    Pleural and Pericardial spaces: There is a small to moderate pericardial effusion which is higher density than simple fluid which may relate to a hemopericardium.    Upper Abdomen: The visualized upper abdomen is unremarkable.    Cardiovascular: AICD generator has leads in the right atrium and right ventricle. There is mild vascular calcification throughout the thoracic aorta without evidence of aneurysmal dilation or dissection.    Pulmonary Artery: There are no filling defects in the pulmonary arteries.    Lung Parenchyma and Airways: The lungs are clear.    Bones: No fracture or aggressive osseous lesion.    Impression  1. No evidence of pulmonary embolism.  2. No evidence of thoracic aortic aneurysm or dissection.  3. Small to moderate amount of hemopericardium.    These findings were discussed with COURTNEY Atkinson.      Electronically signed by:  Amish Ellsworth D.O.  10/4/2022 1:51 AM      Lab Review:   Hospital Outpatient Visit on 02/19/2024   Component Date Value    Glucose 02/19/2024 141 (H)     BUN 02/19/2024 23     Creatinine 02/19/2024 1.39 (H)     Sodium 02/19/2024 143     Potassium 02/19/2024 5.1     Chloride 02/19/2024 106     CO2 02/19/2024 26.0     Calcium 02/19/2024 10.0     BUN/Creatinine Ratio 02/19/2024 16.5     Anion Gap 02/19/2024 11.0     eGFR 02/19/2024 42.2 (L)     Magnesium 02/19/2024 2.1     proBNP 02/19/2024 166.2    Hospital Outpatient Visit on 02/12/2024   Component Date Value    Glucose 02/12/2024 241 (H)     BUN 02/12/2024 28 (H)     Creatinine 02/12/2024 1.39 (H)     Sodium  02/12/2024 140     Potassium 02/12/2024 6.0 (H)     Chloride 02/12/2024 104     CO2 02/12/2024 23.0     Calcium 02/12/2024 9.9     BUN/Creatinine Ratio 02/12/2024 20.1     Anion Gap 02/12/2024 13.0     eGFR 02/12/2024 42.2 (L)     proBNP 02/12/2024 102.7     QT Interval 02/12/2024 456     QTC Interval 02/12/2024 496    Hospital Outpatient Visit on 01/30/2024   Component Date Value    LVIDd 01/30/2024 4.6     LVIDs 01/30/2024 3.2     IVSd 01/30/2024 1.04     LVPWd 01/30/2024 1.09     FS 01/30/2024 32.0     IVS/LVPW 01/30/2024 0.95     ESV(cubed) 01/30/2024 31.5     LV Sys Vol (BSA correcte* 01/30/2024 20.4     EDV(cubed) 01/30/2024 100.3     LV Kamara Vol (BSA correct* 01/30/2024 47.4     LV mass(C)d 01/30/2024 175.7     LVOT area 01/30/2024 3.1     LVOT diam 01/30/2024 1.98     EDV(MOD-sp4) 01/30/2024 87.0     ESV(MOD-sp4) 01/30/2024 37.4     SV(MOD-sp4) 01/30/2024 49.5     SI(MOD-sp4) 01/30/2024 27.0     EF(MOD-sp4) 01/30/2024 57.0     MV E max jeffery 01/30/2024 91.6     MV A max jeffery 01/30/2024 142.5     MV dec time 01/30/2024 0.21     MV E/A 01/30/2024 0.64     Pulm A Revs Dur 01/30/2024 0.11     TR max jeffery 01/30/2024 251.6     SV(LVOT) 01/30/2024 79.9     LA dimension (2D)  01/30/2024 3.2     Pulm Sys Jeffery 01/30/2024 61.5     Pulm Kamara Jeffery 01/30/2024 27.4     Pulm S/D 01/30/2024 2.25     Pulm A Revs Jeffery 01/30/2024 30.4     LV V1 max 01/30/2024 107.2     LV V1 max PG 01/30/2024 4.6     LV V1 mean PG 01/30/2024 2.42     LV V1 VTI 01/30/2024 26.0     Ao pk jeffery 01/30/2024 133.1     Ao max PG 01/30/2024 7.1     Ao mean PG 01/30/2024 3.6     Ao V2 VTI 01/30/2024 29.0     MOJGAN(I,D) 01/30/2024 2.8     AI P1/2t 01/30/2024 658.7     MV max PG 01/30/2024 8.7     MV mean PG 01/30/2024 4.0     MV V2 VTI 01/30/2024 32.9     MVA(VTI) 01/30/2024 2.43     MV dec slope 01/30/2024 427.5     MR max jeffery 01/30/2024 680.4     MR max PG 01/30/2024 185.5     TR max PG 01/30/2024 25.5     RVSP(TR) 01/30/2024 35.5     RAP systole 01/30/2024 10.0      PA V2 max 01/30/2024 107.3     PA acc time 01/30/2024 0.07     PI end-d alireza 01/30/2024 133.1     Ao root diam 01/30/2024 2.7     ACS 01/30/2024 1.59     EF(MOD-bp) 01/30/2024 60.0     RV Base 01/30/2024 3.60     RV Mid 01/30/2024 1.70     TAPSE (>1.6) 01/30/2024 2.60    Hospital Outpatient Visit on 01/29/2024   Component Date Value    Glucose 01/29/2024 209 (H)     BUN 01/29/2024 28 (H)     Creatinine 01/29/2024 1.38 (H)     Sodium 01/29/2024 143     Potassium 01/29/2024 5.4 (H)     Chloride 01/29/2024 106     CO2 01/29/2024 26.0     Calcium 01/29/2024 9.8     BUN/Creatinine Ratio 01/29/2024 20.3     Anion Gap 01/29/2024 11.0     eGFR 01/29/2024 42.6 (L)     Magnesium 01/29/2024 2.2     proBNP 01/29/2024 63.3    Hospital Outpatient Visit on 01/25/2024   Component Date Value    Glucose 01/25/2024 248 (H)     BUN 01/25/2024 30 (H)     Creatinine 01/25/2024 1.35 (H)     Sodium 01/25/2024 142     Potassium 01/25/2024 5.7 (H)     Chloride 01/25/2024 103     CO2 01/25/2024 26.0     Calcium 01/25/2024 10.2     BUN/Creatinine Ratio 01/25/2024 22.2     Anion Gap 01/25/2024 13.0     eGFR 01/25/2024 43.7 (L)     Magnesium 01/25/2024 2.3     proBNP 01/25/2024 58.9     TSH 01/25/2024 1.500     Hemoglobin A1C 01/25/2024 7.90 (H)     QT Interval 01/25/2024 443     QTC Interval 01/25/2024 480    Lab on 12/28/2023   Component Date Value    25 Hydroxy, Vitamin D 12/28/2023 34.7     Color, UA 12/28/2023 Yellow     Appearance, UA 12/28/2023 Clear     pH, UA 12/28/2023 6.0     Specific Gravity, UA 12/28/2023 1.018     Glucose, UA 12/28/2023 Negative     Ketones, UA 12/28/2023 Negative     Bilirubin, UA 12/28/2023 Negative     Blood, UA 12/28/2023 Negative     Protein, UA 12/28/2023 100 mg/dL (2+) (A)     Leuk Esterase, UA 12/28/2023 Moderate (2+) (A)     Nitrite, UA 12/28/2023 Negative     Urobilinogen, UA 12/28/2023 0.2 E.U./dL     Glucose 12/28/2023 158 (H)     BUN 12/28/2023 21     Creatinine 12/28/2023 1.16 (H)     Sodium  12/28/2023 146 (H)     Potassium 12/28/2023 5.2     Chloride 12/28/2023 105     CO2 12/28/2023 26.0     Calcium 12/28/2023 9.9     Total Protein 12/28/2023 6.7     Albumin 12/28/2023 4.9     ALT (SGPT) 12/28/2023 23     AST (SGOT) 12/28/2023 14     Alkaline Phosphatase 12/28/2023 65     Total Bilirubin 12/28/2023 0.2     Globulin 12/28/2023 1.8     A/G Ratio 12/28/2023 2.7     BUN/Creatinine Ratio 12/28/2023 18.1     Anion Gap 12/28/2023 15.0     eGFR 12/28/2023 52.4 (L)     Phosphorus 12/28/2023 4.4     PTH, Intact 12/28/2023 78.0 (H)     Hemoglobin A1C 12/28/2023 7.10 (H)     Protein/Creatinine Ratio* 12/28/2023 526.9 (H)     Creatinine, Urine 12/28/2023 109.7     Total Protein, Urine 12/28/2023 57.8     Magnesium 12/28/2023 1.8     Creatine Kinase 12/28/2023 25     Uric Acid 12/28/2023 7.7 (H)     WBC 12/28/2023 4.64     RBC 12/28/2023 3.98     Hemoglobin 12/28/2023 11.7 (L)     Hematocrit 12/28/2023 35.7     MCV 12/28/2023 89.7     MCH 12/28/2023 29.4     MCHC 12/28/2023 32.8     RDW 12/28/2023 13.3     RDW-SD 12/28/2023 43.9     MPV 12/28/2023 10.8     Platelets 12/28/2023 225     Neutrophil % 12/28/2023 52.1     Lymphocyte % 12/28/2023 30.0     Monocyte % 12/28/2023 9.1     Eosinophil % 12/28/2023 7.5 (H)     Basophil % 12/28/2023 0.9     Immature Grans % 12/28/2023 0.4     Neutrophils, Absolute 12/28/2023 2.42     Lymphocytes, Absolute 12/28/2023 1.39     Monocytes, Absolute 12/28/2023 0.42     Eosinophils, Absolute 12/28/2023 0.35     Basophils, Absolute 12/28/2023 0.04     Immature Grans, Absolute 12/28/2023 0.02     nRBC 12/28/2023 0.0     RBC, UA 12/28/2023 0-2     WBC, UA 12/28/2023 21-50 (A)     Bacteria, UA 12/28/2023 None Seen     Squamous Epithelial Cell* 12/28/2023 3-6 (A)     Hyaline Casts, UA 12/28/2023 3-6     Methodology 12/28/2023 Automated Microscopy    Lab on 12/21/2023   Component Date Value    Campylobacter 12/21/2023 Not Detected     Plesiomonas shigelloides 12/21/2023 Not Detected      Salmonella 12/21/2023 Not Detected     Vibrio 12/21/2023 Not Detected     Vibrio cholerae 12/21/2023 Not Detected     Yersinia enterocolitica 12/21/2023 Not Detected     Enteroaggregative E. col* 12/21/2023 Not Detected     Enteropathogenic E. coli* 12/21/2023 Not Detected     Enterotoxigenic E. coli * 12/21/2023 Not Detected     Shiga-like toxin-produci* 12/21/2023 Not Detected     Shigella/Enteroinvasive * 12/21/2023 Not Detected     Cryptosporidium 12/21/2023 Not Detected     Cyclospora cayetanensis 12/21/2023 Not Detected     Entamoeba histolytica 12/21/2023 Not Detected     Giardia lamblia 12/21/2023 Not Detected     Adenovirus F40/41 12/21/2023 Not Detected     Astrovirus 12/21/2023 Not Detected     Norovirus GI/GII 12/21/2023 Not Detected     Rotavirus A 12/21/2023 Not Detected     Sapovirus (I, II, IV or * 12/21/2023 Not Detected    Lab on 12/20/2023   Component Date Value    Hemoglobin A1C 12/20/2023 7.20 (H)     Glucose 12/20/2023 142 (H)     BUN 12/20/2023 32 (H)     Creatinine 12/20/2023 1.58 (H)     Sodium 12/20/2023 142     Potassium 12/20/2023 5.3 (H)     Chloride 12/20/2023 103     CO2 12/20/2023 22.3     Calcium 12/20/2023 9.6     Total Protein 12/20/2023 7.2     Albumin 12/20/2023 4.7     ALT (SGPT) 12/20/2023 19     AST (SGOT) 12/20/2023 14     Alkaline Phosphatase 12/20/2023 59     Total Bilirubin 12/20/2023 0.2     Globulin 12/20/2023 2.5     A/G Ratio 12/20/2023 1.9     BUN/Creatinine Ratio 12/20/2023 20.3     Anion Gap 12/20/2023 16.7 (H)     eGFR 12/20/2023 36.2 (L)     WBC 12/20/2023 6.12     RBC 12/20/2023 4.14     Hemoglobin 12/20/2023 12.2     Hematocrit 12/20/2023 36.8     MCV 12/20/2023 88.9     MCH 12/20/2023 29.5     MCHC 12/20/2023 33.2     RDW 12/20/2023 13.3     RDW-SD 12/20/2023 43.0     MPV 12/20/2023 11.3     Platelets 12/20/2023 221     Neutrophil % 12/20/2023 57.9     Lymphocyte % 12/20/2023 24.8     Monocyte % 12/20/2023 10.3     Eosinophil % 12/20/2023 5.7     Basophil %  12/20/2023 1.0     Immature Grans % 12/20/2023 0.3     Neutrophils, Absolute 12/20/2023 3.54     Lymphocytes, Absolute 12/20/2023 1.52     Monocytes, Absolute 12/20/2023 0.63     Eosinophils, Absolute 12/20/2023 0.35     Basophils, Absolute 12/20/2023 0.06     Immature Grans, Absolute 12/20/2023 0.02     nRBC 12/20/2023 0.0    Lab on 09/25/2023   Component Date Value    Color, UA 09/25/2023 Yellow     Appearance, UA 09/25/2023 Clear     pH, UA 09/25/2023 6.0     Specific Gravity, UA 09/25/2023 1.008     Glucose, UA 09/25/2023 Negative     Ketones, UA 09/25/2023 Negative     Bilirubin, UA 09/25/2023 Negative     Blood, UA 09/25/2023 Negative     Protein, UA 09/25/2023 30 mg/dL (1+) (A)     Leuk Esterase, UA 09/25/2023 Trace (A)     Nitrite, UA 09/25/2023 Negative     Urobilinogen, UA 09/25/2023 0.2 E.U./dL     Extra Tube 09/25/2023 Hold for add-ons.     RBC, UA 09/25/2023 0-2     WBC, UA 09/25/2023 0-2     Bacteria, UA 09/25/2023 None Seen     Squamous Epithelial Cell* 09/25/2023 0-2     Hyaline Casts, UA 09/25/2023 None Seen     Methodology 09/25/2023 Automated Microscopy    Office Visit on 09/14/2023   Component Date Value    Color 09/14/2023 Yellow     Clarity, UA 09/14/2023 Cloudy (A)     Specific Gravity  09/14/2023 1.015     pH, Urine 09/14/2023 6.5     Leukocytes 09/14/2023 Large (3+) (A)     Nitrite, UA 09/14/2023 Positive (A)     Protein, POC 09/14/2023 100 mg/dL (A)     Glucose, UA 09/14/2023 Negative     Ketones, UA 09/14/2023 Negative     Urobilinogen, UA 09/14/2023 0.2 E.U./dL     Bilirubin 09/14/2023 Negative     Blood, UA 09/14/2023 Trace (A)     Lot Number 09/14/2023 [id number]     Expiration Date 09/14/2023 10/04/2023     Urine Culture 09/14/2023 >100,000 CFU/mL Klebsiella pneumoniae ssp pneumoniae (A)    There may be more visits with results that are not included.     Recent labs reviewed and interpreted for clinical significance and application    Went over the labs with her & her sister while still  "in the clinic          It is a pleasure to be involved in this patient's cardiovascular care relating to their heart failure.  Please feel free to call me with any questions or concerns.    Rodney \"Renan\" Clay LEDEZMA, [facility]  Heart failure program clinical provider    Rodney Williamson, BRISA   02/16/24  .  "

## 2024-02-19 ENCOUNTER — DISEASE STATE MANAGEMENT VISIT (OUTPATIENT)
Facility: HOSPITAL | Age: 64
End: 2024-02-19
Payer: MEDICARE

## 2024-02-19 ENCOUNTER — HOSPITAL ENCOUNTER (OUTPATIENT)
Facility: HOSPITAL | Age: 64
Discharge: HOME | End: 2024-02-19
Admitting: NURSE PRACTITIONER
Payer: MEDICARE

## 2024-02-19 VITALS
WEIGHT: 171.8 LBS | RESPIRATION RATE: 16 BRPM | HEART RATE: 71 BPM | BODY MASS INDEX: 29.47 KG/M2 | OXYGEN SATURATION: 98 % | SYSTOLIC BLOOD PRESSURE: 162 MMHG | DIASTOLIC BLOOD PRESSURE: 81 MMHG

## 2024-02-19 DIAGNOSIS — N17.9 ACUTE KIDNEY INJURY: ICD-10-CM

## 2024-02-19 DIAGNOSIS — E88.810 METABOLIC SYNDROME X: Chronic | ICD-10-CM

## 2024-02-19 DIAGNOSIS — I31.9 PERICARDITIS, UNSPECIFIED CHRONICITY, UNSPECIFIED TYPE: ICD-10-CM

## 2024-02-19 DIAGNOSIS — Z95.810 PRESENCE OF BIVENTRICULAR IMPLANTABLE CARDIOVERTER-DEFIBRILLATOR (ICD): Chronic | ICD-10-CM

## 2024-02-19 DIAGNOSIS — I44.7 LBBB (LEFT BUNDLE BRANCH BLOCK): ICD-10-CM

## 2024-02-19 DIAGNOSIS — Z91.89 AT RISK FOR FLUID VOLUME OVERLOAD: Chronic | ICD-10-CM

## 2024-02-19 DIAGNOSIS — I42.8 OTHER CARDIOMYOPATHY: Primary | ICD-10-CM

## 2024-02-19 DIAGNOSIS — F41.8 MIXED ANXIETY DEPRESSIVE DISORDER: ICD-10-CM

## 2024-02-19 DIAGNOSIS — E11.49 TYPE 2 DIABETES MELLITUS WITH OTHER DIABETIC NEUROLOGICAL COMPLICATION: ICD-10-CM

## 2024-02-19 DIAGNOSIS — I10 ESSENTIAL HYPERTENSION: ICD-10-CM

## 2024-02-19 DIAGNOSIS — D05.12 BREAST NEOPLASM, TIS (DCIS), LEFT: ICD-10-CM

## 2024-02-19 DIAGNOSIS — E78.2 MIXED HYPERLIPIDEMIA: ICD-10-CM

## 2024-02-19 DIAGNOSIS — G47.09 OTHER INSOMNIA: ICD-10-CM

## 2024-02-19 DIAGNOSIS — N18.32 STAGE 3B CHRONIC KIDNEY DISEASE: ICD-10-CM

## 2024-02-19 DIAGNOSIS — I42.0 CARDIOMYOPATHY, DILATED: ICD-10-CM

## 2024-02-19 DIAGNOSIS — K58.8 OTHER IRRITABLE BOWEL SYNDROME: ICD-10-CM

## 2024-02-19 DIAGNOSIS — I27.21 PAH (PULMONARY ARTERY HYPERTENSION): Chronic | ICD-10-CM

## 2024-02-19 DIAGNOSIS — R06.09 DYSPNEA ON EXERTION: ICD-10-CM

## 2024-02-19 LAB
ANION GAP SERPL CALCULATED.3IONS-SCNC: 11 MMOL/L (ref 5–15)
BUN SERPL-MCNC: 23 MG/DL (ref 8–23)
BUN/CREAT SERPL: 16.5 (ref 7–25)
CALCIUM SPEC-SCNC: 10 MG/DL (ref 8.6–10.5)
CHLORIDE SERPL-SCNC: 106 MMOL/L (ref 98–107)
CO2 SERPL-SCNC: 26 MMOL/L (ref 22–29)
CREAT SERPL-MCNC: 1.39 MG/DL (ref 0.57–1)
EGFRCR SERPLBLD CKD-EPI 2021: 42.2 ML/MIN/1.73
GLUCOSE SERPL-MCNC: 141 MG/DL (ref 65–99)
MAGNESIUM SERPL-MCNC: 2.1 MG/DL (ref 1.6–2.4)
NT-PROBNP SERPL-MCNC: 166.2 PG/ML (ref 0–900)
POTASSIUM SERPL-SCNC: 5.1 MMOL/L (ref 3.5–5.2)
SODIUM SERPL-SCNC: 143 MMOL/L (ref 136–145)

## 2024-02-19 PROCEDURE — 99214 OFFICE O/P EST MOD 30 MIN: CPT | Performed by: NURSE PRACTITIONER

## 2024-02-19 PROCEDURE — 83880 ASSAY OF NATRIURETIC PEPTIDE: CPT | Performed by: NURSE PRACTITIONER

## 2024-02-19 PROCEDURE — G0463 HOSPITAL OUTPT CLINIC VISIT: HCPCS

## 2024-02-19 PROCEDURE — 83735 ASSAY OF MAGNESIUM: CPT | Performed by: NURSE PRACTITIONER

## 2024-02-19 PROCEDURE — 80048 BASIC METABOLIC PNL TOTAL CA: CPT | Performed by: NURSE PRACTITIONER

## 2024-02-19 RX ORDER — FUROSEMIDE 20 MG/1
20 TABLET ORAL EVERY OTHER DAY
Qty: 45 TABLET | Refills: 0 | Status: SHIPPED | OUTPATIENT
Start: 2024-02-19 | End: 2024-03-20

## 2024-02-19 RX ORDER — HYDRALAZINE HYDROCHLORIDE 50 MG/1
75 TABLET, FILM COATED ORAL EVERY 12 HOURS
Start: 2024-02-19

## 2024-02-19 NOTE — ADDENDUM NOTE
Encounter addended by: Chari Santos RN on: 2/19/2024 1:41 PM   Actions taken: Charge Capture section accepted

## 2024-02-19 NOTE — ADDENDUM NOTE
Encounter addended by: Rodney Williamson APRN on: 2/19/2024 11:36 AM   Actions taken: Visit diagnoses modified, Order list changed, Diagnosis association updated, Clinical Note Signed

## 2024-02-19 NOTE — PROGRESS NOTES
HEART FAILURE CLINIC - PHARMACY SERVICE     HFpEF with EF 56-60% (Last Echo: 2/3/24).    NYHA Class I C     The patient's last EKG was reviewed from 2/12/24 and shows a QTcB of 496 ms.      Cardiologist: Jeffrey  Nephrologist: Maria Isabel  PCP: PILY Avila     -CHF-specific BB:      Pre Visit Dose: Metoprolol succinate  mg PO BID    Post Visit Dose: Metoprolol succinate  mg PO BID (HR 71 bpm, /81 mmHg)     - Target Dose: Metoprolol succinate  mg PO daily.     - Goal HR of 50s to 60s.     - Patient should be seen every 10 to 14 days for a pulse check with plans for up-titration until target heart rate is achieved.        -ACE/ARB/ARNI:     Pre Visit Dose: None - sacubitril/valsartan discontinued on 2/12/24 due to elevated K    Post Visit Dose: None    - Target Dose:  N/A    - Patient should have a follow-up appointment every 2 to 4 weeks for a hemodynamic check with possible up-titration to target dose.         -SGLT2 inhibitor therapy:    Pre Visit Dose: Dapagliflozin 10 mg PO daily    Post Visit Dose: Dapagliflozin 10 mg PO daily    - Target Dose: Dapagliflozin 10 mg PO daily : CrCl > 20 mL/min which has shown benefit in patients with HF       -MRA:     Pre Visit Dose: None    Post Visit Dose: None    - Target Dose:  N/A    - K is not < 5 mEq/L and SCr is </= 2 mg/dL in female and eGFR > 30 mL/min/1.73m3        -DIURETIC:     Pre Visit Dose: None    Post Visit Dose: Furosemide 20 mg PO QOD      -MAGNESIUM:     Mag is > 2 mg/dL    Pre Visit Dose: None    Post Visit Dose: None    -If Magnesium 1.6-1.9 mg/dL, Initiate Magnesium 400 mg PO daily  -If Magnesium is less than 1.6 mg/dL, Initiate Magnesium 400 mg PO BID    -ANTICOAGULATION:     None     -OTHER CV MEDS:     Pre Visit Dose: atorvastatin 20 mg PO daily, diltiazem 180 mg PO daily, hydralazine 50 mg PO Q8H (however patient states she frequently misses the third dose in a day)    Post Visit Dose: hydralazine dose changed to 75 mg PO  Q12H      -Clinic Administered Medications:     None      Vaccines:     Not assessed at this visit       Patient Assistance:      None            PLAN:  Initiation/Discontinuation/Dose Adjustment:   A. Initiate furosemide 20 mg PO QOD  B. Change hydralazine to 75 mg PO BID (to help with adherence; patient has BP cuff at home and keeps BP log)  Education provided: Discussed new medication changes, avoiding K in diet given frequent elevated K levels, and importance of BP monitoring with adjusted hydralazine dose  Coordination of Care: None by pharmacist  Refills: New prescription sent for furosemide          Estrellita Acevedo PharmD  2/19/2024  09:56 EST

## 2024-02-23 ENCOUNTER — TELEPHONE (OUTPATIENT)
Dept: ENDOCRINOLOGY | Facility: CLINIC | Age: 64
End: 2024-02-23
Payer: MEDICARE

## 2024-02-23 NOTE — TELEPHONE ENCOUNTER
Pt called stating she is having lows at night. Connected pt to practice and will scan in [place] 3 reading. Pt is currently taking Lantus 10mg nightly. Pt did not take insulin last night due to the lows. Pt is in kidney failure and not sure if those drugs could be effecting blood sugars also.     Please advise.

## 2024-02-28 ENCOUNTER — TELEPHONE (OUTPATIENT)
Dept: ENDOCRINOLOGY | Facility: CLINIC | Age: 64
End: 2024-02-28
Payer: MEDICARE

## 2024-02-28 NOTE — TELEPHONE ENCOUNTER
Spoke with patient and she would like to make sure that all of her diabetes meds are not causing issues with her kidneys and her heart.  Will discuss with Dr. Helton tomorrow and call back

## 2024-03-01 NOTE — PROGRESS NOTES
"Cardiology Heart Failure Clinic Note  Rodney \"Renan\" TAY Estrada    Patient ID: Susy Hanson  is a 63 y.o. female.    Encounter Date:03/04/2024     Assessment:   Diagnoses and all orders for this visit:    1. h/o nonischemic, nondilated, cardiomyopathy (Primary)  Overview:   Hx non-dilated, non-ischemic cardiomyopathy          A.  Resolved systolic HF (HFrEF)         B.  EF 56-60% Gr 1 DD (TTE 1-25-24)                   I. EF 60 to 65% w/o mention of diastolic dysfunction (TTE 11/3/22)                   II. EF 15-20% w/o DD (July 22)         C. S/p 2022 [place] aware BiV ICD                    I.  Complicated by RV lead perforatn      2. Dyspnea on exertion  -     Basic Metabolic Panel; Future  -     proBNP; Future  -     Basic Metabolic Panel; Standing  -     Basic Metabolic Panel  -     proBNP; Standing  -     proBNP    3. h/o resolved Left ventricular failure  Overview:  Hx non-dilated, non-ischemic cardiomyopathy          A.  Resolved systolic HF (HFrEF)         B.  LVEF 56 to 60% gr 1 DD (TTE 1/25/24)          C. S/p 2022 [place] aware BiV ICD                    I.  Complicated by RV lead perforatn      4. Presence of biventricular  ICD    5. mild PAH (pulmonary artery hypertension)  Overview:  RVSP 35-45 mmHg (Jan 24 TTE)      6. h/o Acute kidney injury  Overview:  Noted 4/26/2022      7. Stage 3b chronic kidney disease  Overview:  eGFR 1/25/24  is 44      8. At risk for fluid volume overload  Overview:   1. Hx non-dilated, non-ischemic cardiomyopathy          A.  Resolved systolic HF (HFrEF)                I. .  EF 56-60% Gr 1 DD (TTE 1-25-24)               Ii.       EF 60 to 65% w/o mention of diastolic dysfunction (TTE 11/3/22)               III. EF 15-20% w/o DD (July 22)         B. S/p 2022 [place] aware BiV ICD                    I.  Complicated by RV lead perforation   2. CKD stage 3b                    I. H/o VIVEK (4/22)                   II. eGFR 42 (2/12/24)      9. LBBB (left bundle " branch block)    10. Mixed anxiety depressive disorder    11. Other irritable bowel syndrome    12. Type 2 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    13. Other polyneuropathy    14. Metabolic syndrome X  Overview:  A. Hypertension  B. Mixed dyslipidemia  C. Obesity           I.ANDREAS uses CPAP  D. T2DM          II. Peripheral neuropathy          Plan/discussion    Volume overload    Heart Failure Core Measures    Type of Overload :unspecified     Most recent EF   EF 56-60% Gr 1 DD (TTE 1-25-24)   A.  Hx EF 20%  (July 22 TTE)  B. (Nov 22 TTE) 60 to 65% w/o mention of DD     [place] Heart association Class & Stage : 1c     HF Meds     Beta Blocker: Metoprolol 100 mg twice daily  ARNI/ACE/ARB: stopped 2-12-24 Entresto 24 to 26 mg BID  SGLT 2 inhibitors: [id number] mg daily  Diuretics upon release from clinic: Lasix 20 mg every other day  Furoscix: Currently not a candidate  Aldosterone Antagonist: None indicated  Digitalis: N/A  Vasodilators & Nitrates: Not indicated      Cardiac medicines reviewed with risk, benefits, and necessity of each discussed with myself & a [facility].     ____________________________________________________________    Discussion    Heart failure core measures including beta-blocker, ARNI, SGLT2  H/o EF being less than 20% July 22, repeat echo in November 22 EF improved to 60 to 65%.   Repeat TTE 1/25/24 EF 56-60% Gr 1 DD w/ RVSP 35-45 mmHg  She will continue follow-ups with Dr. Murphy as well as Dr. Kerr her nephrologist  Significantly hyperkalemic 2/12/24 with a potassium of 6 renal function is maintaining the creatinine of 1.4 given Lokelma 10 g x 3 days  had BMP done & K+ was WNL ( (3.9) after Lokelma at an ER in [place]  A. Last visit potassium was up to 5.1 and she was placed on Lasix 20mg QOD  5. Blood pressure has not been under good control, however, I was unable to find a lot of room to add medications which would aid in control on the last couple of visits  therefore we will have to defer to Dr. Murphy and Cirilo   6. We will go ahead and get labs in approximately 2 weeks to ensure potassium stabilized  A. We will plan on seeing again in approximately 1 month.  Working around the patient's rather busy travel schedule  7. Follow-up is due continue with Dr. Kerr and Dr. Murphy  8.  Last visit show very little alteration  8. Would continue with ongoing heart failure nursing interventions including:                          A. Fluid restriction at less than 2000 cc daily                          B. Daily weights                          C.  1 g low-sodium diet    I did the following activities preparing for the visit with Susy Hanson  including: reviewing tests, once pt arrived in clinic I also performed a medically appropriate examination and/or evaluation, I personally spent considerable time counseling and educating the patient/family/caregiver, ordering medications, tests, or procedures, referring and communicating with other health care professionals, and documenting information in the medical record. I estimate including preparation 15 minutes          Subjective    Subjective:     No chief complaint on file.      HPI:  63-year-old female patient of Dr. Murphy's, with a PMH of nonischemic nondilated cardiomyopathy with a.  LVEF less than 20% back in 2022, S\P [place] aware BiV ICD complicated by pericardial effusion with lack of pacing and RV lead perforation subsequently had recovery of EF at 60 to 65% (TTE 11/3/22), without mention of any diastolic dysfunction, most recent TTE done in January 2024 shows her EF is now at about 56 to 60% with grade 1 DD. H\O left breast cancer (H cc), diabetes, IBS, metabolic syndrome with HTN, HLD, ANDREAS Rx w/ CPAP, DM.  Comes in for her initial visit at the [facility] heart failure clinic 1/25/2024, she has done well from a heart failure standpoint with improvement of EF however since no echocardiograms been done in greater  than a year it is concerning as to what her current EF might be.  Does not seem to be particularly symptomatic however does have some dyspnea with exertion.  She has concerns regarding her past Breast implant for which she is planning on having those removed.  She was also found to be hyperkalemic treated with Lokelma comes back in f/u 12 Feb 24 after having seen Dr. Murphy who decreased her Entresto due to renal dysfunction. She feels about the same as she did on her last visit.  She was found to be hypertensive 2/12/24 and hyperkalemic with a K+= 6 Was encouraged not to leave town given her increased K+ however she was rather adamant & saw her brother in [place]. Was told to go to nearest ER by renal and we had BMP done in [place] & K+ was WNL after Lokelma.  Comes back on 19 February 2024 after returning home she has felt well but has come back with a K+ up to 5.1.It is still 1.5 months till she sees renal. Back to clinic 4 Mar 24 since last visit.  She has been doing remarkably well, she brought her blood pressure logs which seem to be stable.    ROS:    Constitutional:  weakness, & fatigue have persisted but remains at what she describes as her normal level ., No fever, rigors, chills   Eyes: No recent vision changes, eye pain   ENT/oropharynx: No recent difficulty swallowing, sore throat, epistaxis, changes in hearing   Cardiovascular: No recent chest pain, chest tightness, palpitations except as noted in HPI, paroxysmal nocturnal dyspnea, orthopnea, diaphoresis, dizziness & no pre or vikram syncopal episodes   Respiratory: No at rest shortness of breath, + dyspnea on exertion, no significant productive cough, no wheezing and no hemoptysis   Gastrointestinal: No abdominal pain, nausea, vomiting, diarrhea, bloody stools   Genitourinary: No hematuria, dysuria other than increased frequency   Neurological: No headache, tremors, numbness,  or hemiparesis    Musculoskeletal: No change in typical cramps,  myalgias,  joint pain, w/o any new joint swelling   Integument: No recent rash, no edema      Patient Active Problem List   Diagnosis    Encounter for general adult medical examination without abnormal findings    Essential hypertension    Irritable bowel syndrome    Mixed anxiety depressive disorder    Mixed hyperlipidemia    Obesity    Encounter for screening for malignant neoplasm of colon    Type 2 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    Vitamin D deficiency    Hypertensive urgency    Peripheral neuropathy    ANDREAS on CPAP    Chest pain, atypical    Breast neoplasm, Tis (DCIS), left    Cold intolerance    Insomnia    Breast pain    Dyspnea    h/o Acute kidney injury    LBBB (left bundle branch block)    h/o nonischemic, nondilated, cardiomyopathy    Presence of biventricular  ICD    Hemopericardium    h/o Pericarditis    Pericardial effusion    Metabolic syndrome X    At risk for fluid volume overload    h/o resolved Left ventricular failure    Stage 3b chronic kidney disease    Hyperkalemia    mild PAH (pulmonary artery hypertension)       Past Medical History:   Diagnosis Date    Anxiety 2000    Breast cancer     LEFT BREAST 7/2021    Cataract     Had surgery to remove August 2023    CHF (congestive heart failure)     Colon polyp 12/2023    Removed during colonoscopy    Coronary artery disease 2019    Depression     DM (diabetes mellitus)     History of medical problems 2008    Bladder prolapse repeat Dr. Roper    HL (hearing loss)     Hyperlipidemia     Hypertension     SAW CARDIO/ STRESS TEST 2020 - NOW MEDS MANAGED BY PCP     Irritable bowel syndrome 2006    Peripheral neuropathy     PONV (postoperative nausea and vomiting)     Renal insufficiency 2020    See Dr. funes for moderate kidney failure    Sleep apnea     WEARS CPAP    Type 2 diabetes mellitus 2008    Urinary tract infection 6/23    UTI seen at [facility]       Past Surgical History:   Procedure Laterality Date     BLADDER REPAIR      BREAST BIOPSY      BREAST RECONSTRUCTION Bilateral 08/26/2021    Procedure: BILATERAL PREPECTORALPLACMENT TISSUE EXPANDERS AND ALLODERM;  Surgeon: Heri Arreaga MD;  Location: [facility] MAIN OR;  Service: Plastics;  Laterality: Bilateral;    BREAST TISSUE EXPANDER REMOVAL INSERTION OF IMPLANT Bilateral 12/23/2021    Procedure: BILATERAL REMOVAL TISSUE EXPANDERS AND PLACEMENT OF IMPLANTS;  Surgeon: Heri Arreaga MD;  Location: [facility] MAIN OR;  Service: Plastics;  Laterality: Bilateral;    CARDIAC CATHETERIZATION Left 04/13/2022    Procedure: Cardiac Catheterization/Vascular Study;  Surgeon: Christo Murphy MD;  Location:  [facility];  Service: Cardiovascular;  Laterality: Left;    CARDIAC ELECTROPHYSIOLOGY PROCEDURE N/A 09/19/2022    Procedure: ICD implant [place] aware;  Surgeon: Esteban Kendrick MD;  Location:  [facility];  Service: Cardiology;  Laterality: N/A;    CARDIAC ELECTROPHYSIOLOGY PROCEDURE N/A 09/20/2022    Procedure: lead repositioning;  Surgeon: Esteban Kendrick MD;  Location:  [facility];  Service: Cardiology;  Laterality: N/A;    CARDIAC SURGERY  2022    Defibrillator/pacemaker implant    CARDIOVASCULAR STRESS TEST  2020    COLONOSCOPY  11/2012    EYE SURGERY  7/23    Cataracts removed    HYSTERECTOMY      INSERT / REPLACE / REMOVE PACEMAKER  2022    LYMPH NODE BIOPSY  8/29/2021    MASTECTOMY W/ SENTINEL NODE BIOPSY Bilateral 08/26/2021    Procedure: bilateral total mastectomy, left axillary sentinel lymph node biopsy, possible reconstruction.;  Surgeon: Asia Perkins MD;  Location: [facility] MAIN OR;  Service: General;  Laterality: Bilateral;    TUBAL ABDOMINAL LIGATION  1981       Social History     Socioeconomic History    Marital status:    Tobacco Use    Smoking status: Former     Current packs/day: 0.00     Average packs/day: 2.0 packs/day for 36.0 years (72.0 ttl pk-yrs)     Types: Cigarettes      Start date: 1972     Quit date: 2008     Years since quittin.1     Passive exposure: Past    Smokeless tobacco: Never   Vaping Use    Vaping status: Never Used   Substance and Sexual Activity    Alcohol use: No    Drug use: No    Sexual activity: Yes     Partners: Male     Birth control/protection: None, Tubal ligation, Hysterectomy       Allergies   Allergen Reactions    Hydrocodone-Acetaminophen Hives     Lortab, takes tylenol         Current Outpatient Medications:     Accu-Chek FastClix Lancets misc, USE TO CHECK BLOOD GLUCOSE DAILY, Disp: , Rfl:     Accu-Chek Guide test strip, 1 each by Other route Daily. E11.49, Disp: 100 each, Rfl: 1    ALPRAZolam (XANAX) 1 MG tablet, Take 1 tablet by mouth 2 (Two) Times a Day., Disp: 60 tablet, Rfl: 0    atorvastatin (LIPITOR) 20 MG tablet, Take 1 tablet by mouth Daily., Disp: 90 tablet, Rfl: 1    cetirizine (zyrTEC) 10 MG tablet, Take 1 tablet by mouth Daily As Needed., Disp: , Rfl:     Continuous Blood Gluc  (FreeStyle [place] 3 [place]) device, Use 1 each 4 (Four) Times a Day Before Meals & at Bedtime., Disp: 1 each, Rfl: 0    Continuous Blood Gluc Sensor (FreeStyle Benton 3 Sensor) misc, Use 1 each Every 14 (Fourteen) Days., Disp: 2 each, Rfl: 5    dapagliflozin Propanediol (Farxiga) 10 MG tablet, Take 10 mg by mouth Daily., Disp: 90 tablet, Rfl: 3    dilTIAZem CD (Cardizem CD) 180 MG 24 hr capsule, Take 1 capsule by mouth Daily., Disp: 90 capsule, Rfl: 1    furosemide (Lasix) 20 MG tablet, Take 1 tablet by mouth Every Other Day for 30 days., Disp: 45 tablet, Rfl: 0    glimepiride (Amaryl) 4 MG tablet, Take 1 tablet by mouth Every Morning Before Breakfast., Disp: 90 tablet, Rfl: 3    hydrALAZINE (APRESOLINE) 50 MG tablet, Take 1.5 tablets by mouth Every 12 (Twelve) Hours., Disp: , Rfl:     Insulin Glargine (Lantus SoloStar) 100 UNIT/ML injection pen, Inject 10 Units under the skin into the appropriate area as directed Every Night. (Patient taking  differently: Inject 8 Units under the skin into the appropriate area as directed Every Night.), Disp: 15 mL, Rfl: 1    Insulin Pen Needle (Pen Needles) 32G X 4 MM misc, Use 1 each 4 (Four) Times a Day Before Meals & at Bedtime., Disp: 200 each, Rfl: 5    metoprolol succinate XL (TOPROL-XL) 100 MG 24 hr tablet, Take 1 tablet by mouth 2 (Two) Times a Day., Disp: 180 tablet, Rfl: 1    ondansetron (Zofran) 4 MG tablet, Take 1 tablet by mouth Every 8 (Eight) Hours As Needed for Nausea or Vomiting., Disp: 20 tablet, Rfl: 0    pantoprazole (PROTONIX) 40 MG EC tablet, Take 1 tablet by mouth Daily., Disp: 90 tablet, Rfl: 0    Triamcinolone Acetonide (NASACORT) 55 MCG/ACT nasal inhaler, APPLY 2 SPRAYS BY NASAL ROUTE DAILY FOR 30 DAYS (Patient taking differently: 2 sprays into the nostril(s) as directed by provider Daily As Needed.), Disp: 10.8 mL, Rfl: 6    Immunization History   Administered Date(s) Administered    Arexvy (RSV, Adults 60+ yrs) 09/22/2023    COVID-19 (PFIZER) BIVALENT 12+YRS 11/04/2022, 09/22/2023    COVID-19 (PFIZER) Purple Cap Monovalent 01/06/2021, 01/27/2021, 09/30/2021, 11/01/2021    Fluad Quad 65+ 09/14/2023    H1N1 Inj 11/03/2009    Hepatitis A 04/29/2018, 11/02/2018    Influenza Injectable Mdck Pf Quad 11/04/2022    Influenza, Unspecified 11/01/2021, 09/14/2023    Pneumococcal Conjugate 20-Valent (PCV20) 11/30/2023    RSV, unspecified (Vaccine or MAB) 09/22/2023    Tdap 07/26/2022    flucelvax quad pfs =>4 YRS 10/19/2020         Most recent EKG as reviewed:  Procedures     Most recent echo as reviewed:  Results for orders placed during the hospital encounter of 01/30/24    Adult Transthoracic Echo Complete W/ Cont if Necessary Per Protocol    Interpretation Summary    Left ventricular systolic function is normal. Left ventricular ejection fraction appears to be 56 - 60%.    Left ventricular diastolic function is consistent with (grade I) impaired relaxation.    Estimated right ventricular systolic  pressure from tricuspid regurgitation is mildly elevated (35-45 mmHg).      Most recent stress test results:  Results for orders placed during the hospital encounter of 04/11/22    Stress Test With Myocardial Perfusion One Day    Interpretation Summary  · This is incomplete Cardiolite imaging study. Stress images were not done because test was aborted. Rest images showed small fixed septal and apical defect. Left ventricle size appear normal. No gated SPECT imaging were noted..      Most recent cardiac catheterization results:  Results for orders placed during the hospital encounter of 04/11/22    Cardiac Catheterization/Vascular Study    Narrative  CARDIAC CATHETERIZATION REPORT      DATE OF PROCEDURE:  4/13/2022    INDICATION FOR PROCEDURE:    Congestive heart failure acute systolic  Dilated cardiomyopathy    PROCEDURE PERFORMED:    Left heart catheterization  coronary angiography  left ventriculography    PROCEDURE COMMENTS:    After informed consent was obtained, the patient was prepped and draped in the usual sterile manner.  Mild to moderate sedation was administered.  Right femoral artery was accessed without difficulty and 6 Maltese arterial sheath was inserted.  Sheath was flushed with heparinized saline.    Using 6 Maltese Roberto catheters, first left coronary artery and the right coronary was electively engaged and appropriate views were taken.  A 6 Maltese JR4 catheter was used to cross aortic valve and left heart catheterization was performed.  Left ventriculography was done NAQVI view    Patient tolerated the procedure well.    FINDINGS:    1. HEMODYNAMICS:    Aortic pressure: 167/101 mmHg    LVEDP: 10 to 15 mmHg    Gradient across aortic valve on pullback: No gradient across aortic valve      2. LEFT VENTRICULOGRAPHY: 35 to 40%      3. CORONARY ANGIOGRAPHY:    A: Left main coronary artery: Normal    B: Left anterior descending artery: 25% plaque in proximal and mid segment.  No high-grade stenosis    C:  Left circumflex coronary artery: Mild diffuse disease in the midsegment but no high-grade stenosis    D: Right coronary artery: 25% plaque in proximal and mid segment of RCA but no high-grade stenosis.  It is dominant vessel    SUMMARY:    Mild coronary artery disease  Left ventricular dysfunction    RECOMMENDATIONS:    Medical treatment.        Historical data copied forward from previous encounters in EMR including the history, exam, and assessment/plan has been reviewed and is unchanged except as I have noted and otherwise indicated.      Objective:         /86 (BP Location: Right arm)   Pulse 70   Resp 18   Wt 77.8 kg (171 lb 9.6 oz)   LMP  (LMP Unknown)   SpO2 99%   BMI 29.44 kg/m²     General:  Well-developed, well-nourished, cooperative, no distress, appears stated age if not slightly older    Neuro:  A&O x3. No significantly obvious focal neuro deficet    Psych:  Pleasant affect    HENT:  Normocephalic, atraumatic, moist mucous membranes , Normal ear placement,Throat not injected   Eyes:  PERRLA, Conjunctivae not injected, EOM's intact, conjunctiva does not appear significantly injected   Neck:  Supple, Mildly thickened, no lymph adenopathy nor thyromegaly no JVD or bruit    Lungs:    Symmetrical rise & fall of chest with baseline respiratory pattern. To auscultation lungs are Clear bilaterally, faint late phase expiratory wheezes, no rhonchi or rales are noted    Chest wall:  No significant tenderness when palpated. PMI @ 5th ICS McL  , deflated Left breast, well healed ICD scar   Heart:  Regular rate and rhythm, S1 and S2 normal, no S3 or S4, Gr I/VI systolic ejection murmur best heard at the apex , no obvious rub, click or gallop.    Abdomen:  non-distended, non-tender, bowel sounds noted in the 4 quadrants of the abdomen, significant adipose tissue identified    Extremities:  Equal color motion temperature and sensitivity, Rapid capillary refill noted within the nailbeds of fingers and toes  bilaterally no lower extremity edema.    Pulses:  2+ and symmetric all extremities, rapid capillary refill    Skin:  No obvious rashes, significant lesions identified, warm dry and of normal turgor      In-Office Procedure(s):  No orders to display        Imaging:    Results for orders placed during the hospital encounter of 10/03/22    XR Chest 1 View    Narrative  EXAMINATION: XR CHEST 1 VW    DATE OF EXAM: 10/4/2022 12:28 AM  SLOT: 60    HISTORY: Chest pain    COMPARISON: None.    FINDINGS:    HEART/MEDIASTINUM: Mildly enlarged cardiac silhouette. Radiation is the origin of the right atrium, right ventricle, and coronary sinus.    LUNGS/PLEURA: Linear subsegmental atelectasis in the left lung base. Otherwise, grossly clear.    MUSCULOSKELETAL: No acute osseous pathology. Surgical clips overlie the axilla bilaterally.    Impression  1.  No acute cardiopulmonary abnormality detected.        Electronically signed by:  Yony Patricio M.D.  10/4/2022 12:33 AM       Results for orders placed during the hospital encounter of 01/11/24    US Abdomen Limited    Narrative  US ABDOMEN LIMITED    Date of Exam: 1/11/2024 10:41 AM EST    Indication: persistent nausea.    Comparison: CT abdomen and pelvis angiography 1/17/2020    Technique: Grayscale and color Doppler ultrasound evaluation of the right upper quadrant was performed.      Findings:  Diffusely increased liver echogenicity compatible with hepatic steatosis. No discrete liver lesion. No perihepatic ascites. Visualized portions of the pancreas are normal. The gallbladder is normal. No biliary dilatation. Common bile duct measures 2 mm.  Hepatopetal flow in the portal vein. Visualized hepatic veins are patent. The right kidney measures 9.1 x 4.1 x 3.7 cm. No right-sided hydronephrosis.    Impression  Impression:  1. Hepatic steatosis.  2. Normal gallbladder.  3. No biliary dilatation.        Electronically Signed: Ottoniel Lee MD  1/11/2024 11:54 AM EST  Workstation  ID: [id number]      Results for orders placed during the hospital encounter of 10/03/22    CT Angiogram Chest Pulmonary Embolism    Narrative  Exam: CT Angiography of the Chest.    Date: 10/4/2022.    Comparison: 4/11/2022.    History: Defibrillator placement with chest pain.    Technique: CT examination of the chest was performed following the intravenous administration of 100 mL of Isovue-370. Sagittal, coronal and 3-D reformatted images were provided. CT dose lowering techniques were used, to include: automated exposure  control, adjustment for patient size, and/or use of iterative reconstruction.    FINDINGS:    CHEST WALL: There are bilateral breast implants.    Mediastinum and Theresa: There is no axillary, mediastinal or hilar lymphadenopathy.    Pleural and Pericardial spaces: There is a small to moderate pericardial effusion which is higher density than simple fluid which may relate to a hemopericardium.    Upper Abdomen: The visualized upper abdomen is unremarkable.    Cardiovascular: AICD generator has leads in the right atrium and right ventricle. There is mild vascular calcification throughout the thoracic aorta without evidence of aneurysmal dilation or dissection.    Pulmonary Artery: There are no filling defects in the pulmonary arteries.    Lung Parenchyma and Airways: The lungs are clear.    Bones: No fracture or aggressive osseous lesion.    Impression  1. No evidence of pulmonary embolism.  2. No evidence of thoracic aortic aneurysm or dissection.  3. Small to moderate amount of hemopericardium.    These findings were discussed with COURTNEY Atkinson.      Electronically signed by:  Amish Ellsworth D.O.  10/4/2022 1:51 AM      Lab Review:   Hospital Outpatient Visit on 03/04/2024   Component Date Value    Glucose 03/04/2024 185 (H)     BUN 03/04/2024 33 (H)     Creatinine 03/04/2024 1.46 (H)     Sodium 03/04/2024 144     Potassium 03/04/2024 4.8     Chloride 03/04/2024 107     CO2 03/04/2024 24.0      Calcium 03/04/2024 9.9     BUN/Creatinine Ratio 03/04/2024 22.6     Anion Gap 03/04/2024 13.0     eGFR 03/04/2024 39.8 (L)     proBNP 03/04/2024 49.3    Hospital Outpatient Visit on 02/19/2024   Component Date Value    Glucose 02/19/2024 141 (H)     BUN 02/19/2024 23     Creatinine 02/19/2024 1.39 (H)     Sodium 02/19/2024 143     Potassium 02/19/2024 5.1     Chloride 02/19/2024 106     CO2 02/19/2024 26.0     Calcium 02/19/2024 10.0     BUN/Creatinine Ratio 02/19/2024 16.5     Anion Gap 02/19/2024 11.0     eGFR 02/19/2024 42.2 (L)     Magnesium 02/19/2024 2.1     proBNP 02/19/2024 166.2    Hospital Outpatient Visit on 02/12/2024   Component Date Value    Glucose 02/12/2024 241 (H)     BUN 02/12/2024 28 (H)     Creatinine 02/12/2024 1.39 (H)     Sodium 02/12/2024 140     Potassium 02/12/2024 6.0 (H)     Chloride 02/12/2024 104     CO2 02/12/2024 23.0     Calcium 02/12/2024 9.9     BUN/Creatinine Ratio 02/12/2024 20.1     Anion Gap 02/12/2024 13.0     eGFR 02/12/2024 42.2 (L)     proBNP 02/12/2024 102.7     QT Interval 02/12/2024 456     QTC Interval 02/12/2024 496    Hospital Outpatient Visit on 01/30/2024   Component Date Value    LVIDd 01/30/2024 4.6     LVIDs 01/30/2024 3.2     IVSd 01/30/2024 1.04     LVPWd 01/30/2024 1.09     FS 01/30/2024 32.0     IVS/LVPW 01/30/2024 0.95     ESV(cubed) 01/30/2024 31.5     LV Sys Vol (BSA correcte* 01/30/2024 20.4     EDV(cubed) 01/30/2024 100.3     LV Kamara Vol (BSA correct* 01/30/2024 47.4     LV mass(C)d 01/30/2024 175.7     LVOT area 01/30/2024 3.1     LVOT diam 01/30/2024 1.98     EDV(MOD-sp4) 01/30/2024 87.0     ESV(MOD-sp4) 01/30/2024 37.4     SV(MOD-sp4) 01/30/2024 49.5     SI(MOD-sp4) 01/30/2024 27.0     EF(MOD-sp4) 01/30/2024 57.0     MV E max alireza 01/30/2024 91.6     MV A max alireza 01/30/2024 142.5     MV dec time 01/30/2024 0.21     MV E/A 01/30/2024 0.64     Pulm A Revs Dur 01/30/2024 0.11     TR max alireza 01/30/2024 251.6     SV(LVOT) 01/30/2024 79.9     LA dimension  (2D)  01/30/2024 3.2     Pulm Sys Jeffery 01/30/2024 61.5     Pulm Kamara Jeffery 01/30/2024 27.4     Pulm S/D 01/30/2024 2.25     Pulm A Revs Jeffery 01/30/2024 30.4     LV V1 max 01/30/2024 107.2     LV V1 max PG 01/30/2024 4.6     LV V1 mean PG 01/30/2024 2.42     LV V1 VTI 01/30/2024 26.0     Ao pk jeffery 01/30/2024 133.1     Ao max PG 01/30/2024 7.1     Ao mean PG 01/30/2024 3.6     Ao V2 VTI 01/30/2024 29.0     MOJGAN(I,D) 01/30/2024 2.8     AI P1/2t 01/30/2024 658.7     MV max PG 01/30/2024 8.7     MV mean PG 01/30/2024 4.0     MV V2 VTI 01/30/2024 32.9     MVA(VTI) 01/30/2024 2.43     MV dec slope 01/30/2024 427.5     MR max jeffery 01/30/2024 680.4     MR max PG 01/30/2024 185.5     TR max PG 01/30/2024 25.5     RVSP(TR) 01/30/2024 35.5     RAP systole 01/30/2024 10.0     PA V2 max 01/30/2024 107.3     PA acc time 01/30/2024 0.07     PI end-d jeffery 01/30/2024 133.1     Ao root diam 01/30/2024 2.7     ACS 01/30/2024 1.59     EF(MOD-bp) 01/30/2024 60.0     RV Base 01/30/2024 3.60     RV Mid 01/30/2024 1.70     TAPSE (>1.6) 01/30/2024 2.60    Hospital Outpatient Visit on 01/29/2024   Component Date Value    Glucose 01/29/2024 209 (H)     BUN 01/29/2024 28 (H)     Creatinine 01/29/2024 1.38 (H)     Sodium 01/29/2024 143     Potassium 01/29/2024 5.4 (H)     Chloride 01/29/2024 106     CO2 01/29/2024 26.0     Calcium 01/29/2024 9.8     BUN/Creatinine Ratio 01/29/2024 20.3     Anion Gap 01/29/2024 11.0     eGFR 01/29/2024 42.6 (L)     Magnesium 01/29/2024 2.2     proBNP 01/29/2024 63.3    Hospital Outpatient Visit on 01/25/2024   Component Date Value    Glucose 01/25/2024 248 (H)     BUN 01/25/2024 30 (H)     Creatinine 01/25/2024 1.35 (H)     Sodium 01/25/2024 142     Potassium 01/25/2024 5.7 (H)     Chloride 01/25/2024 103     CO2 01/25/2024 26.0     Calcium 01/25/2024 10.2     BUN/Creatinine Ratio 01/25/2024 22.2     Anion Gap 01/25/2024 13.0     eGFR 01/25/2024 43.7 (L)     Magnesium 01/25/2024 2.3     proBNP 01/25/2024 58.9     TSH  01/25/2024 1.500     Hemoglobin A1C 01/25/2024 7.90 (H)     QT Interval 01/25/2024 443     QTC Interval 01/25/2024 480    Lab on 12/28/2023   Component Date Value    25 Hydroxy, Vitamin D 12/28/2023 34.7     Color, UA 12/28/2023 Yellow     Appearance, UA 12/28/2023 Clear     pH, UA 12/28/2023 6.0     Specific Gravity, UA 12/28/2023 1.018     Glucose, UA 12/28/2023 Negative     Ketones, UA 12/28/2023 Negative     Bilirubin, UA 12/28/2023 Negative     Blood, UA 12/28/2023 Negative     Protein, UA 12/28/2023 100 mg/dL (2+) (A)     Leuk Esterase, UA 12/28/2023 Moderate (2+) (A)     Nitrite, UA 12/28/2023 Negative     Urobilinogen, UA 12/28/2023 0.2 E.U./dL     Glucose 12/28/2023 158 (H)     BUN 12/28/2023 21     Creatinine 12/28/2023 1.16 (H)     Sodium 12/28/2023 146 (H)     Potassium 12/28/2023 5.2     Chloride 12/28/2023 105     CO2 12/28/2023 26.0     Calcium 12/28/2023 9.9     Total Protein 12/28/2023 6.7     Albumin 12/28/2023 4.9     ALT (SGPT) 12/28/2023 23     AST (SGOT) 12/28/2023 14     Alkaline Phosphatase 12/28/2023 65     Total Bilirubin 12/28/2023 0.2     Globulin 12/28/2023 1.8     A/G Ratio 12/28/2023 2.7     BUN/Creatinine Ratio 12/28/2023 18.1     Anion Gap 12/28/2023 15.0     eGFR 12/28/2023 52.4 (L)     Phosphorus 12/28/2023 4.4     PTH, Intact 12/28/2023 78.0 (H)     Hemoglobin A1C 12/28/2023 7.10 (H)     Protein/Creatinine Ratio* 12/28/2023 526.9 (H)     Creatinine, Urine 12/28/2023 109.7     Total Protein, Urine 12/28/2023 57.8     Magnesium 12/28/2023 1.8     Creatine Kinase 12/28/2023 25     Uric Acid 12/28/2023 7.7 (H)     WBC 12/28/2023 4.64     RBC 12/28/2023 3.98     Hemoglobin 12/28/2023 11.7 (L)     Hematocrit 12/28/2023 35.7     MCV 12/28/2023 89.7     MCH 12/28/2023 29.4     MCHC 12/28/2023 32.8     RDW 12/28/2023 13.3     RDW-SD 12/28/2023 43.9     MPV 12/28/2023 10.8     Platelets 12/28/2023 225     Neutrophil % 12/28/2023 52.1     Lymphocyte % 12/28/2023 30.0     Monocyte %  12/28/2023 9.1     Eosinophil % 12/28/2023 7.5 (H)     Basophil % 12/28/2023 0.9     Immature Grans % 12/28/2023 0.4     Neutrophils, Absolute 12/28/2023 2.42     Lymphocytes, Absolute 12/28/2023 1.39     Monocytes, Absolute 12/28/2023 0.42     Eosinophils, Absolute 12/28/2023 0.35     Basophils, Absolute 12/28/2023 0.04     Immature Grans, Absolute 12/28/2023 0.02     nRBC 12/28/2023 0.0     RBC, UA 12/28/2023 0-2     WBC, UA 12/28/2023 21-50 (A)     Bacteria, UA 12/28/2023 None Seen     Squamous Epithelial Cell* 12/28/2023 3-6 (A)     Hyaline Casts, UA 12/28/2023 3-6     Methodology 12/28/2023 Automated Microscopy    Lab on 12/21/2023   Component Date Value    Campylobacter 12/21/2023 Not Detected     Plesiomonas shigelloides 12/21/2023 Not Detected     Salmonella 12/21/2023 Not Detected     Vibrio 12/21/2023 Not Detected     Vibrio cholerae 12/21/2023 Not Detected     Yersinia enterocolitica 12/21/2023 Not Detected     Enteroaggregative E. col* 12/21/2023 Not Detected     Enteropathogenic E. coli* 12/21/2023 Not Detected     Enterotoxigenic E. coli * 12/21/2023 Not Detected     Shiga-like toxin-produci* 12/21/2023 Not Detected     Shigella/Enteroinvasive * 12/21/2023 Not Detected     Cryptosporidium 12/21/2023 Not Detected     Cyclospora cayetanensis 12/21/2023 Not Detected     Entamoeba histolytica 12/21/2023 Not Detected     Giardia lamblia 12/21/2023 Not Detected     Adenovirus F40/41 12/21/2023 Not Detected     Astrovirus 12/21/2023 Not Detected     Norovirus GI/GII 12/21/2023 Not Detected     Rotavirus A 12/21/2023 Not Detected     Sapovirus (I, II, IV or * 12/21/2023 Not Detected    Lab on 12/20/2023   Component Date Value    Hemoglobin A1C 12/20/2023 7.20 (H)     Glucose 12/20/2023 142 (H)     BUN 12/20/2023 32 (H)     Creatinine 12/20/2023 1.58 (H)     Sodium 12/20/2023 142     Potassium 12/20/2023 5.3 (H)     Chloride 12/20/2023 103     CO2 12/20/2023 22.3     Calcium 12/20/2023 9.6     Total Protein  12/20/2023 7.2     Albumin 12/20/2023 4.7     ALT (SGPT) 12/20/2023 19     AST (SGOT) 12/20/2023 14     Alkaline Phosphatase 12/20/2023 59     Total Bilirubin 12/20/2023 0.2     Globulin 12/20/2023 2.5     A/G Ratio 12/20/2023 1.9     BUN/Creatinine Ratio 12/20/2023 20.3     Anion Gap 12/20/2023 16.7 (H)     eGFR 12/20/2023 36.2 (L)     WBC 12/20/2023 6.12     RBC 12/20/2023 4.14     Hemoglobin 12/20/2023 12.2     Hematocrit 12/20/2023 36.8     MCV 12/20/2023 88.9     MCH 12/20/2023 29.5     MCHC 12/20/2023 33.2     RDW 12/20/2023 13.3     RDW-SD 12/20/2023 43.0     MPV 12/20/2023 11.3     Platelets 12/20/2023 221     Neutrophil % 12/20/2023 57.9     Lymphocyte % 12/20/2023 24.8     Monocyte % 12/20/2023 10.3     Eosinophil % 12/20/2023 5.7     Basophil % 12/20/2023 1.0     Immature Grans % 12/20/2023 0.3     Neutrophils, Absolute 12/20/2023 3.54     Lymphocytes, Absolute 12/20/2023 1.52     Monocytes, Absolute 12/20/2023 0.63     Eosinophils, Absolute 12/20/2023 0.35     Basophils, Absolute 12/20/2023 0.06     Immature Grans, Absolute 12/20/2023 0.02     nRBC 12/20/2023 0.0    Lab on 09/25/2023   Component Date Value    Color, UA 09/25/2023 Yellow     Appearance, UA 09/25/2023 Clear     pH, UA 09/25/2023 6.0     Specific Gravity, UA 09/25/2023 1.008     Glucose, UA 09/25/2023 Negative     Ketones, UA 09/25/2023 Negative     Bilirubin, UA 09/25/2023 Negative     Blood, UA 09/25/2023 Negative     Protein, UA 09/25/2023 30 mg/dL (1+) (A)     Leuk Esterase, UA 09/25/2023 Trace (A)     Nitrite, UA 09/25/2023 Negative     Urobilinogen, UA 09/25/2023 0.2 E.U./dL     Extra Tube 09/25/2023 Hold for add-ons.     RBC, UA 09/25/2023 0-2     WBC, UA 09/25/2023 0-2     Bacteria, UA 09/25/2023 None Seen     Squamous Epithelial Cell* 09/25/2023 0-2     Hyaline Casts, UA 09/25/2023 None Seen     Methodology 09/25/2023 Automated Microscopy    There may be more visits with results that are not included.     Recent labs reviewed and  "interpreted for clinical significance and application    Went over the labs individually with the patient while she was still here in clinic          It is a pleasure to be involved in this patient's cardiovascular care relating to their heart failure.  Please feel free to call me with any questions or concerns.    Rodney \"Renan\" Clay LEDEZMA, [facility]  Heart failure program clinical provider    Rodney Williamson, BRISA   03/01/24  .  "

## 2024-03-04 ENCOUNTER — DISEASE STATE MANAGEMENT VISIT (OUTPATIENT)
Facility: HOSPITAL | Age: 64
End: 2024-03-04
Payer: MEDICARE

## 2024-03-04 ENCOUNTER — HOSPITAL ENCOUNTER (OUTPATIENT)
Facility: HOSPITAL | Age: 64
Discharge: HOME | End: 2024-03-04
Admitting: NURSE PRACTITIONER
Payer: MEDICARE

## 2024-03-04 VITALS
HEART RATE: 70 BPM | RESPIRATION RATE: 18 BRPM | BODY MASS INDEX: 29.44 KG/M2 | WEIGHT: 171.6 LBS | SYSTOLIC BLOOD PRESSURE: 133 MMHG | DIASTOLIC BLOOD PRESSURE: 86 MMHG | OXYGEN SATURATION: 99 %

## 2024-03-04 DIAGNOSIS — F41.8 MIXED ANXIETY DEPRESSIVE DISORDER: ICD-10-CM

## 2024-03-04 DIAGNOSIS — N18.32 STAGE 3B CHRONIC KIDNEY DISEASE: ICD-10-CM

## 2024-03-04 DIAGNOSIS — I44.7 LBBB (LEFT BUNDLE BRANCH BLOCK): ICD-10-CM

## 2024-03-04 DIAGNOSIS — I50.1 LEFT VENTRICULAR FAILURE, UNSPECIFIED: Chronic | ICD-10-CM

## 2024-03-04 DIAGNOSIS — I27.21 PAH (PULMONARY ARTERY HYPERTENSION): Chronic | ICD-10-CM

## 2024-03-04 DIAGNOSIS — Z91.89 AT RISK FOR FLUID VOLUME OVERLOAD: Chronic | ICD-10-CM

## 2024-03-04 DIAGNOSIS — E88.810 METABOLIC SYNDROME X: Chronic | ICD-10-CM

## 2024-03-04 DIAGNOSIS — I42.9 CARDIOMYOPATHY, UNSPECIFIED TYPE: Primary | ICD-10-CM

## 2024-03-04 DIAGNOSIS — Z95.810 PRESENCE OF BIVENTRICULAR IMPLANTABLE CARDIOVERTER-DEFIBRILLATOR (ICD): Chronic | ICD-10-CM

## 2024-03-04 DIAGNOSIS — K58.8 OTHER IRRITABLE BOWEL SYNDROME: ICD-10-CM

## 2024-03-04 DIAGNOSIS — R06.09 DYSPNEA ON EXERTION: ICD-10-CM

## 2024-03-04 DIAGNOSIS — G62.89 OTHER POLYNEUROPATHY: ICD-10-CM

## 2024-03-04 DIAGNOSIS — N17.9 ACUTE KIDNEY INJURY: ICD-10-CM

## 2024-03-04 DIAGNOSIS — E11.49 TYPE 2 DIABETES MELLITUS WITH OTHER DIABETIC NEUROLOGICAL COMPLICATION: ICD-10-CM

## 2024-03-04 LAB
ANION GAP SERPL CALCULATED.3IONS-SCNC: 13 MMOL/L (ref 5–15)
BUN SERPL-MCNC: 33 MG/DL (ref 8–23)
BUN/CREAT SERPL: 22.6 (ref 7–25)
CALCIUM SPEC-SCNC: 9.9 MG/DL (ref 8.6–10.5)
CHLORIDE SERPL-SCNC: 107 MMOL/L (ref 98–107)
CO2 SERPL-SCNC: 24 MMOL/L (ref 22–29)
CREAT SERPL-MCNC: 1.46 MG/DL (ref 0.57–1)
EGFRCR SERPLBLD CKD-EPI 2021: 39.8 ML/MIN/1.73
GLUCOSE SERPL-MCNC: 185 MG/DL (ref 65–99)
NT-PROBNP SERPL-MCNC: 49.3 PG/ML (ref 0–900)
POTASSIUM SERPL-SCNC: 4.8 MMOL/L (ref 3.5–5.2)
SODIUM SERPL-SCNC: 144 MMOL/L (ref 136–145)

## 2024-03-04 PROCEDURE — 80048 BASIC METABOLIC PNL TOTAL CA: CPT | Performed by: NURSE PRACTITIONER

## 2024-03-04 PROCEDURE — 99214 OFFICE O/P EST MOD 30 MIN: CPT | Performed by: NURSE PRACTITIONER

## 2024-03-04 PROCEDURE — G0463 HOSPITAL OUTPT CLINIC VISIT: HCPCS

## 2024-03-04 PROCEDURE — 83880 ASSAY OF NATRIURETIC PEPTIDE: CPT | Performed by: NURSE PRACTITIONER

## 2024-03-04 NOTE — PROGRESS NOTES
HEART FAILURE CLINIC - PHARMACY SERVICE     HFpEF with EF 56-60% (Last Echo: 2/3/24).    NYHA Class I C        Cardiologist: Jeffrey  Nephrologist: Kiana  PCP: KENIA Avila     -CHF-specific BB:      Pre Visit Dose: Metoprolol succinate  mg BID    Post Visit Dose: Metoprolol succinate  mg BID (/86, P: 70)     - Target Dose: Metoprolol succinate  mg PO daily.     - Goal HR of 50s to 60s.     - Patient should be seen every 10 to 14 days for a pulse check with plans for up-titration until target heart rate is achieved.        -ACE/ARB/ARNI:     Pre Visit Dose: None due to high K+     Post Visit Dose: None             -SGLT2 inhibitor therapy:    Pre Visit Dose: Dapagliflozin 10 mg PO daily    Post Visit Dose: Dapagliflozin 10 mg PO daily    - Target Dose: Dapagliflozin 10 mg PO daily : CrCl > 20 mL/min which has shown benefit in patients with HF       -MRA:     Pre Visit Dose: None due to renal    Post Visit Dose: None        - K is < 5 mEq/L and SCr is </= 2 mg/dL in female and eGFR > 30 mL/min/1.73m3        -DIURETIC:     Pre Visit Dose: Furosemide 20 mg QOD    Post Visit Dose: Furosemide 20 mg QOD      -MAGNESIUM:     -not assessed at this visit    -ANTICOAGULATION:     None         -OTHER CV MEDS:     Pre Visit Dose: atorvastatin 20 mg daily, diltiazem 180 mg daily, and hydralazine 75 mg Q 12 H    Post Visit Dose: atorvastatin 20 mg daily, diltiazem 180 mg daily, and hydralazine 75 mg Q 12 H      -Clinic Administered Medications:     None      Vaccines:     Not assessed at this visit       Patient Assistance:      1. Messaged pharmacicst at [facility] to start paperwork for [employer] for lantus patient assistance  2.  Patient brought in tax forms so will re-submit [place] & [place]  3.  #14 samples provided for dapagliflozin         PLAN:  Initiation/Discontinuation/Dose Adjustment: no med changes  Education provided: none  Coordination of Care: #10 dapagliflozin, re-submit paperwork for [place] &  [place], and got paperwork signed for [employer] for insulin glargine sent to Endo clinic  Refills: none          Orion Miles, PharmD  3/4/2024  08:18 EST

## 2024-03-04 NOTE — ADDENDUM NOTE
Encounter addended by: Chari Santos RN on: 3/4/2024 11:17 AM   Actions taken: Chief Complaint modified, SmartForm saved, Charge Capture section accepted

## 2024-03-14 ENCOUNTER — PATIENT MESSAGE (OUTPATIENT)
Dept: FAMILY MEDICINE CLINIC | Facility: CLINIC | Age: 64
End: 2024-03-14
Payer: MEDICARE

## 2024-03-14 RX ORDER — METOPROLOL SUCCINATE 100 MG/1
100 TABLET, EXTENDED RELEASE ORAL 2 TIMES DAILY
Qty: 180 TABLET | Refills: 1 | Status: SHIPPED | OUTPATIENT
Start: 2024-03-14

## 2024-03-14 RX ORDER — SULFAMETHOXAZOLE AND TRIMETHOPRIM 800; 160 MG/1; MG/1
1 TABLET ORAL 2 TIMES DAILY
Qty: 14 TABLET | Refills: 0 | Status: SHIPPED | OUTPATIENT
Start: 2024-03-14

## 2024-03-18 DIAGNOSIS — F41.8 MIXED ANXIETY DEPRESSIVE DISORDER: ICD-10-CM

## 2024-03-18 RX ORDER — ALPRAZOLAM 1 MG/1
1 TABLET ORAL 2 TIMES DAILY
Qty: 60 TABLET | Refills: 2 | Status: SHIPPED | OUTPATIENT
Start: 2024-03-18

## 2024-03-20 PROCEDURE — 93296 REM INTERROG EVL PM/IDS: CPT | Performed by: NURSE PRACTITIONER

## 2024-03-20 PROCEDURE — 93295 DEV INTERROG REMOTE 1/2/MLT: CPT | Performed by: NURSE PRACTITIONER

## 2024-03-21 RX ORDER — METOPROLOL SUCCINATE 100 MG/1
TABLET, EXTENDED RELEASE ORAL
Qty: 180 TABLET | Refills: 0 | Status: SHIPPED | OUTPATIENT
Start: 2024-03-21

## 2024-03-26 ENCOUNTER — LAB (OUTPATIENT)
Dept: LAB | Facility: HOSPITAL | Age: 64
End: 2024-03-26
Payer: MEDICARE

## 2024-03-28 ENCOUNTER — LAB (OUTPATIENT)
Dept: LAB | Facility: HOSPITAL | Age: 64
End: 2024-03-28
Payer: MEDICARE

## 2024-03-28 ENCOUNTER — OFFICE VISIT (OUTPATIENT)
Dept: ENDOCRINOLOGY | Facility: CLINIC | Age: 64
End: 2024-03-28
Payer: MEDICARE

## 2024-03-28 DIAGNOSIS — E11.49 TYPE 2 DIABETES MELLITUS WITH OTHER DIABETIC NEUROLOGICAL COMPLICATION: Primary | ICD-10-CM

## 2024-03-28 PROCEDURE — G0108 DIAB MANAGE TRN  PER INDIV: HCPCS | Performed by: INTERNAL MEDICINE

## 2024-03-28 PROCEDURE — 3051F HG A1C>EQUAL 7.0%<8.0%: CPT | Performed by: INTERNAL MEDICINE

## 2024-03-28 NOTE — PROGRESS NOTES
Patient seen from 2 pm to 2:30 pm for 30 minutes for discussion of hyperglycemia. Increased Lantus dose from 10 units nightly to 12 units nightly.  Susy has an appt with Dr. Helton tomorrow to discuss BG readings and asking about a short acting insulin.  Discussed causes, s/s and appropriate treatment of hypoglycemia.

## 2024-03-29 ENCOUNTER — OFFICE VISIT (OUTPATIENT)
Dept: ENDOCRINOLOGY | Facility: CLINIC | Age: 64
End: 2024-03-29
Payer: MEDICARE

## 2024-03-29 VITALS
SYSTOLIC BLOOD PRESSURE: 128 MMHG | OXYGEN SATURATION: 98 % | HEIGHT: 64 IN | HEART RATE: 83 BPM | DIASTOLIC BLOOD PRESSURE: 76 MMHG | WEIGHT: 171 LBS | BODY MASS INDEX: 29.19 KG/M2

## 2024-03-29 DIAGNOSIS — E11.49 TYPE 2 DIABETES MELLITUS WITH OTHER DIABETIC NEUROLOGICAL COMPLICATION: ICD-10-CM

## 2024-03-29 RX ORDER — GLIMEPIRIDE 2 MG/1
2 TABLET ORAL EVERY MORNING
Qty: 30 TABLET | Refills: 11 | Status: SHIPPED | OUTPATIENT
Start: 2024-03-29 | End: 2025-03-29

## 2024-03-29 RX ORDER — INSULIN GLARGINE 100 [IU]/ML
20 INJECTION, SOLUTION SUBCUTANEOUS NIGHTLY
Qty: 15 ML | Refills: 2 | Status: SHIPPED | OUTPATIENT
Start: 2024-03-29

## 2024-03-29 NOTE — PROGRESS NOTES
"Cardiology Heart Failure Clinic Note  Rodney \"Renan\" Clay LEDEZMA [facility]    Patient ID: Susy Hanson  is a 63 y.o. female.    Encounter Date:04/01/2024     Assessment:   Diagnoses and all orders for this visit:    1. Cardiomyopathy, nonischemic nondilated (Primary)  Overview:   Hx non-dilated, non-ischemic cardiomyopathy          A.  Resolved systolic HF (HFrEF)         B.  EF 56-60% Gr 1 DD (TTE 1-25-24)                   I. EF 60 to 65% w/o mention of diastolic dysfunction (TTE 11/3/22)                   II. EF 15-20% w/o DD (July 22)         C. S/p 2022 [place] aware BiV ICD                    I.  Complicated by RV lead perforatn      2. Presence of biventricular  ICD    3. mild PAH (pulmonary artery hypertension)  Overview:  RVSP 35-45 mmHg (Jan 24 TTE)      4. Stage 3b chronic kidney disease  Overview:  eGFR 1/25/24  is 44      5. At risk for fluid volume overload  Overview:   1. Hx non-dilated, non-ischemic cardiomyopathy          A.  Resolved systolic HF (HFrEF)                I. .  EF 56-60% Gr 1 DD (TTE 1-25-24)               Ii.       EF 60 to 65% w/o mention of diastolic dysfunction (TTE 11/3/22)               III. EF 15-20% w/o DD (July 22)         B. S/p 2022 [place] aware BiV ICD                    I.  Complicated by RV lead perforation   2. CKD stage 3b                    I. H/o VIVEK (4/22)                   II. eGFR 42 (2/12/24)      6. Metabolic syndrome X  Overview:  A. Hypertension  B. Mixed dyslipidemia  C. Obesity           I.ANDREAS uses CPAP  D. T2DM          II. Peripheral neuropathy      7. Mixed anxiety depressive disorder    8. h/o Acute kidney injury  Overview:  Noted 4/26/2022      9. LBBB (left bundle branch block)    10. irritable bowel syndrome    11. Dyspnea on exertion  -     Basic Metabolic Panel; Future  -     proBNP; Future  -     Basic Metabolic Panel; Standing  -     Basic Metabolic Panel  -     proBNP; Standing  -     proBNP    12. Breast neoplasm, Tis (DCIS), " left  Overview:  Added automatically from request for surgery [id number]      13. Peripheral polyneuropathy    14. h/o pericarditis, unspecified chronicity    15. Insomnia    16. Type 2 diabetes mellitus with unspecified complications  -     Hemoglobin A1c; Standing  -     Hemoglobin A1c    17. Stage 3a chronic kidney disease  -     Protein / Creatinine Ratio, Urine - Urine, Clean Catch; Standing  -     Protein / Creatinine Ratio, Urine - Urine, Clean Catch  -     PTH, Intact; Standing  -     PTH, Intact  -     Comprehensive Metabolic Panel; Standing  -     Comprehensive Metabolic Panel  -     CK; Standing  -     CK  -     Magnesium; Standing  -     Magnesium  -     Uric Acid; Standing  -     Uric Acid  -     Phosphorus; Standing  -     Phosphorus  -     Vitamin D,25-Hydroxy; Standing  -     Vitamin D,25-Hydroxy  -     Urinalysis With Microscopic If Indicated (No Culture) - Urine, Clean Catch; Standing  -     Urinalysis With Microscopic If Indicated (No Culture) - Urine, Clean Catch  -     CBC (No Diff); Standing  -     CBC (No Diff)  -     Urinalysis, Microscopic Only - Urine, Clean Catch; Standing  -     Urinalysis, Microscopic Only - Urine, Clean Catch        Plan/discussion    Volume overload    Heart Failure Core Measures    Type of Overload : unspecified     Most recent EF   EF 56-60% Gr 1 DD (TTE 1-25-24)   A.  Hx EF 20%  (July 22 TTE)  B. (Nov 22 TTE) 60 to 65% w/o mention of DD     [place] Heart association Class & Stage : 1c     HF Meds     Beta Blocker: Metoprolol 100 mg twice daily  ARNI/ACE/ARB: stopped 2-12-24 Entresto 24 to 26 mg BID  SGLT 2 inhibitors: [id number] mg daily  Diuretics upon release from clinic: Lasix 20 mg every other day  Furoscix: Currently not a candidate  Aldosterone Antagonist: None indicated  Digitalis: N/A  Vasodilators & Nitrates: Not indicated        Cardiac medicines reviewed with risk, benefits, and necessity of each discussed with myself & a [facility].       ____________________________________________________________     Discussion     Heart failure core measures including beta-blocker, ARNI, SGLT2  H/o EF being less than 20% July 22, repeat echo in November 22 EF improved to 60 to 65%.   Repeat TTE 1/25/24 EF 56-60% Gr 1 DD w/ RVSP 35-45 mmHg  She will continue follow-ups with Dr. Murphy as well as Dr. Kerr her nephrologist  Significantly hyperkalemic 2/12/24 with a potassium of 6 renal function is maintaining the creatinine of 1.4 given Lokelma 10 g x 3 days  had BMP done & K+ was WNL ( (3.9) after Lokelma at an ER in [place]  A. Last visit potassium was up to 5.1 and she was placed on Lasix 20mg QOD  5. Blood pressure until today's visit has not been under good control, however, I was unable to find a lot of room to add medications which would aid in control on the last couple of visits therefore we will have to defer to Dr. Murphy and Cirilo   6.  potassium finally seems to be stabilized  A. We will plan on seeing again in approximately 1-2 month.  Working around the patient's rather busy schedule occluding trips to [place] and [place]  7. Has been making a concerted effort to to decrease her potassium dietarily  8. Would continue with her ongoing heart failure nursing interventions including:                          A. Fluid restriction at less than 2000 cc daily                          B. Daily weights                          C.  1 g low-sodium diet    I did the following activities preparing for the visit with Susy Hanson  including: reviewing tests, once pt arrived in clinic I also performed a medically appropriate examination and/or evaluation, I personally spent considerable time counseling and educating the patient/family/caregiver, ordering medications, tests, or procedures, referring and communicating with other health care professionals, and documenting information in the medical record. I estimate including preparation 20 minutes              Subjective:     Chief Complaint   Patient presents with    Congestive Heart Failure       HPI:  63-year-old female patient of Dr. Murphy's, with a PMH of nonischemic nondilated cardiomyopathy with a.  LVEF less than 20% back in 2022, S\P [place] aware BiV ICD complicated by pericardial effusion with lack of pacing and RV lead perforation subsequently had recovery of EF at 60 to 65% (TTE 11/3/22), without mention of any diastolic dysfunction, most recent TTE done in January 2024 shows her EF is now at about 56 to 60% with grade 1 DD. H\O left breast cancer (H cc), diabetes, IBS, metabolic syndrome with HTN, HLD, ANDREAS Rx w/ CPAP, DM.  Comes in for her initial visit at the [facility] heart failure clinic 1/25/2024, she has done well from a heart failure standpoint with improvement of EF however since no echocardiograms been done in greater than a year it is concerning as to what her current EF might be.  Does not seem to be particularly symptomatic however does have some dyspnea with exertion.  She has concerns regarding her past Breast implant for which she is planning on having those removed.  She was also found to be hyperkalemic treated with Lokelma comes back in f/u 12 Feb 24 after having seen Dr. Murphy who decreased her Entresto due to renal dysfunction. She feels about the same as she did on her last visit.  She was found to be hypertensive 2/12/24 and hyperkalemic with a K+= 6 Was encouraged not to leave town given her increased K+ however she was rather adamant & saw her brother in [place]. Was told to go to nearest ER by renal and we had BMP done in [place] & K+ was WNL after Lokelma.  Comes back on 19 February 2024 after returning home she has felt well but has come back with a K+ up to 5.1.It is still 1.5 months till she sees renal. Back to clinic 4 Mar 24 since last visit.  She has been doing remarkably well, she brought her blood pressure logs which seem to be stable. 4/1/24 in f/u  has been doing quite well without volume overload.  She has noted no real dyspnea issues, she is looking forward to going to several events in [place] and [place] with grandchildren.  She is been working rather diligently on trying to maintain healthy lifestyle for her combination of chronic kidney disease, heart failure, diabetes.    ROS:    Constitutional:  weakness, & fatigue have persisted but remains at what she describes as her normal level ., No fever, rigors, chills   Eyes: No recent vision changes, eye pain   ENT/oropharynx: No recent difficulty swallowing, sore throat, epistaxis, changes in hearing   Cardiovascular: No recent chest pain, chest tightness, palpitations except as noted in HPI, paroxysmal nocturnal dyspnea, orthopnea, diaphoresis, dizziness & no pre or vikram syncopal episodes   Respiratory: No at rest shortness of breath, + dyspnea on exertion, no significant productive cough, no wheezing and no hemoptysis   Gastrointestinal: No abdominal pain, nausea, vomiting, diarrhea, bloody stools   Genitourinary: No hematuria, dysuria other than increased frequency   Neurological: No headache, tremors, numbness,  or hemiparesis    Musculoskeletal: No change in typical cramps, myalgias,  joint pain, w/o any new joint swelling   Integument: No recent rash, no edema      Patient Active Problem List   Diagnosis    Encounter for general adult medical examination without abnormal findings    Essential hypertension    Irritable bowel syndrome    Mixed anxiety depressive disorder    Mixed hyperlipidemia    Obesity    Encounter for screening for malignant neoplasm of colon    Type 2 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    Vitamin D deficiency    Hypertensive urgency    Peripheral neuropathy    ANDREAS on CPAP    Chest pain, atypical    Breast neoplasm, Tis (DCIS), left    Cold intolerance    Insomnia    Breast pain    Dyspnea    h/o Acute kidney injury    LBBB (left bundle branch  block)    h/o nonischemic, nondilated, cardiomyopathy    Presence of biventricular  ICD    Hemopericardium    h/o Pericarditis    Pericardial effusion    Metabolic syndrome X    At risk for fluid volume overload    h/o resolved Left ventricular failure    Stage 3b chronic kidney disease    Hyperkalemia    mild PAH (pulmonary artery hypertension)       Past Medical History:   Diagnosis Date    Anxiety 2000    Breast cancer     LEFT BREAST 7/2021    Cataract     Had surgery to remove August 2023    CHF (congestive heart failure)     Colon polyp 12/2023    Removed during colonoscopy    Coronary artery disease 2019    Depression     DM (diabetes mellitus)     History of medical problems 2008    Bladder prolapse repeat Dr. Roper    HL (hearing loss)     Hyperlipidemia     Hypertension     SAW CARDIO/ STRESS TEST 2020 - NOW MEDS MANAGED BY PCP     Irritable bowel syndrome 2006    Peripheral neuropathy     PONV (postoperative nausea and vomiting)     Renal insufficiency 2020    See Dr. funes for moderate kidney failure    Sleep apnea     WEARS CPAP    Type 2 diabetes mellitus 2008    Urinary tract infection 6/23    UTI seen at [facility]       Past Surgical History:   Procedure Laterality Date    BLADDER REPAIR      BREAST BIOPSY      BREAST RECONSTRUCTION Bilateral 08/26/2021    Procedure: BILATERAL PREPECTORALPLACMENT TISSUE EXPANDERS AND ALLODERM;  Surgeon: Heri Arreaga MD;  Location: [facility];  Service: Plastics;  Laterality: Bilateral;    BREAST TISSUE EXPANDER REMOVAL INSERTION OF IMPLANT Bilateral 12/23/2021    Procedure: BILATERAL REMOVAL TISSUE EXPANDERS AND PLACEMENT OF IMPLANTS;  Surgeon: Heri Arreaga MD;  Location: [facility];  Service: Plastics;  Laterality: Bilateral;    CARDIAC CATHETERIZATION Left 04/13/2022    Procedure: Cardiac Catheterization/Vascular Study;  Surgeon: Christo Murphy MD;  Location: [facility] CATH INVASIVE LOCATION;  Service: Cardiovascular;  Laterality:  Left;    CARDIAC ELECTROPHYSIOLOGY PROCEDURE N/A 2022    Procedure: ICD implant [place] aware;  Surgeon: Esteban Kendrick MD;  Location:  [facility] CATH INVASIVE LOCATION;  Service: Cardiology;  Laterality: N/A;    CARDIAC ELECTROPHYSIOLOGY PROCEDURE N/A 2022    Procedure: lead repositioning;  Surgeon: Esteban Kendrick MD;  Location:  [facility] CATH INVASIVE LOCATION;  Service: Cardiology;  Laterality: N/A;    CARDIAC SURGERY      Defibrillator/pacemaker implant    CARDIOVASCULAR STRESS TEST      COLONOSCOPY  2012    EYE SURGERY      Cataracts removed    HYSTERECTOMY      INSERT / REPLACE / REMOVE PACEMAKER      LYMPH NODE BIOPSY  2021    MASTECTOMY W/ SENTINEL NODE BIOPSY Bilateral 2021    Procedure: bilateral total mastectomy, left axillary sentinel lymph node biopsy, possible reconstruction.;  Surgeon: Asia Perkins MD;  Location: [facility] MAIN OR;  Service: General;  Laterality: Bilateral;    TUBAL ABDOMINAL LIGATION         Social History     Socioeconomic History    Marital status:    Tobacco Use    Smoking status: Former     Current packs/day: 0.00     Average packs/day: 2.0 packs/day for 36.0 years (72.0 ttl pk-yrs)     Types: Cigarettes     Start date: 1972     Quit date: 2008     Years since quittin.2     Passive exposure: Past    Smokeless tobacco: Never   Vaping Use    Vaping status: Never Used   Substance and Sexual Activity    Alcohol use: No    Drug use: No    Sexual activity: Yes     Partners: Male     Birth control/protection: None, Tubal ligation, Hysterectomy       Allergies   Allergen Reactions    Hydrocodone-Acetaminophen Hives     Lortab, takes tylenol         Current Outpatient Medications:     Accu-Chek FastClix Lancets misc, USE TO CHECK BLOOD GLUCOSE DAILY, Disp: , Rfl:     Accu-Chek Guide test strip, 1 each by Other route Daily. E11.49, Disp: 100 each, Rfl: 1    ALPRAZolam (XANAX) 1 MG tablet, Take 1 tablet by mouth 2  (Two) Times a Day., Disp: 60 tablet, Rfl: 2    atorvastatin (LIPITOR) 20 MG tablet, Take 1 tablet by mouth Daily., Disp: 90 tablet, Rfl: 1    cetirizine (zyrTEC) 10 MG tablet, Take 1 tablet by mouth Daily As Needed., Disp: , Rfl:     Continuous Blood Gluc  (FreeStyle Benton 3 Ottosen) device, Use 1 each 4 (Four) Times a Day Before Meals & at Bedtime., Disp: 1 each, Rfl: 0    Continuous Blood Gluc Sensor (FreeStyle Benton 3 Sensor) misc, Use 1 each Every 14 (Fourteen) Days., Disp: 2 each, Rfl: 5    dapagliflozin Propanediol (Farxiga) 10 MG tablet, Take 10 mg by mouth Daily., Disp: 90 tablet, Rfl: 3    dilTIAZem CD (Cardizem CD) 180 MG 24 hr capsule, Take 1 capsule by mouth Daily., Disp: 90 capsule, Rfl: 1    furosemide (Lasix) 20 MG tablet, Take 1 tablet by mouth Every Other Day for 30 days., Disp: 45 tablet, Rfl: 0    glimepiride (Amaryl) 2 MG tablet, Take 1 tablet by mouth Every Morning., Disp: 30 tablet, Rfl: 11    hydrALAZINE (APRESOLINE) 25 MG tablet, Take 3 tablets (75 mg) by mouth twice daily, Disp: 504 tablet, Rfl: 3    hydrALAZINE (APRESOLINE) 50 MG tablet, Take 1.5 tablets by mouth Every 12 (Twelve) Hours., Disp: , Rfl:     Insulin Glargine (Lantus SoloStar) 100 UNIT/ML injection pen, Inject 20 Units under the skin into the appropriate area as directed Every Night., Disp: 15 mL, Rfl: 2    Insulin Pen Needle (Pen Needles) 32G X 4 MM misc, Use 1 each 4 (Four) Times a Day Before Meals & at Bedtime., Disp: 200 each, Rfl: 5    metoprolol succinate XL (TOPROL-XL) 100 MG 24 hr tablet, To be filled by Provider, Disp: 180 tablet, Rfl: 0    ondansetron (Zofran) 4 MG tablet, Take 1 tablet by mouth Every 8 (Eight) Hours As Needed for Nausea or Vomiting., Disp: 20 tablet, Rfl: 0    pantoprazole (PROTONIX) 40 MG EC tablet, Take 1 tablet by mouth Daily., Disp: 90 tablet, Rfl: 0    sulfamethoxazole-trimethoprim (Bactrim DS) 800-160 MG per tablet, Take 1 tablet by mouth 2 (Two) Times a Day., Disp: 14 tablet, Rfl: 0     Triamcinolone Acetonide (NASACORT) 55 MCG/ACT nasal inhaler, APPLY 2 SPRAYS BY NASAL ROUTE DAILY FOR 30 DAYS (Patient taking differently: 2 sprays into the nostril(s) as directed by provider Daily As Needed.), Disp: 10.8 mL, Rfl: 6    Immunization History   Administered Date(s) Administered    Arexvy (RSV, Adults 60+ yrs) 09/22/2023    COVID-19 (PFIZER) BIVALENT 12+YRS 11/04/2022, 09/22/2023    COVID-19 (PFIZER) Purple Cap Monovalent 01/06/2021, 01/27/2021, 09/30/2021, 11/01/2021    Fluad Quad 65+ 09/14/2023    H1N1 Inj 11/03/2009    Hepatitis A 04/29/2018, 11/02/2018    Influenza Injectable Mdck Pf Quad 11/04/2022    Influenza, Unspecified 11/01/2021, 09/14/2023    Pneumococcal Conjugate 20-Valent (PCV20) 11/30/2023    RSV, unspecified (Vaccine or MAB) 09/22/2023    Tdap 07/26/2022    flucelvax quad pfs =>4 YRS 10/19/2020         Most recent EKG as reviewed:  Procedures     Most recent echo as reviewed:  Results for orders placed during the hospital encounter of 01/30/24    Adult Transthoracic Echo Complete W/ Cont if Necessary Per Protocol    Interpretation Summary    Left ventricular systolic function is normal. Left ventricular ejection fraction appears to be 56 - 60%.    Left ventricular diastolic function is consistent with (grade I) impaired relaxation.    Estimated right ventricular systolic pressure from tricuspid regurgitation is mildly elevated (35-45 mmHg).      Most recent stress test results:  Results for orders placed during the hospital encounter of 04/11/22    Stress Test With Myocardial Perfusion One Day    Interpretation Summary  · This is incomplete Cardiolite imaging study. Stress images were not done because test was aborted. Rest images showed small fixed septal and apical defect. Left ventricle size appear normal. No gated SPECT imaging were noted..      Most recent cardiac catheterization results:  Results for orders placed during the hospital encounter of 04/11/22    Cardiac  Catheterization/Vascular Study    Narrative  CARDIAC CATHETERIZATION REPORT      DATE OF PROCEDURE:  4/13/2022    INDICATION FOR PROCEDURE:    Congestive heart failure acute systolic  Dilated cardiomyopathy    PROCEDURE PERFORMED:    Left heart catheterization  coronary angiography  left ventriculography    PROCEDURE COMMENTS:    After informed consent was obtained, the patient was prepped and draped in the usual sterile manner.  Mild to moderate sedation was administered.  Right femoral artery was accessed without difficulty and 6 German arterial sheath was inserted.  Sheath was flushed with heparinized saline.    Using 6 German Roberto catheters, first left coronary artery and the right coronary was electively engaged and appropriate views were taken.  A 6 German JR4 catheter was used to cross aortic valve and left heart catheterization was performed.  Left ventriculography was done NAQVI view    Patient tolerated the procedure well.    FINDINGS:    1. HEMODYNAMICS:    Aortic pressure: 167/101 mmHg    LVEDP: 10 to 15 mmHg    Gradient across aortic valve on pullback: No gradient across aortic valve      2. LEFT VENTRICULOGRAPHY: 35 to 40%      3. CORONARY ANGIOGRAPHY:    A: Left main coronary artery: Normal    B: Left anterior descending artery: 25% plaque in proximal and mid segment.  No high-grade stenosis    C: Left circumflex coronary artery: Mild diffuse disease in the midsegment but no high-grade stenosis    D: Right coronary artery: 25% plaque in proximal and mid segment of RCA but no high-grade stenosis.  It is dominant vessel    SUMMARY:    Mild coronary artery disease  Left ventricular dysfunction    RECOMMENDATIONS:    Medical treatment.        Historical data copied forward from previous encounters in EMR including the history, exam, and assessment/plan has been reviewed and is unchanged except as I have noted and otherwise indicated.      Objective:         /86 (BP Location: Right arm)   Pulse 70    Resp 18   Wt 79.2 kg (174 lb 9.6 oz)   LMP  (LMP Unknown)   SpO2 100%   BMI 29.95 kg/m²     General:  Well-developed, [place] well-nourished, cooperative, no distress, appears stated age if not slightly older    Neuro:  A&O x3. No significantly obvious focal neuro deficet    Psych:  Pleasant -affect    HENT:  Normocephalic, atraumatic, moist mucous membranes , Normal ear placement,Throat not injected   Eyes:  PERRLA, Conjunctivae not injected, EOM's intact, conjunctiva does not appear significantly injected   Neck:  Supple, Mildly thickened, no lymph adenopathy nor thyromegaly no JVD or bruit    Lungs:    Symmetrical rise & fall of chest with baseline respiratory pattern. To auscultation lungs bilaterally, have faint late phase expiratory wheezes, there is no rhonchi or rales noted    Chest wall:  No significant tenderness when palpated. PMI @ 5th ICS McL  , deflated Left breast, well healed ICD scar    Heart:  Regular rate and rhythm, S1 and S2 normal, no S3 or S4, Gr I/VI systolic ejection murmur best heard at the apex , no obvious rub, click or gallop.    Abdomen:  non-distended, non-tender, bowel sounds noted in the 4 quadrants of the abdomen, significant adipose tissue identified    Extremities:  Equal color motion temperature and sensitivity, Rapid capillary refill noted within the nailbeds of fingers and toes bilaterally no lower extremity edema.    Pulses:  2+ and symmetric all extremities, rapid capillary refill    Skin:  No obvious rashes, significant lesions identified, warm dry and of normal turgor      In-Office Procedure(s):  No orders to display        Imaging:    Results for orders placed during the hospital encounter of 10/03/22    XR Chest 1 View    Narrative  EXAMINATION: XR CHEST 1 VW    DATE OF EXAM: 10/4/2022 12:28 AM  SLOT: 60    HISTORY: Chest pain    COMPARISON: None.    FINDINGS:    HEART/MEDIASTINUM: Mildly enlarged cardiac silhouette. Radiation is the origin of the right atrium,  right ventricle, and coronary sinus.    LUNGS/PLEURA: Linear subsegmental atelectasis in the left lung base. Otherwise, grossly clear.    MUSCULOSKELETAL: No acute osseous pathology. Surgical clips overlie the axilla bilaterally.    Impression  1.  No acute cardiopulmonary abnormality detected.        Electronically signed by:  Yony Patricio M.D.  10/4/2022 12:33 AM       Results for orders placed during the hospital encounter of 01/11/24    US Abdomen Limited    Narrative  US ABDOMEN LIMITED    Date of Exam: 1/11/2024 10:41 AM EST    Indication: persistent nausea.    Comparison: CT abdomen and pelvis angiography 1/17/2020    Technique: Grayscale and color Doppler ultrasound evaluation of the right upper quadrant was performed.      Findings:  Diffusely increased liver echogenicity compatible with hepatic steatosis. No discrete liver lesion. No perihepatic ascites. Visualized portions of the pancreas are normal. The gallbladder is normal. No biliary dilatation. Common bile duct measures 2 mm.  Hepatopetal flow in the portal vein. Visualized hepatic veins are patent. The right kidney measures 9.1 x 4.1 x 3.7 cm. No right-sided hydronephrosis.    Impression  Impression:  1. Hepatic steatosis.  2. Normal gallbladder.  3. No biliary dilatation.        Electronically Signed: Ottoniel Lee MD  1/11/2024 11:54 AM EST  Workstation ID: [id number]      Results for orders placed during the hospital encounter of 10/03/22    CT Angiogram Chest Pulmonary Embolism    Narrative  Exam: CT Angiography of the Chest.    Date: 10/4/2022.    Comparison: 4/11/2022.    History: Defibrillator placement with chest pain.    Technique: CT examination of the chest was performed following the intravenous administration of 100 mL of Isovue-370. Sagittal, coronal and 3-D reformatted images were provided. CT dose lowering techniques were used, to include: automated exposure  control, adjustment for patient size, and/or use of iterative  reconstruction.    FINDINGS:    CHEST WALL: There are bilateral breast implants.    Mediastinum and Theresa: There is no axillary, mediastinal or hilar lymphadenopathy.    Pleural and Pericardial spaces: There is a small to moderate pericardial effusion which is higher density than simple fluid which may relate to a hemopericardium.    Upper Abdomen: The visualized upper abdomen is unremarkable.    Cardiovascular: AICD generator has leads in the right atrium and right ventricle. There is mild vascular calcification throughout the thoracic aorta without evidence of aneurysmal dilation or dissection.    Pulmonary Artery: There are no filling defects in the pulmonary arteries.    Lung Parenchyma and Airways: The lungs are clear.    Bones: No fracture or aggressive osseous lesion.    Impression  1. No evidence of pulmonary embolism.  2. No evidence of thoracic aortic aneurysm or dissection.  3. Small to moderate amount of hemopericardium.    These findings were discussed with COURTNEY Atkinson.      Electronically signed by:  Amish Ellsworth D.O.  10/4/2022 1:51 AM      Lab Review:   Hospital Outpatient Visit on 04/01/2024   Component Date Value    Glucose 04/01/2024 228 (H)     BUN 04/01/2024 34 (H)     Creatinine 04/01/2024 1.48 (H)     Sodium 04/01/2024 138     Potassium 04/01/2024 4.1     Chloride 04/01/2024 101     CO2 04/01/2024 21.0 (L)     Calcium 04/01/2024 9.8     Total Protein 04/01/2024 7.6     Albumin 04/01/2024 4.8     ALT (SGPT) 04/01/2024 20     AST (SGOT) 04/01/2024 14     Alkaline Phosphatase 04/01/2024 86     Total Bilirubin 04/01/2024 0.2     Globulin 04/01/2024 2.8     A/G Ratio 04/01/2024 1.7     BUN/Creatinine Ratio 04/01/2024 23.0     Anion Gap 04/01/2024 16.0 (H)     eGFR 04/01/2024 39.1 (L)     Creatine Kinase 04/01/2024 47     Magnesium 04/01/2024 2.4     Uric Acid 04/01/2024 7.5 (H)     Phosphorus 04/01/2024 4.6 (H)     Color, UA 04/01/2024 Yellow     Appearance, UA 04/01/2024 Clear     pH, UA  04/01/2024 <=5.0     Specific Gravity, UA 04/01/2024 1.010     Glucose, UA 04/01/2024 >=1000 mg/dL (3+) (A)     Ketones, UA 04/01/2024 Negative     Bilirubin, UA 04/01/2024 Negative     Blood, UA 04/01/2024 Negative     Protein, UA 04/01/2024 Negative     Leuk Esterase, UA 04/01/2024 Moderate (2+) (A)     Nitrite, UA 04/01/2024 Negative     Urobilinogen, UA 04/01/2024 0.2 E.U./dL     WBC 04/01/2024 4.90     RBC 04/01/2024 4.39     Hemoglobin 04/01/2024 12.0     Hematocrit 04/01/2024 39.9     MCV 04/01/2024 90.9     MCH 04/01/2024 27.3     MCHC 04/01/2024 30.1 (L)     RDW 04/01/2024 14.2     RDW-SD 04/01/2024 47.3     MPV 04/01/2024 10.6     Platelets 04/01/2024 243     RBC, UA 04/01/2024 None Seen     WBC, UA 04/01/2024 21-50 (A)     Bacteria, UA 04/01/2024 None Seen     Squamous Epithelial Cell* 04/01/2024 3-6 (A)     Hyaline Casts, UA 04/01/2024 0-2     Methodology 04/01/2024 Automated Microscopy    Hospital Outpatient Visit on 03/04/2024   Component Date Value    Glucose 03/04/2024 185 (H)     BUN 03/04/2024 33 (H)     Creatinine 03/04/2024 1.46 (H)     Sodium 03/04/2024 144     Potassium 03/04/2024 4.8     Chloride 03/04/2024 107     CO2 03/04/2024 24.0     Calcium 03/04/2024 9.9     BUN/Creatinine Ratio 03/04/2024 22.6     Anion Gap 03/04/2024 13.0     eGFR 03/04/2024 39.8 (L)     proBNP 03/04/2024 49.3    Hospital Outpatient Visit on 02/19/2024   Component Date Value    Glucose 02/19/2024 141 (H)     BUN 02/19/2024 23     Creatinine 02/19/2024 1.39 (H)     Sodium 02/19/2024 143     Potassium 02/19/2024 5.1     Chloride 02/19/2024 106     CO2 02/19/2024 26.0     Calcium 02/19/2024 10.0     BUN/Creatinine Ratio 02/19/2024 16.5     Anion Gap 02/19/2024 11.0     eGFR 02/19/2024 42.2 (L)     Magnesium 02/19/2024 2.1     proBNP 02/19/2024 166.2    Hospital Outpatient Visit on 02/12/2024   Component Date Value    Glucose 02/12/2024 241 (H)     BUN 02/12/2024 28 (H)     Creatinine 02/12/2024 1.39 (H)     Sodium  02/12/2024 140     Potassium 02/12/2024 6.0 (H)     Chloride 02/12/2024 104     CO2 02/12/2024 23.0     Calcium 02/12/2024 9.9     BUN/Creatinine Ratio 02/12/2024 20.1     Anion Gap 02/12/2024 13.0     eGFR 02/12/2024 42.2 (L)     proBNP 02/12/2024 102.7     QT Interval 02/12/2024 456     QTC Interval 02/12/2024 496    Hospital Outpatient Visit on 01/30/2024   Component Date Value    LVIDd 01/30/2024 4.6     LVIDs 01/30/2024 3.2     IVSd 01/30/2024 1.04     LVPWd 01/30/2024 1.09     FS 01/30/2024 32.0     IVS/LVPW 01/30/2024 0.95     ESV(cubed) 01/30/2024 31.5     LV Sys Vol (BSA correcte* 01/30/2024 20.4     EDV(cubed) 01/30/2024 100.3     LV Kamara Vol (BSA correct* 01/30/2024 47.4     LV mass(C)d 01/30/2024 175.7     LVOT area 01/30/2024 3.1     LVOT diam 01/30/2024 1.98     EDV(MOD-sp4) 01/30/2024 87.0     ESV(MOD-sp4) 01/30/2024 37.4     SV(MOD-sp4) 01/30/2024 49.5     SI(MOD-sp4) 01/30/2024 27.0     EF(MOD-sp4) 01/30/2024 57.0     MV E max jeffery 01/30/2024 91.6     MV A max jeffery 01/30/2024 142.5     MV dec time 01/30/2024 0.21     MV E/A 01/30/2024 0.64     Pulm A Revs Dur 01/30/2024 0.11     TR max jeffery 01/30/2024 251.6     SV(LVOT) 01/30/2024 79.9     LA dimension (2D)  01/30/2024 3.2     Pulm Sys Jeffery 01/30/2024 61.5     Pulm Kamara Jeffery 01/30/2024 27.4     Pulm S/D 01/30/2024 2.25     Pulm A Revs Jeffery 01/30/2024 30.4     LV V1 max 01/30/2024 107.2     LV V1 max PG 01/30/2024 4.6     LV V1 mean PG 01/30/2024 2.42     LV V1 VTI 01/30/2024 26.0     Ao pk jeffery 01/30/2024 133.1     Ao max PG 01/30/2024 7.1     Ao mean PG 01/30/2024 3.6     Ao V2 VTI 01/30/2024 29.0     MOJGAN(I,D) 01/30/2024 2.8     AI P1/2t 01/30/2024 658.7     MV max PG 01/30/2024 8.7     MV mean PG 01/30/2024 4.0     MV V2 VTI 01/30/2024 32.9     MVA(VTI) 01/30/2024 2.43     MV dec slope 01/30/2024 427.5     MR max jeffery 01/30/2024 680.4     MR max PG 01/30/2024 185.5     TR max PG 01/30/2024 25.5     RVSP(TR) 01/30/2024 35.5     RAP systole 01/30/2024 10.0      PA V2 max 01/30/2024 107.3     PA acc time 01/30/2024 0.07     PI end-d alireza 01/30/2024 133.1     Ao root diam 01/30/2024 2.7     ACS 01/30/2024 1.59     EF(MOD-bp) 01/30/2024 60.0     RV Base 01/30/2024 3.60     RV Mid 01/30/2024 1.70     TAPSE (>1.6) 01/30/2024 2.60    Hospital Outpatient Visit on 01/29/2024   Component Date Value    Glucose 01/29/2024 209 (H)     BUN 01/29/2024 28 (H)     Creatinine 01/29/2024 1.38 (H)     Sodium 01/29/2024 143     Potassium 01/29/2024 5.4 (H)     Chloride 01/29/2024 106     CO2 01/29/2024 26.0     Calcium 01/29/2024 9.8     BUN/Creatinine Ratio 01/29/2024 20.3     Anion Gap 01/29/2024 11.0     eGFR 01/29/2024 42.6 (L)     Magnesium 01/29/2024 2.2     proBNP 01/29/2024 63.3    Hospital Outpatient Visit on 01/25/2024   Component Date Value    Glucose 01/25/2024 248 (H)     BUN 01/25/2024 30 (H)     Creatinine 01/25/2024 1.35 (H)     Sodium 01/25/2024 142     Potassium 01/25/2024 5.7 (H)     Chloride 01/25/2024 103     CO2 01/25/2024 26.0     Calcium 01/25/2024 10.2     BUN/Creatinine Ratio 01/25/2024 22.2     Anion Gap 01/25/2024 13.0     eGFR 01/25/2024 43.7 (L)     Magnesium 01/25/2024 2.3     proBNP 01/25/2024 58.9     TSH 01/25/2024 1.500     Hemoglobin A1C 01/25/2024 7.90 (H)     QT Interval 01/25/2024 443     QTC Interval 01/25/2024 480    Lab on 12/28/2023   Component Date Value    25 Hydroxy, Vitamin D 12/28/2023 34.7     Color, UA 12/28/2023 Yellow     Appearance, UA 12/28/2023 Clear     pH, UA 12/28/2023 6.0     Specific Gravity, UA 12/28/2023 1.018     Glucose, UA 12/28/2023 Negative     Ketones, UA 12/28/2023 Negative     Bilirubin, UA 12/28/2023 Negative     Blood, UA 12/28/2023 Negative     Protein, UA 12/28/2023 100 mg/dL (2+) (A)     Leuk Esterase, UA 12/28/2023 Moderate (2+) (A)     Nitrite, UA 12/28/2023 Negative     Urobilinogen, UA 12/28/2023 0.2 E.U./dL     Glucose 12/28/2023 158 (H)     BUN 12/28/2023 21     Creatinine 12/28/2023 1.16 (H)     Sodium  12/28/2023 146 (H)     Potassium 12/28/2023 5.2     Chloride 12/28/2023 105     CO2 12/28/2023 26.0     Calcium 12/28/2023 9.9     Total Protein 12/28/2023 6.7     Albumin 12/28/2023 4.9     ALT (SGPT) 12/28/2023 23     AST (SGOT) 12/28/2023 14     Alkaline Phosphatase 12/28/2023 65     Total Bilirubin 12/28/2023 0.2     Globulin 12/28/2023 1.8     A/G Ratio 12/28/2023 2.7     BUN/Creatinine Ratio 12/28/2023 18.1     Anion Gap 12/28/2023 15.0     eGFR 12/28/2023 52.4 (L)     Phosphorus 12/28/2023 4.4     PTH, Intact 12/28/2023 78.0 (H)     Hemoglobin A1C 12/28/2023 7.10 (H)     Protein/Creatinine Ratio* 12/28/2023 526.9 (H)     Creatinine, Urine 12/28/2023 109.7     Total Protein, Urine 12/28/2023 57.8     Magnesium 12/28/2023 1.8     Creatine Kinase 12/28/2023 25     Uric Acid 12/28/2023 7.7 (H)     WBC 12/28/2023 4.64     RBC 12/28/2023 3.98     Hemoglobin 12/28/2023 11.7 (L)     Hematocrit 12/28/2023 35.7     MCV 12/28/2023 89.7     MCH 12/28/2023 29.4     MCHC 12/28/2023 32.8     RDW 12/28/2023 13.3     RDW-SD 12/28/2023 43.9     MPV 12/28/2023 10.8     Platelets 12/28/2023 225     Neutrophil % 12/28/2023 52.1     Lymphocyte % 12/28/2023 30.0     Monocyte % 12/28/2023 9.1     Eosinophil % 12/28/2023 7.5 (H)     Basophil % 12/28/2023 0.9     Immature Grans % 12/28/2023 0.4     Neutrophils, Absolute 12/28/2023 2.42     Lymphocytes, Absolute 12/28/2023 1.39     Monocytes, Absolute 12/28/2023 0.42     Eosinophils, Absolute 12/28/2023 0.35     Basophils, Absolute 12/28/2023 0.04     Immature Grans, Absolute 12/28/2023 0.02     nRBC 12/28/2023 0.0     RBC, UA 12/28/2023 0-2     WBC, UA 12/28/2023 21-50 (A)     Bacteria, UA 12/28/2023 None Seen     Squamous Epithelial Cell* 12/28/2023 3-6 (A)     Hyaline Casts, UA 12/28/2023 3-6     Methodology 12/28/2023 Automated Microscopy    Lab on 12/21/2023   Component Date Value    Campylobacter 12/21/2023 Not Detected     Plesiomonas shigelloides 12/21/2023 Not Detected      Salmonella 12/21/2023 Not Detected     Vibrio 12/21/2023 Not Detected     Vibrio cholerae 12/21/2023 Not Detected     Yersinia enterocolitica 12/21/2023 Not Detected     Enteroaggregative E. col* 12/21/2023 Not Detected     Enteropathogenic E. coli* 12/21/2023 Not Detected     Enterotoxigenic E. coli * 12/21/2023 Not Detected     Shiga-like toxin-produci* 12/21/2023 Not Detected     Shigella/Enteroinvasive * 12/21/2023 Not Detected     Cryptosporidium 12/21/2023 Not Detected     Cyclospora cayetanensis 12/21/2023 Not Detected     Entamoeba histolytica 12/21/2023 Not Detected     Giardia lamblia 12/21/2023 Not Detected     Adenovirus F40/41 12/21/2023 Not Detected     Astrovirus 12/21/2023 Not Detected     Norovirus GI/GII 12/21/2023 Not Detected     Rotavirus A 12/21/2023 Not Detected     Sapovirus (I, II, IV or * 12/21/2023 Not Detected    Lab on 12/20/2023   Component Date Value    Hemoglobin A1C 12/20/2023 7.20 (H)     Glucose 12/20/2023 142 (H)     BUN 12/20/2023 32 (H)     Creatinine 12/20/2023 1.58 (H)     Sodium 12/20/2023 142     Potassium 12/20/2023 5.3 (H)     Chloride 12/20/2023 103     CO2 12/20/2023 22.3     Calcium 12/20/2023 9.6     Total Protein 12/20/2023 7.2     Albumin 12/20/2023 4.7     ALT (SGPT) 12/20/2023 19     AST (SGOT) 12/20/2023 14     Alkaline Phosphatase 12/20/2023 59     Total Bilirubin 12/20/2023 0.2     Globulin 12/20/2023 2.5     A/G Ratio 12/20/2023 1.9     BUN/Creatinine Ratio 12/20/2023 20.3     Anion Gap 12/20/2023 16.7 (H)     eGFR 12/20/2023 36.2 (L)     WBC 12/20/2023 6.12     RBC 12/20/2023 4.14     Hemoglobin 12/20/2023 12.2     Hematocrit 12/20/2023 36.8     MCV 12/20/2023 88.9     MCH 12/20/2023 29.5     MCHC 12/20/2023 33.2     RDW 12/20/2023 13.3     RDW-SD 12/20/2023 43.0     MPV 12/20/2023 11.3     Platelets 12/20/2023 221     Neutrophil % 12/20/2023 57.9     Lymphocyte % 12/20/2023 24.8     Monocyte % 12/20/2023 10.3     Eosinophil % 12/20/2023 5.7     Basophil %  "12/20/2023 1.0     Immature Grans % 12/20/2023 0.3     Neutrophils, Absolute 12/20/2023 3.54     Lymphocytes, Absolute 12/20/2023 1.52     Monocytes, Absolute 12/20/2023 0.63     Eosinophils, Absolute 12/20/2023 0.35     Basophils, Absolute 12/20/2023 0.06     Immature Grans, Absolute 12/20/2023 0.02     nRBC 12/20/2023 0.0    There may be more visits with results that are not included.     Recent labs reviewed and interpreted for clinical significance and application    Went over each of the labs individually with the patient and also I did call the lab due to the discrepancy noted between the BMP and the CMP on the potassium which seems to range from near low to rather high at 5.7 feeling the potassium is more at the 4.1 on the line with no hemolysis          It is a pleasure to be involved in this patient's cardiovascular care relating to their heart failure.  Please feel free to call me with any questions or concerns.    Rodney \"Renan\" Clay LEDEZMA, [facility]  Heart failure program clinical provider    BRISA Simons   03/29/24  .  "

## 2024-03-29 NOTE — PATIENT INSTRUCTIONS
Please,    - Continue Farxiga 10 mgs once a day  - Decrease glimepiride to 2 mg daily  - Increase insulin Lantus to 15 units at night    - Continue checking blood sugar through freestyle josefina.    Follow-up in my clinic back again in 5 weeks time.    Thank you for your visit today.    If you have any questions or concerns please feel free to reach out of the office.

## 2024-03-29 NOTE — PROGRESS NOTES
-----------------------------------------------------------------  ENDOCRINE CLINIC NOTE  -----------------------------------------------------------------        PATIENT NAME: Susy Hanson  PATIENT : 1960 AGE: 63 y.o.  MRN NUMBER: [id number]  PRIMARY CARE: Erica Avila MD    ==========================================================================    CHIEF COMPLAINT: Type 2 diabetes management.  DATE OF SERVICE: 24    ==========================================================================    HPI / SUBJECTIVE    63 y.o. female is seen in the clinic today for type 2 diabetes mellitus.  Diagnosis Date:   Current Therapy / Medications:  Insulin Lantus 12 units nightly  Farxiga 10 mg p.o. daily  Amaryl 4 mg once a day  Past tried medications: (Not currently using)  Metformin - GI side-effects  Ozempic - hx of pancreatitis  Home BG logs / monitoring: Checking BG through  Benton 3  Hypoglycemia: Patient had hypoglycemia episodes after last visit and dose was adjusted  Small multiple meals  Following ophthalmology at Dr. Jones's office  Neuropathy: +ve and not on meds  Nephropathy: CKD III  Hx of dilated cardiomyopathy.    ==========================================================================                                                PAST MEDICAL HISTORY    Past Medical History:   Diagnosis Date    Anxiety     Breast cancer     LEFT BREAST 2021    Cataract     Had surgery to remove 2023    CHF (congestive heart failure)     Colon polyp 2023    Removed during colonoscopy    Coronary artery disease     Depression     DM (diabetes mellitus)     History of medical problems     Bladder prolapse repeat Dr. Roper    HL (hearing loss)     Hyperlipidemia     Hypertension     SAW CARDIO/ STRESS TEST  - NOW MEDS MANAGED BY PCP     Irritable bowel syndrome 2006    Peripheral neuropathy     PONV (postoperative nausea and vomiting)     Renal insufficiency  2020    See Dr. funes for moderate kidney failure    Sleep apnea     WEARS CPAP    Type 2 diabetes mellitus 2008    Urinary tract infection 6/23    UTI seen at [facility]       ==========================================================================    PAST SURGICAL HISTORY    Past Surgical History:   Procedure Laterality Date    BLADDER REPAIR      BREAST BIOPSY      BREAST RECONSTRUCTION Bilateral 08/26/2021    Procedure: BILATERAL PREPECTORALPLACMENT TISSUE EXPANDERS AND ALLODERM;  Surgeon: Heri Arreaga MD;  Location:  [facility];  Service: Plastics;  Laterality: Bilateral;    BREAST TISSUE EXPANDER REMOVAL INSERTION OF IMPLANT Bilateral 12/23/2021    Procedure: BILATERAL REMOVAL TISSUE EXPANDERS AND PLACEMENT OF IMPLANTS;  Surgeon: Heri Arreaga MD;  Location:  [facility];  Service: Plastics;  Laterality: Bilateral;    CARDIAC CATHETERIZATION Left 04/13/2022    Procedure: Cardiac Catheterization/Vascular Study;  Surgeon: Christo Murphy MD;  Location:  [facility];  Service: Cardiovascular;  Laterality: Left;    CARDIAC ELECTROPHYSIOLOGY PROCEDURE N/A 09/19/2022    Procedure: ICD implant [place]. Sude aware;  Surgeon: Esteban Kendrick MD;  Location:  [facility];  Service: Cardiology;  Laterality: N/A;    CARDIAC ELECTROPHYSIOLOGY PROCEDURE N/A 09/20/2022    Procedure: lead repositioning;  Surgeon: Esteban Kendirck MD;  Location:  [facility];  Service: Cardiology;  Laterality: N/A;    CARDIAC SURGERY  2022    Defibrillator/pacemaker implant    CARDIOVASCULAR STRESS TEST  2020    COLONOSCOPY  11/2012    EYE SURGERY  7/23    Cataracts removed    HYSTERECTOMY      INSERT / REPLACE / REMOVE PACEMAKER  2022    LYMPH NODE BIOPSY  8/29/2021    MASTECTOMY W/ SENTINEL NODE BIOPSY Bilateral 08/26/2021    Procedure: bilateral total mastectomy, left axillary sentinel lymph node biopsy, possible reconstruction.;  Surgeon: Asia Perkins MD;   Location: [facility];  Service: General;  Laterality: Bilateral;    TUBAL ABDOMINAL LIGATION  1981       ==========================================================================    FAMILY HISTORY    Family History   Problem Relation Age of Onset    Other Mother     Diabetes Mother     Heart disease Mother     Sleep apnea Mother     Snoring Mother     COPD Mother     Hyperlipidemia Mother     Alcohol abuse Mother     Anxiety disorder Mother     Depression Mother     Hypertension Mother     Alzheimer's disease Father     Diabetes Father     Heart disease Father     Other Father     Sleep apnea Father     Snoring Father     Hyperlipidemia Father     Heart failure Father     Kidney disease Father     Hypertension Father     Obesity Father     Diabetes Sister     Heart disease Sister     Other Sister     Sleep apnea Sister     Snoring Sister     COPD Sister     Anxiety disorder Sister     Depression Sister     Hypertension Sister     Hyperlipidemia Sister     Clotting disorder Sister     Hyperlipidemia Brother     Hypertension Brother     Breast cancer Maternal Aunt 80    Breast cancer Paternal Aunt     Diabetes Maternal Grandmother     Breast cancer Paternal Grandmother 60    Cancer Paternal Grandmother     Diabetes Paternal Grandmother     Hyperlipidemia Son     Diabetes Brother     Hypertension Brother     Malig Hyperthermia Neg Hx        ==========================================================================    SOCIAL HISTORY    Social History     Socioeconomic History    Marital status:    Tobacco Use    Smoking status: Former     Current packs/day: 0.00     Average packs/day: 2.0 packs/day for 36.0 years (72.0 ttl pk-yrs)     Types: Cigarettes     Start date: 1972     Quit date: 2008     Years since quittin.2     Passive exposure: Past    Smokeless tobacco: Never   Vaping Use    Vaping status: Never Used   Substance and Sexual Activity    Alcohol use: No    Drug use: No    Sexual  activity: Yes     Partners: Male     Birth control/protection: None, Tubal ligation, Hysterectomy       ==========================================================================    MEDICATIONS      Current Outpatient Medications:     Accu-Chek FastClix Lancets misc, USE TO CHECK BLOOD GLUCOSE DAILY, Disp: , Rfl:     Accu-Chek Guide test strip, 1 each by Other route Daily. E11.49, Disp: 100 each, Rfl: 1    ALPRAZolam (XANAX) 1 MG tablet, Take 1 tablet by mouth 2 (Two) Times a Day., Disp: 60 tablet, Rfl: 2    atorvastatin (LIPITOR) 20 MG tablet, Take 1 tablet by mouth Daily., Disp: 90 tablet, Rfl: 1    cetirizine (zyrTEC) 10 MG tablet, Take 1 tablet by mouth Daily As Needed., Disp: , Rfl:     Continuous Blood Gluc  (FreeStyle Benton 3 Sallis) device, Use 1 each 4 (Four) Times a Day Before Meals & at Bedtime., Disp: 1 each, Rfl: 0    Continuous Blood Gluc Sensor (FreeStyle Benton 3 Sensor) misc, Use 1 each Every 14 (Fourteen) Days., Disp: 2 each, Rfl: 5    dapagliflozin Propanediol (Farxiga) 10 MG tablet, Take 10 mg by mouth Daily., Disp: 90 tablet, Rfl: 3    dilTIAZem CD (Cardizem CD) 180 MG 24 hr capsule, Take 1 capsule by mouth Daily., Disp: 90 capsule, Rfl: 1    hydrALAZINE (APRESOLINE) 25 MG tablet, Take 3 tablets (75 mg) by mouth twice daily, Disp: 504 tablet, Rfl: 3    hydrALAZINE (APRESOLINE) 50 MG tablet, Take 1.5 tablets by mouth Every 12 (Twelve) Hours., Disp: , Rfl:     Insulin Glargine (Lantus SoloStar) 100 UNIT/ML injection pen, Inject 20 Units under the skin into the appropriate area as directed Every Night., Disp: 15 mL, Rfl: 2    Insulin Pen Needle (Pen Needles) 32G X 4 MM misc, Use 1 each 4 (Four) Times a Day Before Meals & at Bedtime., Disp: 200 each, Rfl: 5    metoprolol succinate XL (TOPROL-XL) 100 MG 24 hr tablet, To be filled by Provider, Disp: 180 tablet, Rfl: 0    ondansetron (Zofran) 4 MG tablet, Take 1 tablet by mouth Every 8 (Eight) Hours As Needed for Nausea or Vomiting., Disp: 20  tablet, Rfl: 0    pantoprazole (PROTONIX) 40 MG EC tablet, Take 1 tablet by mouth Daily., Disp: 90 tablet, Rfl: 0    sulfamethoxazole-trimethoprim (Bactrim DS) 800-160 MG per tablet, Take 1 tablet by mouth 2 (Two) Times a Day., Disp: 14 tablet, Rfl: 0    Triamcinolone Acetonide (NASACORT) 55 MCG/ACT nasal inhaler, APPLY 2 SPRAYS BY NASAL ROUTE DAILY FOR 30 DAYS (Patient taking differently: 2 sprays into the nostril(s) as directed by provider Daily As Needed.), Disp: 10.8 mL, Rfl: 6    furosemide (Lasix) 20 MG tablet, Take 1 tablet by mouth Every Other Day for 30 days., Disp: 45 tablet, Rfl: 0    glimepiride (Amaryl) 2 MG tablet, Take 1 tablet by mouth Every Morning., Disp: 30 tablet, Rfl: 11    ==========================================================================    ALLERGIES    Allergies   Allergen Reactions    Hydrocodone-Acetaminophen Hives     Lortab, takes tylenol       ==========================================================================    OBJECTIVE    Vitals:    03/29/24 1240   BP: 128/76   Pulse: 83   SpO2: 98%     Body mass index is 29.33 kg/m².     General: Alert, cooperative, no acute distress    ==========================================================================    LAB EVALUATION    Lab Results   Component Value Date    GLUCOSE 185 (H) 03/04/2024    BUN 33 (H) 03/04/2024    CREATININE 1.46 (H) 03/04/2024    EGFRIFNONA 57 (L) 01/26/2022    EGFRIFAFRI >60 10/13/2017    BCR 22.6 03/04/2024    K 4.8 03/04/2024    CO2 24.0 03/04/2024    CALCIUM 9.9 03/04/2024    ALBUMIN 4.9 12/28/2023    AST 14 12/28/2023    ALT 23 12/28/2023    CHOL 140 03/14/2023    TRIG 153 (H) 03/14/2023    HDL 50 03/14/2023    LDL 64 03/14/2023     Lab Results   Component Value Date    HGBA1C 7.90 (H) 01/25/2024    HGBA1C 7.10 (H) 12/28/2023    HGBA1C 7.20 (H) 12/20/2023     Lab Results   Component Value Date    MICROALBUR 40.5 03/14/2023    CREATININE 1.46 (H) 03/04/2024     Lab Results   Component Value Date    TSH  1.500 01/25/2024     ==========================================================================    CGM Data:    Dates: Mar 16 till Mar 29, 2024    Very High > 250: 4%  High 180 - 250: 39%  Target: 70 - 180: 57%  Low 55 - 70:  0%  Very Low < 55: 0%    Time active on CGM is 96%.  Average blood sugar 175 with glucose variability 23.1% and GMI 7.5%.    ==========================================================================    ASSESSMENT AND PLAN    # Type 2 diabetes with hyperglycemia  # Diabetic peripheral neuropathy  # Diabetic nephropathy with CKD stage IIIb, currently following nephrology in the clinic, following Dr. Kerr  # Overweight    - Currently maintained on glimepiride therapy along with Farxiga  - Previous history of pancreatitis therefore not a good candidate for DPP 4 inhibitor therapy or GLP-1 receptor agonist  - Reviewed blood sugar and given CKD stage IIIb status will de-escalate glimepiride therapy but enhance Lantus coverage  - If needed in the future we will also add time insulin therapy even low-dose  - Patient is planning for trip starting mid May therefore not making significant changes to avoid any hypoglycemia during the trip but will make earlier follow-up in 5 weeks to review blood sugar pattern or patient to reach out to the clinic if she have significant blood sugar changes that needs to be addressed  - Patient also counseled to cut down the snacks and have more scheduled mealtimes  - New adjusted therapy:  Farxiga 10 mg p.o. daily  Glimepiride 2 mg once a day  Lantus 15 units nightly  - Patient to follow-up in my clinic back again in 5 weeks    Thank you for courtesy of consultation.    Return to clinic: 5 weeks    Entire assessment and plan was discussed and counseled the patient in detail to which patient verbalized understanding and agreed with care.  Answered all queries and concerns.    This note was created using voice recognition software and is inherently subject to errors  "including those of syntax and \"sound-alike\" substitutions which may escape proofreading.  In such instances, original meaning may be extrapolated by contextual derivation.    Note: Portions of this note may have been copied from previous notes but documentation have been reviewed and edited as necessary to support clinical decision making for today's visit.    ==========================================================================    INFORMATION PROVIDED TO PATIENT    Patient Instructions   Please,    - Continue Farxiga 10 mgs once a day  - Decrease glimepiride to 2 mg daily  - Increase insulin Lantus to 15 units at night    - Continue checking blood sugar through freestyle josefina.    Follow-up in my clinic back again in 5 weeks time.    Thank you for your visit today.    If you have any questions or concerns please feel free to reach out of the office.       ==========================================================================  Shin Helton MD  Department of Endocrine, Diabetes and Metabolism  [facility], [place]  ==========================================================================  "

## 2024-04-01 ENCOUNTER — TRANSCRIBE ORDERS (OUTPATIENT)
Dept: ADMINISTRATIVE | Facility: HOSPITAL | Age: 64
End: 2024-04-01
Payer: MEDICARE

## 2024-04-01 ENCOUNTER — DISEASE STATE MANAGEMENT VISIT (OUTPATIENT)
Facility: HOSPITAL | Age: 64
End: 2024-04-01
Payer: MEDICARE

## 2024-04-01 ENCOUNTER — HOSPITAL ENCOUNTER (OUTPATIENT)
Facility: HOSPITAL | Age: 64
Discharge: HOME | End: 2024-04-01
Admitting: NURSE PRACTITIONER
Payer: MEDICARE

## 2024-04-01 VITALS
HEART RATE: 70 BPM | WEIGHT: 174.6 LBS | SYSTOLIC BLOOD PRESSURE: 137 MMHG | DIASTOLIC BLOOD PRESSURE: 86 MMHG | OXYGEN SATURATION: 100 % | BODY MASS INDEX: 29.95 KG/M2 | RESPIRATION RATE: 18 BRPM

## 2024-04-01 DIAGNOSIS — N18.31 STAGE 3A CHRONIC KIDNEY DISEASE: ICD-10-CM

## 2024-04-01 DIAGNOSIS — G62.9 PERIPHERAL POLYNEUROPATHY: ICD-10-CM

## 2024-04-01 DIAGNOSIS — K58.8 OTHER IRRITABLE BOWEL SYNDROME: Chronic | ICD-10-CM

## 2024-04-01 DIAGNOSIS — E11.8 TYPE 2 DIABETES MELLITUS WITH UNSPECIFIED COMPLICATIONS: ICD-10-CM

## 2024-04-01 DIAGNOSIS — N17.9 ACUTE KIDNEY INJURY: ICD-10-CM

## 2024-04-01 DIAGNOSIS — E88.810 METABOLIC SYNDROME X: Chronic | ICD-10-CM

## 2024-04-01 DIAGNOSIS — E11.8 TYPE 2 DIABETES MELLITUS WITH UNSPECIFIED COMPLICATIONS: Primary | ICD-10-CM

## 2024-04-01 DIAGNOSIS — N18.32 STAGE 3B CHRONIC KIDNEY DISEASE: ICD-10-CM

## 2024-04-01 DIAGNOSIS — Z91.89 AT RISK FOR FLUID VOLUME OVERLOAD: Chronic | ICD-10-CM

## 2024-04-01 DIAGNOSIS — I27.21 PAH (PULMONARY ARTERY HYPERTENSION): Chronic | ICD-10-CM

## 2024-04-01 DIAGNOSIS — R06.09 DYSPNEA ON EXERTION: ICD-10-CM

## 2024-04-01 DIAGNOSIS — Z95.810 PRESENCE OF BIVENTRICULAR IMPLANTABLE CARDIOVERTER-DEFIBRILLATOR (ICD): Chronic | ICD-10-CM

## 2024-04-01 DIAGNOSIS — I44.7 LBBB (LEFT BUNDLE BRANCH BLOCK): ICD-10-CM

## 2024-04-01 DIAGNOSIS — I42.9 CARDIOMYOPATHY, UNSPECIFIED TYPE: Primary | Chronic | ICD-10-CM

## 2024-04-01 DIAGNOSIS — I31.8 OTHER PERICARDITIS, UNSPECIFIED CHRONICITY: ICD-10-CM

## 2024-04-01 DIAGNOSIS — F41.8 MIXED ANXIETY DEPRESSIVE DISORDER: ICD-10-CM

## 2024-04-01 DIAGNOSIS — D05.12 BREAST NEOPLASM, TIS (DCIS), LEFT: ICD-10-CM

## 2024-04-01 DIAGNOSIS — G47.00 INSOMNIA, UNSPECIFIED TYPE: ICD-10-CM

## 2024-04-01 LAB
25(OH)D3 SERPL-MCNC: 33.3 NG/ML (ref 30–100)
ALBUMIN SERPL-MCNC: 4.8 G/DL (ref 3.5–5.2)
ALBUMIN/GLOB SERPL: 1.7 G/DL
ALP SERPL-CCNC: 86 U/L (ref 39–117)
ALT SERPL W P-5'-P-CCNC: 20 U/L (ref 1–33)
ANION GAP SERPL CALCULATED.3IONS-SCNC: 16 MMOL/L (ref 5–15)
ANION GAP SERPL CALCULATED.3IONS-SCNC: 17 MMOL/L (ref 5–15)
AST SERPL-CCNC: 14 U/L (ref 1–32)
BACTERIA UR QL AUTO: ABNORMAL /HPF
BILIRUB SERPL-MCNC: 0.2 MG/DL (ref 0–1.2)
BILIRUB UR QL STRIP: NEGATIVE
BUN SERPL-MCNC: 34 MG/DL (ref 8–23)
BUN SERPL-MCNC: 36 MG/DL (ref 8–23)
BUN/CREAT SERPL: 23 (ref 7–25)
BUN/CREAT SERPL: 24.5 (ref 7–25)
CALCIUM SPEC-SCNC: 10.4 MG/DL (ref 8.6–10.5)
CALCIUM SPEC-SCNC: 9.8 MG/DL (ref 8.6–10.5)
CHLORIDE SERPL-SCNC: 101 MMOL/L (ref 98–107)
CHLORIDE SERPL-SCNC: 102 MMOL/L (ref 98–107)
CK SERPL-CCNC: 47 U/L (ref 20–180)
CLARITY UR: CLEAR
CO2 SERPL-SCNC: 20 MMOL/L (ref 22–29)
CO2 SERPL-SCNC: 21 MMOL/L (ref 22–29)
COLOR UR: YELLOW
CREAT SERPL-MCNC: 1.47 MG/DL (ref 0.57–1)
CREAT SERPL-MCNC: 1.48 MG/DL (ref 0.57–1)
CREAT UR-MCNC: 14.3 MG/DL
DEPRECATED RDW RBC AUTO: 47.3 FL (ref 37–54)
EGFRCR SERPLBLD CKD-EPI 2021: 39.1 ML/MIN/1.73
EGFRCR SERPLBLD CKD-EPI 2021: 39.5 ML/MIN/1.73
ERYTHROCYTE [DISTWIDTH] IN BLOOD BY AUTOMATED COUNT: 14.2 % (ref 12.3–15.4)
GLOBULIN UR ELPH-MCNC: 2.8 GM/DL
GLUCOSE SERPL-MCNC: 228 MG/DL (ref 65–99)
GLUCOSE SERPL-MCNC: 238 MG/DL (ref 65–99)
GLUCOSE UR STRIP-MCNC: ABNORMAL MG/DL
HBA1C MFR BLD: 7.6 % (ref 4.8–5.6)
HCT VFR BLD AUTO: 39.9 % (ref 34–46.6)
HGB BLD-MCNC: 12 G/DL (ref 12–15.9)
HGB UR QL STRIP.AUTO: NEGATIVE
HYALINE CASTS UR QL AUTO: ABNORMAL /LPF
KETONES UR QL STRIP: NEGATIVE
LEUKOCYTE ESTERASE UR QL STRIP.AUTO: ABNORMAL
MAGNESIUM SERPL-MCNC: 2.4 MG/DL (ref 1.6–2.4)
MCH RBC QN AUTO: 27.3 PG (ref 26.6–33)
MCHC RBC AUTO-ENTMCNC: 30.1 G/DL (ref 31.5–35.7)
MCV RBC AUTO: 90.9 FL (ref 79–97)
NITRITE UR QL STRIP: NEGATIVE
NT-PROBNP SERPL-MCNC: 83.6 PG/ML (ref 0–900)
PH UR STRIP.AUTO: <=5 [PH] (ref 5–8)
PHOSPHATE SERPL-MCNC: 4.6 MG/DL (ref 2.5–4.5)
PLATELET # BLD AUTO: 243 10*3/MM3 (ref 140–450)
PMV BLD AUTO: 10.6 FL (ref 6–12)
POTASSIUM SERPL-SCNC: 4.1 MMOL/L (ref 3.5–5.2)
POTASSIUM SERPL-SCNC: 5.7 MMOL/L (ref 3.5–5.2)
PROT ?TM UR-MCNC: 5.5 MG/DL
PROT SERPL-MCNC: 7.6 G/DL (ref 6–8.5)
PROT UR QL STRIP: NEGATIVE
PROT/CREAT UR: 384.6 MG/G CREA (ref 0–200)
PTH-INTACT SERPL-MCNC: 100.4 PG/ML (ref 15–65)
RBC # BLD AUTO: 4.39 10*6/MM3 (ref 3.77–5.28)
RBC # UR STRIP: ABNORMAL /HPF
REF LAB TEST METHOD: ABNORMAL
SODIUM SERPL-SCNC: 138 MMOL/L (ref 136–145)
SODIUM SERPL-SCNC: 139 MMOL/L (ref 136–145)
SP GR UR STRIP: 1.01 (ref 1–1.03)
SQUAMOUS #/AREA URNS HPF: ABNORMAL /HPF
URATE SERPL-MCNC: 7.5 MG/DL (ref 2.4–5.7)
UROBILINOGEN UR QL STRIP: ABNORMAL
WBC # UR STRIP: ABNORMAL /HPF
WBC NRBC COR # BLD AUTO: 4.9 10*3/MM3 (ref 3.4–10.8)

## 2024-04-01 PROCEDURE — 84550 ASSAY OF BLOOD/URIC ACID: CPT | Performed by: INTERNAL MEDICINE

## 2024-04-01 PROCEDURE — 85027 COMPLETE CBC AUTOMATED: CPT | Performed by: INTERNAL MEDICINE

## 2024-04-01 PROCEDURE — 83880 ASSAY OF NATRIURETIC PEPTIDE: CPT | Performed by: NURSE PRACTITIONER

## 2024-04-01 PROCEDURE — 83735 ASSAY OF MAGNESIUM: CPT | Performed by: INTERNAL MEDICINE

## 2024-04-01 PROCEDURE — 84100 ASSAY OF PHOSPHORUS: CPT | Performed by: INTERNAL MEDICINE

## 2024-04-01 PROCEDURE — 83036 HEMOGLOBIN GLYCOSYLATED A1C: CPT | Performed by: INTERNAL MEDICINE

## 2024-04-01 PROCEDURE — G0463 HOSPITAL OUTPT CLINIC VISIT: HCPCS

## 2024-04-01 PROCEDURE — 82306 VITAMIN D 25 HYDROXY: CPT | Performed by: INTERNAL MEDICINE

## 2024-04-01 PROCEDURE — 84156 ASSAY OF PROTEIN URINE: CPT | Performed by: INTERNAL MEDICINE

## 2024-04-01 PROCEDURE — 81001 URINALYSIS AUTO W/SCOPE: CPT | Performed by: INTERNAL MEDICINE

## 2024-04-01 PROCEDURE — 80053 COMPREHEN METABOLIC PANEL: CPT | Performed by: INTERNAL MEDICINE

## 2024-04-01 PROCEDURE — 82570 ASSAY OF URINE CREATININE: CPT | Performed by: INTERNAL MEDICINE

## 2024-04-01 PROCEDURE — 82550 ASSAY OF CK (CPK): CPT | Performed by: INTERNAL MEDICINE

## 2024-04-01 PROCEDURE — 83970 ASSAY OF PARATHORMONE: CPT | Performed by: INTERNAL MEDICINE

## 2024-04-01 NOTE — PROGRESS NOTES
HEART FAILURE CLINIC - PHARMACY SERVICE     HFpEF with EF 56-60% (Last Echo: 2/3/24).    NYHA Class I C     The patient's last EKG was reviewed from 2/12/24 and shows a QTcB of 496 ms.      Cardiologist: Jeffrey  Nephrologist: Kiana  PCP: PILY Avila     -CHF-specific BB:      Pre Visit Dose:  Metoprolol succinate  mg PO BID    Post Visit Dose:  Metoprolol succinate  mg PO BID (HR 70 bpm, /86 mmHg)     - Target Dose: Metoprolol succinate  mg PO daily.     - Goal HR of 50s to 60s.     - Patient should be seen every 10 to 14 days for a pulse check with plans for up-titration until target heart rate is achieved.        -ACE/ARB/ARNI:     Pre Visit Dose: None - discontinued previousy due to hyperkalemia    Post Visit Dose: None     - Target Dose:  N/A    - Patient should have a follow-up appointment every 2 to 4 weeks for a hemodynamic check with possible up-titration to target dose.         -SGLT2 inhibitor therapy:    Pre Visit Dose: Dapagliflozin 10 mg PO daily    Post Visit Dose: Dapagliflozin 10 mg PO daily    - Target Dose: Dapagliflozin 10 mg PO daily : CrCl > 20 mL/min which has shown benefit in patients with HF       -MRA:     Pre Visit Dose: None - discontinued previousy due to hyperkalemia    Post Visit Dose: None    - Target Dose:  N/A    - K is < 5 mEq/L and SCr is </= 2 mg/dL in female and eGFR > 30 mL/min/1.73m3    **Of note, K value from BMP and CMP differed drastically (both collected today). K value from BMP was elevated at 5.7 mmol/L and K value from CMP was normal at 4.1 mmol/L. Confirmed with Lab that BMP had high hemolysis which resulted in the falsely elevated K result. K value from CMP was accurate.    -GFR 30 to 50 mL/min: Initial 12.5 mg daily or every other day, may double every 4 weeks if KCL remains < 5 mEq/L  -GFR < 30 mL/min: Use not recommended: HF clinical trials excluded if Scr > 2.5 mg/dL      -DIURETIC:     Pre Visit Dose: Furosemide 20 mg PO  QOD    Post Visit Dose: Furosemide 20 mg PO QOD      -MAGNESIUM:     Mag is > 2 mg/dL    Pre Visit Dose: None    Post Visit Dose: None    -If Magnesium 1.6-1.9 mg/dL, Initiate Magnesium 400 mg PO daily  -If Magnesium is less than 1.6 mg/dL, Initiate Magnesium 400 mg PO BID    -ANTICOAGULATION:     None     -OTHER CV MEDS:     Pre Visit Dose: atorvastatin 20 mg PO daily, diltiazem 180 mg PO daily, hydralazine 75 mg PO BID    Post Visit Dose: no changes      -Clinic Administered Medications:     None      Vaccines:     Not assessed at this visit       Patient Assistance:      Patient previously approved by [employer] patient assistance through December 2024.           PLAN:  Initiation/Discontinuation/Dose Adjustment: No changes  Education provided: None  Coordination of Care: Provided patient with [employer] phone number because she has not heard from them yet to set up shipment. Confirmed patient has enough supply on hand to last until shipment comes.  Refills: None          Estrellita Acevedo, PharmD  4/1/2024  10:16 EDT

## 2024-04-04 RX ORDER — ATORVASTATIN CALCIUM 20 MG/1
20 TABLET, FILM COATED ORAL DAILY
Qty: 90 TABLET | Refills: 1 | Status: SHIPPED | OUTPATIENT
Start: 2024-04-04

## 2024-04-08 DIAGNOSIS — I10 ESSENTIAL HYPERTENSION: ICD-10-CM

## 2024-04-08 DIAGNOSIS — E78.2 MIXED HYPERLIPIDEMIA: ICD-10-CM

## 2024-04-08 DIAGNOSIS — I42.0 DILATED CARDIOMYOPATHY: ICD-10-CM

## 2024-04-08 DIAGNOSIS — E11.49 TYPE 2 DIABETES MELLITUS WITH OTHER DIABETIC NEUROLOGICAL COMPLICATION: ICD-10-CM

## 2024-04-08 DIAGNOSIS — Z95.810 PRESENCE OF BIVENTRICULAR IMPLANTABLE CARDIOVERTER-DEFIBRILLATOR (ICD): ICD-10-CM

## 2024-04-08 RX ORDER — PANTOPRAZOLE SODIUM 40 MG/1
40 TABLET, DELAYED RELEASE ORAL DAILY
Qty: 90 TABLET | Refills: 0 | Status: SHIPPED | OUTPATIENT
Start: 2024-04-08

## 2024-04-08 RX ORDER — FUROSEMIDE 20 MG/1
20 TABLET ORAL EVERY OTHER DAY
Qty: 90 TABLET | Refills: 1 | Status: SHIPPED | OUTPATIENT
Start: 2024-04-08 | End: 2024-04-08 | Stop reason: SDUPTHER

## 2024-04-08 RX ORDER — DILTIAZEM HYDROCHLORIDE 180 MG/1
180 CAPSULE, COATED, EXTENDED RELEASE ORAL DAILY
Qty: 90 CAPSULE | Refills: 1 | Status: SHIPPED | OUTPATIENT
Start: 2024-04-08 | End: 2024-04-08 | Stop reason: SDUPTHER

## 2024-04-09 RX ORDER — DILTIAZEM HYDROCHLORIDE 180 MG/1
180 CAPSULE, COATED, EXTENDED RELEASE ORAL DAILY
Qty: 90 CAPSULE | Refills: 1 | Status: SHIPPED | OUTPATIENT
Start: 2024-04-09

## 2024-04-09 RX ORDER — FUROSEMIDE 20 MG/1
20 TABLET ORAL EVERY OTHER DAY
Qty: 90 TABLET | Refills: 1 | Status: SHIPPED | OUTPATIENT
Start: 2024-04-09 | End: 2025-04-04

## 2024-04-17 ENCOUNTER — TELEPHONE (OUTPATIENT)
Dept: ENDOCRINOLOGY | Facility: CLINIC | Age: 64
End: 2024-04-17
Payer: MEDICARE

## 2024-04-17 NOTE — TELEPHONE ENCOUNTER
Pt states that she has been working hard at diet, following both renal and diabetic meal plan, but still not seeing improvement in BG 24 hr recall:   B: cup coffee + 1 T SF coffeemate creamer + hard boiled egg with Mrs Dash   L: half soft pretzel   D: pork loin + cheese + stuffing + asparagus with parm and panko--using Mrs Brody to season  Sn: yogurt   Esther: 64 oz water   15 u Lantus until past 3 days inrease to 20 units, Glimeperide 2 mg (reduced from 4 mg)  Wearing Freestyle Benton to track BG, believes previous lows r/t compression while sleeping. No recent lows

## 2024-04-18 DIAGNOSIS — E78.2 MIXED HYPERLIPIDEMIA: ICD-10-CM

## 2024-04-18 DIAGNOSIS — E11.49 TYPE 2 DIABETES MELLITUS WITH OTHER DIABETIC NEUROLOGICAL COMPLICATION: ICD-10-CM

## 2024-04-18 DIAGNOSIS — I42.0 DILATED CARDIOMYOPATHY: ICD-10-CM

## 2024-04-18 DIAGNOSIS — I10 ESSENTIAL HYPERTENSION: ICD-10-CM

## 2024-04-18 DIAGNOSIS — Z95.810 PRESENCE OF BIVENTRICULAR IMPLANTABLE CARDIOVERTER-DEFIBRILLATOR (ICD): ICD-10-CM

## 2024-04-19 RX ORDER — FUROSEMIDE 20 MG/1
20 TABLET ORAL EVERY OTHER DAY
Qty: 90 TABLET | Refills: 1 | Status: SHIPPED | OUTPATIENT
Start: 2024-04-19 | End: 2025-04-14

## 2024-04-19 RX ORDER — DILTIAZEM HYDROCHLORIDE 180 MG/1
180 CAPSULE, COATED, EXTENDED RELEASE ORAL DAILY
Qty: 90 CAPSULE | Refills: 1 | Status: SHIPPED | OUTPATIENT
Start: 2024-04-19

## 2024-04-25 ENCOUNTER — TELEPHONE (OUTPATIENT)
Dept: ENDOCRINOLOGY | Facility: CLINIC | Age: 64
End: 2024-04-25
Payer: MEDICARE

## 2024-04-25 NOTE — TELEPHONE ENCOUNTER
Pt called concerned about elevated blood sugars. Preparing for 16 day trip to [facility] May 15th. Appointment with Dr Helton on May 3rd.     Diet recall:   10 A: Sn: hard boiled eggs cucumbers, apple, mini kind bar, nuts, SF wafers   Benitez salad, diced chicken   D: grilled chicken, side salad, asparagus    Reviewed [facility] report from 4/12-4/25/24. Pt in target range 34% of the time, in high range (181-250) 54% of the time, very high (>250) 12% of the time, and low 0% of the time. Per pt has been taking 20 units of Lantus qd. Pt to increase Lantus from 20 to 24 units qd. Pt to call early next week to determine need for further insulin titration.

## 2024-05-01 ENCOUNTER — SPECIALTY PHARMACY (OUTPATIENT)
Dept: ENDOCRINOLOGY | Facility: CLINIC | Age: 64
End: 2024-05-01
Payer: MEDICARE

## 2024-05-01 NOTE — PROGRESS NOTES
Specialty Pharmacy Refill Coordination Note     Susy is a 63 y.o. female contacted today regarding refills of her specialty medication(s).    Specialty medication(s) and dose(s) confirmed: yes  Changes to medications: no  Changes to insurance: no  Reviewed and verified with patient:         Refill Questions      Flowsheet Row Most Recent Value   Changes to allergies? No   Changes to medications? No   New conditions or infections since last clinic visit No   Unplanned office visit, urgent care, ED, or hospital admission in the last 4 weeks  No   How does patient/caregiver feel medication is working? Good   Financial problems or insurance changes  No   Since the previous refill, were any specialty medication doses or scheduled injections missed or delayed?  No   Does this patient require a clinical escalation to a pharmacist? No            Delivery Questions      Flowsheet Row Most Recent Value   Delivery method [employer]   Delivery address verified with patient/caregiver? Yes   Delivery address Home   Number of medications in delivery 1   Medication(s) being filled and delivered Continuous Glucose Sensor   Copay verified? Yes   Copay amount 0   Copay form of payment No copayment ($0)                 Follow-up: 3 month(s)     Rajani Rivera, Pharmacy Technician  Specialty Pharmacy Technician   5/1/2024   10:06 EDT

## 2024-05-03 ENCOUNTER — SPECIALTY PHARMACY (OUTPATIENT)
Dept: ENDOCRINOLOGY | Facility: CLINIC | Age: 64
End: 2024-05-03
Payer: MEDICARE

## 2024-05-03 ENCOUNTER — OFFICE VISIT (OUTPATIENT)
Dept: ENDOCRINOLOGY | Facility: CLINIC | Age: 64
End: 2024-05-03
Payer: MEDICARE

## 2024-05-03 VITALS
HEART RATE: 83 BPM | WEIGHT: 175 LBS | HEIGHT: 64 IN | DIASTOLIC BLOOD PRESSURE: 72 MMHG | OXYGEN SATURATION: 99 % | BODY MASS INDEX: 29.88 KG/M2 | SYSTOLIC BLOOD PRESSURE: 110 MMHG

## 2024-05-03 DIAGNOSIS — E66.3 OVERWEIGHT: ICD-10-CM

## 2024-05-03 DIAGNOSIS — E11.65 TYPE 2 DIABETES MELLITUS WITH HYPERGLYCEMIA, WITH LONG-TERM CURRENT USE OF INSULIN: Primary | ICD-10-CM

## 2024-05-03 DIAGNOSIS — E11.21 DIABETIC NEPHROPATHY ASSOCIATED WITH TYPE 2 DIABETES MELLITUS: ICD-10-CM

## 2024-05-03 DIAGNOSIS — E11.42 DIABETIC PERIPHERAL NEUROPATHY: ICD-10-CM

## 2024-05-03 DIAGNOSIS — Z79.4 TYPE 2 DIABETES MELLITUS WITH HYPERGLYCEMIA, WITH LONG-TERM CURRENT USE OF INSULIN: Primary | ICD-10-CM

## 2024-05-03 RX ORDER — INSULIN GLARGINE 100 [IU]/ML
40 INJECTION, SOLUTION SUBCUTANEOUS NIGHTLY
Qty: 30 ML | Refills: 2 | Status: SHIPPED | OUTPATIENT
Start: 2024-05-03

## 2024-05-03 NOTE — PROGRESS NOTES
Specialty Pharmacy Patient Management Program  One-Time Clinical Outreach     Susy Hanson is a 63 y.o. female seen by an Endocrinology provider for Type 2 Diabetes and enrolled in the Endocrinology Patient Management program offered by [facility].      New prescription for Lantus with adjusted dose of up to 50 units daily with appropriate titration as directed submitted to [employer] patient assistance. Office will call patient as soon as her new shipment comes in.     Eliane Son, PharmD, BCPS, BCCCP  Clinical Specialty Pharmacist, Endocrinology  5/3/2024  13:01 EDT

## 2024-05-03 NOTE — PROGRESS NOTES
-----------------------------------------------------------------  ENDOCRINE CLINIC NOTE  -----------------------------------------------------------------        PATIENT NAME: Susy Hanson  PATIENT : 1960 AGE: 63 y.o.  MRN NUMBER: [id number]  PRIMARY CARE: Erica Avila MD    ==========================================================================    CHIEF COMPLAINT: Type 2 diabetes management.  DATE OF SERVICE: 24    ==========================================================================    HPI / SUBJECTIVE    63 y.o. female is seen in the clinic today for type 2 diabetes mellitus.  Diagnosis Date:   Current Therapy / Medications:  Farxiga 10 mg p.o. daily  Glimepiride 2 mg once a day  Lantus 24 units nightly  Past tried medications: (Not currently using)  Metformin - GI side-effects  Ozempic - hx of pancreatitis  Home BG logs / monitoring: Checking BG through  [facility]  No hypoglycemia noted  Small multiple meals  Following ophthalmology at Dr. Jones's office  Peripheral neuropathy and not on meds  Chronic kidney disease III  Hx of dilated cardiomyopathy    ==========================================================================                                                PAST MEDICAL HISTORY    Past Medical History:   Diagnosis Date    Anxiety     Breast cancer     LEFT BREAST 2021    Cataract     Had surgery to remove 2023    CHF (congestive heart failure)     Colon polyp 2023    Removed during colonoscopy    Coronary artery disease     Depression     DM (diabetes mellitus)     History of medical problems     Bladder prolapse repeat Dr. Roper    HL (hearing loss)     Hyperlipidemia     Hypertension     SAW CARDIO/ STRESS TEST  - NOW MEDS MANAGED BY PCP     Irritable bowel syndrome 2006    Peripheral neuropathy     PONV (postoperative nausea and vomiting)     Renal insufficiency     See Dr. funes for moderate kidney failure     Sleep apnea     WEARS CPAP    Type 2 diabetes mellitus 2008    Urinary tract infection 6/23    UTI seen at [facility]       ==========================================================================    PAST SURGICAL HISTORY    Past Surgical History:   Procedure Laterality Date    BLADDER REPAIR      BREAST BIOPSY      BREAST RECONSTRUCTION Bilateral 08/26/2021    Procedure: BILATERAL PREPECTORALPLACMENT TISSUE EXPANDERS AND ALLODERM;  Surgeon: Heri Arreaga MD;  Location:  [facility];  Service: Plastics;  Laterality: Bilateral;    BREAST TISSUE EXPANDER REMOVAL INSERTION OF IMPLANT Bilateral 12/23/2021    Procedure: BILATERAL REMOVAL TISSUE EXPANDERS AND PLACEMENT OF IMPLANTS;  Surgeon: Heri Arreaga MD;  Location:  [facility];  Service: Plastics;  Laterality: Bilateral;    CARDIAC CATHETERIZATION Left 04/13/2022    Procedure: Cardiac Catheterization/Vascular Study;  Surgeon: Christo Murphy MD;  Location:  [facility];  Service: Cardiovascular;  Laterality: Left;    CARDIAC ELECTROPHYSIOLOGY PROCEDURE N/A 09/19/2022    Procedure: ICD implant [place] aware;  Surgeon: Esteban Kendrick MD;  Location:  [facility];  Service: Cardiology;  Laterality: N/A;    CARDIAC ELECTROPHYSIOLOGY PROCEDURE N/A 09/20/2022    Procedure: lead repositioning;  Surgeon: Esteban Kendrick MD;  Location:  [facility];  Service: Cardiology;  Laterality: N/A;    CARDIAC SURGERY  2022    Defibrillator/pacemaker implant    CARDIOVASCULAR STRESS TEST  2020    COLONOSCOPY  11/2012    EYE SURGERY  7/23    Cataracts removed    HYSTERECTOMY      INSERT / REPLACE / REMOVE PACEMAKER  2022    LYMPH NODE BIOPSY  8/29/2021    MASTECTOMY W/ SENTINEL NODE BIOPSY Bilateral 08/26/2021    Procedure: bilateral total mastectomy, left axillary sentinel lymph node biopsy, possible reconstruction.;  Surgeon: Asia Perkins MD;  Location:  [facility];  Service: General;   Laterality: Bilateral;    TUBAL ABDOMINAL LIGATION  1981       ==========================================================================    FAMILY HISTORY    Family History   Problem Relation Age of Onset    Other Mother     Diabetes Mother     Heart disease Mother     Sleep apnea Mother     Snoring Mother     COPD Mother     Hyperlipidemia Mother     Alcohol abuse Mother     Anxiety disorder Mother     Depression Mother     Hypertension Mother     Alzheimer's disease Father     Diabetes Father     Heart disease Father     Other Father     Sleep apnea Father     Snoring Father     Hyperlipidemia Father     Heart failure Father     Kidney disease Father     Hypertension Father     Obesity Father     Diabetes Sister     Heart disease Sister     Other Sister     Sleep apnea Sister     Snoring Sister     COPD Sister     Anxiety disorder Sister     Depression Sister     Hypertension Sister     Hyperlipidemia Sister     Clotting disorder Sister     Hyperlipidemia Brother     Hypertension Brother     Breast cancer Maternal Aunt 80    Breast cancer Paternal Aunt     Diabetes Maternal Grandmother     Breast cancer Paternal Grandmother 60    Cancer Paternal Grandmother     Diabetes Paternal Grandmother     Hyperlipidemia Son     Diabetes Brother     Hypertension Brother     Malig Hyperthermia Neg Hx        ==========================================================================    SOCIAL HISTORY    Social History     Socioeconomic History    Marital status:    Tobacco Use    Smoking status: Former     Current packs/day: 0.00     Average packs/day: 2.0 packs/day for 36.0 years (72.0 ttl pk-yrs)     Types: Cigarettes     Start date: 1972     Quit date: 2008     Years since quittin.3     Passive exposure: Past    Smokeless tobacco: Never   Vaping Use    Vaping status: Never Used   Substance and Sexual Activity    Alcohol use: No    Drug use: No    Sexual activity: Yes     Partners: Male     Birth  control/protection: None, Tubal ligation, Hysterectomy       ==========================================================================    MEDICATIONS      Current Outpatient Medications:     Accu-Chek FastClix Lancets misc, USE TO CHECK BLOOD GLUCOSE DAILY, Disp: , Rfl:     Accu-Chek Guide test strip, 1 each by Other route Daily. E11.49, Disp: 100 each, Rfl: 1    ALPRAZolam (XANAX) 1 MG tablet, Take 1 tablet by mouth 2 (Two) Times a Day., Disp: 60 tablet, Rfl: 2    atorvastatin (LIPITOR) 20 MG tablet, Take 1 tablet by mouth Daily., Disp: 90 tablet, Rfl: 1    cetirizine (zyrTEC) 10 MG tablet, Take 1 tablet by mouth Daily As Needed., Disp: , Rfl:     Continuous Blood Gluc  (FreeStyle [place] 3 [place]) device, Use 1 each 4 (Four) Times a Day Before Meals & at Bedtime., Disp: 1 each, Rfl: 0    Continuous Glucose Sensor (FreeStyle Benton 3 Sensor) misc, Use 1 each Every 14 (Fourteen) Days., Disp: 2 each, Rfl: 5    dapagliflozin Propanediol (Farxiga) 10 MG tablet, Take 10 mg by mouth Daily., Disp: 90 tablet, Rfl: 3    dilTIAZem CD (Cardizem CD) 180 MG 24 hr capsule, Take 1 capsule by mouth Daily., Disp: 90 capsule, Rfl: 1    furosemide (Lasix) 20 MG tablet, Take 1 tablet by mouth Every Other Day for 360 days., Disp: 90 tablet, Rfl: 1    glimepiride (Amaryl) 2 MG tablet, Take 1 tablet by mouth Every Morning., Disp: 30 tablet, Rfl: 11    hydrALAZINE (APRESOLINE) 25 MG tablet, Take 3 tablets (75 mg) by mouth twice daily, Disp: 504 tablet, Rfl: 3    hydrALAZINE (APRESOLINE) 50 MG tablet, Take 1.5 tablets by mouth Every 12 (Twelve) Hours., Disp: , Rfl:     Insulin Glargine (Lantus SoloStar) 100 UNIT/ML injection pen, Inject 40 Units under the skin into the appropriate area as directed Every Night., Disp: 30 mL, Rfl: 2    Insulin Pen Needle (Pen Needles) 32G X 4 MM misc, Use 1 each 4 (Four) Times a Day Before Meals & at Bedtime., Disp: 200 each, Rfl: 5    metoprolol succinate XL (TOPROL-XL) 100 MG 24 hr tablet, To be  filled by Provider, Disp: 180 tablet, Rfl: 0    ondansetron (Zofran) 4 MG tablet, Take 1 tablet by mouth Every 8 (Eight) Hours As Needed for Nausea or Vomiting., Disp: 20 tablet, Rfl: 0    pantoprazole (PROTONIX) 40 MG EC tablet, Take 1 tablet by mouth Daily., Disp: 90 tablet, Rfl: 0    sulfamethoxazole-trimethoprim (Bactrim DS) 800-160 MG per tablet, Take 1 tablet by mouth 2 (Two) Times a Day., Disp: 14 tablet, Rfl: 0    Triamcinolone Acetonide (NASACORT) 55 MCG/ACT nasal inhaler, APPLY 2 SPRAYS BY NASAL ROUTE DAILY FOR 30 DAYS (Patient taking differently: 2 sprays into the nostril(s) as directed by provider Daily As Needed.), Disp: 10.8 mL, Rfl: 6    ==========================================================================    ALLERGIES    Allergies   Allergen Reactions    Hydrocodone-Acetaminophen Hives     Lortab, takes tylenol       ==========================================================================    OBJECTIVE    Vitals:    05/03/24 1147   BP: 110/72   Pulse: 83   SpO2: 99%     Body mass index is 30.02 kg/m².     General: Alert, cooperative, no acute distress    ==========================================================================    LAB EVALUATION    Lab Results   Component Value Date    GLUCOSE 228 (H) 04/01/2024    BUN 34 (H) 04/01/2024    CREATININE 1.48 (H) 04/01/2024    EGFRIFNONA 57 (L) 01/26/2022    EGFRIFAFRI >60 10/13/2017    BCR 23.0 04/01/2024    K 4.1 04/01/2024    CO2 21.0 (L) 04/01/2024    CALCIUM 9.8 04/01/2024    ALBUMIN 4.8 04/01/2024    AST 14 04/01/2024    ALT 20 04/01/2024    CHOL 140 03/14/2023    TRIG 153 (H) 03/14/2023    HDL 50 03/14/2023    LDL 64 03/14/2023     Lab Results   Component Value Date    HGBA1C 7.60 (H) 04/01/2024    HGBA1C 7.90 (H) 01/25/2024    HGBA1C 7.10 (H) 12/28/2023     Lab Results   Component Value Date    MICROALBUR 40.5 03/14/2023    CREATININE 1.48 (H) 04/01/2024     Lab Results   Component Value Date    TSH 1.500 01/25/2024  "    ==========================================================================    CGM Data:    Dates: April 19 till May 2, 2024    Very High > 250: 13%  High 180 - 250: 55%  Target: 70 - 180: 32%  Low 55 - 70:  0%  Very Low < 55: 0%    Time active on CGM is 96%.  Average blood sugar 175 with glucose variability 23.1% and GMI 7.5%.    ==========================================================================    ASSESSMENT AND PLAN    # Type 2 diabetes with hyperglycemia  # Diabetic peripheral neuropathy  # Diabetic nephropathy with CKD stage IIIb, currently following nephrology in the clinic, following Dr. Kerr  # Overweight    - Currently maintained on glimepiride therapy along with Farxiga  - History of pancreatitis therefore not a good candidate for DPP 4 inhibitor therapy or GLP-1 receptor agonist  - Reviewed blood sugar from CGM and given CKD stage IIIb status will avoid titration of glimepiride therapy and maintain dosing  - There is significant overnight hyperglycemia rather than postmeal hyperglycemia  - Will further titrate the insulin Lantus therapy by treatment of 4 units to achieve fasting goal between 100 and 150  - New adjusted therapy:  Farxiga 10 mg p.o. daily  Glimepiride 2 mg once a day  Lantus 28 units nightly and increase by 4 units as needed to achieve fasting between 100 and 150 to maximum of 40 units QHS  - Patient to follow-up in my clinic back again in 2 months    Thank you for courtesy of consultation.    Return to clinic: 2 months    Entire assessment and plan was discussed and counseled the patient in detail to which patient verbalized understanding and agreed with care.  Answered all queries and concerns.    This note was created using voice recognition software and is inherently subject to errors including those of syntax and \"sound-alike\" substitutions which may escape proofreading.  In such instances, original meaning may be extrapolated by contextual derivation.    Note: Portions of " this note may have been copied from previous notes but documentation have been reviewed and edited as necessary to support clinical decision making for today's visit.    ==========================================================================    INFORMATION PROVIDED TO PATIENT    Patient Instructions   Please,    - Continue Farxiga 10 mgs once a day  - Continue glimepiride 2 mg daily  - Increase insulin Lantus to 28 units at night  (Your target for the fasting/early morning blood sugars to remain between 100 and 150, if you are not able to achieve it then increase the insulin Lantus by an agreement of 4 units to maximum of no more than 40 units every night)    - Continue checking blood sugar through freestyle josefina.    Follow-up in my clinic back again in 2 months with repeat nonfasting blood work.    Thank you for your visit today.    If you have any questions or concerns please feel free to reach out of the office.       ==========================================================================  Shin Helton MD  Department of Endocrine, Diabetes and Metabolism  [facility] [place]  ==========================================================================

## 2024-05-03 NOTE — PATIENT INSTRUCTIONS
Please,    - Continue Farxiga 10 mgs once a day  - Continue glimepiride 2 mg daily  - Increase insulin Lantus to 28 units at night  (Your target for the fasting/early morning blood sugars to remain between 100 and 150, if you are not able to achieve it then increase the insulin Lantus by an agreement of 4 units to maximum of no more than 40 units every night)    - Continue checking blood sugar through freestyle josefina.    Follow-up in my clinic back again in 2 months with repeat nonfasting blood work.    Thank you for your visit today.    If you have any questions or concerns please feel free to reach out of the office.

## 2024-05-16 ENCOUNTER — TELEPHONE (OUTPATIENT)
Dept: ENDOCRINOLOGY | Facility: CLINIC | Age: 64
End: 2024-05-16
Payer: MEDICARE

## 2024-05-16 RX ORDER — FUROSEMIDE 20 MG/1
20 TABLET ORAL DAILY
Qty: 45 TABLET | Refills: 1 | Status: SHIPPED | OUTPATIENT
Start: 2024-05-16

## 2024-05-16 NOTE — TELEPHONE ENCOUNTER
Pt assistance program Kaushalbasil came in and she can pick that up. I left message on her phone,

## 2024-06-07 NOTE — PROGRESS NOTES
"Cardiology Heart Failure Clinic Note  Rodney \"Renan\" Clay LEDEZMA [facility]    Patient ID: Susy Hanson  is a 63 y.o. female.    Encounter Date:06/10/2024    HPI:  63-year-old female patient of Dr. Murphy's, with a PMH of nonischemic nondilated cardiomyopathy with a.  LVEF less than 20% back in 2022, S/P [place] aware BiV ICD complicated by pericardial effusion with lack of pacing and RV lead perforation subsequently had recovery of EF at 60 to 65% (TTE 11/3/22), without mention of any diastolic dysfunction, most recent TTE done in January 2024 shows her EF is now at about 56 to 60% with grade 1 DD. H\O left breast cancer (H cc), diabetes, IBS, metabolic syndrome with HTN, HLD, ANDREAS Rx w/ CPAP, DM.  Came in for her initial visit at the [facility] heart failure clinic 1/25/2024, she has done well from a heart failure standpoint with improvement of EF  Does not seem to be particularly symptomatic however does have some dyspnea with exertion.  She has concerns regarding her past Breast implant for which she is planning on having those removed.  She was hyperkalemic treated with Lokelma came back in f/u 12 Feb 24 after having seen Dr. Murphy who decreased her Entresto due to renal dysfunction. She was found to be hypertensive 2/12/24 and hyperkalemic with a K+= 6 Was encouraged not to leave town given her increased K+ however, she was rather adamant & saw her brother in [place]. Was told to go to nearest ER by renal and we had BMP done in [place] & K+ was WNL after Lokelma.  She was back on 19 February 2024 after returning home she has felt well but has come back with a K+ up to 5.1.It was still 1.5 months until she was seen renal. Back on 4 Mar 24 doing remarkably well, she brought her blood pressure logs they were stable. Patient looking forward to going to several events in [place] and [place] with grandchildren.  She is working rather diligently on trying to maintain healthy lifestyle for her " combination of chronic kidney disease, heart failure, diabetes.  She comes back on 10 Michaela 2024 since last visit obtained on considerable amount of time to [place] as well as [place] with her grandsons rodo.-Not a major issues other than a quick visit to the urgent care for sore throat       Assessment:   Diagnoses and all orders for this visit:    1. Stage 3b chronic kidney disease (Primary)  Overview:  eGFR 1/25/24  is 44    Orders:  -     Basic Metabolic Panel; Future  -     proBNP; Future  -     Basic Metabolic Panel; Standing  -     Basic Metabolic Panel  -     proBNP; Standing  -     proBNP    2. mild PAH (pulmonary artery hypertension)  Overview:  RVSP 35-45 mmHg (Jan 24 TTE)      3. h/o Acute kidney injury  Overview:  Noted 4/26/2022      4. At risk for fluid volume overload  Overview:   1. Hx non-dilated, non-ischemic cardiomyopathy          A.  Resolved systolic HF (HFrEF)                I. .  EF 56-60% Gr 1 DD (TTE 1-25-24)               Ii.       EF 60 to 65% w/o mention of diastolic dysfunction (TTE 11/3/22)               III. EF 15-20% w/o DD (July 22)         B. S/p 2022 [place] aware BiV ICD                    I.  Complicated by RV lead perforation   2. CKD stage 3b                    I. H/o VIVEK (4/22)                   II. eGFR 42 (2/12/24)      5. Presence of biventricular  ICD    6. Metabolic syndrome X  Overview:  A. Hypertension  B. Mixed dyslipidemia  C. Obesity           I.ANDREAS uses CPAP  D. T2DM          II. Peripheral neuropathy      7. Dyspnea on exertion    8. Type 2 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    9. Breast neoplasm, Tis (DCIS), left  Overview:  Added automatically from request for surgery [id number]      10. h/o nonischemic nondilated cardiomyopathy, unspecified  Overview:   Hx non-dilated, non-ischemic cardiomyopathy          A.  Resolved systolic HF (HFrEF)         B.  EF 56-60% Gr 1 DD (TTE 1-25-24)                   I. EF 60 to 65% w/o  mention of diastolic dysfunction (TTE 11/3/22)                   II. EF 15-20% w/o DD (July 22)         C. S/p 2022 [employer] [place] aware BiV ICD                    I.  Complicated by RV lead perforatn      11. Polyneuropathy associated with underlying disease    12. Mixed anxiety depressive disorder    13. LBBB (left bundle branch block)    14. Irritable bowel syndrome, unspecified type    15. Pericarditis, unspecified chronicity, unspecified type    16. Insomnia, unspecified type    17. h/o resolved Left ventricular failure  Overview:  Hx non-dilated, non-ischemic cardiomyopathy          A.  Resolved systolic HF (HFrEF)         B.  LVEF 56 to 60% gr 1 DD (TTE 1/25/24)          C. S/p 2022 [place] aware BiV ICD                    I.  Complicated by RV lead perforatn      18. Dyspnea, unspecified type  -     Basic Metabolic Panel; Future  -     Magnesium; Future  -     proBNP; Future  -     Basic Metabolic Panel; Standing  -     Basic Metabolic Panel  -     Magnesium; Standing  -     Magnesium  -     proBNP; Standing  -     proBNP    Other orders  -     hydrALAZINE (APRESOLINE) 50 MG tablet; Take 2 tablets by mouth 2 (Two) Times a Day.  Dispense: 336 tablet; Refill: 3        Plan/discussion    Volume overload    Heart Failure Core Measures    Type of Overload : unspecified     Most recent EF   EF 56-60% Gr 1 DD (TTE 1-25-24)   A.  Hx EF 20%  (July 22 TTE)  B. (Nov 22 TTE) 60 to 65% w/o mention of DD     [place] Heart association Class & Stage : 1c     HF Meds     Beta Blocker: Metoprolol 100 mg twice daily  ARNI/ACE/ARB: stopped 2-12-24 Entresto 24 to 26 mg BID  SGLT 2 inhibitors: [id number] mg daily  Diuretics upon release from clinic: Lasix 20 mg every other day  Furoscix: Currently not a candidate  Aldosterone Antagonist: None indicated  Digitalis: N/A  Vasodilators & Nitrates: Not indicated        Cardiac medicines reviewed with risk, benefits, and necessity of each discussed with myself & a [facility].       ____________________________________________________________     Discussion     Heart failure core measures including beta-blocker, ARNI, SGLT2  H/o EF being less than 20% July 22, repeat echo in November 22 EF improved to 60 to 65%.   Repeat TTE 1/25/24 EF 56-60% Gr 1 DD w/ RVSP 35-45 mmHg  She will continue follow-ups with Dr. Murphy as well as Dr. Kerr her nephrologist  Significantly hyperkalemic 2/12/24 with a potassium of 6 renal function is maintaining the creatinine of 1.4 given Lokelma 10 g x 3 days  had BMP done & K+ was WNL ( (3.9) after Lokelma at an ER in [place]  A. Last visit potassium was up to 5.1 and she was placed on Lasix 20mg QOD  B.  6/10/24 potassium is at 4.8.  5. Blood pressure until today's visit has not been under good control, however, I was unable to find a lot of room to add medications which would aid in control on the last couple of visits therefore we will have to defer to Dr. Murphy and Cirilo.  But on her 6/10/2024 visit her systolic is up to 168 with a diastolic of 81.  Therefore   A. Will increase her hydralazine to 100 mg twice daily  6.  potassium finally seems to be stabilized  A. We will plan on seeing again in approximately 2 month.  Working around the patient's rather busy schedule occluding trips to [place] in [place]  7. Has been making a concerted effort to to decrease her potassium dietarily which seems to be accomplishing its goal  8.  Only medication change made this visit was increasing her hydralazine from 75 mg twice daily to 100 mg twice daily  9. Would continue with her ongoing heart failure nursing interventions including:                          A. Fluid restriction at less than 2000 cc daily                          B. Daily weights                          C.  1 g low-sodium diet        I did the following activities preparing for the visit with Susy Hanson  including: reviewing tests, once pt arrived in clinic I also performed a medically  appropriate examination and/or evaluation, I personally spent considerable time counseling and educating the patient/family/caregiver, ordering medications, tests, or procedures, referring and communicating with other health care professionals, and documenting information in the medical record. I estimate including preparation 20 minutes              Subjective:     Chief Complaint   Patient presents with    Congestive Heart Failure     Constitutional:  weakness, & fatigue remains at what she describes as her normal level ., No fever, rigors, chills   Eyes: No recent vision changes, eye pain   ENT/oropharynx: No recent difficulty swallowing, sore throat, epistaxis, changes in hearing   Cardiovascular: No recent chest pain, chest tightness, palpitations except as noted in HPI, paroxysmal nocturnal dyspnea, orthopnea, diaphoresis, dizziness & no pre or vikram syncopal episodes   Respiratory: No at rest shortness of breath, + dyspnea on exertion, no significant productive cough, no wheezing and no hemoptysis   Gastrointestinal: No abdominal pain, nausea, vomiting, diarrhea, bloody stools   Genitourinary: No hematuria, dysuria other than increased frequency   Neurological: No headache, tremors, numbness,  or hemiparesis    Musculoskeletal: No change in typical cramps, myalgias,  joint pain, w/o any new joint swelling but does complain of osteoarthritic discomfort particularly in the lower extremities   Integument: No recent rash, no edema      Patient Active Problem List   Diagnosis    Encounter for general adult medical examination without abnormal findings    Essential hypertension    Irritable bowel syndrome    Mixed anxiety depressive disorder    Mixed hyperlipidemia    Obesity    Encounter for screening for malignant neoplasm of colon    Type 2 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    Vitamin D deficiency    Hypertensive urgency    Peripheral neuropathy    ANDREAS on CPAP    Chest pain,  atypical    Breast neoplasm, Tis (DCIS), left    Cold intolerance    Insomnia    Breast pain    Dyspnea    h/o Acute kidney injury    LBBB (left bundle branch block)    h/o nonischemic, nondilated, cardiomyopathy    Presence of biventricular  ICD    Hemopericardium    h/o Pericarditis    Pericardial effusion    Metabolic syndrome X    At risk for fluid volume overload    h/o resolved Left ventricular failure    Stage 3b chronic kidney disease    Hyperkalemia    mild PAH (pulmonary artery hypertension)       Past Medical History:   Diagnosis Date    Anxiety 2000    Breast cancer     LEFT BREAST 7/2021    Cataract     Had surgery to remove August 2023    CHF (congestive heart failure)     Colon polyp 12/2023    Removed during colonoscopy    Coronary artery disease 2019    Depression     DM (diabetes mellitus)     History of medical problems 2008    Bladder prolapse repeat Dr. Roper    HL (hearing loss)     Hyperlipidemia     Hypertension     SAW CARDIO/ STRESS TEST 2020 - NOW MEDS MANAGED BY PCP     Irritable bowel syndrome 2006    Peripheral neuropathy     PONV (postoperative nausea and vomiting)     Renal insufficiency 2020    See Dr. funes for moderate kidney failure    Sleep apnea     WEARS CPAP    Type 2 diabetes mellitus 2008    Urinary tract infection 6/23    UTI seen at [facility]       Past Surgical History:   Procedure Laterality Date    BLADDER REPAIR      BREAST BIOPSY      BREAST RECONSTRUCTION Bilateral 08/26/2021    Procedure: BILATERAL PREPECTORALPLACMENT TISSUE EXPANDERS AND ALLODERM;  Surgeon: Heri Arreaga MD;  Location: [facility] OR;  Service: Plastics;  Laterality: Bilateral;    BREAST TISSUE EXPANDER REMOVAL INSERTION OF IMPLANT Bilateral 12/23/2021    Procedure: BILATERAL REMOVAL TISSUE EXPANDERS AND PLACEMENT OF IMPLANTS;  Surgeon: Heri Arreaga MD;  Location: [facility] OR;  Service: Plastics;  Laterality: Bilateral;    CARDIAC CATHETERIZATION Left 04/13/2022     Procedure: Cardiac Catheterization/Vascular Study;  Surgeon: Christo Murphy MD;  Location: [facility] CATH INVASIVE LOCATION;  Service: Cardiovascular;  Laterality: Left;    CARDIAC ELECTROPHYSIOLOGY PROCEDURE N/A 2022    Procedure: ICD implant [place] aware;  Surgeon: Esteban Kendrick MD;  Location:  [facility] CATH INVASIVE LOCATION;  Service: Cardiology;  Laterality: N/A;    CARDIAC ELECTROPHYSIOLOGY PROCEDURE N/A 2022    Procedure: lead repositioning;  Surgeon: Esteban Kendrick MD;  Location: [facility] CATH INVASIVE LOCATION;  Service: Cardiology;  Laterality: N/A;    CARDIAC SURGERY      Defibrillator/pacemaker implant    CARDIOVASCULAR STRESS TEST      COLONOSCOPY  2012    EYE SURGERY      Cataracts removed    HYSTERECTOMY      INSERT / REPLACE / REMOVE PACEMAKER      LYMPH NODE BIOPSY  2021    MASTECTOMY W/ SENTINEL NODE BIOPSY Bilateral 2021    Procedure: bilateral total mastectomy, left axillary sentinel lymph node biopsy, possible reconstruction.;  Surgeon: Asia Perkins MD;  Location: [facility];  Service: General;  Laterality: Bilateral;    TUBAL ABDOMINAL LIGATION         Social History     Socioeconomic History    Marital status:    Tobacco Use    Smoking status: Former     Current packs/day: 0.00     Average packs/day: 2.0 packs/day for 36.0 years (72.0 ttl pk-yrs)     Types: Cigarettes     Start date: 1972     Quit date: 2008     Years since quittin.4     Passive exposure: Past    Smokeless tobacco: Never   Vaping Use    Vaping status: Never Used   Substance and Sexual Activity    Alcohol use: No    Drug use: No    Sexual activity: Yes     Partners: Male     Birth control/protection: None, Tubal ligation, Hysterectomy       Allergies   Allergen Reactions    Hydrocodone-Acetaminophen Hives     Lortab, takes tylenol         Current Outpatient Medications:     Accu-Chek FastClix Lancets misc, USE TO CHECK BLOOD GLUCOSE DAILY, Disp: ,  Rfl:     Accu-Chek Guide test strip, 1 each by Other route Daily. E11.49, Disp: 100 each, Rfl: 1    ALPRAZolam (XANAX) 1 MG tablet, Take 1 tablet by mouth 2 (Two) Times a Day., Disp: 60 tablet, Rfl: 2    atorvastatin (LIPITOR) 20 MG tablet, Take 1 tablet by mouth Daily., Disp: 90 tablet, Rfl: 1    cetirizine (zyrTEC) 10 MG tablet, Take 1 tablet by mouth Daily As Needed., Disp: , Rfl:     Continuous Blood Gluc  (FreeStyle Benton 3 [place]) device, Use 1 each 4 (Four) Times a Day Before Meals & at Bedtime., Disp: 1 each, Rfl: 0    Continuous Glucose Sensor (FreeStyle Benton 3 Sensor) misc, Use 1 each Every 14 (Fourteen) Days., Disp: 2 each, Rfl: 5    dapagliflozin Propanediol (Farxiga) 10 MG tablet, Take 10 mg by mouth Daily., Disp: 90 tablet, Rfl: 3    dilTIAZem CD (Cardizem CD) 180 MG 24 hr capsule, Take 1 capsule by mouth Daily., Disp: 90 capsule, Rfl: 1    furosemide (Lasix) 20 MG tablet, Take 1 tablet by mouth Daily. (Patient taking differently: Take 1 tablet by mouth Every Other Day.), Disp: 45 tablet, Rfl: 1    glimepiride (Amaryl) 2 MG tablet, Take 1 tablet by mouth Every Morning., Disp: 30 tablet, Rfl: 11    Insulin Glargine (Lantus SoloStar) 100 UNIT/ML injection pen, Inject 40 Units under the skin into the appropriate area as directed Every Night., Disp: 30 mL, Rfl: 2    Insulin Pen Needle (Pen Needles) 32G X 4 MM misc, Use 1 each 4 (Four) Times a Day Before Meals & at Bedtime., Disp: 200 each, Rfl: 5    metoprolol succinate XL (TOPROL-XL) 100 MG 24 hr tablet, To be filled by Provider (Patient taking differently: 1 tablet 2 (Two) Times a Day.), Disp: 180 tablet, Rfl: 0    ondansetron (Zofran) 4 MG tablet, Take 1 tablet by mouth Every 8 (Eight) Hours As Needed for Nausea or Vomiting., Disp: 20 tablet, Rfl: 0    pantoprazole (PROTONIX) 40 MG EC tablet, Take 1 tablet by mouth Daily., Disp: 90 tablet, Rfl: 0    Triamcinolone Acetonide (NASACORT) 55 MCG/ACT nasal inhaler, APPLY 2 SPRAYS BY NASAL ROUTE DAILY  FOR 30 DAYS (Patient taking differently: 2 sprays into the nostril(s) as directed by provider Daily As Needed.), Disp: 10.8 mL, Rfl: 6    hydrALAZINE (APRESOLINE) 50 MG tablet, Take 2 tablets by mouth 2 (Two) Times a Day., Disp: 336 tablet, Rfl: 3    Immunization History   Administered Date(s) Administered    Arexvy (RSV, Adults 60+ yrs) 09/22/2023    COVID-19 (PFIZER) BIVALENT 12+YRS 11/04/2022, 09/22/2023    COVID-19 (PFIZER) Purple Cap Monovalent 01/06/2021, 01/27/2021, 09/30/2021, 11/01/2021    Fluad Quad 65+ 09/14/2023    H1N1 Inj 11/03/2009    Hepatitis A 04/29/2018, 11/02/2018    Influenza Injectable Mdck Pf Quad 11/04/2022    Influenza, Unspecified 11/01/2021, 09/14/2023    Pneumococcal Conjugate 20-Valent (PCV20) 11/30/2023    RSV, unspecified (Vaccine or MAB) 09/22/2023    Tdap 07/26/2022    flucelvax quad pfs =>4 YRS 10/19/2020         Most recent EKG as reviewed:  Procedures     Most recent echo as reviewed:  Results for orders placed during the hospital encounter of 01/30/24    Adult Transthoracic Echo Complete W/ Cont if Necessary Per Protocol    Interpretation Summary    Left ventricular systolic function is normal. Left ventricular ejection fraction appears to be 56 - 60%.    Left ventricular diastolic function is consistent with (grade I) impaired relaxation.    Estimated right ventricular systolic pressure from tricuspid regurgitation is mildly elevated (35-45 mmHg).      Most recent stress test results:  Results for orders placed during the hospital encounter of 04/11/22    Stress Test With Myocardial Perfusion One Day    Interpretation Summary  · This is incomplete Cardiolite imaging study. Stress images were not done because test was aborted. Rest images showed small fixed septal and apical defect. Left ventricle size appear normal. No gated SPECT imaging were noted..      Most recent cardiac catheterization results:  Results for orders placed during the hospital encounter of 04/11/22    Cardiac  Catheterization/Vascular Study    Narrative  CARDIAC CATHETERIZATION REPORT      DATE OF PROCEDURE:  4/13/2022    INDICATION FOR PROCEDURE:    Congestive heart failure acute systolic  Dilated cardiomyopathy    PROCEDURE PERFORMED:    Left heart catheterization  coronary angiography  left ventriculography    PROCEDURE COMMENTS:    After informed consent was obtained, the patient was prepped and draped in the usual sterile manner.  Mild to moderate sedation was administered.  Right femoral artery was accessed without difficulty and 6 [place] arterial sheath was inserted.  Sheath was flushed with heparinized saline.    Using 6 [place] Roberto catheters, first left coronary artery and the right coronary was electively engaged and appropriate views were taken.  A 6 [place] JR4 catheter was used to cross aortic valve and left heart catheterization was performed.  Left ventriculography was done NAQVI view    Patient tolerated the procedure well.    FINDINGS:    1. HEMODYNAMICS:    Aortic pressure: 167/101 mmHg    LVEDP: 10 to 15 mmHg    Gradient across aortic valve on pullback: No gradient across aortic valve      2. LEFT VENTRICULOGRAPHY: 35 to 40%      3. CORONARY ANGIOGRAPHY:    A: Left main coronary artery: Normal    B: Left anterior descending artery: 25% plaque in proximal and mid segment.  No high-grade stenosis    C: Left circumflex coronary artery: Mild diffuse disease in the midsegment but no high-grade stenosis    D: Right coronary artery: 25% plaque in proximal and mid segment of RCA but no high-grade stenosis.  It is dominant vessel    SUMMARY:    Mild coronary artery disease  Left ventricular dysfunction    RECOMMENDATIONS:    Medical treatment.        Historical data copied forward from previous encounters in EMR including the history, exam, and assessment/plan has been reviewed and is unchanged except as I have noted and otherwise indicated.      Objective:         /81   Pulse 82   Resp 16   Wt 80.6 kg  (177 lb 12.8 oz)   LMP  (LMP Unknown)   SpO2 98%   BMI 30.50 kg/m²     General:  Well-developed, mildly overly well-nourished, cooperative, no distress, appears stated age if not slightly older    Neuro:  A&O x3. No significantly obvious focal neuro deficet    Psych:  Pleasant -affect    HENT:  Normocephalic, atraumatic, moist mucous membranes , Normal ear placement,Throat not injected   Eyes:  PERRLA, Conjunctivae not injected, EOM's intact, conjunctiva does not appear significantly injected   Neck:  Supple, Mildly thickened, no lymph adenopathy nor thyromegaly no JVD or bruit    Lungs:    Symmetrical rise & fall of chest with baseline respiratory pattern. To auscultation lungs bilaterally, have faint late phase expiratory wheezes, there is no rhonchi or rales noted    Chest wall:  No significant tenderness when palpated. PMI @ 5th ICS McL  , deflated Left breast, well healed ICD scar    Heart:  Regular rate and rhythm, S1 and S2 normal, no S3 or S4, Gr I/VI systolic ejection murmur best heard at the apex , no obvious rub, click or gallop.    Abdomen:  non-distended, non-tender, bowel sounds noted in the 4 quadrants of the abdomen, without any significant central mass adipose tissue identified    Extremities:  Equal color motion temperature and sensitivity, Rapid capillary refill noted within the nailbeds of fingers and toes bilaterally no lower extremity edema.    Pulses:  2+ and symmetric all extremities, rapid capillary refill    Skin:  No obvious rashes, significant lesions identified, warm dry and of normal turgor      In-Office Procedure(s):  No orders to display        Imaging:    Results for orders placed during the hospital encounter of 10/03/22    XR Chest 1 View    Narrative  EXAMINATION: XR CHEST 1 VW    DATE OF EXAM: 10/4/2022 12:28 AM  SLOT: 60    HISTORY: Chest pain    COMPARISON: None.    FINDINGS:    HEART/MEDIASTINUM: Mildly enlarged cardiac silhouette. Radiation is the origin of the right  atrium, right ventricle, and coronary sinus.    LUNGS/PLEURA: Linear subsegmental atelectasis in the left lung base. Otherwise, grossly clear.    MUSCULOSKELETAL: No acute osseous pathology. Surgical clips overlie the axilla bilaterally.    Impression  1.  No acute cardiopulmonary abnormality detected.        Electronically signed by:  Yony Patricio M.D.  10/4/2022 12:33 AM       Results for orders placed during the hospital encounter of 01/11/24    US Abdomen Limited    Narrative  US ABDOMEN LIMITED    Date of Exam: 1/11/2024 10:41 AM EST    Indication: persistent nausea.    Comparison: CT abdomen and pelvis angiography 1/17/2020    Technique: Grayscale and color Doppler ultrasound evaluation of the right upper quadrant was performed.      Findings:  Diffusely increased liver echogenicity compatible with hepatic steatosis. No discrete liver lesion. No perihepatic ascites. Visualized portions of the pancreas are normal. The gallbladder is normal. No biliary dilatation. Common bile duct measures 2 mm.  Hepatopetal flow in the portal vein. Visualized hepatic veins are patent. The right kidney measures 9.1 x 4.1 x 3.7 cm. No right-sided hydronephrosis.    Impression  Impression:  1. Hepatic steatosis.  2. Normal gallbladder.  3. No biliary dilatation.        Electronically Signed: Ottoniel Lee MD  1/11/2024 11:54 AM EST  Workstation ID: [id number]      Results for orders placed during the hospital encounter of 10/03/22    CT Angiogram Chest Pulmonary Embolism    Narrative  Exam: CT Angiography of the Chest.    Date: 10/4/2022.    Comparison: 4/11/2022.    History: Defibrillator placement with chest pain.    Technique: CT examination of the chest was performed following the intravenous administration of 100 mL of Isovue-370. Sagittal, coronal and 3-D reformatted images were provided. CT dose lowering techniques were used, to include: automated exposure  control, adjustment for patient size, and/or use of iterative  reconstruction.    FINDINGS:    CHEST WALL: There are bilateral breast implants.    Mediastinum and Theresa: There is no axillary, mediastinal or hilar lymphadenopathy.    Pleural and Pericardial spaces: There is a small to moderate pericardial effusion which is higher density than simple fluid which may relate to a hemopericardium.    Upper Abdomen: The visualized upper abdomen is unremarkable.    Cardiovascular: AICD generator has leads in the right atrium and right ventricle. There is mild vascular calcification throughout the thoracic aorta without evidence of aneurysmal dilation or dissection.    Pulmonary Artery: There are no filling defects in the pulmonary arteries.    Lung Parenchyma and Airways: The lungs are clear.    Bones: No fracture or aggressive osseous lesion.    Impression  1. No evidence of pulmonary embolism.  2. No evidence of thoracic aortic aneurysm or dissection.  3. Small to moderate amount of hemopericardium.    These findings were discussed with COURTNEY Atkinson.      Electronically signed by:  Amish Ellsworth D.O.  10/4/2022 1:51 AM      Lab Review:   Hospital Outpatient Visit on 06/10/2024   Component Date Value    Glucose 06/10/2024 274 (H)     BUN 06/10/2024 31 (H)     Creatinine 06/10/2024 1.40 (H)     Sodium 06/10/2024 142     Potassium 06/10/2024 4.8     Chloride 06/10/2024 105     CO2 06/10/2024 25.8     Calcium 06/10/2024 9.7     BUN/Creatinine Ratio 06/10/2024 22.1     Anion Gap 06/10/2024 11.2     eGFR 06/10/2024 41.8 (L)     Magnesium 06/10/2024 2.2    Hospital Outpatient Visit on 04/01/2024   Component Date Value    Glucose 04/01/2024 238 (H)     BUN 04/01/2024 36 (H)     Creatinine 04/01/2024 1.47 (H)     Sodium 04/01/2024 139     Potassium 04/01/2024 5.7 (H)     Chloride 04/01/2024 102     CO2 04/01/2024 20.0 (L)     Calcium 04/01/2024 10.4     BUN/Creatinine Ratio 04/01/2024 24.5     Anion Gap 04/01/2024 17.0 (H)     eGFR 04/01/2024 39.5 (L)     proBNP 04/01/2024 83.6      Hemoglobin A1C 04/01/2024 7.60 (H)     Protein/Creatinine Ratio* 04/01/2024 384.6 (H)     Creatinine, Urine 04/01/2024 14.3     Total Protein, Urine 04/01/2024 5.5     PTH, Intact 04/01/2024 100.4 (H)     Glucose 04/01/2024 228 (H)     BUN 04/01/2024 34 (H)     Creatinine 04/01/2024 1.48 (H)     Sodium 04/01/2024 138     Potassium 04/01/2024 4.1     Chloride 04/01/2024 101     CO2 04/01/2024 21.0 (L)     Calcium 04/01/2024 9.8     Total Protein 04/01/2024 7.6     Albumin 04/01/2024 4.8     ALT (SGPT) 04/01/2024 20     AST (SGOT) 04/01/2024 14     Alkaline Phosphatase 04/01/2024 86     Total Bilirubin 04/01/2024 0.2     Globulin 04/01/2024 2.8     A/G Ratio 04/01/2024 1.7     BUN/Creatinine Ratio 04/01/2024 23.0     Anion Gap 04/01/2024 16.0 (H)     eGFR 04/01/2024 39.1 (L)     Creatine Kinase 04/01/2024 47     Magnesium 04/01/2024 2.4     Uric Acid 04/01/2024 7.5 (H)     Phosphorus 04/01/2024 4.6 (H)     25 Hydroxy, Vitamin D 04/01/2024 33.3     Color, UA 04/01/2024 Yellow     Appearance, UA 04/01/2024 Clear     pH, UA 04/01/2024 <=5.0     Specific Gravity, UA 04/01/2024 1.010     Glucose, UA 04/01/2024 >=1000 mg/dL (3+) (A)     Ketones, UA 04/01/2024 Negative     Bilirubin, UA 04/01/2024 Negative     Blood, UA 04/01/2024 Negative     Protein, UA 04/01/2024 Negative     Leuk Esterase, UA 04/01/2024 Moderate (2+) (A)     Nitrite, UA 04/01/2024 Negative     Urobilinogen, UA 04/01/2024 0.2 E.U./dL     WBC 04/01/2024 4.90     RBC 04/01/2024 4.39     Hemoglobin 04/01/2024 12.0     Hematocrit 04/01/2024 39.9     MCV 04/01/2024 90.9     MCH 04/01/2024 27.3     MCHC 04/01/2024 30.1 (L)     RDW 04/01/2024 14.2     RDW-SD 04/01/2024 47.3     MPV 04/01/2024 10.6     Platelets 04/01/2024 243     RBC, UA 04/01/2024 None Seen     WBC, UA 04/01/2024 21-50 (A)     Bacteria, UA 04/01/2024 None Seen     Squamous Epithelial Cell* 04/01/2024 3-6 (A)     Hyaline Casts, UA 04/01/2024 0-2     Methodology 04/01/2024 Automated Microscopy     Hospital Outpatient Visit on 03/04/2024   Component Date Value    Glucose 03/04/2024 185 (H)     BUN 03/04/2024 33 (H)     Creatinine 03/04/2024 1.46 (H)     Sodium 03/04/2024 144     Potassium 03/04/2024 4.8     Chloride 03/04/2024 107     CO2 03/04/2024 24.0     Calcium 03/04/2024 9.9     BUN/Creatinine Ratio 03/04/2024 22.6     Anion Gap 03/04/2024 13.0     eGFR 03/04/2024 39.8 (L)     proBNP 03/04/2024 49.3    Hospital Outpatient Visit on 02/19/2024   Component Date Value    Glucose 02/19/2024 141 (H)     BUN 02/19/2024 23     Creatinine 02/19/2024 1.39 (H)     Sodium 02/19/2024 143     Potassium 02/19/2024 5.1     Chloride 02/19/2024 106     CO2 02/19/2024 26.0     Calcium 02/19/2024 10.0     BUN/Creatinine Ratio 02/19/2024 16.5     Anion Gap 02/19/2024 11.0     eGFR 02/19/2024 42.2 (L)     Magnesium 02/19/2024 2.1     proBNP 02/19/2024 166.2    Hospital Outpatient Visit on 02/12/2024   Component Date Value    Glucose 02/12/2024 241 (H)     BUN 02/12/2024 28 (H)     Creatinine 02/12/2024 1.39 (H)     Sodium 02/12/2024 140     Potassium 02/12/2024 6.0 (H)     Chloride 02/12/2024 104     CO2 02/12/2024 23.0     Calcium 02/12/2024 9.9     BUN/Creatinine Ratio 02/12/2024 20.1     Anion Gap 02/12/2024 13.0     eGFR 02/12/2024 42.2 (L)     proBNP 02/12/2024 102.7     QT Interval 02/12/2024 456     QTC Interval 02/12/2024 496    Hospital Outpatient Visit on 01/30/2024   Component Date Value    LVIDd 01/30/2024 4.6     LVIDs 01/30/2024 3.2     IVSd 01/30/2024 1.04     LVPWd 01/30/2024 1.09     FS 01/30/2024 32.0     IVS/LVPW 01/30/2024 0.95     ESV(cubed) 01/30/2024 31.5     LV Sys Vol (BSA correcte* 01/30/2024 20.4     EDV(cubed) 01/30/2024 100.3     LV Kamara Vol (BSA correct* 01/30/2024 47.4     LV mass(C)d 01/30/2024 175.7     LVOT area 01/30/2024 3.1     LVOT diam 01/30/2024 1.98     EDV(MOD-sp4) 01/30/2024 87.0     ESV(MOD-sp4) 01/30/2024 37.4     SV(MOD-sp4) 01/30/2024 49.5     SVi(MOD-SP4) 01/30/2024 27.0      EF(MOD-sp4) 01/30/2024 57.0     MV E max jeffery 01/30/2024 91.6     MV A max jeffery 01/30/2024 142.5     MV dec time 01/30/2024 0.21     MV E/A 01/30/2024 0.64     Pulm A Revs Dur 01/30/2024 0.11     TR max jeffery 01/30/2024 251.6     SV(LVOT) 01/30/2024 79.9     LA dimension (2D)  01/30/2024 3.2     Pulm Sys Jeffery 01/30/2024 61.5     Pulm Kamara Jeffery 01/30/2024 27.4     Pulm S/D 01/30/2024 2.25     Pulm A Revs Jeffery 01/30/2024 30.4     LV V1 max 01/30/2024 107.2     LV V1 max PG 01/30/2024 4.6     LV V1 mean PG 01/30/2024 2.42     LV V1 VTI 01/30/2024 26.0     Ao pk jeffery 01/30/2024 133.1     Ao max PG 01/30/2024 7.1     Ao mean PG 01/30/2024 3.6     Ao V2 VTI 01/30/2024 29.0     MOJGAN(I,D) 01/30/2024 2.8     AI P1/2t 01/30/2024 658.7     MV max PG 01/30/2024 8.7     MV mean PG 01/30/2024 4.0     MV V2 VTI 01/30/2024 32.9     MVA(VTI) 01/30/2024 2.43     MV dec slope 01/30/2024 427.5     MR max jeffery 01/30/2024 680.4     MR max PG 01/30/2024 185.5     TR max PG 01/30/2024 25.5     RVSP(TR) 01/30/2024 35.5     RAP systole 01/30/2024 10.0     PA V2 max 01/30/2024 107.3     PA acc time 01/30/2024 0.07     PI end-d jeffery 01/30/2024 133.1     Ao root diam 01/30/2024 2.7     ACS 01/30/2024 1.59     EF(MOD-bp) 01/30/2024 60.0     RV Base 01/30/2024 3.60     RV Mid 01/30/2024 1.70     TAPSE (>1.6) 01/30/2024 2.60    Hospital Outpatient Visit on 01/29/2024   Component Date Value    Glucose 01/29/2024 209 (H)     BUN 01/29/2024 28 (H)     Creatinine 01/29/2024 1.38 (H)     Sodium 01/29/2024 143     Potassium 01/29/2024 5.4 (H)     Chloride 01/29/2024 106     CO2 01/29/2024 26.0     Calcium 01/29/2024 9.8     BUN/Creatinine Ratio 01/29/2024 20.3     Anion Gap 01/29/2024 11.0     eGFR 01/29/2024 42.6 (L)     Magnesium 01/29/2024 2.2     proBNP 01/29/2024 63.3    Hospital Outpatient Visit on 01/25/2024   Component Date Value    Glucose 01/25/2024 248 (H)     BUN 01/25/2024 30 (H)     Creatinine 01/25/2024 1.35 (H)     Sodium 01/25/2024 142      Potassium 01/25/2024 5.7 (H)     Chloride 01/25/2024 103     CO2 01/25/2024 26.0     Calcium 01/25/2024 10.2     BUN/Creatinine Ratio 01/25/2024 22.2     Anion Gap 01/25/2024 13.0     eGFR 01/25/2024 43.7 (L)     Magnesium 01/25/2024 2.3     proBNP 01/25/2024 58.9     TSH 01/25/2024 1.500     Hemoglobin A1C 01/25/2024 7.90 (H)     QT Interval 01/25/2024 443     QTC Interval 01/25/2024 480    Lab on 12/28/2023   Component Date Value    25 Hydroxy, Vitamin D 12/28/2023 34.7     Color, UA 12/28/2023 Yellow     Appearance, UA 12/28/2023 Clear     pH, UA 12/28/2023 6.0     Specific Gravity, UA 12/28/2023 1.018     Glucose, UA 12/28/2023 Negative     Ketones, UA 12/28/2023 Negative     Bilirubin, UA 12/28/2023 Negative     Blood, UA 12/28/2023 Negative     Protein, UA 12/28/2023 100 mg/dL (2+) (A)     Leuk Esterase, UA 12/28/2023 Moderate (2+) (A)     Nitrite, UA 12/28/2023 Negative     Urobilinogen, UA 12/28/2023 0.2 E.U./dL     Glucose 12/28/2023 158 (H)     BUN 12/28/2023 21     Creatinine 12/28/2023 1.16 (H)     Sodium 12/28/2023 146 (H)     Potassium 12/28/2023 5.2     Chloride 12/28/2023 105     CO2 12/28/2023 26.0     Calcium 12/28/2023 9.9     Total Protein 12/28/2023 6.7     Albumin 12/28/2023 4.9     ALT (SGPT) 12/28/2023 23     AST (SGOT) 12/28/2023 14     Alkaline Phosphatase 12/28/2023 65     Total Bilirubin 12/28/2023 0.2     Globulin 12/28/2023 1.8     A/G Ratio 12/28/2023 2.7     BUN/Creatinine Ratio 12/28/2023 18.1     Anion Gap 12/28/2023 15.0     eGFR 12/28/2023 52.4 (L)     Phosphorus 12/28/2023 4.4     PTH, Intact 12/28/2023 78.0 (H)     Hemoglobin A1C 12/28/2023 7.10 (H)     Protein/Creatinine Ratio* 12/28/2023 526.9 (H)     Creatinine, Urine 12/28/2023 109.7     Total Protein, Urine 12/28/2023 57.8     Magnesium 12/28/2023 1.8     Creatine Kinase 12/28/2023 25     Uric Acid 12/28/2023 7.7 (H)     WBC 12/28/2023 4.64     RBC 12/28/2023 3.98     Hemoglobin 12/28/2023 11.7 (L)     Hematocrit 12/28/2023  35.7     MCV 12/28/2023 89.7     MCH 12/28/2023 29.4     MCHC 12/28/2023 32.8     RDW 12/28/2023 13.3     RDW-SD 12/28/2023 43.9     MPV 12/28/2023 10.8     Platelets 12/28/2023 225     Neutrophil % 12/28/2023 52.1     Lymphocyte % 12/28/2023 30.0     Monocyte % 12/28/2023 9.1     Eosinophil % 12/28/2023 7.5 (H)     Basophil % 12/28/2023 0.9     Immature Grans % 12/28/2023 0.4     Neutrophils, Absolute 12/28/2023 2.42     Lymphocytes, Absolute 12/28/2023 1.39     Monocytes, Absolute 12/28/2023 0.42     Eosinophils, Absolute 12/28/2023 0.35     Basophils, Absolute 12/28/2023 0.04     Immature Grans, Absolute 12/28/2023 0.02     nRBC 12/28/2023 0.0     RBC, UA 12/28/2023 0-2     WBC, UA 12/28/2023 21-50 (A)     Bacteria, UA 12/28/2023 None Seen     Squamous Epithelial Cell* 12/28/2023 3-6 (A)     Hyaline Casts, UA 12/28/2023 3-6     Methodology 12/28/2023 Automated Microscopy    Lab on 12/21/2023   Component Date Value    Campylobacter 12/21/2023 Not Detected     Plesiomonas shigelloides 12/21/2023 Not Detected     Salmonella 12/21/2023 Not Detected     Vibrio 12/21/2023 Not Detected     Vibrio cholerae 12/21/2023 Not Detected     Yersinia enterocolitica 12/21/2023 Not Detected     Enteroaggregative E. col* 12/21/2023 Not Detected     Enteropathogenic E. coli* 12/21/2023 Not Detected     Enterotoxigenic E. coli * 12/21/2023 Not Detected     Shiga-like toxin-produci* 12/21/2023 Not Detected     Shigella/Enteroinvasive * 12/21/2023 Not Detected     Cryptosporidium 12/21/2023 Not Detected     Cyclospora cayetanensis 12/21/2023 Not Detected     Entamoeba histolytica 12/21/2023 Not Detected     Giardia lamblia 12/21/2023 Not Detected     Adenovirus F40/41 12/21/2023 Not Detected     Astrovirus 12/21/2023 Not Detected     Norovirus GI/GII 12/21/2023 Not Detected     Rotavirus A 12/21/2023 Not Detected     Sapovirus (I, II, IV or * 12/21/2023 Not Detected    There may be more visits with results that are not included.  "    Recent labs reviewed and interpreted for clinical significance and application    Went over each of the available labs with the patient while she was still here in the heart failure clinic          It is a pleasure to be involved in this patient's cardiovascular care relating to their heart failure.  Please feel free to call me with any questions or concerns.    Rodney \"Renan\" Clay LEDEZMA, [facility]  Heart failure program clinical provider    BRISA Simons   06/07/24  .  "

## 2024-06-10 ENCOUNTER — HOSPITAL ENCOUNTER (OUTPATIENT)
Facility: HOSPITAL | Age: 64
Discharge: HOME | End: 2024-06-10
Admitting: NURSE PRACTITIONER
Payer: MEDICARE

## 2024-06-10 ENCOUNTER — DISEASE STATE MANAGEMENT VISIT (OUTPATIENT)
Facility: HOSPITAL | Age: 64
End: 2024-06-10
Payer: MEDICARE

## 2024-06-10 VITALS
SYSTOLIC BLOOD PRESSURE: 168 MMHG | OXYGEN SATURATION: 98 % | BODY MASS INDEX: 30.5 KG/M2 | RESPIRATION RATE: 16 BRPM | WEIGHT: 177.8 LBS | DIASTOLIC BLOOD PRESSURE: 81 MMHG | HEART RATE: 82 BPM

## 2024-06-10 DIAGNOSIS — I31.9 PERICARDITIS, UNSPECIFIED CHRONICITY, UNSPECIFIED TYPE: ICD-10-CM

## 2024-06-10 DIAGNOSIS — R06.09 DYSPNEA ON EXERTION: ICD-10-CM

## 2024-06-10 DIAGNOSIS — D05.12 BREAST NEOPLASM, TIS (DCIS), LEFT: ICD-10-CM

## 2024-06-10 DIAGNOSIS — N18.32 STAGE 3B CHRONIC KIDNEY DISEASE: Primary | ICD-10-CM

## 2024-06-10 DIAGNOSIS — I50.1 LEFT VENTRICULAR FAILURE, UNSPECIFIED: Chronic | ICD-10-CM

## 2024-06-10 DIAGNOSIS — I27.21 PAH (PULMONARY ARTERY HYPERTENSION): Chronic | ICD-10-CM

## 2024-06-10 DIAGNOSIS — Z91.89 AT RISK FOR FLUID VOLUME OVERLOAD: Chronic | ICD-10-CM

## 2024-06-10 DIAGNOSIS — K58.9 IRRITABLE BOWEL SYNDROME, UNSPECIFIED TYPE: ICD-10-CM

## 2024-06-10 DIAGNOSIS — G63 POLYNEUROPATHY ASSOCIATED WITH UNDERLYING DISEASE: ICD-10-CM

## 2024-06-10 DIAGNOSIS — I44.7 LBBB (LEFT BUNDLE BRANCH BLOCK): ICD-10-CM

## 2024-06-10 DIAGNOSIS — I42.9 CARDIOMYOPATHY, UNSPECIFIED TYPE: Chronic | ICD-10-CM

## 2024-06-10 DIAGNOSIS — F41.8 MIXED ANXIETY DEPRESSIVE DISORDER: ICD-10-CM

## 2024-06-10 DIAGNOSIS — E11.49 TYPE 2 DIABETES MELLITUS WITH OTHER DIABETIC NEUROLOGICAL COMPLICATION: ICD-10-CM

## 2024-06-10 DIAGNOSIS — N17.9 ACUTE KIDNEY INJURY: ICD-10-CM

## 2024-06-10 DIAGNOSIS — R06.00 DYSPNEA, UNSPECIFIED TYPE: ICD-10-CM

## 2024-06-10 DIAGNOSIS — E88.810 METABOLIC SYNDROME X: Chronic | ICD-10-CM

## 2024-06-10 DIAGNOSIS — G47.00 INSOMNIA, UNSPECIFIED TYPE: ICD-10-CM

## 2024-06-10 DIAGNOSIS — Z95.810 PRESENCE OF BIVENTRICULAR IMPLANTABLE CARDIOVERTER-DEFIBRILLATOR (ICD): Chronic | ICD-10-CM

## 2024-06-10 LAB
ANION GAP SERPL CALCULATED.3IONS-SCNC: 11.2 MMOL/L (ref 5–15)
BUN SERPL-MCNC: 31 MG/DL (ref 8–23)
BUN/CREAT SERPL: 22.1 (ref 7–25)
CALCIUM SPEC-SCNC: 9.7 MG/DL (ref 8.6–10.5)
CHLORIDE SERPL-SCNC: 105 MMOL/L (ref 98–107)
CO2 SERPL-SCNC: 25.8 MMOL/L (ref 22–29)
CREAT SERPL-MCNC: 1.4 MG/DL (ref 0.57–1)
EGFRCR SERPLBLD CKD-EPI 2021: 41.8 ML/MIN/1.73
GLUCOSE SERPL-MCNC: 274 MG/DL (ref 65–99)
MAGNESIUM SERPL-MCNC: 2.2 MG/DL (ref 1.6–2.4)
NT-PROBNP SERPL-MCNC: 94.1 PG/ML (ref 0–900)
POTASSIUM SERPL-SCNC: 4.8 MMOL/L (ref 3.5–5.2)
SODIUM SERPL-SCNC: 142 MMOL/L (ref 136–145)

## 2024-06-10 PROCEDURE — G0463 HOSPITAL OUTPT CLINIC VISIT: HCPCS

## 2024-06-10 PROCEDURE — 80048 BASIC METABOLIC PNL TOTAL CA: CPT | Performed by: NURSE PRACTITIONER

## 2024-06-10 PROCEDURE — 99214 OFFICE O/P EST MOD 30 MIN: CPT | Performed by: NURSE PRACTITIONER

## 2024-06-10 PROCEDURE — 83880 ASSAY OF NATRIURETIC PEPTIDE: CPT | Performed by: NURSE PRACTITIONER

## 2024-06-10 PROCEDURE — 83735 ASSAY OF MAGNESIUM: CPT | Performed by: NURSE PRACTITIONER

## 2024-06-10 RX ORDER — HYDRALAZINE HYDROCHLORIDE 50 MG/1
100 TABLET, FILM COATED ORAL 2 TIMES DAILY
Qty: 336 TABLET | Refills: 3 | Status: SHIPPED | OUTPATIENT
Start: 2024-06-10

## 2024-06-10 RX ORDER — HYDRALAZINE HYDROCHLORIDE 25 MG/1
TABLET, FILM COATED ORAL
Qty: 504 TABLET | Refills: 3 | Status: SHIPPED | OUTPATIENT
Start: 2024-06-10 | End: 2024-06-10

## 2024-06-10 NOTE — PROGRESS NOTES
[facility] HEART FAILURE CLINIC - PHARMACY SERVICE    Patient Name: Susy Hanson  :1960  Age: 63 y.o.  Sex: female  Primary Cardiologist: Jeffrey  Nephrology: Kiana  PCP: KENIA Avila     HFpEF with EF 56-60% (Last Echo: 2/3/24).    NYHA Class I C     CKD: Stage 3b eGFR 30-44 mL/min        -CHF-specific BB:      Pre Visit Dose:  Metoprolol succinate  mg BID    Post Visit Dose:   Metoprolol succinate  mg BID  (BP: 168/81, HR: 82)     - Target Dose: Metoprolol succinate  mg PO daily.     - Goal HR of 50s to 60s.     - Patient should be seen every 10 to 14 days for a pulse check with plans for up-titration until target heart rate is achieved.        -ACE/ARB/ARNI:     Pre Visit Dose: None due to hyperkalemia    Post Visit Dose: None        -SGLT2 inhibitor therapy:    Pre Visit Dose: Dapagliflozin 10 mg PO daily    Post Visit Dose: Dapagliflozin 10 mg PO daily    - Target Dose: Dapagliflozin 10 mg PO daily : CrCl > 20 mL/min which has shown benefit in patients with HF       -MRA:     Pre Visit Dose: None due to hyperkalemia    Post Visit Dose: None    - K is < 5 mEq/L and SCr is </= 2 mg/dL in female and eGFR > 30 mL/min/1.73m3    -DIURETIC:     Pre Visit Dose: Furosemide 20 mg QOD    Post Visit Dose: Furosemide 2 mg QOD      -MAGNESIUM:     Mag is > 2 mg/dL    Pre Visit Dose: None    Post Visit Dose: None      -ANTICOAGULATION:     None       -OTHER CV MEDS:     Pre Visit Dose: atorvastatin 20 mg daily, diltiazem 180 mg daily, hydralazine 75 mg BID    Post Visit Dose: atorvastatin 20 mg daily, diltiazem 180 mg daily, hydralazine 100 mg BID      -Clinic Administered Medications:     None         Patient Assistance:      [place] & [place] for dapagliflozin           PLAN:  Initiation/Discontinuation/Dose Adjustment: Increase hydralazine  Education provided: none  Coordination of Care: none  Refills: none      Thank you for allowing me to participate in the care of your patient,    Orion  Jd, PharmD  [facility]  06/10/24  08:20 EDT

## 2024-06-10 NOTE — ADDENDUM NOTE
Encounter addended by: Chari Santos RN on: 6/10/2024 11:32 AM   Actions taken: Charge Capture section accepted

## 2024-07-07 DIAGNOSIS — F41.8 MIXED ANXIETY DEPRESSIVE DISORDER: ICD-10-CM

## 2024-07-07 RX ORDER — ALPRAZOLAM 1 MG/1
1 TABLET ORAL 2 TIMES DAILY
Qty: 60 TABLET | Refills: 0 | Status: SHIPPED | OUTPATIENT
Start: 2024-07-07

## 2024-07-08 RX ORDER — METOPROLOL SUCCINATE 100 MG/1
100 TABLET, EXTENDED RELEASE ORAL 2 TIMES DAILY
Qty: 180 TABLET | Refills: 1 | Status: SHIPPED | OUTPATIENT
Start: 2024-07-08

## 2024-07-09 ENCOUNTER — LAB (OUTPATIENT)
Dept: LAB | Facility: HOSPITAL | Age: 64
End: 2024-07-09
Payer: MEDICARE

## 2024-07-09 ENCOUNTER — OFFICE VISIT (OUTPATIENT)
Dept: ENDOCRINOLOGY | Facility: CLINIC | Age: 64
End: 2024-07-09
Payer: MEDICARE

## 2024-07-09 VITALS
HEART RATE: 74 BPM | DIASTOLIC BLOOD PRESSURE: 72 MMHG | BODY MASS INDEX: 30.05 KG/M2 | WEIGHT: 176 LBS | HEIGHT: 64 IN | SYSTOLIC BLOOD PRESSURE: 138 MMHG | OXYGEN SATURATION: 99 %

## 2024-07-09 DIAGNOSIS — E11.42 DIABETIC PERIPHERAL NEUROPATHY: ICD-10-CM

## 2024-07-09 DIAGNOSIS — E11.21 DIABETIC NEPHROPATHY ASSOCIATED WITH TYPE 2 DIABETES MELLITUS: ICD-10-CM

## 2024-07-09 DIAGNOSIS — E66.9 CLASS 1 OBESITY WITH SERIOUS COMORBIDITY AND BODY MASS INDEX (BMI) OF 30.0 TO 30.9 IN ADULT, UNSPECIFIED OBESITY TYPE: ICD-10-CM

## 2024-07-09 DIAGNOSIS — E11.65 TYPE 2 DIABETES MELLITUS WITH HYPERGLYCEMIA, WITH LONG-TERM CURRENT USE OF INSULIN: Primary | ICD-10-CM

## 2024-07-09 DIAGNOSIS — E11.65 TYPE 2 DIABETES MELLITUS WITH HYPERGLYCEMIA, WITH LONG-TERM CURRENT USE OF INSULIN: ICD-10-CM

## 2024-07-09 DIAGNOSIS — Z79.4 TYPE 2 DIABETES MELLITUS WITH HYPERGLYCEMIA, WITH LONG-TERM CURRENT USE OF INSULIN: Primary | ICD-10-CM

## 2024-07-09 DIAGNOSIS — Z79.4 TYPE 2 DIABETES MELLITUS WITH HYPERGLYCEMIA, WITH LONG-TERM CURRENT USE OF INSULIN: ICD-10-CM

## 2024-07-09 LAB
ALBUMIN SERPL-MCNC: 4.5 G/DL (ref 3.5–5.2)
ALBUMIN/GLOB SERPL: 1.5 G/DL
ALP SERPL-CCNC: 99 U/L (ref 39–117)
ALT SERPL W P-5'-P-CCNC: 22 U/L (ref 1–33)
ANION GAP SERPL CALCULATED.3IONS-SCNC: 14.4 MMOL/L (ref 5–15)
AST SERPL-CCNC: 11 U/L (ref 1–32)
BILIRUB SERPL-MCNC: 0.3 MG/DL (ref 0–1.2)
BUN SERPL-MCNC: 37 MG/DL (ref 8–23)
BUN/CREAT SERPL: 25.5 (ref 7–25)
CALCIUM SPEC-SCNC: 9.8 MG/DL (ref 8.6–10.5)
CHLORIDE SERPL-SCNC: 103 MMOL/L (ref 98–107)
CO2 SERPL-SCNC: 21.6 MMOL/L (ref 22–29)
CREAT SERPL-MCNC: 1.45 MG/DL (ref 0.57–1)
EGFRCR SERPLBLD CKD-EPI 2021: 40.1 ML/MIN/1.73
GLOBULIN UR ELPH-MCNC: 3 GM/DL
GLUCOSE SERPL-MCNC: 223 MG/DL (ref 65–99)
HBA1C MFR BLD: 8.82 % (ref 4.8–5.6)
POTASSIUM SERPL-SCNC: 5.3 MMOL/L (ref 3.5–5.2)
PROT SERPL-MCNC: 7.5 G/DL (ref 6–8.5)
SODIUM SERPL-SCNC: 139 MMOL/L (ref 136–145)

## 2024-07-09 PROCEDURE — 95251 CONT GLUC MNTR ANALYSIS I&R: CPT | Performed by: INTERNAL MEDICINE

## 2024-07-09 PROCEDURE — 3051F HG A1C>EQUAL 7.0%<8.0%: CPT | Performed by: INTERNAL MEDICINE

## 2024-07-09 PROCEDURE — 36415 COLL VENOUS BLD VENIPUNCTURE: CPT

## 2024-07-09 PROCEDURE — 3078F DIAST BP <80 MM HG: CPT | Performed by: INTERNAL MEDICINE

## 2024-07-09 PROCEDURE — 83036 HEMOGLOBIN GLYCOSYLATED A1C: CPT

## 2024-07-09 PROCEDURE — 99214 OFFICE O/P EST MOD 30 MIN: CPT | Performed by: INTERNAL MEDICINE

## 2024-07-09 PROCEDURE — 3075F SYST BP GE 130 - 139MM HG: CPT | Performed by: INTERNAL MEDICINE

## 2024-07-09 PROCEDURE — 80053 COMPREHEN METABOLIC PANEL: CPT

## 2024-07-09 PROCEDURE — 1160F RVW MEDS BY RX/DR IN RCRD: CPT | Performed by: INTERNAL MEDICINE

## 2024-07-09 PROCEDURE — 1159F MED LIST DOCD IN RCRD: CPT | Performed by: INTERNAL MEDICINE

## 2024-07-09 RX ORDER — INSULIN GLARGINE 100 [IU]/ML
54 INJECTION, SOLUTION SUBCUTANEOUS NIGHTLY
Qty: 30 ML | Refills: 3 | Status: SHIPPED | OUTPATIENT
Start: 2024-07-09

## 2024-07-09 NOTE — PROGRESS NOTES
-----------------------------------------------------------------  ENDOCRINE CLINIC NOTE  -----------------------------------------------------------------        PATIENT NAME: Susy Hanson  PATIENT : 1960 AGE: 63 y.o.  MRN NUMBER: [id number]  PRIMARY CARE: Erica Avila MD    ==========================================================================    CHIEF COMPLAINT: Type 2 diabetes management.  DATE OF SERVICE: 24    ==========================================================================    HPI / SUBJECTIVE    63 y.o. female is seen in the clinic today for type 2 diabetes mellitus.  Diagnosis Date:   Current Therapy / Medications:  Farxiga 10 mg p.o. daily  Glimepiride 2 mg once a day  Lantus 48 units nightly  Past tried medications: (Not currently using)  Metformin - GI side-effects  Ozempic - hx of pancreatitis  Home BG logs / monitoring: Checking BG through  [facility] 3  No hypoglycemia noted  Small multiple meals  Following ophthalmology at Dr. Jones's office, last visit was May 2024  Peripheral neuropathy and not on meds  Chronic kidney disease III  Hx of dilated cardiomyopathy    ==========================================================================                                                PAST MEDICAL HISTORY    Past Medical History:   Diagnosis Date    Anxiety     Breast cancer     LEFT BREAST 2021    Cataract     Had surgery to remove 2023    CHF (congestive heart failure)     Colon polyp 2023    Removed during colonoscopy    Coronary artery disease     Depression     DM (diabetes mellitus)     History of medical problems     Bladder prolapse repeat Dr. Roper    HL (hearing loss)     Hyperlipidemia     Hypertension     SAW CARDIO/ STRESS TEST  - NOW MEDS MANAGED BY PCP     Irritable bowel syndrome 2006    Peripheral neuropathy     PONV (postoperative nausea and vomiting)     Renal insufficiency     See Dr. funes for  moderate kidney failure    Sleep apnea     WEARS CPAP    Type 2 diabetes mellitus 2008    Urinary tract infection 6/23    UTI seen at [facility]       ==========================================================================    PAST SURGICAL HISTORY    Past Surgical History:   Procedure Laterality Date    BLADDER REPAIR      BREAST BIOPSY      BREAST RECONSTRUCTION Bilateral 08/26/2021    Procedure: BILATERAL PREPECTORALPLACMENT TISSUE EXPANDERS AND ALLODERM;  Surgeon: Heri Arreaga MD;  Location:  [facility];  Service: Plastics;  Laterality: Bilateral;    BREAST TISSUE EXPANDER REMOVAL INSERTION OF IMPLANT Bilateral 12/23/2021    Procedure: BILATERAL REMOVAL TISSUE EXPANDERS AND PLACEMENT OF IMPLANTS;  Surgeon: Heri Arreaga MD;  Location:  [facility];  Service: Plastics;  Laterality: Bilateral;    CARDIAC CATHETERIZATION Left 04/13/2022    Procedure: Cardiac Catheterization/Vascular Study;  Surgeon: Christo Murphy MD;  Location:  [facility];  Service: Cardiovascular;  Laterality: Left;    CARDIAC ELECTROPHYSIOLOGY PROCEDURE N/A 09/19/2022    Procedure: ICD implant [place] aware;  Surgeon: Esteban Kendrick MD;  Location:  [facility];  Service: Cardiology;  Laterality: N/A;    CARDIAC ELECTROPHYSIOLOGY PROCEDURE N/A 09/20/2022    Procedure: lead repositioning;  Surgeon: Esteban Kendrick MD;  Location:  [facility];  Service: Cardiology;  Laterality: N/A;    CARDIAC SURGERY  2022    Defibrillator/pacemaker implant    CARDIOVASCULAR STRESS TEST  2020    COLONOSCOPY  11/2012    EYE SURGERY  7/23    Cataracts removed    HYSTERECTOMY      INSERT / REPLACE / REMOVE PACEMAKER  2022    LYMPH NODE BIOPSY  8/29/2021    MASTECTOMY W/ SENTINEL NODE BIOPSY Bilateral 08/26/2021    Procedure: bilateral total mastectomy, left axillary sentinel lymph node biopsy, possible reconstruction.;  Surgeon: Asia Perkins MD;  Location:  [facility];   Service: General;  Laterality: Bilateral;    TUBAL ABDOMINAL LIGATION  1981       ==========================================================================    FAMILY HISTORY    Family History   Problem Relation Age of Onset    Other Mother     Diabetes Mother     Heart disease Mother     Sleep apnea Mother     Snoring Mother     COPD Mother     Hyperlipidemia Mother     Alcohol abuse Mother     Anxiety disorder Mother     Depression Mother     Hypertension Mother     Alzheimer's disease Father     Diabetes Father     Heart disease Father     Other Father     Sleep apnea Father     Snoring Father     Hyperlipidemia Father     Heart failure Father     Kidney disease Father     Hypertension Father     Obesity Father     Diabetes Sister     Heart disease Sister     Other Sister     Sleep apnea Sister     Snoring Sister     COPD Sister     Anxiety disorder Sister     Depression Sister     Hypertension Sister     Hyperlipidemia Sister     Clotting disorder Sister     Hyperlipidemia Brother     Hypertension Brother     Breast cancer Maternal Aunt 80    Breast cancer Paternal Aunt     Diabetes Maternal Grandmother     Breast cancer Paternal Grandmother 60    Cancer Paternal Grandmother     Diabetes Paternal Grandmother     Hyperlipidemia Son     Diabetes Brother     Hypertension Brother     Malig Hyperthermia Neg Hx        ==========================================================================    SOCIAL HISTORY    Social History     Socioeconomic History    Marital status:    Tobacco Use    Smoking status: Former     Current packs/day: 0.00     Average packs/day: 2.0 packs/day for 36.0 years (72.0 ttl pk-yrs)     Types: Cigarettes     Start date: 1972     Quit date: 2008     Years since quittin.5     Passive exposure: Past    Smokeless tobacco: Never   Vaping Use    Vaping status: Never Used   Substance and Sexual Activity    Alcohol use: No    Drug use: No    Sexual activity: Yes     Partners: Male      Birth control/protection: None, Tubal ligation, Hysterectomy       ==========================================================================    MEDICATIONS      Current Outpatient Medications:     Accu-Chek FastClix Lancets misc, USE TO CHECK BLOOD GLUCOSE DAILY, Disp: , Rfl:     Accu-Chek Guide test strip, 1 each by Other route Daily. E11.49, Disp: 100 each, Rfl: 1    ALPRAZolam (XANAX) 1 MG tablet, Take 1 tablet by mouth 2 (Two) Times a Day., Disp: 60 tablet, Rfl: 0    atorvastatin (LIPITOR) 20 MG tablet, Take 1 tablet by mouth Daily., Disp: 90 tablet, Rfl: 1    Azelastine HCl 137 MCG/SPRAY solution, , Disp: , Rfl:     cetirizine (zyrTEC) 10 MG tablet, Take 1 tablet by mouth Daily As Needed., Disp: , Rfl:     Continuous Blood Gluc  (FreeStyle [place] 3 Arcade) device, Use 1 each 4 (Four) Times a Day Before Meals & at Bedtime., Disp: 1 each, Rfl: 0    Continuous Glucose Sensor (FreeStyle Benton 3 Sensor) misc, Use 1 each Every 14 (Fourteen) Days., Disp: 2 each, Rfl: 5    dapagliflozin Propanediol (Farxiga) 10 MG tablet, Take 10 mg by mouth Daily., Disp: 90 tablet, Rfl: 3    dilTIAZem CD (Cardizem CD) 180 MG 24 hr capsule, Take 1 capsule by mouth Daily., Disp: 90 capsule, Rfl: 1    furosemide (Lasix) 20 MG tablet, Take 1 tablet by mouth Daily. (Patient taking differently: Take 1 tablet by mouth Every Other Day.), Disp: 45 tablet, Rfl: 1    glimepiride (Amaryl) 2 MG tablet, Take 1 tablet by mouth Every Morning., Disp: 30 tablet, Rfl: 11    hydrALAZINE (APRESOLINE) 50 MG tablet, Take 2 tablets by mouth 2 (Two) Times a Day., Disp: 336 tablet, Rfl: 3    Insulin Pen Needle (Pen Needles) 32G X 4 MM misc, Use 1 each 4 (Four) Times a Day Before Meals & at Bedtime., Disp: 200 each, Rfl: 5    metoprolol succinate XL (TOPROL-XL) 100 MG 24 hr tablet, Take 1 tablet by mouth 2 (Two) Times a Day., Disp: 180 tablet, Rfl: 1    ondansetron (Zofran) 4 MG tablet, Take 1 tablet by mouth Every 8 (Eight) Hours As Needed for  Nausea or Vomiting., Disp: 20 tablet, Rfl: 0    pantoprazole (PROTONIX) 40 MG EC tablet, Take 1 tablet by mouth Daily., Disp: 90 tablet, Rfl: 0    Triamcinolone Acetonide (NASACORT) 55 MCG/ACT nasal inhaler, APPLY 2 SPRAYS BY NASAL ROUTE DAILY FOR 30 DAYS (Patient taking differently: 2 sprays into the nostril(s) as directed by provider Daily As Needed.), Disp: 10.8 mL, Rfl: 6    Insulin Glargine (Lantus SoloStar) 100 UNIT/ML injection pen, Inject 54 Units under the skin into the appropriate area as directed Every Night., Disp: 30 mL, Rfl: 3    ==========================================================================    ALLERGIES    Allergies   Allergen Reactions    Hydrocodone-Acetaminophen Hives     Lortab, takes tylenol       ==========================================================================    OBJECTIVE    Vitals:    07/09/24 0840   BP: 138/72   Pulse: 74   SpO2: 99%     Body mass index is 30.21 kg/m².     General: Alert, cooperative, no acute distress    ==========================================================================    LAB EVALUATION    Lab Results   Component Value Date    GLUCOSE 274 (H) 06/10/2024    BUN 31 (H) 06/10/2024    CREATININE 1.40 (H) 06/10/2024    EGFRIFNONA 57 (L) 01/26/2022    EGFRIFAFRI >60 10/13/2017    BCR 22.1 06/10/2024    K 4.8 06/10/2024    CO2 25.8 06/10/2024    CALCIUM 9.7 06/10/2024    ALBUMIN 4.8 04/01/2024    AST 14 04/01/2024    ALT 20 04/01/2024    CHOL 140 03/14/2023    TRIG 153 (H) 03/14/2023    HDL 50 03/14/2023    LDL 64 03/14/2023     Lab Results   Component Value Date    HGBA1C 7.60 (H) 04/01/2024    HGBA1C 7.90 (H) 01/25/2024    HGBA1C 7.10 (H) 12/28/2023     Lab Results   Component Value Date    MICROALBUR 40.5 03/14/2023    CREATININE 1.40 (H) 06/10/2024     Lab Results   Component Value Date    TSH 1.500 01/25/2024     ==========================================================================    CGM Data:    Dates: Jun 26 till July 9, 2024    Very High  "> 250: 5%  High 180 - 250: 34%  Target: 70 - 180: 61%  Low 55 - 70:  0%  Very Low < 55: 0%    Time active on CGM is 95%.  Average blood sugar 172 with glucose variability 25.3% and GMI 7.4%.    ==========================================================================    ASSESSMENT AND PLAN    # Type 2 diabetes with hyperglycemia  # Diabetic peripheral neuropathy  # Diabetic nephropathy with CKD stage IIIb, currently following nephrology in the clinic, following Dr. Kerr  # Obesity with BMI 30.21    -Patient is currently maintained on glimepiride therapy along with Farxiga, on the maximum doses  - Given underlying history of CKD stage IIIb will avoid further titration of either of these medications  - Given underlying history of pancreatitis patient is not a good candidate for DPP 4 number to therapy or GLP-1 receptor agonist therapy  - Patient is currently taking insulin Lantus 48 units nightly and will increase the dose to 54 units otherwise we will continue same therapy without any changes  - New adjusted regimen:  Farxiga 10 mg p.o. daily  Glimepiride 2 mg once a day  Lantus 54 units QHS  - Patient is currently interested in insulin pump therapy, she might be candidate for OmniPod Dash 5, will send patient for pump review class  - A1c today and repeat blood work and clinical visit in 3 months time    Return to clinic: 3 months    Entire assessment and plan was discussed and counseled the patient in detail to which patient verbalized understanding and agreed with care.  Answered all queries and concerns.    This note was created using voice recognition software and is inherently subject to errors including those of syntax and \"sound-alike\" substitutions which may escape proofreading.  In such instances, original meaning may be extrapolated by contextual derivation.    Note: Portions of this note may have been copied from previous notes but documentation have been reviewed and edited as necessary to support " clinical decision making for today's visit.    ==========================================================================    INFORMATION PROVIDED TO PATIENT    Patient Instructions   Please,    - Continue Farxiga 10 mgs once a day  - Continue glimepiride 2 mg daily  - Increase insulin Lantus to 54 units at night  - Continue checking blood sugar through freestyle josefina.    Blood work today.    Follow-up in my clinic back again in 3 months with repeat nonfasting blood work.    Thank you for your visit today.    If you have any questions or concerns please feel free to reach out of the office.       ==========================================================================  Shin Helton MD  Department of Endocrine, Diabetes and Metabolism  [place], [place]  ==========================================================================

## 2024-07-09 NOTE — PATIENT INSTRUCTIONS
Please,    - Continue Farxiga 10 mgs once a day  - Continue glimepiride 2 mg daily  - Increase insulin Lantus to 54 units at night  - Continue checking blood sugar through freestyle josefina.    Blood work today.    Follow-up in my clinic back again in 3 months with repeat nonfasting blood work.    Thank you for your visit today.    If you have any questions or concerns please feel free to reach out of the office.

## 2024-07-16 ENCOUNTER — TELEPHONE (OUTPATIENT)
Dept: SURGERY | Facility: CLINIC | Age: 64
End: 2024-07-16
Payer: MEDICARE

## 2024-07-16 NOTE — TELEPHONE ENCOUNTER
Patient and canceled follow up appt with COURTNEY Garvey on 8/2/2024      She does not wish to r/s at this time she will be following up with a plastic surgeon to have implants removed. I let her know to call the office if she has any new problems or concerns.

## 2024-07-23 ENCOUNTER — OFFICE VISIT (OUTPATIENT)
Dept: FAMILY MEDICINE CLINIC | Facility: CLINIC | Age: 64
End: 2024-07-23
Payer: MEDICARE

## 2024-07-23 VITALS
HEART RATE: 71 BPM | SYSTOLIC BLOOD PRESSURE: 128 MMHG | TEMPERATURE: 98 F | HEIGHT: 64 IN | DIASTOLIC BLOOD PRESSURE: 78 MMHG | WEIGHT: 175 LBS | BODY MASS INDEX: 29.88 KG/M2 | OXYGEN SATURATION: 98 %

## 2024-07-23 DIAGNOSIS — I10 ESSENTIAL HYPERTENSION: ICD-10-CM

## 2024-07-23 DIAGNOSIS — E78.2 MIXED HYPERLIPIDEMIA: ICD-10-CM

## 2024-07-23 DIAGNOSIS — M25.552 LEFT HIP PAIN: ICD-10-CM

## 2024-07-23 DIAGNOSIS — Z00.00 ENCOUNTER FOR ANNUAL WELLNESS EXAM IN MEDICARE PATIENT: Primary | ICD-10-CM

## 2024-07-23 PROCEDURE — G0438 PPPS, INITIAL VISIT: HCPCS | Performed by: FAMILY MEDICINE

## 2024-07-23 PROCEDURE — 99213 OFFICE O/P EST LOW 20 MIN: CPT | Performed by: FAMILY MEDICINE

## 2024-07-23 PROCEDURE — 3078F DIAST BP <80 MM HG: CPT | Performed by: FAMILY MEDICINE

## 2024-07-23 PROCEDURE — 3052F HG A1C>EQUAL 8.0%<EQUAL 9.0%: CPT | Performed by: FAMILY MEDICINE

## 2024-07-23 PROCEDURE — 1125F AMNT PAIN NOTED PAIN PRSNT: CPT | Performed by: FAMILY MEDICINE

## 2024-07-23 PROCEDURE — 1160F RVW MEDS BY RX/DR IN RCRD: CPT | Performed by: FAMILY MEDICINE

## 2024-07-23 PROCEDURE — 3074F SYST BP LT 130 MM HG: CPT | Performed by: FAMILY MEDICINE

## 2024-07-23 PROCEDURE — 1159F MED LIST DOCD IN RCRD: CPT | Performed by: FAMILY MEDICINE

## 2024-07-23 NOTE — PROGRESS NOTES
The ABCs of the Annual Wellness Visit  Subsequent Medicare Wellness Visit    Subjective    Susy Hanson is a 64 y.o. female who presents for a Subsequent Medicare Wellness Visit.    The following portions of the patient's history were reviewed and   updated as appropriate: allergies, current medications, past family history, past medical history, past social history, past surgical history, and problem list.    Compared to one year ago, the patient feels her physical   health is worse.    Compared to one year ago, the patient feels her mental   health is the same.    Recent Hospitalizations:  She was not admitted to the hospital during the last year.       Current Medical Providers:  Patient Care Team:  Erica Avila MD as PCP - General  Sb Kerr MD as Consulting Physician (Nephrology)  Esteban Kendrick MD as Consulting Physician (Cardiology)  Sandee Hull MD as Consulting Physician (Bariatrics)    Outpatient Medications Prior to Visit   Medication Sig Dispense Refill    Accu-Chek FastClix Lancets misc USE TO CHECK BLOOD GLUCOSE DAILY      Accu-Chek Guide test strip 1 each by Other route Daily. E11.49 100 each 1    ALPRAZolam (XANAX) 1 MG tablet Take 1 tablet by mouth 2 (Two) Times a Day. 60 tablet 0    atorvastatin (LIPITOR) 20 MG tablet Take 1 tablet by mouth Daily. 90 tablet 1    Azelastine HCl 137 MCG/SPRAY solution       cetirizine (zyrTEC) 10 MG tablet Take 1 tablet by mouth Daily As Needed.      Continuous Blood Gluc  (FreeStyle [place] 3 [place]) device Use 1 each 4 (Four) Times a Day Before Meals & at Bedtime. 1 each 0    Continuous Glucose Sensor (FreeStyle Benton 3 Sensor) misc Use 1 each Every 14 (Fourteen) Days. 2 each 5    dapagliflozin Propanediol (Farxiga) 10 MG tablet Take 10 mg by mouth Daily. 90 tablet 3    dilTIAZem CD (Cardizem CD) 180 MG 24 hr capsule Take 1 capsule by mouth Daily. 90 capsule 1    furosemide (Lasix) 20 MG tablet Take 1 tablet by mouth Daily.  (Patient taking differently: Take 1 tablet by mouth Every Other Day.) 45 tablet 1    glimepiride (Amaryl) 2 MG tablet Take 1 tablet by mouth Every Morning. 30 tablet 11    hydrALAZINE (APRESOLINE) 50 MG tablet Take 2 tablets by mouth 2 (Two) Times a Day. 336 tablet 3    Insulin Glargine (Lantus SoloStar) 100 UNIT/ML injection pen Inject 54 Units under the skin into the appropriate area as directed Every Night. 30 mL 3    Insulin Pen Needle (Pen Needles) 32G X 4 MM misc Use 1 each 4 (Four) Times a Day Before Meals & at Bedtime. 200 each 5    metoprolol succinate XL (TOPROL-XL) 100 MG 24 hr tablet Take 1 tablet by mouth 2 (Two) Times a Day. 180 tablet 1    ondansetron (Zofran) 4 MG tablet Take 1 tablet by mouth Every 8 (Eight) Hours As Needed for Nausea or Vomiting. 20 tablet 0    pantoprazole (PROTONIX) 40 MG EC tablet Take 1 tablet by mouth Daily. 90 tablet 0    Triamcinolone Acetonide (NASACORT) 55 MCG/ACT nasal inhaler APPLY 2 SPRAYS BY NASAL ROUTE DAILY FOR 30 DAYS (Patient taking differently: 2 sprays into the nostril(s) as directed by provider Daily As Needed.) 10.8 mL 6     No facility-administered medications prior to visit.       No opioid medication identified on active medication list. I have reviewed chart for other potential  high risk medication/s and harmful drug interactions in the elderly.        Aspirin is not on active medication list.  Aspirin use is not indicated based on review of current medical condition/s. Risk of harm outweighs potential benefits.  .    Patient Active Problem List   Diagnosis    Encounter for general adult medical examination without abnormal findings    Essential hypertension    Irritable bowel syndrome    Mixed anxiety depressive disorder    Mixed hyperlipidemia    Obesity    Encounter for annual wellness exam in Medicare patient    Type 2 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    Vitamin D deficiency    Hypertensive urgency    Peripheral  "neuropathy    ANDREAS on CPAP    Chest pain, atypical    Breast neoplasm, Tis (DCIS), left    Cold intolerance    Insomnia    Breast pain    Dyspnea    h/o Acute kidney injury    LBBB (left bundle branch block)    h/o nonischemic, nondilated, cardiomyopathy    Presence of biventricular  ICD    Hemopericardium    h/o Pericarditis    Pericardial effusion    Metabolic syndrome X    At risk for fluid volume overload    h/o resolved Left ventricular failure    Stage 3b chronic kidney disease    Hyperkalemia    mild PAH (pulmonary artery hypertension)     Advance Care Planning   Advance Care Planning     Advance Directive is on file.  ACP discussion was held with the patient during this visit. Patient has an advance directive in EMR which is still valid.      Objective    Vitals:    24 1517 24 1540   BP: 160/89 128/78   BP Location: Right arm    Patient Position: Sitting    Cuff Size: Adult    Pulse: 71    Temp: 98 °F (36.7 °C)    TempSrc: Temporal    SpO2: 98%    Weight: 79.4 kg (175 lb)    Height: 162.6 cm (64\")    PainSc:   8    PainLoc: Hip      Estimated body mass index is 30.04 kg/m² as calculated from the following:    Height as of this encounter: 162.6 cm (64\").    Weight as of this encounter: 79.4 kg (175 lb).           Does the patient have evidence of cognitive impairment? No  MMSE done     Lab Results   Component Value Date    HGBA1C 8.82 (H) 2024        HEALTH RISK ASSESSMENT    Smoking Status:  Social History     Tobacco Use   Smoking Status Former    Current packs/day: 0.00    Average packs/day: 2.0 packs/day for 36.0 years (72.0 ttl pk-yrs)    Types: Cigarettes    Start date: 1972    Quit date: 2008    Years since quittin.5    Passive exposure: Past   Smokeless Tobacco Never     Alcohol Consumption:  Social History     Substance and Sexual Activity   Alcohol Use No     Fall Risk Screen:    STEADI Fall Risk Assessment was completed, and patient is at MODERATE risk for falls. " Assessment completed on:2024    Depression Screenin/23/2024     3:21 PM   PHQ-2/PHQ-9 Depression Screening   Little Interest or Pleasure in Doing Things 0-->not at all   Feeling Down, Depressed or Hopeless 0-->not at all   PHQ-9: Brief Depression Severity Measure Score 0       Health Habits and Functional and Cognitive Screenin/16/2024     8:40 AM   Functional & Cognitive Status   Do you have difficulty preparing food and eating? No   Do you have difficulty bathing yourself, getting dressed or grooming yourself? No   Do you have difficulty using the toilet? No   Do you have difficulty moving around from place to place? No   Do you have trouble with steps or getting out of a bed or a chair? No   Current Diet Other   Dental Exam Up to date   Eye Exam Up to date   Exercise (times per week) 1 times per week   Current Exercises Include No Regular Exercise;House Cleaning   Do you need help using the phone?  No   Are you deaf or do you have serious difficulty hearing?  Yes   Do you need help to go to places out of walking distance? No   Do you need help shopping? No   Do you need help preparing meals?  No   Do you need help with housework?  No   Do you need help with laundry? No   Do you need help taking your medications? No   Do you need help managing money? No   Do you ever drive or ride in a car without wearing a seat belt? No   Have you felt unusual stress, anger or loneliness in the last month? No   Who do you live with? Spouse   If you need help, do you have trouble finding someone available to you? No   Have you been bothered in the last four weeks by sexual problems? No   Do you have difficulty concentrating, remembering or making decisions? No       Age-appropriate Screening Schedule:  Refer to the list below for future screening recommendations based on patient's age, sex and/or medical conditions. Orders for these recommended tests are listed in the plan section. The patient has been  "provided with a written plan.    Health Maintenance   Topic Date Due    ZOSTER VACCINE (1 of 2) Never done    DIABETIC FOOT EXAM  05/18/2022    COVID-19 Vaccine (7 - 2023-24 season) 11/17/2023    LIPID PANEL  03/14/2024    INFLUENZA VACCINE  08/01/2024    HEMOGLOBIN A1C  01/09/2025    BMI FOLLOWUP  02/07/2025    DIABETIC EYE EXAM  03/08/2025    URINE MICROALBUMIN  04/01/2025    ANNUAL WELLNESS VISIT  07/23/2025    DXA SCAN  12/13/2025    COLORECTAL CANCER SCREENING  11/16/2026    TDAP/TD VACCINES (2 - Td or Tdap) 07/26/2032    HEPATITIS C SCREENING  Completed    Pneumococcal Vaccine 65+  Completed    PAP SMEAR  Discontinued    LUNG CANCER SCREENING  Discontinued                  CMS Preventative Services Quick Reference  Risk Factors Identified During Encounter  Immunizations Discussed/Encouraged: Influenza  The above risks/problems have been discussed with the patient.  Pertinent information has been shared with the patient in the After Visit Summary.  An After Visit Summary and PPPS were made available to the patient.    Follow Up:   Next Medicare Wellness visit to be scheduled in 1 year.       Additional E&M Note during same encounter follows:  Patient has multiple medical problems which are significant and separately identifiable that require additional work above and beyond the Medicare Wellness Visit.      Chief Complaint  Medicare Wellness-Initial Visit    Subjective        HPI  Susy LUCIEN Hanson is also being seen today for follow up on bp and chol  Endo follows DM  Left hip pain  Chronic  Worse with activity  Trying to be very active but this is limiting her some    Review of Systems   Constitutional: Negative.    Respiratory: Negative.     Cardiovascular: Negative.    Neurological: Negative.        Objective   Vital Signs:  /78   Pulse 71   Temp 98 °F (36.7 °C) (Temporal)   Ht 162.6 cm (64\")   Wt 79.4 kg (175 lb)   SpO2 98%   BMI 30.04 kg/m²     Physical Exam  Vitals and nursing note reviewed. "   Constitutional:       Appearance: Normal appearance. She is well-developed and well-groomed. She is obese.   Cardiovascular:      Rate and Rhythm: Normal rate and regular rhythm.      Heart sounds: Normal heart sounds.   Pulmonary:      Effort: Pulmonary effort is normal.      Breath sounds: Normal breath sounds.   Musculoskeletal:      Right lower leg: No edema.      Left lower leg: No edema.   Neurological:      Mental Status: She is alert and oriented to person, place, and time.   Psychiatric:         Mood and Affect: Mood normal.                    Lab Results   Component Value Date    GLUCOSE 223 (H) 07/09/2024    BUN 37 (H) 07/09/2024    CREATININE 1.45 (H) 07/09/2024    EGFR 40.1 (L) 07/09/2024    BCR 25.5 (H) 07/09/2024    K 5.3 (H) 07/09/2024    CO2 21.6 (L) 07/09/2024    CALCIUM 9.8 07/09/2024    ALBUMIN 4.5 07/09/2024    BILITOT 0.3 07/09/2024    AST 11 07/09/2024    ALT 22 07/09/2024          Assessment and Plan   Diagnoses and all orders for this visit:    1. Encounter for annual wellness exam in Medicare patient (Primary)    2. Mixed hyperlipidemia  -     Lipid Panel; Future    3. Essential hypertension    4. Left hip pain  -     Ambulatory Referral to Orthopedic Surgery    She will keep follow up with heme/onc and endo  Recent labs reviewed  BP is well controlled and she will continue current meds  Lipid panel ordered to follow up on chol  Counseled on the need for weight loss and a flu shot this Fall  Will refer to ortho for eval of her left hip pain  Will see her back in one year           Follow Up   No follow-ups on file.  Patient was given instructions and counseling regarding her condition or for health maintenance advice. Please see specific information pulled into the AVS if appropriate.

## 2024-07-27 RX ORDER — BLOOD SUGAR DIAGNOSTIC
1 STRIP MISCELLANEOUS DAILY
Qty: 100 EACH | Refills: 1 | Status: SHIPPED | OUTPATIENT
Start: 2024-07-27

## 2024-07-29 DIAGNOSIS — I10 ESSENTIAL (PRIMARY) HYPERTENSION: ICD-10-CM

## 2024-07-29 DIAGNOSIS — Z95.810 PRESENCE OF AUTOMATIC (IMPLANTABLE) CARDIAC DEFIBRILLATOR: ICD-10-CM

## 2024-07-29 DIAGNOSIS — F41.8 MIXED ANXIETY DEPRESSIVE DISORDER: ICD-10-CM

## 2024-07-29 RX ORDER — HYDRALAZINE HYDROCHLORIDE 50 MG/1
50 TABLET, FILM COATED ORAL 3 TIMES DAILY
Qty: 270 TABLET | Refills: 1 | Status: SHIPPED | OUTPATIENT
Start: 2024-07-29

## 2024-07-30 ENCOUNTER — TELEPHONE (OUTPATIENT)
Dept: ENDOCRINOLOGY | Facility: CLINIC | Age: 64
End: 2024-07-30
Payer: MEDICARE

## 2024-07-30 ENCOUNTER — OFFICE VISIT (OUTPATIENT)
Dept: SLEEP MEDICINE | Facility: CLINIC | Age: 64
End: 2024-07-30
Payer: MEDICARE

## 2024-07-30 VITALS
HEIGHT: 64 IN | DIASTOLIC BLOOD PRESSURE: 74 MMHG | WEIGHT: 175 LBS | OXYGEN SATURATION: 97 % | BODY MASS INDEX: 29.88 KG/M2 | SYSTOLIC BLOOD PRESSURE: 154 MMHG | HEART RATE: 72 BPM

## 2024-07-30 DIAGNOSIS — R06.02 SOB (SHORTNESS OF BREATH): ICD-10-CM

## 2024-07-30 DIAGNOSIS — E66.09 CLASS 1 OBESITY DUE TO EXCESS CALORIES WITHOUT SERIOUS COMORBIDITY WITH BODY MASS INDEX (BMI) OF 30.0 TO 30.9 IN ADULT: ICD-10-CM

## 2024-07-30 DIAGNOSIS — G47.33 OSA ON CPAP: Primary | ICD-10-CM

## 2024-07-30 PROCEDURE — G0463 HOSPITAL OUTPT CLINIC VISIT: HCPCS

## 2024-07-30 RX ORDER — AZELASTINE HYDROCHLORIDE 137 UG/1
2 SPRAY, METERED NASAL DAILY
Qty: 90 ML | Refills: 1 | Status: SHIPPED | OUTPATIENT
Start: 2024-07-30

## 2024-07-30 RX ORDER — PANTOPRAZOLE SODIUM 40 MG/1
40 TABLET, DELAYED RELEASE ORAL DAILY
Qty: 90 TABLET | Refills: 3 | Status: SHIPPED | OUTPATIENT
Start: 2024-07-30

## 2024-07-30 RX ORDER — ALPRAZOLAM 1 MG/1
1 TABLET ORAL 2 TIMES DAILY
Qty: 60 TABLET | Refills: 0 | Status: SHIPPED | OUTPATIENT
Start: 2024-07-30

## 2024-07-30 NOTE — PROGRESS NOTES
[facility]  [facility], [address]  [place] [address]  Phone   Fax       SLEEP CLINIC FOLLOW UP PROGRESS NOTE.    Susy Hanson  [id number]   1960  64 y.o.  female      PCP: Erica Avila MD      Date of visit: 7/30/2024    Chief Complaint   Patient presents with    Follow-up    Sleep Apnea       HPI:  This is a 64 y.o. years old patient is here for the management of obstructive sleep apnea.  Sleep apnea is mild in severity with a AHI of 11/hr. Patient is using positive airway pressure therapy with auto CPAP and the symptoms of sleep apnea have improved significantly on the therapy. Normally patient goes to bed at 9 PM and wakes up at 7 AM .  The patient wakes up 3 time(s) during the night and has no problem going back to sleep.  Feels refreshed after waking up.     Since her last visit she had developed breast cancer and underwent bilateral mastectomy.  She also has been diagnosed with cardiomyopathy with acute heart failure and has a defibrillator.  She is being followed by Dr. Murphy.  She states that her ejection fraction has improved significantly and she uses the CPAP on a regular basis now feels well rested.  In addition she is retired from [employer] and enjoying her halfway.    She also reports that she was a smoker in the past and now having significant shortness of breath.  She never had a evaluation for COPD with PFT.  I have encouraged her to get a pulmonary consult and get a PFT    Medications and allergies are reviewed by me and documented in the encounter.     SOCIAL (habits pertaining to sleep medicine)  History tobacco use:No   History of alcohol use: 0 per week  Caffeine use: 1     REVIEW OF SYSTEMS:   Pertaining positive symptoms are:  Phenix Sleepiness Scale :Total score: 13   Acid reflux      PHYSICAL EXAMINATION:  CONSTITUTIONAL:  Vitals:    07/30/24 0900   BP: 154/74   BP Location: Right arm   Patient Position: Sitting  "  Pulse: 72   SpO2: 97%   Weight: 79.4 kg (175 lb)   Height: 162.6 cm (64\")    Body mass index is 30.04 kg/m².   NOSE: nasal passages are clear, No deformities noted   RESP SYSTEM: Not in any respiratory distress, no chest deformities noted,   CARDIOVASULAR: No edema noted  NEURO: Oriented x 3, gait normal,  Mood and affect appeared appropriate      Data reviewed:  The Smart card downloaded on 7/30/2024 has been reviewed independently by me for compliance and discussed the data with the patient.   Compliance; 100%  More than 4 hr use, 100%  She has 2 CPAP  Average use of the device 7 hours per night  Residual AHI: 1.7/hr (goal < 5.0 /hr)  Mask type: Fullface mask  Device: DreamStation 2  DME: [facility]      ASSESSMENT AND PLAN:  Obstructive sleep apnea ( G 47.33).  The symptoms of sleep apnea have improved with the device and the treatment.  Patient's compliance with the device is excellent for treatment of sleep apnea.  I have independently reviewed the smart card down load and discussed with the patient the download data and encouarged the patient to continue to use the device.The residual AHI is acceptable. The device is benefiting the patient and the device is medically necessary.  Without proper control of sleep apnea and good compliance there is a increased risk for hypertension, diabetes mellitus and nonrestorative sleep with hypersomnia which can increase risk for motor vehicle accidents.  Untreated sleep apnea is also a risk factor for development of atrial fibrillation, pulmonary hypertension, insulin resistance and stroke. The patient is also instructed to get the supplies from the "[employer] and and change them on a regular basis.  A prescription for supplies has been sent to the "[employer].  I have also discussed the good sleep hygiene habits and adequate amount of sleep needed for good health.  Cardiomyopathy/heart failure  History of breast cancer  Possible COPD.  Referred to pulmonary for " further evaluation  Return in about 1 year (around 7/30/2025) for with smart card down load. . Patient's questions were answered.    7/30/2024  Sam Quintana MD  Sleep Medicine.  Medical Director,   [facility], [place] and [place] [facility].

## 2024-07-30 NOTE — TELEPHONE ENCOUNTER
Pt called and asked if dr Helton had looked at her labs because she is currently taking 54 units of lantus and a few months ago she was only taking 8 units and her A1C has increased. Pt thinks medication needs to be changed. Pt does get Lantus through patient assistance. I told her I would send another msg to provider pt stated understanding

## 2024-07-31 ENCOUNTER — SPECIALTY PHARMACY (OUTPATIENT)
Dept: ENDOCRINOLOGY | Facility: CLINIC | Age: 64
End: 2024-07-31
Payer: MEDICARE

## 2024-07-31 ENCOUNTER — OFFICE VISIT (OUTPATIENT)
Dept: ORTHOPEDIC SURGERY | Facility: CLINIC | Age: 64
End: 2024-07-31
Payer: MEDICARE

## 2024-07-31 VITALS — HEIGHT: 64 IN | WEIGHT: 175 LBS | HEART RATE: 68 BPM | OXYGEN SATURATION: 97 % | BODY MASS INDEX: 29.88 KG/M2

## 2024-07-31 DIAGNOSIS — E11.65 TYPE 2 DIABETES MELLITUS WITH HYPERGLYCEMIA, WITH LONG-TERM CURRENT USE OF INSULIN: ICD-10-CM

## 2024-07-31 DIAGNOSIS — M25.552 LEFT HIP PAIN: ICD-10-CM

## 2024-07-31 DIAGNOSIS — Z79.4 TYPE 2 DIABETES MELLITUS WITH HYPERGLYCEMIA, WITH LONG-TERM CURRENT USE OF INSULIN: ICD-10-CM

## 2024-07-31 DIAGNOSIS — M25.552 GREATER TROCHANTERIC PAIN SYNDROME OF LEFT LOWER EXTREMITY: Primary | ICD-10-CM

## 2024-07-31 RX ORDER — BLOOD-GLUCOSE SENSOR
1 EACH MISCELLANEOUS
Qty: 2 EACH | Refills: 5 | Status: CANCELLED | OUTPATIENT
Start: 2024-07-31

## 2024-07-31 RX ORDER — LIDOCAINE HYDROCHLORIDE 10 MG/ML
4 INJECTION, SOLUTION EPIDURAL; INFILTRATION; INTRACAUDAL; PERINEURAL
Status: COMPLETED | OUTPATIENT
Start: 2024-07-31 | End: 2024-07-31

## 2024-07-31 RX ORDER — TRIAMCINOLONE ACETONIDE 40 MG/ML
40 INJECTION, SUSPENSION INTRA-ARTICULAR; INTRAMUSCULAR
Status: COMPLETED | OUTPATIENT
Start: 2024-07-31 | End: 2024-07-31

## 2024-07-31 RX ADMIN — LIDOCAINE HYDROCHLORIDE 4 ML: 10 INJECTION, SOLUTION EPIDURAL; INFILTRATION; INTRACAUDAL; PERINEURAL at 13:02

## 2024-07-31 RX ADMIN — TRIAMCINOLONE ACETONIDE 40 MG: 40 INJECTION, SUSPENSION INTRA-ARTICULAR; INTRAMUSCULAR at 13:02

## 2024-07-31 NOTE — PROGRESS NOTES
Specialty Pharmacy Patient Management Program  Program Disenrollment      Susy Hanson is a 64 y.o. female seen by an Endocrinology provider for Type 2 Diabetes. Patient was previously enrolled in the Endocrinology Patient Management program offered by [facility].      Disenrolling patient today as she does not fill target therapy with [facility] at this time.  Pt is welcome and encouraged to continue filling prescriptions with [facility] and is welcome back to  if eligible.      Danilo Salas, PharmD, MPH   7/31/2024  12:18 EDT

## 2024-07-31 NOTE — PROGRESS NOTES
Specialty Pharmacy Patient Management Program  Endocrinology Initial Assessment     Susy Hanson was referred by an Endocrinology provider to the Endocrinology Patient Management program offered by [facility] for Type 2 Diabetes on 07/31/24.  An initial outreach was conducted, including assessment of therapy appropriateness and specialty medication education for Insulin Glargine. The patient was introduced to services offered by [facility], including: regular assessments, refill coordination, curbside pick-up or mail order delivery options, prior authorization maintenance, and financial assistance programs as applicable. The patient was also provided with contact information for the pharmacy team.     Insurance Coverage & Financial Support  Med D - Med through PAP     Relevant Past Medical History and Comorbidities  Relevant medical history and concomitant health conditions were discussed with the patient. The patient's chart has been reviewed for relevant past medical history and comorbid conditions and updated as necessary.  Past Medical History:   Diagnosis Date    Allergic 2008    Weeds, grasses, mold, mildew, etc.    Anxiety 2000    Breast cancer     LEFT BREAST 7/2021    Cataract     Had surgery to remove August 2023    CHF (congestive heart failure)     Colon polyp 12/2023    Removed during colonoscopy    Coronary artery disease 2019    CTS (carpal tunnel syndrome) 2015    Surgery on both hands    Depression     DM (diabetes mellitus)     History of medical problems 2008    Bladder prolapse repeat Dr. Roper    HL (hearing loss)     Hyperlipidemia     Hypertension     SAW CARDIO/ STRESS TEST 2020 - NOW MEDS MANAGED BY PCP     Irritable bowel syndrome 2006    Peripheral neuropathy     PONV (postoperative nausea and vomiting)     Renal insufficiency 2020    See Dr. funes for moderate kidney failure    Sleep apnea     WEARS CPAP    Type 2 diabetes mellitus      Urinary tract infection     UTI seen at [facility]     Social History     Socioeconomic History    Marital status:    Tobacco Use    Smoking status: Former     Current packs/day: 0.00     Average packs/day: 2.0 packs/day for 36.0 years (72.0 ttl pk-yrs)     Types: Cigarettes     Start date: 1972     Quit date: 2008     Years since quittin.5     Passive exposure: Past    Smokeless tobacco: Never   Vaping Use    Vaping status: Never Used   Substance and Sexual Activity    Alcohol use: No    Drug use: No    Sexual activity: Yes     Partners: Male     Birth control/protection: None, Tubal ligation, Hysterectomy       Problem list reviewed by Danilo Salas, PharmD on 2024 at  1:10 PM    Allergies  Known allergies and reactions were discussed with the patient. The patient's chart has been reviewed for  allergy information and updated as necessary.   Allergies   Allergen Reactions    Hydrocodone-Acetaminophen Hives     Lortab, takes tylenol       Allergies reviewed by Danilo Salas, PharmD on 2024 at  1:09 PM    Relevant Laboratory Values  Relevant laboratory values were discussed with the patient. The following specialty medication dose adjustment(s) are recommended: N/A - PAP - [employer] (Lantus) - Enrolled    A1C Last 3 Results          2024    09:10 2024    08:43 2024    09:23   HGBA1C Last 3 Results   Hemoglobin A1C 7.90  7.60  8.82      Lab Results   Component Value Date    HGBA1C 8.82 (H) 2024     Lab Results   Component Value Date    GLUCOSE 223 (H) 2024    CALCIUM 9.8 2024     2024    K 5.3 (H) 2024    CO2 21.6 (L) 2024     2024    BUN 37 (H) 2024    CREATININE 1.45 (H) 2024    EGFRIFAFRI >60 10/13/2017    EGFRIFNONA 57 (L) 2022    BCR 25.5 (H) 2024    ANIONGAP 14.4 2024     Lab Results   Component Value Date    CHOL 140 2023    TRIG 153 (H) 2023    HDL 50  03/14/2023    LDL 64 03/14/2023         Current Medication List  This medication list has been reviewed with the patient and evaluated for any interactions or necessary modifications/recommendations, and updated to include all prescription medications, OTC medications, and supplements the patient is currently taking.  This list reflects what is contained in the patient's profile, which has also been marked as reviewed to communicate to other providers it is the most up to date version of the patient's current medication therapy.     Current Outpatient Medications:     Accu-Chek FastClix Lancets misc, USE TO CHECK BLOOD GLUCOSE DAILY, Disp: , Rfl:     Accu-Chek Guide test strip, 1 each by Other route Daily. Use daily to check BS E11.9, Disp: 100 each, Rfl: 1    ALPRAZolam (XANAX) 1 MG tablet, Take 1 tablet by mouth 2 (Two) Times a Day., Disp: 60 tablet, Rfl: 0    atorvastatin (LIPITOR) 20 MG tablet, Take 1 tablet by mouth Daily., Disp: 90 tablet, Rfl: 1    Azelastine HCl 137 MCG/SPRAY solution, 2 sprays by Each Nare route Daily., Disp: 90 mL, Rfl: 1    cetirizine (zyrTEC) 10 MG tablet, Take 1 tablet by mouth Daily As Needed., Disp: , Rfl:     Continuous Blood Gluc  (FreeStyle [place] 3 [place]) device, Use 1 each 4 (Four) Times a Day Before Meals & at Bedtime., Disp: 1 each, Rfl: 0    Continuous Glucose Sensor (FreeStyle Benton 3 Sensor) misc, Use 1 each Every 14 (Fourteen) Days., Disp: 2 each, Rfl: 5    dapagliflozin Propanediol (Farxiga) 10 MG tablet, Take 10 mg by mouth Daily., Disp: 90 tablet, Rfl: 3    dilTIAZem CD (Cardizem CD) 180 MG 24 hr capsule, Take 1 capsule by mouth Daily., Disp: 90 capsule, Rfl: 1    furosemide (Lasix) 20 MG tablet, Take 1 tablet by mouth Daily. (Patient taking differently: Take 1 tablet by mouth Every Other Day.), Disp: 45 tablet, Rfl: 1    glimepiride (Amaryl) 2 MG tablet, Take 1 tablet by mouth Every Morning., Disp: 30 tablet, Rfl: 11    hydrALAZINE (APRESOLINE) 50 MG tablet,  TAKE 1 TABLET BY MOUTH THREE TIMES A DAY, Disp: 270 tablet, Rfl: 1    Insulin Glargine (Lantus SoloStar) 100 UNIT/ML injection pen, Inject 54 Units under the skin into the appropriate area as directed Every Night., Disp: 30 mL, Rfl: 3    Insulin Pen Needle (Pen Needles) 32G X 4 MM misc, Use 1 each 4 (Four) Times a Day Before Meals & at Bedtime., Disp: 200 each, Rfl: 5    metoprolol succinate XL (TOPROL-XL) 100 MG 24 hr tablet, Take 1 tablet by mouth 2 (Two) Times a Day., Disp: 180 tablet, Rfl: 1    ondansetron (Zofran) 4 MG tablet, Take 1 tablet by mouth Every 8 (Eight) Hours As Needed for Nausea or Vomiting., Disp: 20 tablet, Rfl: 0    pantoprazole (PROTONIX) 40 MG EC tablet, Take 1 tablet by mouth Daily., Disp: 90 tablet, Rfl: 3    Triamcinolone Acetonide (NASACORT) 55 MCG/ACT nasal inhaler, APPLY 2 SPRAYS BY NASAL ROUTE DAILY FOR 30 DAYS (Patient taking differently: 2 sprays into the nostril(s) as directed by provider Daily As Needed.), Disp: 10.8 mL, Rfl: 6    Medicines reviewed by Danilo Salas, PharmD on 7/31/2024 at  1:10 PM    Drug Interactions  No Clinically Significant DDIs Were Identified at Present Time Upon Marking Medications Reviewed    Recommended Medications Assessment  Aspirin: Not Taking Currently  Statin: Currently Taking   ACEi/ARB: Not Taking Currently    Initial Education Provided for Specialty Medication  The patient has been provided with the following education and any applicable administration techniques (i.e. self-injection) have been demonstrated for the therapies indicated. All questions and concerns have been addressed prior to the patient receiving the medication, and the patient has verbalized comprehension of the education and any materials provided. Additional patient education shall be provided and documented upon request by the patient, provider, or payer.    insulin glargine  Medication Expectations    Why am I taking this medication?  You are taking this medication to lower  blood sugar and A1c because you have type 1 or type 2 diabetes. Diabetes is not curable but with proper medication and treatment, we can keep your blood sugar within your personalized target range.    What should I expect while on this medication?  You should expect to see your blood sugar and A1c decrease over time.    How does the medication work?  Insulin glargine (Basaglar, Lantus, Semglee, etc.) is a long-acting insulin that releases slowly and continuously in the body. Insulin helps the sugar in your blood get into cells and provide them with energy to fuel your body.     How long will I be on this medication for?  If you have Type 1 diabetes, you will be on this medication or another insulin throughout your lifetime because your body cannot make its own insulin. If you have Type 2 diabetes, the amount of time you will be on this medication will be determined by your doctor based on blood sugar and A1c control. You will most likely be on this medication or another diabetes medication throughout your lifetime because your body does not make enough insulin. Do not abruptly stop this medication without talking to your doctor first.     How do I take this medication?  Take as directed on your prescription label. Insulin glargine is usually given once or twice daily and can be taken with or without food. Insulin glargine  is injected under the skin (subcutaneously) of your stomach, thigh, buttocks, or upper arm. Use a different injection site in the same body region each day.     What are some possible side effects?  Insulin glargine  may cause low blood sugar (hypoglycemia). Signs and symptoms that may indicate hypoglycemia include dizziness, sweating, anxiety, irritability, confusion, or headache.    What happens if I miss a dose?  If you miss a dose, take it as soon as you remember. If it is close to your next dose, skip it. Never take 2 doses at once.       Medication Safety    What are things I should warn my  doctor immediately about?  Tell your doctor if you have kidney or liver problems. Talk to your doctor if you are pregnant, planning to become pregnant, or breastfeeding. Also tell your doctor if you notice any signs/symptoms of an allergic reaction (rash, hives, difficulty breathing, etc.).    What are things that I should be cautious of?  Be cautious of any side effects from this medication. Talk to your doctor if any new ones develop or aren't getting better.    What are some medications that can interact with this one?  Do not take this medication with rosiglitazone or macimorelin. Taking insulin glargine with other medications that lower your blood sugar may increase the risk of hypoglycemia. Your doctor may reduce the dose of these medications when you start insulin glargine  Always tell your doctor or pharmacist immediately if you start taking any new medications, including over-the-counter medications, vitamins, and herbal supplements.        Medication Storage/Handling    How should I handle this medication?  Keep this medication out of reach of pets/children and keep the pen capped when not in use. Do not share your medicine with others.     How does this medication need to be stored?  Store your new, unused pens in the original carton in the refrigerator. Protect it from light. Do not freeze. Always remove the needle and place the cap on the pen before storing.     How should I dispose of this medication?  Used insulin glargine  pens should be thrown away after 28 days even if insulin remains.?Place your used pen and needle in an approved sharps container?If your doctor decides to stop this medication, take to your local police station for proper disposal. Some pharmacies also have?take-back bins for medication drop-off.??       Resources/Support    How can I remind myself to take this medication?  You can download reminder apps to help you manage your refills or set an alarm on your phone to remind you.      Is financial support available?   Manufacturers of insulin glargine  can provide co-pay cards if you have commercial insurance or patient assistance if you have Medicare or no insurance.     Which vaccines are recommended for me?  Talk to your doctor about these vaccines: Flu, Coronavirus (COVID-19), Pneumococcal (pneumonia), Tdap, Hepatitis B, Zoster (shingles)            Adherence and Self-Administration  Adherence related to the patient's specialty therapy was discussed with the patient. The Adherence segment of this outreach has been reviewed and updated.     Is there a concern with patient's ability to self administer the medication correctly and without issue?: No  Were any potential barriers to adherence identified during the initial assessment or patient education?: No  Are there any concerns regarding the patient's understanding of the importance of medication adherence?: No  Methods for Supporting Patient Adherence and/or Self-Administration: Continued  enrollment    Open Medication Therapy Problems  No medication therapy recommendations to display    Goals of Therapy  Goals related to the patient's specialty therapy were discussed with the patient. The Patient Goals segment of this outreach has been reviewed and updated.   Goals Addressed Today        Specialty Pharmacy General Goal      Decrease A1c to <7%      Lab Results   Component Value Date    HGBA1C 8.82 (H) 07/09/2024    HGBA1C 7.60 (H) 04/01/2024    HGBA1C 7.90 (H) 01/25/2024    HGBA1C 7.10 (H) 12/28/2023    HGBA1C 7.20 (H) 12/20/2023                     Reassessment Plan & Follow-Up  1. Medication Therapy Changes: PAP - [employer] (Lantus) - Enrolled  2. Related Plans, Therapy Recommendations, or Therapy Problems to Be Addressed: None  3. Pharmacist to perform regular assessments no more than (6) months from the previous assessment.  4. Care Coordinator to set up future refill outreaches, coordinate prescription delivery, and escalate clinical  questions to pharmacist.  5. Welcome information and patient satisfaction survey to be sent by specialty pharmacy team with patient's initial fill.    Attestation  Therapeutic appropriateness: Appropriate   I attest the patient was actively involved in and has agreed to the above plan of care. If the prescribed therapy is at any point deemed not appropriate based on the current or future assessments, a consultation will be initiated with the patient's specialty care provider to determine the best course of action. The revised plan of therapy will be documented along with any required assessments and/or additional patient education provided.       Danilo Salas, PharmD, MPH  Clinical Specialty Pharmacist, Endocrinology  7/31/2024  13:12 EDT

## 2024-07-31 NOTE — PROGRESS NOTES
NEW VISIT    Patient: Susy Hanson  ?  YOB: 1960    MRN: [id number]  ?  Chief Complaint   Patient presents with    Left Hip - Pain, Initial Evaluation     NKI, pain for 2-3 months       ?  HPI: Patient is a 64-year-old female presenting today for acute on chronic left hip pain.  She states symptoms have been ongoing for the last few months, acutely worsening after recent [place] with [place] for which they [place], and had increased walking, hiking and standing.  She states the pain is focal over the lateral aspect of the left hip near trochanteric region, is worse with any direct pressure is lying on the affected side, or increase standing/walking.  She denies any groin pain.  She denies any locking, catching or popping.  She denies any numbness or tingling.  She denies low back pain.  She has been getting massages every 2 weeks including over the affected area with mild improvement.  Other treatment includes activity modification, and occasional ice.  Is unable to take oral NSAIDs due to cardiovascular and renal disease.  She is insulin-dependent diabetic followed by endocrinology, with recent A1c in the eights.  States her recent blood sugar has been average 180-190.  Has continuous glucose monitor, and treatment with insulin    Allergies:   Allergies   Allergen Reactions    Hydrocodone-Acetaminophen Hives     Lortab, takes tylenol       Past Medical History:   Diagnosis Date    Allergic 2008    Weeds, grasses, mold, mildew, etc.    Anxiety 2000    Breast cancer     LEFT BREAST 7/2021    Cataract     Had surgery to remove August 2023    CHF (congestive heart failure)     Colon polyp 12/2023    Removed during colonoscopy    Coronary artery disease 2019    CTS (carpal tunnel syndrome) 2015    Surgery on both hands    Depression     DM (diabetes mellitus)     History of medical problems 2008    Bladder prolapse repeat Dr. Roper    HL (hearing loss)      Hyperlipidemia     Hypertension     SAW CARDIO/ STRESS TEST 2020 - NOW MEDS MANAGED BY PCP     Irritable bowel syndrome 2006    Peripheral neuropathy     PONV (postoperative nausea and vomiting)     Renal insufficiency 2020    See Dr. funes for moderate kidney failure    Sleep apnea     WEARS CPAP    Type 2 diabetes mellitus 2008    Urinary tract infection 6/23    UTI seen at [facility]     Past Surgical History:   Procedure Laterality Date    BLADDER REPAIR      BREAST BIOPSY      BREAST RECONSTRUCTION Bilateral 08/26/2021    Procedure: BILATERAL PREPECTORALPLACMENT TISSUE EXPANDERS AND ALLODERM;  Surgeon: Heri Arreaga MD;  Location: [facility] MAIN OR;  Service: Plastics;  Laterality: Bilateral;    BREAST TISSUE EXPANDER REMOVAL INSERTION OF IMPLANT Bilateral 12/23/2021    Procedure: BILATERAL REMOVAL TISSUE EXPANDERS AND PLACEMENT OF IMPLANTS;  Surgeon: Heri Arreaga MD;  Location: [facility] MAIN OR;  Service: Plastics;  Laterality: Bilateral;    CARDIAC CATHETERIZATION Left 04/13/2022    Procedure: Cardiac Catheterization/Vascular Study;  Surgeon: Christo Murphy MD;  Location:  [facility];  Service: Cardiovascular;  Laterality: Left;    CARDIAC ELECTROPHYSIOLOGY PROCEDURE N/A 09/19/2022    Procedure: ICD implant [place] aware;  Surgeon: Esteban Kendrick MD;  Location:  [facility];  Service: Cardiology;  Laterality: N/A;    CARDIAC ELECTROPHYSIOLOGY PROCEDURE N/A 09/20/2022    Procedure: lead repositioning;  Surgeon: Esteban Kendrick MD;  Location:  [facility];  Service: Cardiology;  Laterality: N/A;    CARDIAC SURGERY  2022    Defibrillator/pacemaker implant    CARDIOVASCULAR STRESS TEST  2020    COLONOSCOPY  11/2012    EYE SURGERY  7/23    Cataracts removed    HAND SURGERY  2015    Both hands    HYSTERECTOMY      INSERT / REPLACE / REMOVE PACEMAKER  2022    LYMPH NODE BIOPSY  8/29/2021    MASTECTOMY W/ SENTINEL NODE BIOPSY Bilateral  2021    Procedure: bilateral total mastectomy, left axillary sentinel lymph node biopsy, possible reconstruction.;  Surgeon: Asia Perkins MD;  Location: [facility] OR;  Service: General;  Laterality: Bilateral;    TRIGGER POINT INJECTION      Left thumb    TUBAL ABDOMINAL LIGATION       Social History     Occupational History    Not on file   Tobacco Use    Smoking status: Former     Current packs/day: 0.00     Average packs/day: 2.0 packs/day for 36.0 years (72.0 ttl pk-yrs)     Types: Cigarettes     Start date: 1972     Quit date: 2008     Years since quittin.5     Passive exposure: Past    Smokeless tobacco: Never   Vaping Use    Vaping status: Never Used   Substance and Sexual Activity    Alcohol use: No    Drug use: No    Sexual activity: Yes     Partners: Male     Birth control/protection: None, Tubal ligation, Hysterectomy      Social History     Social History Narrative    Not on file     Family History   Problem Relation Age of Onset    Diabetes Mother     Heart disease Mother     Sleep apnea Mother     Snoring Mother     COPD Mother     Hyperlipidemia Mother     Alcohol abuse Mother     Anxiety disorder Mother     Depression Mother     Hypertension Mother     Alzheimer's disease Father     Diabetes Father     Heart disease Father     Sleep apnea Father     Snoring Father     Hyperlipidemia Father     Heart failure Father     Kidney disease Father     Hypertension Father     Obesity Father     Diabetes Sister     Heart disease Sister     Sleep apnea Sister     Snoring Sister     COPD Sister     Anxiety disorder Sister     Depression Sister     Hypertension Sister     Hyperlipidemia Sister     Clotting disorder Sister     Hyperlipidemia Brother     Hypertension Brother     Breast cancer Maternal Aunt 80    Breast cancer Paternal Aunt     Diabetes Maternal Grandmother     Breast cancer Paternal Grandmother 60    Cancer Paternal Grandmother     Diabetes Paternal Grandmother   "   Hyperlipidemia Son     Diabetes Brother     Hypertension Brother     Malig Hyperthermia Neg Hx        Review of Systems  Constitutional: Negative.  Negative for fever.   Musculoskeletal: Positive for joint pain  Skin: Negative.  Negative for rash and wound.    Neurological: Negative for numbness.     Vitals:    07/31/24 1030   Pulse: 68   SpO2: 97%   Weight: 79.4 kg (175 lb)   Height: 162.6 cm (64\")        Physical Exam  Constitutional: Patient is oriented to person, place, and time. Appears well-developed and well-nourished.   Head: Normocephalic and atraumatic.   Pulmonary/Chest: Effort normal.   Musculoskeletal:   See detailed exam below   Neurological: Alert and oriented to person, place, and time. No sensory deficit. Coordination normal.   Skin: Skin is warm and dry. Capillary refill takes less than 2 seconds. No rash noted. No erythema.     The left hip and pelvis are without obvious signs of acute bony deformity, obliquity, swelling, erythema, ecchymosis or instability.  There is focal tenderness over the lateral trochanteric region including trochanteric bursa as well as glute tendon insertion.  No tenderness over anterior hip joint.  Active and passive range of motion are normal and painless. Log roll is negative. Straight leg raise test is positive for lateral hip pain  and resisted SLR is painless. REGI and FADIR are positive for lateral hip pain . Hip abduction strength is 4/5 and uncomfortable.  Trendelenburg is positive. The opposite hip is otherwise nontender and stable. Gait is uncomfortable and tandem.     Diagnostics:  xrays obtained today     Left Hip X-Ray  Indication: Pain  AP Pelvis; AP and Frog Leg dedicated hip views    Findings:  No fracture  No bony lesion  Normal soft tissues  Mild joint space narrowing of the left femoral acetabular joint, but overall joint space well-preserved    Assessment:  Diagnoses and all orders for this visit:    1. Greater trochanteric pain syndrome of left " lower extremity (Primary)  -     Ambulatory Referral to Physical Therapy for Evaluation & Treatment  -     Large Joint Arthrocentesis: L greater trochanteric bursa    2. Left hip pain  -     XR Hip With or Without Pelvis 2 - 3 View Left  -     Ambulatory Referral to Physical Therapy for Evaluation & Treatment  -     Large Joint Arthrocentesis: L greater trochanteric bursa    3. Type 2 diabetes mellitus with hyperglycemia, with long-term current use of insulin          Plan    Above diagnosis and plan discussed with the patient in office today.  I obtained and personally reviewed left hip x-rays negative for acute osseous abnormality, left hip joint overall well-preserved.  Clinically the patient with tenderness over trochanteric region at trochanteric bursa and glue tendon insertion.  Also mild tenderness over both proximal and mid substance IT band.  We discussed etiology of her symptoms are likely exacerbation of trochanteric bursitis, and gluteal weakness.  We discussed conservative management, and specifically elected to proceed with trochanteric bursa corticosteroid injection which was given as noted below without complication, as well as formal physical therapy for glutes, with core and pelvis stability.  Regarding corticosteroid injection patient's diabetes, I discussed risk of elevating her blood sugar especially acutely over the next 2 weeks, recent A1c of 8.8, with patient reported average blood sugars of 180-190.  I advised patient to closely monitor blood sugar and diet over the next 2 weeks, reporting any significantly elevated blood sugars to her PCP/endocrinologist.  Patient elected to proceed with corticosteroid injection despite discussion of risks and benefits, and agreed to closely monitor blood sugars.  In addition to injection and physical therapy, encouraged regular RICE treatment, activity modification, and continued soft tissue massage as needed to lateral posterior hip.  Will plan follow-up  in 3 months to monitor progress with conservative management, or sooner as needed  Rest, ice, compression, and elevation (RICE) therapy  Stretching and strengthening exercises  Follow up in 3-4 month(s) to monitor progress with ongoing physical therapy and trochanteric injection    Large Joint Arthrocentesis: L greater trochanteric bursa  Date/Time: 7/31/2024 1:02 PM  Consent given by: patient  Site marked: site marked  Timeout: Immediately prior to procedure a time out was called to verify the correct patient, procedure, equipment, support staff and site/side marked as required   Supporting Documentation  Indications: pain   Procedure Details  Location: hip - L greater trochanteric bursa  Preparation: Patient was prepped and draped in the usual sterile fashion  Needle size: 25 G  Approach: lateral  Medications administered: 4 mL lidocaine PF 1% 1 %; 40 mg triamcinolone acetonide 40 MG/ML  Patient tolerance: patient tolerated the procedure well with no immediate complications          Date of encounter: 07/31/2024   Raúl Bhandari DO    Electronically signed by Raúl Bhandari DO, 07/31/24, 10:30 AM EDT.    Disclaimer: Please note that areas of this note were completed with computer voice recognition software.  Quite often unanticipated grammatical, syntax, homophones, and other interpretive errors are inadvertently transcribed by the computer software. Please excuse any errors that have escaped final proofreading.

## 2024-08-01 RX ORDER — PEN NEEDLE, DIABETIC 30 GX3/16"
1 NEEDLE, DISPOSABLE MISCELLANEOUS
Qty: 200 EACH | Refills: 5 | Status: SHIPPED | OUTPATIENT
Start: 2024-08-01

## 2024-08-01 RX ORDER — BLOOD-GLUCOSE SENSOR
1 EACH MISCELLANEOUS
Qty: 2 EACH | Refills: 5 | Status: CANCELLED | OUTPATIENT
Start: 2024-08-01

## 2024-08-01 RX ORDER — BLOOD-GLUCOSE SENSOR
1 EACH MISCELLANEOUS
Qty: 6 EACH | Refills: 1 | Status: SHIPPED | OUTPATIENT
Start: 2024-08-01

## 2024-08-01 NOTE — TELEPHONE ENCOUNTER
Specialty Pharmacy Patient Management Program       Susy Hanson is a 64 y.o. female seen by an Endocrinology provider for Type 2 Diabetes and enrolled in the Endocrinology Patient Management program offered by [facility].      Requested Prescriptions     Pending Prescriptions Disp Refills    Continuous Glucose Sensor (FreeStyle [place] 3 Sensor) misc 6 each 1     Sig: Use 1 each Every 14 (Fourteen) Days.       Refill sent in to patient's pharmacy for above medication prescribed by Dr. Helton.   Last office visit 7/9/2024.  Next visit scheduled 10/3/2024.      Danilo Salas, PharmD, MPH  Clinical Specialty Pharmacist, Endocrinology  8/1/2024  09:26 EDT

## 2024-08-01 NOTE — TELEPHONE ENCOUNTER
Returned call regarding pt's recent steroid shot and out of sensors.     Pt stated she received steriod shot yesterday. She had to take sensor off and is out of sensors. Pt is concerned BG will increase d/t shots and BG has already been elevated per pt.     Per recent josefina report, pt has 9% very high, 50% high, 41% target range, 0% lows.     Pt taking glimepiride 2 mg in the morning, Lantus 54 units in the evening.     Rec for pt to increase Lantus to 60 units in the evening per MD standing order.     Answered pt questions regarding options of DM mgt. Reviewed recent MD office visit (7/9) with pt. Pt is scheduled for next pump preview class. Rule of 15 reviewed.     Informed pt sensors were sent into pharmacy.     Pt agreeable and voiced understanding.

## 2024-08-02 ENCOUNTER — PATIENT ROUNDING (BHMG ONLY) (OUTPATIENT)
Dept: ORTHOPEDIC SURGERY | Facility: CLINIC | Age: 64
End: 2024-08-02
Payer: MEDICARE

## 2024-08-04 DIAGNOSIS — Z79.4 TYPE 2 DIABETES MELLITUS WITH HYPERGLYCEMIA, WITH LONG-TERM CURRENT USE OF INSULIN: ICD-10-CM

## 2024-08-04 DIAGNOSIS — E11.65 TYPE 2 DIABETES MELLITUS WITH HYPERGLYCEMIA, WITH LONG-TERM CURRENT USE OF INSULIN: ICD-10-CM

## 2024-08-04 RX ORDER — INSULIN GLARGINE 100 [IU]/ML
65 INJECTION, SOLUTION SUBCUTANEOUS NIGHTLY
Qty: 45 ML | Refills: 2 | Status: SHIPPED | OUTPATIENT
Start: 2024-08-04

## 2024-08-04 NOTE — TELEPHONE ENCOUNTER
Please call patient and ask her to increase insulin Lantus to 60 units daily.  Reviewed CGM/freestyle josefina reports.  If she continues to have hypoglycemia then increase insulin Lantus to 65 units daily.

## 2024-08-05 ENCOUNTER — TRANSCRIBE ORDERS (OUTPATIENT)
Dept: ADMINISTRATIVE | Facility: HOSPITAL | Age: 64
End: 2024-08-05
Payer: MEDICARE

## 2024-08-05 ENCOUNTER — LAB (OUTPATIENT)
Dept: LAB | Facility: HOSPITAL | Age: 64
End: 2024-08-05
Payer: MEDICARE

## 2024-08-05 ENCOUNTER — TREATMENT (OUTPATIENT)
Dept: PHYSICAL THERAPY | Facility: CLINIC | Age: 64
End: 2024-08-05
Payer: MEDICARE

## 2024-08-05 DIAGNOSIS — M70.62 GREATER TROCHANTERIC BURSITIS OF LEFT HIP: ICD-10-CM

## 2024-08-05 DIAGNOSIS — N18.32 CHRONIC KIDNEY DISEASE (CKD) STAGE G3B/A1, MODERATELY DECREASED GLOMERULAR FILTRATION RATE (GFR) BETWEEN 30-44 ML/MIN/1.73 SQUARE METER AND ALBUMINURIA CREATININE RATIO LESS THAN 30 MG/G (CMS/H*: ICD-10-CM

## 2024-08-05 DIAGNOSIS — E11.8 DIABETIC COMPLICATION: ICD-10-CM

## 2024-08-05 DIAGNOSIS — E78.2 MIXED HYPERLIPIDEMIA: ICD-10-CM

## 2024-08-05 DIAGNOSIS — M25.552 LEFT HIP PAIN: Primary | ICD-10-CM

## 2024-08-05 DIAGNOSIS — N18.32 CHRONIC KIDNEY DISEASE (CKD) STAGE G3B/A1, MODERATELY DECREASED GLOMERULAR FILTRATION RATE (GFR) BETWEEN 30-44 ML/MIN/1.73 SQUARE METER AND ALBUMINURIA CREATININE RATIO LESS THAN 30 MG/G (CMS/H*: Primary | ICD-10-CM

## 2024-08-05 LAB
25(OH)D3 SERPL-MCNC: 41.2 NG/ML (ref 30–100)
ALBUMIN SERPL-MCNC: 4.9 G/DL (ref 3.5–5.2)
ALBUMIN/GLOB SERPL: 1.7 G/DL
ALP SERPL-CCNC: 115 U/L (ref 39–117)
ALT SERPL W P-5'-P-CCNC: 16 U/L (ref 1–33)
ANION GAP SERPL CALCULATED.3IONS-SCNC: 11.6 MMOL/L (ref 5–15)
AST SERPL-CCNC: 13 U/L (ref 1–32)
BACTERIA UR QL AUTO: NORMAL /HPF
BASOPHILS # BLD AUTO: 0.08 10*3/MM3 (ref 0–0.2)
BASOPHILS NFR BLD AUTO: 1 % (ref 0–1.5)
BILIRUB SERPL-MCNC: 0.2 MG/DL (ref 0–1.2)
BILIRUB UR QL STRIP: NEGATIVE
BUN SERPL-MCNC: 43 MG/DL (ref 8–23)
BUN/CREAT SERPL: 30.1 (ref 7–25)
CALCIUM SPEC-SCNC: 9.8 MG/DL (ref 8.6–10.5)
CHLORIDE SERPL-SCNC: 106 MMOL/L (ref 98–107)
CHOLEST SERPL-MCNC: 146 MG/DL (ref 0–200)
CK SERPL-CCNC: 43 U/L (ref 20–180)
CLARITY UR: CLEAR
CO2 SERPL-SCNC: 23.4 MMOL/L (ref 22–29)
COLOR UR: YELLOW
CREAT SERPL-MCNC: 1.43 MG/DL (ref 0.57–1)
CREAT UR-MCNC: 19.8 MG/DL
DEPRECATED RDW RBC AUTO: 54.1 FL (ref 37–54)
EGFRCR SERPLBLD CKD-EPI 2021: 40.5 ML/MIN/1.73
EOSINOPHIL # BLD AUTO: 0.26 10*3/MM3 (ref 0–0.4)
EOSINOPHIL NFR BLD AUTO: 3.2 % (ref 0.3–6.2)
ERYTHROCYTE [DISTWIDTH] IN BLOOD BY AUTOMATED COUNT: 18 % (ref 12.3–15.4)
GLOBULIN UR ELPH-MCNC: 2.9 GM/DL
GLUCOSE SERPL-MCNC: 166 MG/DL (ref 65–99)
GLUCOSE UR STRIP-MCNC: ABNORMAL MG/DL
HBA1C MFR BLD: 8.48 % (ref 4.8–5.6)
HCT VFR BLD AUTO: 42 % (ref 34–46.6)
HDLC SERPL-MCNC: 41 MG/DL (ref 40–60)
HGB BLD-MCNC: 12.2 G/DL (ref 12–15.9)
HGB UR QL STRIP.AUTO: NEGATIVE
HYALINE CASTS UR QL AUTO: NORMAL /LPF
IMM GRANULOCYTES # BLD AUTO: 0.08 10*3/MM3 (ref 0–0.05)
IMM GRANULOCYTES NFR BLD AUTO: 1 % (ref 0–0.5)
KETONES UR QL STRIP: NEGATIVE
LDLC SERPL CALC-MCNC: 64 MG/DL (ref 0–100)
LDLC/HDLC SERPL: 1.32 {RATIO}
LEUKOCYTE ESTERASE UR QL STRIP.AUTO: NEGATIVE
LYMPHOCYTES # BLD AUTO: 1.62 10*3/MM3 (ref 0.7–3.1)
LYMPHOCYTES NFR BLD AUTO: 19.7 % (ref 19.6–45.3)
MAGNESIUM SERPL-MCNC: 2.5 MG/DL (ref 1.6–2.4)
MCH RBC QN AUTO: 24.3 PG (ref 26.6–33)
MCHC RBC AUTO-ENTMCNC: 29 G/DL (ref 31.5–35.7)
MCV RBC AUTO: 83.7 FL (ref 79–97)
MONOCYTES # BLD AUTO: 0.94 10*3/MM3 (ref 0.1–0.9)
MONOCYTES NFR BLD AUTO: 11.4 % (ref 5–12)
NEUTROPHILS NFR BLD AUTO: 5.26 10*3/MM3 (ref 1.7–7)
NEUTROPHILS NFR BLD AUTO: 63.7 % (ref 42.7–76)
NITRITE UR QL STRIP: NEGATIVE
NRBC BLD AUTO-RTO: 0 /100 WBC (ref 0–0.2)
PH UR STRIP.AUTO: <=5 [PH] (ref 5–8)
PHOSPHATE SERPL-MCNC: 4.4 MG/DL (ref 2.5–4.5)
PLATELET # BLD AUTO: 251 10*3/MM3 (ref 140–450)
PMV BLD AUTO: 10.1 FL (ref 6–12)
POTASSIUM SERPL-SCNC: 5.7 MMOL/L (ref 3.5–5.2)
PROT ?TM UR-MCNC: 12 MG/DL
PROT SERPL-MCNC: 7.8 G/DL (ref 6–8.5)
PROT UR QL STRIP: NEGATIVE
PROT/CREAT UR: 606.1 MG/G CREA (ref 0–200)
PTH-INTACT SERPL-MCNC: 120 PG/ML (ref 15–65)
RBC # BLD AUTO: 5.02 10*6/MM3 (ref 3.77–5.28)
RBC # UR STRIP: NORMAL /HPF
REF LAB TEST METHOD: NORMAL
SODIUM SERPL-SCNC: 141 MMOL/L (ref 136–145)
SP GR UR STRIP: 1.01 (ref 1–1.03)
SQUAMOUS #/AREA URNS HPF: NORMAL /HPF
TRIGL SERPL-MCNC: 254 MG/DL (ref 0–150)
URATE SERPL-MCNC: 6.6 MG/DL (ref 2.4–5.7)
UROBILINOGEN UR QL STRIP: ABNORMAL
VLDLC SERPL-MCNC: 41 MG/DL (ref 5–40)
WBC # UR STRIP: NORMAL /HPF
WBC NRBC COR # BLD AUTO: 8.24 10*3/MM3 (ref 3.4–10.8)

## 2024-08-05 PROCEDURE — 82570 ASSAY OF URINE CREATININE: CPT

## 2024-08-05 PROCEDURE — 36415 COLL VENOUS BLD VENIPUNCTURE: CPT

## 2024-08-05 PROCEDURE — 84156 ASSAY OF PROTEIN URINE: CPT

## 2024-08-05 PROCEDURE — 83970 ASSAY OF PARATHORMONE: CPT

## 2024-08-05 PROCEDURE — 84100 ASSAY OF PHOSPHORUS: CPT

## 2024-08-05 PROCEDURE — 83036 HEMOGLOBIN GLYCOSYLATED A1C: CPT

## 2024-08-05 PROCEDURE — 85025 COMPLETE CBC W/AUTO DIFF WBC: CPT

## 2024-08-05 PROCEDURE — 81001 URINALYSIS AUTO W/SCOPE: CPT

## 2024-08-05 PROCEDURE — 83735 ASSAY OF MAGNESIUM: CPT

## 2024-08-05 PROCEDURE — 97161 PT EVAL LOW COMPLEX 20 MIN: CPT | Performed by: PHYSICAL THERAPIST

## 2024-08-05 PROCEDURE — 80053 COMPREHEN METABOLIC PANEL: CPT

## 2024-08-05 PROCEDURE — 82306 VITAMIN D 25 HYDROXY: CPT

## 2024-08-05 PROCEDURE — 82550 ASSAY OF CK (CPK): CPT

## 2024-08-05 PROCEDURE — 84550 ASSAY OF BLOOD/URIC ACID: CPT

## 2024-08-05 PROCEDURE — 97110 THERAPEUTIC EXERCISES: CPT | Performed by: PHYSICAL THERAPIST

## 2024-08-05 PROCEDURE — 80061 LIPID PANEL: CPT

## 2024-08-05 NOTE — PROGRESS NOTES
Physical Therapy Initial Evaluation and Plan of Care  [address] [place] [address]     Patient: Susy Hanson   : 1960  Diagnosis/ICD-10 Code:  Left hip pain [M25.552]  Referring practitioner: Raúl Bhandari DO  Date of Initial Visit: 2024  Today's Date: 2024  Patient seen for 1 sessions           Visit Diagnoses:    ICD-10-CM ICD-9-CM   1. Left hip pain  M25.552 719.45   2. Greater trochanteric bursitis of left hip  M70.62 726.5       Subjective Questionnaire: LEFS: 63%      Subjective 63 yo female with c/o L hip pain. Pt reports insidious onset of pain 3-4 months ago and exacerbation recently after going to several [place]. Pt received a steroid injection last Monday, which helped significantly. Denies pain since the injection. Primary pain was along the greater trochanter area. Symptoms were increased upon standing from a seated position, prolonged walking, transfers, L sidelying. Alleviating and further exacerbating factors unknown. Denies further LE symptoms.  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     Tenderness     Left Hip   Tenderness in the greater trochanter.     Strength/Myotome Testing     Left Hip   Planes of Motion   Flexion: 4  Extension: 4  Abduction: 4  Adduction: 5    Right Hip   Normal muscle strength    Additional Strength Details  Pain provocation with resisted abduction and flexion    Tests     Left Hip   Positive REGI and piriformis.   Negative scour.   Modified Chaitanya: Negative.           Assessment & Plan       Assessment  Impairments: abnormal coordination, activity intolerance, impaired physical strength, lacks appropriate home exercise program, pain with function and weight-bearing intolerance   Functional limitations: carrying objects, lifting, sleeping, walking, pulling, pushing, moving in bed, sitting, standing, stooping and unable to  perform repetitive tasks   Assessment details: 65 yo female with c/o L hip pain. Pt is with a good response to treatment this date and would benefit from further evaluation and treatment to address the above impairments.    Prognosis: good    Goals  Plan Goals: Short term goals, 1 week: Tolerate HEP progression.  Voice compliance with activity modification.  Report improvement in symptoms.    Long term goals, 5 weeks: Improve LEFS to 80%.  4/5 strength or better throughout to allow standing and lifting activities.  Rate worst pain at 2/10 with standing activities.  Independent with HEP.    Plan  Therapy options: will be seen for skilled therapy services  Other planned modality interventions: modalities prn  Planned therapy interventions: abdominal trunk stabilization, body mechanics training, flexibility, functional ROM exercises, home exercise program, manual therapy, neuromuscular re-education, postural training, strengthening, stretching and therapeutic activities  Frequency: 2x week  Duration in weeks: 5  Treatment plan discussed with: patient  Plan details: 30 visits per POC, 90 day certification period         History # of Personal Factors and/or Comorbidities: HIGH (3+)  Examination of Body System(s): # of elements: LOW (1-2)  Clinical Presentation: STABLE   Clinical Decision Making: LOW      Timed:         Manual Therapy:         mins  97845;     Therapeutic Exercise:    15     mins  26106;     Neuromuscular Ronna:        mins  67028;    Therapeutic Activity:          mins  21732;     Gait Training:           mins  78239;     Ultrasound:          mins  31224;    Ionto                                   mins   17520  Self Care                            mins   53155    Un-Timed:  Electrical Stimulation:         mins  64270 ( );  Traction          mins 44259  Low Eval     15     Mins  03143  Mod Eval          Mins  81115  High Eval                            Mins  88783  Re-Eval                                mins  05812        Timed Treatment:   15   mins   Total Treatment:     30   mins      PT SIGNATURE: Oni Orellana PT, DPT, OCS  [place] license: [id number]  DATE TREATMENT INITIATED: 8/5/2024    Certification Period: @TDA @thru 11/2/2024  I certify that the therapy services are furnished while this patient is under my care.  The services outlined above are required for this patient and will be reviewed every 90 days.     PHYSICIAN: _____________________________    Raúl Bhandari,       DATE:     Please sign and return via fax to [unfilled] . Thank you, [facility].

## 2024-08-06 ENCOUNTER — PATIENT MESSAGE (OUTPATIENT)
Dept: PULMONOLOGY | Facility: HOSPITAL | Age: 64
End: 2024-08-06
Payer: MEDICARE

## 2024-08-07 ENCOUNTER — DISEASE STATE MANAGEMENT VISIT (OUTPATIENT)
Facility: HOSPITAL | Age: 64
End: 2024-08-07
Payer: MEDICARE

## 2024-08-07 ENCOUNTER — HOSPITAL ENCOUNTER (OUTPATIENT)
Facility: HOSPITAL | Age: 64
Discharge: HOME | End: 2024-08-07
Admitting: NURSE PRACTITIONER
Payer: MEDICARE

## 2024-08-07 VITALS
OXYGEN SATURATION: 99 % | SYSTOLIC BLOOD PRESSURE: 169 MMHG | BODY MASS INDEX: 30.07 KG/M2 | RESPIRATION RATE: 16 BRPM | HEART RATE: 62 BPM | WEIGHT: 175.2 LBS | DIASTOLIC BLOOD PRESSURE: 87 MMHG

## 2024-08-07 DIAGNOSIS — I42.9 CARDIOMYOPATHY, UNSPECIFIED TYPE: ICD-10-CM

## 2024-08-07 DIAGNOSIS — F41.8 MIXED ANXIETY DEPRESSIVE DISORDER: ICD-10-CM

## 2024-08-07 DIAGNOSIS — N18.32 STAGE 3B CHRONIC KIDNEY DISEASE: ICD-10-CM

## 2024-08-07 DIAGNOSIS — I31.9 PERICARDITIS, UNSPECIFIED CHRONICITY, UNSPECIFIED TYPE: ICD-10-CM

## 2024-08-07 DIAGNOSIS — D05.12 BREAST NEOPLASM, TIS (DCIS), LEFT: ICD-10-CM

## 2024-08-07 DIAGNOSIS — Z95.810 PRESENCE OF BIVENTRICULAR IMPLANTABLE CARDIOVERTER-DEFIBRILLATOR (ICD): Chronic | ICD-10-CM

## 2024-08-07 DIAGNOSIS — E87.5 HYPERKALEMIA: Primary | ICD-10-CM

## 2024-08-07 DIAGNOSIS — G63 POLYNEUROPATHY ASSOCIATED WITH UNDERLYING DISEASE: ICD-10-CM

## 2024-08-07 DIAGNOSIS — Z95.810 PRESENCE OF AUTOMATIC (IMPLANTABLE) CARDIAC DEFIBRILLATOR: ICD-10-CM

## 2024-08-07 DIAGNOSIS — R06.00 DYSPNEA, UNSPECIFIED TYPE: ICD-10-CM

## 2024-08-07 DIAGNOSIS — K58.9 IRRITABLE BOWEL SYNDROME, UNSPECIFIED TYPE: ICD-10-CM

## 2024-08-07 DIAGNOSIS — E11.49 TYPE 2 DIABETES MELLITUS WITH OTHER DIABETIC NEUROLOGICAL COMPLICATION: ICD-10-CM

## 2024-08-07 DIAGNOSIS — E88.810 METABOLIC SYNDROME X: Chronic | ICD-10-CM

## 2024-08-07 DIAGNOSIS — I44.7 LBBB (LEFT BUNDLE BRANCH BLOCK): ICD-10-CM

## 2024-08-07 DIAGNOSIS — I27.21 PAH (PULMONARY ARTERY HYPERTENSION): Chronic | ICD-10-CM

## 2024-08-07 DIAGNOSIS — I10 ESSENTIAL (PRIMARY) HYPERTENSION: ICD-10-CM

## 2024-08-07 DIAGNOSIS — Z91.89 AT RISK FOR FLUID VOLUME OVERLOAD: Chronic | ICD-10-CM

## 2024-08-07 LAB
ANION GAP SERPL CALCULATED.3IONS-SCNC: 15.4 MMOL/L (ref 5–15)
BUN SERPL-MCNC: 54 MG/DL (ref 8–23)
BUN/CREAT SERPL: 37.5 (ref 7–25)
CALCIUM SPEC-SCNC: 9.9 MG/DL (ref 8.6–10.5)
CHLORIDE SERPL-SCNC: 104 MMOL/L (ref 98–107)
CO2 SERPL-SCNC: 22.6 MMOL/L (ref 22–29)
CREAT SERPL-MCNC: 1.44 MG/DL (ref 0.57–1)
EGFRCR SERPLBLD CKD-EPI 2021: 40.2 ML/MIN/1.73
GLUCOSE SERPL-MCNC: 166 MG/DL (ref 65–99)
POTASSIUM SERPL-SCNC: 5.2 MMOL/L (ref 3.5–5.2)
SODIUM SERPL-SCNC: 142 MMOL/L (ref 136–145)

## 2024-08-07 PROCEDURE — 80048 BASIC METABOLIC PNL TOTAL CA: CPT | Performed by: NURSE PRACTITIONER

## 2024-08-07 RX ORDER — HYDRALAZINE HYDROCHLORIDE 25 MG/1
75 TABLET, FILM COATED ORAL 3 TIMES DAILY
Qty: 90 TABLET | Refills: 3 | Status: SHIPPED | OUTPATIENT
Start: 2024-08-07

## 2024-08-07 NOTE — PROGRESS NOTES
"Cardiology Heart Failure Clinic Note  Rodney \"Renan\" Clay [facility]EZMA [facility]    Patient ID: Susy Hanson  is a 64 y.o. female.    Encounter Date:08/07/2024    HPI:  63-year-old female patient of Dr. Murphy's, with a PMH of nonischemic nondilated cardiomyopathy with a.  LVEF less than 20% back in 2022, S/P [place] aware BiV ICD complicated by pericardial effusion with lack of pacing and RV lead perforation subsequently had recovery of EF at 60 to 65% (TTE 11/3/22), without mention of any diastolic dysfunction, most recent TTE done in January 2024 shows her EF is now at about 56 to 60% with grade 1 DD. H\O left breast cancer (H cc), diabetes, IBS, metabolic syndrome with HTN, HLD, ANDREAS Rx w/ CPAP, DM.  Came in for her initial visit at the [facility] heart failure clinic 1/25/2024, she has done well from a heart failure standpoint with improvement of EF  Does not seem to be particularly symptomatic however does have some dyspnea with exertion.  She has concerns regarding her past Breast implant for which she is planning on having those removed.  She was hyperkalemic treated with Lokelma came back in f/u 12 Feb 24 after having seen Dr. Murphy who decreased her Entresto due to renal dysfunction. She was found to be hypertensive 2/12/24 and hyperkalemic with a K+= 6 Was encouraged not to leave town given her increased K+ however, she was rather adamant & saw her brother in [place]. Was told to go to nearest ER by renal and we had BMP done in [place] & K+ was WNL after Lokelma.  She was back on 19 February 2024 after returning home she has felt well but has come back with a K+ up to 5.1.It was still 1.5 months until she was seen renal. Back on 4 Mar 24 doing remarkably well, she brought her blood pressure logs they were stable. Patient looking forward to going to several events in [place] and [place] with grandchildren.  She is working rather diligently on trying to maintain healthy lifestyle for her " combination of chronic kidney disease, heart failure, diabetes.  She comes back on 10 Michaela 2024 since last visit obtained on considerable amount of time to [place] as well as [place] with her grandsons rodo.-Not a major issues comes back 8/7/2024 after having found out she had hyperkalemia, she is also has remained hypertensive after last adjustment made last month       Assessment:   Diagnoses and all orders for this visit:    1. Hyperkalemia (Primary)  Overview:  1/25/24 K+ 5.7    Orders:  -     Basic Metabolic Panel; Future  -     Basic Metabolic Panel; Standing  -     Basic Metabolic Panel  -     Basic Metabolic Panel; Future    2. Presence of automatic (implantable) cardiac defibrillator  -     hydrALAZINE (APRESOLINE) 25 MG tablet; Take 3 tablets by mouth 3 (Three) Times a Day.  Dispense: 90 tablet; Refill: 3    3. Essential (primary) hypertension  -     hydrALAZINE (APRESOLINE) 25 MG tablet; Take 3 tablets by mouth 3 (Three) Times a Day.  Dispense: 90 tablet; Refill: 3    4. Stage 3b chronic kidney disease  Overview:  eGFR 1/25/24  is 44      5. mild PAH (pulmonary artery hypertension)  Overview:  RVSP 35-45 mmHg (Jan 24 TTE)      6. Cardiomyopathy, unspecified type  Overview:   Hx non-dilated, non-ischemic cardiomyopathy          A.  Resolved systolic HF (HFrEF)         B.  EF 56-60% Gr 1 DD (TTE 1-25-24)                   I. EF 60 to 65% w/o mention of diastolic dysfunction (TTE 11/3/22)                   II. EF 15-20% w/o DD (July 22)         C. S/p 2022 Saint [employer] aware BiV ICD                    I.  Complicated by RV lead perforatn      7. At risk for fluid volume overload  Overview:   1. Hx non-dilated, non-ischemic cardiomyopathy          A.  Resolved systolic HF (HFrEF)                I. .  EF 56-60% Gr 1 DD (TTE 1-25-24)               Ii.       EF 60 to 65% w/o mention of diastolic dysfunction (TTE 11/3/22)               III. EF 15-20% w/o DD (July 22)         B. S/p 2022 [employer] aware  BiV ICD                    I.  Complicated by RV lead perforation   2. CKD stage 3b                    I. H/o VIVEK (4/22)                   II. eGFR 42 (2/12/24)      8. Breast neoplasm, Tis (DCIS), left  Overview:  Added automatically from request for surgery [id number]      9. Presence of biventricular  ICD    10. Metabolic syndrome X  Overview:  A. Hypertension  B. Mixed dyslipidemia  C. Obesity           I.ANDREAS uses CPAP  D. T2DM          II. Peripheral neuropathy      11. Dyspnea, unspecified type    12. Type 2 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    13. Polyneuropathy associated with underlying disease    14. Mixed anxiety depressive disorder    15. LBBB (left bundle branch block)    16. Irritable bowel syndrome, unspecified type    17. h/o Pericarditis, unspecified chronicity, unspecified type    Other orders  -     sodium zirconium cyclosilicate (LOKELMA) packet 10 g              Plan/discussion    Volume overload    Heart Failure Core Measures    Type of Overload :  unspecified     Most recent EF   EF 56-60% Gr 1 DD (TTE 1-25-24)   A.  Hx EF 20%  (July 22 TTE)  B. (Nov 22 TTE) 60 to 65% w/o mention of DD     [place] Heart association Class & Stage : 1c     HF Meds     Beta Blocker: Metoprolol 100 mg twice daily  ARNI/ACE/ARB: stopped 2-12-24 Entresto 24 to 26 mg BID  SGLT 2 inhibitors: [id number] mg daily  Diuretics upon release from clinic: Lasix 20 mg every other day  Furoscix: Currently not a candidate  Aldosterone Antagonist: None indicated  Digitalis: N/A  Vasodilators & Nitrates: Not indicated     Additionally on hydralazine 75 mg 3 times daily starting 8/7/2024  Cardiac medicines reviewed with risk, benefits, and necessity of each discussed with myself & a RP.      ____________________________________________________________     Discussion     Heart failure core measures including beta-blocker, ARNI, SGLT2  H/o EF being less than 20% July 22, repeat echo in November 22 EF  improved to 60 to 65%.   Repeat TTE 1/25/24 EF 56-60% Gr 1 DD w/ RVSP 35-45 mmHg  She will continue follow-ups with Dr. Murphy as well as Dr. Kerr her nephrologist  Significantly hyperkalemic 2/12/24 with a potassium of 6 renal function is maintaining the creatinine of 1.4 given Lokelma 10 g x 3 days  had BMP done & K+ was WNL ( (3.9) after Lokelma at an ER in [place]  A. Last visit potassium was up to 5.1 and she was placed on Lasix 20mg QOD  B.  6/10/24 potassium is at 4.8.  C.  8/7/2024 potassium is at 5.2            I.  Will be given 1 Lokelma packet           II.  Will need next potassium level 8/12/24  5. Blood pressure until today's visit has not been under good control, however, I was unable to find a lot of room to add medications which would aid in control on the last couple of visits therefore we will have to defer to Dr. Murphy and Cirilo.  But on her 6/10/2024 visit her systolic is up to 168 with a diastolic of 81.  Therefore              A. Will increase her hydralazine to 100 mg twice daily              B.  8/7/2024 for same blood pressure issues we will go ahead and increase her hydralazine to 75 mg 3 times daily  6.  potassium issues seem to have returned  A. We will plan on seeing again in approximately 1 month.  B.  Of note she has had the visit on 8/7/2024  C.  8/7/2024 eGFR 40, BUN 54 creatinine 1.4, potassium 5.2  7. Has been making a concerted effort to to decrease her potassium dietarily which seems to as of 8/7/2024 not seemingly accomplishing its goal  8.  Only medication change made this visit was increasing her hydralazine from 75 mg 3 times daily from 100 mg twice daily initiated in June 2024  9. Would continue with her ongoing heart failure nursing interventions including:                          A. Fluid restriction at less than 2000 cc daily                          B. Daily weights                          C.  1 g low-sodium diet        I did the following activities preparing  for the visit with Susy Hanson  including: reviewing tests, once pt arrived in clinic I also performed a medically appropriate examination and/or evaluation, I personally spent considerable time counseling and educating the patient/family/caregiver, ordering medications, tests, or procedures, referring and communicating with other health care professionals, and documenting information in the medical record. I estimate including preparation 20 minutes              Subjective:     Chief Complaint   Patient presents with    Congestive Heart Failure       ROS:    Constitutional:  weakness, & fatigue remains at what she describes as her normal level ., No fever, rigors, chills   Eyes: No recent vision changes, eye pain   ENT/oropharynx: No recent difficulty swallowing, sore throat, epistaxis, changes in hearing   Cardiovascular: No recent chest pain, chest tightness, palpitations except as noted in HPI, paroxysmal nocturnal dyspnea, orthopnea, diaphoresis, dizziness & no pre or vikram syncopal episodes   Respiratory: No at rest shortness of breath, + dyspnea on exertion, no significant productive cough, no wheezing and no hemoptysis   Gastrointestinal: No abdominal pain, nausea, vomiting, diarrhea, bloody stools   Genitourinary: No hematuria, dysuria other than increased frequency   Neurological: No headache, tremors, numbness,  or hemiparesis    Musculoskeletal: No change in typical cramps, myalgias,  joint pain, w/o any new joint swelling but does complain of osteoarthritic discomfort particularly in the lower extremities   Integument: No recent rash, no edema          Patient Active Problem List   Diagnosis    Encounter for general adult medical examination without abnormal findings    Essential hypertension    Irritable bowel syndrome    Mixed anxiety depressive disorder    Mixed hyperlipidemia    Obesity    Encounter for annual wellness exam in Medicare patient    Type 2 diabetes mellitus with other diabetic  neurological complication    Vitamin D deficiency    Hypertensive urgency    Peripheral neuropathy    ANDREAS on CPAP    Chest pain, atypical    Breast neoplasm, Tis (DCIS), left    Cold intolerance    Insomnia    Breast pain    Dyspnea    h/o Acute kidney injury    LBBB (left bundle branch block)    h/o nonischemic, nondilated, cardiomyopathy    Presence of biventricular  ICD    Hemopericardium    h/o Pericarditis    Pericardial effusion    Metabolic syndrome X    At risk for fluid volume overload    h/o resolved Left ventricular failure    Stage 3b chronic kidney disease    Hyperkalemia    mild PAH (pulmonary artery hypertension)       Past Medical History:   Diagnosis Date    Allergic 2008    Weeds, grasses, mold, mildew, etc.    Anxiety 2000    Breast cancer     LEFT BREAST 7/2021    Cataract     Had surgery to remove August 2023    CHF (congestive heart failure)     Colon polyp 12/2023    Removed during colonoscopy    Coronary artery disease 2019    CTS (carpal tunnel syndrome) 2015    Surgery on both hands    Depression     DM (diabetes mellitus)     History of medical problems 2008    Bladder prolapse repeat Dr. Roper    HL (hearing loss)     Hyperlipidemia     Hypertension     SAW CARDIO/ STRESS TEST 2020 - NOW MEDS MANAGED BY PCP     Irritable bowel syndrome 2006    Peripheral neuropathy     PONV (postoperative nausea and vomiting)     Renal insufficiency 2020    See Dr. funes for moderate kidney failure    Sleep apnea     WEARS CPAP    Type 2 diabetes mellitus 2008    Urinary tract infection 6/23    UTI seen at [facility]       Past Surgical History:   Procedure Laterality Date    BLADDER REPAIR      BREAST BIOPSY      BREAST RECONSTRUCTION Bilateral 08/26/2021    Procedure: BILATERAL PREPECTORALPLACMENT TISSUE EXPANDERS AND ALLODERM;  Surgeon: Heri Arreaga MD;  Location: [facility];  Service: Plastics;  Laterality: Bilateral;    BREAST TISSUE EXPANDER REMOVAL INSERTION OF IMPLANT Bilateral  2021    Procedure: BILATERAL REMOVAL TISSUE EXPANDERS AND PLACEMENT OF IMPLANTS;  Surgeon: Heri Arreaga MD;  Location: [facility] MAIN OR;  Service: Plastics;  Laterality: Bilateral;    CARDIAC CATHETERIZATION Left 2022    Procedure: Cardiac Catheterization/Vascular Study;  Surgeon: Christo Murphy MD;  Location:  [facility];  Service: Cardiovascular;  Laterality: Left;    CARDIAC ELECTROPHYSIOLOGY PROCEDURE N/A 2022    Procedure: ICD implant [place] [place] aware;  Surgeon: Esteban Kendrick MD;  Location:  [facility];  Service: Cardiology;  Laterality: N/A;    CARDIAC ELECTROPHYSIOLOGY PROCEDURE N/A 2022    Procedure: lead repositioning;  Surgeon: Esteban Kendrick MD;  Location:  [facility];  Service: Cardiology;  Laterality: N/A;    CARDIAC SURGERY      Defibrillator/pacemaker implant    CARDIOVASCULAR STRESS TEST      COLONOSCOPY  2012    EYE SURGERY      Cataracts removed    HAND SURGERY      Both hands    HYSTERECTOMY      INSERT / REPLACE / REMOVE PACEMAKER      LYMPH NODE BIOPSY  2021    MASTECTOMY W/ SENTINEL NODE BIOPSY Bilateral 2021    Procedure: bilateral total mastectomy, left axillary sentinel lymph node biopsy, possible reconstruction.;  Surgeon: Asia Perkins MD;  Location: [facility] MAIN OR;  Service: General;  Laterality: Bilateral;    TRIGGER POINT INJECTION      Left thumb    TUBAL ABDOMINAL LIGATION         Social History     Socioeconomic History    Marital status:    Tobacco Use    Smoking status: Former     Current packs/day: 0.00     Average packs/day: 2.0 packs/day for 36.0 years (72.0 ttl pk-yrs)     Types: Cigarettes     Start date: 1972     Quit date: 2008     Years since quittin.6     Passive exposure: Past    Smokeless tobacco: Never   Vaping Use    Vaping status: Never Used   Substance and Sexual Activity    Alcohol use: No    Drug use: No     Sexual activity: Yes     Partners: Male     Birth control/protection: None, Tubal ligation, Hysterectomy       Allergies   Allergen Reactions    Hydrocodone-Acetaminophen Hives     Lortab, takes tylenol         Current Outpatient Medications:     Accu-Chek FastClix Lancets misc, USE TO CHECK BLOOD GLUCOSE DAILY, Disp: , Rfl:     Accu-Chek Guide test strip, 1 each by Other route Daily. Use daily to check BS E11.9, Disp: 100 each, Rfl: 1    ALPRAZolam (XANAX) 1 MG tablet, Take 1 tablet by mouth 2 (Two) Times a Day., Disp: 60 tablet, Rfl: 0    atorvastatin (LIPITOR) 20 MG tablet, Take 1 tablet by mouth Daily., Disp: 90 tablet, Rfl: 1    Azelastine HCl 137 MCG/SPRAY solution, 2 sprays by Each Nare route Daily., Disp: 90 mL, Rfl: 1    cetirizine (zyrTEC) 10 MG tablet, Take 1 tablet by mouth Daily As Needed., Disp: , Rfl:     Continuous Blood Gluc  (FreeStyle Benton 3 Olympia) device, Use 1 each 4 (Four) Times a Day Before Meals & at Bedtime., Disp: 1 each, Rfl: 0    Continuous Glucose Sensor (FreeStyle Benton 3 Sensor) misc, Use 1 each Every 14 (Fourteen) Days., Disp: 6 each, Rfl: 1    dapagliflozin Propanediol (Farxiga) 10 MG tablet, Take 10 mg by mouth Daily., Disp: 90 tablet, Rfl: 3    dilTIAZem CD (Cardizem CD) 180 MG 24 hr capsule, Take 1 capsule by mouth Daily., Disp: 90 capsule, Rfl: 1    furosemide (Lasix) 20 MG tablet, Take 1 tablet by mouth Daily. (Patient taking differently: Take 1 tablet by mouth Every Other Day.), Disp: 45 tablet, Rfl: 1    glimepiride (Amaryl) 2 MG tablet, Take 1 tablet by mouth Every Morning., Disp: 30 tablet, Rfl: 11    hydrALAZINE (APRESOLINE) 25 MG tablet, Take 3 tablets by mouth 3 (Three) Times a Day., Disp: 90 tablet, Rfl: 3    Insulin Glargine (Lantus SoloStar) 100 UNIT/ML injection pen, Inject 65 Units under the skin into the appropriate area as directed Every Night., Disp: 45 mL, Rfl: 2    Insulin Pen Needle (Pen Needles) 32G X 4 MM misc, Use 1 each 4 (Four) Times a Day Before  Meals & at Bedtime., Disp: 200 each, Rfl: 5    metoprolol succinate XL (TOPROL-XL) 100 MG 24 hr tablet, Take 1 tablet by mouth 2 (Two) Times a Day., Disp: 180 tablet, Rfl: 1    ondansetron (Zofran) 4 MG tablet, Take 1 tablet by mouth Every 8 (Eight) Hours As Needed for Nausea or Vomiting., Disp: 20 tablet, Rfl: 0    pantoprazole (PROTONIX) 40 MG EC tablet, Take 1 tablet by mouth Daily., Disp: 90 tablet, Rfl: 3  No current facility-administered medications for this encounter.    Immunization History   Administered Date(s) Administered    Arexvy (RSV, Adults 60+ yrs) 09/22/2023    COVID-19 (PFIZER) BIVALENT 12+YRS 11/04/2022, 09/22/2023    COVID-19 (PFIZER) Purple Cap Monovalent 01/06/2021, 01/27/2021, 09/30/2021, 11/01/2021    Fluad Quad 65+ 09/14/2023    H1N1 Inj 11/03/2009    Hepatitis A 04/29/2018, 11/02/2018    Influenza Injectable Mdck Pf Quad 11/04/2022    Influenza, Unspecified 11/01/2021, 09/14/2023    Pneumococcal Conjugate 20-Valent (PCV20) 11/30/2023    RSV, unspecified (Vaccine or MAB) 09/22/2023    Tdap 07/26/2022    flucelvax quad pfs =>4 YRS 10/19/2020         Most recent EKG as reviewed:  Procedures     Most recent echo as reviewed:  Results for orders placed during the hospital encounter of 01/30/24    Adult Transthoracic Echo Complete W/ Cont if Necessary Per Protocol    Interpretation Summary    Left ventricular systolic function is normal. Left ventricular ejection fraction appears to be 56 - 60%.    Left ventricular diastolic function is consistent with (grade I) impaired relaxation.    Estimated right ventricular systolic pressure from tricuspid regurgitation is mildly elevated (35-45 mmHg).      Most recent stress test results:  Results for orders placed during the hospital encounter of 04/11/22    Stress Test With Myocardial Perfusion One Day    Interpretation Summary  · This is incomplete Cardiolite imaging study. Stress images were not done because test was aborted. Rest images showed small  fixed septal and apical defect. Left ventricle size appear normal. No gated SPECT imaging were noted..      Most recent cardiac catheterization results:  Results for orders placed during the hospital encounter of 04/11/22    Cardiac Catheterization/Vascular Study    Narrative  CARDIAC CATHETERIZATION REPORT      DATE OF PROCEDURE:  4/13/2022    INDICATION FOR PROCEDURE:    Congestive heart failure acute systolic  Dilated cardiomyopathy    PROCEDURE PERFORMED:    Left heart catheterization  coronary angiography  left ventriculography    PROCEDURE COMMENTS:    After informed consent was obtained, the patient was prepped and draped in the usual sterile manner.  Mild to moderate sedation was administered.  Right femoral artery was accessed without difficulty and 6 Macanese arterial sheath was inserted.  Sheath was flushed with heparinized saline.    Using 6 Macanese Roberto catheters, first left coronary artery and the right coronary was electively engaged and appropriate views were taken.  A 6 Macanese JR4 catheter was used to cross aortic valve and left heart catheterization was performed.  Left ventriculography was done NAQVI view    Patient tolerated the procedure well.    FINDINGS:    1. HEMODYNAMICS:    Aortic pressure: 167/101 mmHg    LVEDP: 10 to 15 mmHg    Gradient across aortic valve on pullback: No gradient across aortic valve      2. LEFT VENTRICULOGRAPHY: 35 to 40%      3. CORONARY ANGIOGRAPHY:    A: Left main coronary artery: Normal    B: Left anterior descending artery: 25% plaque in proximal and mid segment.  No high-grade stenosis    C: Left circumflex coronary artery: Mild diffuse disease in the midsegment but no high-grade stenosis    D: Right coronary artery: 25% plaque in proximal and mid segment of RCA but no high-grade stenosis.  It is dominant vessel    SUMMARY:    Mild coronary artery disease  Left ventricular dysfunction    RECOMMENDATIONS:    Medical treatment.        Historical data copied forward  from previous encounters in EMR including the history, exam, and assessment/plan has been reviewed and is unchanged except as I have noted and otherwise indicated.      Objective:         /87   Pulse 62   Resp 16   Wt 79.5 kg (175 lb 3.2 oz)   LMP  (LMP Unknown)   SpO2 99%   BMI 30.07 kg/m²     General:  Well-developed, mildly overly well-nourished, cooperative, no distress, appears stated age if not slightly older    Neuro:  A&O x3. No significantly obvious focal neuro deficet    Psych:  Pleasant -affect    HENT:  Normocephalic, atraumatic, moist mucous membranes , Normal ear placement,Throat not injected   Eyes:  PERRLA, Conjunctivae not injected, EOM's intact, conjunctiva does not appear significantly injected   Neck:  Supple, Mildly thickened, no lymph adenopathy nor thyromegaly no JVD or bruit    Lungs:    Symmetrical rise & fall of chest with baseline respiratory pattern. To auscultation lungs bilaterally, have faint late phase expiratory wheezes, there is no rhonchi or rales noted    Chest wall:  No significant tenderness when palpated. PMI @ 5th ICS McL  , deflated Left breast, well healed ICD scar    Heart:  Regular rate and rhythm, S1 and S2 normal, no S3 or S4, Gr I/VI systolic ejection murmur best heard at the apex , no obvious rub, click or gallop.    Abdomen:  non-distended, non-tender, bowel sounds noted in the 4 quadrants of the abdomen, without any significant central mass adipose tissue identified    Extremities:  Equal color motion temperature and sensitivity, Rapid capillary refill noted within the nailbeds of fingers and toes bilaterally no lower extremity edema.    Pulses:  2+ and symmetric all extremities, rapid capillary refill    Skin:  No obvious rashes, significant lesions identified, warm dry and of normal turgor      In-Office Procedure(s):  No orders to display        Imaging:    Results for orders placed in visit on 07/31/24    XR Hip With or Without Pelvis 2 - 3 View  Left    Narrative  XR HIP W OR WO PELVIS 2-3 VIEW LEFT    Date of Exam: 7/31/2024 10:40 AM EDT    Indication: left hip pain, NKI, pain for 2-3 months, room 16    Comparison: CT the abdomen and pelvis bone windows 10/19/2013.    Findings:  No pelvic fracture. No hip fracture. No hip dislocation. There is mild bilateral hip joint space narrowing, without significant spurring. Each femoral head maintains a normal round configuration, without evidence of avascular necrosis. Mild degenerative  change of the sacroiliac joints. Imaged lower lumbar spine is within normal limits. No osteolytic or osteoblastic abnormality.    Impression  Impression:  Mild degenerative changes of the hips.  Mild degenerative changes of the sacroiliac joints.  No acute findings.      Electronically Signed: Britney Roman MD  8/1/2024 11:16 AM EDT  Workstation ID: [id number]       Results for orders placed during the hospital encounter of 01/11/24    US Abdomen Limited    Narrative  US ABDOMEN LIMITED    Date of Exam: 1/11/2024 10:41 AM EST    Indication: persistent nausea.    Comparison: CT abdomen and pelvis angiography 1/17/2020    Technique: Grayscale and color Doppler ultrasound evaluation of the right upper quadrant was performed.      Findings:  Diffusely increased liver echogenicity compatible with hepatic steatosis. No discrete liver lesion. No perihepatic ascites. Visualized portions of the pancreas are normal. The gallbladder is normal. No biliary dilatation. Common bile duct measures 2 mm.  Hepatopetal flow in the portal vein. Visualized hepatic veins are patent. The right kidney measures 9.1 x 4.1 x 3.7 cm. No right-sided hydronephrosis.    Impression  Impression:  1. Hepatic steatosis.  2. Normal gallbladder.  3. No biliary dilatation.        Electronically Signed: Ottoniel Lee MD  1/11/2024 11:54 AM EST  Workstation ID: [id number]      Results for orders placed during the hospital encounter of 10/03/22    CT Angiogram Chest Pulmonary  Embolism    Narrative  Exam: CT Angiography of the Chest.    Date: 10/4/2022.    Comparison: 4/11/2022.    History: Defibrillator placement with chest pain.    Technique: CT examination of the chest was performed following the intravenous administration of 100 mL of Isovue-370. Sagittal, coronal and 3-D reformatted images were provided. CT dose lowering techniques were used, to include: automated exposure  control, adjustment for patient size, and/or use of iterative reconstruction.    FINDINGS:    CHEST WALL: There are bilateral breast implants.    Mediastinum and Theresa: There is no axillary, mediastinal or hilar lymphadenopathy.    Pleural and Pericardial spaces: There is a small to moderate pericardial effusion which is higher density than simple fluid which may relate to a hemopericardium.    Upper Abdomen: The visualized upper abdomen is unremarkable.    Cardiovascular: AICD generator has leads in the right atrium and right ventricle. There is mild vascular calcification throughout the thoracic aorta without evidence of aneurysmal dilation or dissection.    Pulmonary Artery: There are no filling defects in the pulmonary arteries.    Lung Parenchyma and Airways: The lungs are clear.    Bones: No fracture or aggressive osseous lesion.    Impression  1. No evidence of pulmonary embolism.  2. No evidence of thoracic aortic aneurysm or dissection.  3. Small to moderate amount of hemopericardium.    These findings were discussed with COURTNEY Atkinson.      Electronically signed by:  Amish Ellsworth D.O.  10/4/2022 1:51 AM      Lab Review:   Hospital Outpatient Visit on 08/07/2024   Component Date Value    Glucose 08/07/2024 166 (H)     BUN 08/07/2024 54 (H)     Creatinine 08/07/2024 1.44 (H)     Sodium 08/07/2024 142     Potassium 08/07/2024 5.2     Chloride 08/07/2024 104     CO2 08/07/2024 22.6     Calcium 08/07/2024 9.9     BUN/Creatinine Ratio 08/07/2024 37.5 (H)     Anion Gap 08/07/2024 15.4 (H)     eGFR  08/07/2024 40.2 (L)    Lab on 08/05/2024   Component Date Value    Glucose 08/05/2024 166 (H)     BUN 08/05/2024 43 (H)     Creatinine 08/05/2024 1.43 (H)     Sodium 08/05/2024 141     Potassium 08/05/2024 5.7 (H)     Chloride 08/05/2024 106     CO2 08/05/2024 23.4     Calcium 08/05/2024 9.8     Total Protein 08/05/2024 7.8     Albumin 08/05/2024 4.9     ALT (SGPT) 08/05/2024 16     AST (SGOT) 08/05/2024 13     Alkaline Phosphatase 08/05/2024 115     Total Bilirubin 08/05/2024 0.2     Globulin 08/05/2024 2.9     A/G Ratio 08/05/2024 1.7     BUN/Creatinine Ratio 08/05/2024 30.1 (H)     Anion Gap 08/05/2024 11.6     eGFR 08/05/2024 40.5 (L)     Creatine Kinase 08/05/2024 43     Hemoglobin A1C 08/05/2024 8.48 (H)     Magnesium 08/05/2024 2.5 (H)     Phosphorus 08/05/2024 4.4     PTH, Intact 08/05/2024 120.0 (H)     Uric Acid 08/05/2024 6.6 (H)     Protein/Creatinine Ratio* 08/05/2024 606.1 (H)     Creatinine, Urine 08/05/2024 19.8     Total Protein, Urine 08/05/2024 12.0     25 Hydroxy, Vitamin D 08/05/2024 41.2     Total Cholesterol 08/05/2024 146     Triglycerides 08/05/2024 254 (H)     HDL Cholesterol 08/05/2024 41     LDL Cholesterol  08/05/2024 64     VLDL Cholesterol 08/05/2024 41 (H)     LDL/HDL Ratio 08/05/2024 1.32     WBC 08/05/2024 8.24     RBC 08/05/2024 5.02     Hemoglobin 08/05/2024 12.2     Hematocrit 08/05/2024 42.0     MCV 08/05/2024 83.7     MCH 08/05/2024 24.3 (L)     MCHC 08/05/2024 29.0 (L)     RDW 08/05/2024 18.0 (H)     RDW-SD 08/05/2024 54.1 (H)     MPV 08/05/2024 10.1     Platelets 08/05/2024 251     Neutrophil % 08/05/2024 63.7     Lymphocyte % 08/05/2024 19.7     Monocyte % 08/05/2024 11.4     Eosinophil % 08/05/2024 3.2     Basophil % 08/05/2024 1.0     Immature Grans % 08/05/2024 1.0 (H)     Neutrophils, Absolute 08/05/2024 5.26     Lymphocytes, Absolute 08/05/2024 1.62     Monocytes, Absolute 08/05/2024 0.94 (H)     Eosinophils, Absolute 08/05/2024 0.26     Basophils, Absolute 08/05/2024  0.08     Immature Grans, Absolute 08/05/2024 0.08 (H)     nRBC 08/05/2024 0.0     Color, UA 08/05/2024 Yellow     Appearance, UA 08/05/2024 Clear     pH, UA 08/05/2024 <=5.0     Specific Gravity, UA 08/05/2024 1.010     Glucose, UA 08/05/2024 >=1000 mg/dL (3+) (A)     Ketones, UA 08/05/2024 Negative     Bilirubin, UA 08/05/2024 Negative     Blood, UA 08/05/2024 Negative     Protein, UA 08/05/2024 Negative     Leuk Esterase, UA 08/05/2024 Negative     Nitrite, UA 08/05/2024 Negative     Urobilinogen, UA 08/05/2024 0.2 E.U./dL     RBC, UA 08/05/2024 0-2     WBC, UA 08/05/2024 0-2     Bacteria, UA 08/05/2024 None Seen     Squamous Epithelial Cell* 08/05/2024 0-2     Hyaline Casts, UA 08/05/2024 None Seen     Methodology 08/05/2024 Automated Microscopy    Lab on 07/09/2024   Component Date Value    Hemoglobin A1C 07/09/2024 8.82 (H)     Glucose 07/09/2024 223 (H)     BUN 07/09/2024 37 (H)     Creatinine 07/09/2024 1.45 (H)     Sodium 07/09/2024 139     Potassium 07/09/2024 5.3 (H)     Chloride 07/09/2024 103     CO2 07/09/2024 21.6 (L)     Calcium 07/09/2024 9.8     Total Protein 07/09/2024 7.5     Albumin 07/09/2024 4.5     ALT (SGPT) 07/09/2024 22     AST (SGOT) 07/09/2024 11     Alkaline Phosphatase 07/09/2024 99     Total Bilirubin 07/09/2024 0.3     Globulin 07/09/2024 3.0     A/G Ratio 07/09/2024 1.5     BUN/Creatinine Ratio 07/09/2024 25.5 (H)     Anion Gap 07/09/2024 14.4     eGFR 07/09/2024 40.1 (L)    Hospital Outpatient Visit on 06/10/2024   Component Date Value    Glucose 06/10/2024 274 (H)     BUN 06/10/2024 31 (H)     Creatinine 06/10/2024 1.40 (H)     Sodium 06/10/2024 142     Potassium 06/10/2024 4.8     Chloride 06/10/2024 105     CO2 06/10/2024 25.8     Calcium 06/10/2024 9.7     BUN/Creatinine Ratio 06/10/2024 22.1     Anion Gap 06/10/2024 11.2     eGFR 06/10/2024 41.8 (L)     Magnesium 06/10/2024 2.2     proBNP 06/10/2024 94.1    Hospital Outpatient Visit on 04/01/2024   Component Date Value     Glucose 04/01/2024 238 (H)     BUN 04/01/2024 36 (H)     Creatinine 04/01/2024 1.47 (H)     Sodium 04/01/2024 139     Potassium 04/01/2024 5.7 (H)     Chloride 04/01/2024 102     CO2 04/01/2024 20.0 (L)     Calcium 04/01/2024 10.4     BUN/Creatinine Ratio 04/01/2024 24.5     Anion Gap 04/01/2024 17.0 (H)     eGFR 04/01/2024 39.5 (L)     proBNP 04/01/2024 83.6     Hemoglobin A1C 04/01/2024 7.60 (H)     Protein/Creatinine Ratio* 04/01/2024 384.6 (H)     Creatinine, Urine 04/01/2024 14.3     Total Protein, Urine 04/01/2024 5.5     PTH, Intact 04/01/2024 100.4 (H)     Glucose 04/01/2024 228 (H)     BUN 04/01/2024 34 (H)     Creatinine 04/01/2024 1.48 (H)     Sodium 04/01/2024 138     Potassium 04/01/2024 4.1     Chloride 04/01/2024 101     CO2 04/01/2024 21.0 (L)     Calcium 04/01/2024 9.8     Total Protein 04/01/2024 7.6     Albumin 04/01/2024 4.8     ALT (SGPT) 04/01/2024 20     AST (SGOT) 04/01/2024 14     Alkaline Phosphatase 04/01/2024 86     Total Bilirubin 04/01/2024 0.2     Globulin 04/01/2024 2.8     A/G Ratio 04/01/2024 1.7     BUN/Creatinine Ratio 04/01/2024 23.0     Anion Gap 04/01/2024 16.0 (H)     eGFR 04/01/2024 39.1 (L)     Creatine Kinase 04/01/2024 47     Magnesium 04/01/2024 2.4     Uric Acid 04/01/2024 7.5 (H)     Phosphorus 04/01/2024 4.6 (H)     25 Hydroxy, Vitamin D 04/01/2024 33.3     Color, UA 04/01/2024 Yellow     Appearance, UA 04/01/2024 Clear     pH, UA 04/01/2024 <=5.0     Specific Gravity, UA 04/01/2024 1.010     Glucose, UA 04/01/2024 >=1000 mg/dL (3+) (A)     Ketones, UA 04/01/2024 Negative     Bilirubin, UA 04/01/2024 Negative     Blood, UA 04/01/2024 Negative     Protein, UA 04/01/2024 Negative     Leuk Esterase, UA 04/01/2024 Moderate (2+) (A)     Nitrite, UA 04/01/2024 Negative     Urobilinogen, UA 04/01/2024 0.2 E.U./dL     WBC 04/01/2024 4.90     RBC 04/01/2024 4.39     Hemoglobin 04/01/2024 12.0     Hematocrit 04/01/2024 39.9     MCV 04/01/2024 90.9     MCH 04/01/2024 27.3      MCHC 04/01/2024 30.1 (L)     RDW 04/01/2024 14.2     RDW-SD 04/01/2024 47.3     MPV 04/01/2024 10.6     Platelets 04/01/2024 243     RBC, UA 04/01/2024 None Seen     WBC, UA 04/01/2024 21-50 (A)     Bacteria, UA 04/01/2024 None Seen     Squamous Epithelial Cell* 04/01/2024 3-6 (A)     Hyaline Casts, UA 04/01/2024 0-2     Methodology 04/01/2024 Automated Microscopy    Hospital Outpatient Visit on 03/04/2024   Component Date Value    Glucose 03/04/2024 185 (H)     BUN 03/04/2024 33 (H)     Creatinine 03/04/2024 1.46 (H)     Sodium 03/04/2024 144     Potassium 03/04/2024 4.8     Chloride 03/04/2024 107     CO2 03/04/2024 24.0     Calcium 03/04/2024 9.9     BUN/Creatinine Ratio 03/04/2024 22.6     Anion Gap 03/04/2024 13.0     eGFR 03/04/2024 39.8 (L)     proBNP 03/04/2024 49.3    Hospital Outpatient Visit on 02/19/2024   Component Date Value    Glucose 02/19/2024 141 (H)     BUN 02/19/2024 23     Creatinine 02/19/2024 1.39 (H)     Sodium 02/19/2024 143     Potassium 02/19/2024 5.1     Chloride 02/19/2024 106     CO2 02/19/2024 26.0     Calcium 02/19/2024 10.0     BUN/Creatinine Ratio 02/19/2024 16.5     Anion Gap 02/19/2024 11.0     eGFR 02/19/2024 42.2 (L)     Magnesium 02/19/2024 2.1     proBNP 02/19/2024 166.2    Hospital Outpatient Visit on 02/12/2024   Component Date Value    Glucose 02/12/2024 241 (H)     BUN 02/12/2024 28 (H)     Creatinine 02/12/2024 1.39 (H)     Sodium 02/12/2024 140     Potassium 02/12/2024 6.0 (H)     Chloride 02/12/2024 104     CO2 02/12/2024 23.0     Calcium 02/12/2024 9.9     BUN/Creatinine Ratio 02/12/2024 20.1     Anion Gap 02/12/2024 13.0     eGFR 02/12/2024 42.2 (L)     proBNP 02/12/2024 102.7     QT Interval 02/12/2024 456     QTC Interval 02/12/2024 496      Recent labs reviewed and interpreted for clinical significance and application    I went over each of the labs individually with Susy while she was still here in the clinic          It is a pleasure to be involved in this  "patient's cardiovascular care relating to their heart failure.  Please feel free to call me with any questions or concerns.    Rodney \"Renan\" Clay LEDEZMA, [facility]  Heart failure program clinical provider    Rodney Williamson, BRISA   08/07/24  .  "

## 2024-08-07 NOTE — PROGRESS NOTES
[facility], operated by [facility] HEART FAILURE CLINIC - PHARMACY SERVICE    Patient Name: Susy Hanson  :1960  Age: 64 y.o.  Sex: female  Primary Cardiologist: Jeffrey  Nephrology: Rafat/Cirilo  PCP: PILY Avila     HFpEF with EF 56-60% (Last Echo: 2/3/24). NYHA Class I C     The patient's last EKG was reviewed from 24 and shows a QTcB of 496 ms.     ICD: yes    CKD: Stage 3b eGFR 30-44 mL/min    BMP          2024    09:23 2024    10:09 2024    09:18   BMP   BUN 37  43  54    Creatinine 1.45  1.43  1.44    Sodium 139  141  142    Potassium 5.3  5.7  5.2    Chloride 103  106  104    CO2 21.6  23.4  22.6    Calcium 9.8  9.8  9.9        Lab Results   Component Value Date    EGFR 40.2 (L) 2024    EGFR 40.5 (L) 2024    EGFR 40.1 (L) 2024       Lab Results   Component Value Date    PROBNP 94.1 06/10/2024    PROBNP 83.6 2024    PROBNP 49.3 2024       Recent Vitals         2024       BP: 154/74 -- 169/87     Pulse: 72 68 62     Weight: 79.4 kg (175 lb) 79.4 kg (175 lb) 79.5 kg (175 lb 3.2 oz)     BMI (Calculated): 30 30 30.1              -CHF-specific BB:      Pre Visit Dose:  Metoprolol succinate  mg PO BID    Post Visit Dose:  Metoprolol succinate  mg PO BID  (HR 62 bpm, /87 mmHg)     - Target Dose: Metoprolol succinate  mg PO daily.     - Goal HR of 50s to 60s.     - Patient should be seen every 10 to 14 days for a pulse check with plans for up-titration until target heart rate is achieved.        -ACE/ARB/ARNI:     Pre Visit Dose: None due to Renal and hyperkalemia    Post Visit Dose: None due to Renal and hyperkalemia    - Target Dose:  N/A    - Patient should have a follow-up appointment every 2 to 4 weeks for a hemodynamic check with possible up-titration to target dose.         -SGLT2 inhibitor therapy:    Pre Visit Dose: Dapagliflozin 10 mg PO daily    Post Visit Dose: Dapagliflozin 10 mg PO daily    - Target Dose:  Dapagliflozin 10 mg PO daily : CrCl > 20 mL/min which has shown benefit in patients with HF       -MRA:     Pre Visit Dose: None due to Renal and hyperkalemia    Post Visit Dose: None due to Renal and hyperkalemia    - Target Dose:  N/A    - K is not < 5 mEq/L and SCr is </= 2 mg/dL in female and eGFR > 30 mL/min/1.73m3    Lab Results   Component Value Date    K 5.2 08/07/2024       Lab Results   Component Value Date    CREATININE 1.44 (H) 08/07/2024       -GFR 30 to 50 mL/min: Initial 12.5 mg daily or every other day, may double every 4 weeks if KCL remains < 5 mEq/L  -GFR < 30 mL/min: Use not recommended: HF clinical trials excluded if Scr > 2.5 mg/dL    -DIURETIC:     Pre Visit Dose: Furosemide 20 mg PO QOD    Post Visit Dose: Furosemide 20 mg PO QOD      -MAGNESIUM:     Mag level not obtained today, however level was elevated at 2.5 on 8/5.    Lab Results   Component Value Date    MG 2.5 (H) 08/05/2024    MG 2.2 06/10/2024    MG 2.4 04/01/2024       Pre Visit Dose: None    Post Visit Dose: None    -If Magnesium 1.6-1.9 mg/dL, Initiate Magnesium 400 mg PO daily  -If Magnesium is less than 1.6 mg/dL, Initiate Magnesium 400 mg PO BID    -ANTICOAGULATION:     None       -OTHER CV MEDS:     Pre Visit Dose: Atorvastatin 20 mg PO daily, diltiazem 180 mg PO daily, hydralazine 100 mg PO BID    Post Visit Dose: No change other than adjusting hydralazine to 75 mg PO TID      -Clinic Administered Medications:     Lokelma 10 g PO x 1 (K = 5.2 mmol/L and was 5.7 with labs drawn on 8/5)    Repeat labs will be drawn in HF Clinic on 8/13/24.    Lokelma administered today was clinic provided sample.       Patient Assistance:      Farxiga Approved              PLAN:  Initiation/Discontinuation/Dose Adjustment:   A. Increase hydralazine to 75 mg PO TID  Education provided: Discussed medication change and strategies to be able to space out the three doses per day given changes in day-to-day with patient being retired  Coordination  of Care: None  Refills: Prescription with updated hydralazine dose and frequency sent to retail pharmacy      Thank you for allowing me to participate in the care of your patient.    Estrellita Acevedo, JonathanD  [facility] Failure [facility]  08/07/24  12:09 EDT

## 2024-08-08 ENCOUNTER — TELEPHONE (OUTPATIENT)
Dept: ENDOCRINOLOGY | Facility: CLINIC | Age: 64
End: 2024-08-08
Payer: MEDICARE

## 2024-08-08 ENCOUNTER — OFFICE VISIT (OUTPATIENT)
Dept: PULMONOLOGY | Facility: HOSPITAL | Age: 64
End: 2024-08-08
Payer: MEDICARE

## 2024-08-08 VITALS
HEART RATE: 65 BPM | DIASTOLIC BLOOD PRESSURE: 91 MMHG | SYSTOLIC BLOOD PRESSURE: 167 MMHG | HEIGHT: 64 IN | WEIGHT: 175 LBS | OXYGEN SATURATION: 100 % | BODY MASS INDEX: 29.88 KG/M2 | RESPIRATION RATE: 16 BRPM

## 2024-08-08 DIAGNOSIS — G47.33 OSA ON CPAP: ICD-10-CM

## 2024-08-08 DIAGNOSIS — R06.09 DYSPNEA ON EXERTION: ICD-10-CM

## 2024-08-08 DIAGNOSIS — Z95.810 PRESENCE OF BIVENTRICULAR IMPLANTABLE CARDIOVERTER-DEFIBRILLATOR (ICD): Chronic | ICD-10-CM

## 2024-08-08 DIAGNOSIS — J44.9 CHRONIC OBSTRUCTIVE PULMONARY DISEASE, UNSPECIFIED COPD TYPE: Primary | ICD-10-CM

## 2024-08-08 DIAGNOSIS — Z87.891 FORMER SMOKER: ICD-10-CM

## 2024-08-08 PROCEDURE — G0463 HOSPITAL OUTPT CLINIC VISIT: HCPCS

## 2024-08-08 RX ORDER — UMECLIDINIUM BROMIDE AND VILANTEROL TRIFENATATE 62.5; 25 UG/1; UG/1
1 POWDER RESPIRATORY (INHALATION)
Qty: 1 EACH | Refills: 5 | Status: SHIPPED | OUTPATIENT
Start: 2024-08-08 | End: 2024-08-08 | Stop reason: SDUPTHER

## 2024-08-08 RX ORDER — UMECLIDINIUM BROMIDE AND VILANTEROL TRIFENATATE 62.5; 25 UG/1; UG/1
1 POWDER RESPIRATORY (INHALATION)
Qty: 60 EACH | Refills: 5 | Status: SHIPPED | OUTPATIENT
Start: 2024-08-08

## 2024-08-08 NOTE — PROGRESS NOTES
HPI:  New patient visit for pulmonary follow-up.  Patient reports shortness of breath that got worse in last few months especially during the heat time.  She has chronic sinusitis and the postnasal drainage can get worse with the seasonal variation.  She quit smoking 2008.  She is using her CPAP machine and followed in the sleep clinic.    Past Medical History:   Diagnosis Date    Allergic 2008    Weeds, grasses, mold, mildew, etc.    Anxiety 2000    Breast cancer     LEFT BREAST 7/2021    Cataract     Had surgery to remove August 2023    CHF (congestive heart failure)     Colon polyp 12/2023    Removed during colonoscopy    Coronary artery disease 2019    CTS (carpal tunnel syndrome) 2015    Surgery on both hands    Depression     DM (diabetes mellitus)     GERD (gastroesophageal reflux disease)     History of medical problems 2008    Bladder prolapse repeat Dr. Roper    HL (hearing loss)     Hyperlipidemia     Hypertension     SAW CARDIO/ STRESS TEST 2020 - NOW MEDS MANAGED BY PCP     Irritable bowel syndrome 2006    Peripheral neuropathy     PONV (postoperative nausea and vomiting)     Renal insufficiency 2020    See Dr. funes for moderate kidney failure    Sleep apnea     WEARS CPAP    Type 2 diabetes mellitus 2008    Urinary tract infection 6/23    UTI seen at [facility]        Current Outpatient Medications on File Prior to Visit   Medication Sig Dispense Refill    Accu-Chek FastClix Lancets misc USE TO CHECK BLOOD GLUCOSE DAILY      Accu-Chek Guide test strip 1 each by Other route Daily. Use daily to check BS E11.9 100 each 1    ALPRAZolam (XANAX) 1 MG tablet Take 1 tablet by mouth 2 (Two) Times a Day. 60 tablet 0    atorvastatin (LIPITOR) 20 MG tablet Take 1 tablet by mouth Daily. 90 tablet 1    Azelastine HCl 137 MCG/SPRAY solution 2 sprays by Each Nare route Daily. 90 mL 1    cetirizine (zyrTEC) 10 MG tablet Take 1 tablet by mouth Daily As Needed.      Continuous Blood Gluc  (FreeStyle Benton  3 Larned) device Use 1 each 4 (Four) Times a Day Before Meals & at Bedtime. 1 each 0    Continuous Glucose Sensor (FreeStyle Benton 3 Sensor) misc Use 1 each Every 14 (Fourteen) Days. 6 each 1    dapagliflozin Propanediol (Farxiga) 10 MG tablet Take 10 mg by mouth Daily. 90 tablet 3    dilTIAZem CD (Cardizem CD) 180 MG 24 hr capsule Take 1 capsule by mouth Daily. 90 capsule 1    furosemide (Lasix) 20 MG tablet Take 1 tablet by mouth Daily. (Patient taking differently: Take 1 tablet by mouth Every Other Day.) 45 tablet 1    glimepiride (Amaryl) 2 MG tablet Take 1 tablet by mouth Every Morning. 30 tablet 11    hydrALAZINE (APRESOLINE) 25 MG tablet Take 3 tablets by mouth 3 (Three) Times a Day. 90 tablet 3    Insulin Glargine (Lantus SoloStar) 100 UNIT/ML injection pen Inject 65 Units under the skin into the appropriate area as directed Every Night. (Patient taking differently: Inject 65 Units under the skin into the appropriate area as directed Every Night. Please send future Lantus prescriptions for refills/dose changes to [facility] (see Danilo or Rajani).) 45 mL 2    Insulin Pen Needle (Pen Needles) 32G X 4 MM misc Use 1 each 4 (Four) Times a Day Before Meals & at Bedtime. 200 each 5    metoprolol succinate XL (TOPROL-XL) 100 MG 24 hr tablet Take 1 tablet by mouth 2 (Two) Times a Day. 180 tablet 1    ondansetron (Zofran) 4 MG tablet Take 1 tablet by mouth Every 8 (Eight) Hours As Needed for Nausea or Vomiting. 20 tablet 0    pantoprazole (PROTONIX) 40 MG EC tablet Take 1 tablet by mouth Daily. 90 tablet 3    [DISCONTINUED] hydroCHLOROthiazide (HYDRODIURIL) 25 MG tablet Take 1 tablet by mouth Daily. 90 tablet 2    [DISCONTINUED] losartan (Cozaar) 100 MG tablet Take 1 tablet by mouth Daily. 90 tablet 2     Current Facility-Administered Medications on File Prior to Visit   Medication Dose Route Frequency Provider Last Rate Last Admin    [COMPLETED] sodium zirconium cyclosilicate (LOKELMA) packet 10 g  10 g Oral Once  Rodney Williamson JAKE, APRN   10 g at 24 1104        Social History     Tobacco Use    Smoking status: Former     Current packs/day: 0.00     Average packs/day: 2.0 packs/day for 36.0 years (72.0 ttl pk-yrs)     Types: Cigarettes     Start date: 1972     Quit date: 2008     Years since quittin.6     Passive exposure: Past    Smokeless tobacco: Never   Vaping Use    Vaping status: Never Used   Substance Use Topics    Alcohol use: No    Drug use: No        Family History   Problem Relation Age of Onset    Diabetes Mother     Heart disease Mother     Sleep apnea Mother     Snoring Mother     COPD Mother     Hyperlipidemia Mother     Alcohol abuse Mother     Anxiety disorder Mother     Depression Mother     Hypertension Mother     Alzheimer's disease Father     Diabetes Father     Heart disease Father     Sleep apnea Father     Snoring Father     Hyperlipidemia Father     Heart failure Father     Kidney disease Father     Hypertension Father     Obesity Father     Diabetes Sister     Heart disease Sister     Sleep apnea Sister     Snoring Sister     COPD Sister     Anxiety disorder Sister     Depression Sister     Hypertension Sister     Hyperlipidemia Sister     Clotting disorder Sister     Hyperlipidemia Brother     Hypertension Brother     Breast cancer Maternal Aunt 80    Breast cancer Paternal Aunt     Diabetes Maternal Grandmother     Breast cancer Paternal Grandmother 60    Cancer Paternal Grandmother     Diabetes Paternal Grandmother     Hyperlipidemia Son     Diabetes Brother     Hypertension Brother     Malig Hyperthermia Neg Hx         Review of system:  Constitutional: Negative for chills, fever and malaise/fatigue.   HENT: sinus allergies    Eyes: Negative.    Cardiovascular: Negative.    Respiratory: shortness of breath.    Skin: Negative.    Musculoskeletal: Negative.    Gastrointestinal: Negative.    Genitourinary: Negative.    Neurological: Negative.    Psychiatric/Behavioral:  "Negative.    Physical exam:  Blood pressure 167/91, pulse 65, resp. rate 16, height 162.6 cm (64\"), weight 79.4 kg (175 lb), SpO2 100%, not currently breastfeeding.    General Appearance:  Alert   HEENT:  Normocephalic, without obvious abnormality, Conjunctiva/corneas clear,.   Nares normal, no drainage     Neck:  Supple, symmetrical, trachea midline. No JVD.  Lungs /Chest wall:   good air entry Bilaterlly, respirations unlabored, symmetrical wall movement.     Heart:  Regular rate and rhythm, S1 S2 normal  Abdomen: Soft, non-tender, no masses, no organomegaly.    Extremities: No edema, no clubbing or cyanosis    XR Hip With or Without Pelvis 2 - 3 View Left    Result Date: 8/1/2024  Impression: Mild degenerative changes of the hips. Mild degenerative changes of the sacroiliac joints. No acute findings. Electronically Signed: Britney Roman MD  8/1/2024 11:16 AM EDT  Workstation ID: [id number]    Results for orders placed during the hospital encounter of 01/30/24    Adult Transthoracic Echo Complete W/ Cont if Necessary Per Protocol    Interpretation Summary    Left ventricular systolic function is normal. Left ventricular ejection fraction appears to be 56 - 60%.    Left ventricular diastolic function is consistent with (grade I) impaired relaxation.    Estimated right ventricular systolic pressure from tricuspid regurgitation is mildly elevated (35-45 mmHg).        Assessment and plan:  Dyspnea on exertion  Probable COPD  Former smoker quit 2008  Dilated CMP, S/P ICD/Pacer placement  HTN, DM  ANDREAS     Plan:  PFTs  Start inhalers ICS/LABA  Albuterol as needed  Keep follow-up with the sleep clinic for the auto CPAP machine  Keep follow-up with cardiology     I personally reviewed the latest radiological study  Patient is advised to stay up-to-date on immunizations for flu, pneumococcal and COVID-19  "

## 2024-08-08 NOTE — TELEPHONE ENCOUNTER
[facility]     Pt assistance Lantus has arrived and she  can come by and pick it up t her convince,

## 2024-08-13 ENCOUNTER — HOSPITAL ENCOUNTER (OUTPATIENT)
Facility: HOSPITAL | Age: 64
Discharge: HOME | End: 2024-08-13
Admitting: NURSE PRACTITIONER
Payer: MEDICARE

## 2024-08-13 ENCOUNTER — DISEASE STATE MANAGEMENT VISIT (OUTPATIENT)
Facility: HOSPITAL | Age: 64
End: 2024-08-13
Payer: MEDICARE

## 2024-08-13 VITALS
WEIGHT: 178.6 LBS | RESPIRATION RATE: 16 BRPM | SYSTOLIC BLOOD PRESSURE: 151 MMHG | OXYGEN SATURATION: 100 % | HEART RATE: 79 BPM | BODY MASS INDEX: 30.66 KG/M2 | DIASTOLIC BLOOD PRESSURE: 84 MMHG

## 2024-08-13 DIAGNOSIS — E11.49 TYPE 2 DIABETES MELLITUS WITH OTHER DIABETIC NEUROLOGICAL COMPLICATION: ICD-10-CM

## 2024-08-13 DIAGNOSIS — G63 POLYNEUROPATHY ASSOCIATED WITH UNDERLYING DISEASE: ICD-10-CM

## 2024-08-13 DIAGNOSIS — K58.9 IRRITABLE BOWEL SYNDROME, UNSPECIFIED TYPE: ICD-10-CM

## 2024-08-13 DIAGNOSIS — Z95.810 PRESENCE OF BIVENTRICULAR IMPLANTABLE CARDIOVERTER-DEFIBRILLATOR (ICD): Chronic | ICD-10-CM

## 2024-08-13 DIAGNOSIS — I44.7 LBBB (LEFT BUNDLE BRANCH BLOCK): ICD-10-CM

## 2024-08-13 DIAGNOSIS — I27.21 PAH (PULMONARY ARTERY HYPERTENSION): Chronic | ICD-10-CM

## 2024-08-13 DIAGNOSIS — I42.9 CARDIOMYOPATHY, UNSPECIFIED TYPE: Primary | ICD-10-CM

## 2024-08-13 DIAGNOSIS — R06.09 DYSPNEA ON EXERTION: ICD-10-CM

## 2024-08-13 DIAGNOSIS — N18.32 STAGE 3B CHRONIC KIDNEY DISEASE: ICD-10-CM

## 2024-08-13 DIAGNOSIS — E88.810 METABOLIC SYNDROME X: Chronic | ICD-10-CM

## 2024-08-13 DIAGNOSIS — Z91.89 AT RISK FOR FLUID VOLUME OVERLOAD: Chronic | ICD-10-CM

## 2024-08-13 DIAGNOSIS — F41.8 MIXED ANXIETY DEPRESSIVE DISORDER: ICD-10-CM

## 2024-08-13 DIAGNOSIS — I31.9 PERICARDITIS, UNSPECIFIED CHRONICITY, UNSPECIFIED TYPE: ICD-10-CM

## 2024-08-13 DIAGNOSIS — E87.5 HYPERKALEMIA: ICD-10-CM

## 2024-08-13 DIAGNOSIS — D05.12 BREAST NEOPLASM, TIS (DCIS), LEFT: ICD-10-CM

## 2024-08-13 LAB
ANION GAP SERPL CALCULATED.3IONS-SCNC: 10.4 MMOL/L (ref 5–15)
BUN SERPL-MCNC: 49 MG/DL (ref 8–23)
BUN/CREAT SERPL: 36 (ref 7–25)
CALCIUM SPEC-SCNC: 9.7 MG/DL (ref 8.6–10.5)
CHLORIDE SERPL-SCNC: 106 MMOL/L (ref 98–107)
CO2 SERPL-SCNC: 22.6 MMOL/L (ref 22–29)
CREAT SERPL-MCNC: 1.36 MG/DL (ref 0.57–1)
EGFRCR SERPLBLD CKD-EPI 2021: 43 ML/MIN/1.73
GLUCOSE SERPL-MCNC: 156 MG/DL (ref 65–99)
MAGNESIUM SERPL-MCNC: 2.4 MG/DL (ref 1.6–2.4)
NT-PROBNP SERPL-MCNC: 115 PG/ML (ref 0–900)
POTASSIUM SERPL-SCNC: 5.4 MMOL/L (ref 3.5–5.2)
SODIUM SERPL-SCNC: 139 MMOL/L (ref 136–145)

## 2024-08-13 PROCEDURE — 80048 BASIC METABOLIC PNL TOTAL CA: CPT | Performed by: NURSE PRACTITIONER

## 2024-08-13 PROCEDURE — G0463 HOSPITAL OUTPT CLINIC VISIT: HCPCS

## 2024-08-13 PROCEDURE — 83880 ASSAY OF NATRIURETIC PEPTIDE: CPT | Performed by: NURSE PRACTITIONER

## 2024-08-13 PROCEDURE — 83735 ASSAY OF MAGNESIUM: CPT | Performed by: NURSE PRACTITIONER

## 2024-08-13 NOTE — ADDENDUM NOTE
Encounter addended by: Chari Santos RN on: 8/13/2024 1:25 PM   Actions taken: Charge Capture section accepted

## 2024-08-13 NOTE — PROGRESS NOTES
"Cardiology Heart Failure Clinic Note  Rodney \"Renan\" Clay LEDEZMA [facility]    Patient ID: Susy Hanson  is a 64 y.o. female.    Encounter Date:08/13/2024    HPI:  64-year-old female patient of Dr. Murphy's, with a PMH of nonischemic nondilated cardiomyopathy with a.  LVEF less than 20% back in 2022, S/P [place] aware BiV ICD complicated by pericardial effusion with lack of pacing and RV lead perforation subsequently had recovery of EF at 60 to 65% (TTE 11/3/22), without mention of any diastolic dysfunction, most recent TTE done in January 2024 shows her EF is now at about 56 to 60% with grade 1 DD. H\O left breast cancer (H cc), diabetes, IBS, metabolic syndrome with HTN, HLD, ANDREAS Rx w/ CPAP, DM.  Came in for her initial visit at the [facility] heart failure clinic 1/25/2024, she has done well from a heart failure standpoint with improvement of EF  Does not seem to be particularly symptomatic however does have some dyspnea with exertion.  She has concerns regarding her past Breast implant for which she is planning on having those removed.  She was hyperkalemic treated with Lokelma came back in f/u 12 Feb 24 after having seen Dr. Muprhy who decreased her Entresto due to renal dysfunction. She was found to be hypertensive 2/12/24 and hyperkalemic with a K+= 6 Was encouraged not to leave town given her increased K+ however, she was rather adamant & saw her brother in [place]. Was told to go to nearest ER by renal and we had BMP done in [place] & K+ was WNL after Lokelma.  She was back on 19 February 2024 after returning home she has felt well but has come back with a K+ up to 5.1.It was still 1.5 months until she was seen renal. Back on 4 Mar 24 doing remarkably well, she brought her blood pressure logs they were stable. Patient looking forward to going to several events in [place] and [place] with grandchildren.  She is working rather diligently on trying to maintain healthy lifestyle for her " combination of chronic kidney disease, heart failure, diabetes.  She comes back on 10 Michaela 2024 since last visit obtained on considerable amount of time to [place] as well as [place] with her grandsons rodo.-Not a major issues comes back 8/7/2024 after having found out she had hyperkalemia, she is also has remained hypertensive after last adjustment made last month, done relatively well says she has been having trouble getting in touch with her nephrologist as she tends not to return her calls when she goes about her elevated potassiums.  She does however have appointment with Dr. Rodney her nephrologist later today.          Assessment:   Diagnoses and all orders for this visit:    1. Cardiomyopathy, unspecified type (Primary)  Overview:   Hx non-dilated, non-ischemic cardiomyopathy          A.  Resolved systolic HF (HFrEF)         B.  EF 56-60% Gr 1 DD (TTE 1-25-24)                   I. EF 60 to 65% w/o mention of diastolic dysfunction (TTE 11/3/22)                   II. EF 15-20% w/o DD (July 22)         C. S/p 2022 Saint Nirmal aware BiV ICD                    I.  Complicated by RV lead perforatn    Orders:  -     sodium zirconium cyclosilicate (LOKELMA) packet 10 g    2. mild PAH (pulmonary artery hypertension)  Overview:  RVSP 35-45 mmHg (Jan 24 TTE)      3. Stage 3b chronic kidney disease  Overview:  eGFR 1/25/24  is 44      4. At risk for fluid volume overload  Overview:   1. Hx non-dilated, non-ischemic cardiomyopathy          A.  Resolved systolic HF (HFrEF)                I. .  EF 56-60% Gr 1 DD (TTE 1-25-24)               Ii.       EF 60 to 65% w/o mention of diastolic dysfunction (TTE 11/3/22)               III. EF 15-20% w/o DD (July 22)         B. S/p 2022 [place]irm[place] aware BiV ICD                    I.  Complicated by RV lead perforation   2. CKD stage 3b                    I. H/o VIVEK (4/22)                   II. eGFR 42 (2/12/24)      5. Presence of biventricular  ICD    6. Breast neoplasm,  Tis (DCIS), left  Overview:  Added automatically from request for surgery [id number]      7. Metabolic syndrome X  Overview:  A. Hypertension  B. Mixed dyslipidemia  C. Obesity           I.ANDREAS uses CPAP  D. T2DM          II. Peripheral neuropathy      8. Type 2 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    9. Dyspnea on exertion  -     Magnesium; Future  -     Basic Metabolic Panel; Future  -     proBNP; Future  -     Magnesium; Standing  -     Magnesium  -     Cancel: Basic Metabolic Panel; Standing  -     Cancel: Basic Metabolic Panel  -     proBNP; Standing  -     proBNP    10. Polyneuropathy associated with underlying disease    11. Mixed anxiety depressive disorder    12. LBBB (left bundle branch block)    13. Irritable bowel syndrome, unspecified type    14. Pericarditis, unspecified chronicity, unspecified type    15. Hyperkalemia  Overview:  1/25/24 K+ 5.7  8/13/24 @ 5.4    Orders:  -     Magnesium; Future  -     Basic Metabolic Panel; Future  -     proBNP; Future  -     Basic Metabolic Panel; Standing  -     Basic Metabolic Panel  -     Magnesium; Standing  -     Magnesium  -     Cancel: Basic Metabolic Panel; Standing  -     Cancel: Basic Metabolic Panel  -     proBNP; Standing  -     proBNP              Plan/discussion    Volume overload    Heart Failure Core Measures    Type of Overload : unspecified     Most recent EF   EF 56-60% Gr 1 DD (TTE 1-25-24)   A.  Hx EF 20%  (July 22 TTE)  B. (Nov 22 TTE) 60 to 65% w/o mention of DD     [place] Heart association Class & Stage : 1c     HF Meds     Beta Blocker: Metoprolol 100 mg twice daily  ARNI/ACE/ARB: stopped 2-12-24 Entresto 24 to 26 mg BID  SGLT 2 inhibitors: [id number] mg daily  Diuretics upon release from clinic: Lasix 20 mg every other day  Furoscix: Currently not a candidate  Aldosterone Antagonist: None indicated  Digitalis: N/A  Vasodilators & Nitrates: Not indicated     Additionally on hydralazine 75 mg 3 times daily starting  8/7/2024  Cardiac medicines reviewed with risk, benefits, and necessity of each discussed with myself & a [facility].      ____________________________________________________________     Discussion     Heart failure core measures including beta-blocker, ARNI, SGLT2  H/o EF being less than 20% July 22, repeat echo in November 22 EF improved to 60 to 65%.   Repeat TTE 1/25/24 EF 56-60% Gr 1 DD w/ RVSP 35-45 mmHg  She will continue follow-ups with Dr. Murphy as well as Dr. Kerr her nephrologist  Significantly hyperkalemic 2/12/24 with a potassium of 6 renal function is maintaining the creatinine of 1.4 given Lokelma 10 g x 3 days  had BMP done & K+ was WNL ( (3.9) after Lokelma at an ER in [place]  A. Last visit potassium was up to 5.1 and she was placed on Lasix 20mg QOD  B.  6/10/24 potassium is at 4.8.  C.  8/7/2024 potassium is at 5.2            I.  Will be given 1 Lokelma packet           II.  Will need next potassium level 8/13/24 & it was 5.4     Lokelma x 1 dose in HF c;linic  5. Blood pressure until today's visit has not been under good control, however, I was unable to find a lot of room to add medications which would aid in control on the last couple of visits therefore we will have to defer to Dr. Murphy and Cirilo.  But on her 6/10/2024 visit her systolic is up to 168 with a diastolic of 81.  Therefore              A. Will increase her hydralazine to 100 mg twice daily              B.  8/7/2024 for same blood pressure issues we will go ahead and increase her hydralazine to 75 mg 3 times daily  6.  potassium issues seem to have returned  A. We will plan on seeing again in approximately 1 month.  B.  Of note she has had the visit on 8/7/2024  C.  8/7/2024 eGFR 40, BUN 54 creatinine 1.4, potassium 5.2  D/  8/13/24 eGFR 43, BUN 49, creatinine 1.4  7. Has been making a concerted effort to to decrease her potassium dietarily which seems to as of 8/7/2024 not seemingly accomplishing its goal  8.   Only medication  was the kelma would defer however to Dr. Kerr her nephrologist on managing the potassium   only medication change made 8/7/2024 visit was increasing her hydralazine from 75 mg 3 times daily from 100 mg twice daily initiated in June 2024 and her blood pressure seemingly better control  9. Would continue with her ongoing heart failure nursing interventions including:                          A. Fluid restriction at less than 2000 cc daily                          B. Daily weights                          C.  1 g low-sodium diet        I did the following activities preparing for the visit with Susy Hanson  including: reviewing tests, once pt arrived in clinic I also performed a medically appropriate examination and/or evaluation, I personally spent considerable time counseling and educating the patient/family/caregiver, ordering medications, tests, or procedures, referring and communicating with other health care professionals, and documenting information in the medical record. I estimate including preparation 20 minutes              Subjective:     Chief Complaint   Patient presents with    Congestive Heart Failure       ROS:      Constitutional:  weakness, & fatigue remains at what she describes as her normal level ., No fever, rigors, chills   Eyes: No recent vision changes, eye pain   ENT/oropharynx: No recent difficulty swallowing, sore throat, epistaxis, changes in hearing   Cardiovascular: No recent chest pain, chest tightness, palpitations except as noted in HPI, paroxysmal nocturnal dyspnea, orthopnea, diaphoresis, dizziness & no pre or vikram syncopal episodes   Respiratory: No at rest shortness of breath, + dyspnea on exertion she tells me that she would be lying if she still did not occur occasionally, no significant productive cough, no wheezing and no hemoptysis   Gastrointestinal: No abdominal pain, nausea, vomiting, diarrhea, bloody stools   Genitourinary: No hematuria, dysuria other than  increased frequency   Neurological: No headache, tremors, numbness,  or hemiparesis    Musculoskeletal: No change in typical cramps, myalgias,  joint pain, w/o any new joint swelling but does complain of osteoarthritic discomfort particularly in the lower extremities   Integument: No recent rash, no edema      Patient Active Problem List   Diagnosis    Encounter for general adult medical examination without abnormal findings    Essential hypertension    Irritable bowel syndrome    Mixed anxiety depressive disorder    Mixed hyperlipidemia    Obesity    Encounter for annual wellness exam in Medicare patient    Type 2 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    Vitamin D deficiency    Hypertensive urgency    Peripheral neuropathy    ANDREAS on CPAP    Chest pain, atypical    Breast neoplasm, Tis (DCIS), left    Cold intolerance    Insomnia    Breast pain    Dyspnea    h/o Acute kidney injury    LBBB (left bundle branch block)    h/o nonischemic, nondilated, cardiomyopathy    Presence of biventricular  ICD    Hemopericardium    h/o Pericarditis    Pericardial effusion    Metabolic syndrome X    At risk for fluid volume overload    h/o resolved Left ventricular failure    Stage 3b chronic kidney disease    Hyperkalemia    mild PAH (pulmonary artery hypertension)       Past Medical History:   Diagnosis Date    Allergic 2008    Weeds, grasses, mold, mildew, etc.    Anxiety 2000    Breast cancer     LEFT BREAST 7/2021    Cataract     Had surgery to remove August 2023    CHF (congestive heart failure)     Colon polyp 12/2023    Removed during colonoscopy    Coronary artery disease 2019    CTS (carpal tunnel syndrome) 2015    Surgery on both hands    Depression     DM (diabetes mellitus)     GERD (gastroesophageal reflux disease)     History of medical problems 2008    Bladder prolapse repeat Dr. Roper    HL (hearing loss)     Hyperlipidemia     Hypertension     SAW CARDIO/ STRESS TEST 2020 - NOW MEDS  MANAGED BY PCP     Irritable bowel syndrome 2006    Peripheral neuropathy     PONV (postoperative nausea and vomiting)     Renal insufficiency 2020    See Dr. funes for moderate kidney failure    Sleep apnea     WEARS CPAP    Type 2 diabetes mellitus 2008    Urinary tract infection 6/23    UTI seen at [facility]       Past Surgical History:   Procedure Laterality Date    BLADDER REPAIR      BREAST BIOPSY      BREAST RECONSTRUCTION Bilateral 08/26/2021    Procedure: BILATERAL PREPECTORALPLACMENT TISSUE EXPANDERS AND ALLODERM;  Surgeon: Heri Arreaga MD;  Location: [facility] MAIN OR;  Service: Plastics;  Laterality: Bilateral;    BREAST TISSUE EXPANDER REMOVAL INSERTION OF IMPLANT Bilateral 12/23/2021    Procedure: BILATERAL REMOVAL TISSUE EXPANDERS AND PLACEMENT OF IMPLANTS;  Surgeon: Heri Arreaga MD;  Location:  [facility] MAIN OR;  Service: Plastics;  Laterality: Bilateral;    CARDIAC CATHETERIZATION Left 04/13/2022    Procedure: Cardiac Catheterization/Vascular Study;  Surgeon: Christo Murphy MD;  Location:  [facility];  Service: Cardiovascular;  Laterality: Left;    CARDIAC ELECTROPHYSIOLOGY PROCEDURE N/A 09/19/2022    Procedure: ICD implant [place] aware;  Surgeon: Esteban Kendrick MD;  Location:  [facility];  Service: Cardiology;  Laterality: N/A;    CARDIAC ELECTROPHYSIOLOGY PROCEDURE N/A 09/20/2022    Procedure: lead repositioning;  Surgeon: Esteban Kendrick MD;  Location:  [facility];  Service: Cardiology;  Laterality: N/A;    CARDIAC SURGERY  2022    Defibrillator/pacemaker implant    CARDIOVASCULAR STRESS TEST  2020    COLONOSCOPY  11/2012    EYE SURGERY  7/23    Cataracts removed    HAND SURGERY  2015    Both hands    HYSTERECTOMY      INSERT / REPLACE / REMOVE PACEMAKER  2022    LYMPH NODE BIOPSY  8/29/2021    MASTECTOMY W/ SENTINEL NODE BIOPSY Bilateral 08/26/2021    Procedure: bilateral total mastectomy, left axillary sentinel  lymph node biopsy, possible reconstruction.;  Surgeon: Asia Perkins MD;  Location: [facility] MAIN OR;  Service: General;  Laterality: Bilateral;    TRIGGER POINT INJECTION      Left thumb    TUBAL ABDOMINAL LIGATION         Social History     Socioeconomic History    Marital status:    Tobacco Use    Smoking status: Former     Current packs/day: 0.00     Average packs/day: 2.0 packs/day for 36.0 years (72.0 ttl pk-yrs)     Types: Cigarettes     Start date: 1972     Quit date: 2008     Years since quittin.6     Passive exposure: Past    Smokeless tobacco: Never   Vaping Use    Vaping status: Never Used   Substance and Sexual Activity    Alcohol use: No    Drug use: No    Sexual activity: Yes     Partners: Male     Birth control/protection: None, Tubal ligation, Hysterectomy       Allergies   Allergen Reactions    Hydrocodone-Acetaminophen Hives     Lortab, takes tylenol         Current Outpatient Medications:     Accu-Chek FastClix Lancets misc, USE TO CHECK BLOOD GLUCOSE DAILY, Disp: , Rfl:     Accu-Chek Guide test strip, 1 each by Other route Daily. Use daily to check BS E11.9, Disp: 100 each, Rfl: 1    ALPRAZolam (XANAX) 1 MG tablet, Take 1 tablet by mouth 2 (Two) Times a Day., Disp: 60 tablet, Rfl: 0    atorvastatin (LIPITOR) 20 MG tablet, Take 1 tablet by mouth Daily., Disp: 90 tablet, Rfl: 1    Azelastine HCl 137 MCG/SPRAY solution, 2 sprays by Each Nare route Daily., Disp: 90 mL, Rfl: 1    cetirizine (zyrTEC) 10 MG tablet, Take 1 tablet by mouth Daily As Needed., Disp: , Rfl:     Continuous Blood Gluc  (FreeStyle Benton 3 Logsden) device, Use 1 each 4 (Four) Times a Day Before Meals & at Bedtime., Disp: 1 each, Rfl: 0    Continuous Glucose Sensor (FreeStyle Benton 3 Sensor) misc, Use 1 each Every 14 (Fourteen) Days., Disp: 6 each, Rfl: 1    dapagliflozin Propanediol (Farxiga) 10 MG tablet, Take 10 mg by mouth Daily., Disp: 90 tablet, Rfl: 3    dilTIAZem CD (Cardizem CD)  180 MG 24 hr capsule, Take 1 capsule by mouth Daily., Disp: 90 capsule, Rfl: 1    furosemide (Lasix) 20 MG tablet, Take 1 tablet by mouth Daily. (Patient taking differently: Take 1 tablet by mouth Every Other Day.), Disp: 45 tablet, Rfl: 1    glimepiride (Amaryl) 2 MG tablet, Take 1 tablet by mouth Every Morning., Disp: 30 tablet, Rfl: 11    hydrALAZINE (APRESOLINE) 25 MG tablet, Take 3 tablets by mouth 3 (Three) Times a Day., Disp: 90 tablet, Rfl: 3    Insulin Glargine (Lantus SoloStar) 100 UNIT/ML injection pen, Inject 65 Units under the skin into the appropriate area as directed Every Night. (Patient taking differently: Inject 65 Units under the skin into the appropriate area as directed Every Night. Please send future Lantus prescriptions for refills/dose changes to [facility] (see Danilo or Rajani).), Disp: 45 mL, Rfl: 2    Insulin Pen Needle (Pen Needles) 32G X 4 MM misc, Use 1 each 4 (Four) Times a Day Before Meals & at Bedtime., Disp: 200 each, Rfl: 5    metoprolol succinate XL (TOPROL-XL) 100 MG 24 hr tablet, Take 1 tablet by mouth 2 (Two) Times a Day., Disp: 180 tablet, Rfl: 1    ondansetron (Zofran) 4 MG tablet, Take 1 tablet by mouth Every 8 (Eight) Hours As Needed for Nausea or Vomiting., Disp: 20 tablet, Rfl: 0    pantoprazole (PROTONIX) 40 MG EC tablet, Take 1 tablet by mouth Daily., Disp: 90 tablet, Rfl: 3    Umeclidinium-Vilanterol (Anoro Ellipta) 62.5-25 MCG/ACT aerosol powder  inhaler, Inhale 1 puff Daily., Disp: 60 each, Rfl: 5  No current facility-administered medications for this encounter.    Immunization History   Administered Date(s) Administered    Arexvy (RSV, Adults 60+ yrs) 09/22/2023    COVID-19 (PFIZER) BIVALENT 12+YRS 11/04/2022, 09/22/2023    COVID-19 (PFIZER) Purple Cap Monovalent 01/06/2021, 01/27/2021, 09/30/2021, 11/01/2021    Fluad Quad 65+ 09/14/2023    H1N1 Inj 11/03/2009    Hepatitis A 04/29/2018, 11/02/2018    Influenza Injectable Mdck Pf Quad 11/04/2022    Influenza,  Unspecified 11/01/2021, 09/14/2023    Pneumococcal Conjugate 20-Valent (PCV20) 11/30/2023    RSV, unspecified (Vaccine or MAB) 09/22/2023    Tdap 07/26/2022    flucelvax quad pfs =>4 YRS 10/19/2020         Most recent EKG as reviewed:  Procedures     Most recent echo as reviewed:  Results for orders placed during the hospital encounter of 01/30/24    Adult Transthoracic Echo Complete W/ Cont if Necessary Per Protocol    Interpretation Summary    Left ventricular systolic function is normal. Left ventricular ejection fraction appears to be 56 - 60%.    Left ventricular diastolic function is consistent with (grade I) impaired relaxation.    Estimated right ventricular systolic pressure from tricuspid regurgitation is mildly elevated (35-45 mmHg).      Most recent stress test results:  Results for orders placed during the hospital encounter of 04/11/22    Stress Test With Myocardial Perfusion One Day    Interpretation Summary  · This is incomplete Cardiolite imaging study. Stress images were not done because test was aborted. Rest images showed small fixed septal and apical defect. Left ventricle size appear normal. No gated SPECT imaging were noted..      Most recent cardiac catheterization results:  Results for orders placed during the hospital encounter of 04/11/22    Cardiac Catheterization/Vascular Study    Narrative  CARDIAC CATHETERIZATION REPORT      DATE OF PROCEDURE:  4/13/2022    INDICATION FOR PROCEDURE:    Congestive heart failure acute systolic  Dilated cardiomyopathy    PROCEDURE PERFORMED:    Left heart catheterization  coronary angiography  left ventriculography    PROCEDURE COMMENTS:    After informed consent was obtained, the patient was prepped and draped in the usual sterile manner.  Mild to moderate sedation was administered.  Right femoral artery was accessed without difficulty and 6 Slovenian arterial sheath was inserted.  Sheath was flushed with heparinized saline.    Using 6 Slovenian Roberto  catheters, first left coronary artery and the right coronary was electively engaged and appropriate views were taken.  A 6 Mauritian JR4 catheter was used to cross aortic valve and left heart catheterization was performed.  Left ventriculography was done NAQVI view    Patient tolerated the procedure well.    FINDINGS:    1. HEMODYNAMICS:    Aortic pressure: 167/101 mmHg    LVEDP: 10 to 15 mmHg    Gradient across aortic valve on pullback: No gradient across aortic valve      2. LEFT VENTRICULOGRAPHY: 35 to 40%      3. CORONARY ANGIOGRAPHY:    A: Left main coronary artery: Normal    B: Left anterior descending artery: 25% plaque in proximal and mid segment.  No high-grade stenosis    C: Left circumflex coronary artery: Mild diffuse disease in the midsegment but no high-grade stenosis    D: Right coronary artery: 25% plaque in proximal and mid segment of RCA but no high-grade stenosis.  It is dominant vessel    SUMMARY:    Mild coronary artery disease  Left ventricular dysfunction    RECOMMENDATIONS:    Medical treatment.        Historical data copied forward from previous encounters in EMR including the history, exam, and assessment/plan has been reviewed and is unchanged except as I have noted and otherwise indicated.      Objective:         /84   Pulse 79   Resp 16   Wt 81 kg (178 lb 9.6 oz)   LMP  (LMP Unknown)   SpO2 100%   BMI 30.66 kg/m²       General:  Well-developed, mildly overly well-nourished, cooperative, no distress, appears stated age if not slightly older    Neuro:  A&O x3. No significantly obvious focal neuro deficet    Psych:  Pleasant -affect    HENT:  Normocephalic, atraumatic, moist mucous membranes , Normal ear placement,Throat not injected   Eyes:  PERRLA, Conjunctivae not injected, EOM's intact, conjunctiva does not appear significantly injected   Neck:  Supple, somewhat mildly thickened, no lymph adenopathy nor thyromegaly no JVD while sitting in chair nor bruit    Lungs:    Symmetrical  rise & fall of chest with baseline respiratory pattern. To auscultation lungs bilaterally, she has a late phase expiratory wheezes, there is no rhonchi or rales noted    Chest wall:  No significant tenderness when palpated. PMI @ 5th ICS McL  , deflated Left breast, well healed ICD scar    Heart:  Regular rate and rhythm, S1 and S2 normal, no S3 or S4, Gr I/VI systolic ejection murmur best heard at the apex , no obvious rub, click or gallop.    Abdomen:  non-distended, non-tender, bowel sounds noted in the 4 quadrants of the abdomen, without any significant central mass adipose tissue identified    Extremities:  Equal color motion temperature and sensitivity, Rapid capillary refill noted within the nailbeds of fingers and toes bilaterally no lower extremity edema.    Pulses:  2+ and symmetric all extremities, rapid capillary refill    Skin:  No obvious rashes, significant lesions identified, warm dry and of normal turgor      In-Office Procedure(s):  No orders to display        Imaging:    Results for orders placed in visit on 07/31/24    XR Hip With or Without Pelvis 2 - 3 View Left    Narrative  XR HIP W OR WO PELVIS 2-3 VIEW LEFT    Date of Exam: 7/31/2024 10:40 AM EDT    Indication: left hip pain, NKI, pain for 2-3 months, room 16    Comparison: CT the abdomen and pelvis bone windows 10/19/2013.    Findings:  No pelvic fracture. No hip fracture. No hip dislocation. There is mild bilateral hip joint space narrowing, without significant spurring. Each femoral head maintains a normal round configuration, without evidence of avascular necrosis. Mild degenerative  change of the sacroiliac joints. Imaged lower lumbar spine is within normal limits. No osteolytic or osteoblastic abnormality.    Impression  Impression:  Mild degenerative changes of the hips.  Mild degenerative changes of the sacroiliac joints.  No acute findings.      Electronically Signed: Britney Roman MD  8/1/2024 11:16 AM EDT  Workstation ID:  [id number]       Results for orders placed during the hospital encounter of 01/11/24    US Abdomen Limited    Narrative  US ABDOMEN LIMITED    Date of Exam: 1/11/2024 10:41 AM EST    Indication: persistent nausea.    Comparison: CT abdomen and pelvis angiography 1/17/2020    Technique: Grayscale and color Doppler ultrasound evaluation of the right upper quadrant was performed.      Findings:  Diffusely increased liver echogenicity compatible with hepatic steatosis. No discrete liver lesion. No perihepatic ascites. Visualized portions of the pancreas are normal. The gallbladder is normal. No biliary dilatation. Common bile duct measures 2 mm.  Hepatopetal flow in the portal vein. Visualized hepatic veins are patent. The right kidney measures 9.1 x 4.1 x 3.7 cm. No right-sided hydronephrosis.    Impression  Impression:  1. Hepatic steatosis.  2. Normal gallbladder.  3. No biliary dilatation.        Electronically Signed: Ottoniel Lee MD  1/11/2024 11:54 AM EST  Workstation ID: [id number]      Results for orders placed during the hospital encounter of 10/03/22    CT Angiogram Chest Pulmonary Embolism    Narrative  Exam: CT Angiography of the Chest.    Date: 10/4/2022.    Comparison: 4/11/2022.    History: Defibrillator placement with chest pain.    Technique: CT examination of the chest was performed following the intravenous administration of 100 mL of Isovue-370. Sagittal, coronal and 3-D reformatted images were provided. CT dose lowering techniques were used, to include: automated exposure  control, adjustment for patient size, and/or use of iterative reconstruction.    FINDINGS:    CHEST WALL: There are bilateral breast implants.    Mediastinum and Theresa: There is no axillary, mediastinal or hilar lymphadenopathy.    Pleural and Pericardial spaces: There is a small to moderate pericardial effusion which is higher density than simple fluid which may relate to a hemopericardium.    Upper Abdomen: The visualized upper  abdomen is unremarkable.    Cardiovascular: AICD generator has leads in the right atrium and right ventricle. There is mild vascular calcification throughout the thoracic aorta without evidence of aneurysmal dilation or dissection.    Pulmonary Artery: There are no filling defects in the pulmonary arteries.    Lung Parenchyma and Airways: The lungs are clear.    Bones: No fracture or aggressive osseous lesion.    Impression  1. No evidence of pulmonary embolism.  2. No evidence of thoracic aortic aneurysm or dissection.  3. Small to moderate amount of hemopericardium.    These findings were discussed with COURTNEY Atkinson.      Electronically signed by:  Amish Ellsworth D.O.  10/4/2022 1:51 AM      Lab Review:   Hospital Outpatient Visit on 08/13/2024   Component Date Value    Glucose 08/13/2024 156 (H)     BUN 08/13/2024 49 (H)     Creatinine 08/13/2024 1.36 (H)     Sodium 08/13/2024 139     Potassium 08/13/2024 5.4 (H)     Chloride 08/13/2024 106     CO2 08/13/2024 22.6     Calcium 08/13/2024 9.7     BUN/Creatinine Ratio 08/13/2024 36.0 (H)     Anion Gap 08/13/2024 10.4     eGFR 08/13/2024 43.0 (L)     Magnesium 08/13/2024 2.4     proBNP 08/13/2024 115.0    Hospital Outpatient Visit on 08/07/2024   Component Date Value    Glucose 08/07/2024 166 (H)     BUN 08/07/2024 54 (H)     Creatinine 08/07/2024 1.44 (H)     Sodium 08/07/2024 142     Potassium 08/07/2024 5.2     Chloride 08/07/2024 104     CO2 08/07/2024 22.6     Calcium 08/07/2024 9.9     BUN/Creatinine Ratio 08/07/2024 37.5 (H)     Anion Gap 08/07/2024 15.4 (H)     eGFR 08/07/2024 40.2 (L)    Lab on 08/05/2024   Component Date Value    Glucose 08/05/2024 166 (H)     BUN 08/05/2024 43 (H)     Creatinine 08/05/2024 1.43 (H)     Sodium 08/05/2024 141     Potassium 08/05/2024 5.7 (H)     Chloride 08/05/2024 106     CO2 08/05/2024 23.4     Calcium 08/05/2024 9.8     Total Protein 08/05/2024 7.8     Albumin 08/05/2024 4.9     ALT (SGPT) 08/05/2024 16     AST  (SGOT) 08/05/2024 13     Alkaline Phosphatase 08/05/2024 115     Total Bilirubin 08/05/2024 0.2     Globulin 08/05/2024 2.9     A/G Ratio 08/05/2024 1.7     BUN/Creatinine Ratio 08/05/2024 30.1 (H)     Anion Gap 08/05/2024 11.6     eGFR 08/05/2024 40.5 (L)     Creatine Kinase 08/05/2024 43     Hemoglobin A1C 08/05/2024 8.48 (H)     Magnesium 08/05/2024 2.5 (H)     Phosphorus 08/05/2024 4.4     PTH, Intact 08/05/2024 120.0 (H)     Uric Acid 08/05/2024 6.6 (H)     Protein/Creatinine Ratio* 08/05/2024 606.1 (H)     Creatinine, Urine 08/05/2024 19.8     Total Protein, Urine 08/05/2024 12.0     25 Hydroxy, Vitamin D 08/05/2024 41.2     Total Cholesterol 08/05/2024 146     Triglycerides 08/05/2024 254 (H)     HDL Cholesterol 08/05/2024 41     LDL Cholesterol  08/05/2024 64     VLDL Cholesterol 08/05/2024 41 (H)     LDL/HDL Ratio 08/05/2024 1.32     WBC 08/05/2024 8.24     RBC 08/05/2024 5.02     Hemoglobin 08/05/2024 12.2     Hematocrit 08/05/2024 42.0     MCV 08/05/2024 83.7     MCH 08/05/2024 24.3 (L)     MCHC 08/05/2024 29.0 (L)     RDW 08/05/2024 18.0 (H)     RDW-SD 08/05/2024 54.1 (H)     MPV 08/05/2024 10.1     Platelets 08/05/2024 251     Neutrophil % 08/05/2024 63.7     Lymphocyte % 08/05/2024 19.7     Monocyte % 08/05/2024 11.4     Eosinophil % 08/05/2024 3.2     Basophil % 08/05/2024 1.0     Immature Grans % 08/05/2024 1.0 (H)     Neutrophils, Absolute 08/05/2024 5.26     Lymphocytes, Absolute 08/05/2024 1.62     Monocytes, Absolute 08/05/2024 0.94 (H)     Eosinophils, Absolute 08/05/2024 0.26     Basophils, Absolute 08/05/2024 0.08     Immature Grans, Absolute 08/05/2024 0.08 (H)     nRBC 08/05/2024 0.0     Color, UA 08/05/2024 Yellow     Appearance, UA 08/05/2024 Clear     pH, UA 08/05/2024 <=5.0     Specific Gravity, UA 08/05/2024 1.010     Glucose, UA 08/05/2024 >=1000 mg/dL (3+) (A)     Ketones, UA 08/05/2024 Negative     Bilirubin, UA 08/05/2024 Negative     Blood, UA 08/05/2024 Negative     Protein, UA  08/05/2024 Negative     Leuk Esterase, UA 08/05/2024 Negative     Nitrite, UA 08/05/2024 Negative     Urobilinogen, UA 08/05/2024 0.2 E.U./dL     RBC, UA 08/05/2024 0-2     WBC, UA 08/05/2024 0-2     Bacteria, UA 08/05/2024 None Seen     Squamous Epithelial Cell* 08/05/2024 0-2     Hyaline Casts, UA 08/05/2024 None Seen     Methodology 08/05/2024 Automated Microscopy    Lab on 07/09/2024   Component Date Value    Hemoglobin A1C 07/09/2024 8.82 (H)     Glucose 07/09/2024 223 (H)     BUN 07/09/2024 37 (H)     Creatinine 07/09/2024 1.45 (H)     Sodium 07/09/2024 139     Potassium 07/09/2024 5.3 (H)     Chloride 07/09/2024 103     CO2 07/09/2024 21.6 (L)     Calcium 07/09/2024 9.8     Total Protein 07/09/2024 7.5     Albumin 07/09/2024 4.5     ALT (SGPT) 07/09/2024 22     AST (SGOT) 07/09/2024 11     Alkaline Phosphatase 07/09/2024 99     Total Bilirubin 07/09/2024 0.3     Globulin 07/09/2024 3.0     A/G Ratio 07/09/2024 1.5     BUN/Creatinine Ratio 07/09/2024 25.5 (H)     Anion Gap 07/09/2024 14.4     eGFR 07/09/2024 40.1 (L)    Hospital Outpatient Visit on 06/10/2024   Component Date Value    Glucose 06/10/2024 274 (H)     BUN 06/10/2024 31 (H)     Creatinine 06/10/2024 1.40 (H)     Sodium 06/10/2024 142     Potassium 06/10/2024 4.8     Chloride 06/10/2024 105     CO2 06/10/2024 25.8     Calcium 06/10/2024 9.7     BUN/Creatinine Ratio 06/10/2024 22.1     Anion Gap 06/10/2024 11.2     eGFR 06/10/2024 41.8 (L)     Magnesium 06/10/2024 2.2     proBNP 06/10/2024 94.1    Hospital Outpatient Visit on 04/01/2024   Component Date Value    Glucose 04/01/2024 238 (H)     BUN 04/01/2024 36 (H)     Creatinine 04/01/2024 1.47 (H)     Sodium 04/01/2024 139     Potassium 04/01/2024 5.7 (H)     Chloride 04/01/2024 102     CO2 04/01/2024 20.0 (L)     Calcium 04/01/2024 10.4     BUN/Creatinine Ratio 04/01/2024 24.5     Anion Gap 04/01/2024 17.0 (H)     eGFR 04/01/2024 39.5 (L)     proBNP 04/01/2024 83.6     Hemoglobin A1C 04/01/2024  7.60 (H)     Protein/Creatinine Ratio* 04/01/2024 384.6 (H)     Creatinine, Urine 04/01/2024 14.3     Total Protein, Urine 04/01/2024 5.5     PTH, Intact 04/01/2024 100.4 (H)     Glucose 04/01/2024 228 (H)     BUN 04/01/2024 34 (H)     Creatinine 04/01/2024 1.48 (H)     Sodium 04/01/2024 138     Potassium 04/01/2024 4.1     Chloride 04/01/2024 101     CO2 04/01/2024 21.0 (L)     Calcium 04/01/2024 9.8     Total Protein 04/01/2024 7.6     Albumin 04/01/2024 4.8     ALT (SGPT) 04/01/2024 20     AST (SGOT) 04/01/2024 14     Alkaline Phosphatase 04/01/2024 86     Total Bilirubin 04/01/2024 0.2     Globulin 04/01/2024 2.8     A/G Ratio 04/01/2024 1.7     BUN/Creatinine Ratio 04/01/2024 23.0     Anion Gap 04/01/2024 16.0 (H)     eGFR 04/01/2024 39.1 (L)     Creatine Kinase 04/01/2024 47     Magnesium 04/01/2024 2.4     Uric Acid 04/01/2024 7.5 (H)     Phosphorus 04/01/2024 4.6 (H)     25 Hydroxy, Vitamin D 04/01/2024 33.3     Color, UA 04/01/2024 Yellow     Appearance, UA 04/01/2024 Clear     pH, UA 04/01/2024 <=5.0     Specific Gravity, UA 04/01/2024 1.010     Glucose, UA 04/01/2024 >=1000 mg/dL (3+) (A)     Ketones, UA 04/01/2024 Negative     Bilirubin, UA 04/01/2024 Negative     Blood, UA 04/01/2024 Negative     Protein, UA 04/01/2024 Negative     Leuk Esterase, UA 04/01/2024 Moderate (2+) (A)     Nitrite, UA 04/01/2024 Negative     Urobilinogen, UA 04/01/2024 0.2 E.U./dL     WBC 04/01/2024 4.90     RBC 04/01/2024 4.39     Hemoglobin 04/01/2024 12.0     Hematocrit 04/01/2024 39.9     MCV 04/01/2024 90.9     MCH 04/01/2024 27.3     MCHC 04/01/2024 30.1 (L)     RDW 04/01/2024 14.2     RDW-SD 04/01/2024 47.3     MPV 04/01/2024 10.6     Platelets 04/01/2024 243     RBC, UA 04/01/2024 None Seen     WBC, UA 04/01/2024 21-50 (A)     Bacteria, UA 04/01/2024 None Seen     Squamous Epithelial Cell* 04/01/2024 3-6 (A)     Hyaline Casts, UA 04/01/2024 0-2     Methodology 04/01/2024 Automated Microscopy    Hospital Outpatient Visit  "on 03/04/2024   Component Date Value    Glucose 03/04/2024 185 (H)     BUN 03/04/2024 33 (H)     Creatinine 03/04/2024 1.46 (H)     Sodium 03/04/2024 144     Potassium 03/04/2024 4.8     Chloride 03/04/2024 107     CO2 03/04/2024 24.0     Calcium 03/04/2024 9.9     BUN/Creatinine Ratio 03/04/2024 22.6     Anion Gap 03/04/2024 13.0     eGFR 03/04/2024 39.8 (L)     proBNP 03/04/2024 49.3    Hospital Outpatient Visit on 02/19/2024   Component Date Value    Glucose 02/19/2024 141 (H)     BUN 02/19/2024 23     Creatinine 02/19/2024 1.39 (H)     Sodium 02/19/2024 143     Potassium 02/19/2024 5.1     Chloride 02/19/2024 106     CO2 02/19/2024 26.0     Calcium 02/19/2024 10.0     BUN/Creatinine Ratio 02/19/2024 16.5     Anion Gap 02/19/2024 11.0     eGFR 02/19/2024 42.2 (L)     Magnesium 02/19/2024 2.1     proBNP 02/19/2024 166.2      Recent labs reviewed and interpreted for clinical significance and application    Susy and I went over each of her labs while she was still here in the heart failure clinic she is requesting Dr. Kerr manage her potassium          It is a pleasure to be involved in this patient's cardiovascular care relating to their heart failure.  Please feel free to call me with any questions or concerns.    Rodney \"Renan\" Clay LEDEZMA, [facility]  Heart failure program clinical provider    Rodney Williamson, BRISA   08/12/24  .  "

## 2024-08-13 NOTE — PROGRESS NOTES
[facility] HEART FAILURE CLINIC - PHARMACY SERVICE    Patient Name: Susy Hanson  :1960  Age: 64 y.o.  Sex: female  Primary Cardiologist: Jeffrey  Nephrology: Kiana (24)  PCP: KENIA Avila     HFpEF with EF 56-60% (Last Echo: 2/3/24).    NYHA Class I C     ICD: yes    CKD: Stage 3b eGFR 30-44 mL/min    BMP          2024    09:23 2024    10:09 2024    09:18   BMP   BUN 37  43  54    Creatinine 1.45  1.43  1.44    Sodium 139  141  142    Potassium 5.3  5.7  5.2    Chloride 103  106  104    CO2 21.6  23.4  22.6    Calcium 9.8  9.8  9.9        Lab Results   Component Value Date    EGFR 40.2 (L) 2024    EGFR 40.5 (L) 2024    EGFR 40.1 (L) 2024       Lab Results   Component Value Date    PROBNP 94.1 06/10/2024    PROBNP 83.6 2024    PROBNP 49.3 2024       Recent Vitals         2024       BP: 169/87 167/91 151/84     Pulse: 62 65 79     Weight: 79.5 kg (175 lb 3.2 oz) 79.4 kg (175 lb) 81 kg (178 lb 9.6 oz)     BMI (Calculated): 30.1 30 30.6              -CHF-specific BB:      Pre Visit Dose:  Metoprolol succinate  mg BID    Post Visit Dose:  Metoprolol succinate  mg BID     - Target Dose: Metoprolol succinate  mg PO daily.     - Goal HR of 50s to 60s.     - Patient should be seen every 10 to 14 days for a pulse check with plans for up-titration until target heart rate is achieved.        -ACE/ARB/ARNI:     Pre Visit Dose: None due to Renal and hyperkalemia    Post Visit Dose: None due to Renal and hyperkalemia        -SGLT2 inhibitor therapy:    Pre Visit Dose: Dapagliflozin 10 mg PO daily    Post Visit Dose: Dapagliflozin 10 mg PO daily    - Target Dose: Dapagliflozin 10 mg PO daily : CrCl > 20 mL/min which has shown benefit in patients with HF       -MRA:     Pre Visit Dose: None due to Renal and hyperkalemia    Post Visit Dose: None due to Renal and hyperkalemia      - K is not < 5 mEq/L and SCr is </= 2  mg/dL in female and eGFR > 30 mL/min/1.73m3    Lab Results   Component Value Date    K 5.2 08/07/2024       Lab Results   Component Value Date    CREATININE 1.44 (H) 08/07/2024       -DIURETIC:     Pre Visit Dose: Furosemide 20 mg QOD    Post Visit Dose: Furosemide 20 mg QOD      -MAGNESIUM:     Mag is > 2 mg/dL    Lab Results   Component Value Date    MG 2.5 (H) 08/05/2024    MG 2.2 06/10/2024    MG 2.4 04/01/2024       Pre Visit Dose: None    Post Visit Dose: None      -ANTICOAGULATION:     None     -OTHER CV MEDS:     Pre Visit Dose: atorvastatin 20 mg daily, diltiazem 180 mg daily, and hydralazine 75 mg TID    Post Visit Dose: no changes      -Clinic Administered Medications:     Lokelma 10 gm x 1 , provided sample         Patient Assistance:         Farxiga Approved     -Will research patient assistance for umeclidinium - vilanterol inhaler due to cost        PLAN:  Initiation/Discontinuation/Dose Adjustment: No med changes  Education provided: none  Coordination of Care: Will research umeclidinium-vilanterol inhaler  Refills: none      Thank you for allowing me to participate in the care of your patient.    Orion Miles, PharmD  [facility] Failure [facility]  08/13/24  11:45 EDT

## 2024-08-15 ENCOUNTER — TREATMENT (OUTPATIENT)
Dept: PHYSICAL THERAPY | Facility: CLINIC | Age: 64
End: 2024-08-15
Payer: MEDICARE

## 2024-08-15 DIAGNOSIS — M70.62 GREATER TROCHANTERIC BURSITIS OF LEFT HIP: ICD-10-CM

## 2024-08-15 DIAGNOSIS — M25.552 LEFT HIP PAIN: Primary | ICD-10-CM

## 2024-08-15 PROCEDURE — 97110 THERAPEUTIC EXERCISES: CPT | Performed by: PHYSICAL THERAPIST

## 2024-08-15 PROCEDURE — 97140 MANUAL THERAPY 1/> REGIONS: CPT | Performed by: PHYSICAL THERAPIST

## 2024-08-15 NOTE — PROGRESS NOTES
Physical Therapy Daily Treatment Note  [facility]  [address] [place] [address]     Patient: Susy Hanson   : 1960   Referring practitioner: Raúl Bhandari DO  Date of initial visit: Type: THERAPY  Noted: 2024   Today's date: 8/15/2024   Patient seen for 2 visits    Visit Diagnoses:    ICD-10-CM ICD-9-CM   1. Left hip pain  M25.552 719.45   2. Greater trochanteric bursitis of left hip  M70.62 726.5       Subjective   Pt reports: HEP going well, feels pain symptoms improving.      Objective     See Exercise, Manual, and Modality Logs for complete treatment.     Patient Education: HEP    Assessment/Plan Pain symptoms improving, tolerating rx progression.      Progress per Plan of Care            Timed:         Manual Therapy:    10     mins  98110;     Therapeutic Exercise:   15   mins  43493;     Neuromuscular Ronna:        mins  13950;    Therapeutic Activity:          mins  18526;     Gait Training:           mins  73303;     Ultrasound:          mins  38864;    Ionto                                   mins   20105  Self Care                            mins   86201    Un-Timed:  Electrical Stimulation:         mins  95906 ( );  Traction          mins 68386  Low Eval          Mins  40693  Mod Eval          Mins  41501  High Eval                            Mins  71280  Re-Eval                               mins  82807    Timed Treatment:   25   mins   Total Treatment:     25   mins      Oni Orellana, PT, DPT, OCS  [place] license: [id number]  Physical Therapist

## 2024-08-16 ENCOUNTER — TELEPHONE (OUTPATIENT)
Dept: PHARMACY | Facility: HOSPITAL | Age: 64
End: 2024-08-16
Payer: MEDICARE

## 2024-08-16 NOTE — TELEPHONE ENCOUNTER
Heart Failure Management  Prior Authorization Pending Determination     Signed patient up on the waitlist for the Assistance Fund (TAF) for COPD for the Anoro Ellipta.       Approval Start Date: 8/16/24          Orion Miles PharmD, [facility]  Ambulatory Care Clinical Pharmacist  8/16/2024  11:51 EDT

## 2024-08-22 ENCOUNTER — TRANSCRIBE ORDERS (OUTPATIENT)
Dept: ADMINISTRATIVE | Facility: HOSPITAL | Age: 64
End: 2024-08-22
Payer: MEDICARE

## 2024-08-22 ENCOUNTER — LAB (OUTPATIENT)
Dept: LAB | Facility: HOSPITAL | Age: 64
End: 2024-08-22
Payer: MEDICARE

## 2024-08-22 ENCOUNTER — HOSPITAL ENCOUNTER (OUTPATIENT)
Dept: RESPIRATORY THERAPY | Facility: HOSPITAL | Age: 64
Discharge: HOME | End: 2024-08-22
Payer: MEDICARE

## 2024-08-22 VITALS — RESPIRATION RATE: 12 BRPM | OXYGEN SATURATION: 98 % | HEART RATE: 66 BPM

## 2024-08-22 DIAGNOSIS — N18.32 CHRONIC KIDNEY DISEASE (CKD) STAGE G3B/A1, MODERATELY DECREASED GLOMERULAR FILTRATION RATE (GFR) BETWEEN 30-44 ML/MIN/1.73 SQUARE METER AND ALBUMINURIA CREATININE RATIO LESS THAN 30 MG/G (CMS/H*: ICD-10-CM

## 2024-08-22 DIAGNOSIS — N18.32 CHRONIC KIDNEY DISEASE (CKD) STAGE G3B/A1, MODERATELY DECREASED GLOMERULAR FILTRATION RATE (GFR) BETWEEN 30-44 ML/MIN/1.73 SQUARE METER AND ALBUMINURIA CREATININE RATIO LESS THAN 30 MG/G (CMS/H*: Primary | ICD-10-CM

## 2024-08-22 DIAGNOSIS — J44.9 CHRONIC OBSTRUCTIVE PULMONARY DISEASE, UNSPECIFIED COPD TYPE: ICD-10-CM

## 2024-08-22 LAB
ANION GAP SERPL CALCULATED.3IONS-SCNC: 12.6 MMOL/L (ref 5–15)
BUN SERPL-MCNC: 46 MG/DL (ref 8–23)
BUN/CREAT SERPL: 28 (ref 7–25)
CALCIUM SPEC-SCNC: 10.2 MG/DL (ref 8.6–10.5)
CHLORIDE SERPL-SCNC: 103 MMOL/L (ref 98–107)
CO2 SERPL-SCNC: 24.4 MMOL/L (ref 22–29)
CREAT SERPL-MCNC: 1.64 MG/DL (ref 0.57–1)
EGFRCR SERPLBLD CKD-EPI 2021: 34.4 ML/MIN/1.73
GLUCOSE SERPL-MCNC: 150 MG/DL (ref 65–99)
POTASSIUM SERPL-SCNC: 5.8 MMOL/L (ref 3.5–5.2)
SODIUM SERPL-SCNC: 140 MMOL/L (ref 136–145)

## 2024-08-22 PROCEDURE — 94726 PLETHYSMOGRAPHY LUNG VOLUMES: CPT

## 2024-08-22 PROCEDURE — 94799 UNLISTED PULMONARY SVC/PX: CPT

## 2024-08-22 PROCEDURE — 80048 BASIC METABOLIC PNL TOTAL CA: CPT

## 2024-08-22 PROCEDURE — 94060 EVALUATION OF WHEEZING: CPT

## 2024-08-22 PROCEDURE — 94664 DEMO&/EVAL PT USE INHALER: CPT

## 2024-08-22 PROCEDURE — 36415 COLL VENOUS BLD VENIPUNCTURE: CPT

## 2024-08-22 PROCEDURE — 94729 DIFFUSING CAPACITY: CPT

## 2024-08-22 RX ORDER — ALBUTEROL SULFATE 90 UG/1
2 AEROSOL, METERED RESPIRATORY (INHALATION) ONCE
Status: COMPLETED | OUTPATIENT
Start: 2024-08-22 | End: 2024-08-22

## 2024-08-22 RX ADMIN — ALBUTEROL SULFATE 2 PUFF: 108 AEROSOL, METERED RESPIRATORY (INHALATION) at 08:18

## 2024-08-26 ENCOUNTER — TRANSCRIBE ORDERS (OUTPATIENT)
Dept: ADMINISTRATIVE | Facility: HOSPITAL | Age: 64
End: 2024-08-26
Payer: MEDICARE

## 2024-08-26 ENCOUNTER — TELEPHONE (OUTPATIENT)
Dept: CARDIOLOGY | Facility: CLINIC | Age: 64
End: 2024-08-26

## 2024-08-26 ENCOUNTER — LAB (OUTPATIENT)
Dept: LAB | Facility: HOSPITAL | Age: 64
End: 2024-08-26
Payer: MEDICARE

## 2024-08-26 DIAGNOSIS — E11.8 DIABETIC COMPLICATION: ICD-10-CM

## 2024-08-26 DIAGNOSIS — N18.32 CHRONIC KIDNEY DISEASE (CKD) STAGE G3B/A1, MODERATELY DECREASED GLOMERULAR FILTRATION RATE (GFR) BETWEEN 30-44 ML/MIN/1.73 SQUARE METER AND ALBUMINURIA CREATININE RATIO LESS THAN 30 MG/G (CMS/H*: ICD-10-CM

## 2024-08-26 DIAGNOSIS — E87.5 HYPERPOTASSEMIA: ICD-10-CM

## 2024-08-26 DIAGNOSIS — N18.32 CHRONIC KIDNEY DISEASE (CKD) STAGE G3B/A1, MODERATELY DECREASED GLOMERULAR FILTRATION RATE (GFR) BETWEEN 30-44 ML/MIN/1.73 SQUARE METER AND ALBUMINURIA CREATININE RATIO LESS THAN 30 MG/G (CMS/H*: Primary | ICD-10-CM

## 2024-08-26 DIAGNOSIS — E87.5 HYPERPOTASSEMIA: Primary | ICD-10-CM

## 2024-08-26 LAB
ANION GAP SERPL CALCULATED.3IONS-SCNC: 12 MMOL/L (ref 5–15)
BUN SERPL-MCNC: 48 MG/DL (ref 8–23)
BUN/CREAT SERPL: 35 (ref 7–25)
CALCIUM SPEC-SCNC: 9.6 MG/DL (ref 8.6–10.5)
CHLORIDE SERPL-SCNC: 106 MMOL/L (ref 98–107)
CO2 SERPL-SCNC: 22 MMOL/L (ref 22–29)
CREAT SERPL-MCNC: 1.37 MG/DL (ref 0.57–1)
EGFRCR SERPLBLD CKD-EPI 2021: 42.7 ML/MIN/1.73
GLUCOSE SERPL-MCNC: 199 MG/DL (ref 65–99)
POTASSIUM SERPL-SCNC: 5 MMOL/L (ref 3.5–5.2)
SODIUM SERPL-SCNC: 140 MMOL/L (ref 136–145)

## 2024-08-26 PROCEDURE — 80048 BASIC METABOLIC PNL TOTAL CA: CPT

## 2024-08-26 PROCEDURE — 36415 COLL VENOUS BLD VENIPUNCTURE: CPT

## 2024-08-27 ENCOUNTER — TRANSCRIBE ORDERS (OUTPATIENT)
Dept: ADMINISTRATIVE | Facility: HOSPITAL | Age: 64
End: 2024-08-27
Payer: MEDICARE

## 2024-08-27 DIAGNOSIS — E87.5 HYPERPOTASSEMIA: Primary | ICD-10-CM

## 2024-08-29 ENCOUNTER — LAB (OUTPATIENT)
Dept: LAB | Facility: HOSPITAL | Age: 64
End: 2024-08-29
Payer: MEDICARE

## 2024-08-29 DIAGNOSIS — E87.5 HYPERPOTASSEMIA: ICD-10-CM

## 2024-08-29 LAB
ANION GAP SERPL CALCULATED.3IONS-SCNC: 10 MMOL/L (ref 5–15)
BUN SERPL-MCNC: 45 MG/DL (ref 8–23)
BUN/CREAT SERPL: 31.5 (ref 7–25)
CALCIUM SPEC-SCNC: 9.8 MG/DL (ref 8.6–10.5)
CHLORIDE SERPL-SCNC: 103 MMOL/L (ref 98–107)
CO2 SERPL-SCNC: 25 MMOL/L (ref 22–29)
CREAT SERPL-MCNC: 1.43 MG/DL (ref 0.57–1)
EGFRCR SERPLBLD CKD-EPI 2021: 40.5 ML/MIN/1.73
GLUCOSE SERPL-MCNC: 212 MG/DL (ref 65–99)
POTASSIUM SERPL-SCNC: 4.7 MMOL/L (ref 3.5–5.2)
SODIUM SERPL-SCNC: 138 MMOL/L (ref 136–145)

## 2024-08-29 PROCEDURE — 80048 BASIC METABOLIC PNL TOTAL CA: CPT

## 2024-08-29 PROCEDURE — 36415 COLL VENOUS BLD VENIPUNCTURE: CPT

## 2024-09-01 RX ORDER — ATORVASTATIN CALCIUM 20 MG/1
20 TABLET, FILM COATED ORAL DAILY
Qty: 90 TABLET | Refills: 1 | Status: SHIPPED | OUTPATIENT
Start: 2024-09-01

## 2024-09-03 NOTE — PROGRESS NOTES
Date of Office Visit: 2024  Encounter Provider: Dr. Christo Murphy  Place of Service: [facility] CARDIOLOGY [place]  Patient Name: Susy Hanson  :1960  Erica Avila MD    Chief Complaint   Patient presents with    Congestive Heart Failure    Coronary Artery Disease    Hypertension    Diabetes    Follow-up     History of Present Illness    I am pleased to see Mrs. Hanson as a follow-up.    I am pleased see Mrs. Hanson in my office today as a follow-up     As you know, patient is a 64-year-old white female whose past medical history is significant for hypertension, diabetes mellitus, who came today for follow-up.     In 2020, patient was admitted in the hospital with blood pressure greater than 200.  She underwent stress test which was negative for ischemia.  EKG showed left bundle branch block pattern.  MRI of the brain was unremarkable.  CT angiography to rule out renal artery stenosis was done and it was negative.  Patient antihypertensive regimen was adjusted and was discharged.     In 2021, patient was admitted at [facility] with symptom of shortness of breath and accelerated hypertension.  She was noted to be in congestive heart failure.  Echocardiogram showed ejection fraction of 25%.  Patient underwent cardiac catheterization and no significant coronary artery disease was noted.  Patient was started on Entresto and Toprol-XL.  She was discharged home.    In 2022, patient was referred for ICD.  Patient underwent biventricular ICD implant.  Unfortunately patient required repositioning of RV lead due to increased impedance.  Postprocedure patient had chest pain and echocardiogram showed pericardial effusion.  Patient was treated with colchicine and NSAID.  In 2022, repeat echocardiogram showed normal left ventricular size and function with a EF of 60 to 65%.  Trace pericardial effusion noted.    In 2024, patient  underwent echocardiogram which showed normal left ventricular size and function with a EF of 55 to 60%.  No pericardial effusion noted.    Patient came today for follow-up.  Patient blood pressure is elevated.  She denies any chest pain.  Patient denies any orthopnea, PND, syncope or presyncope.  No leg edema noted.    Pacemaker is remotely checked and it is functioning appropriately.    Blood pressure is elevated.  Entresto has been discontinued because of hyperkalemia.  I would increase hydralazine to 100 mg..  Patient is advised to discuss with PCP for treatment of anxiety and depression with SSRI.      Past Medical History:   Diagnosis Date    Allergic 2008    Weeds, grasses, mold, mildew, etc.    Anxiety 2000    Breast cancer     LEFT BREAST 7/2021    Cataract     Had surgery to remove August 2023    CHF (congestive heart failure)     Colon polyp 12/2023    Removed during colonoscopy    Coronary artery disease 2019    CTS (carpal tunnel syndrome) 2015    Surgery on both hands    Depression     DM (diabetes mellitus)     GERD (gastroesophageal reflux disease)     History of medical problems 2008    Bladder prolapse repeat Dr. Roper    HL (hearing loss)     Hyperlipidemia     Hypertension     SAW CARDIO/ STRESS TEST 2020 - NOW MEDS MANAGED BY PCP     Irritable bowel syndrome 2006    Peripheral neuropathy     PONV (postoperative nausea and vomiting)     Renal insufficiency 2020    See Dr. funes for moderate kidney failure    Sleep apnea     WEARS CPAP    Type 2 diabetes mellitus 2008    Urinary tract infection 6/23    UTI seen at [facility]         Past Surgical History:   Procedure Laterality Date    BLADDER REPAIR      BREAST BIOPSY      BREAST RECONSTRUCTION Bilateral 08/26/2021    Procedure: BILATERAL PREPECTORALPLACMENT TISSUE EXPANDERS AND ALLODERM;  Surgeon: Heri Arreaga MD;  Location: [facility];  Service: Plastics;  Laterality: Bilateral;    BREAST TISSUE EXPANDER REMOVAL INSERTION OF  IMPLANT Bilateral 12/23/2021    Procedure: BILATERAL REMOVAL TISSUE EXPANDERS AND PLACEMENT OF IMPLANTS;  Surgeon: Heri Arreaga MD;  Location: [facility] MAIN OR;  Service: Plastics;  Laterality: Bilateral;    CARDIAC CATHETERIZATION Left 04/13/2022    Procedure: Cardiac Catheterization/Vascular Study;  Surgeon: Christo Murphy MD;  Location:  [facility];  Service: Cardiovascular;  Laterality: Left;    CARDIAC ELECTROPHYSIOLOGY PROCEDURE N/A 09/19/2022    Procedure: ICD implant [place] aware;  Surgeon: Esteban Kendrick MD;  Location:  [facility];  Service: Cardiology;  Laterality: N/A;    CARDIAC ELECTROPHYSIOLOGY PROCEDURE N/A 09/20/2022    Procedure: lead repositioning;  Surgeon: Esteban Kendrick MD;  Location:  [facility];  Service: Cardiology;  Laterality: N/A;    CARDIAC SURGERY  2022    Defibrillator/pacemaker implant    CARDIOVASCULAR STRESS TEST  2020    COLONOSCOPY  11/2012    EYE SURGERY  7/23    Cataracts removed    HAND SURGERY  2015    Both hands    HYSTERECTOMY      INSERT / REPLACE / REMOVE PACEMAKER  2022    LYMPH NODE BIOPSY  8/29/2021    MASTECTOMY W/ SENTINEL NODE BIOPSY Bilateral 08/26/2021    Procedure: bilateral total mastectomy, left axillary sentinel lymph node biopsy, possible reconstruction.;  Surgeon: Asia Perkins MD;  Location: [facility];  Service: General;  Laterality: Bilateral;    TRIGGER POINT INJECTION  2012    Left thumb    TUBAL ABDOMINAL LIGATION  1981           Current Outpatient Medications:     Accu-Chek FastClix Lancets misc, USE TO CHECK BLOOD GLUCOSE DAILY, Disp: , Rfl:     Accu-Chek Guide test strip, 1 each by Other route Daily. Use daily to check BS E11.9, Disp: 100 each, Rfl: 1    ALPRAZolam (XANAX) 1 MG tablet, Take 1 tablet by mouth 2 (Two) Times a Day., Disp: 60 tablet, Rfl: 0    atorvastatin (LIPITOR) 20 MG tablet, TAKE ONE TABLET BY MOUTH EVERY DAY, Disp: 90 tablet, Rfl: 1    Azelastine HCl  137 MCG/SPRAY solution, 2 sprays by Each Nare route Daily., Disp: 90 mL, Rfl: 1    cetirizine (zyrTEC) 10 MG tablet, Take 1 tablet by mouth Daily As Needed., Disp: , Rfl:     Continuous Blood Gluc  (FreeStyle [place] 3 [place]) device, Use 1 each 4 (Four) Times a Day Before Meals & at Bedtime., Disp: 1 each, Rfl: 0    Continuous Glucose Sensor (FreeStyle Benton 3 Sensor) misc, Use 1 each Every 14 (Fourteen) Days., Disp: 6 each, Rfl: 1    dapagliflozin Propanediol (Farxiga) 10 MG tablet, Take 10 mg by mouth Daily., Disp: 90 tablet, Rfl: 3    dilTIAZem CD (Cardizem CD) 180 MG 24 hr capsule, Take 1 capsule by mouth Daily., Disp: 90 capsule, Rfl: 1    furosemide (Lasix) 20 MG tablet, Take 1 tablet by mouth Daily. (Patient taking differently: Take 1 tablet by mouth Every Other Day.), Disp: 45 tablet, Rfl: 1    glimepiride (Amaryl) 2 MG tablet, Take 1 tablet by mouth Every Morning., Disp: 30 tablet, Rfl: 11    hydrALAZINE (APRESOLINE) 100 MG tablet, Take 1 tablet by mouth 3 (Three) Times a Day., Disp: 270 tablet, Rfl: 1    Insulin Glargine (Lantus SoloStar) 100 UNIT/ML injection pen, Inject 65 Units under the skin into the appropriate area as directed Every Night. (Patient taking differently: Inject 65 Units under the skin into the appropriate area as directed Every Night. Please send future Lantus prescriptions for refills/dose changes to [facility] (see Danilo or Rajani).), Disp: 45 mL, Rfl: 2    Insulin Pen Needle (Pen Needles) 32G X 4 MM misc, Use 1 each 4 (Four) Times a Day Before Meals & at Bedtime., Disp: 200 each, Rfl: 5    metoprolol succinate XL (TOPROL-XL) 100 MG 24 hr tablet, Take 1 tablet by mouth 2 (Two) Times a Day., Disp: 180 tablet, Rfl: 1    ondansetron (Zofran) 4 MG tablet, Take 1 tablet by mouth Every 8 (Eight) Hours As Needed for Nausea or Vomiting., Disp: 20 tablet, Rfl: 0    pantoprazole (PROTONIX) 40 MG EC tablet, Take 1 tablet by mouth Daily., Disp: 90 tablet, Rfl: 3    sodium zirconium  cyclosilicate (Lokelma) 10 g packet, Mix 1 packet in 45ml of water and drink by mouth today 1 time. Rinse glass with water and drink for full dose., Disp: 5 packet, Rfl: 0    Umeclidinium-Vilanterol (Anoro Ellipta) 62.5-25 MCG/ACT aerosol powder  inhaler, Inhale 1 puff Daily., Disp: 60 each, Rfl: 5      Social History     Socioeconomic History    Marital status:    Tobacco Use    Smoking status: Former     Current packs/day: 0.00     Average packs/day: 2.0 packs/day for 36.0 years (72.0 ttl pk-yrs)     Types: Cigarettes     Start date: 1972     Quit date: 2008     Years since quittin.6     Passive exposure: Past    Smokeless tobacco: Never   Vaping Use    Vaping status: Never Used   Substance and Sexual Activity    Alcohol use: No    Drug use: No    Sexual activity: Yes     Partners: Male     Birth control/protection: None, Tubal ligation, Hysterectomy         Review of Systems   Constitutional: Negative for chills and fever.   HENT:  Negative for ear discharge and nosebleeds.    Eyes:  Negative for discharge and redness.   Cardiovascular:  Negative for chest pain, orthopnea, palpitations, paroxysmal nocturnal dyspnea and syncope.   Respiratory:  Negative for cough, shortness of breath and wheezing.    Endocrine: Negative for heat intolerance.   Skin:  Negative for rash.   Musculoskeletal:  Negative for arthritis and myalgias.   Gastrointestinal:  Negative for abdominal pain, melena, nausea and vomiting.   Genitourinary:  Negative for dysuria and hematuria.   Neurological:  Negative for dizziness, light-headedness, numbness and tremors.   Psychiatric/Behavioral:  Negative for depression. The patient is not nervous/anxious.        Procedures      ECG 12 Lead    Date/Time: 2024 10:15 AM  Performed by: Christo Murphy MD    Authorized by: Christo Murphy MD  Comparison: compared with previous ECG   Similar to previous ECG  Rhythm: paced    Clinical impression: abnormal EKG          ECG 12 Lead     "(Results Pending)           Objective:    BP (!) 184/85 (BP Location: Right arm, Patient Position: Sitting, Cuff Size: Large Adult)   Pulse 75   Resp 16   Ht 162.6 cm (64.02\")   Wt 80.3 kg (177 lb)   LMP  (LMP Unknown)   SpO2 97%   BMI 30.37 kg/m²         Constitutional:       Appearance: Well-developed.   Eyes:      General: No scleral icterus.        Right eye: No discharge.   HENT:      Head: Normocephalic and atraumatic.   Neck:      Thyroid: No thyromegaly.      Lymphadenopathy: No cervical adenopathy.   Pulmonary:      Effort: Pulmonary effort is normal. No respiratory distress.      Breath sounds: Normal breath sounds. No wheezing. No rales.   Cardiovascular:      Normal rate. Regular rhythm.      No gallop.    Edema:     Peripheral edema absent.   Abdominal:      Tenderness: There is no abdominal tenderness.   Skin:     Findings: No erythema or rash.   Neurological:      Mental Status: Alert and oriented to person, place, and time.             Assessment:       Diagnosis Plan   1. Essential hypertension  ECG 12 Lead      2. Mixed hyperlipidemia  ECG 12 Lead      3. Presence of biventricular  ICD        4. ANDREAS on CPAP        5. Dilated cardiomyopathy        6. Presence of automatic (implantable) cardiac defibrillator  hydrALAZINE (APRESOLINE) 100 MG tablet      7. Essential (primary) hypertension  hydrALAZINE (APRESOLINE) 100 MG tablet               Plan:       MDM:    1.  Hypertension:    I would increase hydralazine to 100 mg every 8.  Monitor the blood pressure at home    2.  Status post BiV ICD:    It is functioning appropriately.  No change in programming    Mg    3.  Mixed hyperlipidemia:    Patient is on Lipitor repeat lipid panel testing recent LDL is 64.    4.  Renal insufficiency:    Recent creatinine is 1.4 but patient has intermittent hyperkalemia.  Continue to monitor patient follow-up with nephrology  "

## 2024-09-04 ENCOUNTER — OFFICE VISIT (OUTPATIENT)
Dept: CARDIOLOGY | Facility: CLINIC | Age: 64
End: 2024-09-04
Payer: MEDICARE

## 2024-09-04 VITALS
DIASTOLIC BLOOD PRESSURE: 85 MMHG | WEIGHT: 177 LBS | BODY MASS INDEX: 30.22 KG/M2 | SYSTOLIC BLOOD PRESSURE: 184 MMHG | OXYGEN SATURATION: 97 % | HEIGHT: 64 IN | HEART RATE: 75 BPM | RESPIRATION RATE: 16 BRPM

## 2024-09-04 DIAGNOSIS — I10 ESSENTIAL (PRIMARY) HYPERTENSION: ICD-10-CM

## 2024-09-04 DIAGNOSIS — Z95.810 PRESENCE OF AUTOMATIC (IMPLANTABLE) CARDIAC DEFIBRILLATOR: ICD-10-CM

## 2024-09-04 DIAGNOSIS — I10 ESSENTIAL HYPERTENSION: Primary | ICD-10-CM

## 2024-09-04 DIAGNOSIS — E78.2 MIXED HYPERLIPIDEMIA: ICD-10-CM

## 2024-09-04 DIAGNOSIS — G47.33 OSA ON CPAP: ICD-10-CM

## 2024-09-04 DIAGNOSIS — I42.0 DILATED CARDIOMYOPATHY: ICD-10-CM

## 2024-09-04 DIAGNOSIS — Z95.810 PRESENCE OF BIVENTRICULAR IMPLANTABLE CARDIOVERTER-DEFIBRILLATOR (ICD): Chronic | ICD-10-CM

## 2024-09-04 PROCEDURE — 3079F DIAST BP 80-89 MM HG: CPT | Performed by: INTERNAL MEDICINE

## 2024-09-04 PROCEDURE — 1160F RVW MEDS BY RX/DR IN RCRD: CPT | Performed by: INTERNAL MEDICINE

## 2024-09-04 PROCEDURE — 99214 OFFICE O/P EST MOD 30 MIN: CPT | Performed by: INTERNAL MEDICINE

## 2024-09-04 PROCEDURE — 93000 ELECTROCARDIOGRAM COMPLETE: CPT | Performed by: INTERNAL MEDICINE

## 2024-09-04 PROCEDURE — 3077F SYST BP >= 140 MM HG: CPT | Performed by: INTERNAL MEDICINE

## 2024-09-04 PROCEDURE — 1159F MED LIST DOCD IN RCRD: CPT | Performed by: INTERNAL MEDICINE

## 2024-09-04 RX ORDER — HYDRALAZINE HYDROCHLORIDE 25 MG/1
75 TABLET, FILM COATED ORAL 3 TIMES DAILY
Qty: 90 TABLET | Refills: 3 | Status: CANCELLED | OUTPATIENT
Start: 2024-09-04

## 2024-09-04 RX ORDER — HYDRALAZINE HYDROCHLORIDE 100 MG/1
100 TABLET, FILM COATED ORAL 3 TIMES DAILY
Qty: 270 TABLET | Refills: 1 | Status: SHIPPED | OUTPATIENT
Start: 2024-09-04

## 2024-09-06 ENCOUNTER — LAB (OUTPATIENT)
Dept: LAB | Facility: HOSPITAL | Age: 64
End: 2024-09-06
Payer: MEDICARE

## 2024-09-06 DIAGNOSIS — E87.5 HYPERPOTASSEMIA: ICD-10-CM

## 2024-09-06 LAB
ANION GAP SERPL CALCULATED.3IONS-SCNC: 9.8 MMOL/L (ref 5–15)
BUN SERPL-MCNC: 35 MG/DL (ref 8–23)
BUN/CREAT SERPL: 28 (ref 7–25)
CALCIUM SPEC-SCNC: 10.1 MG/DL (ref 8.6–10.5)
CHLORIDE SERPL-SCNC: 107 MMOL/L (ref 98–107)
CO2 SERPL-SCNC: 26.2 MMOL/L (ref 22–29)
CREAT SERPL-MCNC: 1.25 MG/DL (ref 0.57–1)
EGFRCR SERPLBLD CKD-EPI 2021: 47.6 ML/MIN/1.73
GLUCOSE SERPL-MCNC: 191 MG/DL (ref 65–99)
POTASSIUM SERPL-SCNC: 5.3 MMOL/L (ref 3.5–5.2)
SODIUM SERPL-SCNC: 143 MMOL/L (ref 136–145)

## 2024-09-06 PROCEDURE — 36415 COLL VENOUS BLD VENIPUNCTURE: CPT

## 2024-09-06 PROCEDURE — 80048 BASIC METABOLIC PNL TOTAL CA: CPT

## 2024-09-10 DIAGNOSIS — F41.8 MIXED ANXIETY DEPRESSIVE DISORDER: ICD-10-CM

## 2024-09-10 RX ORDER — ALPRAZOLAM 1 MG
1 TABLET ORAL 2 TIMES DAILY
Qty: 60 TABLET | Refills: 0 | OUTPATIENT
Start: 2024-09-10

## 2024-09-10 RX ORDER — ALPRAZOLAM 1 MG
1 TABLET ORAL 2 TIMES DAILY
Qty: 60 TABLET | Refills: 0 | Status: SHIPPED | OUTPATIENT
Start: 2024-09-10

## 2024-09-12 ENCOUNTER — TELEPHONE (OUTPATIENT)
Dept: ENDOCRINOLOGY | Facility: CLINIC | Age: 64
End: 2024-09-12
Payer: MEDICARE

## 2024-09-12 NOTE — TELEPHONE ENCOUNTER
Discussed BG with pt. Lows were able to be justified; happened on vacation. Reviewed rule of 15.     Pt would like to discuss how to manage high BG and review labs.     Please advise

## 2024-09-16 ENCOUNTER — LAB (OUTPATIENT)
Dept: LAB | Facility: HOSPITAL | Age: 64
End: 2024-09-16
Payer: MEDICARE

## 2024-09-16 ENCOUNTER — TRANSCRIBE ORDERS (OUTPATIENT)
Dept: ADMINISTRATIVE | Facility: HOSPITAL | Age: 64
End: 2024-09-16
Payer: MEDICARE

## 2024-09-16 DIAGNOSIS — N18.32 CHRONIC KIDNEY DISEASE (CKD) STAGE G3B/A1, MODERATELY DECREASED GLOMERULAR FILTRATION RATE (GFR) BETWEEN 30-44 ML/MIN/1.73 SQUARE METER AND ALBUMINURIA CREATININE RATIO LESS THAN 30 MG/G (CMS/H*: Primary | ICD-10-CM

## 2024-09-16 DIAGNOSIS — N18.32 CHRONIC KIDNEY DISEASE (CKD) STAGE G3B/A1, MODERATELY DECREASED GLOMERULAR FILTRATION RATE (GFR) BETWEEN 30-44 ML/MIN/1.73 SQUARE METER AND ALBUMINURIA CREATININE RATIO LESS THAN 30 MG/G (CMS/H*: ICD-10-CM

## 2024-09-16 LAB
25(OH)D3 SERPL-MCNC: 36.6 NG/ML (ref 30–100)
ANION GAP SERPL CALCULATED.3IONS-SCNC: 12.4 MMOL/L (ref 5–15)
BACTERIA UR QL AUTO: ABNORMAL /HPF
BASOPHILS # BLD AUTO: 0.08 10*3/MM3 (ref 0–0.2)
BASOPHILS NFR BLD AUTO: 1 % (ref 0–1.5)
BILIRUB UR QL STRIP: NEGATIVE
BUN SERPL-MCNC: 33 MG/DL (ref 8–23)
BUN/CREAT SERPL: 25.6 (ref 7–25)
CALCIUM SPEC-SCNC: 10.4 MG/DL (ref 8.6–10.5)
CHLORIDE SERPL-SCNC: 99 MMOL/L (ref 98–107)
CK SERPL-CCNC: 43 U/L (ref 20–180)
CLARITY UR: CLEAR
CO2 SERPL-SCNC: 28.6 MMOL/L (ref 22–29)
COLOR UR: YELLOW
CREAT SERPL-MCNC: 1.29 MG/DL (ref 0.57–1)
CREAT UR-MCNC: 16 MG/DL
DEPRECATED RDW RBC AUTO: 53.8 FL (ref 37–54)
EGFRCR SERPLBLD CKD-EPI 2021: 45.9 ML/MIN/1.73
EOSINOPHIL # BLD AUTO: 0.24 10*3/MM3 (ref 0–0.4)
EOSINOPHIL NFR BLD AUTO: 2.9 % (ref 0.3–6.2)
ERYTHROCYTE [DISTWIDTH] IN BLOOD BY AUTOMATED COUNT: 17.8 % (ref 12.3–15.4)
GLUCOSE SERPL-MCNC: 193 MG/DL (ref 65–99)
GLUCOSE UR STRIP-MCNC: NEGATIVE MG/DL
HCT VFR BLD AUTO: 42.9 % (ref 34–46.6)
HGB BLD-MCNC: 12.4 G/DL (ref 12–15.9)
HGB UR QL STRIP.AUTO: NEGATIVE
HYALINE CASTS UR QL AUTO: ABNORMAL /LPF
IMM GRANULOCYTES # BLD AUTO: 0.11 10*3/MM3 (ref 0–0.05)
IMM GRANULOCYTES NFR BLD AUTO: 1.3 % (ref 0–0.5)
KETONES UR QL STRIP: NEGATIVE
LEUKOCYTE ESTERASE UR QL STRIP.AUTO: ABNORMAL
LYMPHOCYTES # BLD AUTO: 1.66 10*3/MM3 (ref 0.7–3.1)
LYMPHOCYTES NFR BLD AUTO: 20.1 % (ref 19.6–45.3)
MAGNESIUM SERPL-MCNC: 2.2 MG/DL (ref 1.6–2.4)
MCH RBC QN AUTO: 24.1 PG (ref 26.6–33)
MCHC RBC AUTO-ENTMCNC: 28.9 G/DL (ref 31.5–35.7)
MCV RBC AUTO: 83.5 FL (ref 79–97)
MONOCYTES # BLD AUTO: 0.75 10*3/MM3 (ref 0.1–0.9)
MONOCYTES NFR BLD AUTO: 9.1 % (ref 5–12)
NEUTROPHILS NFR BLD AUTO: 5.42 10*3/MM3 (ref 1.7–7)
NEUTROPHILS NFR BLD AUTO: 65.6 % (ref 42.7–76)
NITRITE UR QL STRIP: NEGATIVE
NRBC BLD AUTO-RTO: 0 /100 WBC (ref 0–0.2)
PH UR STRIP.AUTO: 6.5 [PH] (ref 5–8)
PLATELET # BLD AUTO: 268 10*3/MM3 (ref 140–450)
PMV BLD AUTO: 10 FL (ref 6–12)
POTASSIUM SERPL-SCNC: 4.6 MMOL/L (ref 3.5–5.2)
PROT ?TM UR-MCNC: 7.2 MG/DL
PROT UR QL STRIP: NEGATIVE
PROT/CREAT UR: 450 MG/G CREA (ref 0–200)
RBC # BLD AUTO: 5.14 10*6/MM3 (ref 3.77–5.28)
RBC # UR STRIP: ABNORMAL /HPF
REF LAB TEST METHOD: ABNORMAL
SODIUM SERPL-SCNC: 140 MMOL/L (ref 136–145)
SP GR UR STRIP: 1.01 (ref 1–1.03)
SQUAMOUS #/AREA URNS HPF: ABNORMAL /HPF
UROBILINOGEN UR QL STRIP: ABNORMAL
WBC # UR STRIP: ABNORMAL /HPF
WBC NRBC COR # BLD AUTO: 8.26 10*3/MM3 (ref 3.4–10.8)

## 2024-09-16 PROCEDURE — 81001 URINALYSIS AUTO W/SCOPE: CPT

## 2024-09-16 PROCEDURE — 87086 URINE CULTURE/COLONY COUNT: CPT

## 2024-09-16 PROCEDURE — 85025 COMPLETE CBC W/AUTO DIFF WBC: CPT

## 2024-09-16 PROCEDURE — 80048 BASIC METABOLIC PNL TOTAL CA: CPT

## 2024-09-16 PROCEDURE — 82570 ASSAY OF URINE CREATININE: CPT

## 2024-09-16 PROCEDURE — 84156 ASSAY OF PROTEIN URINE: CPT

## 2024-09-16 PROCEDURE — 82306 VITAMIN D 25 HYDROXY: CPT

## 2024-09-16 PROCEDURE — 36415 COLL VENOUS BLD VENIPUNCTURE: CPT

## 2024-09-16 PROCEDURE — 83735 ASSAY OF MAGNESIUM: CPT

## 2024-09-16 PROCEDURE — 82550 ASSAY OF CK (CPK): CPT

## 2024-09-17 LAB — BACTERIA SPEC AEROBE CULT: NO GROWTH

## 2024-09-18 PROCEDURE — 93296 REM INTERROG EVL PM/IDS: CPT | Performed by: NURSE PRACTITIONER

## 2024-09-18 PROCEDURE — 93295 DEV INTERROG REMOTE 1/2/MLT: CPT | Performed by: NURSE PRACTITIONER

## 2024-09-19 ENCOUNTER — HOSPITAL ENCOUNTER (EMERGENCY)
Facility: HOSPITAL | Age: 64
Discharge: HOME | End: 2024-09-19
Attending: EMERGENCY MEDICINE
Payer: MEDICARE

## 2024-09-19 VITALS
HEIGHT: 64 IN | WEIGHT: 179 LBS | RESPIRATION RATE: 17 BRPM | BODY MASS INDEX: 30.56 KG/M2 | HEART RATE: 78 BPM | TEMPERATURE: 97.9 F | OXYGEN SATURATION: 98 % | SYSTOLIC BLOOD PRESSURE: 177 MMHG | DIASTOLIC BLOOD PRESSURE: 86 MMHG

## 2024-09-19 DIAGNOSIS — E11.65 HYPERGLYCEMIA DUE TO DIABETES MELLITUS: ICD-10-CM

## 2024-09-19 DIAGNOSIS — N39.0 URINARY TRACT INFECTION WITHOUT HEMATURIA, SITE UNSPECIFIED: Primary | ICD-10-CM

## 2024-09-19 LAB
ALBUMIN SERPL-MCNC: 4.4 G/DL (ref 3.5–5.2)
ALBUMIN/GLOB SERPL: 1.6 G/DL
ALP SERPL-CCNC: 108 U/L (ref 39–117)
ALT SERPL W P-5'-P-CCNC: 21 U/L (ref 1–33)
ANION GAP SERPL CALCULATED.3IONS-SCNC: 9.6 MMOL/L (ref 5–15)
AST SERPL-CCNC: 17 U/L (ref 1–32)
ATMOSPHERIC PRESS: ABNORMAL MM[HG]
BASE EXCESS BLDV CALC-SCNC: 1.2 MMOL/L (ref -2–2)
BASOPHILS # BLD AUTO: 0.06 10*3/MM3 (ref 0–0.2)
BASOPHILS NFR BLD AUTO: 0.9 % (ref 0–1.5)
BILIRUB SERPL-MCNC: 0.3 MG/DL (ref 0–1.2)
BILIRUB UR QL STRIP: NEGATIVE
BUN SERPL-MCNC: 39 MG/DL (ref 8–23)
BUN/CREAT SERPL: 25.3 (ref 7–25)
CALCIUM SPEC-SCNC: 9 MG/DL (ref 8.6–10.5)
CHLORIDE SERPL-SCNC: 97 MMOL/L (ref 98–107)
CLARITY UR: ABNORMAL
CO2 SERPL-SCNC: 25.4 MMOL/L (ref 22–29)
COLOR UR: YELLOW
CREAT SERPL-MCNC: 1.54 MG/DL (ref 0.57–1)
DEPRECATED RDW RBC AUTO: 56.4 FL (ref 37–54)
EGFRCR SERPLBLD CKD-EPI 2021: 37.1 ML/MIN/1.73
EOSINOPHIL # BLD AUTO: 0.26 10*3/MM3 (ref 0–0.4)
EOSINOPHIL NFR BLD AUTO: 4.1 % (ref 0.3–6.2)
ERYTHROCYTE [DISTWIDTH] IN BLOOD BY AUTOMATED COUNT: 18.2 % (ref 12.3–15.4)
GLOBULIN UR ELPH-MCNC: 2.8 GM/DL
GLUCOSE BLDC GLUCOMTR-MCNC: 304 MG/DL (ref 70–130)
GLUCOSE SERPL-MCNC: 441 MG/DL (ref 65–99)
GLUCOSE UR STRIP-MCNC: ABNORMAL MG/DL
HCO3 BLDV-SCNC: 26.2 MMOL/L (ref 22–26)
HCT VFR BLD AUTO: 38.6 % (ref 34–46.6)
HGB BLD-MCNC: 11.2 G/DL (ref 12–15.9)
HGB UR QL STRIP.AUTO: NEGATIVE
IMM GRANULOCYTES # BLD AUTO: 0.05 10*3/MM3 (ref 0–0.05)
IMM GRANULOCYTES NFR BLD AUTO: 0.8 % (ref 0–0.5)
KETONES UR QL STRIP: NEGATIVE
LEUKOCYTE ESTERASE UR QL STRIP.AUTO: ABNORMAL
LYMPHOCYTES # BLD AUTO: 1.35 10*3/MM3 (ref 0.7–3.1)
LYMPHOCYTES NFR BLD AUTO: 21.3 % (ref 19.6–45.3)
MCH RBC QN AUTO: 24.5 PG (ref 26.6–33)
MCHC RBC AUTO-ENTMCNC: 29 G/DL (ref 31.5–35.7)
MCV RBC AUTO: 84.3 FL (ref 79–97)
MODALITY: ABNORMAL
MONOCYTES # BLD AUTO: 0.76 10*3/MM3 (ref 0.1–0.9)
MONOCYTES NFR BLD AUTO: 12 % (ref 5–12)
NEUTROPHILS NFR BLD AUTO: 3.87 10*3/MM3 (ref 1.7–7)
NEUTROPHILS NFR BLD AUTO: 60.9 % (ref 42.7–76)
NITRITE UR QL STRIP: NEGATIVE
PCO2 BLDV: 41.9 MM HG (ref 41–51)
PH BLDV: 7.4 PH UNITS (ref 7.31–7.41)
PH UR STRIP.AUTO: 5.5 [PH] (ref 5–8)
PLATELET # BLD AUTO: 214 10*3/MM3 (ref 140–450)
PMV BLD AUTO: 10.2 FL (ref 6–12)
PO2 BLDV: 61.1 MM HG (ref 35–42)
POTASSIUM SERPL-SCNC: 4.6 MMOL/L (ref 3.5–5.2)
PROT SERPL-MCNC: 7.2 G/DL (ref 6–8.5)
PROT UR QL STRIP: NEGATIVE
RBC # BLD AUTO: 4.58 10*6/MM3 (ref 3.77–5.28)
SAO2 % BLDCOV: 91.1 % (ref 45–75)
SODIUM SERPL-SCNC: 132 MMOL/L (ref 136–145)
SP GR UR STRIP: 1.01 (ref 1–1.03)
UROBILINOGEN UR QL STRIP: ABNORMAL
WBC NRBC COR # BLD AUTO: 6.35 10*3/MM3 (ref 3.4–10.8)

## 2024-09-19 PROCEDURE — 25810000003 SODIUM CHLORIDE 0.9 % SOLUTION: Performed by: EMERGENCY MEDICINE

## 2024-09-19 PROCEDURE — 82948 REAGENT STRIP/BLOOD GLUCOSE: CPT

## 2024-09-19 PROCEDURE — 99284 EMERGENCY DEPT VISIT MOD MDM: CPT | Performed by: EMERGENCY MEDICINE

## 2024-09-19 PROCEDURE — 85025 COMPLETE CBC W/AUTO DIFF WBC: CPT | Performed by: EMERGENCY MEDICINE

## 2024-09-19 PROCEDURE — 82803 BLOOD GASES ANY COMBINATION: CPT | Performed by: EMERGENCY MEDICINE

## 2024-09-19 PROCEDURE — 36415 COLL VENOUS BLD VENIPUNCTURE: CPT

## 2024-09-19 PROCEDURE — 80053 COMPREHEN METABOLIC PANEL: CPT | Performed by: EMERGENCY MEDICINE

## 2024-09-19 PROCEDURE — 81003 URINALYSIS AUTO W/O SCOPE: CPT | Performed by: EMERGENCY MEDICINE

## 2024-09-19 PROCEDURE — 99283 EMERGENCY DEPT VISIT LOW MDM: CPT

## 2024-09-19 RX ORDER — NITROFURANTOIN 25; 75 MG/1; MG/1
100 CAPSULE ORAL EVERY 12 HOURS SCHEDULED
Status: COMPLETED | OUTPATIENT
Start: 2024-09-19 | End: 2024-09-19

## 2024-09-19 RX ORDER — NITROFURANTOIN 25; 75 MG/1; MG/1
100 CAPSULE ORAL 2 TIMES DAILY
Qty: 14 CAPSULE | Refills: 0 | Status: SHIPPED | OUTPATIENT
Start: 2024-09-19 | End: 2024-09-19

## 2024-09-19 RX ORDER — NITROFURANTOIN 25; 75 MG/1; MG/1
100 CAPSULE ORAL 2 TIMES DAILY
Qty: 14 CAPSULE | Refills: 0 | Status: SHIPPED | OUTPATIENT
Start: 2024-09-19 | End: 2024-09-27

## 2024-09-19 RX ORDER — NITROFURANTOIN 25; 75 MG/1; MG/1
CAPSULE ORAL
Status: DISCONTINUED
Start: 2024-09-19 | End: 2024-09-20 | Stop reason: HOSPADM

## 2024-09-19 RX ADMIN — SODIUM CHLORIDE 1000 ML: 9 INJECTION, SOLUTION INTRAVENOUS at 20:57

## 2024-09-19 RX ADMIN — NITROFURANTOIN MONOHYDRATE/MACROCRYSTALS 100 MG: 75; 25 CAPSULE ORAL at 22:35

## 2024-09-20 PROBLEM — I42.9 CARDIOMYOPATHY: Chronic | Status: ACTIVE | Noted: 2022-09-19

## 2024-09-23 ENCOUNTER — DISEASE STATE MANAGEMENT VISIT (OUTPATIENT)
Facility: HOSPITAL | Age: 64
End: 2024-09-23
Payer: MEDICARE

## 2024-09-23 ENCOUNTER — HOSPITAL ENCOUNTER (OUTPATIENT)
Facility: HOSPITAL | Age: 64
Discharge: HOME | End: 2024-09-23
Payer: MEDICARE

## 2024-09-23 ENCOUNTER — HOSPITAL ENCOUNTER (OUTPATIENT)
Dept: CARDIOLOGY | Facility: HOSPITAL | Age: 64
Discharge: HOME | End: 2024-09-23
Payer: MEDICARE

## 2024-09-23 VITALS
SYSTOLIC BLOOD PRESSURE: 155 MMHG | HEART RATE: 74 BPM | BODY MASS INDEX: 30.79 KG/M2 | RESPIRATION RATE: 16 BRPM | OXYGEN SATURATION: 99 % | DIASTOLIC BLOOD PRESSURE: 78 MMHG | WEIGHT: 179.4 LBS

## 2024-09-23 DIAGNOSIS — E88.810 METABOLIC SYNDROME X: Chronic | ICD-10-CM

## 2024-09-23 DIAGNOSIS — I27.21 PAH (PULMONARY ARTERY HYPERTENSION): Chronic | ICD-10-CM

## 2024-09-23 DIAGNOSIS — Z95.810 PRESENCE OF BIVENTRICULAR IMPLANTABLE CARDIOVERTER-DEFIBRILLATOR (ICD): Chronic | ICD-10-CM

## 2024-09-23 DIAGNOSIS — E87.5 HYPERKALEMIA: ICD-10-CM

## 2024-09-23 DIAGNOSIS — N17.9 ACUTE KIDNEY INJURY: ICD-10-CM

## 2024-09-23 DIAGNOSIS — I31.9 PERICARDITIS, UNSPECIFIED CHRONICITY, UNSPECIFIED TYPE: ICD-10-CM

## 2024-09-23 DIAGNOSIS — R06.09 DYSPNEA ON EXERTION: ICD-10-CM

## 2024-09-23 DIAGNOSIS — Z91.89 AT RISK FOR FLUID VOLUME OVERLOAD: Primary | Chronic | ICD-10-CM

## 2024-09-23 DIAGNOSIS — I42.9 CARDIOMYOPATHY, UNSPECIFIED TYPE: ICD-10-CM

## 2024-09-23 DIAGNOSIS — F41.8 MIXED ANXIETY DEPRESSIVE DISORDER: ICD-10-CM

## 2024-09-23 DIAGNOSIS — E11.49 TYPE 2 DIABETES MELLITUS WITH OTHER DIABETIC NEUROLOGICAL COMPLICATION: ICD-10-CM

## 2024-09-23 DIAGNOSIS — G63 POLYNEUROPATHY ASSOCIATED WITH UNDERLYING DISEASE: ICD-10-CM

## 2024-09-23 DIAGNOSIS — D05.12 BREAST NEOPLASM, TIS (DCIS), LEFT: ICD-10-CM

## 2024-09-23 DIAGNOSIS — I44.7 LBBB (LEFT BUNDLE BRANCH BLOCK): ICD-10-CM

## 2024-09-23 DIAGNOSIS — K58.9 IRRITABLE BOWEL SYNDROME, UNSPECIFIED TYPE: ICD-10-CM

## 2024-09-23 DIAGNOSIS — I50.1 LEFT VENTRICULAR FAILURE, UNSPECIFIED: Chronic | ICD-10-CM

## 2024-09-23 DIAGNOSIS — N18.32 STAGE 3B CHRONIC KIDNEY DISEASE: ICD-10-CM

## 2024-09-23 LAB
ANION GAP SERPL CALCULATED.3IONS-SCNC: 12.5 MMOL/L (ref 5–15)
BASOPHILS # BLD AUTO: 0.1 10*3/MM3 (ref 0–0.2)
BASOPHILS NFR BLD AUTO: 1.2 % (ref 0–1.5)
BUN SERPL-MCNC: 30 MG/DL (ref 8–23)
BUN/CREAT SERPL: 20.1 (ref 7–25)
CALCIUM SPEC-SCNC: 10.1 MG/DL (ref 8.6–10.5)
CHLORIDE SERPL-SCNC: 105 MMOL/L (ref 98–107)
CO2 SERPL-SCNC: 26.5 MMOL/L (ref 22–29)
CREAT SERPL-MCNC: 1.49 MG/DL (ref 0.57–1)
DEPRECATED RDW RBC AUTO: 55.3 FL (ref 37–54)
EGFRCR SERPLBLD CKD-EPI 2021: 38.6 ML/MIN/1.73
EOSINOPHIL # BLD AUTO: 0.61 10*3/MM3 (ref 0–0.4)
EOSINOPHIL NFR BLD AUTO: 7.1 % (ref 0.3–6.2)
ERYTHROCYTE [DISTWIDTH] IN BLOOD BY AUTOMATED COUNT: 18.4 % (ref 12.3–15.4)
GLUCOSE SERPL-MCNC: 132 MG/DL (ref 65–99)
HCT VFR BLD AUTO: 41.8 % (ref 34–46.6)
HGB BLD-MCNC: 12.3 G/DL (ref 12–15.9)
IMM GRANULOCYTES # BLD AUTO: 0.1 10*3/MM3 (ref 0–0.05)
IMM GRANULOCYTES NFR BLD AUTO: 1.2 % (ref 0–0.5)
LYMPHOCYTES # BLD AUTO: 1.34 10*3/MM3 (ref 0.7–3.1)
LYMPHOCYTES NFR BLD AUTO: 15.7 % (ref 19.6–45.3)
MCH RBC QN AUTO: 24.7 PG (ref 26.6–33)
MCHC RBC AUTO-ENTMCNC: 29.4 G/DL (ref 31.5–35.7)
MCV RBC AUTO: 83.9 FL (ref 79–97)
MONOCYTES # BLD AUTO: 1.01 10*3/MM3 (ref 0.1–0.9)
MONOCYTES NFR BLD AUTO: 11.8 % (ref 5–12)
NEUTROPHILS NFR BLD AUTO: 5.4 10*3/MM3 (ref 1.7–7)
NEUTROPHILS NFR BLD AUTO: 63 % (ref 42.7–76)
NRBC BLD AUTO-RTO: 0 /100 WBC (ref 0–0.2)
NT-PROBNP SERPL-MCNC: 106 PG/ML (ref 0–900)
PLATELET # BLD AUTO: 258 10*3/MM3 (ref 140–450)
PMV BLD AUTO: 10.3 FL (ref 6–12)
POTASSIUM SERPL-SCNC: 5.3 MMOL/L (ref 3.5–5.2)
RBC # BLD AUTO: 4.98 10*6/MM3 (ref 3.77–5.28)
SODIUM SERPL-SCNC: 144 MMOL/L (ref 136–145)
WBC NRBC COR # BLD AUTO: 8.56 10*3/MM3 (ref 3.4–10.8)

## 2024-09-23 PROCEDURE — 85025 COMPLETE CBC W/AUTO DIFF WBC: CPT | Performed by: NURSE PRACTITIONER

## 2024-09-23 PROCEDURE — 80048 BASIC METABOLIC PNL TOTAL CA: CPT | Performed by: NURSE PRACTITIONER

## 2024-09-23 PROCEDURE — G0463 HOSPITAL OUTPT CLINIC VISIT: HCPCS

## 2024-09-23 PROCEDURE — 93010 ELECTROCARDIOGRAM REPORT: CPT | Performed by: INTERNAL MEDICINE

## 2024-09-23 PROCEDURE — 83880 ASSAY OF NATRIURETIC PEPTIDE: CPT | Performed by: NURSE PRACTITIONER

## 2024-09-23 PROCEDURE — 93005 ELECTROCARDIOGRAM TRACING: CPT | Performed by: NURSE PRACTITIONER

## 2024-09-23 RX ORDER — FUROSEMIDE 20 MG
20 TABLET ORAL DAILY
COMMUNITY

## 2024-09-23 RX ORDER — DAPAGLIFLOZIN 10 MG/1
10 TABLET, FILM COATED ORAL DAILY
COMMUNITY

## 2024-09-24 LAB
QT INTERVAL: 426 MS
QTC INTERVAL: 485 MS

## 2024-09-25 ENCOUNTER — HOSPITAL ENCOUNTER (OUTPATIENT)
Dept: CARDIOLOGY | Facility: HOSPITAL | Age: 64
Discharge: HOME | End: 2024-09-25
Payer: MEDICARE

## 2024-09-25 ENCOUNTER — HOSPITAL ENCOUNTER (OUTPATIENT)
Facility: HOSPITAL | Age: 64
Discharge: HOME | End: 2024-09-25
Payer: MEDICARE

## 2024-09-25 ENCOUNTER — TELEPHONE (OUTPATIENT)
Facility: HOSPITAL | Age: 64
End: 2024-09-25
Payer: MEDICARE

## 2024-09-25 VITALS
OXYGEN SATURATION: 98 % | DIASTOLIC BLOOD PRESSURE: 86 MMHG | RESPIRATION RATE: 16 BRPM | BODY MASS INDEX: 31.14 KG/M2 | HEART RATE: 98 BPM | SYSTOLIC BLOOD PRESSURE: 163 MMHG | WEIGHT: 181.4 LBS

## 2024-09-25 DIAGNOSIS — E87.5 HYPERKALEMIA: Primary | ICD-10-CM

## 2024-09-25 DIAGNOSIS — E87.5 HYPERKALEMIA: ICD-10-CM

## 2024-09-25 DIAGNOSIS — Z79.4 TYPE 2 DIABETES MELLITUS WITH HYPERGLYCEMIA, WITH LONG-TERM CURRENT USE OF INSULIN: ICD-10-CM

## 2024-09-25 DIAGNOSIS — E11.65 TYPE 2 DIABETES MELLITUS WITH HYPERGLYCEMIA, WITH LONG-TERM CURRENT USE OF INSULIN: ICD-10-CM

## 2024-09-25 LAB
ALBUMIN SERPL-MCNC: 4.5 G/DL (ref 3.5–5.2)
ALBUMIN/GLOB SERPL: 1.4 G/DL
ALP SERPL-CCNC: 103 U/L (ref 39–117)
ALT SERPL W P-5'-P-CCNC: 14 U/L (ref 1–33)
ANION GAP SERPL CALCULATED.3IONS-SCNC: 10.2 MMOL/L (ref 5–15)
AST SERPL-CCNC: 16 U/L (ref 1–32)
BACTERIA UR QL AUTO: ABNORMAL /HPF
BILIRUB SERPL-MCNC: 0.4 MG/DL (ref 0–1.2)
BILIRUB UR QL STRIP: NEGATIVE
BUN SERPL-MCNC: 38 MG/DL (ref 8–23)
BUN/CREAT SERPL: 24.7 (ref 7–25)
CALCIUM SPEC-SCNC: 9.7 MG/DL (ref 8.6–10.5)
CHLORIDE SERPL-SCNC: 103 MMOL/L (ref 98–107)
CLARITY UR: CLEAR
CO2 SERPL-SCNC: 27.8 MMOL/L (ref 22–29)
COLOR UR: YELLOW
CREAT SERPL-MCNC: 1.54 MG/DL (ref 0.57–1)
EGFRCR SERPLBLD CKD-EPI 2021: 37.1 ML/MIN/1.73
GLOBULIN UR ELPH-MCNC: 3.3 GM/DL
GLUCOSE SERPL-MCNC: 124 MG/DL (ref 65–99)
GLUCOSE UR STRIP-MCNC: NEGATIVE MG/DL
HBA1C MFR BLD: 8.79 % (ref 4.8–5.6)
HGB UR QL STRIP.AUTO: NEGATIVE
HYALINE CASTS UR QL AUTO: ABNORMAL /LPF
KETONES UR QL STRIP: NEGATIVE
LEUKOCYTE ESTERASE UR QL STRIP.AUTO: ABNORMAL
NITRITE UR QL STRIP: NEGATIVE
PH UR STRIP.AUTO: 5.5 [PH] (ref 5–8)
POTASSIUM SERPL-SCNC: 5.8 MMOL/L (ref 3.5–5.2)
PROT SERPL-MCNC: 7.8 G/DL (ref 6–8.5)
PROT UR QL STRIP: NEGATIVE
RBC # UR STRIP: ABNORMAL /HPF
REF LAB TEST METHOD: ABNORMAL
SODIUM SERPL-SCNC: 141 MMOL/L (ref 136–145)
SP GR UR STRIP: 1.01 (ref 1–1.03)
SQUAMOUS #/AREA URNS HPF: ABNORMAL /HPF
UROBILINOGEN UR QL STRIP: ABNORMAL
WBC # UR STRIP: ABNORMAL /HPF

## 2024-09-25 PROCEDURE — 83036 HEMOGLOBIN GLYCOSYLATED A1C: CPT | Performed by: INTERNAL MEDICINE

## 2024-09-25 PROCEDURE — 80053 COMPREHEN METABOLIC PANEL: CPT | Performed by: INTERNAL MEDICINE

## 2024-09-25 PROCEDURE — 81001 URINALYSIS AUTO W/SCOPE: CPT

## 2024-09-25 PROCEDURE — 93005 ELECTROCARDIOGRAM TRACING: CPT | Performed by: NURSE PRACTITIONER

## 2024-09-25 PROCEDURE — G0463 HOSPITAL OUTPT CLINIC VISIT: HCPCS

## 2024-09-26 ENCOUNTER — OFFICE VISIT (OUTPATIENT)
Dept: ENDOCRINOLOGY | Facility: CLINIC | Age: 64
End: 2024-09-26
Payer: MEDICARE

## 2024-09-26 DIAGNOSIS — E78.2 MIXED HYPERLIPIDEMIA: ICD-10-CM

## 2024-09-26 DIAGNOSIS — E11.49 TYPE 2 DIABETES MELLITUS WITH OTHER DIABETIC NEUROLOGICAL COMPLICATION: ICD-10-CM

## 2024-09-26 DIAGNOSIS — I10 ESSENTIAL HYPERTENSION: ICD-10-CM

## 2024-09-26 DIAGNOSIS — Z95.810 PRESENCE OF BIVENTRICULAR IMPLANTABLE CARDIOVERTER-DEFIBRILLATOR (ICD): ICD-10-CM

## 2024-09-26 DIAGNOSIS — E11.49 TYPE 2 DIABETES MELLITUS WITH OTHER DIABETIC NEUROLOGICAL COMPLICATION: Primary | ICD-10-CM

## 2024-09-26 DIAGNOSIS — I42.0 DILATED CARDIOMYOPATHY: ICD-10-CM

## 2024-09-26 LAB
QT INTERVAL: 385 MS
QTC INTERVAL: 490 MS

## 2024-09-26 RX ORDER — DILTIAZEM HYDROCHLORIDE 180 MG/1
180 CAPSULE, COATED, EXTENDED RELEASE ORAL DAILY
Qty: 90 CAPSULE | Refills: 1 | Status: SHIPPED | OUTPATIENT
Start: 2024-09-26

## 2024-09-27 ENCOUNTER — HOSPITAL ENCOUNTER (OUTPATIENT)
Facility: HOSPITAL | Age: 64
Discharge: HOME | End: 2024-09-27
Payer: MEDICARE

## 2024-09-27 VITALS
SYSTOLIC BLOOD PRESSURE: 139 MMHG | OXYGEN SATURATION: 97 % | WEIGHT: 179.6 LBS | DIASTOLIC BLOOD PRESSURE: 70 MMHG | RESPIRATION RATE: 16 BRPM | BODY MASS INDEX: 30.83 KG/M2 | HEART RATE: 86 BPM

## 2024-09-27 DIAGNOSIS — E88.810 METABOLIC SYNDROME: Primary | ICD-10-CM

## 2024-09-27 LAB
ANION GAP SERPL CALCULATED.3IONS-SCNC: 13.9 MMOL/L (ref 5–15)
BUN SERPL-MCNC: 31 MG/DL (ref 8–23)
BUN/CREAT SERPL: 19.5 (ref 7–25)
CALCIUM SPEC-SCNC: 9.5 MG/DL (ref 8.6–10.5)
CHLORIDE SERPL-SCNC: 100 MMOL/L (ref 98–107)
CO2 SERPL-SCNC: 27.1 MMOL/L (ref 22–29)
CREAT SERPL-MCNC: 1.59 MG/DL (ref 0.57–1)
EGFRCR SERPLBLD CKD-EPI 2021: 35.7 ML/MIN/1.73
GLUCOSE SERPL-MCNC: 217 MG/DL (ref 65–99)
POTASSIUM SERPL-SCNC: 4.7 MMOL/L (ref 3.5–5.2)
SODIUM SERPL-SCNC: 141 MMOL/L (ref 136–145)

## 2024-09-27 PROCEDURE — G0463 HOSPITAL OUTPT CLINIC VISIT: HCPCS

## 2024-09-27 PROCEDURE — 80048 BASIC METABOLIC PNL TOTAL CA: CPT | Performed by: NURSE PRACTITIONER

## 2024-10-02 ENCOUNTER — TRANSCRIBE ORDERS (OUTPATIENT)
Dept: ADMINISTRATIVE | Facility: HOSPITAL | Age: 64
End: 2024-10-02
Payer: MEDICARE

## 2024-10-02 ENCOUNTER — HOSPITAL ENCOUNTER (OUTPATIENT)
Facility: HOSPITAL | Age: 64
Discharge: HOME | End: 2024-10-02
Payer: MEDICARE

## 2024-10-02 ENCOUNTER — LAB (OUTPATIENT)
Dept: LAB | Facility: HOSPITAL | Age: 64
End: 2024-10-02
Payer: MEDICARE

## 2024-10-02 VITALS
WEIGHT: 178.4 LBS | SYSTOLIC BLOOD PRESSURE: 144 MMHG | OXYGEN SATURATION: 100 % | BODY MASS INDEX: 30.62 KG/M2 | DIASTOLIC BLOOD PRESSURE: 86 MMHG | RESPIRATION RATE: 16 BRPM | HEART RATE: 68 BPM

## 2024-10-02 DIAGNOSIS — E87.8 ELECTROLYTE AND FLUID DISORDER: Primary | ICD-10-CM

## 2024-10-02 DIAGNOSIS — E87.5 HYPERPOTASSEMIA: Primary | ICD-10-CM

## 2024-10-02 DIAGNOSIS — E87.5 HYPERPOTASSEMIA: ICD-10-CM

## 2024-10-02 LAB
ANION GAP SERPL CALCULATED.3IONS-SCNC: 11.7 MMOL/L (ref 5–15)
BUN SERPL-MCNC: 29 MG/DL (ref 8–23)
BUN/CREAT SERPL: 20 (ref 7–25)
CALCIUM SPEC-SCNC: 9.6 MG/DL (ref 8.6–10.5)
CHLORIDE SERPL-SCNC: 105 MMOL/L (ref 98–107)
CO2 SERPL-SCNC: 24.3 MMOL/L (ref 22–29)
CREAT SERPL-MCNC: 1.45 MG/DL (ref 0.57–1)
EGFRCR SERPLBLD CKD-EPI 2021: 39.9 ML/MIN/1.73
GLUCOSE SERPL-MCNC: 192 MG/DL (ref 65–99)
POTASSIUM SERPL-SCNC: 5.3 MMOL/L (ref 3.5–5.2)
POTASSIUM UR-SCNC: 20.7 MMOL/L
SODIUM SERPL-SCNC: 141 MMOL/L (ref 136–145)

## 2024-10-02 PROCEDURE — 36415 COLL VENOUS BLD VENIPUNCTURE: CPT

## 2024-10-02 PROCEDURE — 84133 ASSAY OF URINE POTASSIUM: CPT

## 2024-10-02 PROCEDURE — 84244 ASSAY OF RENIN: CPT

## 2024-10-02 PROCEDURE — 80048 BASIC METABOLIC PNL TOTAL CA: CPT | Performed by: NURSE PRACTITIONER

## 2024-10-02 PROCEDURE — 82088 ASSAY OF ALDOSTERONE: CPT

## 2024-10-02 PROCEDURE — G0463 HOSPITAL OUTPT CLINIC VISIT: HCPCS

## 2024-10-02 RX ORDER — SODIUM ZIRCONIUM CYCLOSILICATE 10 G/10G
10 POWDER, FOR SUSPENSION ORAL
Qty: 1 PACKET | Refills: 0 | Status: SHIPPED | OUTPATIENT
Start: 2024-10-02

## 2024-10-03 ENCOUNTER — OFFICE VISIT (OUTPATIENT)
Dept: ENDOCRINOLOGY | Facility: CLINIC | Age: 64
End: 2024-10-03
Payer: MEDICARE

## 2024-10-03 VITALS
HEIGHT: 64 IN | SYSTOLIC BLOOD PRESSURE: 152 MMHG | BODY MASS INDEX: 30.05 KG/M2 | WEIGHT: 176 LBS | HEART RATE: 75 BPM | DIASTOLIC BLOOD PRESSURE: 76 MMHG | OXYGEN SATURATION: 100 %

## 2024-10-03 DIAGNOSIS — E11.42 DIABETIC PERIPHERAL NEUROPATHY: ICD-10-CM

## 2024-10-03 DIAGNOSIS — E11.21 DIABETIC NEPHROPATHY ASSOCIATED WITH TYPE 2 DIABETES MELLITUS: ICD-10-CM

## 2024-10-03 DIAGNOSIS — Z79.4 TYPE 2 DIABETES MELLITUS WITH HYPERGLYCEMIA, WITH LONG-TERM CURRENT USE OF INSULIN: Primary | ICD-10-CM

## 2024-10-03 DIAGNOSIS — E66.811 CLASS 1 OBESITY WITH SERIOUS COMORBIDITY AND BODY MASS INDEX (BMI) OF 30.0 TO 30.9 IN ADULT, UNSPECIFIED OBESITY TYPE: ICD-10-CM

## 2024-10-03 DIAGNOSIS — E11.65 TYPE 2 DIABETES MELLITUS WITH HYPERGLYCEMIA, WITH LONG-TERM CURRENT USE OF INSULIN: Primary | ICD-10-CM

## 2024-10-03 RX ORDER — INSULIN PMP CART,AUT,G6/7,CNTR
1 EACH SUBCUTANEOUS DAILY
Qty: 1 KIT | Refills: 0 | Status: SHIPPED | OUTPATIENT
Start: 2024-10-03

## 2024-10-03 RX ORDER — INSULIN PMP CART,AUT,G6/7,CNTR
1 EACH SUBCUTANEOUS DAILY
Qty: 10 EACH | Refills: 6 | Status: SHIPPED | OUTPATIENT
Start: 2024-10-03

## 2024-10-03 RX ORDER — INSULIN GLARGINE 100 [IU]/ML
30 INJECTION, SOLUTION SUBCUTANEOUS NIGHTLY
Qty: 30 ML | Refills: 2 | Status: SHIPPED | OUTPATIENT
Start: 2024-10-03

## 2024-10-03 RX ORDER — ACYCLOVIR 400 MG/1
1 TABLET ORAL
Qty: 9 EACH | Refills: 3 | Status: SHIPPED | OUTPATIENT
Start: 2024-10-03

## 2024-10-03 RX ORDER — ACYCLOVIR 400 MG/1
1 TABLET ORAL
Qty: 1 EACH | Refills: 0 | Status: SHIPPED | OUTPATIENT
Start: 2024-10-03

## 2024-10-03 RX ORDER — DAPAGLIFLOZIN 10 MG/1
10 TABLET, FILM COATED ORAL DAILY
Qty: 90 TABLET | Refills: 1 | Status: SHIPPED | OUTPATIENT
Start: 2024-10-03

## 2024-10-03 RX ORDER — INSULIN LISPRO 100 [IU]/ML
INJECTION, SOLUTION INTRAVENOUS; SUBCUTANEOUS
Qty: 15 ML | Refills: 2 | Status: SHIPPED | OUTPATIENT
Start: 2024-10-03

## 2024-10-03 NOTE — PROGRESS NOTES
-----------------------------------------------------------------  ENDOCRINE CLINIC NOTE  -----------------------------------------------------------------        PATIENT NAME: Susy Hanson  PATIENT : 1960 AGE: 64 y.o.  MRN NUMBER: [id number]  PRIMARY CARE: Erica Avila MD    ==========================================================================    CHIEF COMPLAINT: Type 2 diabetes management.  DATE OF SERVICE: 10/03/24    ==========================================================================    HPI / SUBJECTIVE    64 y.o. female is seen in the clinic today for type 2 diabetes mellitus.  Diagnosis Date:   Current Therapy / Medications:  Farxiga 10 mg p.o. daily  Glimepiride 2 mg once a day  Lantus 32 units QHS  Past tried medications: (Not currently using)  Metformin - GI side-effects  Ozempic - hx of pancreatitis  Home BG logs / monitoring: Checking BG through [facility] 3  Three to four meals a day  Following ophthalmology at Dr. Jones's office, last visit was May 2024  Peripheral neuropathy and not on meds  Chronic kidney disease III with recurrent hyperkalemia  Hx of dilated cardiomyopathy  Patient attended the Pump pre-view class and interested in Omnipod pump    ==========================================================================                                                PAST MEDICAL HISTORY    Past Medical History:   Diagnosis Date    Allergic 2008    Weeds, grasses, mold, mildew, etc.    Anxiety     Breast cancer     LEFT BREAST 2021    Cataract     Had surgery to remove 2023    CHF (congestive heart failure)     Colon polyp 2023    Removed during colonoscopy    Coronary artery disease     CTS (carpal tunnel syndrome) 2015    Surgery on both hands    Depression     DM (diabetes mellitus)     GERD (gastroesophageal reflux disease)     History of medical problems     Bladder prolapse repeat Dr. Roper    HL (hearing loss)      Hyperlipidemia     Hypertension     SAW CARDIO/ STRESS TEST 2020 - NOW MEDS MANAGED BY PCP     Irritable bowel syndrome 2006    Peripheral neuropathy     PONV (postoperative nausea and vomiting)     Renal insufficiency 2020    See Dr. funes for moderate kidney failure    Sleep apnea     WEARS CPAP    Type 2 diabetes mellitus 2008    Urinary tract infection 6/23    UTI seen at [facility]       ==========================================================================    PAST SURGICAL HISTORY    Past Surgical History:   Procedure Laterality Date    BLADDER REPAIR      BREAST BIOPSY      BREAST RECONSTRUCTION Bilateral 08/26/2021    Procedure: BILATERAL PREPECTORALPLACMENT TISSUE EXPANDERS AND ALLODERM;  Surgeon: Heri Arreaga MD;  Location: [facility] MAIN OR;  Service: Plastics;  Laterality: Bilateral;    BREAST TISSUE EXPANDER REMOVAL INSERTION OF IMPLANT Bilateral 12/23/2021    Procedure: BILATERAL REMOVAL TISSUE EXPANDERS AND PLACEMENT OF IMPLANTS;  Surgeon: Heri Arreaga MD;  Location: [facility] MAIN OR;  Service: Plastics;  Laterality: Bilateral;    CARDIAC CATHETERIZATION Left 04/13/2022    Procedure: Cardiac Catheterization/Vascular Study;  Surgeon: Christo Murphy MD;  Location:  [facility];  Service: Cardiovascular;  Laterality: Left;    CARDIAC ELECTROPHYSIOLOGY PROCEDURE N/A 09/19/2022    Procedure: ICD implant [place] aware;  Surgeon: Esteban Kendrick MD;  Location:  [facility];  Service: Cardiology;  Laterality: N/A;    CARDIAC ELECTROPHYSIOLOGY PROCEDURE N/A 09/20/2022    Procedure: lead repositioning;  Surgeon: Esteban Kendrick MD;  Location:  [facility];  Service: Cardiology;  Laterality: N/A;    CARDIAC SURGERY  2022    Defibrillator/pacemaker implant    CARDIOVASCULAR STRESS TEST  2020    COLONOSCOPY  11/2012    EYE SURGERY  7/23    Cataracts removed    HAND SURGERY  2015    Both hands    HYSTERECTOMY      INSERT / REPLACE /  REMOVE PACEMAKER  2022    LYMPH NODE BIOPSY  8/29/2021    MASTECTOMY W/ SENTINEL NODE BIOPSY Bilateral 08/26/2021    Procedure: bilateral total mastectomy, left axillary sentinel lymph node biopsy, possible reconstruction.;  Surgeon: Asia Perkins MD;  Location: [facility];  Service: General;  Laterality: Bilateral;    TRIGGER POINT INJECTION  2012    Left thumb    TUBAL ABDOMINAL LIGATION  1981       ==========================================================================    FAMILY HISTORY    Family History   Problem Relation Age of Onset    Diabetes Mother     Heart disease Mother     Sleep apnea Mother     Snoring Mother     COPD Mother     Hyperlipidemia Mother     Alcohol abuse Mother     Anxiety disorder Mother     Depression Mother     Hypertension Mother     Alzheimer's disease Father     Diabetes Father     Heart disease Father     Sleep apnea Father     Snoring Father     Hyperlipidemia Father     Heart failure Father     Kidney disease Father     Hypertension Father     Obesity Father     Diabetes Sister     Heart disease Sister     Sleep apnea Sister     Snoring Sister     COPD Sister     Anxiety disorder Sister     Depression Sister     Hypertension Sister     Hyperlipidemia Sister     Clotting disorder Sister     Hyperlipidemia Brother     Hypertension Brother     Breast cancer Maternal Aunt 80    Breast cancer Paternal Aunt     Diabetes Maternal Grandmother     Breast cancer Paternal Grandmother 60    Cancer Paternal Grandmother     Diabetes Paternal Grandmother     Hyperlipidemia Son     Diabetes Brother     Hypertension Brother     Malig Hyperthermia Neg Hx        ==========================================================================    SOCIAL HISTORY    Social History     Socioeconomic History    Marital status:    Tobacco Use    Smoking status: Former     Current packs/day: 0.00     Average packs/day: 2.0 packs/day for 36.0 years (72.0 ttl pk-yrs)     Types: Cigarettes      Start date: 1972     Quit date: 2008     Years since quittin.7     Passive exposure: Past    Smokeless tobacco: Never   Vaping Use    Vaping status: Never Used   Substance and Sexual Activity    Alcohol use: No    Drug use: No    Sexual activity: Yes     Partners: Male     Birth control/protection: None, Tubal ligation, Hysterectomy       ==========================================================================    MEDICATIONS      Current Outpatient Medications:     Accu-Chek FastClix Lancets misc, USE TO CHECK BLOOD GLUCOSE DAILY, Disp: , Rfl:     Accu-Chek Guide test strip, 1 each by Other route Daily. Use daily to check BS E11.9, Disp: 100 each, Rfl: 1    ALPRAZolam (XANAX) 1 MG tablet, TAKE ONE TABLET BY MOUTH TWICE A DAY, Disp: 60 tablet, Rfl: 0    atorvastatin (LIPITOR) 20 MG tablet, TAKE ONE TABLET BY MOUTH EVERY DAY, Disp: 90 tablet, Rfl: 1    Azelastine HCl 137 MCG/SPRAY solution, 2 sprays by Each Nare route Daily., Disp: 90 mL, Rfl: 1    cetirizine (zyrTEC) 10 MG tablet, Take 1 tablet by mouth Daily As Needed., Disp: , Rfl:     Continuous Blood Gluc  (FreeStyle [facility] 3 [place]) device, Use 1 each 4 (Four) Times a Day Before Meals & at Bedtime., Disp: 1 each, Rfl: 0    Continuous Glucose Sensor (FreeStyle Benton 3 Sensor) misc, Use 1 each Every 14 (Fourteen) Days., Disp: 6 each, Rfl: 1    dapagliflozin Propanediol (Farxiga) 10 MG tablet, Take 10 mg by mouth Daily., Disp: , Rfl:     dilTIAZem CD (CARDIZEM CD) 180 MG 24 hr capsule, TAKE ONE CAPSULE BY MOUTH EVERY DAY, Disp: 90 capsule, Rfl: 1    furosemide (LASIX) 20 MG tablet, Take 1 tablet by mouth Daily., Disp: , Rfl:     hydrALAZINE (APRESOLINE) 100 MG tablet, Take 1 tablet by mouth 3 (Three) Times a Day., Disp: 270 tablet, Rfl: 1    Insulin Glargine (Lantus SoloStar) 100 UNIT/ML injection pen, Inject 65 Units under the skin into the appropriate area as directed Every Night. (Patient taking differently: Inject 65 Units under the skin  into the appropriate area as directed Every Night. Please send future Lantus prescriptions for refills/dose changes to [facility] (see Danilo or Rajani).), Disp: 45 mL, Rfl: 2    Insulin Pen Needle (Pen Needles) 32G X 4 MM misc, Use 1 each 4 (Four) Times a Day Before Meals & at Bedtime., Disp: 200 each, Rfl: 5    metoprolol succinate XL (TOPROL-XL) 100 MG 24 hr tablet, Take 1 tablet by mouth 2 (Two) Times a Day., Disp: 180 tablet, Rfl: 1    ondansetron (Zofran) 4 MG tablet, Take 1 tablet by mouth Every 8 (Eight) Hours As Needed for Nausea or Vomiting., Disp: 20 tablet, Rfl: 0    pantoprazole (PROTONIX) 40 MG EC tablet, Take 1 tablet by mouth Daily., Disp: 90 tablet, Rfl: 3    sodium zirconium cyclosilicate (Lokelma) 10 g packet, Mix 1 packet in 45ml of water and drink by mouth today 1 time. Rinse glass with water and drink for full dose., Disp: 1 packet, Rfl: 0    Umeclidinium-Vilanterol (Anoro Ellipta) 62.5-25 MCG/ACT aerosol powder  inhaler, Inhale 1 puff Daily., Disp: 60 each, Rfl: 5  No current facility-administered medications for this visit.    ==========================================================================    ALLERGIES    Allergies   Allergen Reactions    Hydrocodone-Acetaminophen Hives     Lortab, takes tylenol       ==========================================================================    OBJECTIVE    Vitals:    10/03/24 0836   BP: 152/76   Pulse: 75   SpO2: 100%     Body mass index is 30.21 kg/m².     General: Alert, cooperative, no acute distress    ==========================================================================    LAB EVALUATION    Lab Results   Component Value Date    GLUCOSE 192 (H) 10/02/2024    BUN 29 (H) 10/02/2024    CREATININE 1.45 (H) 10/02/2024    EGFRIFNONA 57 (L) 01/26/2022    EGFRIFAFRI >60 10/13/2017    BCR 20.0 10/02/2024    K 5.3 (H) 10/02/2024    CO2 24.3 10/02/2024    CALCIUM 9.6 10/02/2024    ALBUMIN 4.5 09/25/2024    AST 16 09/25/2024    ALT 14 09/25/2024    CHOL  "146 08/05/2024    TRIG 254 (H) 08/05/2024    HDL 41 08/05/2024    LDL 64 08/05/2024     Lab Results   Component Value Date    HGBA1C 8.79 (H) 09/25/2024    HGBA1C 8.48 (H) 08/05/2024    HGBA1C 8.82 (H) 07/09/2024     Lab Results   Component Value Date    MICROALBUR 40.5 03/14/2023    CREATININE 1.45 (H) 10/02/2024     Lab Results   Component Value Date    TSH 1.500 01/25/2024     ==========================================================================    CGM Data:    Dates: Sep 20 till Oct 3, 2024    Very High > 250: 4%  High 180 - 250: 13%  Target: 70 - 180: 82%  Low 55 - 70:  1%  Very Low < 55: 0%    Time active on CGM is 96%, average blood sugar 139 with GMI 6.6%.    ==========================================================================    ASSESSMENT AND PLAN    # Type 2 diabetes with hyperglycemia  # Diabetic peripheral neuropathy  # Diabetic nephropathy with CKD stage IIIb with Hyperkalemia, currently following nephrology in the clinic, following Dr. Kerr  # Obesity with BMI 30.21    - Reviewed CGM data and patient have significantly high glucose variability and is also having hypoglycemic episodes  - Given CKD status we will discontinue glimepiride therapy and introduce mealtime insulin therapy  - Patient have a history of pancreatitis therefore not a good candidate for GLP-1 RA or DPP 4 inhibitor therapy  - Will also start workup for insulin pump therapy, OmniPod  - In the meantime new adjusted insulin regimen:  Farxiga 10 mg p.o. daily  Lantus 30 units QHS  Lispro 3 units with each meal with sliding scale  - Repeat blood work and clinical work up in 3 months    Return to clinic: 3 months    Entire assessment and plan was discussed and counseled the patient in detail to which patient verbalized understanding and agreed with care.  Answered all queries and concerns.    This note was created using voice recognition software and is inherently subject to errors including those of syntax and \"sound-alike\" " substitutions which may escape proofreading.  In such instances, original meaning may be extrapolated by contextual derivation.    Note: Portions of this note may have been copied from previous notes but documentation have been reviewed and edited as necessary to support clinical decision making for today's visit.    ==========================================================================    INFORMATION PROVIDED TO PATIENT    Patient Instructions   Please,    # Diabetes Management:    Please start insulin therapy as:    - Insulin Glargine (Slow acting insulin) 30 Units in the evening.  - Insulin lispro / aspart (Fast acting / meal time insulin): 3 Units with each meal.    Meal time insulin need to be taken 15 mins before meal. You can bring your insulin with you if you are planning to eat out.    If you plan to miss the meal please miss the meal time insulin as well.    Also take following non-insulin therapies:  - Farxiga 10 mgs once a day    STOP GLIMEPIRIDE THERAPY    Check your blood glucose through freestyle josefina.    - If your blood glucose in the morning / fasting is less than 100 please decrease the dose of Insulin Glargine by 3 units.    - If your blood glucose before meal is less than 100 then decrease the dose of Insulin Lispro / Aspart by 1 units    Use correction insulin if your blood is high along with your meal time insulin. Use this correction insulin before meal only, not to be taken without meal.    Corrections for High Blood Sugar  Use the following guide to “correct” for pre-meal high blood sugar.  This insulin will help “correct” the high reading.  You will still need to take as per scheduled meal insulin.    If your   Blood Sugar Reading is…. Number of additional   units of Insulin Lispro to Take…  (Correction)    Take 0 additional unit   151-200 Take 1 additional unit   201-250 Take 2 additional unit   251-300 Take 3 additional unit   301-350 Take 4 additional unit   351-400 Take 5  additional unit   More than 400 Take 6 additional unit       Low Blood Glucose:    - If you feel sweaty, anxious, shakiness, feel weak, trouble walking, feels like passing out or trouble seeing thing, please check your blood glucose and if its less than 70 or if your feel symptoms of low blood glucose then take one of the following or use Glucagon Injection:    *3 or 4 glucose tablets  *½ cup of juice or regular soda (not sugar-free)  *2 tablespoons of raisins  *4 or 5 saltine crackers  *1 tablespoon of sugar  *1 tablespoon of honey or corn syrup  *6 to 8 hard candies    Follow up in 3 months with repeat blood work.     Thank you for your visit today.    If you have any questions or concerns please feel free to reach out of the office.       ==========================================================================  Shin Helton MD  Department of Endocrine, Diabetes and Metabolism  [facility], [place]  ==========================================================================

## 2024-10-03 NOTE — PATIENT INSTRUCTIONS
Please,    # Diabetes Management:    Please start insulin therapy as:    - Insulin Glargine (Slow acting insulin) 30 Units in the evening.  - Insulin lispro / aspart (Fast acting / meal time insulin): 3 Units with each meal.    Meal time insulin need to be taken 15 mins before meal. You can bring your insulin with you if you are planning to eat out.    If you plan to miss the meal please miss the meal time insulin as well.    Also take following non-insulin therapies:  - Farxiga 10 mgs once a day    STOP GLIMEPIRIDE THERAPY    Check your blood glucose through freestyle josefina.    - If your blood glucose in the morning / fasting is less than 100 please decrease the dose of Insulin Glargine by 3 units.    - If your blood glucose before meal is less than 100 then decrease the dose of Insulin Lispro / Aspart by 1 units    Use correction insulin if your blood is high along with your meal time insulin. Use this correction insulin before meal only, not to be taken without meal.    Corrections for High Blood Sugar  Use the following guide to “correct” for pre-meal high blood sugar.  This insulin will help “correct” the high reading.  You will still need to take as per scheduled meal insulin.    If your   Blood Sugar Reading is…. Number of additional   units of Insulin Lispro to Take…  (Correction)    Take 0 additional unit   151-200 Take 1 additional unit   201-250 Take 2 additional unit   251-300 Take 3 additional unit   301-350 Take 4 additional unit   351-400 Take 5 additional unit   More than 400 Take 6 additional unit       Low Blood Glucose:    - If you feel sweaty, anxious, shakiness, feel weak, trouble walking, feels like passing out or trouble seeing thing, please check your blood glucose and if its less than 70 or if your feel symptoms of low blood glucose then take one of the following or use Glucagon Injection:    *3 or 4 glucose tablets  *½ cup of juice or regular soda (not sugar-free)  *2 tablespoons of  raisins  *4 or 5 saltine crackers  *1 tablespoon of sugar  *1 tablespoon of honey or corn syrup  *6 to 8 hard candies    Follow up in 3 months with repeat blood work.     Thank you for your visit today.    If you have any questions or concerns please feel free to reach out of the office.

## 2024-10-07 ENCOUNTER — TELEPHONE (OUTPATIENT)
Dept: ENDOCRINOLOGY | Facility: CLINIC | Age: 64
End: 2024-10-07
Payer: MEDICARE

## 2024-10-07 NOTE — TELEPHONE ENCOUNTER
Pt called asking about coverage for Omnipod 5 and timeline of receiving pump. Rx recently sent to [facility] and [facility] has contacted pt. Discussed pre-auth being completed. Reached out to omnipod reps to inquire any financial assistance through omnipod.     Pt also desiring MNT for kidney, DM, and heart disease. Pt has attended DM education in that past. Pt wants individualized meal plan for all three dz states. Enc pt to reach out to insurance for reimbursement of MNT. Pt can call back to schedule or get out of pocket rates. Pt agreeable and voiced understanding.

## 2024-10-07 NOTE — TELEPHONE ENCOUNTER
PA submitted for omnipod kit (Key: [id number]), approved 7/9/24 - 10/7/2025    PA submitted for omnipod pods and available without authorization.     PA submitted for dexcom g7  (Key: [id number]).     PA submitted for dexcom g7 sensor  (Key: [id number]), available without authorization.

## 2024-10-08 LAB
ALDOST SERPL-MCNC: 10.1 NG/DL (ref 0–30)
ALDOST/RENIN PLAS-RTO: 29.1 {RATIO} (ref 0–30)
RENIN PLAS-CCNC: 0.35 NG/ML/HR (ref 0.17–5.38)

## 2024-10-09 ENCOUNTER — HOSPITAL ENCOUNTER (OUTPATIENT)
Facility: HOSPITAL | Age: 64
Discharge: HOME | End: 2024-10-09
Payer: MEDICARE

## 2024-10-09 ENCOUNTER — TELEPHONE (OUTPATIENT)
Facility: HOSPITAL | Age: 64
End: 2024-10-09
Payer: MEDICARE

## 2024-10-09 ENCOUNTER — OFFICE VISIT (OUTPATIENT)
Dept: ENDOCRINOLOGY | Facility: CLINIC | Age: 64
End: 2024-10-09
Payer: MEDICARE

## 2024-10-09 VITALS
HEART RATE: 76 BPM | OXYGEN SATURATION: 100 % | DIASTOLIC BLOOD PRESSURE: 86 MMHG | SYSTOLIC BLOOD PRESSURE: 148 MMHG | BODY MASS INDEX: 30.52 KG/M2 | WEIGHT: 177.8 LBS

## 2024-10-09 DIAGNOSIS — R06.09 DYSPNEA ON EXERTION: ICD-10-CM

## 2024-10-09 DIAGNOSIS — E11.49 TYPE 2 DIABETES MELLITUS WITH OTHER DIABETIC NEUROLOGICAL COMPLICATION: Primary | ICD-10-CM

## 2024-10-09 DIAGNOSIS — E87.5 HYPERKALEMIA: Primary | ICD-10-CM

## 2024-10-09 LAB
ANION GAP SERPL CALCULATED.3IONS-SCNC: 10.5 MMOL/L (ref 5–15)
BUN SERPL-MCNC: 46 MG/DL (ref 8–23)
BUN/CREAT SERPL: 31.3 (ref 7–25)
CALCIUM SPEC-SCNC: 10.1 MG/DL (ref 8.6–10.5)
CHLORIDE SERPL-SCNC: 104 MMOL/L (ref 98–107)
CO2 SERPL-SCNC: 24.5 MMOL/L (ref 22–29)
CREAT SERPL-MCNC: 1.47 MG/DL (ref 0.57–1)
EGFRCR SERPLBLD CKD-EPI 2021: 39.2 ML/MIN/1.73
GLUCOSE SERPL-MCNC: 258 MG/DL (ref 65–99)
NT-PROBNP SERPL-MCNC: 68.5 PG/ML (ref 0–900)
POTASSIUM SERPL-SCNC: 5.2 MMOL/L (ref 3.5–5.2)
SODIUM SERPL-SCNC: 139 MMOL/L (ref 136–145)

## 2024-10-09 PROCEDURE — 97802 MEDICAL NUTRITION INDIV IN: CPT | Performed by: DIETITIAN, REGISTERED

## 2024-10-09 PROCEDURE — 83880 ASSAY OF NATRIURETIC PEPTIDE: CPT | Performed by: NURSE PRACTITIONER

## 2024-10-09 PROCEDURE — G0463 HOSPITAL OUTPT CLINIC VISIT: HCPCS

## 2024-10-09 PROCEDURE — 80048 BASIC METABOLIC PNL TOTAL CA: CPT | Performed by: NURSE PRACTITIONER

## 2024-10-09 NOTE — TELEPHONE ENCOUNTER
Pt contact clinic asked for lab results. HF APRN reviewed lab results, no changes in medications. Pt to have follow-up labs next week.  Verbal understanding. JAKE Santos RN  
Other (Free Text): Pt to follow up at  for laser
Detail Level: Detailed
Note Text (......Xxx Chief Complaint.): This diagnosis correlates with the
Render Risk Assessment In Note?: no
Other (Free Text): Treated 1 lesion with cautery of L cheek

## 2024-10-09 NOTE — PROGRESS NOTES
Pt attended individual MNT from [id number].     Pt desired individualized meal plan for CKD, DM, and heart failure.     Discussed current insulin regimen. Reviewed SSI and taking meal time insulin 15 min before meal time. Pt states she started taking insulin prior to meals yesterday. Reviewed Dr. Helton office visit from 10/3 with pt. Pt had some lows and long acting insulin was decreased at the 10/3 visit. Pt denies any recent lows but concerned with elevated BG. Discussed adding fiber with snacks and looking at labels for carb content of snacks. Enc to keep at 15g carb per snack to help with elevated BG during the day. Reviewed tx for low BG. Reviewed pro amount. Pt with CKD stage III. Pt wt 178#. Enc pt to limit protein to approx 50g daily (0.6 g pro/kg). Discussed protein sources. Enc pt to check with kidney doctor for pro amount.     Reviewed high potassium foods to avoid. Marked high potassium foods out of DM food list book for pt. Enc to eat 30-45 g carb per meal choosing lower potassium foods and checking labels for sodium content of foods (choosing foods less than 140 mg sodium per serving).  Pt agreeable and voiced understanding.     Provided Plate method literature, potassium content of food from [facility], heart failure nutrition therapy from [facility].     Pt declined behavioral health services. Provided list of websites to support mental health. Informed pt of support group.     Pt awaiting to hear from insurance re: cost of insulin pump.     Pt to call next week if need additional insulin adjustment.

## 2024-10-10 ENCOUNTER — TELEPHONE (OUTPATIENT)
Dept: ENDOCRINOLOGY | Facility: CLINIC | Age: 64
End: 2024-10-10
Payer: MEDICARE

## 2024-10-10 ENCOUNTER — TELEPHONE (OUTPATIENT)
Dept: ENDOCRINOLOGY | Facility: CLINIC | Age: 64
End: 2024-10-10

## 2024-10-10 DIAGNOSIS — E11.49 TYPE 2 DIABETES MELLITUS WITH OTHER DIABETIC NEUROLOGICAL COMPLICATION: Primary | ICD-10-CM

## 2024-10-10 RX ORDER — INSULIN PMP CART,AUT,G6/7,CNTR
1 EACH SUBCUTANEOUS
Qty: 10 EACH | Refills: 6 | Status: SHIPPED | OUTPATIENT
Start: 2024-10-10 | End: 2024-10-11 | Stop reason: SDUPTHER

## 2024-10-10 NOTE — TELEPHONE ENCOUNTER
Received notification clarification was needed re: freq change.     Updated to change pod every 3 days.     Rx sent to [facility]

## 2024-10-11 ENCOUNTER — TELEPHONE (OUTPATIENT)
Dept: ENDOCRINOLOGY | Facility: CLINIC | Age: 64
End: 2024-10-11
Payer: MEDICARE

## 2024-10-11 DIAGNOSIS — E11.49 TYPE 2 DIABETES MELLITUS WITH OTHER DIABETIC NEUROLOGICAL COMPLICATION: ICD-10-CM

## 2024-10-11 RX ORDER — INSULIN PMP CART,AUT,G6/7,CNTR
1 EACH SUBCUTANEOUS
Qty: 10 EACH | Refills: 6 | Status: SHIPPED | OUTPATIENT
Start: 2024-10-11

## 2024-10-11 RX ORDER — INSULIN PMP CART,AUT,G6/7,CNTR
1 EACH SUBCUTANEOUS DAILY
Qty: 1 KIT | Refills: 0 | Status: SHIPPED | OUTPATIENT
Start: 2024-10-11

## 2024-10-11 NOTE — TELEPHONE ENCOUNTER
Omnipod rep contacted d/t pt stating not able to afford copay. Rep called pt.     Per rep, run new rx for omnipod and intro kit through local pharmacy to see cost. Rep states pt agreed.     Rx sent to [facility].

## 2024-10-11 NOTE — TELEPHONE ENCOUNTER
Pt called stating BG has been elevated. Pt taking lispro SSI before meals. Using SSI pt has been taking 9 units before meals. Pt taking 30 units of Lantus daily.     Recent MD visit 10/3 with change in DM med mgt.     Rec for pt to increase Lantus to 33 units per MD standing order.     Pt has h/o low BG. Pt denies any lows recently. Rule of 15 reviewed and double check with finger stick. Enc pt to call with additional adjustment. Pt agreeable and voiced understanding.     Pt states [employer] contacted her but not able to afford copay amount provided.

## 2024-10-15 ENCOUNTER — TELEPHONE (OUTPATIENT)
Dept: ENDOCRINOLOGY | Facility: CLINIC | Age: 64
End: 2024-10-15
Payer: MEDICARE

## 2024-10-15 NOTE — TELEPHONE ENCOUNTER
Pt called c/o consistently high -350. Reviewed Freestyle [facility] report. Pt treated for possible UTI 3 weeks ago, but no known infection or illness at this time. Pt spending 34% of TIR, 49% between 181-250 mg/dL, and 17% of time >250 mg/dL, 0% of time <70 mg/dL. Per pt self-increased Lantus from 33 to 40 units last night. Advised pt to continue this dose until Thursday before making another increase to 48 units (20%) if BG continues above goal. Pt reports taking 9 units of Lispro with sliding scale before most meals past few days and sometimes giving correction dose of 9 units between meals. Advised against giving lipro closer than 3 hours apart due to possibility of stacking insulin, rather to use SS to correct with next meal. Pt to increase Lispro base dose to 5 units with meals + sliding scale. Reminded pt to always care source of rapid acting carb to treat potential low BG. Pt would really like to transition to pump therapy, but concerned about the cost of Omnipod. Reached out to [employer] [facility] Ward roger per pt request to contact pt. Faxed demographics and last 2 clinical notes to start processing and determine cost to patient. See media for details.

## 2024-10-16 ENCOUNTER — HOSPITAL ENCOUNTER (OUTPATIENT)
Facility: HOSPITAL | Age: 64
Discharge: HOME | End: 2024-10-16
Admitting: NURSE PRACTITIONER
Payer: MEDICARE

## 2024-10-16 VITALS
BODY MASS INDEX: 30.93 KG/M2 | SYSTOLIC BLOOD PRESSURE: 146 MMHG | DIASTOLIC BLOOD PRESSURE: 83 MMHG | HEART RATE: 78 BPM | OXYGEN SATURATION: 99 % | WEIGHT: 180.2 LBS

## 2024-10-16 DIAGNOSIS — R06.09 DYSPNEA ON EXERTION: ICD-10-CM

## 2024-10-16 DIAGNOSIS — E87.5 HYPERKALEMIA: Primary | ICD-10-CM

## 2024-10-16 LAB
ANION GAP SERPL CALCULATED.3IONS-SCNC: 12.6 MMOL/L (ref 5–15)
BUN SERPL-MCNC: 36 MG/DL (ref 8–23)
BUN/CREAT SERPL: 23.1 (ref 7–25)
CALCIUM SPEC-SCNC: 10.2 MG/DL (ref 8.6–10.5)
CHLORIDE SERPL-SCNC: 102 MMOL/L (ref 98–107)
CO2 SERPL-SCNC: 24.4 MMOL/L (ref 22–29)
CREAT SERPL-MCNC: 1.56 MG/DL (ref 0.57–1)
EGFRCR SERPLBLD CKD-EPI 2021: 36.5 ML/MIN/1.73
GLUCOSE SERPL-MCNC: 196 MG/DL (ref 65–99)
NT-PROBNP SERPL-MCNC: 107 PG/ML (ref 0–900)
POTASSIUM SERPL-SCNC: 5.8 MMOL/L (ref 3.5–5.2)
SODIUM SERPL-SCNC: 139 MMOL/L (ref 136–145)

## 2024-10-16 PROCEDURE — 80048 BASIC METABOLIC PNL TOTAL CA: CPT | Performed by: NURSE PRACTITIONER

## 2024-10-16 PROCEDURE — 83880 ASSAY OF NATRIURETIC PEPTIDE: CPT | Performed by: NURSE PRACTITIONER

## 2024-10-16 PROCEDURE — G0463 HOSPITAL OUTPT CLINIC VISIT: HCPCS

## 2024-10-16 RX ORDER — SODIUM ZIRCONIUM CYCLOSILICATE 10 G/10G
10 POWDER, FOR SUSPENSION ORAL 3 TIMES DAILY
Qty: 6 EACH | Refills: 0 | Status: SHIPPED | OUTPATIENT
Start: 2024-10-16 | End: 2024-10-18

## 2024-10-18 ENCOUNTER — TELEPHONE (OUTPATIENT)
Facility: HOSPITAL | Age: 64
End: 2024-10-18

## 2024-10-18 ENCOUNTER — HOSPITAL ENCOUNTER (OUTPATIENT)
Facility: HOSPITAL | Age: 64
Discharge: HOME | End: 2024-10-18
Payer: MEDICARE

## 2024-10-18 VITALS — OXYGEN SATURATION: 98 % | SYSTOLIC BLOOD PRESSURE: 131 MMHG | HEART RATE: 74 BPM | DIASTOLIC BLOOD PRESSURE: 86 MMHG

## 2024-10-18 DIAGNOSIS — E87.5 HYPERKALEMIA: Primary | ICD-10-CM

## 2024-10-18 LAB
ANION GAP SERPL CALCULATED.3IONS-SCNC: 13.9 MMOL/L (ref 5–15)
BUN SERPL-MCNC: 30 MG/DL (ref 8–23)
BUN/CREAT SERPL: 19.4 (ref 7–25)
CALCIUM SPEC-SCNC: 9.3 MG/DL (ref 8.6–10.5)
CHLORIDE SERPL-SCNC: 102 MMOL/L (ref 98–107)
CO2 SERPL-SCNC: 26.1 MMOL/L (ref 22–29)
CREAT SERPL-MCNC: 1.55 MG/DL (ref 0.57–1)
EGFRCR SERPLBLD CKD-EPI 2021: 36.8 ML/MIN/1.73
GLUCOSE SERPL-MCNC: 159 MG/DL (ref 65–99)
POTASSIUM SERPL-SCNC: 3.5 MMOL/L (ref 3.5–5.2)
SODIUM SERPL-SCNC: 142 MMOL/L (ref 136–145)

## 2024-10-18 PROCEDURE — 80048 BASIC METABOLIC PNL TOTAL CA: CPT | Performed by: NURSE PRACTITIONER

## 2024-10-18 PROCEDURE — G0463 HOSPITAL OUTPT CLINIC VISIT: HCPCS

## 2024-10-21 RX ORDER — GLIMEPIRIDE 2 MG/1
2 TABLET ORAL EVERY MORNING
Qty: 90 TABLET | Refills: 3 | OUTPATIENT
Start: 2024-10-21 | End: 2025-10-21

## 2024-10-22 ENCOUNTER — TELEPHONE (OUTPATIENT)
Dept: ENDOCRINOLOGY | Facility: CLINIC | Age: 64
End: 2024-10-22
Payer: MEDICARE

## 2024-10-22 DIAGNOSIS — E11.49 TYPE 2 DIABETES MELLITUS WITH OTHER DIABETIC NEUROLOGICAL COMPLICATION: Primary | ICD-10-CM

## 2024-10-22 RX ORDER — INSULIN LISPRO 100 [IU]/ML
INJECTION, SOLUTION INTRAVENOUS; SUBCUTANEOUS
Qty: 30 ML | Refills: 12 | Status: SHIPPED | OUTPATIENT
Start: 2024-10-22

## 2024-10-22 NOTE — PROGRESS NOTES
"Cardiology Heart Failure Clinic Note  [facility] \"Renan\" [facility]    Patient ID: Susy Hanson  is a 64 y.o. female.    Encounter Date:10/23/2024    HPI:  64-year-old female patient of Dr. Murphy's, with a PMH of nonischemic nondilated cardiomyopathy with a.  LVEF less than 20% back in 2022, S/P [employer] [place] aware BiV ICD complicated by pericardial effusion with lack of pacing and RV lead perforation subsequently had recovery of EF at 60 to 65% (TTE 11/3/22), without mention of any diastolic dysfunction, most recent TTE done in January 2024 shows her EF is now at about 56 to 60% with grade 1 DD. H\O left breast cancer (H cc), diabetes, IBS, metabolic syndrome with HTN, HLD, ANDREAS Rx w/ CPAP, DM.  Came in for her initial visit at the [facility] heart failure clinic 1/25/2024, she has done well from a heart failure standpoint with improvement of EF  Does not seem to be particularly symptomatic however does have some dyspnea with exertion.  She has concerns regarding her past Breast implant for which she is planning on having those removed.  She was hyperkalemic treated with Lokelma came back in f/u 12 Feb 24 after having seen Dr. Murphy who decreased her Entresto due to renal dysfunction. She was found to be hypertensive 2/12/24 and hyperkalemic with a K+= 6 Was encouraged not to leave town given her increased K+ however, she was rather adamant & saw her brother in [place]. Was told to go to nearest ER by renal and we had BMP done in [place] & K+ was WNL after Lokelma.  She was back on 19 February 2024 after returning home she has felt well but has come back with a K+ up to 5.1.It was still 1.5 months until she was seen renal. Back on 4 Mar 24 doing remarkably well, she brought her blood pressure logs they were stable. Patient looking forward to going to several events in [place] and [place] with grandchildren.  She is working rather diligently on trying to maintain healthy lifestyle for her " combination of chronic kidney disease, heart failure, diabetes.  She comes back on 10 Michaela 2024 since last visit obtained on considerable amount of time to [place] as well as [place] with her grandsons rodo.-Not a major issues comes back 8/7/2024 after having found out she had hyperkalemia, she is also has remained hypertensive after last adjustment made last month, done relatively well says she has been having trouble getting in touch with her nephrologist as she tends not to return her calls when she goes about her elevated potassiums.  She does however have appointment with Dr. Rodney her previous nephrologist she apparently was having some issues with getting along with nephrology ,now changed to follow-up with Dr. Douglass comes back 23 September 2024 and follow-up since last visit as noted she has changed nephrologist and has already seen Emiliano nurse practitioner with Dr. Douglass's office placed her back on Farxiga and decreased her diuretic.  She is obviously not any volume issues however continues to have hyperkalemic issues.  Back in follow-up on 23 October 2024 since last visit other than the issues with her hyperkalemia, little from a heart failure standpoint and otherwise been doing well getting ready to celebrate her 50th wedding anniversary          Assessment:   Diagnoses and all orders for this visit:    1. At risk for fluid volume overload (Primary)  Overview:   1. Hx non-dilated, non-ischemic cardiomyopathy          A.  Resolved systolic HF (HFrEF)                I. .  EF 56-60% Gr 1 DD (TTE 1-25-24)               Ii.       EF 60 to 65% w/o mention of diastolic dysfunction (TTE 11/3/22)               III. EF 15-20% w/o DD (July 22)         B. S/p 2022 [employer] aware BiV ICD                    I.  Complicated by RV lead perforation   2. CKD stage 3b                    I. H/o VIVEK (4/22)                   II. eGFR 42 (2/12/24)      2. Cardiomyopathy, unspecified type  Overview:   Hx  non-dilated, non-ischemic cardiomyopathy          A.  Resolved systolic HF (HFrEF)         B.  EF 56-60% Gr 1 DD (TTE 1-25-24)                   I. EF 60 to 65% w/o mention of diastolic dysfunction (TTE 11/3/22)                   II. EF 15-20% w/o DD (July 22)         C. S/p 2022 [employer] aware BiV ICD                    I.  Complicated by RV lead perforatn      3. mild PAH (pulmonary artery hypertension)  Overview:  RVSP 35-45 mmHg (Jan 24 TTE)      4. Stage 3b chronic kidney disease  Overview:  eGFR 1/25/24  is 44      5. Presence of biventricular  ICD    6. Dyspnea on exertion    7. Metabolic syndrome  Overview:  A. Hypertension  B. Mixed dyslipidemia  C. Obesity           I.ANDREAS uses CPAP  D. T2DM          II. Peripheral neuropathy      8. h/o resolved Left ventricular failure  Overview:  Hx non-dilated, non-ischemic cardiomyopathy          A.  Resolved systolic HF (HFrEF)         B.  LVEF 56 to 60% gr 1 DD (TTE 1/25/24)          C. S/p 2022 [employer] aware BiV ICD                    I.  Complicated by RV lead perforatn      9. Irritable bowel syndrome, unspecified type    10. Mixed anxiety depressive disorder    11. Breast neoplasm, Tis (DCIS), left  Overview:  Added automatically from request for surgery [id number]      12. Polyneuropathy associated with underlying disease    13. Type 2 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    14. LBBB (left bundle branch block)    15. Pericarditis, unspecified chronicity, unspecified type              Plan/discussion    Volume overload    Heart Failure Core Measures    Type of Overload : unspecified     Most recent EF   EF 56-60% Gr 1 DD (TTE 1-25-24)   A.  Hx EF 20%  (July 22 TTE)  B. (Nov 22 TTE) 60 to 65% w/o mention of DD     [place] Heart association Class & Stage : 1c     HF Meds     Beta Blocker: Metoprolol 100 mg twice daily  ARNI/ACE/ARB: stopped 2-12-24 Entresto 24 to 26 mg BID  SGLT 2 inhibitors: [id number] mg daily  Diuretics upon release  from clinic: Lasix 20 mg every day  Furoscix: Currently not a candidate  Aldosterone Antagonist: None indicated  Digitalis: N/A  Vasodilators & Nitrates: Not indicated     Additionally on hydralazine 100 mg 3 times daily starting 8/7/2024     Cardiac medicines reviewed with risk, benefits, and necessity of each discussed with myself & a formerly [facility].      ____________________________________________________________     Discussion     Heart failure core measures including beta-blocker, (stopped), ARNI, SGLT2  H/o EF being less than 20% July 22, repeat echo in November 22 EF improved to 60 to 65%.   Repeat TTE 1/25/24 EF 56-60% Gr 1 DD w/ RVSP 35-45 mmHg  She will continue follow-ups with Dr. Murphy as well as Dr. Kerr who was replaced as her nephrologist by Morenita  Significantly hyperkalemic 2/12/24 with a potassium of 6 renal function is maintaining the creatinine of 1.4 given Lokelma 10 g x 3 days  had BMP done & K+ was WNL ( (3.9) after Lokelma at an ER in [place]  A. Last visit potassium was up to 5.1 and she was placed on Lasix 20mg QOD  B.  6/10/24 potassium is at 4.8.  C.  8/7/2024 potassium is at 5.2            I.  Will be given 1 Lokelma packet           II.  Will need next potassium level 8/13/24 & it was 5.4                                Lokelma x 1 dose in HF c;linic        D.  9/23/2024 potassiums up to 5.3                   I.  1 dose Lokelma 9/23/2024 and second 9/24/24        E. 10/23/24 K+ 4.3  5.  blood pressure until today's visit has not been under good control, however, I was unable to find a lot of room to add medications which would aid in control on the last couple of visits therefore we will have to defer to Dr. Murphy and Cirilo.    6.  But on her 6/10/2024 visit her systolic is up to 168 with a diastolic of 81.  Therefore              A.  Last visit Dr. Murphy increased Hydralazine to 100 mg 3 times daily              B.  8/7/2024 for same blood pressure issues we will go ahead and increase her  hydralazine to 75 mg 3 times daily  6.  potassium issues seem to have returned  A. We will plan on seeing again in approximately 1 month.  B.  Of note she has had the visit on 8/7/2024  C.  8/7/2024 eGFR 40, BUN 54 creatinine 1.4, potassium 5.2  D/  8/13/24 eGFR 43, BUN 49, creatinine 1.4  E.  9/23/2024 BUN at 30 creatinine 1.5 EGFR at 39  F.  10/23/2024 eGFR 37, BUN at 27, creatinine 1.5, proBNP 163  7. Has been making a concerted effort to to decrease her potassium dietarily which seems to as of 9/23/2024 not seemingly accomplishing its goal  8.   Only medication added 9/23/2024 was the Lokelma would defer however to Dr. Douglass and his NP Emiliano her nephrologist on managing the potassium   9.  Will have her come back for labs given the fact she was given the Lokelma today here in the clinic on Wednesday               A.  Would likely benefit from weekly BMPs will discuss that again on Wednesday when here for labs  10.  only medication change made 8/7/2024 visit was increasing her hydralazine from 75 mg 3 times daily from 100 mg twice daily initiated in June 2024 and her blood pressure seemingly better control  9. Would continue with her ongoing heart failure nursing interventions including:                          A. Fluid restriction at less than 2000 cc daily                          B. Daily weights                          C.  1 g low-sodium diet        I did the following activities preparing for the visit with Susy Hanson  including: reviewing tests, once pt arrived in clinic I also performed a medically appropriate examination and/or evaluation, I personally spent considerable time counseling and educating the patient/family/caregiver, ordering medications, tests, or procedures, referring and communicating with other health care professionals, and documenting information in the medical record. I estimate including preparation 20 minutes              Subjective:     No chief complaint on  file.      ROS:      Constitutional:  weakness, & fatigue remains at her normal level ., No fever, rigors, chills   Eyes: No recent vision changes, eye pain   ENT/oropharynx: No recent difficulty swallowing, sore throat, epistaxis, changes in hearing   Cardiovascular: No recent chest pain, chest tightness, palpitations except as noted in HPI, paroxysmal nocturnal dyspnea, orthopnea, diaphoresis, dizziness & no pre or vikram syncopal episodes   Respiratory: No at rest shortness of breath, + rare dyspnea on exertion  no significant productive cough, no wheezing and no hemoptysis   Gastrointestinal: No abdominal pain, nausea, vomiting, diarrhea, bloody stools   Genitourinary: No hematuria, dysuria   Neurological: No change in her typical headache pattern, she has had no new tremors, numbness,  or hemiparesis    Musculoskeletal: No change in typical cramps, myalgias,  joint pain, w/o any new joint swelling but does complain of osteoarthritic discomfort particularly in the lower extremities   Integument: No recent rash, no edema      Patient Active Problem List   Diagnosis    Encounter for general adult medical examination without abnormal findings    Essential hypertension    Irritable bowel syndrome    Mixed anxiety depressive disorder    Mixed hyperlipidemia    Obesity    Encounter for annual wellness exam in Medicare patient    Type 2 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    Vitamin D deficiency    Hypertensive urgency    Peripheral neuropathy    ANDREAS on CPAP    Chest pain, atypical    Breast neoplasm, Tis (DCIS), left    Cold intolerance    Insomnia    Breast pain    Dyspnea    h/o Acute kidney injury    LBBB (left bundle branch block)    h/o Resolved nonischemic, nondilated, cardiomyopathy    Presence of biventricular  ICD    Hemopericardium    h/o Pericarditis    Pericardial effusion    Syndrome X, metabolic    At risk for fluid volume overload    h/o resolved Left ventricular failure     Stage 3b chronic kidney disease    Hyperkalemia    mild PAH (pulmonary artery hypertension)       Past Medical History:   Diagnosis Date    Allergic 2008    Weeds, grasses, mold, mildew, etc.    Anxiety 2000    Breast cancer     LEFT BREAST 7/2021    Cataract     Had surgery to remove August 2023    CHF (congestive heart failure)     Colon polyp 12/2023    Removed during colonoscopy    Coronary artery disease 2019    CTS (carpal tunnel syndrome) 2015    Surgery on both hands    Depression     DM (diabetes mellitus)     GERD (gastroesophageal reflux disease)     History of medical problems 2008    Bladder prolapse repeat Dr. Roper    HL (hearing loss)     Hyperlipidemia     Hypertension     SAW CARDIO/ STRESS TEST 2020 - NOW MEDS MANAGED BY PCP     Irritable bowel syndrome 2006    Peripheral neuropathy     PONV (postoperative nausea and vomiting)     Renal insufficiency 2020    See Dr. funes for moderate kidney failure    Sleep apnea     WEARS CPAP    Type 2 diabetes mellitus 2008    Urinary tract infection 6/23    UTI seen at [facility]       Past Surgical History:   Procedure Laterality Date    BLADDER REPAIR      BREAST BIOPSY      BREAST RECONSTRUCTION Bilateral 08/26/2021    Procedure: BILATERAL PREPECTORALPLACMENT TISSUE EXPANDERS AND ALLODERM;  Surgeon: Heri Arreaga MD;  Location: [facility];  Service: Plastics;  Laterality: Bilateral;    BREAST TISSUE EXPANDER REMOVAL INSERTION OF IMPLANT Bilateral 12/23/2021    Procedure: BILATERAL REMOVAL TISSUE EXPANDERS AND PLACEMENT OF IMPLANTS;  Surgeon: Heri Arreaga MD;  Location: [facility];  Service: Plastics;  Laterality: Bilateral;    CARDIAC CATHETERIZATION Left 04/13/2022    Procedure: Cardiac Catheterization/Vascular Study;  Surgeon: Christo Murphy MD;  Location: [facility] CATH INVASIVE LOCATION;  Service: Cardiovascular;  Laterality: Left;    CARDIAC ELECTROPHYSIOLOGY PROCEDURE N/A 09/19/2022    Procedure: ICD implant [employer]  aware;  Surgeon: Esteban Kendrick MD;  Location:  [facility] CATH INVASIVE LOCATION;  Service: Cardiology;  Laterality: N/A;    CARDIAC ELECTROPHYSIOLOGY PROCEDURE N/A 2022    Procedure: lead repositioning;  Surgeon: Esteban Kendrick MD;  Location:  [facility] INVAS[facility];  Service: Cardiology;  Laterality: N/A;    CARDIAC SURGERY      Defibrillator/pacemaker implant    CARDIOVASCULAR STRESS TEST      COLONOSCOPY  2012    EYE SURGERY      Cataracts removed    HAND SURGERY      Both hands    HYSTERECTOMY      INSERT / REPLACE / REMOVE PACEMAKER      LYMPH NODE BIOPSY  2021    MASTECTOMY W/ SENTINEL NODE BIOPSY Bilateral 2021    Procedure: bilateral total mastectomy, left axillary sentinel lymph node biopsy, possible reconstruction.;  Surgeon: Asia Perkins MD;  Location: [facility] MAIN OR;  Service: General;  Laterality: Bilateral;    TRIGGER POINT INJECTION      Left thumb    TUBAL ABDOMINAL LIGATION         Social History     Socioeconomic History    Marital status:    Tobacco Use    Smoking status: Former     Current packs/day: 0.00     Average packs/day: 2.0 packs/day for 36.0 years (72.0 ttl pk-yrs)     Types: Cigarettes     Start date: 1972     Quit date: 2008     Years since quittin.8     Passive exposure: Past    Smokeless tobacco: Never   Vaping Use    Vaping status: Never Used   Substance and Sexual Activity    Alcohol use: No    Drug use: No    Sexual activity: Yes     Partners: Male     Birth control/protection: None, Tubal ligation, Hysterectomy       Allergies   Allergen Reactions    Hydrocodone-Acetaminophen Hives     Lortab, takes tylenol         Current Outpatient Medications:     Accu-Chek FastClix Lancets misc, USE TO CHECK BLOOD GLUCOSE DAILY, Disp: , Rfl:     Accu-Chek Guide test strip, 1 each by Other route Daily. Use daily to check BS E11.9, Disp: 100 each, Rfl: 1    ALPRAZolam (XANAX) 1 MG tablet, TAKE ONE TABLET BY  MOUTH TWICE A DAY, Disp: 60 tablet, Rfl: 0    atorvastatin (LIPITOR) 20 MG tablet, TAKE ONE TABLET BY MOUTH EVERY DAY, Disp: 90 tablet, Rfl: 1    Azelastine HCl 137 MCG/SPRAY solution, 2 sprays by Each Nare route Daily., Disp: 90 mL, Rfl: 1    cetirizine (zyrTEC) 10 MG tablet, Take 1 tablet by mouth Daily As Needed., Disp: , Rfl:     Continuous Blood Gluc  (FreeStyle [place] 3 [place]) device, Use 1 each 4 (Four) Times a Day Before Meals & at Bedtime., Disp: 1 each, Rfl: 0    Continuous Glucose  (Dexcom G7 ) device, Use 1 each 4 (Four) Times a Day Before Meals & at Bedtime., Disp: 1 each, Rfl: 0    Continuous Glucose Sensor (Dexcom G7 Sensor) misc, Use 1 each 4 (Four) Times a Day Before Meals & at Bedtime., Disp: 9 each, Rfl: 3    Continuous Glucose Sensor (FreeStyle Benton 3 Sensor) misc, Use 1 each Every 14 (Fourteen) Days., Disp: 6 each, Rfl: 1    dapagliflozin Propanediol (Farxiga) 10 MG tablet, Take 10 mg by mouth Daily., Disp: 90 tablet, Rfl: 1    dilTIAZem CD (CARDIZEM CD) 180 MG 24 hr capsule, TAKE ONE CAPSULE BY MOUTH EVERY DAY, Disp: 90 capsule, Rfl: 1    furosemide (LASIX) 20 MG tablet, Take 1 tablet by mouth Daily., Disp: , Rfl:     hydrALAZINE (APRESOLINE) 100 MG tablet, Take 1 tablet by mouth 3 (Three) Times a Day., Disp: 270 tablet, Rfl: 1    Insulin Disposable Pump (Omnipod 5 G6 Intro, Gen 5,) kit, Use 1 package Daily., Disp: 1 kit, Rfl: 0    Insulin Disposable Pump (Omnipod 5 G6 Pods, Gen 5,) misc, Use 1 package Every 3 (Three) Days., Disp: 10 each, Rfl: 6    Insulin Glargine (Lantus SoloStar) 100 UNIT/ML injection pen, Inject 30 Units under the skin into the appropriate area as directed Every Night., Disp: 30 mL, Rfl: 2    Insulin Lispro (humaLOG) 100 UNIT/ML injection, For use in insulin pump. MDD 90 units., Disp: 30 mL, Rfl: 12    Insulin Lispro, 1 Unit Dial, (HumaLOG KwikPen) 100 UNIT/ML solution pen-injector, 3 units with meal with sliding scale maximum of 9 units with each  meal, Disp: 15 mL, Rfl: 2    Insulin Pen Needle (Pen Needles) 32G X 4 MM misc, Use 1 each 4 (Four) Times a Day Before Meals & at Bedtime., Disp: 200 each, Rfl: 5    metoprolol succinate XL (TOPROL-XL) 100 MG 24 hr tablet, Take 1 tablet by mouth 2 (Two) Times a Day., Disp: 180 tablet, Rfl: 1    ondansetron (Zofran) 4 MG tablet, Take 1 tablet by mouth Every 8 (Eight) Hours As Needed for Nausea or Vomiting., Disp: 20 tablet, Rfl: 0    pantoprazole (PROTONIX) 40 MG EC tablet, Take 1 tablet by mouth Daily., Disp: 90 tablet, Rfl: 3    Umeclidinium-Vilanterol (Anoro Ellipta) 62.5-25 MCG/ACT aerosol powder  inhaler, Inhale 1 puff Daily., Disp: 60 each, Rfl: 5    Immunization History   Administered Date(s) Administered    ABRYSVO (RSV, 60+ or pregnant women 32-36 wks) 09/16/2024    Arexvy (RSV, Adults 60+ yrs) 09/22/2023    COVID-19 (PFIZER) BIVALENT 12+YRS 11/04/2022, 09/22/2023    COVID-19 (PFIZER) Purple Cap Monovalent 01/06/2021, 01/27/2021, 09/30/2021, 11/01/2021    COVID-19 (UNSPECIFIED) 09/16/2024    Fluad Quad 65+ 09/14/2023    Fluzone High-Dose 65+YRS 09/16/2024    H1N1 Inj 11/03/2009    Hepatitis A 04/29/2018, 11/02/2018    Influenza Injectable Mdck Pf Quad 11/04/2022    Influenza, Unspecified 11/01/2021, 09/14/2023    Pneumococcal Conjugate 20-Valent (PCV20) 11/30/2023    RSV, unspecified (Vaccine or MAB) 09/22/2023    Tdap 07/26/2022    Zoster, Unspecified 09/16/2024    flucelvax quad pfs =>4 YRS 10/19/2020         Most recent EKG as reviewed:  Procedures     Most recent echo as reviewed:  Results for orders placed during the hospital encounter of 01/30/24    Adult Transthoracic Echo Complete W/ Cont if Necessary Per Protocol    Interpretation Summary    Left ventricular systolic function is normal. Left ventricular ejection fraction appears to be 56 - 60%.    Left ventricular diastolic function is consistent with (grade I) impaired relaxation.    Estimated right ventricular systolic pressure from tricuspid  regurgitation is mildly elevated (35-45 mmHg).      Most recent stress test results:  Results for orders placed during the hospital encounter of 04/11/22    Stress Test With Myocardial Perfusion One Day    Interpretation Summary  · This is incomplete Cardiolite imaging study. Stress images were not done because test was aborted. Rest images showed small fixed septal and apical defect. Left ventricle size appear normal. No gated SPECT imaging were noted..      Most recent cardiac catheterization results:  Results for orders placed during the hospital encounter of 04/11/22    Cardiac Catheterization/Vascular Study    Narrative  CARDIAC CATHETERIZATION REPORT      DATE OF PROCEDURE:  4/13/2022    INDICATION FOR PROCEDURE:    Congestive heart failure acute systolic  Dilated cardiomyopathy    PROCEDURE PERFORMED:    Left heart catheterization  coronary angiography  left ventriculography    PROCEDURE COMMENTS:    After informed consent was obtained, the patient was prepped and draped in the usual sterile manner.  Mild to moderate sedation was administered.  Right femoral artery was accessed without difficulty and 6 Citizen of [place] arterial sheath was inserted.  Sheath was flushed with heparinized saline.    Using 6 [place] Roberto catheters, first left coronary artery and the right coronary was electively engaged and appropriate views were taken.  A 6 [place] JR4 catheter was used to cross aortic valve and left heart catheterization was performed.  Left ventriculography was done NAQVI view    Patient tolerated the procedure well.    FINDINGS:    1. HEMODYNAMICS:    Aortic pressure: 167/101 mmHg    LVEDP: 10 to 15 mmHg    Gradient across aortic valve on pullback: No gradient across aortic valve      2. LEFT VENTRICULOGRAPHY: 35 to 40%      3. CORONARY ANGIOGRAPHY:    A: Left main coronary artery: Normal    B: Left anterior descending artery: 25% plaque in proximal and mid segment.  No high-grade stenosis    C: Left circumflex coronary  artery: Mild diffuse disease in the midsegment but no high-grade stenosis    D: Right coronary artery: 25% plaque in proximal and mid segment of RCA but no high-grade stenosis.  It is dominant vessel    SUMMARY:    Mild coronary artery disease  Left ventricular dysfunction    RECOMMENDATIONS:    Medical treatment.        Historical data copied forward from previous encounters in EMR including the history, exam, and assessment/plan has been reviewed and is unchanged except as I have noted and otherwise indicated.      Objective:         LMP  (LMP Unknown)     General:  Well-developed, mildly overly well-nourished, cooperative, no distress, appears stated age if not slightly older    Neuro:  A&O x3. No significantly obvious focal neuro deficet    Psych:  Pleasant -affect    HENT:  Normocephalic, atraumatic, moist mucous membranes , Normal ear placement,Throat not injected   Eyes:  PERRLA, Conjunctivae not injected, EOM's intact, conjunctiva does not appear significantly injected   Neck:  Supple, somewhat mildly thickened, no lymph adenopathy nor thyromegaly no JVD while sitting in chair there was no audible bruit    Lungs:    Symmetrical rise & fall of chest with baseline respiratory pattern. To auscultation lungs bilaterally, mostly clear,+ late phase expiratory wheeze, there is no rhonchi or rales noted, the bilaterally the bases are not diminished   Chest wall:  No significant tenderness when palpated. PMI @ 5th ICS McL  , deflated Left breast, well healed ICD scar    Heart:  Regular rate and rhythm, S1 and S2 normal, no S3 or S4, Gr I/VI systolic ejection murmur best heard at the apex , no obvious rub, click or gallop.    Abdomen:  non-distended, non-tender, bowel sounds noted in the 4 quadrants of the abdomen, with only very mild central abdominal adipose tissue identified    Extremities:  Equal color motion temperature and sensitivity, Rapid capillary refill noted within the nailbeds of fingers and toes  bilaterally no lower extremity edema noted.    Pulses:  2+ and symmetric all extremities, rapid capillary refill    Skin:  No obvious rashes, significant lesions identified, warm dry and of normal turgor      In-Office Procedure(s):  No orders to display        Imaging:    Results for orders placed in visit on 07/31/24    XR Hip With or Without Pelvis 2 - 3 View Left    Narrative  XR HIP W OR WO PELVIS 2-3 VIEW LEFT    Date of Exam: 7/31/2024 10:40 AM EDT    Indication: left hip pain, NKI, pain for 2-3 months, room 16    Comparison: CT the abdomen and pelvis bone windows 10/19/2013.    Findings:  No pelvic fracture. No hip fracture. No hip dislocation. There is mild bilateral hip joint space narrowing, without significant spurring. Each femoral head maintains a normal round configuration, without evidence of avascular necrosis. Mild degenerative  change of the sacroiliac joints. Imaged lower lumbar spine is within normal limits. No osteolytic or osteoblastic abnormality.    Impression  Impression:  Mild degenerative changes of the hips.  Mild degenerative changes of the sacroiliac joints.  No acute findings.      Electronically Signed: Britney Roman MD  8/1/2024 11:16 AM EDT  Workstation ID: [id number]       Results for orders placed during the hospital encounter of 01/11/24    US Abdomen Limited    Narrative  US ABDOMEN LIMITED    Date of Exam: 1/11/2024 10:41 AM EST    Indication: persistent nausea.    Comparison: CT abdomen and pelvis angiography 1/17/2020    Technique: Grayscale and color Doppler ultrasound evaluation of the right upper quadrant was performed.      Findings:  Diffusely increased liver echogenicity compatible with hepatic steatosis. No discrete liver lesion. No perihepatic ascites. Visualized portions of the pancreas are normal. The gallbladder is normal. No biliary dilatation. Common bile duct measures 2 mm.  Hepatopetal flow in the portal vein. Visualized hepatic veins are patent. The right  kidney measures 9.1 x 4.1 x 3.7 cm. No right-sided hydronephrosis.    Impression  Impression:  1. Hepatic steatosis.  2. Normal gallbladder.  3. No biliary dilatation.        Electronically Signed: Ottoniel Lee MD  1/11/2024 11:54 AM EST  Workstation ID: [id number]      Results for orders placed during the hospital encounter of 10/03/22    CT Angiogram Chest Pulmonary Embolism    Narrative  Exam: CT Angiography of the Chest.    Date: 10/4/2022.    Comparison: 4/11/2022.    History: Defibrillator placement with chest pain.    Technique: CT examination of the chest was performed following the intravenous administration of 100 mL of Isovue-370. Sagittal, coronal and 3-D reformatted images were provided. CT dose lowering techniques were used, to include: automated exposure  control, adjustment for patient size, and/or use of iterative reconstruction.    FINDINGS:    CHEST WALL: There are bilateral breast implants.    Mediastinum and Theresa: There is no axillary, mediastinal or hilar lymphadenopathy.    Pleural and Pericardial spaces: There is a small to moderate pericardial effusion which is higher density than simple fluid which may relate to a hemopericardium.    Upper Abdomen: The visualized upper abdomen is unremarkable.    Cardiovascular: AICD generator has leads in the right atrium and right ventricle. There is mild vascular calcification throughout the thoracic aorta without evidence of aneurysmal dilation or dissection.    Pulmonary Artery: There are no filling defects in the pulmonary arteries.    Lung Parenchyma and Airways: The lungs are clear.    Bones: No fracture or aggressive osseous lesion.    Impression  1. No evidence of pulmonary embolism.  2. No evidence of thoracic aortic aneurysm or dissection.  3. Small to moderate amount of hemopericardium.    These findings were discussed with COURTNEY Atkinson.      Electronically signed by:  Amish Ellsworth D.O.  10/4/2022 1:51 AM      Lab Review:   [facility]  Outpatient Visit on 10/18/2024   Component Date Value    Glucose 10/18/2024 159 (H)     BUN 10/18/2024 30 (H)     Creatinine 10/18/2024 1.55 (H)     Sodium 10/18/2024 142     Potassium 10/18/2024 3.5     Chloride 10/18/2024 102     CO2 10/18/2024 26.1     Calcium 10/18/2024 9.3     BUN/Creatinine Ratio 10/18/2024 19.4     Anion Gap 10/18/2024 13.9     eGFR 10/18/2024 36.8 (L)    Hospital Outpatient Visit on 10/16/2024   Component Date Value    Glucose 10/16/2024 196 (H)     BUN 10/16/2024 36 (H)     Creatinine 10/16/2024 1.56 (H)     Sodium 10/16/2024 139     Potassium 10/16/2024 5.8 (H)     Chloride 10/16/2024 102     CO2 10/16/2024 24.4     Calcium 10/16/2024 10.2     BUN/Creatinine Ratio 10/16/2024 23.1     Anion Gap 10/16/2024 12.6     eGFR 10/16/2024 36.5 (L)     proBNP 10/16/2024 107.0    Hospital Outpatient Visit on 10/09/2024   Component Date Value    Glucose 10/09/2024 258 (H)     BUN 10/09/2024 46 (H)     Creatinine 10/09/2024 1.47 (H)     Sodium 10/09/2024 139     Potassium 10/09/2024 5.2     Chloride 10/09/2024 104     CO2 10/09/2024 24.5     Calcium 10/09/2024 10.1     BUN/Creatinine Ratio 10/09/2024 31.3 (H)     Anion Gap 10/09/2024 10.5     eGFR 10/09/2024 39.2 (L)     proBNP 10/09/2024 68.5    Hospital Outpatient Visit on 10/02/2024   Component Date Value    Glucose 10/02/2024 192 (H)     BUN 10/02/2024 29 (H)     Creatinine 10/02/2024 1.45 (H)     Sodium 10/02/2024 141     Potassium 10/02/2024 5.3 (H)     Chloride 10/02/2024 105     CO2 10/02/2024 24.3     Calcium 10/02/2024 9.6     BUN/Creatinine Ratio 10/02/2024 20.0     Anion Gap 10/02/2024 11.7     eGFR 10/02/2024 39.9 (L)    Lab on 10/02/2024   Component Date Value    Potassium, Urine 10/02/2024 20.7     Aldosterone 10/02/2024 10.1     Renin Activity 10/02/2024 0.347     Aldosterone/Renin Ratio 10/02/2024 29.1    Hospital Outpatient Visit on 09/27/2024   Component Date Value    Glucose 09/27/2024 217 (H)     BUN 09/27/2024 31 (H)     Creatinine  09/27/2024 1.59 (H)     Sodium 09/27/2024 141     Potassium 09/27/2024 4.7     Chloride 09/27/2024 100     CO2 09/27/2024 27.1     Calcium 09/27/2024 9.5     BUN/Creatinine Ratio 09/27/2024 19.5     Anion Gap 09/27/2024 13.9     eGFR 09/27/2024 35.7 (L)    Hospital Outpatient Visit on 09/25/2024   Component Date Value    QT Interval 09/25/2024 385     QTC Interval 09/25/2024 490    Hospital Outpatient Visit on 09/25/2024   Component Date Value    Glucose 09/25/2024 124 (H)     BUN 09/25/2024 38 (H)     Creatinine 09/25/2024 1.54 (H)     Sodium 09/25/2024 141     Potassium 09/25/2024 5.8 (H)     Chloride 09/25/2024 103     CO2 09/25/2024 27.8     Calcium 09/25/2024 9.7     Total Protein 09/25/2024 7.8     Albumin 09/25/2024 4.5     ALT (SGPT) 09/25/2024 14     AST (SGOT) 09/25/2024 16     Alkaline Phosphatase 09/25/2024 103     Total Bilirubin 09/25/2024 0.4     Globulin 09/25/2024 3.3     A/G Ratio 09/25/2024 1.4     BUN/Creatinine Ratio 09/25/2024 24.7     Anion Gap 09/25/2024 10.2     eGFR 09/25/2024 37.1 (L)     Hemoglobin A1C 09/25/2024 8.79 (H)    Hospital Outpatient Visit on 09/23/2024   Component Date Value    QT Interval 09/23/2024 426     QTC Interval 09/23/2024 485    Hospital Outpatient Visit on 09/23/2024   Component Date Value    Glucose 09/23/2024 132 (H)     BUN 09/23/2024 30 (H)     Creatinine 09/23/2024 1.49 (H)     Sodium 09/23/2024 144     Potassium 09/23/2024 5.3 (H)     Chloride 09/23/2024 105     CO2 09/23/2024 26.5     Calcium 09/23/2024 10.1     BUN/Creatinine Ratio 09/23/2024 20.1     Anion Gap 09/23/2024 12.5     eGFR 09/23/2024 38.6 (L)     proBNP 09/23/2024 106.0     WBC 09/23/2024 8.56     RBC 09/23/2024 4.98     Hemoglobin 09/23/2024 12.3     Hematocrit 09/23/2024 41.8     MCV 09/23/2024 83.9     MCH 09/23/2024 24.7 (L)     MCHC 09/23/2024 29.4 (L)     RDW 09/23/2024 18.4 (H)     RDW-SD 09/23/2024 55.3 (H)     MPV 09/23/2024 10.3     Platelets 09/23/2024 258     Neutrophil % 09/23/2024  "63.0     Lymphocyte % 09/23/2024 15.7 (L)     Monocyte % 09/23/2024 11.8     Eosinophil % 09/23/2024 7.1 (H)     Basophil % 09/23/2024 1.2     Immature Grans % 09/23/2024 1.2 (H)     Neutrophils, Absolute 09/23/2024 5.40     Lymphocytes, Absolute 09/23/2024 1.34     Monocytes, Absolute 09/23/2024 1.01 (H)     Eosinophils, Absolute 09/23/2024 0.61 (H)     Basophils, Absolute 09/23/2024 0.10     Immature Grans, Absolute 09/23/2024 0.10 (H)     nRBC 09/23/2024 0.0    There may be more visits with results that are not included.     Recent labs reviewed and interpreted for clinical significance and application    Jeanine and I went over each of her labs while she was still here in the heart failure clinic.  She is quite happy regarding her potassium today renal function remained stable          It is a pleasure to be involved in this patient's cardiovascular care relating to their heart failure.  Please feel free to call me with any questions or concerns.    Rodney \"Renan\" Clay LEDEZMA, [facility]  Heart failure program clinical provider    BRISA Simons   10/22/24  .  "

## 2024-10-23 ENCOUNTER — DISEASE STATE MANAGEMENT VISIT (OUTPATIENT)
Facility: HOSPITAL | Age: 64
End: 2024-10-23
Payer: MEDICARE

## 2024-10-23 ENCOUNTER — HOSPITAL ENCOUNTER (OUTPATIENT)
Facility: HOSPITAL | Age: 64
Discharge: HOME | End: 2024-10-23
Admitting: NURSE PRACTITIONER
Payer: MEDICARE

## 2024-10-23 VITALS
DIASTOLIC BLOOD PRESSURE: 78 MMHG | SYSTOLIC BLOOD PRESSURE: 143 MMHG | OXYGEN SATURATION: 98 % | RESPIRATION RATE: 18 BRPM | BODY MASS INDEX: 30.73 KG/M2 | WEIGHT: 179 LBS | HEART RATE: 77 BPM

## 2024-10-23 DIAGNOSIS — E88.810 METABOLIC SYNDROME: ICD-10-CM

## 2024-10-23 DIAGNOSIS — I44.7 LBBB (LEFT BUNDLE BRANCH BLOCK): ICD-10-CM

## 2024-10-23 DIAGNOSIS — N18.32 STAGE 3B CHRONIC KIDNEY DISEASE: ICD-10-CM

## 2024-10-23 DIAGNOSIS — I42.9 CARDIOMYOPATHY, UNSPECIFIED TYPE: Chronic | ICD-10-CM

## 2024-10-23 DIAGNOSIS — I27.21 PAH (PULMONARY ARTERY HYPERTENSION): Chronic | ICD-10-CM

## 2024-10-23 DIAGNOSIS — E87.5 HYPERKALEMIA: ICD-10-CM

## 2024-10-23 DIAGNOSIS — Z95.810 PRESENCE OF BIVENTRICULAR IMPLANTABLE CARDIOVERTER-DEFIBRILLATOR (ICD): Chronic | ICD-10-CM

## 2024-10-23 DIAGNOSIS — Z91.89 AT RISK FOR FLUID VOLUME OVERLOAD: Primary | Chronic | ICD-10-CM

## 2024-10-23 DIAGNOSIS — R06.09 DYSPNEA ON EXERTION: ICD-10-CM

## 2024-10-23 DIAGNOSIS — I50.1 LEFT VENTRICULAR FAILURE, UNSPECIFIED: Chronic | ICD-10-CM

## 2024-10-23 DIAGNOSIS — F41.8 MIXED ANXIETY DEPRESSIVE DISORDER: ICD-10-CM

## 2024-10-23 DIAGNOSIS — D05.12 BREAST NEOPLASM, TIS (DCIS), LEFT: ICD-10-CM

## 2024-10-23 DIAGNOSIS — K58.9 IRRITABLE BOWEL SYNDROME, UNSPECIFIED TYPE: ICD-10-CM

## 2024-10-23 DIAGNOSIS — E11.49 TYPE 2 DIABETES MELLITUS WITH OTHER DIABETIC NEUROLOGICAL COMPLICATION: ICD-10-CM

## 2024-10-23 DIAGNOSIS — G63 POLYNEUROPATHY ASSOCIATED WITH UNDERLYING DISEASE: ICD-10-CM

## 2024-10-23 DIAGNOSIS — I31.9 PERICARDITIS, UNSPECIFIED CHRONICITY, UNSPECIFIED TYPE: ICD-10-CM

## 2024-10-23 LAB
ANION GAP SERPL CALCULATED.3IONS-SCNC: 13.2 MMOL/L (ref 5–15)
BUN SERPL-MCNC: 27 MG/DL (ref 8–23)
BUN/CREAT SERPL: 17.5 (ref 7–25)
CALCIUM SPEC-SCNC: 9.9 MG/DL (ref 8.6–10.5)
CHLORIDE SERPL-SCNC: 105 MMOL/L (ref 98–107)
CO2 SERPL-SCNC: 25.8 MMOL/L (ref 22–29)
CREAT SERPL-MCNC: 1.54 MG/DL (ref 0.57–1)
EGFRCR SERPLBLD CKD-EPI 2021: 37.1 ML/MIN/1.73
GLUCOSE SERPL-MCNC: 161 MG/DL (ref 65–99)
NT-PROBNP SERPL-MCNC: 163 PG/ML (ref 0–900)
POTASSIUM SERPL-SCNC: 4.3 MMOL/L (ref 3.5–5.2)
SODIUM SERPL-SCNC: 144 MMOL/L (ref 136–145)

## 2024-10-23 PROCEDURE — G0463 HOSPITAL OUTPT CLINIC VISIT: HCPCS

## 2024-10-23 PROCEDURE — 83880 ASSAY OF NATRIURETIC PEPTIDE: CPT | Performed by: NURSE PRACTITIONER

## 2024-10-23 PROCEDURE — 80048 BASIC METABOLIC PNL TOTAL CA: CPT | Performed by: NURSE PRACTITIONER

## 2024-10-23 NOTE — PROGRESS NOTES
[facility] HEART FAILURE CLINIC - PHARMACY SERVICE    Patient Name: Susy Hanson  :1960  Age: 64 y.o.  Sex: female  Primary Cardiologist: Jeffrey  Nephrology: Cedric  PCP: KENIA Avila     HFpEF with EF 56-60% (Last Echo: 2/3/24).    NYHA Class I C     The patient's last EKG was reviewed from 24 and shows a QTcB of 485 ms.     ICD: yes    CKD: Stage 3b eGFR 30-44 mL/min    BMP          10/16/2024    08:20 10/18/2024    11:50 10/23/2024    08:08   BMP   BUN 36  30  27    Creatinine 1.56  1.55  1.54    Sodium 139  142  144    Potassium 5.8  3.5  4.3    Chloride 102  102  105    CO2 24.4  26.1  25.8    Calcium 10.2  9.3  9.9        Lab Results   Component Value Date    EGFR 37.1 (L) 10/23/2024    EGFR 36.8 (L) 10/18/2024    EGFR 36.5 (L) 10/16/2024       Lab Results   Component Value Date    PROBNP 163.0 10/23/2024    PROBNP 107.0 10/16/2024    PROBNP 68.5 10/09/2024       Recent Vitals         10/16/2024 10/18/2024 10/23/2024       BP: 146/83 131/86 143/78     Pulse: 78 74 77     Weight: 81.7 kg (180 lb 3.2 oz) -- 81.2 kg (179 lb)     BMI (Calculated): 30.9 -- 30.7              -CHF-specific BB:      Pre Visit Dose: Metoprolol succinate  mg PO BID    Post Visit Dose: Metoprolol succinate  mg PO BID     - Target Dose: Metoprolol succinate  mg PO daily.     - Goal HR of 50s to 60s.     - Patient should be seen every 10 to 14 days for a pulse check with plans for up-titration until target heart rate is achieved.        -ACE/ARB/ARNI:     Pre Visit Dose: None due to Renal and persistent hyperkalemia    Post Visit Dose: None due to Renal    - Target Dose: N/A    - Patient should have a follow-up appointment every 2 to 4 weeks for a hemodynamic check with possible up-titration to target dose.         -SGLT2 inhibitor therapy:    Pre Visit Dose: Dapagliflozin 10 mg PO daily    Post Visit Dose: Dapagliflozin 10 mg PO daily    - Target Dose: Dapagliflozin 10 mg PO daily : CrCl > 20  mL/min which has shown benefit in patients with HF       -MRA:     Pre Visit Dose: None due to Renal and persistent hyperkalemia    Post Visit Dose: None due to Renal    - Target Dose:  N/A    - K is < 5 mEq/L and SCr is </= 2 mg/dL in female and eGFR > 30 mL/min/1.73m3    Lab Results   Component Value Date    K 4.3 10/23/2024       Lab Results   Component Value Date    CREATININE 1.54 (H) 10/23/2024       -GFR 30 to 50 mL/min: Initial 12.5 mg daily or every other day, may double every 4 weeks if KCL remains < 5 mEq/L  -GFR < 30 mL/min: Use not recommended: HF clinical trials excluded if Scr > 2.5 mg/dL    -DIURETIC:     Pre Visit Dose: Furosemide 20 mg PO daily    Post Visit Dose: Furosemide 20 mg PO daily      -MAGNESIUM:     Mag is > 2 mg/dL    Lab Results   Component Value Date    MG 2.2 09/16/2024    MG 2.4 08/13/2024    MG 2.5 (H) 08/05/2024       Pre Visit Dose: None    Post Visit Dose: None    -If Magnesium 1.6-1.9 mg/dL, Initiate Magnesium 400 mg PO daily  -If Magnesium is less than 1.6 mg/dL, Initiate Magnesium 400 mg PO BID    -ANTICOAGULATION:     None      -OTHER CV MEDS:     Pre Visit Dose: atorvastatin 20 mg daily, diltiazem 180 mg daily, and hydralazine 100 mg TID    Post Visit Dose: No changes      -Clinic Administered Medications:     None    Patient Assistance:      Patient currently enrolled in [employer] patient assistance    PLAN:  Initiation/Discontinuation/Dose Adjustment: No medication changes  Education provided: None  Coordination of Care: None  Refills: None      Thank you for allowing me to participate in the care of your patient.    Eliane Son, PharmD  [facility]  10/23/24  10:35 EDT

## 2024-10-28 ENCOUNTER — TELEPHONE (OUTPATIENT)
Dept: ENDOCRINOLOGY | Facility: CLINIC | Age: 64
End: 2024-10-28
Payer: MEDICARE

## 2024-10-30 ENCOUNTER — TELEPHONE (OUTPATIENT)
Dept: ENDOCRINOLOGY | Facility: CLINIC | Age: 64
End: 2024-10-30
Payer: MEDICARE

## 2024-10-30 ENCOUNTER — HOSPITAL ENCOUNTER (OUTPATIENT)
Facility: HOSPITAL | Age: 64
Discharge: HOME | End: 2024-10-30
Admitting: NURSE PRACTITIONER
Payer: MEDICARE

## 2024-10-30 ENCOUNTER — TELEPHONE (OUTPATIENT)
Facility: HOSPITAL | Age: 64
End: 2024-10-30

## 2024-10-30 VITALS
HEART RATE: 68 BPM | RESPIRATION RATE: 18 BRPM | SYSTOLIC BLOOD PRESSURE: 147 MMHG | WEIGHT: 178 LBS | OXYGEN SATURATION: 100 % | DIASTOLIC BLOOD PRESSURE: 94 MMHG | BODY MASS INDEX: 30.55 KG/M2

## 2024-10-30 DIAGNOSIS — E87.5 HYPERKALEMIA: Primary | ICD-10-CM

## 2024-10-30 LAB
ANION GAP SERPL CALCULATED.3IONS-SCNC: 14.4 MMOL/L (ref 5–15)
BUN SERPL-MCNC: 36 MG/DL (ref 8–23)
BUN/CREAT SERPL: 22.1 (ref 7–25)
CALCIUM SPEC-SCNC: 10.1 MG/DL (ref 8.6–10.5)
CHLORIDE SERPL-SCNC: 104 MMOL/L (ref 98–107)
CO2 SERPL-SCNC: 25.6 MMOL/L (ref 22–29)
CREAT SERPL-MCNC: 1.63 MG/DL (ref 0.57–1)
EGFRCR SERPLBLD CKD-EPI 2021: 34.6 ML/MIN/1.73
GLUCOSE SERPL-MCNC: 160 MG/DL (ref 65–99)
POTASSIUM SERPL-SCNC: 4.5 MMOL/L (ref 3.5–5.2)
SODIUM SERPL-SCNC: 144 MMOL/L (ref 136–145)

## 2024-10-30 PROCEDURE — 80048 BASIC METABOLIC PNL TOTAL CA: CPT | Performed by: NURSE PRACTITIONER

## 2024-10-30 PROCEDURE — G0463 HOSPITAL OUTPT CLINIC VISIT: HCPCS

## 2024-10-30 NOTE — TELEPHONE ENCOUNTER
Received email from [employer] rep pt has G7 sensor but did not get compatible pods from pharmacy. Rep requesting G6 samples and future orders for pod refills be sent to [facility]. Noted in chart. Samples left at .

## 2024-10-30 NOTE — TELEPHONE ENCOUNTER
Patient called to get the results of her potassium blood work from this morning. Provider reviewed the lab results and advised for the patient to continue with the potassium as prescribed. The results were given to the patient along with the plan to continue the potassium as directed. Patient verbalized she understood.   We will recheck again in a week.

## 2024-11-01 ENCOUNTER — OFFICE VISIT (OUTPATIENT)
Dept: ORTHOPEDIC SURGERY | Facility: CLINIC | Age: 64
End: 2024-11-01
Payer: MEDICARE

## 2024-11-01 VITALS — HEART RATE: 89 BPM | WEIGHT: 178 LBS | OXYGEN SATURATION: 99 % | BODY MASS INDEX: 30.39 KG/M2 | HEIGHT: 64 IN

## 2024-11-01 DIAGNOSIS — M25.552 GREATER TROCHANTERIC PAIN SYNDROME OF LEFT LOWER EXTREMITY: Primary | ICD-10-CM

## 2024-11-01 DIAGNOSIS — E11.49 TYPE 2 DIABETES MELLITUS WITH OTHER DIABETIC NEUROLOGICAL COMPLICATION: ICD-10-CM

## 2024-11-01 RX ORDER — LIDOCAINE HYDROCHLORIDE 10 MG/ML
2 INJECTION, SOLUTION INFILTRATION; PERINEURAL
Status: COMPLETED | OUTPATIENT
Start: 2024-11-01 | End: 2024-11-01

## 2024-11-01 RX ORDER — TRIAMCINOLONE ACETONIDE 40 MG/ML
80 INJECTION, SUSPENSION INTRA-ARTICULAR; INTRAMUSCULAR
Status: COMPLETED | OUTPATIENT
Start: 2024-11-01 | End: 2024-11-01

## 2024-11-01 RX ORDER — INSULIN PMP CART,AUT,G6/7,CNTR
1 EACH SUBCUTANEOUS
Qty: 10 EACH | Refills: 6 | Status: SHIPPED | OUTPATIENT
Start: 2024-11-01

## 2024-11-01 RX ADMIN — LIDOCAINE HYDROCHLORIDE 2 ML: 10 INJECTION, SOLUTION INFILTRATION; PERINEURAL at 10:38

## 2024-11-01 RX ADMIN — TRIAMCINOLONE ACETONIDE 80 MG: 40 INJECTION, SUSPENSION INTRA-ARTICULAR; INTRAMUSCULAR at 10:38

## 2024-11-01 NOTE — TELEPHONE ENCOUNTER
Omnipod refills sent to [facility] per pt request to be used when next refill is needed.    Pt called and informed.     Pt stated she picked up G6. Questions regarding downloading maggy and pairing with omnipod. Enc pt to download G6 maggy however to contact customer service regarding pairing with G6. Pt voiced understanding.     Pt also stated she got a steroid shot today.

## 2024-11-01 NOTE — TELEPHONE ENCOUNTER
Pt called and informed of Dr. Danie lanier of changing ICR ratio to 1:6 for 3 days then back to 1:8. Pt currently changing G6 and going to contact omnipod customer service.     Enc pt to double check regarding timeliness of giving self correction doses     Pt agreeable and voiced understanding.

## 2024-11-01 NOTE — PROGRESS NOTES
[facility]        Patient Name: Susy Hanson  : 1960  Primary Care Physician: Erica Avila MD        Chief Complaint:    Chief Complaint   Patient presents with    Left Hip - Pain, Initial Evaluation        HPI:   Susy Hanson is a 64 y.o. year old who presents today for evaluation of left hip pain.    Previous followed by Dr. Bhandari and transferring care based on his recent relocation.     At her last visit with him, she was provided with a left GTB steroid injection. She states that she tolerated this injection well and experienced good relief of symptoms post injection.     Pain location is left, lateral hip and focal over the greater trochanter. Occasionally pain will radiate to the knee. Denies any significant numbness/tingling. She has known neuropathy in her feet that is unchanged.     She takes Tylenol arthritis Qhs for her hip pain. She cannot tolerated NSAIDs based on cardiac and renal co morbidities.         Past Medical/Surgical, Social and Family History:  I have reviewed and/or updated pertinent history as noted in the medical record including:  Past Medical History:   Diagnosis Date    Allergic 2008    Weeds, grasses, mold, mildew, etc.    Anxiety     Breast cancer     LEFT BREAST 2021    Bursitis of hip     Cataract     Had surgery to remove 2023    CHF (congestive heart failure)     Colon polyp 2023    Removed during colonoscopy    Coronary artery disease     CTS (carpal tunnel syndrome) 2015    Surgery on both hands    Depression     DM (diabetes mellitus)     GERD (gastroesophageal reflux disease)     History of medical problems     Bladder prolapse repeat Dr. Roper    HL (hearing loss)     Hyperlipidemia     Hypertension     SAW CARDIO/ STRESS TEST  - NOW MEDS MANAGED BY PCP     Irritable bowel syndrome 2006    Peripheral neuropathy     PONV (postoperative nausea and vomiting)     Renal insufficiency     See Dr. funes for  moderate kidney failure    Sleep apnea     WEARS CPAP    Type 2 diabetes mellitus 2008    Urinary tract infection 6/23    UTI seen at [facility]     Past Surgical History:   Procedure Laterality Date    BLADDER REPAIR      BREAST BIOPSY      BREAST RECONSTRUCTION Bilateral 08/26/2021    Procedure: BILATERAL PREPECTORALPLACMENT TISSUE EXPANDERS AND ALLODERM;  Surgeon: Heri Arreaga MD;  Location: [facility] MAIN OR;  Service: Plastics;  Laterality: Bilateral;    BREAST TISSUE EXPANDER REMOVAL INSERTION OF IMPLANT Bilateral 12/23/2021    Procedure: BILATERAL REMOVAL TISSUE EXPANDERS AND PLACEMENT OF IMPLANTS;  Surgeon: Heri Arreaga MD;  Location: [facility] MAIN OR;  Service: Plastics;  Laterality: Bilateral;    CARDIAC CATHETERIZATION Left 04/13/2022    Procedure: Cardiac Catheterization/Vascular Study;  Surgeon: Christo Murphy MD;  Location:  [facility];  Service: Cardiovascular;  Laterality: Left;    CARDIAC ELECTROPHYSIOLOGY PROCEDURE N/A 09/19/2022    Procedure: ICD implant [place] aware;  Surgeon: Esteban Kendrick MD;  Location:  [facility];  Service: Cardiology;  Laterality: N/A;    CARDIAC ELECTROPHYSIOLOGY PROCEDURE N/A 09/20/2022    Procedure: lead repositioning;  Surgeon: Esteban Kendrick MD;  Location:  [facility];  Service: Cardiology;  Laterality: N/A;    CARDIAC SURGERY  2022    Defibrillator/pacemaker implant    CARDIOVASCULAR STRESS TEST  2020    COLONOSCOPY  11/2012    EYE SURGERY  7/23    Cataracts removed    HAND SURGERY  2015    Both hands    HYSTERECTOMY      INSERT / REPLACE / REMOVE PACEMAKER  2022    LYMPH NODE BIOPSY  8/29/2021    MASTECTOMY W/ SENTINEL NODE BIOPSY Bilateral 08/26/2021    Procedure: bilateral total mastectomy, left axillary sentinel lymph node biopsy, possible reconstruction.;  Surgeon: Asia Perkins MD;  Location: [facility] MAIN OR;  Service: General;  Laterality: Bilateral;    TRIGGER POINT  INJECTION      Left thumb    TUBAL ABDOMINAL LIGATION       Social History     Occupational History    Not on file   Tobacco Use    Smoking status: Former     Current packs/day: 0.00     Average packs/day: 2.0 packs/day for 36.0 years (72.0 ttl pk-yrs)     Types: Cigarettes     Start date: 1972     Quit date: 2008     Years since quittin.8     Passive exposure: Past    Smokeless tobacco: Never   Vaping Use    Vaping status: Never Used   Substance and Sexual Activity    Alcohol use: No    Drug use: No    Sexual activity: Yes     Partners: Male     Birth control/protection: None, Tubal ligation, Hysterectomy          Allergies:   Allergies   Allergen Reactions    Hydrocodone-Acetaminophen Hives     Lortab, takes tylenol       Medications:   Home Medications:  Current Outpatient Medications on File Prior to Visit   Medication Sig    ALPRAZolam (XANAX) 1 MG tablet TAKE ONE TABLET BY MOUTH TWICE A DAY    atorvastatin (LIPITOR) 20 MG tablet TAKE ONE TABLET BY MOUTH EVERY DAY    Azelastine HCl 137 MCG/SPRAY solution 2 sprays by Each Nare route Daily.    cetirizine (zyrTEC) 10 MG tablet Take 1 tablet by mouth Daily As Needed.    Continuous Glucose  (Dexcom G7 ) device Use 1 each 4 (Four) Times a Day Before Meals & at Bedtime.    Continuous Glucose Sensor (Dexcom G7 Sensor) misc Use 1 each 4 (Four) Times a Day Before Meals & at Bedtime.    dapagliflozin Propanediol (Farxiga) 10 MG tablet Take 10 mg by mouth Daily.    dilTIAZem CD (CARDIZEM CD) 180 MG 24 hr capsule TAKE ONE CAPSULE BY MOUTH EVERY DAY    furosemide (LASIX) 20 MG tablet Take 1 tablet by mouth Daily.    hydrALAZINE (APRESOLINE) 100 MG tablet Take 1 tablet by mouth 3 (Three) Times a Day.    Insulin Disposable Pump (Omnipod 5 G6 Intro, Gen 5,) kit Use 1 package Daily.    Insulin Disposable Pump (Omnipod 5 G6 Pods, Gen 5,) misc Use 1 package Every 3 (Three) Days.    Insulin Glargine (Lantus SoloStar) 100 UNIT/ML injection pen  Inject 30 Units under the skin into the appropriate area as directed Every Night.    Insulin Lispro (humaLOG) 100 UNIT/ML injection For use in insulin pump. MDD 90 units.    Insulin Lispro, 1 Unit Dial, (HumaLOG KwikPen) 100 UNIT/ML solution pen-injector 3 units with meal with sliding scale maximum of 9 units with each meal    metoprolol succinate XL (TOPROL-XL) 100 MG 24 hr tablet Take 1 tablet by mouth 2 (Two) Times a Day.    ondansetron (Zofran) 4 MG tablet Take 1 tablet by mouth Every 8 (Eight) Hours As Needed for Nausea or Vomiting.    pantoprazole (PROTONIX) 40 MG EC tablet Take 1 tablet by mouth Daily.    Umeclidinium-Vilanterol (Anoro Ellipta) 62.5-25 MCG/ACT aerosol powder  inhaler Inhale 1 puff Daily.    [DISCONTINUED] Accu-Chek FastClix Lancets misc USE TO CHECK BLOOD GLUCOSE DAILY (Patient not taking: Reported on 11/1/2024)    [DISCONTINUED] Accu-Chek Guide test strip 1 each by Other route Daily. Use daily to check BS E11.9 (Patient not taking: Reported on 11/1/2024)    [DISCONTINUED] hydroCHLOROthiazide (HYDRODIURIL) 25 MG tablet Take 1 tablet by mouth Daily.    [DISCONTINUED] Insulin Pen Needle (Pen Needles) 32G X 4 MM misc Use 1 each 4 (Four) Times a Day Before Meals & at Bedtime. (Patient not taking: Reported on 11/1/2024)    [DISCONTINUED] losartan (Cozaar) 100 MG tablet Take 1 tablet by mouth Daily.     No current facility-administered medications on file prior to visit.         ROS:  Negative unless listed in the HPI    Physical Exam:   64 y.o. female  Body mass index is 30.55 kg/m²., 80.7 kg (178 lb)  Vitals:    11/01/24 0941   Pulse: 89   SpO2: 99%     General: Alert, cooperative, appears well and in no observable distress.   HEENT: Normocephalic, atraumatic on external visual inspection. No icterus.   CV: No significant peripheral edema.    Respiratory: Normal respiratory effort.   Skin: Warm & well perfused; appropriate skin turgor.  Psych: Appropriate mood & affect.  Neuro: Gross sensation  and motor intact in affected extremity/extremities.  Vascular: Peripheral pulses palpable in affected extremity/extremities.     Ortho Exam   Hip Musculoskeletal Exam  Gait    Gait is normal.    Inspection    Left      Erythema: none        Ecchymosis: none        Edema: none        Deformity: none        Previous incision: no previous incision    Palpation    Left      Increased warmth: none      Tenderness: present        Greater trochanteric region pain: moderate       Radiology:  XR HIP W OR WO PELVIS 2-3 VIEW LEFT     Date of Exam: 7/31/2024 10:40 AM EDT     Indication: left hip pain, NKI, pain for 2-3 months, room 16     Comparison: CT the abdomen and pelvis bone windows 10/19/2013.     Findings:  No pelvic fracture. No hip fracture. No hip dislocation. There is mild bilateral hip joint space narrowing, without significant spurring. Each femoral head maintains a normal round configuration, without evidence of avascular necrosis. Mild degenerative   change of the sacroiliac joints. Imaged lower lumbar spine is within normal limits. No osteolytic or osteoblastic abnormality.     IMPRESSION:  Impression:  Mild degenerative changes of the hips.  Mild degenerative changes of the sacroiliac joints.  No acute findings.        Electronically Signed: Britney Roman MD    8/1/2024 11:16 AM EDT    Workstation ID: [id number]      Large Joint Arthrocentesis: L greater trochanteric bursa  Date/Time: 11/1/2024 10:38 AM  Consent given by: patient  Site marked: site marked  Timeout: Immediately prior to procedure a time out was called to verify the correct patient, procedure, equipment, support staff and site/side marked as required   Supporting Documentation  Indications: pain and diagnostic evaluation   Procedure Details  Location: hip - L greater trochanteric bursa  Needle size: 25 G  Approach: lateral  Medications administered: 2 mL lidocaine 1 %; 80 mg triamcinolone acetonide 40 MG/ML  Patient tolerance: patient tolerated  the procedure well with no immediate complications          Assessment:  Left greater trochanteric bursitis   Body mass index is 30.55 kg/m².  BMI consistent with Obese Class I: 30-34.9kg/m2           Plan:  Reviewed the above imaging and assessment with the patient today  Injection provided per proc doc above  Begin topical pain cream, Rx alternative, e scribed today  Follow up in 3 months or PRN for repeat injection   Patient encouraged to call with any questions or concerns in the interim    BRISA Orellana

## 2024-11-01 NOTE — TELEPHONE ENCOUNTER
Educator reached out to insulin pump rep regarding pt just switching to algorithm with pump and having steroid shot.     Rep stated switching to algorithm should help with elevated BG from steroid. Pt could also check Bg every 2-3 hours. Pt to give correction doses appropriately.   Pt called and informed.

## 2024-11-04 DIAGNOSIS — F41.8 MIXED ANXIETY DEPRESSIVE DISORDER: ICD-10-CM

## 2024-11-05 ENCOUNTER — TELEPHONE (OUTPATIENT)
Dept: ENDOCRINOLOGY | Facility: CLINIC | Age: 64
End: 2024-11-05
Payer: MEDICARE

## 2024-11-05 NOTE — TELEPHONE ENCOUNTER
Pt called wanting to double check her pump settings. Reviewed glooko report. Settings very similar to pump start order. Pt also concerned with BG d/t steroid shot however pt states BG has improved to 150 this AM per pt.     Pt concerned with running out of pods and insulin. Insulin order reviewed. Pt MDD 90 units. Currently used 87 units to cover elevated BG from steroid shot. Pt also states she has pods compatible with G6 and new pods ordered that are compatible with G7.     Pt also asking about changing pump settings to manage BG. Informed pt diff to determine needed pump settings d/t affects of steroid shot. Will need to allow steroid to effect to diminish then can look at BG and make pump adjustments if needed.     Enc pt to call if still need adjustment d/t elevated BG to call office.     Pt agreeable and voiced understanding.

## 2024-11-06 ENCOUNTER — TELEPHONE (OUTPATIENT)
Facility: HOSPITAL | Age: 64
End: 2024-11-06

## 2024-11-06 ENCOUNTER — TELEPHONE (OUTPATIENT)
Dept: ENDOCRINOLOGY | Facility: CLINIC | Age: 64
End: 2024-11-06
Payer: MEDICARE

## 2024-11-06 ENCOUNTER — HOSPITAL ENCOUNTER (OUTPATIENT)
Facility: HOSPITAL | Age: 64
Discharge: HOME | End: 2024-11-06
Admitting: NURSE PRACTITIONER
Payer: MEDICARE

## 2024-11-06 VITALS
SYSTOLIC BLOOD PRESSURE: 163 MMHG | BODY MASS INDEX: 31.03 KG/M2 | OXYGEN SATURATION: 99 % | HEART RATE: 76 BPM | RESPIRATION RATE: 18 BRPM | DIASTOLIC BLOOD PRESSURE: 94 MMHG | WEIGHT: 180.8 LBS

## 2024-11-06 DIAGNOSIS — E87.8 DISORDER OF FLUID OR ELECTROLYTE: ICD-10-CM

## 2024-11-06 DIAGNOSIS — R06.09 OTHER FORMS OF DYSPNEA: ICD-10-CM

## 2024-11-06 DIAGNOSIS — I27.21 PAH (PULMONARY ARTERY HYPERTENSION): Primary | Chronic | ICD-10-CM

## 2024-11-06 LAB
ANION GAP SERPL CALCULATED.3IONS-SCNC: 10 MMOL/L (ref 5–15)
BUN SERPL-MCNC: 36 MG/DL (ref 8–23)
BUN/CREAT SERPL: 24.2 (ref 7–25)
CALCIUM SPEC-SCNC: 10.1 MG/DL (ref 8.6–10.5)
CHLORIDE SERPL-SCNC: 106 MMOL/L (ref 98–107)
CO2 SERPL-SCNC: 26 MMOL/L (ref 22–29)
CREAT SERPL-MCNC: 1.49 MG/DL (ref 0.57–1)
EGFRCR SERPLBLD CKD-EPI 2021: 38.6 ML/MIN/1.73
GLUCOSE SERPL-MCNC: 77 MG/DL (ref 65–99)
NT-PROBNP SERPL-MCNC: 153 PG/ML (ref 0–900)
POTASSIUM SERPL-SCNC: 5.5 MMOL/L (ref 3.5–5.2)
SODIUM SERPL-SCNC: 142 MMOL/L (ref 136–145)

## 2024-11-06 PROCEDURE — G0463 HOSPITAL OUTPT CLINIC VISIT: HCPCS

## 2024-11-06 PROCEDURE — 80048 BASIC METABOLIC PNL TOTAL CA: CPT | Performed by: NURSE PRACTITIONER

## 2024-11-06 PROCEDURE — 83880 ASSAY OF NATRIURETIC PEPTIDE: CPT | Performed by: NURSE PRACTITIONER

## 2024-11-06 RX ORDER — ALPRAZOLAM 1 MG/1
1 TABLET ORAL 2 TIMES DAILY
Qty: 60 TABLET | Refills: 0 | Status: SHIPPED | OUTPATIENT
Start: 2024-11-06

## 2024-11-06 NOTE — TELEPHONE ENCOUNTER
Called patient to give her lab results after talking to provider. Potassium is 5.5. Provider instructed patient to take a dose of Lokelma 10 mg packet. Patient has Lokelma at home. I gave the patient instructions and she understood. Patient will be back in a week for follow up labs.

## 2024-11-06 NOTE — TELEPHONE ENCOUNTER
Omnipod rep looked at [employer] report and suggested using correction dose instead of doing ghost carbs. Called and spoke with pt. Pt unaware how to do correction doses. Led pt through how to use correction calculator and base insulin dose on current sugar reading and taking into consideration insulin on board. Pt voiced understanding.     Pt stated she still has G6 pods however ran out of G6 sensors. Left 2 G6 samples for pt at .

## 2024-11-07 ENCOUNTER — TELEPHONE (OUTPATIENT)
Dept: ENDOCRINOLOGY | Facility: CLINIC | Age: 64
End: 2024-11-07
Payer: MEDICARE

## 2024-11-07 NOTE — TELEPHONE ENCOUNTER
Pt left message stating sensor and fingerstick were not the same reading. Pt changed sensor and pod at the same time. Pt stated sensor read 40 however . Sensor seems to be working now. Instructed pt to call [employer] for assistance. Pt states she has the contacts number. Enc pt to cont to check Bg and sensor throughout day for accuracy. Pt agreeable and voiced understanding.

## 2024-11-13 ENCOUNTER — TELEPHONE (OUTPATIENT)
Facility: HOSPITAL | Age: 64
End: 2024-11-13
Payer: MEDICARE

## 2024-11-13 ENCOUNTER — SPECIALTY PHARMACY (OUTPATIENT)
Dept: ENDOCRINOLOGY | Facility: CLINIC | Age: 64
End: 2024-11-13
Payer: MEDICARE

## 2024-11-13 ENCOUNTER — TRANSCRIBE ORDERS (OUTPATIENT)
Dept: LAB | Facility: HOSPITAL | Age: 64
End: 2024-11-13
Payer: MEDICARE

## 2024-11-13 ENCOUNTER — HOSPITAL ENCOUNTER (OUTPATIENT)
Facility: HOSPITAL | Age: 64
Discharge: HOME | End: 2024-11-13
Payer: MEDICARE

## 2024-11-13 ENCOUNTER — OFFICE VISIT (OUTPATIENT)
Dept: FAMILY MEDICINE CLINIC | Facility: CLINIC | Age: 64
End: 2024-11-13
Payer: MEDICARE

## 2024-11-13 ENCOUNTER — LAB (OUTPATIENT)
Dept: LAB | Facility: HOSPITAL | Age: 64
End: 2024-11-13
Payer: MEDICARE

## 2024-11-13 VITALS
HEIGHT: 64 IN | SYSTOLIC BLOOD PRESSURE: 133 MMHG | WEIGHT: 176 LBS | OXYGEN SATURATION: 97 % | HEART RATE: 61 BPM | BODY MASS INDEX: 30.05 KG/M2 | DIASTOLIC BLOOD PRESSURE: 70 MMHG

## 2024-11-13 DIAGNOSIS — N18.32 CHRONIC KIDNEY DISEASE (CKD) STAGE G3B/A1, MODERATELY DECREASED GLOMERULAR FILTRATION RATE (GFR) BETWEEN 30-44 ML/MIN/1.73 SQUARE METER AND ALBUMINURIA CREATININE RATIO LESS THAN 30 MG/G (CMS/H*: Primary | ICD-10-CM

## 2024-11-13 DIAGNOSIS — I42.9 CARDIOMYOPATHY, UNSPECIFIED TYPE: Chronic | ICD-10-CM

## 2024-11-13 DIAGNOSIS — E11.22 TYPE 2 DIABETES MELLITUS WITH DIABETIC CHRONIC KIDNEY DISEASE, UNSPECIFIED CKD STAGE, UNSPECIFIED WHETHER LONG TERM INSULIN USE: ICD-10-CM

## 2024-11-13 DIAGNOSIS — E87.5 HYPERKALEMIA: Primary | ICD-10-CM

## 2024-11-13 DIAGNOSIS — N18.32 CHRONIC KIDNEY DISEASE (CKD) STAGE G3B/A1, MODERATELY DECREASED GLOMERULAR FILTRATION RATE (GFR) BETWEEN 30-44 ML/MIN/1.73 SQUARE METER AND ALBUMINURIA CREATININE RATIO LESS THAN 30 MG/G (CMS/H*: ICD-10-CM

## 2024-11-13 DIAGNOSIS — I27.21 PAH (PULMONARY ARTERY HYPERTENSION): Chronic | ICD-10-CM

## 2024-11-13 DIAGNOSIS — L72.0 CYST OF SKIN AND SUBCUTANEOUS TISSUE: Primary | ICD-10-CM

## 2024-11-13 DIAGNOSIS — E87.5 HYPERKALEMIA: ICD-10-CM

## 2024-11-13 LAB
25(OH)D3 SERPL-MCNC: 31.3 NG/ML (ref 30–100)
ANION GAP SERPL CALCULATED.3IONS-SCNC: 11.2 MMOL/L (ref 5–15)
BACTERIA UR QL AUTO: ABNORMAL /HPF
BASOPHILS # BLD AUTO: 0.07 10*3/MM3 (ref 0–0.2)
BASOPHILS NFR BLD AUTO: 0.9 % (ref 0–1.5)
BILIRUB UR QL STRIP: NEGATIVE
BUN SERPL-MCNC: 44 MG/DL (ref 8–23)
BUN/CREAT SERPL: 28.6 (ref 7–25)
CALCIUM SPEC-SCNC: 10.2 MG/DL (ref 8.6–10.5)
CHLORIDE SERPL-SCNC: 104 MMOL/L (ref 98–107)
CK SERPL-CCNC: 60 U/L (ref 20–180)
CLARITY UR: CLEAR
CO2 SERPL-SCNC: 24.8 MMOL/L (ref 22–29)
COLOR UR: YELLOW
CREAT SERPL-MCNC: 1.54 MG/DL (ref 0.57–1)
CREAT UR-MCNC: 22.8 MG/DL
DEPRECATED RDW RBC AUTO: 53.7 FL (ref 37–54)
EGFRCR SERPLBLD CKD-EPI 2021: 37.1 ML/MIN/1.73
EOSINOPHIL # BLD AUTO: 0.27 10*3/MM3 (ref 0–0.4)
EOSINOPHIL NFR BLD AUTO: 3.3 % (ref 0.3–6.2)
ERYTHROCYTE [DISTWIDTH] IN BLOOD BY AUTOMATED COUNT: 17.7 % (ref 12.3–15.4)
GLUCOSE SERPL-MCNC: 162 MG/DL (ref 65–99)
GLUCOSE UR STRIP-MCNC: ABNORMAL MG/DL
HBA1C MFR BLD: 7.89 % (ref 4.8–5.6)
HCT VFR BLD AUTO: 40 % (ref 34–46.6)
HGB BLD-MCNC: 11.7 G/DL (ref 12–15.9)
HGB UR QL STRIP.AUTO: NEGATIVE
HYALINE CASTS UR QL AUTO: ABNORMAL /LPF
IMM GRANULOCYTES # BLD AUTO: 0.05 10*3/MM3 (ref 0–0.05)
IMM GRANULOCYTES NFR BLD AUTO: 0.6 % (ref 0–0.5)
KETONES UR QL STRIP: NEGATIVE
LEUKOCYTE ESTERASE UR QL STRIP.AUTO: ABNORMAL
LYMPHOCYTES # BLD AUTO: 1.7 10*3/MM3 (ref 0.7–3.1)
LYMPHOCYTES NFR BLD AUTO: 21 % (ref 19.6–45.3)
MAGNESIUM SERPL-MCNC: 2.3 MG/DL (ref 1.6–2.4)
MCH RBC QN AUTO: 24.5 PG (ref 26.6–33)
MCHC RBC AUTO-ENTMCNC: 29.3 G/DL (ref 31.5–35.7)
MCV RBC AUTO: 83.7 FL (ref 79–97)
MONOCYTES # BLD AUTO: 0.78 10*3/MM3 (ref 0.1–0.9)
MONOCYTES NFR BLD AUTO: 9.6 % (ref 5–12)
NEUTROPHILS NFR BLD AUTO: 5.24 10*3/MM3 (ref 1.7–7)
NEUTROPHILS NFR BLD AUTO: 64.6 % (ref 42.7–76)
NITRITE UR QL STRIP: NEGATIVE
NRBC BLD AUTO-RTO: 0 /100 WBC (ref 0–0.2)
NT-PROBNP SERPL-MCNC: 120 PG/ML (ref 0–900)
PH UR STRIP.AUTO: <=5 [PH] (ref 5–8)
PHOSPHATE SERPL-MCNC: 4.1 MG/DL (ref 2.5–4.5)
PLATELET # BLD AUTO: 303 10*3/MM3 (ref 140–450)
PMV BLD AUTO: 9.8 FL (ref 6–12)
POTASSIUM SERPL-SCNC: 5.3 MMOL/L (ref 3.5–5.2)
PROT ?TM UR-MCNC: 8.9 MG/DL
PROT UR QL STRIP: NEGATIVE
PROT/CREAT UR: 390.4 MG/G CREA (ref 0–200)
PTH-INTACT SERPL-MCNC: 74 PG/ML (ref 15–65)
RBC # BLD AUTO: 4.78 10*6/MM3 (ref 3.77–5.28)
RBC # UR STRIP: ABNORMAL /HPF
REF LAB TEST METHOD: ABNORMAL
SODIUM SERPL-SCNC: 140 MMOL/L (ref 136–145)
SP GR UR STRIP: 1.01 (ref 1–1.03)
SQUAMOUS #/AREA URNS HPF: ABNORMAL /HPF
URATE SERPL-MCNC: 6.4 MG/DL (ref 2.4–5.7)
UROBILINOGEN UR QL STRIP: ABNORMAL
WBC # UR STRIP: ABNORMAL /HPF
WBC NRBC COR # BLD AUTO: 8.11 10*3/MM3 (ref 3.4–10.8)

## 2024-11-13 PROCEDURE — 82570 ASSAY OF URINE CREATININE: CPT

## 2024-11-13 PROCEDURE — 85025 COMPLETE CBC W/AUTO DIFF WBC: CPT

## 2024-11-13 PROCEDURE — 3078F DIAST BP <80 MM HG: CPT | Performed by: FAMILY MEDICINE

## 2024-11-13 PROCEDURE — 84156 ASSAY OF PROTEIN URINE: CPT

## 2024-11-13 PROCEDURE — 3075F SYST BP GE 130 - 139MM HG: CPT | Performed by: FAMILY MEDICINE

## 2024-11-13 PROCEDURE — 1125F AMNT PAIN NOTED PAIN PRSNT: CPT | Performed by: FAMILY MEDICINE

## 2024-11-13 PROCEDURE — 3051F HG A1C>EQUAL 7.0%<8.0%: CPT | Performed by: FAMILY MEDICINE

## 2024-11-13 PROCEDURE — 99213 OFFICE O/P EST LOW 20 MIN: CPT | Performed by: FAMILY MEDICINE

## 2024-11-13 PROCEDURE — 83970 ASSAY OF PARATHORMONE: CPT

## 2024-11-13 PROCEDURE — 80048 BASIC METABOLIC PNL TOTAL CA: CPT

## 2024-11-13 PROCEDURE — 84550 ASSAY OF BLOOD/URIC ACID: CPT

## 2024-11-13 PROCEDURE — 83735 ASSAY OF MAGNESIUM: CPT

## 2024-11-13 PROCEDURE — 82550 ASSAY OF CK (CPK): CPT

## 2024-11-13 PROCEDURE — 82306 VITAMIN D 25 HYDROXY: CPT

## 2024-11-13 PROCEDURE — 87086 URINE CULTURE/COLONY COUNT: CPT

## 2024-11-13 PROCEDURE — 81001 URINALYSIS AUTO W/SCOPE: CPT

## 2024-11-13 PROCEDURE — 36415 COLL VENOUS BLD VENIPUNCTURE: CPT

## 2024-11-13 PROCEDURE — 83036 HEMOGLOBIN GLYCOSYLATED A1C: CPT

## 2024-11-13 PROCEDURE — 83880 ASSAY OF NATRIURETIC PEPTIDE: CPT

## 2024-11-13 PROCEDURE — 84100 ASSAY OF PHOSPHORUS: CPT

## 2024-11-13 NOTE — PROGRESS NOTES
Specialty Pharmacy Patient Management Program  Program Disenrollment      Susy Hanson is a 64 y.o. female seen by an Endocrinology provider for Type 2 Diabetes. Patient was previously enrolled in the Endocrinology Patient Management program offered by [facility].      Disenrolling pt from  External Fill enrollment as she is no longer receiving Lantus on a regular basis and will not receive through PAP any longer.  Pt is welcome to re-enroll in the future if able.        Danilo Salas, PharmD, MPH   11/13/2024  11:40 EST

## 2024-11-13 NOTE — TELEPHONE ENCOUNTER
Heart Failure Clinic: [facility]  Clinical Outreach     Susy Hanson is a 64 y.o. female and is a patient of the [facility] heart failure clinic.   Called Patient to discuss lab work.   Discussed with provider. Potassium 5.3.   No medication changes at this time.  Patient has renal follow up scheduled next week.    Discussed with patient who verbalized understanding.  No further appointments scheduled, patient to call for any needs.      Patricia Clark RN  [facility] Heart Failure [facility]  11/13/2024  11:14 EST

## 2024-11-13 NOTE — PROGRESS NOTES
Answers submitted by the patient for this visit:  Other (Submitted on 11/9/2024)  Please describe your symptoms.: Knot on back of neck  Have you had these symptoms before?: No  How long have you been having these symptoms?: 1-2 weeks  Primary Reason for Visit (Submitted on 11/9/2024)  What is the primary reason for your visit?: Problem Not Listed  Subjective   Susy Hanson is a 64 y.o. female.     History of Present Illness  The patient presents for evaluation of a cyst on her neck.    She has been dealing with a cyst on her neck for approximately a month, which has been progressively enlarging. She receives massages every two weeks and first noticed the cyst a few weeks ago. She mentioned it to her daughter, who also had a similar issue. Her massage therapist did not notice the cyst until her most recent session. The cyst is not causing her any pain, but she is concerned about its increasing size. She has not experienced any fever or other symptoms.       The following portions of the patient's history were reviewed and updated as appropriate: allergies, current medications, past family history, past medical history, past social history, past surgical history, and problem list.  Past Medical History:   Diagnosis Date    Allergic 2008    Weeds, grasses, mold, mildew, etc.    Anxiety 2000    Breast cancer     LEFT BREAST 7/2021    Bursitis of hip 2024    Cataract     Had surgery to remove August 2023    CHF (congestive heart failure)     Colon polyp 12/2023    Removed during colonoscopy    COPD (chronic obstructive pulmonary disease) 2024    Coronary artery disease 2019    CTS (carpal tunnel syndrome) 2015    Surgery on both hands    Depression     DM (diabetes mellitus)     GERD (gastroesophageal reflux disease)     History of medical problems 2008    Bladder prolapse repeat Dr. Roper    HL (hearing loss)     Hyperlipidemia     Hypertension     SAW CARDIO/ STRESS TEST 2020 - NOW MEDS MANAGED BY PCP      Irritable bowel syndrome 2006    Peripheral neuropathy     PONV (postoperative nausea and vomiting)     Renal insufficiency 2020    See Dr. funes for moderate kidney failure    Sleep apnea     WEARS CPAP    Type 2 diabetes mellitus 2008    Urinary tract infection 6/23    UTI seen at [facility]     Past Surgical History:   Procedure Laterality Date    BLADDER REPAIR      BREAST BIOPSY      BREAST RECONSTRUCTION Bilateral 08/26/2021    Procedure: BILATERAL PREPECTORALPLACMENT TISSUE EXPANDERS AND ALLODERM;  Surgeon: Heri Arreaga MD;  Location: [facility] MAIN OR;  Service: Plastics;  Laterality: Bilateral;    BREAST TISSUE EXPANDER REMOVAL INSERTION OF IMPLANT Bilateral 12/23/2021    Procedure: BILATERAL REMOVAL TISSUE EXPANDERS AND PLACEMENT OF IMPLANTS;  Surgeon: Heri Arreaga MD;  Location:  [facility] MAIN OR;  Service: Plastics;  Laterality: Bilateral;    CARDIAC CATHETERIZATION Left 04/13/2022    Procedure: Cardiac Catheterization/Vascular Study;  Surgeon: Christo Murphy MD;  Location:  [facility];  Service: Cardiovascular;  Laterality: Left;    CARDIAC ELECTROPHYSIOLOGY PROCEDURE N/A 09/19/2022    Procedure: ICD implant [place] aware;  Surgeon: Esteban Kendrick MD;  Location:  [facility];  Service: Cardiology;  Laterality: N/A;    CARDIAC ELECTROPHYSIOLOGY PROCEDURE N/A 09/20/2022    Procedure: lead repositioning;  Surgeon: Esteban Kendrick MD;  Location:  [facility];  Service: Cardiology;  Laterality: N/A;    CARDIAC SURGERY  2022    Defibrillator/pacemaker implant    CARDIOVASCULAR STRESS TEST  2020    COLONOSCOPY  11/2012    EYE SURGERY  7/23    Cataracts removed    HAND SURGERY  2015    Both hands    HYSTERECTOMY      INSERT / REPLACE / REMOVE PACEMAKER  2022    LYMPH NODE BIOPSY  8/29/2021    MASTECTOMY W/ SENTINEL NODE BIOPSY Bilateral 08/26/2021    Procedure: bilateral total mastectomy, left axillary sentinel lymph node biopsy,  possible reconstruction.;  Surgeon: Asia Perkins MD;  Location: [facility] OR;  Service: General;  Laterality: Bilateral;    TRIGGER POINT INJECTION      Left thumb    TUBAL ABDOMINAL LIGATION       Family History   Problem Relation Age of Onset    Diabetes Mother     Heart disease Mother     Sleep apnea Mother     Snoring Mother     COPD Mother     Hyperlipidemia Mother     Alcohol abuse Mother     Anxiety disorder Mother     Depression Mother     Hypertension Mother     Alzheimer's disease Father     Diabetes Father     Heart disease Father     Sleep apnea Father     Snoring Father     Hyperlipidemia Father     Heart failure Father     Kidney disease Father     Hypertension Father     Obesity Father     Diabetes Sister     Heart disease Sister     Sleep apnea Sister     Snoring Sister     COPD Sister     Anxiety disorder Sister     Depression Sister     Hypertension Sister     Hyperlipidemia Sister     Clotting disorder Sister     Hyperlipidemia Brother     Hypertension Brother     Heart disease Brother     Breast cancer Maternal Aunt 80    Breast cancer Paternal Aunt     Diabetes Maternal Grandmother     Breast cancer Paternal Grandmother 60    Cancer Paternal Grandmother     Diabetes Paternal Grandmother     Hyperlipidemia Son     Diabetes Brother     Hypertension Brother     Malig Hyperthermia Neg Hx      Social History     Socioeconomic History    Marital status:    Tobacco Use    Smoking status: Former     Current packs/day: 0.00     Average packs/day: 2.0 packs/day for 36.0 years (72.0 ttl pk-yrs)     Types: Cigarettes     Start date: 1972     Quit date: 2008     Years since quittin.8     Passive exposure: Past    Smokeless tobacco: Never   Vaping Use    Vaping status: Never Used   Substance and Sexual Activity    Alcohol use: No    Drug use: No    Sexual activity: Yes     Partners: Male     Birth control/protection: None, Tubal ligation, Hysterectomy         Current  Outpatient Medications:     ALPRAZolam (XANAX) 1 MG tablet, Take 1 tablet by mouth 2 (Two) Times a Day., Disp: 60 tablet, Rfl: 0    atorvastatin (LIPITOR) 20 MG tablet, TAKE ONE TABLET BY MOUTH EVERY DAY, Disp: 90 tablet, Rfl: 1    Azelastine HCl 137 MCG/SPRAY solution, 2 sprays by Each Nare route Daily., Disp: 90 mL, Rfl: 1    cetirizine (zyrTEC) 10 MG tablet, Take 1 tablet by mouth Daily As Needed., Disp: , Rfl:     Continuous Glucose  (Dexcom G7 ) device, Use 1 each 4 (Four) Times a Day Before Meals & at Bedtime., Disp: 1 each, Rfl: 0    Continuous Glucose Sensor (Dexcom G7 Sensor) misc, Use 1 each 4 (Four) Times a Day Before Meals & at Bedtime., Disp: 9 each, Rfl: 3    dapagliflozin Propanediol (Farxiga) 10 MG tablet, Take 10 mg by mouth Daily., Disp: 90 tablet, Rfl: 1    dilTIAZem CD (CARDIZEM CD) 180 MG 24 hr capsule, TAKE ONE CAPSULE BY MOUTH EVERY DAY, Disp: 90 capsule, Rfl: 1    furosemide (LASIX) 20 MG tablet, Take 1 tablet by mouth Daily., Disp: , Rfl:     hydrALAZINE (APRESOLINE) 100 MG tablet, Take 1 tablet by mouth 3 (Three) Times a Day., Disp: 270 tablet, Rfl: 1    Ibuprofen 3 %, Gabapentin 10 %, Baclofen 2 %, lidocaine 4 %, Apply 1-2 g topically to the appropriate area as directed 3 (Three) to 4 (Four) times daily., Disp: 90 g, Rfl: 2    Insulin Disposable Pump (Omnipod 5 [id number] Pods Gen 5) misc, Use 1 package Every 3 (Three) Days., Disp: 10 each, Rfl: 6    Insulin Disposable Pump (Omnipod 5 G6 Intro, Gen 5,) kit, Use 1 package Daily., Disp: 1 kit, Rfl: 0    Insulin Glargine (Lantus SoloStar) 100 UNIT/ML injection pen, Inject 30 Units under the skin into the appropriate area as directed Every Night., Disp: 30 mL, Rfl: 2    Insulin Lispro (humaLOG) 100 UNIT/ML injection, For use in insulin pump. MDD 90 units., Disp: 30 mL, Rfl: 12    Insulin Lispro, 1 Unit Dial, (HumaLOG KwikPen) 100 UNIT/ML solution pen-injector, 3 units with meal with sliding scale maximum of 9 units with each meal,  "Disp: 15 mL, Rfl: 2    metoprolol succinate XL (TOPROL-XL) 100 MG 24 hr tablet, Take 1 tablet by mouth 2 (Two) Times a Day., Disp: 180 tablet, Rfl: 1    ondansetron (Zofran) 4 MG tablet, Take 1 tablet by mouth Every 8 (Eight) Hours As Needed for Nausea or Vomiting., Disp: 20 tablet, Rfl: 0    pantoprazole (PROTONIX) 40 MG EC tablet, Take 1 tablet by mouth Daily., Disp: 90 tablet, Rfl: 3    Umeclidinium-Vilanterol (Anoro Ellipta) 62.5-25 MCG/ACT aerosol powder  inhaler, Inhale 1 puff Daily., Disp: 60 each, Rfl: 5    Review of Systems  ROS done and noted in HPI    /70 (BP Location: Left arm, Patient Position: Sitting, Cuff Size: Large Adult)   Pulse 61   Ht 162.6 cm (64\")   Wt 79.8 kg (176 lb)   LMP  (LMP Unknown)   SpO2 97%   BMI 30.21 kg/m²           Objective   Physical Exam  Vitals and nursing note reviewed.   Constitutional:       Appearance: Normal appearance.   HENT:      Right Ear: Tympanic membrane, ear canal and external ear normal.      Left Ear: Ear canal and external ear normal.      Mouth/Throat:      Mouth: Mucous membranes are moist.      Pharynx: Oropharynx is clear.   Cardiovascular:      Rate and Rhythm: Normal rate and regular rhythm.      Heart sounds: Normal heart sounds.   Pulmonary:      Effort: Pulmonary effort is normal.      Breath sounds: Normal breath sounds.   Musculoskeletal:      Cervical back: Neck supple.   Lymphadenopathy:      Cervical: No cervical adenopathy.   Skin:     Findings: No rash.          Neurological:      Mental Status: She is alert.       Physical Exam         Results         Assessment & Plan   Problems Addressed this Visit    None  Visit Diagnoses       Cyst of skin and subcutaneous tissue    -  Primary    Relevant Orders    US Head Neck Soft Tissue          Diagnoses         Codes Comments    Cyst of skin and subcutaneous tissue    -  Primary ICD-10-CM: L72.0  ICD-9-CM: 706.2           Assessment & Plan  1. cyst.  The cyst on her neck is smooth, movable, " and not painful, suggesting it is likely a sebaceous cyst. There is no evidence of infection. An ultrasound will be ordered to determine if the cyst is solid, like a lymph node, or fluid-filled, like a sebaceous cyst. If it is confirmed to be a sebaceous cyst, removal can be considered. Depending on the location and characteristics, a surgeon may be able to remove it in the office.                Patient or patient representative verbalized consent for the use of Ambient Listening during the visit with  Erica Avila MD for chart documentation. 11/13/2024  14:11 EST

## 2024-11-14 LAB — BACTERIA SPEC AEROBE CULT: NO GROWTH

## 2024-11-15 ENCOUNTER — TELEPHONE (OUTPATIENT)
Dept: ENDOCRINOLOGY | Facility: CLINIC | Age: 64
End: 2024-11-15
Payer: MEDICARE

## 2024-11-15 DIAGNOSIS — E11.49 TYPE 2 DIABETES MELLITUS WITH OTHER DIABETIC NEUROLOGICAL COMPLICATION: ICD-10-CM

## 2024-11-15 RX ORDER — INSULIN PMP CART,AUT,G6/7,CNTR
1 EACH SUBCUTANEOUS
Qty: 15 EACH | Refills: 6 | Status: SHIPPED | OUTPATIENT
Start: 2024-11-15

## 2024-11-15 NOTE — TELEPHONE ENCOUNTER
Spoke with pt to f/u regarding problem with G6 sensor. Pt states it was a bruise and is improving. Pt switched to G7 and is not having any issues. Pt states she is having trouble with removing pods d/t adhesive sticking and causing skin irritation.   Referred pt to omnipod website for skin irritation and enc pt to call customer service for tips on removal. Pt declines to call customer service. Reached out to [employer] rep to contact pt for tips per pt request.     Pt states she has been changing her pod more often than every three days and will run out of pods. Pt has one box of pods left and does have insulin pens (long and short acting) if she runs out of pods.     Sent updated rx to [facility] to change pod every 2 days.

## 2024-11-18 ENCOUNTER — OFFICE VISIT (OUTPATIENT)
Dept: PULMONOLOGY | Facility: HOSPITAL | Age: 64
End: 2024-11-18
Payer: MEDICARE

## 2024-11-18 ENCOUNTER — PHARMACY IMMUNIZATION REVIEW (OUTPATIENT)
Dept: PHARMACY | Facility: HOSPITAL | Age: 64
End: 2024-11-18
Payer: MEDICARE

## 2024-11-18 ENCOUNTER — APPOINTMENT (OUTPATIENT)
Dept: PHARMACY | Facility: HOSPITAL | Age: 64
End: 2024-11-18
Payer: MEDICARE

## 2024-11-18 VITALS
WEIGHT: 175 LBS | RESPIRATION RATE: 16 BRPM | HEART RATE: 65 BPM | HEIGHT: 64 IN | OXYGEN SATURATION: 99 % | DIASTOLIC BLOOD PRESSURE: 79 MMHG | BODY MASS INDEX: 29.88 KG/M2 | SYSTOLIC BLOOD PRESSURE: 155 MMHG

## 2024-11-18 DIAGNOSIS — Z87.891 FORMER SMOKER: ICD-10-CM

## 2024-11-18 DIAGNOSIS — I10 ESSENTIAL (PRIMARY) HYPERTENSION: ICD-10-CM

## 2024-11-18 DIAGNOSIS — J44.9 CHRONIC OBSTRUCTIVE PULMONARY DISEASE, UNSPECIFIED COPD TYPE: Primary | ICD-10-CM

## 2024-11-18 DIAGNOSIS — G47.33 OSA ON CPAP: ICD-10-CM

## 2024-11-18 DIAGNOSIS — R06.09 DYSPNEA ON EXERTION: ICD-10-CM

## 2024-11-18 DIAGNOSIS — E11.49 TYPE 2 DIABETES MELLITUS WITH OTHER DIABETIC NEUROLOGICAL COMPLICATION: ICD-10-CM

## 2024-11-18 DIAGNOSIS — Z95.810 PRESENCE OF AUTOMATIC (IMPLANTABLE) CARDIAC DEFIBRILLATOR: ICD-10-CM

## 2024-11-18 PROCEDURE — G0463 HOSPITAL OUTPT CLINIC VISIT: HCPCS

## 2024-11-18 RX ORDER — HYDRALAZINE HYDROCHLORIDE 100 MG/1
100 TABLET, FILM COATED ORAL 3 TIMES DAILY
Qty: 270 TABLET | Refills: 1 | Status: SHIPPED | OUTPATIENT
Start: 2024-11-18

## 2024-11-18 RX ORDER — INSULIN LISPRO 100 [IU]/ML
INJECTION, SOLUTION INTRAVENOUS; SUBCUTANEOUS
Qty: 30 ML | Refills: 12 | Status: SHIPPED | OUTPATIENT
Start: 2024-11-18

## 2024-11-18 RX ORDER — HYDRALAZINE HYDROCHLORIDE 100 MG/1
100 TABLET, FILM COATED ORAL 3 TIMES DAILY
Qty: 270 TABLET | Refills: 1 | Status: SHIPPED | OUTPATIENT
Start: 2024-11-18 | End: 2024-11-18

## 2024-11-18 NOTE — PROGRESS NOTES
[facility] Vaccination Administration    Patient reported to [facility] via referral on 11/18/2024    Allergies:    Hydrocodone-acetaminophen    Vaccine History:   Immunization History   Administered Date(s) Administered    ABRYSVO (RSV, 60+ or pregnant women 32-36 wks) 09/16/2024    Arexvy (RSV, Adults 60+ yrs) 09/22/2023    COVID-19 (PFIZER) BIVALENT 12+YRS 11/04/2022, 09/22/2023    COVID-19 (PFIZER) Purple Cap Monovalent 01/06/2021, 01/27/2021, 09/30/2021, 11/01/2021    COVID-19 (UNSPECIFIED) 09/16/2024    Fluad Quad 65+ 09/14/2023    Fluzone High-Dose 65+YRS 09/16/2024    H1N1 Inj 11/03/2009    Hepatitis A 04/29/2018, 11/02/2018    Influenza Injectable Mdck Pf Quad 11/04/2022    Influenza, Unspecified 11/01/2021, 09/14/2023    Pneumococcal Conjugate 20-Valent (PCV20) 11/30/2023    RSV, unspecified (Vaccine or MAB) 09/22/2023    Shingrix 11/18/2024    Tdap 07/26/2022    Zoster, Unspecified 09/16/2024    flucelvax quad pfs =>4 YRS 10/19/2020       Plan:    The following vaccines were administered today:  Zoster dose #2    April Escamilla  11/18/2024  09:14 EST

## 2024-11-18 NOTE — PROGRESS NOTES
I have reviewed the notes, assessments, and/or procedures performed by Anali Escamilla, pharmacy technician, I concur with her documentation of Susy Hanson.

## 2024-11-18 NOTE — PROGRESS NOTES
HPI:  Patient reports shortness of breath that got worse in last few months especially during the heat time.  She has chronic sinusitis and the postnasal drainage can get worse with the seasonal variation.  She quit smoking 2008.  She is using her CPAP machine and followed in the sleep clinic.       Past Medical History:   Diagnosis Date    Allergic 2008    Weeds, grasses, mold, mildew, etc.    Anxiety 2000    Breast cancer     LEFT BREAST 7/2021    Bursitis of hip 2024    Cataract     Had surgery to remove August 2023    CHF (congestive heart failure)     Colon polyp 12/2023    Removed during colonoscopy    COPD (chronic obstructive pulmonary disease) 2024    Coronary artery disease 2019    CTS (carpal tunnel syndrome) 2015    Surgery on both hands    Depression     DM (diabetes mellitus)     GERD (gastroesophageal reflux disease)     History of medical problems 2008    Bladder prolapse repeat Dr. Roper    HL (hearing loss)     Hyperlipidemia     Hypertension     SAW CARDIO/ STRESS TEST 2020 - NOW MEDS MANAGED BY PCP     Irritable bowel syndrome 2006    Peripheral neuropathy     PONV (postoperative nausea and vomiting)     Renal insufficiency 2020    See Dr. funes for moderate kidney failure    Sleep apnea     WEARS CPAP    Sleep apnea, obstructive     Type 2 diabetes mellitus 2008    Urinary tract infection 6/23    UTI seen at [facility]        Current Outpatient Medications on File Prior to Visit   Medication Sig Dispense Refill    ALPRAZolam (XANAX) 1 MG tablet Take 1 tablet by mouth 2 (Two) Times a Day. 60 tablet 0    atorvastatin (LIPITOR) 20 MG tablet Take 1 tablet by mouth Daily. 90 tablet 1    Azelastine HCl 137 MCG/SPRAY solution 2 sprays by Each Nare route Daily. 90 mL 1    cetirizine (zyrTEC) 10 MG tablet Take 1 tablet by mouth Daily As Needed.      Continuous Glucose Sensor (Dexcom G7 Sensor) misc Inject 1 each under the skin into the appropriate area as directed Every 10 (Ten) Days. 9 each 3     dapagliflozin Propanediol (Farxiga) 10 MG tablet Take 10 mg by mouth Daily. 90 tablet 1    dilTIAZem CD (CARDIZEM CD) 180 MG 24 hr capsule TAKE ONE CAPSULE BY MOUTH EVERY DAY 90 capsule 1    furosemide (LASIX) 20 MG tablet Take 1 tablet by mouth Every Other Day. 45 tablet 1    glucose blood test strip Use to test blood sugar every day 100 each 1    hydrALAZINE (APRESOLINE) 100 MG tablet Take 1 tablet by mouth 3 (Three) Times a Day. 270 tablet 1    hydrALAZINE (APRESOLINE) 25 MG tablet Take 3 tablets by mouth 3 times a day. 90 tablet 3    Ibuprofen 3 %, Gabapentin 10 %, Baclofen 2 %, lidocaine 4 % Apply 1-2 g topically to the appropriate area as directed 3 (Three) to 4 (Four) times daily. 90 g 2    Insulin Disposable Pump (Omnipod 5 [id number] Pods Gen 5) misc Use 1 package Every Other Day. 15 each 6    Insulin Lispro (humaLOG) 100 UNIT/ML injection For use in insulin pump. MDD 90 units. 30 mL 12    metoprolol succinate XL (TOPROL-XL) 100 MG 24 hr tablet Take 1 tablet by mouth 2 (Two) Times a Day. 180 tablet 1    ondansetron (Zofran) 4 MG tablet Take 1 tablet by mouth Every 8 (Eight) Hours As Needed for Nausea or Vomiting. 20 tablet 0    pantoprazole (PROTONIX) 40 MG EC tablet Take 1 tablet by mouth Daily. 90 tablet 3    Umeclidinium-Vilanterol (Anoro Ellipta) 62.5-25 MCG/ACT aerosol powder  inhaler Inhale 1 puff by mouth into the lungs every day. 60 each 5    [DISCONTINUED] atorvastatin (LIPITOR) 20 MG tablet TAKE ONE TABLET BY MOUTH EVERY DAY 90 tablet 1    [DISCONTINUED] furosemide (LASIX) 40 MG tablet Take 1 tablet by mouth Daily. 90 tablet 2    [DISCONTINUED] glimepiride (AMARYL) 2 MG tablet Take 1 tablet by mouth Every Morning. 30 tablet 3    [DISCONTINUED] hydrALAZINE (APRESOLINE) 50 MG tablet Take 2 tablets by mouth 2 (Two) Times a Day. 336 tablet 1    [DISCONTINUED] Insulin Lispro, 1 Unit Dial, (HumaLOG KwikPen) 100 UNIT/ML solution pen-injector 3 units with meal with sliding scale maximum of 9 units with each  meal 15 mL 2    [DISCONTINUED] Azelastine HCl 137 MCG/SPRAY solution Instill 2 sprays in each nostril Daily. 90 mL 1    [DISCONTINUED] Continuous Glucose  (Dexcom G7 ) device Use 1 each 4 (Four) Times a Day Before Meals & at Bedtime. 1 each 0    [DISCONTINUED] Continuous Glucose Sensor (Dexcom G7 Sensor) misc Use 1 each 4 (Four) Times a Day Before Meals & at Bedtime. 9 each 3    [DISCONTINUED] dapagliflozin Propanediol 10 MG tablet Take 10 mg by mouth Daily. 90 tablet 2    [DISCONTINUED] dilTIAZem CD (CARDIZEM CD) 180 MG 24 hr capsule Take 1 capsule by mouth Daily. 90 capsule 1    [DISCONTINUED] furosemide (LASIX) 20 MG tablet Take 1 tablet by mouth Daily.      [DISCONTINUED] hydroCHLOROthiazide (HYDRODIURIL) 25 MG tablet Take 1 tablet by mouth Daily. 90 tablet 2    [DISCONTINUED] Insulin Disposable Pump (Omnipod 5 [id number] Pods Gen 5) misc Inject 1 each under the skin into the appropriate area as directed Every 3 (Three) Days. 10 each 6    [DISCONTINUED] Insulin Glargine (Lantus SoloStar) 100 UNIT/ML injection pen Inject 30 Units under the skin into the appropriate area as directed Every Night. 30 mL 2    [DISCONTINUED] Insulin Glargine (Lantus SoloStar) 100 UNIT/ML injection pen Inject 30 Units under the skin into the appropriate area as directed Every Night. 30 mL 2    [DISCONTINUED] Insulin Lispro (humaLOG) 100 UNIT/ML injection For use in insulin pump, Maximum daily dose is 90 units. 30 mL 12    [DISCONTINUED] Insulin Lispro, 1 Unit Dial, (HUMALOG) 100 UNIT/ML solution pen-injector Inject 3 Units under the skin with each meal plus sliding scale up to max 9 units per meal. Pen expires 28 days after opening. 15 mL 1    [DISCONTINUED] losartan (Cozaar) 100 MG tablet Take 1 tablet by mouth Daily. 90 tablet 2    [DISCONTINUED] Umeclidinium-Vilanterol (Anoro Ellipta) 62.5-25 MCG/ACT aerosol powder  inhaler Inhale 1 puff Daily. 60 each 5     No current facility-administered medications on file prior to  visit.        Social History     Tobacco Use    Smoking status: Former     Current packs/day: 0.00     Average packs/day: 2.0 packs/day for 36.0 years (72.0 ttl pk-yrs)     Types: Cigarettes     Start date: 1972     Quit date: 2008     Years since quittin.8     Passive exposure: Past    Smokeless tobacco: Never   Vaping Use    Vaping status: Never Used   Substance Use Topics    Alcohol use: No    Drug use: No        Family History   Problem Relation Age of Onset    Diabetes Mother     Heart disease Mother     Sleep apnea Mother     Snoring Mother     COPD Mother     Hyperlipidemia Mother     Alcohol abuse Mother     Anxiety disorder Mother     Depression Mother     Hypertension Mother     Alzheimer's disease Father     Diabetes Father     Heart disease Father     Sleep apnea Father     Snoring Father     Hyperlipidemia Father     Heart failure Father     Kidney disease Father     Hypertension Father     Obesity Father     Diabetes Sister     Heart disease Sister     Sleep apnea Sister     Snoring Sister     COPD Sister     Anxiety disorder Sister     Depression Sister     Hypertension Sister     Hyperlipidemia Sister     Clotting disorder Sister     Hyperlipidemia Brother     Hypertension Brother     Heart disease Brother     Breast cancer Maternal Aunt 80    Breast cancer Paternal Aunt     Diabetes Maternal Grandmother     Breast cancer Paternal Grandmother 60    Cancer Paternal Grandmother     Diabetes Paternal Grandmother     Hyperlipidemia Son     Diabetes Brother     Hypertension Brother     Malig Hyperthermia Neg Hx         Review of system:  Constitutional: Negative for chills, fever and malaise/fatigue.   HENT: Negative.    Eyes: Negative.    Cardiovascular: Negative.    Respiratory: negative for cough and shortness of breath.    Skin: Negative.    Musculoskeletal: Negative.    Gastrointestinal: Negative.    Genitourinary: Negative.    Neurological: Negative.    Psychiatric/Behavioral:  "Negative.    Physical exam:  Blood pressure 155/79, pulse 65, resp. rate 16, height 162.6 cm (64\"), weight 79.4 kg (175 lb), SpO2 99%, not currently breastfeeding.    General Appearance:  Alert   HEENT:  Normocephalic, without obvious abnormality, Conjunctiva/corneas clear,.   Nares normal, no drainage     Neck:  Supple, symmetrical, trachea midline. No JVD.  Lungs /Chest wall:   good air entry Bilaterlly, respirations unlabored, symmetrical wall movement.     Heart:  Regular rate and rhythm, S1 S2 normal  Abdomen: Soft, non-tender, no masses, no organomegaly.    Extremities: No edema, no clubbing or cyanosis    No radiology results for the last 90 days.   Results for orders placed during the hospital encounter of 01/30/24    Adult Transthoracic Echo Complete W/ Cont if Necessary Per Protocol    Interpretation Summary    Left ventricular systolic function is normal. Left ventricular ejection fraction appears to be 56 - 60%.    Left ventricular diastolic function is consistent with (grade I) impaired relaxation.    Estimated right ventricular systolic pressure from tricuspid regurgitation is mildly elevated (35-45 mmHg).        Assessment and plan:  Dyspnea on exertion  Mild COPD: PFTs August 2024 with mild obstruction: FEV1/FVC 80%, FVC 75%, FEV1 79%, TLC 98%, DLCO 88%  Former smoker quit 2008  Dilated CMP, S/P ICD/Pacer placement  HTN, DM  ANDREAS      Plan:    Anoro was expensive, start Stiolto, sample and instructions given  Albuterol as needed  Keep follow-up with the sleep clinic for the auto CPAP machine  Keep follow-up with cardiology     I personally reviewed the latest radiological study  Patient is advised to stay up-to-date on immunizations for flu, pneumococcal and COVID-19  "

## 2024-11-19 ENCOUNTER — TELEPHONE (OUTPATIENT)
Dept: ENDOCRINOLOGY | Facility: CLINIC | Age: 64
End: 2024-11-19
Payer: MEDICARE

## 2024-11-19 ENCOUNTER — TELEPHONE (OUTPATIENT)
Dept: FAMILY MEDICINE CLINIC | Facility: CLINIC | Age: 64
End: 2024-11-19
Payer: MEDICARE

## 2024-11-19 DIAGNOSIS — D17.0 LIPOMA OF NECK: Primary | ICD-10-CM

## 2024-11-19 NOTE — TELEPHONE ENCOUNTER
Caller: Susy Hanson    Relationship: Self    Best call back number: [phone]        What test was performed: LABS    When was the test performed: 11/13/24    Where was the test performed:     Additional notes: PATIENT SAW THE RESULTS ON MYCHART WILL NEED A CALL TO DISCUSS

## 2024-11-19 NOTE — TELEPHONE ENCOUNTER
Called pt to inform of updated rx for 3 boxes of pods monthly being sent to [facility] pharmacy. Noted also rx for insulin vial pending. Pt states she filled pod today and has enough insulin to fill second pod. Pt desired her glooko report to be reviewed. Pt stated her kidney doctor feels her elevated BG are harming her kidneys.     Pt is not consistently bolusing 15 minutes prior to eating. Reviewed importance of bolusing prior to eating to aid in post meal spikes.     Pt denies lows.     Pt not cho counting. Pt giving 35-40g cho with meal intake.     Current settings are 1.5 units/hr basal    Correct factor 50    Correct above 170    Target . ICR 1.8:1    Avg .     Please advise for adjustments

## 2024-11-25 ENCOUNTER — TELEPHONE (OUTPATIENT)
Dept: FAMILY MEDICINE CLINIC | Facility: CLINIC | Age: 64
End: 2024-11-25
Payer: MEDICARE

## 2024-11-25 RX ORDER — CLOTRIMAZOLE 10 MG/1
10 LOZENGE ORAL
Qty: 25 TABLET | Refills: 0 | Status: SHIPPED | OUTPATIENT
Start: 2024-11-25 | End: 2024-11-30

## 2024-11-25 NOTE — TELEPHONE ENCOUNTER
Caller: Susy Hanson    Relationship: Self    Best call back number:     [phone]        What medication are you requesting: NA    What are your current symptoms: WHITE FILM ON TONGUE/IRRITATION     How long have you been experiencing symptoms: OVER THE WEEKEND     Have you had these symptoms before:    [] Yes  [x] No    Have you been treated for these symptoms before:   [] Yes  [x] No    If a prescription is needed, what is your preferred pharmacy and phone number: [facility] - [place]     Additional notes: PATIENT BELIEVES SHE HAS THRUSH AND WOULD LIKE MEDICATION SENT TO PHARMACY     HUB OFFERED APPOINTMENT

## 2024-11-27 ENCOUNTER — LAB (OUTPATIENT)
Dept: LAB | Facility: HOSPITAL | Age: 64
End: 2024-11-27
Payer: MEDICARE

## 2024-11-27 ENCOUNTER — TRANSCRIBE ORDERS (OUTPATIENT)
Dept: ADMINISTRATIVE | Facility: HOSPITAL | Age: 64
End: 2024-11-27
Payer: MEDICARE

## 2024-11-27 DIAGNOSIS — M15.9 GENERALIZED OSTEOARTHROSIS, INVOLVING MULTIPLE SITES: ICD-10-CM

## 2024-11-27 DIAGNOSIS — E55.9 VITAMIN D DEFICIENCY: ICD-10-CM

## 2024-11-27 DIAGNOSIS — R80.9 PROTEINURIA, UNSPECIFIED TYPE: ICD-10-CM

## 2024-11-27 DIAGNOSIS — N18.32 CHRONIC KIDNEY DISEASE (CKD) STAGE G3B/A1, MODERATELY DECREASED GLOMERULAR FILTRATION RATE (GFR) BETWEEN 30-44 ML/MIN/1.73 SQUARE METER AND ALBUMINURIA CREATININE RATIO LESS THAN 30 MG/G (CMS/H*: ICD-10-CM

## 2024-11-27 DIAGNOSIS — E87.5 HYPERPOTASSEMIA: Primary | ICD-10-CM

## 2024-11-27 DIAGNOSIS — E11.22 TYPE 2 DIABETES MELLITUS WITH CHRONIC KIDNEY DISEASE, WITHOUT LONG-TERM CURRENT USE OF INSULIN, UNSPECIFIED CKD STAGE: ICD-10-CM

## 2024-11-27 DIAGNOSIS — E87.5 HYPERPOTASSEMIA: ICD-10-CM

## 2024-11-27 LAB
25(OH)D3 SERPL-MCNC: 28.3 NG/ML (ref 30–100)
ALBUMIN SERPL-MCNC: 4.3 G/DL (ref 3.5–5.2)
ALBUMIN/GLOB SERPL: 1.5 G/DL
ALP SERPL-CCNC: 87 U/L (ref 39–117)
ALT SERPL W P-5'-P-CCNC: 18 U/L (ref 1–33)
ANION GAP SERPL CALCULATED.3IONS-SCNC: 7.5 MMOL/L (ref 5–15)
AST SERPL-CCNC: 12 U/L (ref 1–32)
BACTERIA UR QL AUTO: ABNORMAL /HPF
BASOPHILS # BLD AUTO: 0.04 10*3/MM3 (ref 0–0.2)
BASOPHILS NFR BLD AUTO: 0.7 % (ref 0–1.5)
BILIRUB SERPL-MCNC: 0.2 MG/DL (ref 0–1.2)
BILIRUB UR QL STRIP: NEGATIVE
BUN SERPL-MCNC: 31 MG/DL (ref 8–23)
BUN/CREAT SERPL: 21.8 (ref 7–25)
CALCIUM SPEC-SCNC: 9.8 MG/DL (ref 8.6–10.5)
CHLORIDE SERPL-SCNC: 107 MMOL/L (ref 98–107)
CK SERPL-CCNC: 56 U/L (ref 20–180)
CLARITY UR: CLEAR
CO2 SERPL-SCNC: 25.5 MMOL/L (ref 22–29)
COLOR UR: YELLOW
CREAT SERPL-MCNC: 1.42 MG/DL (ref 0.57–1)
CREAT UR-MCNC: 61.5 MG/DL
DEPRECATED RDW RBC AUTO: 54.5 FL (ref 37–54)
EGFRCR SERPLBLD CKD-EPI 2021: 40.9 ML/MIN/1.73
EOSINOPHIL # BLD AUTO: 0.16 10*3/MM3 (ref 0–0.4)
EOSINOPHIL NFR BLD AUTO: 2.7 % (ref 0.3–6.2)
ERYTHROCYTE [DISTWIDTH] IN BLOOD BY AUTOMATED COUNT: 17.8 % (ref 12.3–15.4)
GLOBULIN UR ELPH-MCNC: 2.9 GM/DL
GLUCOSE SERPL-MCNC: 131 MG/DL (ref 65–99)
GLUCOSE UR STRIP-MCNC: ABNORMAL MG/DL
HBA1C MFR BLD: 7.71 % (ref 4.8–5.6)
HCT VFR BLD AUTO: 41.1 % (ref 34–46.6)
HGB BLD-MCNC: 11.8 G/DL (ref 12–15.9)
HGB UR QL STRIP.AUTO: NEGATIVE
HOLD SPECIMEN: NORMAL
HYALINE CASTS UR QL AUTO: ABNORMAL /LPF
IMM GRANULOCYTES # BLD AUTO: 0.03 10*3/MM3 (ref 0–0.05)
IMM GRANULOCYTES NFR BLD AUTO: 0.5 % (ref 0–0.5)
KETONES UR QL STRIP: NEGATIVE
LEUKOCYTE ESTERASE UR QL STRIP.AUTO: ABNORMAL
LYMPHOCYTES # BLD AUTO: 1.39 10*3/MM3 (ref 0.7–3.1)
LYMPHOCYTES NFR BLD AUTO: 23.2 % (ref 19.6–45.3)
MCH RBC QN AUTO: 24.2 PG (ref 26.6–33)
MCHC RBC AUTO-ENTMCNC: 28.7 G/DL (ref 31.5–35.7)
MCV RBC AUTO: 84.2 FL (ref 79–97)
MONOCYTES # BLD AUTO: 0.67 10*3/MM3 (ref 0.1–0.9)
MONOCYTES NFR BLD AUTO: 11.2 % (ref 5–12)
NEUTROPHILS NFR BLD AUTO: 3.69 10*3/MM3 (ref 1.7–7)
NEUTROPHILS NFR BLD AUTO: 61.7 % (ref 42.7–76)
NITRITE UR QL STRIP: NEGATIVE
NRBC BLD AUTO-RTO: 0 /100 WBC (ref 0–0.2)
PH UR STRIP.AUTO: 6 [PH] (ref 5–8)
PHOSPHATE SERPL-MCNC: 4.4 MG/DL (ref 2.5–4.5)
PLATELET # BLD AUTO: 281 10*3/MM3 (ref 140–450)
PMV BLD AUTO: 10.2 FL (ref 6–12)
POTASSIUM SERPL-SCNC: 5.2 MMOL/L (ref 3.5–5.2)
PROT ?TM UR-MCNC: 20.4 MG/DL
PROT SERPL-MCNC: 7.2 G/DL (ref 6–8.5)
PROT UR QL STRIP: ABNORMAL
PROT/CREAT UR: 331.7 MG/G CREA (ref 0–200)
PTH-INTACT SERPL-MCNC: 54.2 PG/ML (ref 15–65)
RBC # BLD AUTO: 4.88 10*6/MM3 (ref 3.77–5.28)
RBC # UR STRIP: ABNORMAL /HPF
REF LAB TEST METHOD: ABNORMAL
SODIUM SERPL-SCNC: 140 MMOL/L (ref 136–145)
SP GR UR STRIP: 1.02 (ref 1–1.03)
SQUAMOUS #/AREA URNS HPF: ABNORMAL /HPF
URATE SERPL-MCNC: 4.4 MG/DL (ref 2.4–5.7)
UROBILINOGEN UR QL STRIP: ABNORMAL
WBC # UR STRIP: ABNORMAL /HPF
WBC NRBC COR # BLD AUTO: 5.98 10*3/MM3 (ref 3.4–10.8)

## 2024-11-27 PROCEDURE — 84550 ASSAY OF BLOOD/URIC ACID: CPT

## 2024-11-27 PROCEDURE — 82570 ASSAY OF URINE CREATININE: CPT

## 2024-11-27 PROCEDURE — 84100 ASSAY OF PHOSPHORUS: CPT

## 2024-11-27 PROCEDURE — 82306 VITAMIN D 25 HYDROXY: CPT

## 2024-11-27 PROCEDURE — 83036 HEMOGLOBIN GLYCOSYLATED A1C: CPT

## 2024-11-27 PROCEDURE — 81001 URINALYSIS AUTO W/SCOPE: CPT

## 2024-11-27 PROCEDURE — 83970 ASSAY OF PARATHORMONE: CPT

## 2024-11-27 PROCEDURE — 36415 COLL VENOUS BLD VENIPUNCTURE: CPT

## 2024-11-27 PROCEDURE — 82550 ASSAY OF CK (CPK): CPT

## 2024-11-27 PROCEDURE — 84156 ASSAY OF PROTEIN URINE: CPT

## 2024-11-27 PROCEDURE — 87086 URINE CULTURE/COLONY COUNT: CPT

## 2024-11-27 PROCEDURE — 80053 COMPREHEN METABOLIC PANEL: CPT

## 2024-11-27 PROCEDURE — 85025 COMPLETE CBC W/AUTO DIFF WBC: CPT

## 2024-11-29 LAB — BACTERIA SPEC AEROBE CULT: NO GROWTH

## 2024-12-02 ENCOUNTER — OFFICE VISIT (OUTPATIENT)
Dept: SURGERY | Facility: CLINIC | Age: 64
End: 2024-12-02
Payer: MEDICARE

## 2024-12-02 ENCOUNTER — TELEPHONE (OUTPATIENT)
Dept: ENDOCRINOLOGY | Facility: CLINIC | Age: 64
End: 2024-12-02
Payer: MEDICARE

## 2024-12-02 VITALS
WEIGHT: 177.9 LBS | HEART RATE: 66 BPM | SYSTOLIC BLOOD PRESSURE: 173 MMHG | DIASTOLIC BLOOD PRESSURE: 117 MMHG | OXYGEN SATURATION: 100 % | TEMPERATURE: 98.2 F | RESPIRATION RATE: 18 BRPM | BODY MASS INDEX: 30.37 KG/M2 | HEIGHT: 64 IN

## 2024-12-02 DIAGNOSIS — D17.0 LIPOMA OF NECK: Primary | ICD-10-CM

## 2024-12-02 PROCEDURE — 3080F DIAST BP >= 90 MM HG: CPT | Performed by: SURGERY

## 2024-12-02 PROCEDURE — 99214 OFFICE O/P EST MOD 30 MIN: CPT | Performed by: SURGERY

## 2024-12-02 PROCEDURE — 1160F RVW MEDS BY RX/DR IN RCRD: CPT | Performed by: SURGERY

## 2024-12-02 PROCEDURE — 1159F MED LIST DOCD IN RCRD: CPT | Performed by: SURGERY

## 2024-12-02 PROCEDURE — 3077F SYST BP >= 140 MM HG: CPT | Performed by: SURGERY

## 2024-12-02 RX ORDER — SODIUM CHLORIDE 0.9 % (FLUSH) 0.9 %
3-10 SYRINGE (ML) INJECTION AS NEEDED
OUTPATIENT
Start: 2024-12-02

## 2024-12-02 RX ORDER — SODIUM CHLORIDE 9 MG/ML
40 INJECTION, SOLUTION INTRAVENOUS AS NEEDED
OUTPATIENT
Start: 2024-12-02

## 2024-12-02 RX ORDER — SODIUM CHLORIDE 0.9 % (FLUSH) 0.9 %
3 SYRINGE (ML) INJECTION EVERY 12 HOURS SCHEDULED
OUTPATIENT
Start: 2024-12-02

## 2024-12-02 RX ORDER — SODIUM CHLORIDE 9 MG/ML
100 INJECTION, SOLUTION INTRAVENOUS CONTINUOUS
OUTPATIENT
Start: 2024-12-02 | End: 2024-12-03

## 2024-12-02 NOTE — PROGRESS NOTES
General Surgery History and Physical      Referring Provider: Erica Pina*    Chief Complaint:    Neck mass/lipoma    History of Present Illness:    Susy Hanson is a 64 y.o. female who presents for evaluation of a posterior neck mass.  She reports it was first noticed approximately 1 month ago.  She notes it is increasing in size and does have pain in the neck but is under sure if it is secondary to the mass.  She is interested in excision as she is unsure if the neck pain is secondary to this and because it is increasing in size.    Past Medical History:   Past Medical History:   Diagnosis Date    Allergic 2008    Weeds, grasses, mold, mildew, etc.    Anxiety 2000    Breast cancer     LEFT BREAST 7/2021    Bursitis of hip 2024    Cataract     Had surgery to remove August 2023    CHF (congestive heart failure)     Colon polyp 12/2023    Removed during colonoscopy    COPD (chronic obstructive pulmonary disease) 2024    Coronary artery disease 2019    CTS (carpal tunnel syndrome) 2015    Surgery on both hands    Depression     DM (diabetes mellitus)     GERD (gastroesophageal reflux disease)     History of medical problems 2008    Bladder prolapse repeat Dr. Roper    HL (hearing loss)     Hyperlipidemia     Hypertension     SAW CARDIO/ STRESS TEST 2020 - NOW MEDS MANAGED BY PCP     Irritable bowel syndrome 2006    Peripheral neuropathy     PONV (postoperative nausea and vomiting)     Renal insufficiency 2020    See Dr. funes for moderate kidney failure    Sleep apnea     WEARS CPAP    Sleep apnea, obstructive     Type 2 diabetes mellitus 2008    Urinary tract infection 6/23    UTI seen at [facility]      Past Surgical History:    Past Surgical History:   Procedure Laterality Date    BLADDER REPAIR      BREAST BIOPSY      BREAST RECONSTRUCTION Bilateral 08/26/2021    Procedure: BILATERAL PREPECTORALPLACMENT TISSUE EXPANDERS AND ALLODERM;  Surgeon: Heri Arreaga MD;  Location: [facility]  MAIN OR;  Service: Plastics;  Laterality: Bilateral;    BREAST TISSUE EXPANDER REMOVAL INSERTION OF IMPLANT Bilateral 12/23/2021    Procedure: BILATERAL REMOVAL TISSUE EXPANDERS AND PLACEMENT OF IMPLANTS;  Surgeon: Heri Arreaga MD;  Location: [facility] MAIN OR;  Service: Plastics;  Laterality: Bilateral;    CARDIAC CATHETERIZATION Left 04/13/2022    Procedure: Cardiac Catheterization/Vascular Study;  Surgeon: Christo Murphy MD;  Location:  [facility];  Service: Cardiovascular;  Laterality: Left;    CARDIAC ELECTROPHYSIOLOGY PROCEDURE N/A 09/19/2022    Procedure: ICD implant [place] aware;  Surgeon: Esteban Kendrick MD;  Location:  [facility];  Service: Cardiology;  Laterality: N/A;    CARDIAC ELECTROPHYSIOLOGY PROCEDURE N/A 09/20/2022    Procedure: lead repositioning;  Surgeon: Esteban Kendrick MD;  Location:  [facility];  Service: Cardiology;  Laterality: N/A;    CARDIAC SURGERY  2022    Defibrillator/pacemaker implant    CARDIOVASCULAR STRESS TEST  2020    COLONOSCOPY  11/2012    EYE SURGERY  7/23    Cataracts removed    HAND SURGERY  2015    Both hands    HYSTERECTOMY      INSERT / REPLACE / REMOVE PACEMAKER  2022    LYMPH NODE BIOPSY  8/29/2021    MASTECTOMY W/ SENTINEL NODE BIOPSY Bilateral 08/26/2021    Procedure: bilateral total mastectomy, left axillary sentinel lymph node biopsy, possible reconstruction.;  Surgeon: Aisa Perkins MD;  Location: [facility] MAIN OR;  Service: General;  Laterality: Bilateral;    TRIGGER POINT INJECTION  2012    Left thumb    TUBAL ABDOMINAL LIGATION  1981     Family History:    Family History   Problem Relation Age of Onset    Diabetes Mother     Heart disease Mother     Sleep apnea Mother     Snoring Mother     COPD Mother     Hyperlipidemia Mother     Alcohol abuse Mother     Anxiety disorder Mother     Depression Mother     Hypertension Mother     Alzheimer's disease Father     Diabetes Father     Heart  disease Father     Sleep apnea Father     Snoring Father     Hyperlipidemia Father     Heart failure Father     Kidney disease Father     Hypertension Father     Obesity Father     Diabetes Sister     Heart disease Sister     Sleep apnea Sister     Snoring Sister     COPD Sister     Anxiety disorder Sister     Depression Sister     Hypertension Sister     Hyperlipidemia Sister     Clotting disorder Sister     Hyperlipidemia Brother     Hypertension Brother     Heart disease Brother     Breast cancer Maternal Aunt 80    Breast cancer Paternal Aunt     Diabetes Maternal Grandmother     Breast cancer Paternal Grandmother 60    Cancer Paternal Grandmother     Diabetes Paternal Grandmother     Hyperlipidemia Son     Diabetes Brother     Hypertension Brother     Malig Hyperthermia Neg Hx      Social History:    Social History     Socioeconomic History    Marital status:    Tobacco Use    Smoking status: Former     Current packs/day: 0.00     Average packs/day: 2.0 packs/day for 36.0 years (72.0 ttl pk-yrs)     Types: Cigarettes     Start date: 1972     Quit date: 2008     Years since quittin.9     Passive exposure: Past    Smokeless tobacco: Never   Vaping Use    Vaping status: Never Used   Substance and Sexual Activity    Alcohol use: No    Drug use: No    Sexual activity: Yes     Partners: Male     Birth control/protection: None, Tubal ligation, Hysterectomy     Allergies:   Allergies   Allergen Reactions    Hydrocodone-Acetaminophen Hives     Lortab, takes tylenol     Medications:     Current Outpatient Medications:     ALPRAZolam (XANAX) 1 MG tablet, Take 1 tablet by mouth 2 (Two) Times a Day., Disp: 60 tablet, Rfl: 0    atorvastatin (LIPITOR) 20 MG tablet, Take 1 tablet by mouth Daily., Disp: 90 tablet, Rfl: 1    Azelastine HCl 137 MCG/SPRAY solution, 2 sprays by Each Nare route Daily., Disp: 90 mL, Rfl: 1    cetirizine (zyrTEC) 10 MG tablet, Take 1 tablet by mouth Daily As Needed., Disp: , Rfl:      Continuous Glucose Sensor (Dexcom G7 Sensor) misc, Inject 1 each under the skin into the appropriate area as directed Every 10 (Ten) Days., Disp: 9 each, Rfl: 3    dapagliflozin Propanediol (Farxiga) 10 MG tablet, Take 10 mg by mouth Daily., Disp: 90 tablet, Rfl: 1    dilTIAZem CD (CARDIZEM CD) 180 MG 24 hr capsule, TAKE ONE CAPSULE BY MOUTH EVERY DAY, Disp: 90 capsule, Rfl: 1    furosemide (LASIX) 20 MG tablet, Take 1 tablet by mouth Every Other Day., Disp: 45 tablet, Rfl: 1    glucose blood test strip, Use to test blood sugar every day, Disp: 100 each, Rfl: 1    hydrALAZINE (APRESOLINE) 100 MG tablet, Take 1 tablet by mouth 3 (Three) Times a Day., Disp: 270 tablet, Rfl: 1    Ibuprofen 3 %, Gabapentin 10 %, Baclofen 2 %, lidocaine 4 %, Apply 1-2 g topically to the appropriate area as directed 3 (Three) to 4 (Four) times daily., Disp: 90 g, Rfl: 2    Insulin Disposable Pump (Omnipod 5 [id number] Pods Gen 5) misc, Use 1 package Every Other Day., Disp: 15 each, Rfl: 6    Insulin Lispro (humaLOG) 100 UNIT/ML injection, For use in insulin pump. Max daily dose 90 units., Disp: 30 mL, Rfl: 12    metoprolol succinate XL (TOPROL-XL) 100 MG 24 hr tablet, Take 1 tablet by mouth 2 (Two) Times a Day., Disp: 180 tablet, Rfl: 1    nystatin (MYCOSTATIN) 100,000 unit/mL suspension, Take 5 mL by mouth 4 (Four) Times a Day for 7 days., Disp: 473 mL, Rfl: 0    ondansetron (Zofran) 4 MG tablet, Take 1 tablet by mouth Every 8 (Eight) Hours As Needed for Nausea or Vomiting., Disp: 20 tablet, Rfl: 0    pantoprazole (PROTONIX) 40 MG EC tablet, Take 1 tablet by mouth Daily., Disp: 90 tablet, Rfl: 3    sodium bicarbonate 650 MG tablet, Take 1 tablet by mouth Daily., Disp: 90 tablet, Rfl: 3    tiotropium bromide-olodaterol (STIOLTO RESPIMAT) 2.5-2.5 MCG/ACT aerosol solution inhaler, Inhale 2 puffs Daily., Disp: 1 each, Rfl: 0    Radiology/Endoscopy:    Ultrasound Neck 11/16/24 - [facility]  Superficial soft tissue mass at base of  skull, possbile lipoma.  2.4x0.7x2.3cm.    Labs:    Recent labs reviewed    Review of Systems:   As noted above in HPI    Physical Exam:   No acute distress, alert  Nonlabored respirations  Posterior neck soft tissue mass, mobile, approximately 3 cm x 3 cm, no overlying skin changes; most consistent with lipoma  Extremities warm and well-perfused with no gross deformities    Assessment and Plan:  Susy Hanson is a 64 y.o. female with posterior neck lipoma.    -Surgical excision was offered and the surgery along with associate risk on benefits, terms were discussed with patient  - Patient expressed understanding and would like to proceed with excision; will schedule    Lita Garcia MD  General Surgery

## 2024-12-02 NOTE — TELEPHONE ENCOUNTER
Called pt to relay Dr Helton's message to continue current pump settings. Pt voiced understanding.

## 2024-12-08 DIAGNOSIS — F41.8 MIXED ANXIETY DEPRESSIVE DISORDER: ICD-10-CM

## 2024-12-09 RX ORDER — ALPRAZOLAM 1 MG/1
1 TABLET ORAL 2 TIMES DAILY
Qty: 60 TABLET | Refills: 0 | Status: SHIPPED | OUTPATIENT
Start: 2024-12-09

## 2024-12-10 ENCOUNTER — TELEPHONE (OUTPATIENT)
Dept: ENDOCRINOLOGY | Facility: CLINIC | Age: 64
End: 2024-12-10
Payer: MEDICARE

## 2024-12-10 NOTE — TELEPHONE ENCOUNTER
"Pt called stating that she accidentally wasted insulin in omnipod and may run out before she can have refilled--\" I had a senior moment, I was putting the insulin in the wrong hole.\"  Per pharmacy too early to fill--earliest she can get is Sat. Has ~150 units left, \"which may last until Sat\" but will likely need before that.  Pt to call insurance and and explain what happened to see if they can make accomodation to fill early. Pt also notes that she has pens as back-up. Advised pt to call back after speaking with insurance if she is unable to get insulin before Sat  "

## 2024-12-11 DIAGNOSIS — E11.49 TYPE 2 DIABETES MELLITUS WITH OTHER DIABETIC NEUROLOGICAL COMPLICATION: ICD-10-CM

## 2024-12-11 DIAGNOSIS — I42.0 DILATED CARDIOMYOPATHY: ICD-10-CM

## 2024-12-11 DIAGNOSIS — E78.2 MIXED HYPERLIPIDEMIA: ICD-10-CM

## 2024-12-11 DIAGNOSIS — Z95.810 PRESENCE OF BIVENTRICULAR IMPLANTABLE CARDIOVERTER-DEFIBRILLATOR (ICD): ICD-10-CM

## 2024-12-11 DIAGNOSIS — I10 ESSENTIAL HYPERTENSION: ICD-10-CM

## 2024-12-11 RX ORDER — PANTOPRAZOLE SODIUM 40 MG/1
40 TABLET, DELAYED RELEASE ORAL DAILY
Qty: 90 TABLET | Refills: 3 | Status: SHIPPED | OUTPATIENT
Start: 2024-12-11

## 2024-12-12 RX ORDER — DILTIAZEM HYDROCHLORIDE 180 MG/1
180 CAPSULE, COATED, EXTENDED RELEASE ORAL DAILY
Qty: 90 CAPSULE | Refills: 1 | Status: SHIPPED | OUTPATIENT
Start: 2024-12-12

## 2024-12-12 RX ORDER — METOPROLOL SUCCINATE 100 MG/1
100 TABLET, EXTENDED RELEASE ORAL 2 TIMES DAILY
Qty: 180 TABLET | Refills: 1 | Status: SHIPPED | OUTPATIENT
Start: 2024-12-12

## 2024-12-20 DIAGNOSIS — E11.65 TYPE 2 DIABETES MELLITUS WITH HYPERGLYCEMIA, WITH LONG-TERM CURRENT USE OF INSULIN: Primary | ICD-10-CM

## 2024-12-20 DIAGNOSIS — Z79.4 TYPE 2 DIABETES MELLITUS WITH HYPERGLYCEMIA, WITH LONG-TERM CURRENT USE OF INSULIN: Primary | ICD-10-CM

## 2024-12-23 ENCOUNTER — TRANSCRIBE ORDERS (OUTPATIENT)
Dept: ADMINISTRATIVE | Facility: HOSPITAL | Age: 64
End: 2024-12-23
Payer: MEDICARE

## 2024-12-23 ENCOUNTER — LAB (OUTPATIENT)
Dept: LAB | Facility: HOSPITAL | Age: 64
End: 2024-12-23
Payer: MEDICARE

## 2024-12-23 DIAGNOSIS — N18.32 CHRONIC KIDNEY DISEASE (CKD) STAGE G3B/A1, MODERATELY DECREASED GLOMERULAR FILTRATION RATE (GFR) BETWEEN 30-44 ML/MIN/1.73 SQUARE METER AND ALBUMINURIA CREATININE RATIO LESS THAN 30 MG/G (CMS/H*: Primary | ICD-10-CM

## 2024-12-23 DIAGNOSIS — N18.32 CHRONIC KIDNEY DISEASE (CKD) STAGE G3B/A1, MODERATELY DECREASED GLOMERULAR FILTRATION RATE (GFR) BETWEEN 30-44 ML/MIN/1.73 SQUARE METER AND ALBUMINURIA CREATININE RATIO LESS THAN 30 MG/G (CMS/H*: ICD-10-CM

## 2024-12-23 LAB
ALBUMIN SERPL-MCNC: 4.6 G/DL (ref 3.5–5.2)
ALBUMIN/GLOB SERPL: 1.7 G/DL
ALP SERPL-CCNC: 67 U/L (ref 39–117)
ALT SERPL W P-5'-P-CCNC: 19 U/L (ref 1–33)
ANION GAP SERPL CALCULATED.3IONS-SCNC: 8.3 MMOL/L (ref 5–15)
AST SERPL-CCNC: 18 U/L (ref 1–32)
BILIRUB SERPL-MCNC: 0.3 MG/DL (ref 0–1.2)
BUN SERPL-MCNC: 26 MG/DL (ref 8–23)
BUN/CREAT SERPL: 17.8 (ref 7–25)
CALCIUM SPEC-SCNC: 10 MG/DL (ref 8.6–10.5)
CHLORIDE SERPL-SCNC: 107 MMOL/L (ref 98–107)
CO2 SERPL-SCNC: 28.7 MMOL/L (ref 22–29)
CREAT SERPL-MCNC: 1.46 MG/DL (ref 0.57–1)
EGFRCR SERPLBLD CKD-EPI 2021: 39.5 ML/MIN/1.73
GLOBULIN UR ELPH-MCNC: 2.7 GM/DL
GLUCOSE SERPL-MCNC: 102 MG/DL (ref 65–99)
POTASSIUM SERPL-SCNC: 6 MMOL/L (ref 3.5–5.2)
PROT SERPL-MCNC: 7.3 G/DL (ref 6–8.5)
SODIUM SERPL-SCNC: 144 MMOL/L (ref 136–145)

## 2024-12-23 PROCEDURE — 36415 COLL VENOUS BLD VENIPUNCTURE: CPT

## 2024-12-23 PROCEDURE — 80053 COMPREHEN METABOLIC PANEL: CPT

## 2024-12-23 RX ORDER — ACYCLOVIR 400 MG/1
1 TABLET ORAL
Qty: 9 EACH | Refills: 3 | Status: SHIPPED | OUTPATIENT
Start: 2024-12-23

## 2024-12-26 ENCOUNTER — LAB (OUTPATIENT)
Dept: LAB | Facility: HOSPITAL | Age: 64
End: 2024-12-26
Payer: MEDICARE

## 2024-12-26 DIAGNOSIS — N18.32 CHRONIC KIDNEY DISEASE (CKD) STAGE G3B/A1, MODERATELY DECREASED GLOMERULAR FILTRATION RATE (GFR) BETWEEN 30-44 ML/MIN/1.73 SQUARE METER AND ALBUMINURIA CREATININE RATIO LESS THAN 30 MG/G (CMS/H*: ICD-10-CM

## 2024-12-26 LAB
ALBUMIN SERPL-MCNC: 4.7 G/DL (ref 3.5–5.2)
ALBUMIN/GLOB SERPL: 1.6 G/DL
ALP SERPL-CCNC: 75 U/L (ref 39–117)
ALT SERPL W P-5'-P-CCNC: 24 U/L (ref 1–33)
ANION GAP SERPL CALCULATED.3IONS-SCNC: 11.4 MMOL/L (ref 5–15)
AST SERPL-CCNC: 19 U/L (ref 1–32)
BILIRUB SERPL-MCNC: 0.4 MG/DL (ref 0–1.2)
BUN SERPL-MCNC: 28 MG/DL (ref 8–23)
BUN/CREAT SERPL: 17.7 (ref 7–25)
CALCIUM SPEC-SCNC: 10 MG/DL (ref 8.6–10.5)
CHLORIDE SERPL-SCNC: 104 MMOL/L (ref 98–107)
CO2 SERPL-SCNC: 27.6 MMOL/L (ref 22–29)
CREAT SERPL-MCNC: 1.58 MG/DL (ref 0.57–1)
EGFRCR SERPLBLD CKD-EPI 2021: 36 ML/MIN/1.73
GLOBULIN UR ELPH-MCNC: 2.9 GM/DL
GLUCOSE SERPL-MCNC: 197 MG/DL (ref 65–99)
POTASSIUM SERPL-SCNC: 5.2 MMOL/L (ref 3.5–5.2)
PROT SERPL-MCNC: 7.6 G/DL (ref 6–8.5)
SODIUM SERPL-SCNC: 143 MMOL/L (ref 136–145)

## 2024-12-26 PROCEDURE — 80053 COMPREHEN METABOLIC PANEL: CPT

## 2024-12-26 PROCEDURE — 36415 COLL VENOUS BLD VENIPUNCTURE: CPT

## 2024-12-29 DIAGNOSIS — R11.0 NAUSEA: ICD-10-CM

## 2024-12-30 RX ORDER — ONDANSETRON 4 MG/1
4 TABLET, FILM COATED ORAL EVERY 8 HOURS PRN
Qty: 20 TABLET | Refills: 0 | Status: SHIPPED | OUTPATIENT
Start: 2024-12-30

## 2025-01-07 ENCOUNTER — TELEPHONE (OUTPATIENT)
Dept: ENDOCRINOLOGY | Facility: CLINIC | Age: 65
End: 2025-01-07
Payer: MEDICARE

## 2025-01-07 ENCOUNTER — LAB (OUTPATIENT)
Dept: LAB | Facility: HOSPITAL | Age: 65
End: 2025-01-07
Payer: MEDICARE

## 2025-01-07 DIAGNOSIS — N18.32 CHRONIC KIDNEY DISEASE (CKD) STAGE G3B/A1, MODERATELY DECREASED GLOMERULAR FILTRATION RATE (GFR) BETWEEN 30-44 ML/MIN/1.73 SQUARE METER AND ALBUMINURIA CREATININE RATIO LESS THAN 30 MG/G (CMS/H*: ICD-10-CM

## 2025-01-07 LAB
ALBUMIN SERPL-MCNC: 4.6 G/DL (ref 3.5–5.2)
ALBUMIN/GLOB SERPL: 1.5 G/DL
ALP SERPL-CCNC: 76 U/L (ref 39–117)
ALT SERPL W P-5'-P-CCNC: 17 U/L (ref 1–33)
ANION GAP SERPL CALCULATED.3IONS-SCNC: 10.5 MMOL/L (ref 5–15)
AST SERPL-CCNC: 16 U/L (ref 1–32)
BILIRUB SERPL-MCNC: 0.3 MG/DL (ref 0–1.2)
BUN SERPL-MCNC: 36 MG/DL (ref 8–23)
BUN/CREAT SERPL: 19.3 (ref 7–25)
CALCIUM SPEC-SCNC: 9.8 MG/DL (ref 8.6–10.5)
CHLORIDE SERPL-SCNC: 103 MMOL/L (ref 98–107)
CO2 SERPL-SCNC: 25.5 MMOL/L (ref 22–29)
CREAT SERPL-MCNC: 1.87 MG/DL (ref 0.57–1)
EGFRCR SERPLBLD CKD-EPI 2021: 29.4 ML/MIN/1.73
GLOBULIN UR ELPH-MCNC: 3.1 GM/DL
GLUCOSE SERPL-MCNC: 105 MG/DL (ref 65–99)
POTASSIUM SERPL-SCNC: 4.8 MMOL/L (ref 3.5–5.2)
PROT SERPL-MCNC: 7.7 G/DL (ref 6–8.5)
SODIUM SERPL-SCNC: 139 MMOL/L (ref 136–145)

## 2025-01-07 PROCEDURE — 80053 COMPREHEN METABOLIC PANEL: CPT

## 2025-01-07 PROCEDURE — 36415 COLL VENOUS BLD VENIPUNCTURE: CPT

## 2025-01-07 NOTE — TELEPHONE ENCOUNTER
Returned pt's call re: receiving Omnipod pods that are only compatible with Dexcom G6 from [facility] pharmacy. Pt wears Dexcom G7 so when she placed new pod she received error message. Unable to connect with pt, but LVM--Advised pt to call Omnipod customer care line to have replacement sent and to notify her pharmacy that she needs her Omnipod to state on the box that it is compatible with Dexcom G6 and G7. Pt to call back with further questions.

## 2025-01-21 ENCOUNTER — HOSPITAL ENCOUNTER (OUTPATIENT)
Dept: CARDIOLOGY | Facility: HOSPITAL | Age: 65
Discharge: HOME | End: 2025-01-21
Payer: MEDICARE

## 2025-01-21 ENCOUNTER — LAB (OUTPATIENT)
Dept: LAB | Facility: HOSPITAL | Age: 65
End: 2025-01-21
Payer: MEDICARE

## 2025-01-21 LAB
ANION GAP SERPL CALCULATED.3IONS-SCNC: 13.7 MMOL/L (ref 5–15)
BASOPHILS # BLD AUTO: 0.07 10*3/MM3 (ref 0–0.2)
BASOPHILS NFR BLD AUTO: 1 % (ref 0–1.5)
BUN SERPL-MCNC: 32 MG/DL (ref 8–23)
BUN/CREAT SERPL: 20.6 (ref 7–25)
CALCIUM SPEC-SCNC: 9.9 MG/DL (ref 8.6–10.5)
CHLORIDE SERPL-SCNC: 104 MMOL/L (ref 98–107)
CO2 SERPL-SCNC: 26.3 MMOL/L (ref 22–29)
CREAT SERPL-MCNC: 1.55 MG/DL (ref 0.57–1)
DEPRECATED RDW RBC AUTO: 53.1 FL (ref 37–54)
EGFRCR SERPLBLD CKD-EPI 2021: 36.8 ML/MIN/1.73
EOSINOPHIL # BLD AUTO: 0.15 10*3/MM3 (ref 0–0.4)
EOSINOPHIL NFR BLD AUTO: 2.2 % (ref 0.3–6.2)
ERYTHROCYTE [DISTWIDTH] IN BLOOD BY AUTOMATED COUNT: 17.2 % (ref 12.3–15.4)
GLUCOSE SERPL-MCNC: 120 MG/DL (ref 65–99)
HBA1C MFR BLD: 6.91 % (ref 4.8–5.6)
HCT VFR BLD AUTO: 40.5 % (ref 34–46.6)
HGB BLD-MCNC: 11.6 G/DL (ref 12–15.9)
IMM GRANULOCYTES # BLD AUTO: 0.04 10*3/MM3 (ref 0–0.05)
IMM GRANULOCYTES NFR BLD AUTO: 0.6 % (ref 0–0.5)
LYMPHOCYTES # BLD AUTO: 1.5 10*3/MM3 (ref 0.7–3.1)
LYMPHOCYTES NFR BLD AUTO: 22.1 % (ref 19.6–45.3)
MCH RBC QN AUTO: 23.9 PG (ref 26.6–33)
MCHC RBC AUTO-ENTMCNC: 28.6 G/DL (ref 31.5–35.7)
MCV RBC AUTO: 83.3 FL (ref 79–97)
MONOCYTES # BLD AUTO: 0.83 10*3/MM3 (ref 0.1–0.9)
MONOCYTES NFR BLD AUTO: 12.2 % (ref 5–12)
NEUTROPHILS NFR BLD AUTO: 4.2 10*3/MM3 (ref 1.7–7)
NEUTROPHILS NFR BLD AUTO: 61.9 % (ref 42.7–76)
NRBC BLD AUTO-RTO: 0 /100 WBC (ref 0–0.2)
PLATELET # BLD AUTO: 305 10*3/MM3 (ref 140–450)
PMV BLD AUTO: 10.1 FL (ref 6–12)
POTASSIUM SERPL-SCNC: 4.8 MMOL/L (ref 3.5–5.2)
QT INTERVAL: 441 MS
QTC INTERVAL: 471 MS
RBC # BLD AUTO: 4.86 10*6/MM3 (ref 3.77–5.28)
SODIUM SERPL-SCNC: 144 MMOL/L (ref 136–145)
WBC NRBC COR # BLD AUTO: 6.79 10*3/MM3 (ref 3.4–10.8)

## 2025-01-21 PROCEDURE — 85025 COMPLETE CBC W/AUTO DIFF WBC: CPT | Performed by: SURGERY

## 2025-01-21 PROCEDURE — 83036 HEMOGLOBIN GLYCOSYLATED A1C: CPT

## 2025-01-21 PROCEDURE — 36415 COLL VENOUS BLD VENIPUNCTURE: CPT | Performed by: SURGERY

## 2025-01-21 PROCEDURE — 93005 ELECTROCARDIOGRAM TRACING: CPT | Performed by: SURGERY

## 2025-01-21 PROCEDURE — 80048 BASIC METABOLIC PNL TOTAL CA: CPT

## 2025-01-23 ENCOUNTER — ANESTHESIA EVENT (OUTPATIENT)
Dept: PERIOP | Facility: HOSPITAL | Age: 65
End: 2025-01-23
Payer: MEDICARE

## 2025-01-24 ENCOUNTER — ANESTHESIA (OUTPATIENT)
Dept: PERIOP | Facility: HOSPITAL | Age: 65
End: 2025-01-24
Payer: MEDICARE

## 2025-01-24 ENCOUNTER — HOSPITAL ENCOUNTER (OUTPATIENT)
Facility: HOSPITAL | Age: 65
Setting detail: HOSPITAL OUTPATIENT SURGERY
Discharge: HOME | End: 2025-01-24
Attending: SURGERY | Admitting: SURGERY
Payer: MEDICARE

## 2025-01-24 VITALS
WEIGHT: 186.6 LBS | TEMPERATURE: 97.5 F | RESPIRATION RATE: 14 BRPM | BODY MASS INDEX: 31.86 KG/M2 | DIASTOLIC BLOOD PRESSURE: 85 MMHG | OXYGEN SATURATION: 97 % | HEIGHT: 64 IN | HEART RATE: 84 BPM | SYSTOLIC BLOOD PRESSURE: 157 MMHG

## 2025-01-24 DIAGNOSIS — D17.0 LIPOMA OF NECK: ICD-10-CM

## 2025-01-24 LAB
GLUCOSE BLDC GLUCOMTR-MCNC: 107 MG/DL (ref 70–105)
GLUCOSE BLDC GLUCOMTR-MCNC: 113 MG/DL (ref 70–105)
MRSA DNA SPEC QL NAA+PROBE: NORMAL

## 2025-01-24 PROCEDURE — 25010000002 ONDANSETRON PER 1 MG: Performed by: NURSE ANESTHETIST, CERTIFIED REGISTERED

## 2025-01-24 PROCEDURE — 25010000002 PROPOFOL 10 MG/ML EMULSION: Performed by: NURSE ANESTHETIST, CERTIFIED REGISTERED

## 2025-01-24 PROCEDURE — 87641 MR-STAPH DNA AMP PROBE: CPT | Performed by: SURGERY

## 2025-01-24 PROCEDURE — 21555 EXC NECK LES SC < 3 CM: CPT | Performed by: SURGERY

## 2025-01-24 PROCEDURE — 82948 REAGENT STRIP/BLOOD GLUCOSE: CPT

## 2025-01-24 PROCEDURE — 25010000002 HYDRALAZINE PER 20 MG: Performed by: NURSE ANESTHETIST, CERTIFIED REGISTERED

## 2025-01-24 PROCEDURE — 25010000002 LIDOCAINE PF 1% 1 % SOLUTION: Performed by: NURSE ANESTHETIST, CERTIFIED REGISTERED

## 2025-01-24 PROCEDURE — 25010000002 SUCCINYLCHOLINE PER 20 MG: Performed by: NURSE ANESTHETIST, CERTIFIED REGISTERED

## 2025-01-24 PROCEDURE — S0260 H&P FOR SURGERY: HCPCS | Performed by: SURGERY

## 2025-01-24 PROCEDURE — 25810000003 SODIUM CHLORIDE 0.9 % SOLUTION: Performed by: SURGERY

## 2025-01-24 PROCEDURE — 88304 TISSUE EXAM BY PATHOLOGIST: CPT | Performed by: SURGERY

## 2025-01-24 PROCEDURE — 25010000002 LIDOCAINE 1% - EPINEPHRINE 1:100000 1 %-1:100000 SOLUTION: Performed by: SURGERY

## 2025-01-24 PROCEDURE — 25010000002 FENTANYL CITRATE (PF) 100 MCG/2ML SOLUTION: Performed by: NURSE ANESTHETIST, CERTIFIED REGISTERED

## 2025-01-24 RX ORDER — EPHEDRINE SULFATE 5 MG/ML
5 INJECTION INTRAVENOUS ONCE AS NEEDED
Status: DISCONTINUED | OUTPATIENT
Start: 2025-01-24 | End: 2025-01-24 | Stop reason: HOSPADM

## 2025-01-24 RX ORDER — LIDOCAINE HYDROCHLORIDE 10 MG/ML
INJECTION, SOLUTION EPIDURAL; INFILTRATION; INTRACAUDAL; PERINEURAL AS NEEDED
Status: DISCONTINUED | OUTPATIENT
Start: 2025-01-24 | End: 2025-01-24 | Stop reason: SURG

## 2025-01-24 RX ORDER — HYDRALAZINE HYDROCHLORIDE 20 MG/ML
INJECTION INTRAMUSCULAR; INTRAVENOUS AS NEEDED
Status: DISCONTINUED | OUTPATIENT
Start: 2025-01-24 | End: 2025-01-24 | Stop reason: SURG

## 2025-01-24 RX ORDER — OXYCODONE HYDROCHLORIDE 5 MG/1
5 TABLET ORAL ONCE AS NEEDED
Status: DISCONTINUED | OUTPATIENT
Start: 2025-01-24 | End: 2025-01-24 | Stop reason: HOSPADM

## 2025-01-24 RX ORDER — DIPHENHYDRAMINE HYDROCHLORIDE 50 MG/ML
12.5 INJECTION INTRAMUSCULAR; INTRAVENOUS ONCE AS NEEDED
Status: DISCONTINUED | OUTPATIENT
Start: 2025-01-24 | End: 2025-01-24 | Stop reason: HOSPADM

## 2025-01-24 RX ORDER — NALOXONE HCL 0.4 MG/ML
0.4 VIAL (ML) INJECTION AS NEEDED
Status: DISCONTINUED | OUTPATIENT
Start: 2025-01-24 | End: 2025-01-24 | Stop reason: HOSPADM

## 2025-01-24 RX ORDER — LABETALOL HYDROCHLORIDE 5 MG/ML
5 INJECTION, SOLUTION INTRAVENOUS
Status: DISCONTINUED | OUTPATIENT
Start: 2025-01-24 | End: 2025-01-24 | Stop reason: HOSPADM

## 2025-01-24 RX ORDER — SODIUM CHLORIDE 0.9 % (FLUSH) 0.9 %
3-10 SYRINGE (ML) INJECTION AS NEEDED
Status: DISCONTINUED | OUTPATIENT
Start: 2025-01-24 | End: 2025-01-24 | Stop reason: HOSPADM

## 2025-01-24 RX ORDER — SODIUM CHLORIDE 9 MG/ML
100 INJECTION, SOLUTION INTRAVENOUS CONTINUOUS
Status: DISCONTINUED | OUTPATIENT
Start: 2025-01-24 | End: 2025-01-24 | Stop reason: HOSPADM

## 2025-01-24 RX ORDER — SODIUM CHLORIDE 9 MG/ML
40 INJECTION, SOLUTION INTRAVENOUS AS NEEDED
Status: DISCONTINUED | OUTPATIENT
Start: 2025-01-24 | End: 2025-01-24 | Stop reason: HOSPADM

## 2025-01-24 RX ORDER — PROPOFOL 10 MG/ML
VIAL (ML) INTRAVENOUS AS NEEDED
Status: DISCONTINUED | OUTPATIENT
Start: 2025-01-24 | End: 2025-01-24 | Stop reason: SURG

## 2025-01-24 RX ORDER — ONDANSETRON 2 MG/ML
INJECTION INTRAMUSCULAR; INTRAVENOUS AS NEEDED
Status: DISCONTINUED | OUTPATIENT
Start: 2025-01-24 | End: 2025-01-24 | Stop reason: SURG

## 2025-01-24 RX ORDER — FENTANYL CITRATE 50 UG/ML
50 INJECTION, SOLUTION INTRAMUSCULAR; INTRAVENOUS
Status: DISCONTINUED | OUTPATIENT
Start: 2025-01-24 | End: 2025-01-24 | Stop reason: HOSPADM

## 2025-01-24 RX ORDER — ROCURONIUM BROMIDE 10 MG/ML
INJECTION, SOLUTION INTRAVENOUS AS NEEDED
Status: DISCONTINUED | OUTPATIENT
Start: 2025-01-24 | End: 2025-01-24 | Stop reason: SURG

## 2025-01-24 RX ORDER — HYDRALAZINE HYDROCHLORIDE 20 MG/ML
5 INJECTION INTRAMUSCULAR; INTRAVENOUS
Status: DISCONTINUED | OUTPATIENT
Start: 2025-01-24 | End: 2025-01-24 | Stop reason: HOSPADM

## 2025-01-24 RX ORDER — IPRATROPIUM BROMIDE AND ALBUTEROL SULFATE 2.5; .5 MG/3ML; MG/3ML
3 SOLUTION RESPIRATORY (INHALATION) ONCE AS NEEDED
Status: DISCONTINUED | OUTPATIENT
Start: 2025-01-24 | End: 2025-01-24 | Stop reason: HOSPADM

## 2025-01-24 RX ORDER — SUCCINYLCHOLINE CHLORIDE 20 MG/ML
INJECTION INTRAMUSCULAR; INTRAVENOUS AS NEEDED
Status: DISCONTINUED | OUTPATIENT
Start: 2025-01-24 | End: 2025-01-24 | Stop reason: SURG

## 2025-01-24 RX ORDER — FENTANYL CITRATE 50 UG/ML
INJECTION, SOLUTION INTRAMUSCULAR; INTRAVENOUS AS NEEDED
Status: DISCONTINUED | OUTPATIENT
Start: 2025-01-24 | End: 2025-01-24 | Stop reason: SURG

## 2025-01-24 RX ORDER — DIPHENHYDRAMINE HYDROCHLORIDE 50 MG/ML
12.5 INJECTION INTRAMUSCULAR; INTRAVENOUS
Status: DISCONTINUED | OUTPATIENT
Start: 2025-01-24 | End: 2025-01-24 | Stop reason: HOSPADM

## 2025-01-24 RX ORDER — ONDANSETRON 2 MG/ML
4 INJECTION INTRAMUSCULAR; INTRAVENOUS ONCE AS NEEDED
Status: DISCONTINUED | OUTPATIENT
Start: 2025-01-24 | End: 2025-01-24 | Stop reason: HOSPADM

## 2025-01-24 RX ORDER — OXYCODONE HYDROCHLORIDE 5 MG/1
10 TABLET ORAL EVERY 4 HOURS PRN
Status: DISCONTINUED | OUTPATIENT
Start: 2025-01-24 | End: 2025-01-24 | Stop reason: HOSPADM

## 2025-01-24 RX ORDER — SODIUM CHLORIDE 0.9 % (FLUSH) 0.9 %
3 SYRINGE (ML) INJECTION EVERY 12 HOURS SCHEDULED
Status: DISCONTINUED | OUTPATIENT
Start: 2025-01-24 | End: 2025-01-24 | Stop reason: HOSPADM

## 2025-01-24 RX ORDER — LIDOCAINE HYDROCHLORIDE AND EPINEPHRINE 10; 10 MG/ML; UG/ML
INJECTION, SOLUTION INFILTRATION; PERINEURAL AS NEEDED
Status: DISCONTINUED | OUTPATIENT
Start: 2025-01-24 | End: 2025-01-24 | Stop reason: HOSPADM

## 2025-01-24 RX ADMIN — LIDOCAINE HYDROCHLORIDE 50 MG: 10 INJECTION, SOLUTION EPIDURAL; INFILTRATION; INTRACAUDAL; PERINEURAL at 07:31

## 2025-01-24 RX ADMIN — PROPOFOL 125 MCG/KG/MIN: 10 INJECTION, EMULSION INTRAVENOUS at 07:35

## 2025-01-24 RX ADMIN — SUCCINYLCHOLINE CHLORIDE 40 MG: 20 INJECTION, SOLUTION INTRAMUSCULAR; INTRAVENOUS at 07:31

## 2025-01-24 RX ADMIN — FENTANYL CITRATE 50 MCG: 50 INJECTION, SOLUTION INTRAMUSCULAR; INTRAVENOUS at 07:31

## 2025-01-24 RX ADMIN — ONDANSETRON 4 MG: 2 INJECTION INTRAMUSCULAR; INTRAVENOUS at 07:44

## 2025-01-24 RX ADMIN — HYDRALAZINE HYDROCHLORIDE 10 MG: 20 INJECTION INTRAMUSCULAR; INTRAVENOUS at 07:55

## 2025-01-24 RX ADMIN — ROCURONIUM BROMIDE 5 MG: 10 INJECTION, SOLUTION INTRAVENOUS at 07:31

## 2025-01-24 RX ADMIN — PROPOFOL 200 MG: 10 INJECTION, EMULSION INTRAVENOUS at 07:31

## 2025-01-24 RX ADMIN — SODIUM CHLORIDE 100 ML/HR: 9 INJECTION, SOLUTION INTRAVENOUS at 06:30

## 2025-01-24 NOTE — OP NOTE
Operative Report    Patient Name:  Susy Hanson  YOB: 1960    Date of Surgery:  1/24/2025    Pre-op Diagnosis:   Lipoma of neck [D17.0]       Post-op Diagnosis:   Lipoma of neck [D17.0]       Procedure(s):  Neck lipoma excision    Staff:  Surgeon(s):  Lita Garcia MD    Circulator: Kath Sanez RN; Emmett Gongora RN  Scrub Person: Sherrell Harkins    Anesthesia: Choice    Estimated Blood Loss: minimal    Implants:    Nothing was implanted during the procedure    Specimen:          Specimens       ID Source Type Tests Collected By Collected At Frozen?    A Neck Tissue TISSUE PATHOLOGY EXAM   Lita Garcia MD 1/24/25 [id number]     Description: lipoma          Findings: 2 cm x 2 cm lipoma of the left posterior neck down to muscle.  Poorly circumscribed.     Complications: None immediate    CLINICAL INDICATIONS:  The patient is a 64 year old female with a mass consistent with a lipoma in the posterior neck.  The patient was interested in having the mass excised.   The surgery along with the risks, benefits, and alternatives to surgery were discussed.  The patient verbalized understanding and wished to proceed with surgery.  Informed consent was obtained.    DESCRIPTION OF PROCEDURE:  The patient was brought to the operating room and placed in the supine position with both arms out.  She was induced and her airway was secured by the anesthesia team.  The patient was then repositioned in the right lateral decubitus position with an axillary roll in place and pressure points padded.  The patient's posterior neck was then prepped and draped in the usual sterile fashion.  A time out was performed.    We began infiltrating the skin and subcutaneous tissues overlying the mass with local anesthetic.  A small transverse incision was made overlying the mass.  The subcutaneous tissues were dissected with electrocautery.  The mass which appeared to be a lipoma was then encountered and dissected free from the  surrounding subcutaneous fat with a combination blunt dissection and electrocautery.  Was not well-circumscribed at the superior and deep aspect and tracked down to muscle.  The pocket from which the mass was removed was palpated and probed to ensure the mass was removed completely.  The incision was then reapproximated with 3-0 vicryl in an interrupted fashion and the skin was then closed with 4-0 vicryl in a subcuticular fashion.  The incision was cleaned and skin glue was applied.    The patient tolerated the procedure well.  She was awakened by anesthesia and transferred to the recovery room in stable condition.  At the completion of the case, all instrument, needle, and sponge counts were correct.      Lita Garcia MD     Date: 1/24/2025  Time: 08:43 EST    This note was created using Dragon Voice Recognition software.

## 2025-01-24 NOTE — ANESTHESIA PREPROCEDURE EVALUATION
Anesthesia Evaluation     Patient summary reviewed and Nursing notes reviewed   history of anesthetic complications:  PONV  NPO Solid Status: > 8 hours  NPO Liquid Status: > 8 hours           Airway   Mallampati: II  Dental      Pulmonary    (+) COPD,shortness of breath, sleep apnea on CPAP  Cardiovascular     ECG reviewed    (+) hypertension, CAD, dysrhythmias, CHF , pericardial effusion, hyperlipidemia      Neuro/Psych  (+) numbness  GI/Hepatic/Renal/Endo    (+) obesity, GERD, renal disease- CRI, diabetes mellitus    Musculoskeletal     Abdominal    Substance History      OB/GYN          Other      history of cancer remission    ROS/Med Hx Other: TIVA rec for hx PONV    Left ventricular systolic function is normal. Left ventricular ejection fraction appears to be 56 - 60%.              Anesthesia Plan    ASA 3     general   total IV anesthesia  intravenous induction     Anesthetic plan, risks, benefits, and alternatives have been provided, discussed and informed consent has been obtained with: patient.    Plan discussed with CRNA.    CODE STATUS:

## 2025-01-24 NOTE — DISCHARGE INSTRUCTIONS
After 24 hours May shower soap and water over incision  Allow skin glue to fall off over the next 1 to 2 weeks  May use ice to the area for comfort  Over-the-counter Tylenol and ibuprofen as needed for pain control

## 2025-01-24 NOTE — ANESTHESIA POSTPROCEDURE EVALUATION
Patient: Susy Hanson    Procedure Summary       Date: 01/24/25 Room / Location: [facility] OR 09 / [facility] MAIN OR    Anesthesia Start: 0726 Anesthesia Stop: 0827    Procedure: Neck lipoma excision Diagnosis:       Lipoma of neck      (Lipoma of neck [D17.0])    Surgeons: Lita Garcia MD Provider: Obinna Padilla MD    Anesthesia Type: general ASA Status: 3            Anesthesia Type: general    Vitals  Vitals Value Taken Time   /70 01/24/25 0911   Temp 97.5 °F (36.4 °C) 01/24/25 0909   Pulse 84 01/24/25 0912   Resp 15 01/24/25 0909   SpO2 96 % 01/24/25 0912   Vitals shown include unfiled device data.        Post Anesthesia Care and Evaluation    Patient location during evaluation: PACU  Patient participation: complete - patient participated  Level of consciousness: awake  Pain scale: See nurse's notes for pain score.  Pain management: adequate    Airway patency: patent  Anesthetic complications: No anesthetic complications  PONV Status: none  Cardiovascular status: acceptable  Respiratory status: acceptable and spontaneous ventilation  Hydration status: acceptable    Comments: Patient seen and examined postoperatively; vital signs stable; SpO2 greater than or equal to 90%; cardiopulmonary status stable; nausea/vomiting adequately controlled; pain adequately controlled; no apparent anesthesia complications; patient discharged from anesthesia care when discharge criteria were met

## 2025-01-24 NOTE — H&P
General Surgery History and Physical      Referring Provider: Lita Garcia MD    Chief Complaint:    Neck mass/lipoma    History of Present Illness:    Susy Hanson is a 64 y.o. female who presents for evaluation of a posterior neck mass.  She reports it was first noticed approximately 1 month ago.  She notes it is increasing in size and does have pain in the neck but is under sure if it is secondary to the mass.  She is interested in excision as she is unsure if the neck pain is secondary to this and because it is increasing in size.    Past Medical History:   Past Medical History:   Diagnosis Date    Allergic 2008    Weeds, grasses, mold, mildew, etc.    Anxiety 2000    Breast cancer     LEFT BREAST 7/2021    Bursitis of hip 2024    Cataract     Had surgery to remove August 2023    CHF (congestive heart failure)     Colon polyp 12/2023    Removed during colonoscopy    COPD (chronic obstructive pulmonary disease) 2024    Coronary artery disease 2019    CTS (carpal tunnel syndrome) 2015    Surgery on both hands    Depression     DM (diabetes mellitus)     GERD (gastroesophageal reflux disease)     History of medical problems 2008    Bladder prolapse repeat Dr. Roper    HL (hearing loss)     Hyperlipidemia     Hypertension     SAW CARDIO/ STRESS TEST 2020 - NOW MEDS MANAGED BY PCP     Irritable bowel syndrome 2006    Peripheral neuropathy     PONV (postoperative nausea and vomiting)     Renal insufficiency 2020    See Dr. funes for moderate kidney failure    Sleep apnea     WEARS CPAP    Sleep apnea, obstructive     Type 2 diabetes mellitus 2008    Urinary tract infection 6/23    UTI seen at [facility]      Past Surgical History:    Past Surgical History:   Procedure Laterality Date    BLADDER REPAIR      BREAST AUGMENTATION      BREAST BIOPSY      BREAST RECONSTRUCTION Bilateral 08/26/2021    Procedure: BILATERAL PREPECTORALPLACMENT TISSUE EXPANDERS AND ALLODERM;  Surgeon: Heri Arreaga MD;   Location: [facility] MAIN OR;  Service: Plastics;  Laterality: Bilateral;    BREAST TISSUE EXPANDER REMOVAL INSERTION OF IMPLANT Bilateral 12/23/2021    Procedure: BILATERAL REMOVAL TISSUE EXPANDERS AND PLACEMENT OF IMPLANTS;  Surgeon: Heir Arreaga MD;  Location: [facility] MAIN OR;  Service: Plastics;  Laterality: Bilateral;    CARDIAC CATHETERIZATION Left 04/13/2022    Procedure: Cardiac Catheterization/Vascular Study;  Surgeon: Christo Murphy MD;  Location:  [facility]AS[facility];  Service: Cardiovascular;  Laterality: Left;    CARDIAC ELECTROPHYSIOLOGY PROCEDURE N/A 09/19/2022    Procedure: ICD implant St. Sude aware;  Surgeon: Esteban Kendrick MD;  Location:  [facility];  Service: Cardiology;  Laterality: N/A;    CARDIAC ELECTROPHYSIOLOGY PROCEDURE N/A 09/20/2022    Procedure: lead repositioning;  Surgeon: Esteban Kendrick MD;  Location:  [facility];  Service: Cardiology;  Laterality: N/A;    CARDIAC SURGERY  2022    Defibrillator/pacemaker implant    CARDIOVASCULAR STRESS TEST  2020    COLONOSCOPY  11/2012    EYE SURGERY  7/23    Cataracts removed    HAND SURGERY  2015    Both hands    HYSTERECTOMY      INSERT / REPLACE / REMOVE PACEMAKER  2022    LYMPH NODE BIOPSY  8/29/2021    MASTECTOMY W/ SENTINEL NODE BIOPSY Bilateral 08/26/2021    Procedure: bilateral total mastectomy, left axillary sentinel lymph node biopsy, possible reconstruction.;  Surgeon: Asia Perkins MD;  Location: [facility] OR;  Service: General;  Laterality: Bilateral;    TRIGGER POINT INJECTION  2012    Left thumb    TUBAL ABDOMINAL LIGATION  1981     Family History:    Family History   Problem Relation Age of Onset    Diabetes Mother     Heart disease Mother     Sleep apnea Mother     Snoring Mother     COPD Mother     Hyperlipidemia Mother     Alcohol abuse Mother     Anxiety disorder Mother     Depression Mother     Hypertension Mother     Alzheimer's disease Father     Diabetes  Father     Heart disease Father     Sleep apnea Father     Snoring Father     Hyperlipidemia Father     Heart failure Father     Kidney disease Father     Hypertension Father     Obesity Father     Diabetes Sister     Heart disease Sister     Sleep apnea Sister     Snoring Sister     COPD Sister     Anxiety disorder Sister     Depression Sister     Hypertension Sister     Hyperlipidemia Sister     Clotting disorder Sister     Hyperlipidemia Brother     Hypertension Brother     Heart disease Brother     Breast cancer Maternal Aunt 80    Breast cancer Paternal Aunt     Diabetes Maternal Grandmother     Breast cancer Paternal Grandmother 60    Cancer Paternal Grandmother     Diabetes Paternal Grandmother     Hyperlipidemia Son     Diabetes Brother     Hypertension Brother     Malig Hyperthermia Neg Hx      Social History:    Social History     Socioeconomic History    Marital status:    Tobacco Use    Smoking status: Former     Current packs/day: 0.00     Average packs/day: 2.0 packs/day for 36.0 years (72.0 ttl pk-yrs)     Types: Cigarettes     Start date: 1972     Quit date: 2008     Years since quittin.0     Passive exposure: Past    Smokeless tobacco: Never   Vaping Use    Vaping status: Never Used   Substance and Sexual Activity    Alcohol use: No    Drug use: No    Sexual activity: Yes     Partners: Male     Birth control/protection: None, Tubal ligation, Hysterectomy     Allergies:   Allergies   Allergen Reactions    Hydrocodone-Acetaminophen Hives     Lortab, takes tylenol     Medications:     Current Facility-Administered Medications:     sodium chloride 0.9 % flush 3 mL, 3 mL, Intravenous, Q12H, Lita Garcia MD    sodium chloride 0.9 % flush 3-10 mL, 3-10 mL, Intravenous, PRN, Lita Garcia MD    sodium chloride 0.9 % infusion 40 mL, 40 mL, Intravenous, PRN, Lita Garcia MD    sodium chloride 0.9 % infusion, 100 mL/hr, Intravenous, Continuous, Lita Garcia MD, Last Rate: 100 mL/hr at  01/24/25 0630, 100 mL/hr at 01/24/25 0630    Radiology/Endoscopy:    Ultrasound Neck 11/16/24 - [facility]  Superficial soft tissue mass at base of skull, possbile lipoma.  2.4x0.7x2.3cm.    Labs:    Recent labs reviewed    Review of Systems:   As noted above in HPI    Physical Exam:   No acute distress, alert  Nonlabored respirations  Posterior neck soft tissue mass, mobile, approximately 3 cm x 3 cm, no overlying skin changes; most consistent with lipoma  Extremities warm and well-perfused with no gross deformities    Assessment and Plan:  Susy Hanson is a 64 y.o. female with posterior neck lipoma.    -Surgical excision was offered and the surgery along with associate risk on benefits, terms were discussed with patient  - Patient expressed understanding and would like to proceed with excision; will schedule    Lita Garcia MD  General Surgery

## 2025-01-27 LAB
LAB AP CASE REPORT: NORMAL
PATH REPORT.FINAL DX SPEC: NORMAL
PATH REPORT.GROSS SPEC: NORMAL

## 2025-01-28 ENCOUNTER — TELEPHONE (OUTPATIENT)
Dept: SURGERY | Facility: CLINIC | Age: 65
End: 2025-01-28
Payer: MEDICARE

## 2025-01-28 LAB
QT INTERVAL: 441 MS
QTC INTERVAL: 471 MS

## 2025-01-28 NOTE — TELEPHONE ENCOUNTER
Call placed to patient to follow up after surgery, reports doing better. Discussed follow up appointment and encouraged to call with any questions. Patient expressed understanding of all discussed.

## 2025-02-03 DIAGNOSIS — F41.8 MIXED ANXIETY DEPRESSIVE DISORDER: ICD-10-CM

## 2025-02-03 RX ORDER — ALPRAZOLAM 1 MG/1
1 TABLET ORAL 2 TIMES DAILY
Qty: 60 TABLET | Refills: 0 | Status: SHIPPED | OUTPATIENT
Start: 2025-02-03

## 2025-02-06 ENCOUNTER — OFFICE VISIT (OUTPATIENT)
Dept: ENDOCRINOLOGY | Facility: CLINIC | Age: 65
End: 2025-02-06
Payer: MEDICARE

## 2025-02-06 VITALS
WEIGHT: 188 LBS | BODY MASS INDEX: 32.1 KG/M2 | HEIGHT: 64 IN | OXYGEN SATURATION: 100 % | SYSTOLIC BLOOD PRESSURE: 178 MMHG | DIASTOLIC BLOOD PRESSURE: 98 MMHG | HEART RATE: 82 BPM

## 2025-02-06 DIAGNOSIS — E11.49 TYPE 2 DIABETES MELLITUS WITH OTHER DIABETIC NEUROLOGICAL COMPLICATION: ICD-10-CM

## 2025-02-06 DIAGNOSIS — E66.9 OBESITY (BMI 30-39.9): ICD-10-CM

## 2025-02-06 DIAGNOSIS — E11.42 DIABETIC PERIPHERAL NEUROPATHY: ICD-10-CM

## 2025-02-06 DIAGNOSIS — E11.65 TYPE 2 DIABETES MELLITUS WITH HYPERGLYCEMIA, WITH LONG-TERM CURRENT USE OF INSULIN: Primary | ICD-10-CM

## 2025-02-06 DIAGNOSIS — E11.21 DIABETIC NEPHROPATHY ASSOCIATED WITH TYPE 2 DIABETES MELLITUS: ICD-10-CM

## 2025-02-06 DIAGNOSIS — Z79.4 TYPE 2 DIABETES MELLITUS WITH HYPERGLYCEMIA, WITH LONG-TERM CURRENT USE OF INSULIN: Primary | ICD-10-CM

## 2025-02-06 RX ORDER — INSULIN LISPRO 100 [IU]/ML
INJECTION, SOLUTION INTRAVENOUS; SUBCUTANEOUS
Qty: 30 ML | Refills: 12 | Status: SHIPPED | OUTPATIENT
Start: 2025-02-06

## 2025-02-06 NOTE — PATIENT INSTRUCTIONS
Please,    - Continue Farxiga therapy without any changes.  - Continue use OmniPod pump along with Dexcom G7.  - Since you are on the insulin therapy please always some source of sugar with you in case of hypoglycemia/low blood sugar.    Nonfasting blood work before next visit.    Thank you for your visit today.    If you have any questions or concerns please feel free to reach out of the office.

## 2025-02-06 NOTE — PROGRESS NOTES
-----------------------------------------------------------------  ENDOCRINE CLINIC NOTE  -----------------------------------------------------------------        PATIENT NAME: Susy Hanson  PATIENT : 1960 AGE: 64 y.o.  MRN NUMBER: [id number]  PRIMARY CARE: Erica Aivla MD    ==========================================================================    CHIEF COMPLAINT: Type 2 diabetes management.  DATE OF SERVICE: 25    ==========================================================================    HPI / SUBJECTIVE    64 y.o. female is seen in the clinic today for type 2 diabetes mellitus.  Diagnosis Date:   Current Therapy / Medications:  Farxiga 10 mg p.o. daily  Omnipod 5 Pump:  Basal Rate 1.15 Units / hr  Bolus Rate  ISF 1:31  ICO 1:8  Insulin - Humalog  Active Insulin time 3 Hours  Dexcom G7  Target is 110  Past tried medications: (Not currently using)  Metformin - GI side-effects  Ozempic - hx of pancreatitis  Three to four meals a day  Following ophthalmology at Dr. Jones's office, last visit was May 2024  Peripheral neuropathy and not on meds  Chronic kidney disease III with recurrent hyperkalemia  Hx of dilated cardiomyopathy    ==========================================================================                                                PAST MEDICAL HISTORY    Past Medical History:   Diagnosis Date    Allergic 2008    Weeds, grasses, mold, mildew, etc.    Anxiety     Breast cancer     LEFT BREAST 2021    Bursitis of hip     Cataract     Had surgery to remove 2023    CHF (congestive heart failure)     Colon polyp 2023    Removed during colonoscopy    COPD (chronic obstructive pulmonary disease)     Coronary artery disease     CTS (carpal tunnel syndrome) 2015    Surgery on both hands    Depression     DM (diabetes mellitus)     GERD (gastroesophageal reflux disease)     History of medical problems     Bladder prolapse repeat  Dr. Roper    HL (hearing loss)     Hyperlipidemia     Hypertension     SAW CARDIO/ STRESS TEST 2020 - NOW MEDS MANAGED BY PCP     Irritable bowel syndrome 2006    Peripheral neuropathy     PONV (postoperative nausea and vomiting)     Renal insufficiency 2020    See Dr. funes for moderate kidney failure    Sleep apnea     WEARS CPAP    Sleep apnea, obstructive     Type 2 diabetes mellitus 2008    Urinary tract infection 6/23    UTI seen at [facility]       ==========================================================================    PAST SURGICAL HISTORY    Past Surgical History:   Procedure Laterality Date    BLADDER REPAIR      BREAST AUGMENTATION      BREAST BIOPSY      BREAST RECONSTRUCTION Bilateral 08/26/2021    Procedure: BILATERAL PREPECTORALPLACMENT TISSUE EXPANDERS AND ALLODERM;  Surgeon: Heri Arreaga MD;  Location: [facility] MAIN OR;  Service: Plastics;  Laterality: Bilateral;    BREAST TISSUE EXPANDER REMOVAL INSERTION OF IMPLANT Bilateral 12/23/2021    Procedure: BILATERAL REMOVAL TISSUE EXPANDERS AND PLACEMENT OF IMPLANTS;  Surgeon: Heri Arreaga MD;  Location:  [facility] MAIN OR;  Service: Plastics;  Laterality: Bilateral;    CARDIAC CATHETERIZATION Left 04/13/2022    Procedure: Cardiac Catheterization/Vascular Study;  Surgeon: Christo Murphy MD;  Location:  [facility];  Service: Cardiovascular;  Laterality: Left;    CARDIAC ELECTROPHYSIOLOGY PROCEDURE N/A 09/19/2022    Procedure: ICD implant St. Sude aware;  Surgeon: Esteban Kendrick MD;  Location:  [facility];  Service: Cardiology;  Laterality: N/A;    CARDIAC ELECTROPHYSIOLOGY PROCEDURE N/A 09/20/2022    Procedure: lead repositioning;  Surgeon: Esteban Kendrick MD;  Location:  [facility];  Service: Cardiology;  Laterality: N/A;    CARDIAC SURGERY  2022    Defibrillator/pacemaker implant    CARDIOVASCULAR STRESS TEST  2020    COLONOSCOPY  11/2012    EYE SURGERY  7/23     Cataracts removed    HAND SURGERY  2015    Both hands    HYSTERECTOMY      INSERT / REPLACE / REMOVE PACEMAKER  2022    LIPOMA EXCISION N/A 1/24/2025    Procedure: Neck lipoma excision;  Surgeon: Lita Garcia MD;  Location: [facility] MAIN OR;  Service: General;  Laterality: N/A;    LYMPH NODE BIOPSY  8/29/2021    MASTECTOMY W/ SENTINEL NODE BIOPSY Bilateral 08/26/2021    Procedure: bilateral total mastectomy, left axillary sentinel lymph node biopsy, possible reconstruction.;  Surgeon: Asia Perkins MD;  Location: [facility] MAIN OR;  Service: General;  Laterality: Bilateral;    TRIGGER POINT INJECTION  2012    Left thumb    TUBAL ABDOMINAL LIGATION  1981       ==========================================================================    FAMILY HISTORY    Family History   Problem Relation Age of Onset    Diabetes Mother     Heart disease Mother     Sleep apnea Mother     Snoring Mother     COPD Mother     Hyperlipidemia Mother     Alcohol abuse Mother     Anxiety disorder Mother     Depression Mother     Hypertension Mother     Alzheimer's disease Father     Diabetes Father     Heart disease Father     Sleep apnea Father     Snoring Father     Hyperlipidemia Father     Heart failure Father     Kidney disease Father     Hypertension Father     Obesity Father     Diabetes Sister     Heart disease Sister     Sleep apnea Sister     Snoring Sister     COPD Sister     Anxiety disorder Sister     Depression Sister     Hypertension Sister     Hyperlipidemia Sister     Clotting disorder Sister     Hyperlipidemia Brother     Hypertension Brother     Heart disease Brother     Breast cancer Maternal Aunt 80    Breast cancer Paternal Aunt     Diabetes Maternal Grandmother     Breast cancer Paternal Grandmother 60    Cancer Paternal Grandmother     Diabetes Paternal Grandmother     Hyperlipidemia Son     Diabetes Brother     Hypertension Brother     Malig Hyperthermia Neg Hx         ==========================================================================    SOCIAL HISTORY    Social History     Socioeconomic History    Marital status:    Tobacco Use    Smoking status: Former     Current packs/day: 0.00     Average packs/day: 2.0 packs/day for 36.0 years (72.0 ttl pk-yrs)     Types: Cigarettes     Start date: 1972     Quit date: 2008     Years since quittin.1     Passive exposure: Past    Smokeless tobacco: Never   Vaping Use    Vaping status: Never Used   Substance and Sexual Activity    Alcohol use: No    Drug use: No    Sexual activity: Yes     Partners: Male     Birth control/protection: None, Tubal ligation, Hysterectomy       ==========================================================================    MEDICATIONS      Current Outpatient Medications:     ALPRAZolam (XANAX) 1 MG tablet, Take 1 tablet by mouth 2 (Two) Times a Day., Disp: 60 tablet, Rfl: 0    atorvastatin (LIPITOR) 20 MG tablet, Take 1 tablet by mouth Daily., Disp: 90 tablet, Rfl: 1    Azelastine HCl 137 MCG/SPRAY solution, 2 sprays by Each Nare route Daily., Disp: 90 mL, Rfl: 1    cetirizine (zyrTEC) 10 MG tablet, Take 1 tablet by mouth Daily As Needed., Disp: , Rfl:     Continuous Glucose Sensor (Dexcom G7 Sensor) misc, Use 1 each Every 10 (Ten) Days., Disp: 9 each, Rfl: 3    dapagliflozin Propanediol (Farxiga) 10 MG tablet, Take 10 mg by mouth Daily., Disp: 90 tablet, Rfl: 1    dilTIAZem CD (CARDIZEM CD) 180 MG 24 hr capsule, Take 1 capsule by mouth Daily., Disp: 90 capsule, Rfl: 1    furosemide (LASIX) 20 MG tablet, Take 1 tablet by mouth Every Other Day., Disp: 45 tablet, Rfl: 1    glucose blood test strip, Use to test blood sugar every day, Disp: 100 each, Rfl: 1    hydrALAZINE (APRESOLINE) 100 MG tablet, Take 1 tablet by mouth 3 (Three) Times a Day., Disp: 270 tablet, Rfl: 1    Ibuprofen 3 %, Gabapentin 10 %, Baclofen 2 %, lidocaine 4 %, Apply 1-2 g topically to the appropriate area as  directed 3 (Three) to 4 (Four) times daily., Disp: 90 g, Rfl: 2    Insulin Disposable Pump (Omnipod 5 [id number] Pods Gen 5) misc, Use 1 package Every Other Day., Disp: 15 each, Rfl: 6    Insulin Lispro (humaLOG) 100 UNIT/ML injection, For use in insulin pump. Max daily dose 100 units daily, Disp: 30 mL, Rfl: 12    metoprolol succinate XL (TOPROL-XL) 100 MG 24 hr tablet, Take 1 tablet by mouth 2 (Two) Times a Day., Disp: 180 tablet, Rfl: 1    ondansetron (Zofran) 4 MG tablet, Take 1 tablet by mouth Every 8 (Eight) Hours As Needed for Nausea or Vomiting., Disp: 20 tablet, Rfl: 0    pantoprazole (PROTONIX) 40 MG EC tablet, Take 1 tablet by mouth Daily., Disp: 90 tablet, Rfl: 3    sodium bicarbonate 650 MG tablet, Take 2 tablets by mouth Daily., Disp: 180 tablet, Rfl: 3    tiotropium bromide-olodaterol (STIOLTO RESPIMAT) 2.5-2.5 MCG/ACT aerosol solution inhaler, Inhale 2 puffs Daily., Disp: 1 each, Rfl: 0    ==========================================================================    ALLERGIES    Allergies   Allergen Reactions    Hydrocodone-Acetaminophen Hives     Lortab, takes tylenol       ==========================================================================    OBJECTIVE    Vitals:    02/06/25 0843   BP: 178/98   Pulse: 82   SpO2: 100%       Body mass index is 32.27 kg/m².     General: Alert, cooperative, no acute distress    ==========================================================================    LAB EVALUATION    Lab Results   Component Value Date    GLUCOSE 120 (H) 01/21/2025    BUN 32 (H) 01/21/2025    CREATININE 1.55 (H) 01/21/2025    EGFRIFNONA 57 (L) 01/26/2022    EGFRIFAFRI >60 10/13/2017    BCR 20.6 01/21/2025    K 4.8 01/21/2025    CO2 26.3 01/21/2025    CALCIUM 9.9 01/21/2025    ALBUMIN 4.6 01/07/2025    AST 16 01/07/2025    ALT 17 01/07/2025    CHOL 146 08/05/2024    TRIG 254 (H) 08/05/2024    HDL 41 08/05/2024    LDL 64 08/05/2024     Lab Results   Component Value Date    HGBA1C 6.91 (H)  "01/21/2025    HGBA1C 7.71 (H) 11/27/2024    HGBA1C 7.89 (H) 11/13/2024     Lab Results   Component Value Date    MICROALBUR 40.5 03/14/2023    CREATININE 1.55 (H) 01/21/2025     Lab Results   Component Value Date    TSH 1.500 01/25/2024     ==========================================================================    CGM Data:    Dates: Jan 23 till Feb 5, 2025    Very High > 250: 4%  High 180 - 250: 22%  Target: 70 - 180: 74%  Low 55 - 70:  0%  Very Low < 55: 0%    GMI 7.1% with average blood sugar of 158  On pump review patient is 95% in automated mode and 5% and manual mode  Total insulin requirement is 92 units and patient is spending 42% time and basal insulin that is 38.7 units and 58% and bolus dosing that is 53.4 units    ==========================================================================    ASSESSMENT AND PLAN    # Type 2 diabetes with hyperglycemia  # Diabetic peripheral neuropathy  # Diabetic nephropathy with CKD stage IIIb, following nephrology (Dr. Kerr) in the clinic  # Obesity with BMI 32.03    - Reviewed CGM data, will adjust insulin pump  - Patient is not a good candidate for GLP-1 receptor agonist therapy or DPP 4 inhibitor therapy due to pancreatitis  - Continue Farxiga therapy  - In the meantime new adjusted insulin regimen:  Farxiga 10 mg p.o. daily  Omnipod 5 Pump:  Basal Rate 1.65 Units / hr  Bolus Rate  ISF 1:25  ICO 1:7  Insulin - Humalog  Active Insulin time 3 Hours  Dexcom G7  Target is 110  - Repeat blood work and clinical work up in 3 months    Return to clinic: 3 months    Entire assessment and plan was discussed and counseled the patient in detail to which patient verbalized understanding and agreed with care.  Answered all queries and concerns.    This note was created using voice recognition software and is inherently subject to errors including those of syntax and \"sound-alike\" substitutions which may escape proofreading.  In such instances, original meaning may be " extrapolated by contextual derivation.    Note: Portions of this note may have been copied from previous notes but documentation have been reviewed and edited as necessary to support clinical decision making for today's visit.    ==========================================================================    INFORMATION PROVIDED TO PATIENT    Patient Instructions   Please,    - Continue Farxiga therapy without any changes.  - Continue use OmniPod pump along with Dexcom G7.  - Since you are on the insulin therapy please always some source of sugar with you in case of hypoglycemia/low blood sugar.    Nonfasting blood work before next visit.    Thank you for your visit today.    If you have any questions or concerns please feel free to reach out of the office.       ==========================================================================  Shin Helton MD  Department of Endocrine, Diabetes and Metabolism  [place], [place]  ==========================================================================

## 2025-02-10 ENCOUNTER — PATIENT MESSAGE (OUTPATIENT)
Dept: FAMILY MEDICINE CLINIC | Facility: CLINIC | Age: 65
End: 2025-02-10
Payer: MEDICARE

## 2025-02-10 ENCOUNTER — OFFICE VISIT (OUTPATIENT)
Dept: SURGERY | Facility: CLINIC | Age: 65
End: 2025-02-10
Payer: MEDICARE

## 2025-02-10 VITALS
OXYGEN SATURATION: 98 % | HEART RATE: 76 BPM | TEMPERATURE: 98.4 F | BODY MASS INDEX: 31.58 KG/M2 | WEIGHT: 185 LBS | SYSTOLIC BLOOD PRESSURE: 151 MMHG | HEIGHT: 64 IN | DIASTOLIC BLOOD PRESSURE: 84 MMHG

## 2025-02-10 DIAGNOSIS — D17.0 LIPOMA OF NECK: Primary | ICD-10-CM

## 2025-02-10 PROCEDURE — 3077F SYST BP >= 140 MM HG: CPT

## 2025-02-10 PROCEDURE — 99024 POSTOP FOLLOW-UP VISIT: CPT

## 2025-02-10 PROCEDURE — 3079F DIAST BP 80-89 MM HG: CPT

## 2025-02-10 PROCEDURE — 1160F RVW MEDS BY RX/DR IN RCRD: CPT

## 2025-02-10 PROCEDURE — 1159F MED LIST DOCD IN RCRD: CPT

## 2025-02-10 NOTE — PROGRESS NOTES
"Chief Complaint  Post-op Follow-up (Post op 1/24/25 Neck Lipoma/Cyst excision, Dr. Garcia)    Subjective        Susy Hanson presents to [facility] GENERAL SUR[facility]ERY status post excision of neck lipoma with Dr. Garcia on 1/24/2025.  Overall doing well, no complaints.  Denies pain.  Denies fevers and chills.  Tolerating regular diet without nausea and vomiting.  Having regular bowel function.      Objective   Vital Signs:  /84 (BP Location: Right arm, Patient Position: Sitting, Cuff Size: Large Adult)   Pulse 76   Temp 98.4 °F (36.9 °C) (Infrared)   Ht 162.6 cm (64\")   Wt 83.9 kg (185 lb)   SpO2 98%   BMI 31.76 kg/m²   Estimated body mass index is 31.76 kg/m² as calculated from the following:    Height as of this encounter: 162.6 cm (64\").    Weight as of this encounter: 83.9 kg (185 lb).            Physical Exam  Constitutional:       General: She is not in acute distress.  Neck:      Comments: Incision appears to be healing well.  Nontender to palpation.  No active drainage or bleeding.  No surrounding ecchymosis or erythema.  Cardiovascular:      Rate and Rhythm: Normal rate.   Pulmonary:      Effort: Pulmonary effort is normal. No respiratory distress.   Abdominal:      General: There is no distension.      Palpations: Abdomen is soft.      Tenderness: There is no abdominal tenderness. There is no guarding.   Neurological:      Mental Status: She is alert. Mental status is at baseline.   Psychiatric:         Mood and Affect: Mood normal.         Behavior: Behavior normal.        Result Review :                Assessment and Plan   Diagnoses and all orders for this visit:    1. Lipoma of neck (Primary)    status post excision of neck lipoma with Dr. Garcia on 1/24/2025.  Overall doing well, no complaints.  Denies pain.  Denies fevers and chills.  Tolerating regular diet without nausea and vomiting.  Having regular bowel function.  Discussed pathology with patient.  No further " physical restrictions.  No dietary restrictions.  Can follow-up as needed.         Follow Up   No follow-ups on file.  Patient was given instructions and counseling regarding her condition or for health maintenance advice. Please see specific information pulled into the AVS if appropriate.

## 2025-02-11 RX ORDER — CEFDINIR 300 MG/1
300 CAPSULE ORAL 2 TIMES DAILY
Qty: 14 CAPSULE | Refills: 0 | Status: SHIPPED | OUTPATIENT
Start: 2025-02-11 | End: 2025-02-18

## 2025-02-24 ENCOUNTER — LAB (OUTPATIENT)
Dept: LAB | Facility: HOSPITAL | Age: 65
End: 2025-02-24
Payer: MEDICARE

## 2025-02-24 DIAGNOSIS — N18.32 CHRONIC KIDNEY DISEASE (CKD) STAGE G3B/A1, MODERATELY DECREASED GLOMERULAR FILTRATION RATE (GFR) BETWEEN 30-44 ML/MIN/1.73 SQUARE METER AND ALBUMINURIA CREATININE RATIO LESS THAN 30 MG/G (CMS/H*: ICD-10-CM

## 2025-02-24 LAB
ALBUMIN SERPL-MCNC: 4.6 G/DL (ref 3.5–5.2)
ALBUMIN/GLOB SERPL: 1.6 G/DL
ALP SERPL-CCNC: 71 U/L (ref 39–117)
ALT SERPL W P-5'-P-CCNC: 17 U/L (ref 1–33)
ANION GAP SERPL CALCULATED.3IONS-SCNC: 12 MMOL/L (ref 5–15)
AST SERPL-CCNC: 16 U/L (ref 1–32)
BILIRUB SERPL-MCNC: 0.3 MG/DL (ref 0–1.2)
BUN SERPL-MCNC: 28 MG/DL (ref 8–23)
BUN/CREAT SERPL: 18.4 (ref 7–25)
CALCIUM SPEC-SCNC: 9.9 MG/DL (ref 8.6–10.5)
CHLORIDE SERPL-SCNC: 107 MMOL/L (ref 98–107)
CO2 SERPL-SCNC: 27 MMOL/L (ref 22–29)
CREAT SERPL-MCNC: 1.52 MG/DL (ref 0.57–1)
EGFRCR SERPLBLD CKD-EPI 2021: 37.7 ML/MIN/1.73
GLOBULIN UR ELPH-MCNC: 2.8 GM/DL
GLUCOSE SERPL-MCNC: 100 MG/DL (ref 65–99)
POTASSIUM SERPL-SCNC: 5.3 MMOL/L (ref 3.5–5.2)
PROT SERPL-MCNC: 7.4 G/DL (ref 6–8.5)
SODIUM SERPL-SCNC: 146 MMOL/L (ref 136–145)

## 2025-02-24 PROCEDURE — 80053 COMPREHEN METABOLIC PANEL: CPT

## 2025-02-24 PROCEDURE — 36415 COLL VENOUS BLD VENIPUNCTURE: CPT

## 2025-02-25 DIAGNOSIS — E11.49 TYPE 2 DIABETES MELLITUS WITH OTHER DIABETIC NEUROLOGICAL COMPLICATION: Primary | ICD-10-CM

## 2025-02-25 RX ORDER — AVOBENZONE, HOMOSALATE, OCTISALATE, OCTOCRYLENE 30; 40; 45; 26 MG/ML; MG/ML; MG/ML; MG/ML
1 CREAM TOPICAL 3 TIMES DAILY
Qty: 306 EACH | Refills: 2 | Status: SHIPPED | OUTPATIENT
Start: 2025-02-25

## 2025-02-28 DIAGNOSIS — E11.49 TYPE 2 DIABETES MELLITUS WITH OTHER DIABETIC NEUROLOGICAL COMPLICATION: Primary | ICD-10-CM

## 2025-03-03 ENCOUNTER — LAB (OUTPATIENT)
Dept: LAB | Facility: HOSPITAL | Age: 65
End: 2025-03-03
Payer: MEDICARE

## 2025-03-03 DIAGNOSIS — N18.32 CHRONIC KIDNEY DISEASE (CKD) STAGE G3B/A1, MODERATELY DECREASED GLOMERULAR FILTRATION RATE (GFR) BETWEEN 30-44 ML/MIN/1.73 SQUARE METER AND ALBUMINURIA CREATININE RATIO LESS THAN 30 MG/G (CMS/H*: ICD-10-CM

## 2025-03-03 LAB
ALBUMIN SERPL-MCNC: 4.7 G/DL (ref 3.5–5.2)
ALBUMIN/GLOB SERPL: 1.6 G/DL
ALP SERPL-CCNC: 77 U/L (ref 39–117)
ALT SERPL W P-5'-P-CCNC: 15 U/L (ref 1–33)
ANION GAP SERPL CALCULATED.3IONS-SCNC: 10.6 MMOL/L (ref 5–15)
AST SERPL-CCNC: 17 U/L (ref 1–32)
BILIRUB SERPL-MCNC: 0.3 MG/DL (ref 0–1.2)
BUN SERPL-MCNC: 27 MG/DL (ref 8–23)
BUN/CREAT SERPL: 17.6 (ref 7–25)
CALCIUM SPEC-SCNC: 10 MG/DL (ref 8.6–10.5)
CHLORIDE SERPL-SCNC: 106 MMOL/L (ref 98–107)
CO2 SERPL-SCNC: 26.4 MMOL/L (ref 22–29)
CREAT SERPL-MCNC: 1.53 MG/DL (ref 0.57–1)
EGFRCR SERPLBLD CKD-EPI 2021: 37.4 ML/MIN/1.73
GLOBULIN UR ELPH-MCNC: 2.9 GM/DL
GLUCOSE SERPL-MCNC: 109 MG/DL (ref 65–99)
POTASSIUM SERPL-SCNC: 5.8 MMOL/L (ref 3.5–5.2)
PROT SERPL-MCNC: 7.6 G/DL (ref 6–8.5)
SODIUM SERPL-SCNC: 143 MMOL/L (ref 136–145)

## 2025-03-03 PROCEDURE — 80053 COMPREHEN METABOLIC PANEL: CPT

## 2025-03-03 PROCEDURE — 36415 COLL VENOUS BLD VENIPUNCTURE: CPT

## 2025-03-04 RX ORDER — LANCETS
EACH MISCELLANEOUS
Qty: 300 EACH | Refills: 5 | Status: SHIPPED | OUTPATIENT
Start: 2025-03-04

## 2025-03-04 RX ORDER — LANCING DEVICE/LANCETS
KIT MISCELLANEOUS
Qty: 1 KIT | Refills: 0 | Status: SHIPPED | OUTPATIENT
Start: 2025-03-04

## 2025-03-05 DIAGNOSIS — F41.8 MIXED ANXIETY DEPRESSIVE DISORDER: ICD-10-CM

## 2025-03-05 RX ORDER — ALPRAZOLAM 1 MG/1
1 TABLET ORAL 2 TIMES DAILY
Qty: 60 TABLET | Refills: 0 | Status: SHIPPED | OUTPATIENT
Start: 2025-03-05

## 2025-03-09 ENCOUNTER — APPOINTMENT (OUTPATIENT)
Dept: CT IMAGING | Facility: HOSPITAL | Age: 65
End: 2025-03-09
Payer: MEDICARE

## 2025-03-09 ENCOUNTER — HOSPITAL ENCOUNTER (EMERGENCY)
Facility: HOSPITAL | Age: 65
Discharge: HOME | End: 2025-03-09
Attending: EMERGENCY MEDICINE | Admitting: EMERGENCY MEDICINE
Payer: MEDICARE

## 2025-03-09 VITALS
BODY MASS INDEX: 31.26 KG/M2 | DIASTOLIC BLOOD PRESSURE: 84 MMHG | WEIGHT: 183.1 LBS | RESPIRATION RATE: 16 BRPM | HEIGHT: 64 IN | SYSTOLIC BLOOD PRESSURE: 135 MMHG | HEART RATE: 76 BPM | TEMPERATURE: 98.7 F | OXYGEN SATURATION: 99 %

## 2025-03-09 DIAGNOSIS — R35.0 URINARY FREQUENCY: Primary | ICD-10-CM

## 2025-03-09 LAB
ALBUMIN SERPL-MCNC: 4.7 G/DL (ref 3.5–5.2)
ALBUMIN/GLOB SERPL: 1.7 G/DL
ALP SERPL-CCNC: 80 U/L (ref 39–117)
ALT SERPL W P-5'-P-CCNC: 13 U/L (ref 1–33)
ANION GAP SERPL CALCULATED.3IONS-SCNC: 11 MMOL/L (ref 5–15)
AST SERPL-CCNC: 12 U/L (ref 1–32)
BASOPHILS # BLD AUTO: 0.1 10*3/MM3 (ref 0–0.2)
BASOPHILS NFR BLD AUTO: 1 % (ref 0–1.5)
BILIRUB SERPL-MCNC: 0.3 MG/DL (ref 0–1.2)
BILIRUB UR QL STRIP: NEGATIVE
BUN SERPL-MCNC: 25 MG/DL (ref 8–23)
BUN/CREAT SERPL: 15.9 (ref 7–25)
CALCIUM SPEC-SCNC: 9.7 MG/DL (ref 8.6–10.5)
CHLORIDE SERPL-SCNC: 102 MMOL/L (ref 98–107)
CLARITY UR: CLEAR
CO2 SERPL-SCNC: 27 MMOL/L (ref 22–29)
COLOR UR: YELLOW
CREAT SERPL-MCNC: 1.57 MG/DL (ref 0.57–1)
DEPRECATED RDW RBC AUTO: 54.8 FL (ref 37–54)
EGFRCR SERPLBLD CKD-EPI 2021: 36.2 ML/MIN/1.73
EOSINOPHIL # BLD AUTO: 0.14 10*3/MM3 (ref 0–0.4)
EOSINOPHIL NFR BLD AUTO: 1.4 % (ref 0.3–6.2)
ERYTHROCYTE [DISTWIDTH] IN BLOOD BY AUTOMATED COUNT: 17.9 % (ref 12.3–15.4)
GLOBULIN UR ELPH-MCNC: 2.8 GM/DL
GLUCOSE SERPL-MCNC: 80 MG/DL (ref 65–99)
GLUCOSE UR STRIP-MCNC: ABNORMAL MG/DL
HCT VFR BLD AUTO: 39.6 % (ref 34–46.6)
HGB BLD-MCNC: 11.2 G/DL (ref 12–15.9)
HGB UR QL STRIP.AUTO: NEGATIVE
IMM GRANULOCYTES # BLD AUTO: 0.02 10*3/MM3 (ref 0–0.05)
IMM GRANULOCYTES NFR BLD AUTO: 0.2 % (ref 0–0.5)
KETONES UR QL STRIP: NEGATIVE
LEUKOCYTE ESTERASE UR QL STRIP.AUTO: NEGATIVE
LYMPHOCYTES # BLD AUTO: 1.37 10*3/MM3 (ref 0.7–3.1)
LYMPHOCYTES NFR BLD AUTO: 13.2 % (ref 19.6–45.3)
MCH RBC QN AUTO: 23.3 PG (ref 26.6–33)
MCHC RBC AUTO-ENTMCNC: 28.3 G/DL (ref 31.5–35.7)
MCV RBC AUTO: 82.5 FL (ref 79–97)
MONOCYTES # BLD AUTO: 1.19 10*3/MM3 (ref 0.1–0.9)
MONOCYTES NFR BLD AUTO: 11.5 % (ref 5–12)
NEUTROPHILS NFR BLD AUTO: 7.52 10*3/MM3 (ref 1.7–7)
NEUTROPHILS NFR BLD AUTO: 72.7 % (ref 42.7–76)
NITRITE UR QL STRIP: NEGATIVE
PH UR STRIP.AUTO: 6 [PH] (ref 5–8)
PLATELET # BLD AUTO: 264 10*3/MM3 (ref 140–450)
PMV BLD AUTO: 9.8 FL (ref 6–12)
POTASSIUM SERPL-SCNC: 4 MMOL/L (ref 3.5–5.2)
PROT SERPL-MCNC: 7.5 G/DL (ref 6–8.5)
PROT UR QL STRIP: ABNORMAL
RBC # BLD AUTO: 4.8 10*6/MM3 (ref 3.77–5.28)
SODIUM SERPL-SCNC: 140 MMOL/L (ref 136–145)
SP GR UR STRIP: <=1.005 (ref 1–1.03)
UROBILINOGEN UR QL STRIP: ABNORMAL
WBC NRBC COR # BLD AUTO: 10.34 10*3/MM3 (ref 3.4–10.8)

## 2025-03-09 PROCEDURE — 80053 COMPREHEN METABOLIC PANEL: CPT | Performed by: NURSE PRACTITIONER

## 2025-03-09 PROCEDURE — 85025 COMPLETE CBC W/AUTO DIFF WBC: CPT | Performed by: NURSE PRACTITIONER

## 2025-03-09 PROCEDURE — 99284 EMERGENCY DEPT VISIT MOD MDM: CPT

## 2025-03-09 PROCEDURE — 81003 URINALYSIS AUTO W/O SCOPE: CPT | Performed by: EMERGENCY MEDICINE

## 2025-03-09 PROCEDURE — 36415 COLL VENOUS BLD VENIPUNCTURE: CPT

## 2025-03-09 PROCEDURE — 74176 CT ABD & PELVIS W/O CONTRAST: CPT

## 2025-03-09 RX ORDER — SODIUM CHLORIDE 0.9 % (FLUSH) 0.9 %
10 SYRINGE (ML) INJECTION AS NEEDED
Status: DISCONTINUED | OUTPATIENT
Start: 2025-03-09 | End: 2025-03-09 | Stop reason: HOSPADM

## 2025-03-09 NOTE — FSED PROVIDER NOTE
Subjective   History of Present Illness  The patient is a 64-year-old female who presents to the ER with urinary pain and frequency. Patient reports right side pain and back pain. Patient reports she just her Lokemia dosing.     History provided by:  Patient   used: No        Review of Systems   Gastrointestinal:  Positive for abdominal pain.   Genitourinary:  Positive for dysuria and frequency.       Past Medical History:   Diagnosis Date    Allergic 2008    Weeds, grasses, mold, mildew, etc.    Anxiety 2000    Breast cancer     LEFT BREAST 7/2021    Bursitis of hip 2024    Cataract     Had surgery to remove August 2023    CHF (congestive heart failure)     Colon polyp 12/2023    Removed during colonoscopy    COPD (chronic obstructive pulmonary disease) 2024    Coronary artery disease 2019    CTS (carpal tunnel syndrome) 2015    Surgery on both hands    Depression     DM (diabetes mellitus)     GERD (gastroesophageal reflux disease)     History of medical problems 2008    Bladder prolapse repeat Dr. Roper    HL (hearing loss)     Hyperlipidemia     Hypertension     SAW CARDIO/ STRESS TEST 2020 - NOW MEDS MANAGED BY PCP     Irritable bowel syndrome 2006    Peripheral neuropathy     PONV (postoperative nausea and vomiting)     Renal insufficiency 2020    See Dr. funes for moderate kidney failure    Sleep apnea     WEARS CPAP    Sleep apnea, obstructive     Type 2 diabetes mellitus 2008    Urinary tract infection 6/23    UTI seen at [facility]       Allergies   Allergen Reactions    Hydrocodone-Acetaminophen Hives     Lortab, takes tylenol    Cat Dander Swelling       Past Surgical History:   Procedure Laterality Date    BLADDER REPAIR      BREAST AUGMENTATION      BREAST BIOPSY      BREAST RECONSTRUCTION Bilateral 08/26/2021    Procedure: BILATERAL PREPECTORALPLACMENT TISSUE EXPANDERS AND ALLODERM;  Surgeon: Heri Arreaga MD;  Location: [facility];  Service: Plastics;   Laterality: Bilateral;    BREAST TISSUE EXPANDER REMOVAL INSERTION OF IMPLANT Bilateral 12/23/2021    Procedure: BILATERAL REMOVAL TISSUE EXPANDERS AND PLACEMENT OF IMPLANTS;  Surgeon: Heri Arreaga MD;  Location: [facility] MAIN OR;  Service: Plastics;  Laterality: Bilateral;    CARDIAC CATHETERIZATION Left 04/13/2022    Procedure: Cardiac Catheterization/Vascular Study;  Surgeon: Christo Murphy MD;  Location:  [facility] CATH INVASIVE LOCATION;  Service: Cardiovascular;  Laterality: Left;    CARDIAC ELECTROPHYSIOLOGY PROCEDURE N/A 09/19/2022    Procedure: ICD implant [place] aware;  Surgeon: Esteban Kendrick MD;  Location: [facility] CATH INVASIVE LOCATION;  Service: Cardiology;  Laterality: N/A;    CARDIAC ELECTROPHYSIOLOGY PROCEDURE N/A 09/20/2022    Procedure: lead repositioning;  Surgeon: Esteban Kendrick MD;  Location: [facility] CATH INVASIVE LOCATION;  Service: Cardiology;  Laterality: N/A;    CARDIAC SURGERY  2022    Defibrillator/pacemaker implant    CARDIOVASCULAR STRESS TEST  2020    COLONOSCOPY  11/2012    EYE SURGERY  7/23    Cataracts removed    HAND SURGERY  2015    Both hands    HYSTERECTOMY      INSERT / REPLACE / REMOVE PACEMAKER  2022    LIPOMA EXCISION N/A 1/24/2025    Procedure: Neck lipoma excision;  Surgeon: Lita Garcia MD;  Location: [facility] MAIN OR;  Service: General;  Laterality: N/A;    LYMPH NODE BIOPSY  8/29/2021    MASTECTOMY W/ SENTINEL NODE BIOPSY Bilateral 08/26/2021    Procedure: bilateral total mastectomy, left axillary sentinel lymph node biopsy, possible reconstruction.;  Surgeon: Asia Perkins MD;  Location: [facility] MAIN OR;  Service: General;  Laterality: Bilateral;    TRIGGER POINT INJECTION  2012    Left thumb    TUBAL ABDOMINAL LIGATION  1981       Family History   Problem Relation Age of Onset    Diabetes Mother     Heart disease Mother     Sleep apnea Mother     Snoring Mother     COPD Mother     Hyperlipidemia Mother     Alcohol abuse Mother     Anxiety disorder  Mother     Depression Mother     Hypertension Mother     Alzheimer's disease Father     Diabetes Father     Heart disease Father     Sleep apnea Father     Snoring Father     Hyperlipidemia Father     Heart failure Father     Kidney disease Father     Hypertension Father     Obesity Father     Diabetes Sister     Heart disease Sister     Sleep apnea Sister     Snoring Sister     COPD Sister     Anxiety disorder Sister     Depression Sister     Hypertension Sister     Hyperlipidemia Sister     Clotting disorder Sister     Hyperlipidemia Brother     Hypertension Brother     Heart disease Brother     Breast cancer Maternal Aunt 80    Breast cancer Paternal Aunt     Diabetes Maternal Grandmother     Breast cancer Paternal Grandmother 60    Cancer Paternal Grandmother     Diabetes Paternal Grandmother     Hyperlipidemia Son     Diabetes Brother     Hypertension Brother     Malig Hyperthermia Neg Hx        Social History     Socioeconomic History    Marital status:    Tobacco Use    Smoking status: Former     Current packs/day: 0.00     Average packs/day: 2.0 packs/day for 36.0 years (72.0 ttl pk-yrs)     Types: Cigarettes     Start date: 1972     Quit date: 2008     Years since quittin.1     Passive exposure: Past    Smokeless tobacco: Never   Vaping Use    Vaping status: Never Used   Substance and Sexual Activity    Alcohol use: No    Drug use: No    Sexual activity: Yes     Partners: Male     Birth control/protection: None, Tubal ligation, Hysterectomy           Objective   Physical Exam  Vitals and nursing note reviewed.   Constitutional:       Appearance: Normal appearance.   HENT:      Head: Normocephalic.      Right Ear: Tympanic membrane and ear canal normal.      Left Ear: Tympanic membrane and ear canal normal.      Nose: Nose normal.      Mouth/Throat:      Lips: Pink.      Mouth: Mucous membranes are moist.      Pharynx: Oropharynx is clear. Uvula midline.   Eyes:      Conjunctiva/sclera:  Conjunctivae normal.      Pupils: Pupils are equal, round, and reactive to light.   Cardiovascular:      Rate and Rhythm: Normal rate and regular rhythm.      Pulses: Normal pulses.      Heart sounds: Normal heart sounds.   Pulmonary:      Effort: Pulmonary effort is normal.      Breath sounds: Normal breath sounds.   Abdominal:      General: Bowel sounds are normal.      Palpations: Abdomen is soft.      Comments: Patient reports right-sided abdominal pain and right flank pain.   Musculoskeletal:         General: Normal range of motion.      Cervical back: Full passive range of motion without pain, normal range of motion and neck supple.   Skin:     General: Skin is warm and dry.   Neurological:      General: No focal deficit present.      Mental Status: She is alert and oriented to person, place, and time.   Psychiatric:         Mood and Affect: Mood normal.         Behavior: Behavior normal. Behavior is cooperative.         Procedures           ED Course  ED Course as of 03/09/25 1859   Sun Mar 09, 2025   1521 Glucose(!): 100 mg/dL (Trace) [DS]   1522 Protein, UA(!): Trace [DS]   1553 WBC: 10.34 [DS]   1553 Hemoglobin(!): 11.2 [DS]   1553 Hematocrit: 39.6 [DS]   1633 Glucose: 80 [DS]   1633 BUN(!): 25 [DS]   1633 Creatinine(!): 1.57 [DS]   1633 Sodium: 140 [DS]   1633 Potassium: 4.0 [DS]      ED Course User Index  [DS] Stephany Aparicio APRN                                           Medical Decision Making  The patient is a 64-year-old female who presents to the ER with urinary pain and frequency. Patient reports right side pain and back pain. Patient reports she just her Lokemia dosing.    Abdominal exam without peritoneal signs. No evidence of acute abdomen at this time. Well appearing. Given work up, low suspicion for acute hepatobiliary disease (including acute cholecystitis or cholangitis), acute pancreatitis (neg lipase), PUD (including gastric perforation), acute infectious processes (pneumonia,  hepatitis, pyelonephritis), acute appendicitis, vascular catastrophe, bowel obstruction, viscus perforation, or testicular torsion, diverticulitis. Presentation not consistent with other acute, emergent causes of abdominal pain at this time.    Problems Addressed:  Urinary frequency: complicated acute illness or injury    Amount and/or Complexity of Data Reviewed  Labs: ordered. Decision-making details documented in ED Course.  Radiology: ordered.     Details: CT ABDOMEN PELVIS WO CONTRAST     Date of Exam: 3/9/2025 3:56 PM EDT     Indication: right side abdomen and back pain.     Comparison: 1/17/2020     Technique: Axial CT images were obtained of the abdomen and pelvis without the administration of contrast. Sagittal and coronal reconstructions were performed.  Automated exposure control and iterative reconstruction methods were used.     FINDINGS:     Lung bases: Cardiac pacemaker/defibrillator leads.     Liver:No masses. No intrahepatic biliary ductal dilatation.     Spleen:No masses. No perisplenic hematoma.     Pancreas:No pancreatic masses. No evidence of pancreatitis.     Gallbladder and common bile duct:No evidence of cholelithiasis. No evidence of cholecystitis.     Adrenal glands:No adrenal masses     Kidneys and ureters:No kidney stones. No renal masses.No calculi present within the ureters. Normal caliber ureters.     Urinary bladder:No urinary bladder wall thickening. No bladder masses.     Small bowel:Normal caliber small bowel.     Large bowel:No diverticulosis or diverticulitis. No large bowel masses are appreciated     Appendix: Normal     GENITOURINARY: Prior hysterectomy     Ascites or pneumoperitoneum:None.     Adenopathy:None present     Osseous structures: Lumbar vertebral body height and alignment are normal. The sacrum is intact. No spondylolysis.     Other findings: Atheromatous disease of the abdominal aorta and visualized branches.     IMPRESSION:  No acute abdominal or pelvic  pathology.            Risk  OTC drugs.        Final diagnoses:   Urinary frequency       ED Disposition  ED Disposition       ED Disposition   Discharge    Condition   Stable    Comment   --               Erica Avila MD  [facility] [facility] [address]  [place] [facility]  [phone]    Schedule an appointment as soon as possible for a visit in 1 week  As needed, If symptoms worsen         Medication List      No changes were made to your prescriptions during this visit.

## 2025-03-09 NOTE — DISCHARGE INSTRUCTIONS
Call for a follow up appointment with your primary care for for reevaluation and further treatment.    Call Dr. Douglass's office let him know that he had blood work today and see if there is anything else they would like to take    Tylenol/Motrin as needed for pain/fevers    Make sure patient is drinking plenty of fluids.    Return for any new or worsening symptoms.      Voice recognition transcription technology was used for documentation on this chart.  Result there may be some typos and/or introduced into the chart that were overlooked during editing reviewing.

## 2025-03-14 RX ORDER — FUROSEMIDE 20 MG/1
20 TABLET ORAL EVERY OTHER DAY
Qty: 45 TABLET | Refills: 1 | Status: SHIPPED | OUTPATIENT
Start: 2025-03-14

## 2025-03-18 NOTE — PROGRESS NOTES
Date of Office Visit: 2025  Encounter Provider: Dr. Christo Murphy  Place of Service: [facility] CARDIOLOGY [place]  Patient Name: Susy Hanson  :1960  Erica Avila MD    Chief Complaint   Patient presents with    Coronary Artery Disease    Hypertension    Hyperlipidemia    Congestive Heart Failure    Pacemaker Check    Follow-up     History of Present Illness    I am pleased to see Mrs. Hanson as a follow-up.    I am pleased see Mrs. Hanson in my office today as a follow-up     As you know, patient is a 64-year-old white female whose past medical history is significant for hypertension, diabetes mellitus, who came today for follow-up.     In 2020, patient was admitted in the hospital with blood pressure greater than 200.  She underwent stress test which was negative for ischemia.  EKG showed left bundle branch block pattern.  MRI of the brain was unremarkable.  CT angiography to rule out renal artery stenosis was done and it was negative.  Patient antihypertensive regimen was adjusted and was discharged.     In 2021, patient was admitted at [facility] with symptom of shortness of breath and accelerated hypertension.  She was noted to be in congestive heart failure.  Echocardiogram showed ejection fraction of 25%.  Patient underwent cardiac catheterization and no significant coronary artery disease was noted.  Patient was started on Entresto and Toprol-XL.  She was discharged home.    In 2022, patient was referred for ICD.  Patient underwent biventricular ICD implant.  Unfortunately patient required repositioning of RV lead due to increased impedance.  Postprocedure patient had chest pain and echocardiogram showed pericardial effusion.  Patient was treated with colchicine and NSAID.  In 2022, repeat echocardiogram showed normal left ventricular size and function with a EF of 60 to 65%.  Trace pericardial effusion noted.    In  February 2024, patient underwent echocardiogram which showed normal left ventricular size and function with a EF of 55 to 60%.  No pericardial effusion noted.    Patient came today for follow-up.  Patient continues to have elevated blood pressure.  Patient denies any symptom of chest pain or tightness or heaviness.  No orthopnea PND no leg edema noted.    ICD is interrogated and it is functioning appropriately.  Patient has 98% of biventricular pacing.  No arrhythmias noted.  No change in programming    At this stage, I would recommend that patient showed increase diltiazem 360 mg daily.  Imdur 30 mg daily would be started.  Patient cannot be given is/ARB/ARNI because of CKD and renal dysfunction.        Past Medical History:   Diagnosis Date    Allergic 2008    Weeds, grasses, mold, mildew, etc.    Anxiety 2000    Breast cancer     LEFT BREAST 7/2021    Bursitis of hip 2024    Cataract     Had surgery to remove August 2023    CHF (congestive heart failure)     Colon polyp 12/2023    Removed during colonoscopy    COPD (chronic obstructive pulmonary disease) 2024    Coronary artery disease 2019    CTS (carpal tunnel syndrome) 2015    Surgery on both hands    Depression     DM (diabetes mellitus)     GERD (gastroesophageal reflux disease)     History of medical problems 2008    Bladder prolapse repeat Dr. Roper    HL (hearing loss)     Hyperlipidemia     Hypertension     SAW CARDIO/ STRESS TEST 2020 - NOW MEDS MANAGED BY PCP     Irritable bowel syndrome 2006    Peripheral neuropathy     PONV (postoperative nausea and vomiting)     Renal insufficiency 2020    See Dr. funes for moderate kidney failure    Sleep apnea     WEARS CPAP    Sleep apnea, obstructive     Type 2 diabetes mellitus 2008    Urinary tract infection 6/23    UTI seen at [facility]         Past Surgical History:   Procedure Laterality Date    BLADDER REPAIR      BREAST AUGMENTATION      BREAST BIOPSY      BREAST RECONSTRUCTION Bilateral 08/26/2021     Procedure: BILATERAL PREPECTORALPLACMENT TISSUE EXPANDERS AND ALLODERM;  Surgeon: Heri Arreaga MD;  Location: [facility] MAIN OR;  Service: Plastics;  Laterality: Bilateral;    BREAST TISSUE EXPANDER REMOVAL INSERTION OF IMPLANT Bilateral 12/23/2021    Procedure: BILATERAL REMOVAL TISSUE EXPANDERS AND PLACEMENT OF IMPLANTS;  Surgeon: Heri Arreaga MD;  Location: [facility] MAIN OR;  Service: Plastics;  Laterality: Bilateral;    CARDIAC CATHETERIZATION Left 04/13/2022    Procedure: Cardiac Catheterization/Vascular Study;  Surgeon: Christo Murphy MD;  Location: [facility] CATH INVASIVE LOCATION;  Service: Cardiovascular;  Laterality: Left;    CARDIAC ELECTROPHYSIOLOGY PROCEDURE N/A 09/19/2022    Procedure: ICD implant [employer] aware;  Surgeon: Esteban Kendrick MD;  Location: [facility] CATH INVASIVE LOCATION;  Service: Cardiology;  Laterality: N/A;    CARDIAC ELECTROPHYSIOLOGY PROCEDURE N/A 09/20/2022    Procedure: lead repositioning;  Surgeon: Esteban Kendrick MD;  Location: [facility] CATH INVASIVE LOCATION;  Service: Cardiology;  Laterality: N/A;    CARDIAC SURGERY  2022    Defibrillator/pacemaker implant    CARDIOVASCULAR STRESS TEST  2020    COLONOSCOPY  11/2012    EYE SURGERY  7/23    Cataracts removed    HAND SURGERY  2015    Both hands    HYSTERECTOMY      INSERT / REPLACE / REMOVE PACEMAKER  2022    LIPOMA EXCISION N/A 1/24/2025    Procedure: Neck lipoma excision;  Surgeon: Lita Garcia MD;  Location: [facility] MAIN OR;  Service: General;  Laterality: N/A;    LYMPH NODE BIOPSY  8/29/2021    MASTECTOMY W/ SENTINEL NODE BIOPSY Bilateral 08/26/2021    Procedure: bilateral total mastectomy, left axillary sentinel lymph node biopsy, possible reconstruction.;  Surgeon: Asia Perkins MD;  Location: [facility] MAIN OR;  Service: General;  Laterality: Bilateral;    TRIGGER POINT INJECTION  2012    Left thumb    TUBAL ABDOMINAL LIGATION  1981           Current Outpatient Medications:     Accu-Chek FastClix  Lancets misc, Use to check blood sugar three times daily., Disp: 300 each, Rfl: 5    ALPRAZolam (XANAX) 1 MG tablet, Take 1 tablet by mouth 2 (Two) Times a Day., Disp: 60 tablet, Rfl: 0    atorvastatin (LIPITOR) 20 MG tablet, Take 1 tablet by mouth Daily., Disp: 90 tablet, Rfl: 1    Azelastine HCl 137 MCG/SPRAY solution, 2 sprays by Each Nare route Daily., Disp: 90 mL, Rfl: 1    Blood Glucose Monitoring Suppl w/Device kit, Use to check blood sugar 3 times daily., Disp: 1 each, Rfl: 0    cetirizine (zyrTEC) 10 MG tablet, Take 1 tablet by mouth Daily As Needed., Disp: , Rfl:     Continuous Glucose Sensor (Dexcom G7 Sensor) misc, Use 1 each Every 10 (Ten) Days., Disp: 9 each, Rfl: 3    dapagliflozin Propanediol (Farxiga) 10 MG tablet, Take 10 mg by mouth Daily., Disp: 90 tablet, Rfl: 1    dilTIAZem CD (CARDIZEM CD) 180 MG 24 hr capsule, Take 2 capsules by mouth Daily., Disp: 90 capsule, Rfl: 1    furosemide (LASIX) 20 MG tablet, Take 1 tablet by mouth Every Other Day., Disp: 45 tablet, Rfl: 1    glucose blood test strip, Use to check blood sugar 3 times daily., Disp: 300 each, Rfl: 2    hydrALAZINE (APRESOLINE) 100 MG tablet, Take 1 tablet by mouth 3 (Three) Times a Day., Disp: 270 tablet, Rfl: 1    Ibuprofen 3 %, Gabapentin 10 %, Baclofen 2 %, lidocaine 4 %, Apply 1-2 g topically to the appropriate area as directed 3 (Three) to 4 (Four) times daily., Disp: 90 g, Rfl: 2    Insulin Disposable Pump (Omnipod 5 [id number] Pods Gen 5) misc, Use 1 package Every Other Day., Disp: 15 each, Rfl: 6    Insulin Lispro (humaLOG) 100 UNIT/ML injection, For use in insulin pump. Max daily dose 100 units daily, Disp: 30 mL, Rfl: 12    Lancets Misc. (Accu-Chek FastClix Lancet) kit, Use to check blood sugar three times daily., Disp: 1 kit, Rfl: 0    metoprolol succinate XL (TOPROL-XL) 100 MG 24 hr tablet, Take 1 tablet by mouth 2 (Two) Times a Day., Disp: 180 tablet, Rfl: 1    ondansetron (Zofran) 4 MG tablet, Take 1 tablet by mouth Every 8  (Eight) Hours As Needed for Nausea or Vomiting., Disp: 20 tablet, Rfl: 0    pantoprazole (PROTONIX) 40 MG EC tablet, Take 1 tablet by mouth Daily., Disp: 90 tablet, Rfl: 3    sodium bicarbonate 650 MG tablet, Take 2 tablets by mouth Daily., Disp: 180 tablet, Rfl: 3    sodium zirconium cyclosilicate (Lokelma) 10 g packet, Take 1 packet (10 g) by mouth Daily for 3 days. Mix in 45mL of water and drink immediately. Rinse glass with water and drink for full dose., Disp: 30 each, Rfl: 1    tiotropium bromide-olodaterol (STIOLTO RESPIMAT) 2.5-2.5 MCG/ACT aerosol solution inhaler, Inhale 2 puffs Daily., Disp: 1 each, Rfl: 0    isosorbide mononitrate (IMDUR) 30 MG 24 hr tablet, Take 1 tablet by mouth Daily., Disp: 90 tablet, Rfl: 3      Social History     Socioeconomic History    Marital status:    Tobacco Use    Smoking status: Former     Current packs/day: 0.00     Average packs/day: 2.0 packs/day for 36.0 years (72.0 ttl pk-yrs)     Types: Cigarettes     Start date: 1972     Quit date: 2008     Years since quittin.2     Passive exposure: Past    Smokeless tobacco: Never   Vaping Use    Vaping status: Never Used   Substance and Sexual Activity    Alcohol use: No    Drug use: No    Sexual activity: Yes     Partners: Male     Birth control/protection: None, Tubal ligation, Hysterectomy         Review of Systems   Constitutional: Negative for chills and fever.   HENT:  Negative for ear discharge and nosebleeds.    Eyes:  Negative for discharge and redness.   Cardiovascular:  Negative for chest pain, orthopnea, palpitations, paroxysmal nocturnal dyspnea and syncope.   Respiratory:  Positive for shortness of breath. Negative for cough and wheezing.    Endocrine: Negative for heat intolerance.   Skin:  Negative for rash.   Musculoskeletal:  Negative for arthritis and myalgias.   Gastrointestinal:  Negative for abdominal pain, melena, nausea and vomiting.   Genitourinary:  Negative for dysuria and hematuria.  "  Neurological:  Negative for dizziness, light-headedness, numbness and tremors.   Psychiatric/Behavioral:  Negative for depression. The patient is not nervous/anxious.        Procedures    Procedures    No orders to display           Objective:    /85 (BP Location: Right arm, Patient Position: Sitting, Cuff Size: Large Adult)   Pulse 62   Resp 18   Ht 162 cm (63.78\")   Wt 82.1 kg (181 lb)   LMP  (LMP Unknown)   SpO2 97%   BMI 31.28 kg/m²         Constitutional:       Appearance: Well-developed.   Eyes:      General: No scleral icterus.        Right eye: No discharge.   HENT:      Head: Normocephalic and atraumatic.   Neck:      Thyroid: No thyromegaly.      Lymphadenopathy: No cervical adenopathy.   Pulmonary:      Effort: Pulmonary effort is normal. No respiratory distress.      Breath sounds: Normal breath sounds. No wheezing. No rales.   Cardiovascular:      Normal rate. Regular rhythm.      No gallop.    Edema:     Peripheral edema absent.   Abdominal:      Tenderness: There is no abdominal tenderness.   Skin:     Findings: No erythema or rash.   Neurological:      Mental Status: Alert and oriented to person, place, and time.             Assessment:       Diagnosis Plan   1. Presence of biventricular  ICD  dilTIAZem CD (CARDIZEM CD) 180 MG 24 hr capsule    isosorbide mononitrate (IMDUR) 30 MG 24 hr tablet      2. Dilated cardiomyopathy  dilTIAZem CD (CARDIZEM CD) 180 MG 24 hr capsule    isosorbide mononitrate (IMDUR) 30 MG 24 hr tablet      3. Essential hypertension  dilTIAZem CD (CARDIZEM CD) 180 MG 24 hr capsule    isosorbide mononitrate (IMDUR) 30 MG 24 hr tablet      4. Mixed hyperlipidemia  dilTIAZem CD (CARDIZEM CD) 180 MG 24 hr capsule    isosorbide mononitrate (IMDUR) 30 MG 24 hr tablet      5. Type 2 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  dilTIAZem CD (CARDIZEM CD) 180 MG 24 hr capsule    isosorbide mononitrate (IMDUR) 30 MG 24 hr tablet               Plan:     "   MDM:    1.  Hypertension:    Increase diltiazem to 360 mg daily.    2.  Mixed hyperlipidemia:    Patient is on Lipitor.  Recent LDL is 64 which is desirable    3.  S/p ICD:    It is functioning appropriately no change in programming    4.  Cardiomyopathy dilated:    Patient had severe left ventricular dysfunction which has improved.  Continue current therapy

## 2025-03-19 ENCOUNTER — CLINICAL SUPPORT NO REQUIREMENTS (OUTPATIENT)
Dept: CARDIOLOGY | Facility: CLINIC | Age: 65
End: 2025-03-19
Payer: MEDICARE

## 2025-03-19 ENCOUNTER — OFFICE VISIT (OUTPATIENT)
Dept: CARDIOLOGY | Facility: CLINIC | Age: 65
End: 2025-03-19
Payer: MEDICARE

## 2025-03-19 VITALS
SYSTOLIC BLOOD PRESSURE: 180 MMHG | RESPIRATION RATE: 18 BRPM | OXYGEN SATURATION: 97 % | HEART RATE: 62 BPM | BODY MASS INDEX: 30.9 KG/M2 | HEIGHT: 64 IN | DIASTOLIC BLOOD PRESSURE: 85 MMHG | WEIGHT: 181 LBS

## 2025-03-19 DIAGNOSIS — I10 ESSENTIAL HYPERTENSION: ICD-10-CM

## 2025-03-19 DIAGNOSIS — E78.2 MIXED HYPERLIPIDEMIA: ICD-10-CM

## 2025-03-19 DIAGNOSIS — Z95.810 PRESENCE OF BIVENTRICULAR IMPLANTABLE CARDIOVERTER-DEFIBRILLATOR (ICD): Primary | Chronic | ICD-10-CM

## 2025-03-19 DIAGNOSIS — E11.49 TYPE 2 DIABETES MELLITUS WITH OTHER DIABETIC NEUROLOGICAL COMPLICATION: ICD-10-CM

## 2025-03-19 DIAGNOSIS — I42.0 DILATED CARDIOMYOPATHY: Chronic | ICD-10-CM

## 2025-03-19 RX ORDER — DILTIAZEM HYDROCHLORIDE 180 MG/1
360 CAPSULE, COATED, EXTENDED RELEASE ORAL DAILY
Qty: 90 CAPSULE | Refills: 1 | Status: SHIPPED | OUTPATIENT
Start: 2025-03-19

## 2025-03-19 RX ORDER — ISOSORBIDE MONONITRATE 30 MG/1
30 TABLET, EXTENDED RELEASE ORAL DAILY
Qty: 90 TABLET | Refills: 3 | Status: SHIPPED | OUTPATIENT
Start: 2025-03-19

## 2025-03-20 RX ORDER — AZELASTINE HYDROCHLORIDE 137 UG/1
2 SPRAY, METERED NASAL DAILY
Qty: 90 ML | Refills: 1 | Status: SHIPPED | OUTPATIENT
Start: 2025-03-20

## 2025-03-21 NOTE — PROGRESS NOTES
DEVICE INTERROGATION:  IN OFFICE    DEVICE TYPE:   Biventricular ICD    :   [employer]    BATTERY:  Stable    TIME TO ELECTIVE REPLACEMENT INDICATORS:   5.8 years    CHARGE TIME:   9.3 seconds        LEAD DATA:       DEVICE REPROGRAMMED FOR TESTING      Atrial:   4.8 mV, 360 ohms, 0.5 V@0.5 ms    Ventricular:     RV programmed off    LV: 0.625 V at 1.0 ms, 900 ohms      Pacemaker dependent:   No      Atrial pacing percentage: 2.8%    Ventricular pacing percentage: 99%      Arrhythmia Logbook Reviewed: No A-fib, no VT or VF episode        Summary:    Stable Device Function    No significant arhythmia burden.     Battery status is stable.    Reviewed with: Dr. Murphy      NEXT IN OFFICE DEVICE CHECK DUE: Next visit    REMOTE DEVICE INTERROGATIONS: Ongoing

## 2025-04-01 RX ORDER — ATORVASTATIN CALCIUM 20 MG/1
20 TABLET, FILM COATED ORAL DAILY
Qty: 90 TABLET | Refills: 1 | Status: SHIPPED | OUTPATIENT
Start: 2025-04-01

## 2025-04-22 DIAGNOSIS — F41.8 MIXED ANXIETY DEPRESSIVE DISORDER: ICD-10-CM

## 2025-04-22 RX ORDER — ALPRAZOLAM 1 MG/1
1 TABLET ORAL 2 TIMES DAILY
Qty: 60 TABLET | Refills: 0 | Status: SHIPPED | OUTPATIENT
Start: 2025-04-22

## 2025-05-06 ENCOUNTER — LAB (OUTPATIENT)
Dept: LAB | Facility: HOSPITAL | Age: 65
End: 2025-05-06
Payer: MEDICARE

## 2025-05-06 ENCOUNTER — OFFICE VISIT (OUTPATIENT)
Dept: PULMONOLOGY | Facility: HOSPITAL | Age: 65
End: 2025-05-06
Payer: MEDICARE

## 2025-05-06 ENCOUNTER — TRANSCRIBE ORDERS (OUTPATIENT)
Dept: ADMINISTRATIVE | Facility: HOSPITAL | Age: 65
End: 2025-05-06
Payer: MEDICARE

## 2025-05-06 VITALS
WEIGHT: 183 LBS | RESPIRATION RATE: 16 BRPM | OXYGEN SATURATION: 100 % | HEIGHT: 64 IN | DIASTOLIC BLOOD PRESSURE: 79 MMHG | SYSTOLIC BLOOD PRESSURE: 180 MMHG | BODY MASS INDEX: 31.24 KG/M2 | HEART RATE: 79 BPM

## 2025-05-06 DIAGNOSIS — I27.20 PULMONARY HYPERTENSION: Primary | ICD-10-CM

## 2025-05-06 DIAGNOSIS — R06.09 DYSPNEA ON EXERTION: ICD-10-CM

## 2025-05-06 DIAGNOSIS — M15.9 GENERALIZED OSTEOARTHROSIS, INVOLVING MULTIPLE SITES: ICD-10-CM

## 2025-05-06 DIAGNOSIS — J44.9 CHRONIC OBSTRUCTIVE PULMONARY DISEASE, UNSPECIFIED COPD TYPE: ICD-10-CM

## 2025-05-06 DIAGNOSIS — N18.32 CHRONIC KIDNEY DISEASE (CKD) STAGE G3B/A1, MODERATELY DECREASED GLOMERULAR FILTRATION RATE (GFR) BETWEEN 30-44 ML/MIN/1.73 SQUARE METER AND ALBUMINURIA CREATININE RATIO LESS THAN 30 MG/G (CMS/H*: ICD-10-CM

## 2025-05-06 DIAGNOSIS — E87.5 HYPERPOTASSEMIA: ICD-10-CM

## 2025-05-06 DIAGNOSIS — E55.9 AVITAMINOSIS D: ICD-10-CM

## 2025-05-06 DIAGNOSIS — E11.22 TYPE 2 DIABETES MELLITUS WITH DIABETIC CHRONIC KIDNEY DISEASE, UNSPECIFIED CKD STAGE, UNSPECIFIED WHETHER LONG TERM INSULIN USE: ICD-10-CM

## 2025-05-06 DIAGNOSIS — Z87.891 FORMER SMOKER: ICD-10-CM

## 2025-05-06 DIAGNOSIS — R80.9 PROTEINURIA, UNSPECIFIED TYPE: ICD-10-CM

## 2025-05-06 DIAGNOSIS — G47.33 OSA ON CPAP: ICD-10-CM

## 2025-05-06 DIAGNOSIS — E11.65 TYPE 2 DIABETES MELLITUS WITH HYPERGLYCEMIA, WITH LONG-TERM CURRENT USE OF INSULIN: ICD-10-CM

## 2025-05-06 DIAGNOSIS — E87.5 HYPERPOTASSEMIA: Primary | ICD-10-CM

## 2025-05-06 DIAGNOSIS — E78.5 HYPERLIPIDEMIA, UNSPECIFIED HYPERLIPIDEMIA TYPE: ICD-10-CM

## 2025-05-06 DIAGNOSIS — Z79.4 TYPE 2 DIABETES MELLITUS WITH HYPERGLYCEMIA, WITH LONG-TERM CURRENT USE OF INSULIN: ICD-10-CM

## 2025-05-06 LAB
25(OH)D3 SERPL-MCNC: 29.6 NG/ML (ref 30–100)
ALBUMIN SERPL-MCNC: 5.1 G/DL (ref 3.5–5.2)
ALBUMIN UR-MCNC: 10.7 MG/DL
ALBUMIN/GLOB SERPL: 1.8 G/DL
ALP SERPL-CCNC: 98 U/L (ref 39–117)
ALT SERPL W P-5'-P-CCNC: 23 U/L (ref 1–33)
ANION GAP SERPL CALCULATED.3IONS-SCNC: 13.2 MMOL/L (ref 5–15)
AST SERPL-CCNC: 19 U/L (ref 1–32)
BACTERIA UR QL AUTO: ABNORMAL /HPF
BASOPHILS # BLD AUTO: 0.1 10*3/MM3 (ref 0–0.2)
BASOPHILS NFR BLD AUTO: 1.4 % (ref 0–1.5)
BILIRUB SERPL-MCNC: 0.3 MG/DL (ref 0–1.2)
BILIRUB UR QL STRIP: NEGATIVE
BUN SERPL-MCNC: 30 MG/DL (ref 8–23)
BUN/CREAT SERPL: 23.3 (ref 7–25)
CALCIUM SPEC-SCNC: 10.3 MG/DL (ref 8.6–10.5)
CHLORIDE SERPL-SCNC: 105 MMOL/L (ref 98–107)
CK SERPL-CCNC: 67 U/L (ref 20–180)
CLARITY UR: CLEAR
CO2 SERPL-SCNC: 26.8 MMOL/L (ref 22–29)
COLOR UR: YELLOW
CREAT SERPL-MCNC: 1.29 MG/DL (ref 0.57–1)
CREAT UR-MCNC: 7.3 MG/DL
CREAT UR-MCNC: 8.1 MG/DL
DEPRECATED RDW RBC AUTO: 52.7 FL (ref 37–54)
EGFRCR SERPLBLD CKD-EPI 2021: 45.9 ML/MIN/1.73
EOSINOPHIL # BLD AUTO: 0.3 10*3/MM3 (ref 0–0.4)
EOSINOPHIL NFR BLD AUTO: 4.3 % (ref 0.3–6.2)
ERYTHROCYTE [DISTWIDTH] IN BLOOD BY AUTOMATED COUNT: 18.4 % (ref 12.3–15.4)
GLOBULIN UR ELPH-MCNC: 2.9 GM/DL
GLUCOSE SERPL-MCNC: 150 MG/DL (ref 65–99)
GLUCOSE UR STRIP-MCNC: ABNORMAL MG/DL
HBA1C MFR BLD: 6.76 % (ref 4.8–5.6)
HCT VFR BLD AUTO: 41.8 % (ref 34–46.6)
HGB BLD-MCNC: 11.7 G/DL (ref 12–15.9)
HGB UR QL STRIP.AUTO: NEGATIVE
HYALINE CASTS UR QL AUTO: ABNORMAL /LPF
IMM GRANULOCYTES # BLD AUTO: 0.08 10*3/MM3 (ref 0–0.05)
IMM GRANULOCYTES NFR BLD AUTO: 1.2 % (ref 0–0.5)
KETONES UR QL STRIP: NEGATIVE
LEUKOCYTE ESTERASE UR QL STRIP.AUTO: ABNORMAL
LYMPHOCYTES # BLD AUTO: 1.4 10*3/MM3 (ref 0.7–3.1)
LYMPHOCYTES NFR BLD AUTO: 20.3 % (ref 19.6–45.3)
MCH RBC QN AUTO: 22.6 PG (ref 26.6–33)
MCHC RBC AUTO-ENTMCNC: 28 G/DL (ref 31.5–35.7)
MCV RBC AUTO: 80.9 FL (ref 79–97)
MICROALBUMIN/CREAT UR: 1321 MG/G (ref 0–29)
MONOCYTES # BLD AUTO: 0.7 10*3/MM3 (ref 0.1–0.9)
MONOCYTES NFR BLD AUTO: 10.1 % (ref 5–12)
NEUTROPHILS NFR BLD AUTO: 4.32 10*3/MM3 (ref 1.7–7)
NEUTROPHILS NFR BLD AUTO: 62.7 % (ref 42.7–76)
NITRITE UR QL STRIP: NEGATIVE
NRBC BLD AUTO-RTO: 0 /100 WBC (ref 0–0.2)
PH UR STRIP.AUTO: <=5 [PH] (ref 5–8)
PHOSPHATE SERPL-MCNC: 4.7 MG/DL (ref 2.5–4.5)
PLATELET # BLD AUTO: 305 10*3/MM3 (ref 140–450)
PMV BLD AUTO: 10.6 FL (ref 6–12)
POTASSIUM SERPL-SCNC: 4.7 MMOL/L (ref 3.5–5.2)
PROT ?TM UR-MCNC: 17 MG/DL
PROT SERPL-MCNC: 8 G/DL (ref 6–8.5)
PROT UR QL STRIP: ABNORMAL
PROT/CREAT UR: 2328.8 MG/G CREA (ref 0–200)
PTH-INTACT SERPL-MCNC: 101 PG/ML (ref 15–65)
RBC # BLD AUTO: 5.17 10*6/MM3 (ref 3.77–5.28)
RBC # UR STRIP: ABNORMAL /HPF
REF LAB TEST METHOD: ABNORMAL
SODIUM SERPL-SCNC: 145 MMOL/L (ref 136–145)
SP GR UR STRIP: 1.01 (ref 1–1.03)
SQUAMOUS #/AREA URNS HPF: ABNORMAL /HPF
URATE SERPL-MCNC: 5.3 MG/DL (ref 2.4–5.7)
UROBILINOGEN UR QL STRIP: ABNORMAL
WBC # UR STRIP: ABNORMAL /HPF
WBC NRBC COR # BLD AUTO: 6.9 10*3/MM3 (ref 3.4–10.8)

## 2025-05-06 PROCEDURE — 81001 URINALYSIS AUTO W/SCOPE: CPT

## 2025-05-06 PROCEDURE — 82570 ASSAY OF URINE CREATININE: CPT

## 2025-05-06 PROCEDURE — 36415 COLL VENOUS BLD VENIPUNCTURE: CPT

## 2025-05-06 PROCEDURE — 85025 COMPLETE CBC W/AUTO DIFF WBC: CPT

## 2025-05-06 PROCEDURE — 82306 VITAMIN D 25 HYDROXY: CPT

## 2025-05-06 PROCEDURE — 84550 ASSAY OF BLOOD/URIC ACID: CPT

## 2025-05-06 PROCEDURE — 83036 HEMOGLOBIN GLYCOSYLATED A1C: CPT

## 2025-05-06 PROCEDURE — 84156 ASSAY OF PROTEIN URINE: CPT

## 2025-05-06 PROCEDURE — 82043 UR ALBUMIN QUANTITATIVE: CPT

## 2025-05-06 PROCEDURE — 83970 ASSAY OF PARATHORMONE: CPT

## 2025-05-06 PROCEDURE — 82550 ASSAY OF CK (CPK): CPT

## 2025-05-06 PROCEDURE — 80053 COMPREHEN METABOLIC PANEL: CPT

## 2025-05-06 PROCEDURE — 84100 ASSAY OF PHOSPHORUS: CPT

## 2025-05-06 PROCEDURE — G0463 HOSPITAL OUTPT CLINIC VISIT: HCPCS

## 2025-05-06 RX ORDER — UMECLIDINIUM BROMIDE AND VILANTEROL TRIFENATATE 62.5; 25 UG/1; UG/1
1 POWDER RESPIRATORY (INHALATION)
Qty: 180 EACH | Refills: 4 | Status: SHIPPED | OUTPATIENT
Start: 2025-05-06

## 2025-05-06 NOTE — PROGRESS NOTES
HPI:  Patient is here for pulmonary and sleep follow-up.  Patient reports shortness of breath on exertion, mainly if she takes 2 or more flights of stairs  She has chronic sinusitis and the postnasal drainage can get worse with the seasonal variation.  She quit smoking 2008.  She is using her CPAP machine and symptoms are controlled, no snoring    Past Medical History:   Diagnosis Date    Allergic 2008    Weeds, grasses, mold, mildew, etc.    Anxiety 2000    Breast cancer     LEFT BREAST 7/2021    Bursitis of hip 2024    Cataract     Had surgery to remove August 2023    CHF (congestive heart failure)     Cholesterol blood reduced 2000    Colon polyp 12/2023    Removed during colonoscopy    COPD (chronic obstructive pulmonary disease) 2024    Coronary artery disease 2019    CTS (carpal tunnel syndrome) 2015    Surgery on both hands    Depression     DM (diabetes mellitus)     GERD (gastroesophageal reflux disease)     History of medical problems 2008    Bladder prolapse repeat Dr. Roper    HL (hearing loss)     Hyperlipidemia     Hypertension     SAW CARDIO/ STRESS TEST 2020 - NOW MEDS MANAGED BY PCP     Insulin dependent diabetes mellitus 2008    Irritable bowel syndrome 2006    Peripheral neuropathy     PONV (postoperative nausea and vomiting)     Renal insufficiency 2020    See Dr. funes for moderate kidney failure    Sleep apnea     WEARS CPAP    Sleep apnea, obstructive     Type 2 diabetes mellitus 2008    Urinary tract infection 6/23    UTI seen at [facility]        Current Outpatient Medications on File Prior to Visit   Medication Sig Dispense Refill    Accu-Chek FastClix Lancets misc Use to check blood sugar three times daily. 300 each 5    ALPRAZolam (XANAX) 1 MG tablet Take 1 tablet by mouth 2 (Two) Times a Day. 60 tablet 0    atorvastatin (LIPITOR) 20 MG tablet Take 1 tablet by mouth Daily. 90 tablet 1    Azelastine HCl 137 MCG/SPRAY solution Spray 2 sprays in each nostril Daily. 90 mL 1     cetirizine (zyrTEC) 10 MG tablet Take 1 tablet by mouth Daily As Needed.      Continuous Glucose Sensor (Dexcom G7 Sensor) misc Use 1 each Every 10 (Ten) Days. 9 each 3    dapagliflozin Propanediol (Farxiga) 10 MG tablet Take 10 mg by mouth Daily. 90 tablet 1    dilTIAZem CD (CARDIZEM CD) 180 MG 24 hr capsule Take 2 capsules by mouth Daily. 90 capsule 1    furosemide (LASIX) 20 MG tablet Take 1 tablet by mouth Every Other Day. 45 tablet 1    glucose blood test strip Use to check blood sugar 3 times daily. 300 each 2    hydrALAZINE (APRESOLINE) 100 MG tablet Take 1 tablet by mouth 3 (Three) Times a Day. 270 tablet 1    Insulin Disposable Pump (Omnipod 5 [id number] Pods Gen 5) misc Use 1 pod Every Other Day. 15 each 6    Insulin Lispro (humaLOG) 100 UNIT/ML injection For use in insulin pump. Max daily dose 100 units daily 30 mL 12    isosorbide mononitrate (IMDUR) 30 MG 24 hr tablet Take 1 tablet by mouth Daily. 90 tablet 3    Lancets Mis. (Accu-Chek FastClix Lancet) kit Use to check blood sugar three times daily. 1 kit 0    metoprolol succinate XL (TOPROL-XL) 100 MG 24 hr tablet Take 1 tablet by mouth 2 (Two) Times a Day. 180 tablet 1    ondansetron (Zofran) 4 MG tablet Take 1 tablet by mouth Every 8 (Eight) Hours As Needed for Nausea or Vomiting. 20 tablet 0    pantoprazole (PROTONIX) 40 MG EC tablet Take 1 tablet by mouth Daily. 90 tablet 3    sodium bicarbonate 650 MG tablet Take 2 tablets by mouth Daily. 180 tablet 3    sodium zirconium cyclosilicate (Lokelma) 10 g packet Take 1 packet (10 g) by mouth Daily for 3 days. Mix in 45mL of water and drink immediately. Rinse glass with water and drink for full dose. 30 each 1    tiotropium bromide-olodaterol (STIOLTO RESPIMAT) 2.5-2.5 MCG/ACT aerosol solution inhaler Inhale 2 puffs Daily. 1 each 0    [DISCONTINUED] Blood Glucose Monitoring Suppl w/Device kit Use to check blood sugar 3 times daily. 1 each 0    [DISCONTINUED] hydroCHLOROthiazide (HYDRODIURIL) 25 MG tablet  Take 1 tablet by mouth Daily. 90 tablet 2    [DISCONTINUED] Ibuprofen 3 %, Gabapentin 10 %, Baclofen 2 %, lidocaine 4 % Apply 1-2 g topically to the appropriate area as directed 3 (Three) to 4 (Four) times daily. 90 g 2    [DISCONTINUED] losartan (Cozaar) 100 MG tablet Take 1 tablet by mouth Daily. 90 tablet 2     No current facility-administered medications on file prior to visit.        Social History     Tobacco Use    Smoking status: Former     Current packs/day: 0.00     Average packs/day: 2.0 packs/day for 36.0 years (72.0 ttl pk-yrs)     Types: Cigarettes     Start date: 1972     Quit date: 2008     Years since quittin.3     Passive exposure: Past    Smokeless tobacco: Never   Vaping Use    Vaping status: Never Used   Substance Use Topics    Alcohol use: No    Drug use: No        Family History   Problem Relation Age of Onset    Diabetes Mother     Heart disease Mother     Sleep apnea Mother     Snoring Mother     COPD Mother     Hyperlipidemia Mother     Alcohol abuse Mother     Anxiety disorder Mother     Depression Mother     Hypertension Mother     Other Mother     Alzheimer's disease Father     Diabetes Father     Heart disease Father     Sleep apnea Father     Snoring Father     Hyperlipidemia Father     Heart failure Father     Kidney disease Father     Hypertension Father     Obesity Father     Heart attack Father     Other Father     Hearing loss Father     Diabetes Sister     Heart disease Sister     Sleep apnea Sister     Snoring Sister     COPD Sister     Anxiety disorder Sister     Depression Sister     Hypertension Sister     Clotting disorder Sister     Hyperlipidemia Sister     Other Sister     Hyperlipidemia Sister     Clotting disorder Sister     Heart disease Sister     Hypertension Sister     Hyperlipidemia Brother     Hypertension Brother     Heart disease Brother     Breast cancer Maternal Aunt 80    COPD Maternal Aunt         2 maternal Aunts    Breast cancer Paternal Aunt      Diabetes Maternal Grandmother     Breast cancer Paternal Grandmother 60    Cancer Paternal Grandmother     Diabetes Paternal Grandmother     Hyperlipidemia Son     Diabetes Brother     Hypertension Brother     Hypertension Brother     Malig Hyperthermia Neg Hx         Review of system:  Constitutional: Negative for chills, fever and malaise/fatigue.   HENT: Negative.    Eyes: Negative.    Cardiovascular: Negative.    Respiratory: negative for cough and shortness of breath.    Skin: Negative.    Musculoskeletal: Negative.    Gastrointestinal: Negative.    Genitourinary: Negative.    Neurological: Negative.    Psychiatric/Behavioral: Negative.    Physical exam:  not currently breastfeeding.    General Appearance:  Alert   HEENT:  Normocephalic, without obvious abnormality, Conjunctiva/corneas clear,.   Nares normal, no drainage     Neck:  Supple, symmetrical, trachea midline. No JVD.  Lungs /Chest wall:   good air entry Bilaterlly, respirations unlabored, symmetrical wall movement.     Heart:  Regular rate and rhythm, S1 S2 normal  Abdomen: Soft, non-tender, no masses, no organomegaly.    Extremities: No edema, no clubbing or cyanosis    CT Abdomen Pelvis Without Contrast  Result Date: 3/9/2025  No acute abdominal or pelvic pathology. Electronically Signed: Murali Dhaliwal MD  3/9/2025 4:29 PM EDT  Workstation ID: [id number]     Results for orders placed during the hospital encounter of 01/30/24    Adult Transthoracic Echo Complete W/ Cont if Necessary Per Protocol    Interpretation Summary    Left ventricular systolic function is normal. Left ventricular ejection fraction appears to be 56 - 60%.    Left ventricular diastolic function is consistent with (grade I) impaired relaxation.    Estimated right ventricular systolic pressure from tricuspid regurgitation is mildly elevated (35-45 mmHg).        Assessment and plan:  Dyspnea on exertion  Mild COPD: PFTs August 2024 with mild obstruction: FEV1/FVC 80%, FVC 75%, FEV1  79%, TLC 98%, DLCO 88%  Former smoker quit 2008  Dilated CMP, S/P ICD/Pacer placement  Diastolic dysfunction  Mild pulmonary hypertension on echo 1/30/2024 RV estimated pressure 35-45 mmHg  HTN, DM  ANDREAS : Compliance 81.1% more than 4 hours, residual AHI 2     Plan:     Resume Anoro inhaler  Albuterol as needed    Keep follow-up with cardiology .  Repeat echocardiogram to check on the pulmonary hypertension    Continue auto CPAP 6-16 cm H2O  SAFETY DRIVING  ADEQUATE SLEEP HYGEINE  DISCUSSED CARDIOVASCULAR & METABOLIC SIDE EFFECTS OF UNTREATED ANDREAS  PATIENT IS COMPLIANT USING MACHINE AND BENEFITS FROM THERAPY, , PATIENT HAS NASAL DRYNESS AND WILL NEED HEATED HUMIDITY WITH PAP

## 2025-05-12 DIAGNOSIS — Z95.810 PRESENCE OF AUTOMATIC (IMPLANTABLE) CARDIAC DEFIBRILLATOR: ICD-10-CM

## 2025-05-12 DIAGNOSIS — I10 ESSENTIAL (PRIMARY) HYPERTENSION: ICD-10-CM

## 2025-05-12 RX ORDER — HYDRALAZINE HYDROCHLORIDE 100 MG/1
100 TABLET, FILM COATED ORAL 3 TIMES DAILY
Qty: 270 TABLET | Refills: 1 | Status: SHIPPED | OUTPATIENT
Start: 2025-05-12

## 2025-05-13 RX ORDER — METOPROLOL SUCCINATE 100 MG/1
100 TABLET, EXTENDED RELEASE ORAL 2 TIMES DAILY
Qty: 180 TABLET | Refills: 1 | Status: SHIPPED | OUTPATIENT
Start: 2025-05-13

## 2025-05-22 ENCOUNTER — HOSPITAL ENCOUNTER (OUTPATIENT)
Dept: CARDIOLOGY | Facility: HOSPITAL | Age: 65
Discharge: HOME | End: 2025-05-22
Admitting: INTERNAL MEDICINE
Payer: MEDICARE

## 2025-05-22 VITALS
BODY MASS INDEX: 31.24 KG/M2 | DIASTOLIC BLOOD PRESSURE: 64 MMHG | WEIGHT: 183 LBS | HEIGHT: 64 IN | SYSTOLIC BLOOD PRESSURE: 146 MMHG

## 2025-05-22 DIAGNOSIS — I27.20 PULMONARY HYPERTENSION: ICD-10-CM

## 2025-05-22 PROCEDURE — 93306 TTE W/DOPPLER COMPLETE: CPT

## 2025-05-22 PROCEDURE — 93356 MYOCRD STRAIN IMG SPCKL TRCK: CPT

## 2025-05-23 LAB
AORTIC DIMENSIONLESS INDEX: 0.65 (DI)
AV MEAN PRESS GRAD SYS DOP V1V2: 4 MMHG
AV VMAX SYS DOP: 143 CM/SEC
BH CV ECHO LEFT VENTRICLE GLOBAL LONGITUDINAL STRAIN: -17.3 %
BH CV ECHO MEAS - AI P1/2T: 351.8 MSEC
BH CV ECHO MEAS - AO MAX PG: 8.2 MMHG
BH CV ECHO MEAS - AO V2 VTI: 34.3 CM
BH CV ECHO MEAS - AVA(I,D): 1.84 CM2
BH CV ECHO MEAS - EDV(CUBED): 97.3 ML
BH CV ECHO MEAS - EDV(MOD-SP4): 131 ML
BH CV ECHO MEAS - EF(MOD-SP4): 56.9 %
BH CV ECHO MEAS - ESV(CUBED): 22 ML
BH CV ECHO MEAS - ESV(MOD-SP4): 56.4 ML
BH CV ECHO MEAS - FS: 39.1 %
BH CV ECHO MEAS - IVS/LVPW: 1 CM
BH CV ECHO MEAS - IVSD: 1.1 CM
BH CV ECHO MEAS - LA DIMENSION: 3.4 CM
BH CV ECHO MEAS - LAT PEAK E' VEL: 7.3 CM/SEC
BH CV ECHO MEAS - LV DIASTOLIC VOL/BSA (35-75): 69.5 CM2
BH CV ECHO MEAS - LV MASS(C)D: 181.2 GRAMS
BH CV ECHO MEAS - LV MAX PG: 3.3 MMHG
BH CV ECHO MEAS - LV MEAN PG: 2 MMHG
BH CV ECHO MEAS - LV SYSTOLIC VOL/BSA (12-30): 29.9 CM2
BH CV ECHO MEAS - LV V1 MAX: 90.7 CM/SEC
BH CV ECHO MEAS - LV V1 VTI: 22.3 CM
BH CV ECHO MEAS - LVIDD: 4.6 CM
BH CV ECHO MEAS - LVIDS: 2.8 CM
BH CV ECHO MEAS - LVOT AREA: 2.8 CM2
BH CV ECHO MEAS - LVOT DIAM: 1.9 CM
BH CV ECHO MEAS - LVPWD: 1.1 CM
BH CV ECHO MEAS - MED PEAK E' VEL: 8.1 CM/SEC
BH CV ECHO MEAS - MR MAX PG: 63.4 MMHG
BH CV ECHO MEAS - MR MAX VEL: 398 CM/SEC
BH CV ECHO MEAS - MV A MAX VEL: 123 CM/SEC
BH CV ECHO MEAS - MV DEC SLOPE: 510 CM/SEC2
BH CV ECHO MEAS - MV DEC TIME: 0.22 SEC
BH CV ECHO MEAS - MV E MAX VEL: 112 CM/SEC
BH CV ECHO MEAS - MV E/A: 0.91
BH CV ECHO MEAS - MV MAX PG: 5.8 MMHG
BH CV ECHO MEAS - MV MEAN PG: 3 MMHG
BH CV ECHO MEAS - MV P1/2T: 72.9 MSEC
BH CV ECHO MEAS - MV V2 VTI: 36.8 CM
BH CV ECHO MEAS - MVA(P1/2T): 3 CM2
BH CV ECHO MEAS - MVA(VTI): 1.72 CM2
BH CV ECHO MEAS - PA ACC TIME: 0.14 SEC
BH CV ECHO MEAS - PA V2 MAX: 99.8 CM/SEC
BH CV ECHO MEAS - RV MAX PG: 1.55 MMHG
BH CV ECHO MEAS - RV V1 MAX: 62.3 CM/SEC
BH CV ECHO MEAS - RV V1 VTI: 14 CM
BH CV ECHO MEAS - SV(LVOT): 63.2 ML
BH CV ECHO MEAS - SV(MOD-SP4): 74.6 ML
BH CV ECHO MEAS - SVI(LVOT): 33.6 ML/M2
BH CV ECHO MEAS - SVI(MOD-SP4): 39.6 ML/M2
BH CV ECHO MEAS - TAPSE (>1.6): 2.06 CM
BH CV ECHO MEAS - TR MAX PG: 27.9 MMHG
BH CV ECHO MEAS - TR MAX VEL: 264 CM/SEC
BH CV ECHO MEASUREMENTS AVERAGE E/E' RATIO: 14.55
BH CV XLRA - TDI S': 12.1 CM/SEC
LV EF BIPLANE MOD: 57 %
SINUS: 2.7 CM
STJ: 2.2 CM

## 2025-05-29 ENCOUNTER — OFFICE VISIT (OUTPATIENT)
Dept: ENDOCRINOLOGY | Facility: CLINIC | Age: 65
End: 2025-05-29
Payer: MEDICARE

## 2025-05-29 VITALS
DIASTOLIC BLOOD PRESSURE: 80 MMHG | BODY MASS INDEX: 31.72 KG/M2 | HEART RATE: 79 BPM | HEIGHT: 64 IN | WEIGHT: 185.8 LBS | OXYGEN SATURATION: 99 % | SYSTOLIC BLOOD PRESSURE: 140 MMHG

## 2025-05-29 DIAGNOSIS — E11.21 DIABETIC NEPHROPATHY ASSOCIATED WITH TYPE 2 DIABETES MELLITUS: ICD-10-CM

## 2025-05-29 DIAGNOSIS — E11.42 DIABETIC PERIPHERAL NEUROPATHY: ICD-10-CM

## 2025-05-29 DIAGNOSIS — Z79.4 TYPE 2 DIABETES MELLITUS WITH HYPERGLYCEMIA, WITH LONG-TERM CURRENT USE OF INSULIN: Primary | ICD-10-CM

## 2025-05-29 DIAGNOSIS — E66.9 OBESITY (BMI 30-39.9): ICD-10-CM

## 2025-05-29 DIAGNOSIS — E11.65 TYPE 2 DIABETES MELLITUS WITH HYPERGLYCEMIA, WITH LONG-TERM CURRENT USE OF INSULIN: Primary | ICD-10-CM

## 2025-05-29 PROCEDURE — 3044F HG A1C LEVEL LT 7.0%: CPT | Performed by: INTERNAL MEDICINE

## 2025-05-29 PROCEDURE — 1159F MED LIST DOCD IN RCRD: CPT | Performed by: INTERNAL MEDICINE

## 2025-05-29 PROCEDURE — 95251 CONT GLUC MNTR ANALYSIS I&R: CPT | Performed by: INTERNAL MEDICINE

## 2025-05-29 PROCEDURE — 99214 OFFICE O/P EST MOD 30 MIN: CPT | Performed by: INTERNAL MEDICINE

## 2025-05-29 PROCEDURE — 3077F SYST BP >= 140 MM HG: CPT | Performed by: INTERNAL MEDICINE

## 2025-05-29 PROCEDURE — 3079F DIAST BP 80-89 MM HG: CPT | Performed by: INTERNAL MEDICINE

## 2025-05-29 PROCEDURE — 1160F RVW MEDS BY RX/DR IN RCRD: CPT | Performed by: INTERNAL MEDICINE

## 2025-05-29 RX ORDER — CHLORAL HYDRATE 500 MG
CAPSULE ORAL
COMMUNITY

## 2025-05-29 NOTE — PROGRESS NOTES
-----------------------------------------------------------------  ENDOCRINE CLINIC NOTE  -----------------------------------------------------------------        PATIENT NAME: Susy Hanson  PATIENT : 1960 AGE: 64 y.o.  MRN NUMBER: [id number]  PRIMARY CARE: Erica Avila MD    ==========================================================================    CHIEF COMPLAINT: Type 2 diabetes management.  DATE OF SERVICE: 25    ==========================================================================    HPI / SUBJECTIVE    64 y.o. female is seen in the clinic today for type 2 diabetes mellitus.  Diagnosis Date:   Current Therapy / Medications:  Farxiga 10 mg p.o. daily  Omnipod 5 Pump:  Basal Rate 1.65 Units / hr  Bolus Rate  ISF 1:25  ICO 1:7  Insulin - Humalog  Active Insulin time 3 Hours  Dexcom G7  Target is 110  Past tried medications: (Not currently using)  Metformin - GI side-effects  Ozempic - hx of pancreatitis  Three to four meals a day  Following ophthalmology at Dr. Jones's office, last visit was 2025  Peripheral neuropathy and not on meds  Chronic kidney disease III with recurrent hyperkalemia  Hx of dilated cardiomyopathy    ==========================================================================                                                PAST MEDICAL HISTORY    Past Medical History:   Diagnosis Date    Allergic     Weeds, grasses, mold, mildew, etc.    Anxiety     Breast cancer     LEFT BREAST 2021    Bursitis of hip     Cataract     Had surgery to remove 2023    CHF (congestive heart failure)     Cholesterol blood reduced     Colon polyp 2023    Removed during colonoscopy    COPD (chronic obstructive pulmonary disease)     Coronary artery disease 2019    CTS (carpal tunnel syndrome) 2015    Surgery on both hands    Depression     DM (diabetes mellitus)     GERD (gastroesophageal reflux disease)     History of medical  problems 2008    Bladder prolapse repeat Dr. Roper    HL (hearing loss)     Hyperlipidemia     Hypertension     SAW CARDIO/ STRESS TEST 2020 - NOW MEDS MANAGED BY PCP     Insulin dependent diabetes mellitus 2008    Irritable bowel syndrome 2006    Peripheral neuropathy     PONV (postoperative nausea and vomiting)     Renal insufficiency 2020    See Dr. funes for moderate kidney failure    Sleep apnea     WEARS CPAP    Sleep apnea, obstructive     Type 2 diabetes mellitus 2008    Urinary tract infection 6/23    UTI seen at [facility]       ==========================================================================    PAST SURGICAL HISTORY    Past Surgical History:   Procedure Laterality Date    BLADDER REPAIR      BREAST AUGMENTATION      BREAST BIOPSY      BREAST RECONSTRUCTION Bilateral 08/26/2021    Procedure: BILATERAL PREPECTORALPLACMENT TISSUE EXPANDERS AND ALLODERM;  Surgeon: Heri Arreaga MD;  Location[facility] MAIN OR;  Service: Plastics;  Laterality: Bilateral;    BREAST TISSUE EXPANDER REMOVAL INSERTION OF IMPLANT Bilateral 12/23/2021    Procedure: BILATERAL REMOVAL TISSUE EXPANDERS AND PLACEMENT OF IMPLANTS;  Surgeon: Heri Arreaga MD;  Location:  [facility] MAIN OR;  Service: Plastics;  Laterality: Bilateral;    CARDIAC CATHETERIZATION Left 04/13/2022    Procedure: Cardiac Catheterization/Vascular Study;  Surgeon: Christo Murphy MD;  Location:  [facility];  Service: Cardiovascular;  Laterality: Left;    CARDIAC ELECTROPHYSIOLOGY PROCEDURE N/A 09/19/2022    Procedure: ICD implant [place] aware;  Surgeon: Esteban Kendrick MD;  Location:  [facility];  Service: Cardiology;  Laterality: N/A;    CARDIAC ELECTROPHYSIOLOGY PROCEDURE N/A 09/20/2022    Procedure: lead repositioning;  Surgeon: Esteban Kendrick MD;  Location:  [facility];  Service: Cardiology;  Laterality: N/A;    CARDIAC SURGERY  2022    Defibrillator/pacemaker implant     CARDIOVASCULAR STRESS TEST  2020    COLONOSCOPY  11/2012    EYE SURGERY  7/23    Cataracts removed    HAND SURGERY  2015    Both hands    HYSTERECTOMY      INSERT / REPLACE / REMOVE PACEMAKER  2022    LIPOMA EXCISION N/A 1/24/2025    Procedure: Neck lipoma excision;  Surgeon: Lita Garcia MD;  Location: [facility] MAIN OR;  Service: General;  Laterality: N/A;    LYMPH NODE BIOPSY  8/29/2021    MASTECTOMY W/ SENTINEL NODE BIOPSY Bilateral 08/26/2021    Procedure: bilateral total mastectomy, left axillary sentinel lymph node biopsy, possible reconstruction.;  Surgeon: Asia Perkins MD;  Location: [facility] MAIN OR;  Service: General;  Laterality: Bilateral;    TRIGGER POINT INJECTION  2012    Left thumb    TUBAL ABDOMINAL LIGATION  1981       ==========================================================================    FAMILY HISTORY    Family History   Problem Relation Age of Onset    Diabetes Mother     Heart disease Mother     Sleep apnea Mother     Snoring Mother     COPD Mother     Hyperlipidemia Mother     Alcohol abuse Mother     Anxiety disorder Mother     Depression Mother     Hypertension Mother     Other Mother     Alzheimer's disease Father     Diabetes Father     Heart disease Father     Sleep apnea Father     Snoring Father     Hyperlipidemia Father     Heart failure Father     Kidney disease Father     Hypertension Father     Obesity Father     Heart attack Father     Other Father     Hearing loss Father     Diabetes Sister     Heart disease Sister     Sleep apnea Sister     Snoring Sister     COPD Sister     Anxiety disorder Sister     Depression Sister     Hypertension Sister     Clotting disorder Sister     Hyperlipidemia Sister     Other Sister     Hyperlipidemia Sister     Clotting disorder Sister     Heart disease Sister     Hypertension Sister     Hyperlipidemia Brother     Hypertension Brother     Heart disease Brother     Breast cancer Maternal Aunt 80    COPD Maternal Aunt         2 maternal  Aunts    Breast cancer Paternal Aunt     Diabetes Maternal Grandmother     Breast cancer Paternal Grandmother 60    Cancer Paternal Grandmother     Diabetes Paternal Grandmother     Hyperlipidemia Son     Diabetes Brother     Hypertension Brother     Hypertension Brother     Malig Hyperthermia Neg Hx        ==========================================================================    SOCIAL HISTORY    Social History     Socioeconomic History    Marital status:    Tobacco Use    Smoking status: Former     Current packs/day: 0.00     Average packs/day: 2.0 packs/day for 36.0 years (72.0 ttl pk-yrs)     Types: Cigarettes     Start date: 1972     Quit date: 2008     Years since quittin.4     Passive exposure: Past    Smokeless tobacco: Never   Vaping Use    Vaping status: Never Used   Substance and Sexual Activity    Alcohol use: No    Drug use: No    Sexual activity: Yes     Partners: Male     Birth control/protection: None, Tubal ligation, Hysterectomy       ==========================================================================    MEDICATIONS      Current Outpatient Medications:     Accu-Chek FastClix Lancets misc, Use to check blood sugar three times daily., Disp: 300 each, Rfl: 5    ALPRAZolam (XANAX) 1 MG tablet, Take 1 tablet by mouth 2 (Two) Times a Day., Disp: 60 tablet, Rfl: 0    atorvastatin (LIPITOR) 20 MG tablet, Take 1 tablet by mouth Daily., Disp: 90 tablet, Rfl: 1    Azelastine HCl 137 MCG/SPRAY solution, Spray 2 sprays in each nostril Daily., Disp: 90 mL, Rfl: 1    cetirizine (zyrTEC) 10 MG tablet, Take 1 tablet by mouth Daily As Needed., Disp: , Rfl:     Continuous Glucose Sensor (Dexcom G7 Sensor) misc, Use 1 each Every 10 (Ten) Days., Disp: 9 each, Rfl: 3    dapagliflozin Propanediol (Farxiga) 10 MG tablet, Take 10 mg by mouth Daily., Disp: 90 tablet, Rfl: 1    dilTIAZem CD (CARDIZEM CD) 180 MG 24 hr capsule, Take 2 capsules by mouth Daily., Disp: 90 capsule, Rfl: 1     furosemide (LASIX) 20 MG tablet, Take 1 tablet by mouth Every Other Day., Disp: 45 tablet, Rfl: 1    glucose blood test strip, Use to check blood sugar 3 times daily., Disp: 300 each, Rfl: 2    hydrALAZINE (APRESOLINE) 100 MG tablet, Take 1 tablet by mouth 3 (Three) Times a Day., Disp: 270 tablet, Rfl: 1    Insulin Disposable Pump (Omnipod 5 [id number] Pods Gen 5) misc, Use 1 pod Every Other Day., Disp: 15 each, Rfl: 6    Insulin Lispro (humaLOG) 100 UNIT/ML injection, For use in insulin pump. Max daily dose 100 units daily, Disp: 30 mL, Rfl: 12    isosorbide mononitrate (IMDUR) 30 MG 24 hr tablet, Take 1 tablet by mouth Daily., Disp: 90 tablet, Rfl: 3    Lancets Misc. (Accu-Chek FastClix Lancet) kit, Use to check blood sugar three times daily., Disp: 1 kit, Rfl: 0    metoprolol succinate XL (TOPROL-XL) 100 MG 24 hr tablet, Take 1 tablet by mouth 2 (Two) Times a Day., Disp: 180 tablet, Rfl: 1    Omega-3 Fatty Acids (fish oil) 1000 MG capsule capsule, Take  by mouth Daily With Breakfast., Disp: , Rfl:     ondansetron (Zofran) 4 MG tablet, Take 1 tablet by mouth Every 8 (Eight) Hours As Needed for Nausea or Vomiting., Disp: 20 tablet, Rfl: 0    pantoprazole (PROTONIX) 40 MG EC tablet, Take 1 tablet by mouth Daily., Disp: 90 tablet, Rfl: 3    sodium bicarbonate 650 MG tablet, Take 2 tablets by mouth Daily., Disp: 180 tablet, Rfl: 3    Umeclidinium-Vilanterol (Anoro Ellipta) 62.5-25 MCG/ACT aerosol powder  inhaler, Inhale 1 puff by mouth Daily., Disp: 180 each, Rfl: 4    ==========================================================================    ALLERGIES    Allergies   Allergen Reactions    Hydrocodone-Acetaminophen Hives     Lortab, takes tylenol    Cat Dander Swelling       ==========================================================================    OBJECTIVE    Vitals:    05/29/25 0846   BP: 140/80   Pulse: 79   SpO2: 99%       Body mass index is 31.89 kg/m².     General: Alert, cooperative, no acute  distress    ==========================================================================    LAB EVALUATION    Lab Results   Component Value Date    GLUCOSE 150 (H) 05/06/2025    BUN 30 (H) 05/06/2025    CREATININE 1.29 (H) 05/06/2025    EGFRIFNONA 57 (L) 01/26/2022    EGFRIFAFRI >60 10/13/2017    BCR 23.3 05/06/2025    K 4.7 05/06/2025    CO2 26.8 05/06/2025    CALCIUM 10.3 05/06/2025    ALBUMIN 5.1 05/06/2025    AST 19 05/06/2025    ALT 23 05/06/2025    CHOL 146 08/05/2024    TRIG 254 (H) 08/05/2024    HDL 41 08/05/2024    LDL 64 08/05/2024     Lab Results   Component Value Date    HGBA1C 6.76 (H) 05/06/2025    HGBA1C 6.91 (H) 01/21/2025    HGBA1C 7.71 (H) 11/27/2024     Lab Results   Component Value Date    MICROALBUR 10.7 05/06/2025    CREATININE 1.29 (H) 05/06/2025     Lab Results   Component Value Date    TSH 1.500 01/25/2024     ==========================================================================    CGM Data:    Dates: May 16 till May 29, 2025    Very High > 250: 0%  High 180 - 250: 12%  Target: 70 - 180: 88%  Low 55 - 70:  0%  Very Low < 55: 0%    GMI 6.8% with average blood sugar of 144  On pump review patient is 97% in automated mode and 3% and manual mode  Total insulin requirement is 87.8 units and patient is spending 39% time and basal insulin that is 34.5 units and 61% and bolus dosing that is 53.2 units    ==========================================================================    ASSESSMENT AND PLAN    # Type 2 diabetes with hyperglycemia  # Diabetic peripheral neuropathy  # Diabetic nephropathy with CKD stage IIIb, following nephrology (Dr. Kerr) in the clinic  # Obesity with BMI 31.89    - Reviewed CGM data, continue same dosing  - Patient is not a good candidate for GLP-1 receptor agonist therapy or DPP 4 inhibitor therapy due to pancreatitis  - Continue Farxiga therapy  - In the meantime new adjusted insulin regimen:  Farxiga 10 mg p.o. daily  Omnipod 5 Pump:  Basal Rate 1.65 Units /  "hr  Bolus Rate  ISF 1:25  ICO 1:7  Insulin - Humalog  Active Insulin time 3 Hours  Dexcom G7  Target is 110  - Repeat blood work and clinical work up in 4 months    Return to clinic: 4 months    Entire assessment and plan was discussed and counseled the patient in detail to which patient verbalized understanding and agreed with care.  Answered all queries and concerns.    This note was created using voice recognition software and is inherently subject to errors including those of syntax and \"sound-alike\" substitutions which may escape proofreading.  In such instances, original meaning may be extrapolated by contextual derivation.    Note: Portions of this note may have been copied from previous notes but documentation have been reviewed and edited as necessary to support clinical decision making for today's visit.    ==========================================================================    INFORMATION PROVIDED TO PATIENT    Patient Instructions   Please,    - Continue Farxiga therapy without any changes.  - Continue use OmniPod pump along with Dexcom G7.  - Since you are on the insulin therapy please always some source of sugar with you in case of hypoglycemia/low blood sugar.    Nonfasting blood work before next visit.    Thank you for your visit today.    If you have any questions or concerns please feel free to reach out of the office.       ==========================================================================  Shin Helton MD  Department of Endocrine, Diabetes and Metabolism  [facility], [place]  ==========================================================================  "

## 2025-05-30 ENCOUNTER — TELEPHONE (OUTPATIENT)
Dept: PULMONOLOGY | Facility: HOSPITAL | Age: 65
End: 2025-05-30
Payer: MEDICARE

## 2025-05-30 NOTE — TELEPHONE ENCOUNTER
Pt called for results of her echo, I messaged Dr. Fischer and her echo showed no pulmonary hypertension impression: · Left ventricular ejection fraction appears to be 51 - 55%.  · The following left ventricular wall segments are hypokinetic: basal inferior.  · Left ventricular diastolic function was normal     LMOM FOR PT.

## 2025-06-05 DIAGNOSIS — E11.49 TYPE 2 DIABETES MELLITUS WITH OTHER DIABETIC NEUROLOGICAL COMPLICATION: ICD-10-CM

## 2025-06-05 RX ORDER — INSULIN PMP CART,AUT,G6/7,CNTR
1 EACH SUBCUTANEOUS
Qty: 15 EACH | Refills: 3 | Status: SHIPPED | OUTPATIENT
Start: 2025-06-05

## 2025-06-05 RX ORDER — CHLORAL HYDRATE 500 MG
1000 CAPSULE ORAL
Qty: 90 CAPSULE | Refills: 0 | Status: SHIPPED | OUTPATIENT
Start: 2025-06-05

## 2025-06-15 DIAGNOSIS — F41.8 MIXED ANXIETY DEPRESSIVE DISORDER: ICD-10-CM

## 2025-06-16 RX ORDER — ALPRAZOLAM 1 MG/1
1 TABLET ORAL 2 TIMES DAILY
Qty: 60 TABLET | Refills: 0 | Status: SHIPPED | OUTPATIENT
Start: 2025-06-16

## 2025-06-18 LAB
MC_CV_MDC_IDC_RATE_1: 150
MC_CV_MDC_IDC_RATE_1: 182
MC_CV_MDC_IDC_RATE_1: 222
MC_CV_MDC_IDC_SHOCK_MEASURED_IMPEDANCE: 81
MC_CV_MDC_IDC_THERAPIES: NORMAL
MC_CV_MDC_IDC_THERAPIES: NORMAL
MC_CV_MDC_IDC_ZONE_ID: 1
MC_CV_MDC_IDC_ZONE_ID: 2
MC_CV_MDC_IDC_ZONE_ID: 3
MDC_IDC_MSMT_BATTERY_REMAINING_LONGEVITY: 66 MO
MDC_IDC_MSMT_BATTERY_REMAINING_PERCENTAGE: 66 %
MDC_IDC_MSMT_BATTERY_RRT_TRIGGER: 2.62
MDC_IDC_MSMT_BATTERY_STATUS: NORMAL
MDC_IDC_MSMT_BATTERY_VOLTAGE: 2.96
MDC_IDC_MSMT_CAP_CHARGE_TIME: 9.3
MDC_IDC_MSMT_LEADCHNL_LV_DTM: NORMAL
MDC_IDC_MSMT_LEADCHNL_LV_IMPEDANCE_VALUE: 850
MDC_IDC_MSMT_LEADCHNL_LV_PACING_THRESHOLD_AMPLITUDE: 0.88
MDC_IDC_MSMT_LEADCHNL_LV_PACING_THRESHOLD_POLARITY: NORMAL
MDC_IDC_MSMT_LEADCHNL_LV_PACING_THRESHOLD_PULSEWIDTH: 1
MDC_IDC_MSMT_LEADCHNL_RA_DTM: NORMAL
MDC_IDC_MSMT_LEADCHNL_RA_IMPEDANCE_VALUE: 360
MDC_IDC_MSMT_LEADCHNL_RA_PACING_THRESHOLD_AMPLITUDE: 0.5
MDC_IDC_MSMT_LEADCHNL_RA_PACING_THRESHOLD_POLARITY: NORMAL
MDC_IDC_MSMT_LEADCHNL_RA_PACING_THRESHOLD_PULSEWIDTH: 0.5
MDC_IDC_MSMT_LEADCHNL_RA_SENSING_INTR_AMPL: 4.2
MDC_IDC_MSMT_LEADCHNL_RV_IMPEDANCE_VALUE: 460
MDC_IDC_MSMT_LEADCHNL_RV_PACING_THRESHOLD_POLARITY: NORMAL
MDC_IDC_MSMT_LEADCHNL_RV_SENSING_INTR_AMPL: 3.8
MDC_IDC_PG_IMPLANT_DTM: NORMAL
MDC_IDC_PG_MFG: NORMAL
MDC_IDC_PG_MODEL: NORMAL
MDC_IDC_PG_SERIAL: NORMAL
MDC_IDC_PG_TYPE: NORMAL
MDC_IDC_SESS_DTM: NORMAL
MDC_IDC_SESS_TYPE: NORMAL
MDC_IDC_SET_BRADY_AT_MODE_SWITCH_RATE: 180
MDC_IDC_SET_BRADY_LOWRATE: 60
MDC_IDC_SET_BRADY_MAX_SENSOR_RATE: 120
MDC_IDC_SET_BRADY_MAX_TRACKING_RATE: 120
MDC_IDC_SET_BRADY_MODE: NORMAL
MDC_IDC_SET_BRADY_PAV_DELAY: 120
MDC_IDC_SET_BRADY_SAV_DELAY: 100
MDC_IDC_SET_CRT_LVRV_DELAY: 15
MDC_IDC_SET_CRT_PACED_CHAMBERS: NORMAL
MDC_IDC_SET_LEADCHNL_LV_PACING_AMPLITUDE: 1.88
MDC_IDC_SET_LEADCHNL_LV_PACING_POLARITY: NORMAL
MDC_IDC_SET_LEADCHNL_LV_PACING_PULSEWIDTH: 1
MDC_IDC_SET_LEADCHNL_RA_PACING_AMPLITUDE: 1.5
MDC_IDC_SET_LEADCHNL_RA_PACING_POLARITY: NORMAL
MDC_IDC_SET_LEADCHNL_RA_PACING_PULSEWIDTH: 0.5
MDC_IDC_SET_LEADCHNL_RA_SENSING_POLARITY: NORMAL
MDC_IDC_SET_LEADCHNL_RA_SENSING_SENSITIVITY: 0.3
MDC_IDC_SET_LEADCHNL_RV_PACING_AMPLITUDE: 0.25
MDC_IDC_SET_LEADCHNL_RV_PACING_POLARITY: NORMAL
MDC_IDC_SET_LEADCHNL_RV_PACING_PULSEWIDTH: 0.1
MDC_IDC_SET_LEADCHNL_RV_SENSING_POLARITY: NORMAL
MDC_IDC_SET_LEADCHNL_RV_SENSING_SENSITIVITY: 0.3
MDC_IDC_SET_ZONE_STATUS: NORMAL
MDC_IDC_SET_ZONE_TYPE: NORMAL
MDC_IDC_STAT_AT_BURDEN_PERCENT: 0
MDC_IDC_STAT_BRADY_RA_PERCENT_PACED: 3.3
MDC_IDC_STAT_BRADY_RV_PERCENT_PACED: 100
MDC_IDC_STAT_CRT_PERCENT_PACED: 99
MDC_IDC_STAT_TACHYTHERAPY_ATP_DELIVERED_RECENT: 0
MDC_IDC_STAT_TACHYTHERAPY_SHOCKS_ABORTED_RECENT: 0
MDC_IDC_STAT_TACHYTHERAPY_SHOCKS_DELIVERED_RECENT: 0

## 2025-06-18 PROCEDURE — 93295 DEV INTERROG REMOTE 1/2/MLT: CPT | Performed by: NURSE PRACTITIONER

## 2025-06-18 PROCEDURE — 93296 REM INTERROG EVL PM/IDS: CPT | Performed by: NURSE PRACTITIONER

## 2025-06-26 ENCOUNTER — OFFICE VISIT (OUTPATIENT)
Dept: FAMILY MEDICINE CLINIC | Facility: CLINIC | Age: 65
End: 2025-06-26
Payer: MEDICARE

## 2025-06-26 VITALS
SYSTOLIC BLOOD PRESSURE: 126 MMHG | WEIGHT: 186 LBS | HEART RATE: 68 BPM | BODY MASS INDEX: 31.76 KG/M2 | OXYGEN SATURATION: 97 % | HEIGHT: 64 IN | DIASTOLIC BLOOD PRESSURE: 76 MMHG

## 2025-06-26 DIAGNOSIS — R25.2 MUSCLE CRAMPS: ICD-10-CM

## 2025-06-26 DIAGNOSIS — E11.42 DIABETIC PERIPHERAL NEUROPATHY: Primary | ICD-10-CM

## 2025-06-26 RX ORDER — GABAPENTIN 300 MG/1
300 CAPSULE ORAL
Qty: 30 CAPSULE | Refills: 1 | Status: SHIPPED | OUTPATIENT
Start: 2025-06-26

## 2025-06-26 NOTE — PROGRESS NOTES
Answers submitted by the patient for this visit:  Problem not listed (Submitted on 6/19/2025)  Chief Complaint: Other medical problem  Reason for appointment: Nerapathy in feet  Other symptom: Feet pain and toe curling  Onset: 1 to 6 months  Chronicity: new  Frequency: daily  Subjective   Susy Hanson is a 64 y.o. female.     History of Present Illness  The patient presents for evaluation of neuropathy.    She has a history of neuropathy but recently experienced a new symptom of her toes curling under, which she is unable to straighten. This symptom is not constant but occurs intermittently, accompanied by severe pain. She describes the sensation as if her toes are cold, even though they are not. She has been using compression ankle socks, which provide some relief, but she finds them uncomfortable to  and often removes them during the night.    She is not currently on any medication for her neuropathy. She was previously prescribed gabapentin following a mastectomy but discontinued its use due to unpleasant side effects. She is considering resuming gabapentin at night to see if it improves her symptoms. She recalls that when her nerve pain was particularly severe, she increased the dosage and frequency of gabapentin.     She maintains good hydration and avoids bananas due to their high potassium content. She reports no chest pain or shortness of breath.    PAST SURGICAL HISTORY:  Mastectomy    FAMILY HISTORY  Her sister has vascular disease and had a toe amputated.       The following portions of the patient's history were reviewed and updated as appropriate: allergies, current medications, past family history, past medical history, past social history, past surgical history, and problem list.  Past Medical History:   Diagnosis Date    Allergic 2008    Weeds, grasses, mold, mildew, etc.    Anxiety 2000    Breast cancer     LEFT BREAST 7/2021    Bursitis of hip 2024    Cataract     Had surgery to  remove August 2023    CHF (congestive heart failure)     Cholesterol blood reduced 2000    Colon polyp 12/2023    Removed during colonoscopy    COPD (chronic obstructive pulmonary disease) 2024    Coronary artery disease 2019    CTS (carpal tunnel syndrome) 2015    Surgery on both hands    Depression     DM (diabetes mellitus)     GERD (gastroesophageal reflux disease)     History of medical problems 2008    Bladder prolapse repeat Dr. Roper    HL (hearing loss)     Hyperlipidemia     Hypertension     SAW CARDIO/ STRESS TEST 2020 - NOW MEDS MANAGED BY PCP     Insulin dependent diabetes mellitus 2008    Irritable bowel syndrome 2006    Peripheral neuropathy     PONV (postoperative nausea and vomiting)     Renal insufficiency 2020    See Dr. funes for moderate kidney failure    Sleep apnea     WEARS CPAP    Sleep apnea, obstructive     Type 2 diabetes mellitus 2008    Urinary tract infection 6/23    UTI seen at [facility]     Past Surgical History:   Procedure Laterality Date    BLADDER REPAIR      BREAST AUGMENTATION      BREAST BIOPSY      BREAST RECONSTRUCTION Bilateral 08/26/2021    Procedure: BILATERAL PREPECTORALPLACMENT TISSUE EXPANDERS AND ALLODERM;  Surgeon: Heri Arreaga MD;  Location: [facility];  Service: Plastics;  Laterality: Bilateral;    BREAST TISSUE EXPANDER REMOVAL INSERTION OF IMPLANT Bilateral 12/23/2021    Procedure: BILATERAL REMOVAL TISSUE EXPANDERS AND PLACEMENT OF IMPLANTS;  Surgeon: Heri Arreaga MD;  Location: [facility] MAIN OR;  Service: Plastics;  Laterality: Bilateral;    CARDIAC CATHETERIZATION Left 04/13/2022    Procedure: Cardiac Catheterization/Vascular Study;  Surgeon: Christo Murphy MD;  Location: [facility] CATH INVASIVE LOCATION;  Service: Cardiovascular;  Laterality: Left;    CARDIAC ELECTROPHYSIOLOGY PROCEDURE N/A 09/19/2022    Procedure: ICD implant [place] aware;  Surgeon: Esteban Kendrick MD;  Location:  [facility] CATH INVASIVE LOCATION;  Service:  Cardiology;  Laterality: N/A;    CARDIAC ELECTROPHYSIOLOGY PROCEDURE N/A 09/20/2022    Procedure: lead repositioning;  Surgeon: Esteban Kendrick MD;  Location: [facility] CATH INVASIVE LOCATION;  Service: Cardiology;  Laterality: N/A;    CARDIAC SURGERY  2022    Defibrillator/pacemaker implant    CARDIOVASCULAR STRESS TEST  2020    COLONOSCOPY  11/2012    EYE SURGERY  7/23    Cataracts removed    HAND SURGERY  2015    Both hands    HYSTERECTOMY      INSERT / REPLACE / REMOVE PACEMAKER  2022    LIPOMA EXCISION N/A 1/24/2025    Procedure: Neck lipoma excision;  Surgeon: Lita Garcia MD;  Location: [facility] MAIN OR;  Service: General;  Laterality: N/A;    LYMPH NODE BIOPSY  8/29/2021    MASTECTOMY W/ SENTINEL NODE BIOPSY Bilateral 08/26/2021    Procedure: bilateral total mastectomy, left axillary sentinel lymph node biopsy, possible reconstruction.;  Surgeon: Asia Perkins MD;  Location: [facility] MAIN OR;  Service: General;  Laterality: Bilateral;    TRIGGER POINT INJECTION  2012    Left thumb    TUBAL ABDOMINAL LIGATION  1981     Family History   Problem Relation Age of Onset    Diabetes Mother     Heart disease Mother     Sleep apnea Mother     Snoring Mother     COPD Mother     Hyperlipidemia Mother     Alcohol abuse Mother     Anxiety disorder Mother     Depression Mother     Hypertension Mother     Other Mother     Alzheimer's disease Father     Diabetes Father     Heart disease Father     Sleep apnea Father     Snoring Father     Hyperlipidemia Father     Heart failure Father     Kidney disease Father     Hypertension Father     Obesity Father     Heart attack Father     Other Father     Hearing loss Father     Diabetes Sister     Heart disease Sister     Sleep apnea Sister     Snoring Sister     COPD Sister     Anxiety disorder Sister     Depression Sister     Hypertension Sister     Clotting disorder Sister     Hyperlipidemia Sister     Other Sister     Hyperlipidemia Sister     Clotting disorder Sister      Heart disease Sister     Hypertension Sister     Hyperlipidemia Brother     Hypertension Brother     Heart disease Brother     Breast cancer Maternal Aunt 80    COPD Maternal Aunt         2 maternal Aunts    Breast cancer Paternal Aunt     Diabetes Maternal Grandmother     Breast cancer Paternal Grandmother 60    Cancer Paternal Grandmother     Diabetes Paternal Grandmother     Hyperlipidemia Son     Diabetes Brother     Hypertension Brother     Hypertension Brother     Malig Hyperthermia Neg Hx      Social History     Socioeconomic History    Marital status:    Tobacco Use    Smoking status: Former     Current packs/day: 0.00     Average packs/day: 2.0 packs/day for 36.0 years (72.0 ttl pk-yrs)     Types: Cigarettes     Start date: 1972     Quit date: 2008     Years since quittin.4     Passive exposure: Past    Smokeless tobacco: Never   Vaping Use    Vaping status: Never Used   Substance and Sexual Activity    Alcohol use: No    Drug use: No    Sexual activity: Yes     Partners: Male     Birth control/protection: None, Tubal ligation, Hysterectomy         Current Outpatient Medications:     Accu-Chek FastClix Lancets misc, Use to check blood sugar three times daily., Disp: 300 each, Rfl: 5    ALPRAZolam (XANAX) 1 MG tablet, Take 1 tablet by mouth 2 (Two) Times a Day., Disp: 60 tablet, Rfl: 0    atorvastatin (LIPITOR) 20 MG tablet, Take 1 tablet by mouth Daily., Disp: 90 tablet, Rfl: 1    Azelastine HCl 137 MCG/SPRAY solution, Spray 2 sprays in each nostril Daily., Disp: 90 mL, Rfl: 1    cetirizine (zyrTEC) 10 MG tablet, Take 1 tablet by mouth Daily As Needed., Disp: , Rfl:     Continuous Glucose Sensor (Dexcom G7 Sensor) misc, Use 1 each Every 10 (Ten) Days., Disp: 9 each, Rfl: 3    dapagliflozin Propanediol (Farxiga) 10 MG tablet, Take 10 mg by mouth Daily., Disp: 90 tablet, Rfl: 1    dilTIAZem CD (CARDIZEM CD) 180 MG 24 hr capsule, Take 2 capsules by mouth Daily., Disp: 90 capsule, Rfl: 1     "furosemide (LASIX) 20 MG tablet, Take 1 tablet by mouth Every Other Day., Disp: 45 tablet, Rfl: 1    glucose blood test strip, Use to check blood sugar 3 times daily., Disp: 300 each, Rfl: 2    hydrALAZINE (APRESOLINE) 100 MG tablet, Take 1 tablet by mouth 3 (Three) Times a Day., Disp: 270 tablet, Rfl: 1    Insulin Disposable Pump (Omnipod 5 [id number] Pods Gen 5) misc, Use 1 pod Every Other Day., Disp: 15 each, Rfl: 3    Insulin Lispro (humaLOG) 100 UNIT/ML injection, For use in insulin pump. Max daily dose 100 units daily, Disp: 30 mL, Rfl: 12    isosorbide mononitrate (IMDUR) 30 MG 24 hr tablet, Take 1 tablet by mouth Daily., Disp: 90 tablet, Rfl: 3    Lancets Misc. (Accu-Chek FastClix Lancet) kit, Use to check blood sugar three times daily., Disp: 1 kit, Rfl: 0    metoprolol succinate XL (TOPROL-XL) 100 MG 24 hr tablet, Take 1 tablet by mouth 2 (Two) Times a Day., Disp: 180 tablet, Rfl: 1    Omega-3 Fatty Acids (fish oil) 1000 MG capsule capsule, Take 1 capsule by mouth Daily With Breakfast., Disp: 90 capsule, Rfl: 0    ondansetron (Zofran) 4 MG tablet, Take 1 tablet by mouth Every 8 (Eight) Hours As Needed for Nausea or Vomiting., Disp: 20 tablet, Rfl: 0    pantoprazole (PROTONIX) 40 MG EC tablet, Take 1 tablet by mouth Daily., Disp: 90 tablet, Rfl: 3    sodium bicarbonate 650 MG tablet, Take 2 tablets by mouth Daily., Disp: 180 tablet, Rfl: 3    Umeclidinium-Vilanterol (Anoro Ellipta) 62.5-25 MCG/ACT aerosol powder  inhaler, Inhale 1 puff by mouth Daily., Disp: 180 each, Rfl: 4    gabapentin (NEURONTIN) 300 MG capsule, Take 1 capsule by mouth every night at bedtime., Disp: 30 capsule, Rfl: 1    Review of Systems  ROS done and noted in HPI    /76 (BP Location: Right arm, Patient Position: Sitting, Cuff Size: Large Adult)   Pulse 68   Ht 162.6 cm (64\")   Wt 84.4 kg (186 lb)   LMP  (LMP Unknown)   SpO2 97%   BMI 31.93 kg/m²           Objective   Physical Exam  Vitals and nursing note reviewed. "   Constitutional:       Appearance: Normal appearance. She is obese.   Cardiovascular:      Rate and Rhythm: Normal rate and regular rhythm.      Pulses:           Dorsalis pedis pulses are 1+ on the right side and 1+ on the left side.        Posterior tibial pulses are 1+ on the right side and 1+ on the left side.      Heart sounds: Normal heart sounds.   Pulmonary:      Breath sounds: Normal breath sounds.   Musculoskeletal:      Right lower leg: No edema.      Left lower leg: No edema.   Neurological:      Mental Status: She is alert.       Physical Exam         Results  Lab Results   Component Value Date    GLUCOSE 150 (H) 05/06/2025    BUN 30 (H) 05/06/2025    CREATININE 1.29 (H) 05/06/2025     05/06/2025    K 4.7 05/06/2025     05/06/2025    CALCIUM 10.3 05/06/2025    PROTEINTOT 8.0 05/06/2025    ALBUMIN 5.1 05/06/2025    ALT 23 05/06/2025    AST 19 05/06/2025    ALKPHOS 98 05/06/2025    BILITOT 0.3 05/06/2025    GLOB 2.9 05/06/2025    AGRATIO 1.8 05/06/2025    BCR 23.3 05/06/2025    ANIONGAP 13.2 05/06/2025    EGFR 45.9 (L) 05/06/2025           Labs   - Potassium level: 05/06/2025, Normal       Assessment & Plan   Problems Addressed this Visit          Neuro    Diabetic peripheral neuropathy - Primary     Other Visit Diagnoses         Muscle cramps        Relevant Orders    Magnesium          Diagnoses         Codes Comments      Diabetic peripheral neuropathy    -  Primary ICD-10-CM: E11.42  ICD-9-CM: 250.60, 357.2       Muscle cramps     ICD-10-CM: R25.2  ICD-9-CM: 729.82           Assessment & Plan  1. Neuropathy/muscle cramps.  - She reports experiencing toe curling and pain, which may be due to spasms or cramps.  - Her most recent blood work from 05/06/2025 showed normal potassium levels.  - A prescription for gabapentin 300 mg, to be taken at bedtime, has been provided. She is advised to continue this regimen for a minimum of 2 to 3 weeks before assessing its effectiveness.  - A magnesium  level test will be conducted. If the gabapentin does not provide sufficient relief, an alternative treatment plan involving Lyrica will be considered. Additionally, she is encouraged to maintain adequate hydration                Patient or patient representative verbalized consent for the use of Ambient Listening during the visit with  Erica Avila MD for chart documentation. 6/26/2025  11:24 EDT

## 2025-07-01 ENCOUNTER — PATIENT MESSAGE (OUTPATIENT)
Dept: FAMILY MEDICINE CLINIC | Facility: CLINIC | Age: 65
End: 2025-07-01
Payer: MEDICARE

## 2025-07-01 DIAGNOSIS — E11.49 TYPE 2 DIABETES MELLITUS WITH OTHER DIABETIC NEUROLOGICAL COMPLICATION: ICD-10-CM

## 2025-07-01 DIAGNOSIS — E78.2 MIXED HYPERLIPIDEMIA: ICD-10-CM

## 2025-07-01 DIAGNOSIS — I10 ESSENTIAL HYPERTENSION: ICD-10-CM

## 2025-07-01 DIAGNOSIS — Z95.810 PRESENCE OF BIVENTRICULAR IMPLANTABLE CARDIOVERTER-DEFIBRILLATOR (ICD): Chronic | ICD-10-CM

## 2025-07-01 DIAGNOSIS — I42.0 DILATED CARDIOMYOPATHY: Chronic | ICD-10-CM

## 2025-07-01 RX ORDER — CLOTRIMAZOLE 10 MG/1
10 LOZENGE ORAL
Qty: 25 TABLET | Refills: 0 | Status: SHIPPED | OUTPATIENT
Start: 2025-07-01 | End: 2025-07-07

## 2025-07-01 RX ORDER — DILTIAZEM HYDROCHLORIDE 180 MG/1
360 CAPSULE, COATED, EXTENDED RELEASE ORAL DAILY
Qty: 180 CAPSULE | Refills: 3 | Status: SHIPPED | OUTPATIENT
Start: 2025-07-01

## 2025-07-25 ENCOUNTER — LAB (OUTPATIENT)
Dept: FAMILY MEDICINE CLINIC | Facility: CLINIC | Age: 65
End: 2025-07-25
Payer: MEDICARE

## 2025-07-25 ENCOUNTER — OFFICE VISIT (OUTPATIENT)
Dept: FAMILY MEDICINE CLINIC | Facility: CLINIC | Age: 65
End: 2025-07-25
Payer: MEDICARE

## 2025-07-25 VITALS
HEIGHT: 64 IN | HEART RATE: 60 BPM | WEIGHT: 185 LBS | OXYGEN SATURATION: 98 % | BODY MASS INDEX: 31.58 KG/M2 | TEMPERATURE: 97.4 F | DIASTOLIC BLOOD PRESSURE: 58 MMHG | SYSTOLIC BLOOD PRESSURE: 134 MMHG

## 2025-07-25 DIAGNOSIS — L21.9 SEBORRHEIC DERMATITIS: ICD-10-CM

## 2025-07-25 DIAGNOSIS — Z00.00 ENCOUNTER FOR ANNUAL WELLNESS EXAM IN MEDICARE PATIENT: Primary | ICD-10-CM

## 2025-07-25 DIAGNOSIS — B37.0 THRUSH: ICD-10-CM

## 2025-07-25 DIAGNOSIS — B35.3 TINEA PEDIS OF RIGHT FOOT: ICD-10-CM

## 2025-07-25 DIAGNOSIS — I10 ESSENTIAL HYPERTENSION: ICD-10-CM

## 2025-07-25 DIAGNOSIS — R25.2 MUSCLE CRAMPS: ICD-10-CM

## 2025-07-25 DIAGNOSIS — E78.2 MIXED HYPERLIPIDEMIA: ICD-10-CM

## 2025-07-25 LAB — MAGNESIUM SERPL-MCNC: 2.5 MG/DL (ref 1.6–2.4)

## 2025-07-25 PROCEDURE — 36415 COLL VENOUS BLD VENIPUNCTURE: CPT

## 2025-07-25 PROCEDURE — 83735 ASSAY OF MAGNESIUM: CPT | Performed by: FAMILY MEDICINE

## 2025-07-25 RX ORDER — NYSTATIN 100000 [USP'U]/ML
500000 SUSPENSION ORAL 4 TIMES DAILY
Qty: 200 ML | Refills: 0 | Status: SHIPPED | OUTPATIENT
Start: 2025-07-25

## 2025-07-25 RX ORDER — CLOTRIMAZOLE 1 %
1 CREAM (GRAM) TOPICAL 2 TIMES DAILY
Qty: 28 G | Refills: 0 | Status: SHIPPED | OUTPATIENT
Start: 2025-07-25

## 2025-07-25 NOTE — PROGRESS NOTES
Subjective   The ABCs of the Annual Wellness Visit  Medicare Wellness Visit      Susy Hanson is a 65 y.o. patient who presents for a Medicare Wellness Visit.    The following portions of the patient's history were reviewed and   updated as appropriate: allergies, current medications, past family history, past medical history, past social history, past surgical history, and problem list.    Compared to one year ago, the patient's physical   health is the same.  Compared to one year ago, the patient's mental   health is the same.    Recent Hospitalizations:  She was not admitted to the hospital during the last year.     Current Medical Providers:  Patient Care Team:  Erica Avila MD as PCP - General  [facility] as Consulting Physician (Nephrology)  Sandee Hull MD as Consulting Physician (Bariatrics)  Christo Murphy MD as Consulting Physician (Cardiology)  Vishal Jin MD as Consulting Physician (Cardiac Electrophysiology)  Shin Helton MD as Consulting Physician (Endocrinology)  Rodney Williamson APRN as Nurse Practitioner (Nurse Practitioner)  Lita Garcia MD as Surgeon (General Surgery)  Arpita Barone PA as Physician Assistant (Physician Assistant)  Major Fischer MD as Consulting Physician (Pulmonary Disease)    Outpatient Medications Prior to Visit   Medication Sig Dispense Refill    Accu-Chek FastClix Lancets misc Use to check blood sugar three times daily. 300 each 5    ALPRAZolam (XANAX) 1 MG tablet Take 1 tablet by mouth 2 (Two) Times a Day. 60 tablet 0    atorvastatin (LIPITOR) 20 MG tablet Take 1 tablet by mouth Daily. 90 tablet 1    Azelastine HCl 137 MCG/SPRAY solution Spray 2 sprays in each nostril Daily. 90 mL 1    cetirizine (zyrTEC) 10 MG tablet Take 1 tablet by mouth Daily As Needed.      Continuous Glucose Sensor (Dexcom G7 Sensor) misc Use 1 each Every 10 (Ten) Days. 9 each 3    dapagliflozin Propanediol (Farxiga) 10 MG tablet Take 10 mg by mouth Daily. 90  tablet 1    dilTIAZem CD (CARDIZEM CD) 180 MG 24 hr capsule Take 2 capsules by mouth Daily. 180 capsule 3    furosemide (LASIX) 20 MG tablet Take 1 tablet by mouth Every Other Day. 45 tablet 1    gabapentin (NEURONTIN) 300 MG capsule Take 1 capsule by mouth every night at bedtime. 30 capsule 1    gabapentin (NEURONTIN) 300 MG capsule Take 1 capsule by mouth every night at bedtime. 30 capsule 0    glucose blood test strip Use to check blood sugar 3 times daily. 300 each 2    hydrALAZINE (APRESOLINE) 100 MG tablet Take 1 tablet by mouth 3 (Three) Times a Day. 270 tablet 1    Insulin Disposable Pump (Omnipod 5 [id number] Pods Gen 5) misc Use 1 pod Every Other Day. 15 each 3    Insulin Lispro (humaLOG) 100 UNIT/ML injection For use in insulin pump. Max daily dose 100 units daily 30 mL 12    isosorbide mononitrate (IMDUR) 30 MG 24 hr tablet Take 1 tablet by mouth Daily. 90 tablet 3    Lancets Misc. (Accu-Chek FastClix Lancet) kit Use to check blood sugar three times daily. 1 kit 0    metoprolol succinate XL (TOPROL-XL) 100 MG 24 hr tablet Take 1 tablet by mouth 2 (Two) Times a Day. 180 tablet 1    Omega-3 Fatty Acids (fish oil) 1000 MG capsule capsule Take 1 capsule by mouth Daily With Breakfast. 90 capsule 0    ondansetron (Zofran) 4 MG tablet Take 1 tablet by mouth Every 8 (Eight) Hours As Needed for Nausea or Vomiting. 20 tablet 0    pantoprazole (PROTONIX) 40 MG EC tablet Take 1 tablet by mouth Daily. 90 tablet 3    sodium bicarbonate 650 MG tablet Take 2 tablets by mouth Daily. 180 tablet 3    Umeclidinium-Vilanterol (Anoro Ellipta) 62.5-25 MCG/ACT aerosol powder  inhaler Inhale 1 puff by mouth Daily. 180 each 4    nystatin (MYCOSTATIN) 100,000 unit/mL suspension Take 5 mL by mouth 4 (Four) Times a Day. 200 mL 0     No facility-administered medications prior to visit.     No opioid medication identified on active medication list. I have reviewed chart for other potential  high risk medication/s and harmful drug  "interactions in the elderly.      Aspirin is not on active medication list.  Aspirin use is not indicated based on review of current medical condition/s. Risk of harm outweighs potential benefits.  .    Patient Active Problem List   Diagnosis    Encounter for general adult medical examination without abnormal findings    Essential hypertension    Irritable bowel syndrome    Mixed anxiety depressive disorder    Mixed hyperlipidemia    Obesity (BMI 30-39.9)    Encounter for annual wellness exam in Medicare patient    Type 2 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    Vitamin D deficiency    Hypertensive urgency    Peripheral neuropathy    ANDREAS on CPAP    Chest pain, atypical    Breast neoplasm, Tis (DCIS), left    Cold intolerance    Insomnia    Breast pain    Dyspnea    h/o Acute kidney injury    LBBB (left bundle branch block)    h/o Resolved nonischemic, nondilated, cardiomyopathy    Presence of biventricular  ICD    Hemopericardium    h/o Pericarditis    Pericardial effusion    Syndrome X, metabolic    At risk for fluid volume overload    h/o resolved Left ventricular failure    Stage 3b chronic kidney disease    Hyperkalemia    mild PAH (pulmonary artery hypertension)    Type 2 diabetes mellitus with hyperglycemia, with long-term current use of insulin    Diabetic peripheral neuropathy    Diabetic nephropathy associated with type 2 diabetes mellitus     Advance Care Planning Advance Directive is on file.  ACP discussion was held with the patient during this visit. Patient has an advance directive in EMR which is still valid.             Objective   Vitals:    07/25/25 1324   BP: 134/58   BP Location: Left arm   Patient Position: Sitting   Cuff Size: Large Adult   Pulse: 60   Temp: 97.4 °F (36.3 °C)   TempSrc: Temporal   SpO2: 98%   Weight: 83.9 kg (185 lb)   Height: 162.6 cm (64\")   PainSc: 0-No pain       Estimated body mass index is 31.76 kg/m² as calculated from the following:    Height " "as of this encounter: 162.6 cm (64\").    Weight as of this encounter: 83.9 kg (185 lb).                Does the patient have evidence of cognitive impairment? No  MMSE done     Lab Results   Component Value Date    HGBA1C 6.76 (H) 2025                                                                                                Health  Risk Assessment    Smoking Status:  Social History     Tobacco Use   Smoking Status Former    Current packs/day: 0.00    Average packs/day: 2.0 packs/day for 36.0 years (72.0 ttl pk-yrs)    Types: Cigarettes    Start date: 1972    Quit date: 2008    Years since quittin.5    Passive exposure: Past   Smokeless Tobacco Never     Alcohol Consumption:  Social History     Substance and Sexual Activity   Alcohol Use No       Fall Risk Screen  STEADI Fall Risk Assessment was completed, and patient is at LOW risk for falls.Assessment completed on:2025    Depression Screening   Little interest or pleasure in doing things? Not at all   Feeling down, depressed, or hopeless? Not at all   PHQ-2 Total Score 0      Health Habits and Functional and Cognitive Screenin/21/2025     3:28 PM   Functional & Cognitive Status   Do you have difficulty preparing food and eating? No   Do you have difficulty bathing yourself, getting dressed or grooming yourself? No   Do you have difficulty using the toilet? No   Do you have difficulty moving around from place to place? No   Do you have trouble with steps or getting out of a bed or a chair? No   Current Diet Diabetic Diet   Dental Exam Up to date   Eye Exam Up to date   Exercise (times per week) 1 times per week   Current Exercises Include Yard Work   Do you need help using the phone?  No   Are you deaf or do you have serious difficulty hearing?  No   Do you need help to go to places out of walking distance? No   Do you need help shopping? No   Do you need help preparing meals?  No   Do you need help with housework?  No   Do " you need help with laundry? No   Do you need help taking your medications? No   Do you need help managing money? No   Do you ever drive or ride in a car without wearing a seat belt? No   Have you felt unusual fatigue (could be tiredness), stress, anger or loneliness in the last month? No   Who do you live with? Spouse   If you need help, do you have trouble finding someone available to you? No   Have you been bothered in the last four weeks by sexual problems? No   Do you have difficulty concentrating, remembering or making decisions? No           Age-appropriate Screening Schedule:  Refer to the list below for future screening recommendations based on patient's age, sex and/or medical conditions. Orders for these recommended tests are listed in the plan section. The patient has been provided with a written plan.    Health Maintenance List  Health Maintenance   Topic Date Due    DIABETIC FOOT EXAM  05/18/2022    LIPID PANEL  08/05/2025    INFLUENZA VACCINE  10/01/2025    HEMOGLOBIN A1C  11/06/2025    DXA SCAN  12/13/2025    DIABETIC EYE EXAM  03/21/2026    URINE MICROALBUMIN-CREATININE RATIO (uACR)  05/06/2026    ANNUAL WELLNESS VISIT  07/25/2026    COLORECTAL CANCER SCREENING  11/16/2026    TDAP/TD VACCINES (2 - Td or Tdap) 07/26/2032    HEPATITIS C SCREENING  Completed    COVID-19 Vaccine  Completed    Pneumococcal Vaccine 50+  Completed    ZOSTER VACCINE  Completed    MAMMOGRAM  Discontinued    LUNG CANCER SCREENING  Discontinued                                                                                                                                                CMS Preventative Services Quick Reference  Risk Factors Identified During Encounter  Immunizations Discussed/Encouraged: Influenza    The above risks/problems have been discussed with the patient.  Pertinent information has been shared with the patient in the After Visit Summary.  An After Visit Summary and PPPS were made available to the  "patient.    Follow Up:   Next Medicare Wellness visit to be scheduled in 1 year.         Additional E&M Note during same encounter follows:  Patient has additional, significant, and separately identifiable condition(s)/problem(s) that require work above and beyond the Medicare Wellness Visit     Chief Complaint  Medicare Wellness-subsequent, Nail Problem (Toes itching in between. ), Suspicious Skin Lesion, and Thrush    Subjective   HPI  Susy is also being seen today for additional medical problem/s: follow up on bp and chol  Endo follows dm  Swelling left ankle last night  None since  She thinks her thrush is returning  she rinses her mouth out every time she uses her indoor room.  She was treated a few months ago with Mycelex atrocious and followed that with nystatin oral suspension.  She has some irritation between her 3rd and 4th toes on the right foot.  She also has a few skin lesions that she is concerned about    Review of Systems   Constitutional: Negative.    Respiratory: Negative.     Cardiovascular: Negative.         Objective   Vital Signs:  /58 (BP Location: Left arm, Patient Position: Sitting, Cuff Size: Large Adult)   Pulse 60   Temp 97.4 °F (36.3 °C) (Temporal)   Ht 162.6 cm (64\")   Wt 83.9 kg (185 lb)   SpO2 98%   BMI 31.76 kg/m²   Physical Exam  Vitals and nursing note reviewed.   Constitutional:       Appearance: Normal appearance. She is obese.   HENT:      Mouth/Throat:      Comments: Thrush present on tongue  Cardiovascular:      Rate and Rhythm: Normal rate and regular rhythm.      Heart sounds: Normal heart sounds.   Pulmonary:      Effort: Pulmonary effort is normal.      Breath sounds: Normal breath sounds.   Musculoskeletal:      Right lower leg: No edema.      Left lower leg: No edema.   Skin:     Comments: Irritation and flaking between 3rd and 4th toes on the right    She has several subdermis in the area she is concerned   Neurological:      Mental Status: She is alert " and oriented to person, place, and time.   Psychiatric:         Mood and Affect: Mood normal.               Lab Results   Component Value Date    GLUCOSE 150 (H) 05/06/2025    BUN 30 (H) 05/06/2025    CREATININE 1.29 (H) 05/06/2025     05/06/2025    K 4.7 05/06/2025     05/06/2025    CALCIUM 10.3 05/06/2025    PROTEINTOT 8.0 05/06/2025    ALBUMIN 5.1 05/06/2025    ALT 23 05/06/2025    AST 19 05/06/2025    ALKPHOS 98 05/06/2025    BILITOT 0.3 05/06/2025    GLOB 2.9 05/06/2025    AGRATIO 1.8 05/06/2025    BCR 23.3 05/06/2025    ANIONGAP 13.2 05/06/2025    EGFR 45.9 (L) 05/06/2025     Lab Results   Component Value Date    CHOL 146 08/05/2024    TRIG 254 (H) 08/05/2024    HDL 41 08/05/2024    LDL 64 08/05/2024          Assessment and Plan      Encounter for annual wellness exam in Medicare patient  She is counseled on the need for weight loss.  She was encouraged to get her flu shot this fall.  She will continue to follow-up with endocrinology regarding her diabetes.       Essential hypertension    Blood pressure is controlled and she will continue hydralazine and metoprolol  Updated labs ordered       Mixed hyperlipidemia     She will continue atorvastatin       Thrush    Orders:    nystatin (MYCOSTATIN) 100,000 unit/mL suspension; Take 5 mL by mouth 4 (Four) Times a Day.    Tinea pedis of right foot  Lotrimin cream       Seborrheic dermatitis  Reassured               Follow Up   Return in about 1 year (around 7/25/2026) for Medicare Wellness.  Patient was given instructions and counseling regarding her condition or for health maintenance advice. Please see specific information pulled into the AVS if appropriate.

## 2025-07-25 NOTE — ASSESSMENT & PLAN NOTE
She is counseled on the need for weight loss.  She was encouraged to get her flu shot this fall.  She will continue to follow-up with endocrinology regarding her diabetes.

## 2025-07-25 NOTE — ASSESSMENT & PLAN NOTE
{Hypertension is (optional):[id number]}  Blood pressure is controlled and she will continue hydralazine and metoprolol  Updated labs ordered

## 2025-07-29 RX ORDER — CHLORAL HYDRATE 500 MG
1000 CAPSULE ORAL
Qty: 90 CAPSULE | Refills: 0 | Status: SHIPPED | OUTPATIENT
Start: 2025-07-29

## 2025-08-13 DIAGNOSIS — E11.42 DIABETIC PERIPHERAL NEUROPATHY: Primary | ICD-10-CM

## 2025-08-13 RX ORDER — GABAPENTIN 300 MG/1
300 CAPSULE ORAL
Qty: 90 CAPSULE | Refills: 0 | Status: SHIPPED | OUTPATIENT
Start: 2025-08-13

## (undated) DEVICE — STPLR SKIN VISISTAT WD 35CT

## (undated) DEVICE — THE STERILE LIGHT HANDLE COVER IS USED WITH STERIS SURGICAL LIGHTING AND VISUALIZATION SYSTEMS.

## (undated) DEVICE — PINNACLE INTRODUCER SHEATH: Brand: PINNACLE

## (undated) DEVICE — INTENDED FOR TISSUE SEPARATION, AND OTHER PROCEDURES THAT REQUIRE A SHARP SURGICAL BLADE TO PUNCTURE OR CUT.: Brand: BARD-PARKER ® CARBON RIB-BACK BLADES

## (undated) DEVICE — NDL HYPO PRECISIONGLIDE/REG 25G 5/8 BLU

## (undated) DEVICE — PACEMAKER CDS: Brand: MEDLINE INDUSTRIES, INC.

## (undated) DEVICE — GLV SURG BIOGEL LTX PF 8 1/2

## (undated) DEVICE — 3M™ IOBAN™ 2 ANTIMICROBIAL INCISE DRAPE 6650EZ: Brand: IOBAN™ 2

## (undated) DEVICE — ELECTRD DEFIB M/FUNC PROPADZ RADIOL 2PK

## (undated) DEVICE — GW PTFE EMERALD HEPCOAT FC J TIP STD .035 3MM 150CM

## (undated) DEVICE — TRAP FLD MINIVAC MEGADYNE 100ML

## (undated) DEVICE — NEEDLE, QUINCKE 22GX3.5": Brand: MEDLINE INDUSTRIES, INC.

## (undated) DEVICE — SOL NS 500ML

## (undated) DEVICE — SYRINGE, LUER LOCK, 60ML: Brand: MEDLINE

## (undated) DEVICE — ADHS SKIN SURG TISS VISC PREMIERPRO EXOFIN HI/VISC FAST/DRY

## (undated) DEVICE — SOL NACL 0.9PCT 100ML SGL

## (undated) DEVICE — KT DYEVERT PLS EZ W/SMART SYR FOR HI VISC CONTRST DISP

## (undated) DEVICE — PROXIMATE RH ROTATING HEAD SKIN STAPLERS (35 WIDE) CONTAINS 35 STAINLESS STEEL STAPLES: Brand: PROXIMATE

## (undated) DEVICE — INTENDED FOR TISSUE SEPARATION, AND OTHER PROCEDURES THAT REQUIRE A SHARP SURGICAL BLADE TO PUNCTURE OR CUT.: Brand: BARD-PARKER ® STAINLESS STEEL BLADES

## (undated) DEVICE — ST ACC MICROPUNCTURE STFF/CANN PLAT/TP 4F 21G 40CM

## (undated) DEVICE — SYR LUERLOK 30CC

## (undated) DEVICE — SOLUTION,WATER,IRRIGATION,1000ML,STERILE: Brand: MEDLINE

## (undated) DEVICE — SYRINGE,TOOMEY,IRRIGATION,70CC,STERILE: Brand: MEDLINE

## (undated) DEVICE — DECANTER: Brand: UNBRANDED

## (undated) DEVICE — DECANT BG O JET

## (undated) DEVICE — CATH DIAG IMPULSE FL4 6F 100CM

## (undated) DEVICE — SOL LR 1000ML

## (undated) DEVICE — DRSNG WND BORDR/ADHS NONADHR/GZ LF 4X4IN STRL

## (undated) DEVICE — INTRO SHEATH PRELUDE SNAP .038 7F 13CM W/SDPRT

## (undated) DEVICE — PREP SOL POVIDONE/IODINE BT 4OZ

## (undated) DEVICE — SUT VIC 3/0 TIES 18IN J110T

## (undated) DEVICE — IRRIGATOR BULB ASEPTO 60CC STRL

## (undated) DEVICE — NDL FLTR 19G 1 1/2 LF

## (undated) DEVICE — TBG SXN LIPO 3/8IDX5/8IN ODX10FT DISP

## (undated) DEVICE — ELECTRD BLD MEGADYNE 2.75IN SS

## (undated) DEVICE — PLASMABLADE PS200-040 4.0: Brand: PLASMABLADE™

## (undated) DEVICE — TOTAL TRAY, 16FR 10ML SIL FOLEY, URN: Brand: MEDLINE

## (undated) DEVICE — TBG INFILTRATION CB 156IN

## (undated) DEVICE — KT SURG TURNOVER 050

## (undated) DEVICE — BIOPATCH™ ANTIMICROBIAL DRESSING WITH CHLORHEXIDINE GLUCONATE IS A HYDROPHILLIC POLYURETHANE ABSORPTIVE FOAM WITH CHLORHEXIDINE GLUCONATE (CHG) WHICH INHIBITS BACTERIAL GROWTH UNDER THE DRESSING. THE DRESSING IS INTENDED TO BE USED TO ABSORB EXUDATE, COVER A WOUND CAUSED BY VASCULAR AND NONVASCULAR PERCUTANEOUS MEDICAL DEVICES DURING SURGERY, AS WELL AS REDUCE LOCAL INFECTION AND COLONIZATION OF MICROORGANISMS.: Brand: BIOPATCH

## (undated) DEVICE — ANTIBACTERIAL UNDYED BRAIDED (POLYGLACTIN 910), SYNTHETIC ABSORBABLE SUTURE: Brand: COATED VICRYL

## (undated) DEVICE — UNDYED BRAIDED (POLYGLACTIN 910), SYNTHETIC ABSORBABLE SUTURE: Brand: COATED VICRYL

## (undated) DEVICE — INTRO SHEATH PRELUDE SNAP .038 10F 13CM W/SDPRT FUSCHIA

## (undated) DEVICE — SPNG LAP 18X18IN LF STRL PK/5

## (undated) DEVICE — SUT ETHIB 0/0 MO6 I8IN CX45D

## (undated) DEVICE — SUT MNCRYL PLS ANTIB UD 4/0 PS2 18IN

## (undated) DEVICE — NEEDLE, QUINCKE, 20GX3.5": Brand: MEDLINE

## (undated) DEVICE — CULT AER/ANAEROB FASTIDIOUS BACT

## (undated) DEVICE — SKIN PREP TRAY W/CHG: Brand: MEDLINE INDUSTRIES, INC.

## (undated) DEVICE — TOWEL,OR,DSP,ST,BLUE,STD,4/PK,20PK/CS: Brand: MEDLINE

## (undated) DEVICE — COVER,MAYO STAND,STERILE: Brand: MEDLINE

## (undated) DEVICE — INTRO SHEATH PRELUDE SNAP .038 6F 13CM W/SDPRT

## (undated) DEVICE — CVR TRANSD CIV FLX TPR 11.9 TO 3.8X61CM

## (undated) DEVICE — 3M™ STERI-STRIP™ REINFORCED ADHESIVE SKIN CLOSURES, R1547, 1/2 IN X 4 IN (12 MM X 100 MM), 6 STRIPS/ENVELOPE: Brand: 3M™ STERI-STRIP™

## (undated) DEVICE — PENCL E/S ULTRAVAC TELESCP NOSE HOLSTR 10FT

## (undated) DEVICE — SUT ETHLN 4/0 PS2 PLSTC 1667G

## (undated) DEVICE — GW CHOICE PT PLS .014 182CM

## (undated) DEVICE — 3M™ IOBAN™ 2 ANTIMICROBIAL INCISE DRAPE 6648EZ: Brand: IOBAN™ 2

## (undated) DEVICE — PK UNIV COMPL 40

## (undated) DEVICE — SYR LL TP 10ML STRL

## (undated) DEVICE — ELECTRD BLD EZ CLN MOD XLNG 2.75IN

## (undated) DEVICE — DRAPE,REIN 53X77,STERILE: Brand: MEDLINE

## (undated) DEVICE — DEV DEL BRST/IMP GEL KELLER2 FUNNEL EA/5

## (undated) DEVICE — SYR ANGIO FNGR FIX CONTRL MLL 10ML

## (undated) DEVICE — STPCK 3WY D201 DISCOFIX

## (undated) DEVICE — SUT MNCRYL 3/0 PS2 18IN MCP497G

## (undated) DEVICE — GLV SURG SENSICARE POLYISPRN W/ALOE PF LF 6.5 GRN STRL

## (undated) DEVICE — APPL CHLORAPREP HI/LITE 26ML ORNG

## (undated) DEVICE — RADIFOCUS OBTURATOR: Brand: RADIFOCUS

## (undated) DEVICE — CAUTERY TIP POLISHER: Brand: DEVON

## (undated) DEVICE — SUT PDS 2/0 SH 27IN Z317H

## (undated) DEVICE — THE STERILE CAMERA HANDLE COVER IS FOR USE WITH THE STERIS SURGICAL LIGHTING AND VISUALIZATION SYSTEMS.

## (undated) DEVICE — Device

## (undated) DEVICE — GLOVE,SURG,SENSICARE SLT,LF,PF,6: Brand: MEDLINE

## (undated) DEVICE — GAUZE,SPONGE,4"X4",16PLY,XRAY,STRL,LF: Brand: MEDLINE

## (undated) DEVICE — NDL HYPO PRECISIONGLIDE REG 22G 1 1/2

## (undated) DEVICE — GLV SURG BIOGEL LTX PF 7

## (undated) DEVICE — CATH DIAG IMPULSE FR4 6F 100CM

## (undated) DEVICE — PK TRY HEART CATH 50

## (undated) DEVICE — BNDG ELAS ELITE V/CLOSE 6IN 5YD LF STRL

## (undated) DEVICE — 3M™ PATIENT PLATE, CORDED, SPLIT, LARGE, 40 PER CASE, 1179: Brand: 3M™

## (undated) DEVICE — NDL HYPO PRECISIONGLIDE REG 25G 1 1/2

## (undated) DEVICE — PATIENT RETURN ELECTRODE, SINGLE-USE, CONTACT QUALITY MONITORING, ADULT, WITH 9FT CORD, FOR PATIENTS WEIGING OVER 33LBS. (15KG): Brand: MEGADYNE

## (undated) DEVICE — JACKSON-PRATT 100CC BULB RESERVOIR: Brand: CARDINAL HEALTH

## (undated) DEVICE — LOU MINOR PROCEDURE: Brand: MEDLINE INDUSTRIES, INC.

## (undated) DEVICE — DISPOSABLE BIPOLAR CABLE 12FT. (3.6M): Brand: KIRWAN

## (undated) DEVICE — 1010 S-DRAPE TOWEL DRAPE 10/BX: Brand: STERI-DRAPE™

## (undated) DEVICE — BANDAGE,GAUZE,BULKEE II,4.5"X4.1YD,STRL: Brand: MEDLINE

## (undated) DEVICE — SUT ETHLN 3/0 PS1 18IN 1663H

## (undated) DEVICE — ADHS LIQ MASTISOL 2/3ML

## (undated) DEVICE — DRSNG SURESITE WNDW 4X4.5

## (undated) DEVICE — PK MINOR LAPAROTOMY 50

## (undated) DEVICE — LINER CANSTR SXN QUICKFIT 3000CC

## (undated) DEVICE — VIOLET BRAIDED (POLYGLACTIN 910), SYNTHETIC ABSORBABLE SUTURE: Brand: COATED VICRYL

## (undated) DEVICE — TBG PENCL TELESCP MEGADYNE SMOKE EVAC 10FT

## (undated) DEVICE — CABL BIPOL W/ALLGTR CLIP/SM 12FT

## (undated) DEVICE — SYR LUERLOK 20CC BX/50

## (undated) DEVICE — CATH GUIDE OUTR CPS DIRECT PL OC W/SHEATH 115DEG 7F/9F 47CM

## (undated) DEVICE — PENCL SMOKE/EVAC MEGADYNE TELESCP 15FT

## (undated) DEVICE — RADIFOCUS GLIDEWIRE: Brand: GLIDEWIRE